# Patient Record
Sex: FEMALE | Race: WHITE | Employment: OTHER | URBAN - METROPOLITAN AREA
[De-identification: names, ages, dates, MRNs, and addresses within clinical notes are randomized per-mention and may not be internally consistent; named-entity substitution may affect disease eponyms.]

---

## 2017-10-04 PROBLEM — N39.0 ACUTE UTI (URINARY TRACT INFECTION): Status: ACTIVE | Noted: 2017-10-04

## 2017-10-04 PROBLEM — R62.7 FTT (FAILURE TO THRIVE) IN ADULT: Status: ACTIVE | Noted: 2017-10-04

## 2017-10-04 PROBLEM — E10.65 TYPE 1 DIABETES MELLITUS WITH HYPERGLYCEMIA (HCC): Status: ACTIVE | Noted: 2017-10-04

## 2017-10-04 PROBLEM — E66.01 MORBID OBESITY WITH BMI OF 40.0-44.9, ADULT (HCC): Status: ACTIVE | Noted: 2017-10-04

## 2017-10-04 PROBLEM — E87.5 HYPERKALEMIA: Status: ACTIVE | Noted: 2017-10-04

## 2017-10-04 PROBLEM — N17.9 AKI (ACUTE KIDNEY INJURY) (HCC): Status: ACTIVE | Noted: 2017-10-04

## 2017-10-07 PROBLEM — L97.919 CHRONIC VENOUS HYPERTENSION (IDIOPATHIC) WITH ULCER AND INFLAMMATION OF BILATERAL LOWER EXTREMITY (HCC): Status: ACTIVE | Noted: 2017-10-07

## 2017-10-07 PROBLEM — L97.929 CHRONIC VENOUS HYPERTENSION (IDIOPATHIC) WITH ULCER AND INFLAMMATION OF BILATERAL LOWER EXTREMITY (HCC): Status: ACTIVE | Noted: 2017-10-07

## 2017-10-07 PROBLEM — I87.333 CHRONIC VENOUS HYPERTENSION (IDIOPATHIC) WITH ULCER AND INFLAMMATION OF BILATERAL LOWER EXTREMITY (HCC): Status: ACTIVE | Noted: 2017-10-07

## 2018-04-12 PROBLEM — E87.1 HYPONATREMIA: Status: ACTIVE | Noted: 2018-04-12

## 2018-04-13 PROBLEM — E11.9 DMII (DIABETES MELLITUS, TYPE 2) (HCC): Status: ACTIVE | Noted: 2018-04-13

## 2018-04-13 PROBLEM — N30.00 ACUTE CYSTITIS: Status: ACTIVE | Noted: 2018-04-13

## 2018-04-13 PROBLEM — R79.89 PRERENAL AZOTEMIA: Status: ACTIVE | Noted: 2018-04-13

## 2018-04-13 PROBLEM — R53.1 GENERALIZED WEAKNESS: Status: ACTIVE | Noted: 2018-04-13

## 2018-04-13 PROBLEM — N17.9 ACUTE RENAL FAILURE (ARF) (HCC): Status: ACTIVE | Noted: 2018-04-13

## 2018-04-13 PROBLEM — L03.115 BILATERAL LOWER LEG CELLULITIS: Status: ACTIVE | Noted: 2018-04-13

## 2018-04-13 PROBLEM — R63.0 POOR APPETITE: Status: ACTIVE | Noted: 2018-04-13

## 2018-04-13 PROBLEM — L03.116 BILATERAL LOWER LEG CELLULITIS: Status: ACTIVE | Noted: 2018-04-13

## 2018-04-13 PROBLEM — E86.0 DEHYDRATION: Status: ACTIVE | Noted: 2018-04-13

## 2018-04-13 PROBLEM — E44.0 PROTEIN-CALORIE MALNUTRITION, MODERATE (HCC): Status: ACTIVE | Noted: 2018-04-13

## 2018-04-14 PROBLEM — R31.9 URINARY TRACT INFECTION WITH HEMATURIA: Status: ACTIVE | Noted: 2017-10-04

## 2018-04-23 ENCOUNTER — TELEPHONE (OUTPATIENT)
Dept: CARDIOLOGY CLINIC | Age: 73
End: 2018-04-23

## 2018-04-26 ENCOUNTER — TELEPHONE (OUTPATIENT)
Dept: PHARMACY | Facility: CLINIC | Age: 73
End: 2018-04-26

## 2018-04-26 LAB — INR BLD: 1.2

## 2018-04-27 ENCOUNTER — ANTI-COAG VISIT (OUTPATIENT)
Dept: PHARMACY | Facility: CLINIC | Age: 73
End: 2018-04-27

## 2018-04-30 ENCOUNTER — ANTI-COAG VISIT (OUTPATIENT)
Dept: PHARMACY | Facility: CLINIC | Age: 73
End: 2018-04-30

## 2018-04-30 DIAGNOSIS — I48.0 PAF (PAROXYSMAL ATRIAL FIBRILLATION) (HCC): ICD-10-CM

## 2018-04-30 LAB — INR BLD: 4.9

## 2018-05-02 ENCOUNTER — ANTI-COAG VISIT (OUTPATIENT)
Dept: PHARMACY | Facility: CLINIC | Age: 73
End: 2018-05-02

## 2018-05-02 ENCOUNTER — TELEPHONE (OUTPATIENT)
Dept: CARDIOLOGY CLINIC | Age: 73
End: 2018-05-02

## 2018-05-02 LAB — INR BLD: 6.2

## 2018-05-04 ENCOUNTER — ANTI-COAG VISIT (OUTPATIENT)
Dept: PHARMACY | Facility: CLINIC | Age: 73
End: 2018-05-04

## 2018-05-04 DIAGNOSIS — I48.0 PAF (PAROXYSMAL ATRIAL FIBRILLATION) (HCC): ICD-10-CM

## 2018-05-04 LAB — INR BLD: 4.8

## 2018-05-07 ENCOUNTER — OFFICE VISIT (OUTPATIENT)
Dept: CARDIOLOGY CLINIC | Age: 73
End: 2018-05-07

## 2018-05-07 ENCOUNTER — ANTI-COAG VISIT (OUTPATIENT)
Dept: PHARMACY | Facility: CLINIC | Age: 73
End: 2018-05-07

## 2018-05-07 VITALS
WEIGHT: 181 LBS | SYSTOLIC BLOOD PRESSURE: 130 MMHG | HEIGHT: 59 IN | DIASTOLIC BLOOD PRESSURE: 70 MMHG | HEART RATE: 71 BPM | OXYGEN SATURATION: 95 % | BODY MASS INDEX: 36.49 KG/M2

## 2018-05-07 DIAGNOSIS — L97.919 CHRONIC VENOUS HYPERTENSION (IDIOPATHIC) WITH ULCER AND INFLAMMATION OF BILATERAL LOWER EXTREMITY (HCC): ICD-10-CM

## 2018-05-07 DIAGNOSIS — L97.929 CHRONIC VENOUS HYPERTENSION (IDIOPATHIC) WITH ULCER AND INFLAMMATION OF BILATERAL LOWER EXTREMITY (HCC): ICD-10-CM

## 2018-05-07 DIAGNOSIS — I87.333 CHRONIC VENOUS HYPERTENSION (IDIOPATHIC) WITH ULCER AND INFLAMMATION OF BILATERAL LOWER EXTREMITY (HCC): ICD-10-CM

## 2018-05-07 DIAGNOSIS — D64.9 CHRONIC ANEMIA: ICD-10-CM

## 2018-05-07 DIAGNOSIS — E87.1 HYPONATREMIA: ICD-10-CM

## 2018-05-07 DIAGNOSIS — I48.0 PAF (PAROXYSMAL ATRIAL FIBRILLATION) (HCC): Primary | ICD-10-CM

## 2018-05-07 DIAGNOSIS — I50.32 CHRONIC DIASTOLIC HEART FAILURE (HCC): ICD-10-CM

## 2018-05-07 DIAGNOSIS — I10 ESSENTIAL HYPERTENSION: ICD-10-CM

## 2018-05-07 LAB — INR BLD: 4.5

## 2018-05-07 PROCEDURE — G8427 DOCREV CUR MEDS BY ELIG CLIN: HCPCS | Performed by: NURSE PRACTITIONER

## 2018-05-07 PROCEDURE — G8400 PT W/DXA NO RESULTS DOC: HCPCS | Performed by: NURSE PRACTITIONER

## 2018-05-07 PROCEDURE — 1111F DSCHRG MED/CURRENT MED MERGE: CPT | Performed by: NURSE PRACTITIONER

## 2018-05-07 PROCEDURE — G8417 CALC BMI ABV UP PARAM F/U: HCPCS | Performed by: NURSE PRACTITIONER

## 2018-05-07 PROCEDURE — 4040F PNEUMOC VAC/ADMIN/RCVD: CPT | Performed by: NURSE PRACTITIONER

## 2018-05-07 PROCEDURE — 1036F TOBACCO NON-USER: CPT | Performed by: NURSE PRACTITIONER

## 2018-05-07 PROCEDURE — 99214 OFFICE O/P EST MOD 30 MIN: CPT | Performed by: NURSE PRACTITIONER

## 2018-05-07 PROCEDURE — G8598 ASA/ANTIPLAT THER USED: HCPCS | Performed by: NURSE PRACTITIONER

## 2018-05-07 PROCEDURE — 1123F ACP DISCUSS/DSCN MKR DOCD: CPT | Performed by: NURSE PRACTITIONER

## 2018-05-07 PROCEDURE — 1090F PRES/ABSN URINE INCON ASSESS: CPT | Performed by: NURSE PRACTITIONER

## 2018-05-07 PROCEDURE — 3017F COLORECTAL CA SCREEN DOC REV: CPT | Performed by: NURSE PRACTITIONER

## 2018-05-07 PROCEDURE — 93000 ELECTROCARDIOGRAM COMPLETE: CPT | Performed by: NURSE PRACTITIONER

## 2018-05-10 ENCOUNTER — ANTI-COAG VISIT (OUTPATIENT)
Dept: PHARMACY | Facility: CLINIC | Age: 73
End: 2018-05-10

## 2018-05-10 DIAGNOSIS — I48.0 PAF (PAROXYSMAL ATRIAL FIBRILLATION) (HCC): ICD-10-CM

## 2018-05-13 PROBLEM — E86.0 DEHYDRATION: Status: RESOLVED | Noted: 2018-04-13 | Resolved: 2018-05-13

## 2018-05-14 ENCOUNTER — TELEPHONE (OUTPATIENT)
Dept: CARDIOLOGY CLINIC | Age: 73
End: 2018-05-14

## 2018-05-17 ENCOUNTER — PAT TELEPHONE (OUTPATIENT)
Dept: PREADMISSION TESTING | Age: 73
End: 2018-05-17

## 2018-05-18 ENCOUNTER — SURG/PROC ORDERS (OUTPATIENT)
Dept: ANESTHESIOLOGY | Age: 73
End: 2018-05-18

## 2018-05-18 RX ORDER — SODIUM CHLORIDE 9 MG/ML
INJECTION, SOLUTION INTRAVENOUS CONTINUOUS
Status: CANCELLED | OUTPATIENT
Start: 2018-05-18

## 2018-05-18 RX ORDER — SODIUM CHLORIDE 0.9 % (FLUSH) 0.9 %
10 SYRINGE (ML) INJECTION EVERY 12 HOURS SCHEDULED
Status: CANCELLED | OUTPATIENT
Start: 2018-05-18

## 2018-05-18 RX ORDER — SODIUM CHLORIDE 0.9 % (FLUSH) 0.9 %
10 SYRINGE (ML) INJECTION PRN
Status: CANCELLED | OUTPATIENT
Start: 2018-05-18

## 2018-05-21 ENCOUNTER — HOSPITAL ENCOUNTER (OUTPATIENT)
Dept: SURGERY | Age: 73
Discharge: HOME | End: 2018-05-21
Admitting: UROLOGY

## 2018-05-21 VITALS
HEART RATE: 93 BPM | HEIGHT: 59 IN | OXYGEN SATURATION: 99 % | DIASTOLIC BLOOD PRESSURE: 79 MMHG | RESPIRATION RATE: 20 BRPM | WEIGHT: 181 LBS | SYSTOLIC BLOOD PRESSURE: 154 MMHG | BODY MASS INDEX: 36.49 KG/M2 | TEMPERATURE: 98.6 F

## 2018-05-21 LAB
ANION GAP SERPL CALCULATED.3IONS-SCNC: 13 MMOL/L (ref 3–16)
APTT: 30.6 SEC (ref 24.1–34.9)
BUN BLDV-MCNC: 9 MG/DL (ref 7–20)
CALCIUM SERPL-MCNC: 8.1 MG/DL (ref 8.3–10.6)
CHLORIDE BLD-SCNC: 105 MMOL/L (ref 99–110)
CO2: 26 MMOL/L (ref 21–32)
CREAT SERPL-MCNC: 1 MG/DL (ref 0.6–1.2)
GFR AFRICAN AMERICAN: >60
GFR NON-AFRICAN AMERICAN: 54
GLUCOSE BLD-MCNC: 146 MG/DL (ref 70–99)
GLUCOSE BLD-MCNC: 155 MG/DL (ref 70–99)
HCT VFR BLD CALC: 25.7 % (ref 36–48)
HEMOGLOBIN: 9 G/DL (ref 12–16)
INR BLD: 1.05 (ref 0.85–1.15)
MCH RBC QN AUTO: 33 PG (ref 26–34)
MCHC RBC AUTO-ENTMCNC: 34.9 G/DL (ref 31–36)
MCV RBC AUTO: 94.4 FL (ref 80–100)
PDW BLD-RTO: 14 % (ref 12.4–15.4)
PERFORMED ON: ABNORMAL
PLATELET # BLD: 215 K/UL (ref 135–450)
PMV BLD AUTO: 8.7 FL (ref 5–10.5)
POTASSIUM REFLEX MAGNESIUM: 4.3 MMOL/L (ref 3.5–5.1)
PROTHROMBIN TIME: 11.9 SEC (ref 9.6–13)
RBC # BLD: 2.72 M/UL (ref 4–5.2)
SODIUM BLD-SCNC: 144 MMOL/L (ref 136–145)
WBC # BLD: 6.1 K/UL (ref 4–11)

## 2018-05-21 RX ORDER — SODIUM CHLORIDE 0.9 % (FLUSH) 0.9 %
10 SYRINGE (ML) INJECTION EVERY 12 HOURS SCHEDULED
Status: DISCONTINUED | OUTPATIENT
Start: 2018-05-21 | End: 2018-05-22 | Stop reason: HOSPADM

## 2018-05-21 RX ORDER — FENTANYL CITRATE 50 UG/ML
25 INJECTION, SOLUTION INTRAMUSCULAR; INTRAVENOUS EVERY 5 MIN PRN
Status: DISCONTINUED | OUTPATIENT
Start: 2018-05-21 | End: 2018-05-22 | Stop reason: HOSPADM

## 2018-05-21 RX ORDER — SODIUM CHLORIDE 0.9 % (FLUSH) 0.9 %
10 SYRINGE (ML) INJECTION EVERY 12 HOURS SCHEDULED
Status: DISCONTINUED | OUTPATIENT
Start: 2018-05-21 | End: 2018-05-21 | Stop reason: SDUPTHER

## 2018-05-21 RX ORDER — OXYCODONE HYDROCHLORIDE AND ACETAMINOPHEN 5; 325 MG/1; MG/1
1 TABLET ORAL PRN
Status: ACTIVE | OUTPATIENT
Start: 2018-05-21 | End: 2018-05-21

## 2018-05-21 RX ORDER — ONDANSETRON 2 MG/ML
4 INJECTION INTRAMUSCULAR; INTRAVENOUS
Status: ACTIVE | OUTPATIENT
Start: 2018-05-21 | End: 2018-05-21

## 2018-05-21 RX ORDER — SODIUM CHLORIDE 9 MG/ML
INJECTION, SOLUTION INTRAVENOUS CONTINUOUS
Status: DISCONTINUED | OUTPATIENT
Start: 2018-05-21 | End: 2018-05-21 | Stop reason: SDUPTHER

## 2018-05-21 RX ORDER — SODIUM CHLORIDE 0.9 % (FLUSH) 0.9 %
10 SYRINGE (ML) INJECTION PRN
Status: DISCONTINUED | OUTPATIENT
Start: 2018-05-21 | End: 2018-05-21 | Stop reason: SDUPTHER

## 2018-05-21 RX ORDER — OXYCODONE HYDROCHLORIDE AND ACETAMINOPHEN 5; 325 MG/1; MG/1
2 TABLET ORAL PRN
Status: ACTIVE | OUTPATIENT
Start: 2018-05-21 | End: 2018-05-21

## 2018-05-21 RX ORDER — SODIUM CHLORIDE 9 MG/ML
INJECTION, SOLUTION INTRAVENOUS CONTINUOUS
Status: DISCONTINUED | OUTPATIENT
Start: 2018-05-21 | End: 2018-05-22 | Stop reason: HOSPADM

## 2018-05-21 RX ORDER — SODIUM CHLORIDE 0.9 % (FLUSH) 0.9 %
10 SYRINGE (ML) INJECTION PRN
Status: DISCONTINUED | OUTPATIENT
Start: 2018-05-21 | End: 2018-05-22 | Stop reason: HOSPADM

## 2018-05-21 RX ADMIN — SODIUM CHLORIDE: 9 INJECTION, SOLUTION INTRAVENOUS at 14:34

## 2018-05-21 ASSESSMENT — PAIN SCALES - GENERAL
PAINLEVEL_OUTOF10: 0

## 2018-05-21 ASSESSMENT — PAIN - FUNCTIONAL ASSESSMENT: PAIN_FUNCTIONAL_ASSESSMENT: 0-10

## 2018-05-21 ASSESSMENT — PAIN DESCRIPTION - PAIN TYPE
TYPE: SURGICAL PAIN
TYPE: SURGICAL PAIN

## 2018-05-21 ASSESSMENT — PAIN DESCRIPTION - DESCRIPTORS: DESCRIPTORS: ACHING

## 2018-05-21 ASSESSMENT — ENCOUNTER SYMPTOMS: SHORTNESS OF BREATH: 0

## 2018-06-21 ENCOUNTER — TELEPHONE (OUTPATIENT)
Dept: CARDIOLOGY CLINIC | Age: 73
End: 2018-06-21

## 2018-06-21 NOTE — TELEPHONE ENCOUNTER
Spoke with Meliza (daughter) and Herlinda Mccormick GIANCARLO [facility]) on speaker phone in regards to restarting anticoagulants. Herlinda cMcormick is concerned since Nadir Quiroz is a fall risk and has a history of anemia. She recommended her taking  mg. Meliza states the Xarelto will cost $142/month which is cost prohibitive. The are both concerned with her being on Coumadin. We discussed in detail the risk of stroke with a CHADS Vasc score of at least 7 (CHF, age, gender, HTN, DM,atherosclerosis). Meliza and Senia plan to talk with Nadir Quiroz and will return call to the office.

## 2018-07-13 ENCOUNTER — ANTI-COAG VISIT (OUTPATIENT)
Dept: PHARMACY | Facility: CLINIC | Age: 73
End: 2018-07-13

## 2018-09-17 ENCOUNTER — OFFICE VISIT (OUTPATIENT)
Dept: CARDIOLOGY CLINIC | Age: 73
End: 2018-09-17

## 2018-09-17 VITALS
OXYGEN SATURATION: 98 % | BODY MASS INDEX: 37.5 KG/M2 | SYSTOLIC BLOOD PRESSURE: 130 MMHG | WEIGHT: 186 LBS | HEART RATE: 70 BPM | DIASTOLIC BLOOD PRESSURE: 60 MMHG | HEIGHT: 59 IN

## 2018-09-17 DIAGNOSIS — I48.0 PAF (PAROXYSMAL ATRIAL FIBRILLATION) (HCC): Primary | ICD-10-CM

## 2018-09-17 DIAGNOSIS — I25.10 CORONARY ARTERY CALCIFICATION: ICD-10-CM

## 2018-09-17 DIAGNOSIS — I50.32 CHRONIC DIASTOLIC HEART FAILURE (HCC): ICD-10-CM

## 2018-09-17 DIAGNOSIS — I25.84 CORONARY ARTERY CALCIFICATION: ICD-10-CM

## 2018-09-17 DIAGNOSIS — I10 ESSENTIAL HYPERTENSION: ICD-10-CM

## 2018-09-17 PROCEDURE — 1090F PRES/ABSN URINE INCON ASSESS: CPT | Performed by: INTERNAL MEDICINE

## 2018-09-17 PROCEDURE — 1101F PT FALLS ASSESS-DOCD LE1/YR: CPT | Performed by: INTERNAL MEDICINE

## 2018-09-17 PROCEDURE — 1036F TOBACCO NON-USER: CPT | Performed by: INTERNAL MEDICINE

## 2018-09-17 PROCEDURE — 3017F COLORECTAL CA SCREEN DOC REV: CPT | Performed by: INTERNAL MEDICINE

## 2018-09-17 PROCEDURE — G8400 PT W/DXA NO RESULTS DOC: HCPCS | Performed by: INTERNAL MEDICINE

## 2018-09-17 PROCEDURE — G8427 DOCREV CUR MEDS BY ELIG CLIN: HCPCS | Performed by: INTERNAL MEDICINE

## 2018-09-17 PROCEDURE — 1123F ACP DISCUSS/DSCN MKR DOCD: CPT | Performed by: INTERNAL MEDICINE

## 2018-09-17 PROCEDURE — G8598 ASA/ANTIPLAT THER USED: HCPCS | Performed by: INTERNAL MEDICINE

## 2018-09-17 PROCEDURE — 4040F PNEUMOC VAC/ADMIN/RCVD: CPT | Performed by: INTERNAL MEDICINE

## 2018-09-17 PROCEDURE — G8417 CALC BMI ABV UP PARAM F/U: HCPCS | Performed by: INTERNAL MEDICINE

## 2018-09-17 PROCEDURE — 99214 OFFICE O/P EST MOD 30 MIN: CPT | Performed by: INTERNAL MEDICINE

## 2018-09-17 RX ORDER — ASPIRIN 325 MG
325 TABLET ORAL DAILY
COMMUNITY
Start: 2018-09-17 | End: 2019-08-22

## 2018-09-17 NOTE — PATIENT INSTRUCTIONS
Patient Education        Heart-Healthy Diet: Care Instructions  Your Care Instructions    A heart-healthy diet has lots of vegetables, fruits, nuts, beans, and whole grains, and is low in salt. It limits foods that are high in saturated fat, such as meats, cheeses, and fried foods. It may be hard to change your diet, but even small changes can lower your risk of heart attack and heart disease. Follow-up care is a key part of your treatment and safety. Be sure to make and go to all appointments, and call your doctor if you are having problems. It's also a good idea to know your test results and keep a list of the medicines you take. How can you care for yourself at home? Watch your portions  · Learn what a serving is. A \"serving\" and a \"portion\" are not always the same thing. Make sure that you are not eating larger portions than are recommended. For example, a serving of pasta is ½ cup. A serving size of meat is 2 to 3 ounces. A 3-ounce serving is about the size of a deck of cards. Measure serving sizes until you are good at [place]" them. Keep in mind that restaurants often serve portions that are 2 or 3 times the size of one serving. · To keep your energy level up and keep you from feeling hungry, eat often but in smaller portions. · Eat only the number of calories you need to stay at a healthy weight. If you need to lose weight, eat fewer calories than your body burns (through exercise and other physical activity). Eat more fruits and vegetables  · Eat a variety of fruit and vegetables every day. Dark green, deep orange, red, or yellow fruits and vegetables are especially good for you. Examples include spinach, carrots, peaches, and berries. · Keep carrots, celery, and other veggies handy for snacks. Buy fruit that is in season and store it where you can see it so that you will be tempted to eat it. · Cook dishes that have a lot of veggies in them, such as stir-fries and soups.   Limit saturated and

## 2018-09-17 NOTE — PROGRESS NOTES
[address]      Cardiology Consult    Flako Cutlre  1945    September 17, 2018      Reason for Referral: CHF management     CC: \"I've been doing fine\"    HPI:  The patient is 74 y.o. female with a past medical history significant for diabetes mellitus, hypertension, and diastolic heart failure who presents for heart failure management and PAF. She was hospitalized 4/12/2018-4/19/2018 with dehydration, acute renal failure, hyperkalemia, bilateral LE cellulitis, atrial fib, and severe hyponatremia. She followed up with Chava Reynolds NP after discharge. She had a suprapubic catheter placed as an outpatient. Her INR remained supra therapeutic and she was started on Xarelto. She is not an ideal candidate for anticoagulation due to frequent falls and anemia. She is accompanied by her daughter and is currently living with her. She denies any new cardiac sounding complaints. She reports compliance with her medications. She reports frequent falls and utilizes a walker. She and her daughter Danyel Sheridan ultimately decided to discontinue anticoagulation. She prefers conservative therapies and does not wish to pursue Watchman device. She denies worsening of chronic SHINE. She states since having her suprapubic catheter placed her LE edema has improved. She also wears support stockings. Patient denies exertional chest pain/pressure, dyspnea at rest, PND, orthopnea, palpitations, lightheadedness, and syncope.     Past Medical History:   Diagnosis Date    Acute MI ([address]     Atrial fibrillation ([address]     CAD (coronary artery disease)     CHF (congestive heart failure) (HCC)     Chronic back pain     Chronic kidney disease     Diabetes mellitus ([address]     Hyperlipidemia     Hypertension     MRSA (methicillin resistant staph aureus) culture positive 04/13/2018    leg ulcer    MRSA colonization 04/13/2018    Osteoarthritis     Pancreatitis     Sepsis ([address]     Stasis ulcer ([address]     bilateral lower and no thyromegaly present. No lymphadenopathy present. Cardiovascular: Normal rate. Normal heart sounds. Exam reveals no gallop and no friction rub. No murmur heard. Pulmonary/Chest: Effort normal and breath sounds normal. No respiratory distress. She has no wheezes, rhonchi or rales. Abdominal: Soft, non-tender. Bowel sounds are normal. She exhibits no organomegaly, mass or bruit. Extremities: 1+ BLE edema. No cyanosis or clubbing. Pulses are 2+ radial/dorsalis pedis/posterior tibial/carotid bilaterally. Left leg wound, support stocking. Neurological: No gross cranial nerve deficit. Coordination normal.   Skin: Skin is warm and dry. There is no rash or diaphoresis. Psychiatric: She has a normal mood and affect. Her speech is normal and behavior is normal.     Lab Review:   FLP:  No results found for: TRIG, HDL, LDLCALC, LDLDIRECT, LABVLDL   Lipids 7/30/18 ([facility]) Trig 124, HDL 52, LDL 41    BUN/Creatinine:    Lab Results   Component Value Date    BUN 9 05/21/2018    CREATININE 1.0 05/21/2018 7/30/18 ([facility]) Cr 1.10, BUN 13, K 3.9, Na 139  7/30/18 ([facility]) H&H     EKG Interpretation:     Image Review:  CT 8/2017 Regenia Schirmer)   VESSELS:  Moderate atherosclerotic disease. No abdominal aortic aneurysm. Echo 4/2018:  Normal left ventricle size, wall thickness, and systolic function with an estimated ejection fraction of 55-60%. No regional wall motion abnormalities are seen. Normal right ventricular size and function. Mild mitral regurgitation.     ECG 5/7/2018:  Sinus rhythm     Assessment:     1) Paroxsymal atrial fibrillation. Asymptomatic. CHADS Vasc score at least 7 (CHF, age, gender, HTN, DM, atherosclerosis). Treatment options for atrial fibrillation discussed including rate control, anticoagulation, and antiarrhythmics. She is not and ideal candidate for anticoagulation secondary to fall risk and anemia. She does not want to take full anticoagulation and prefers ASA only.  She is aware of the increased risk for CVA. Discussed the watchman procedure but she prefers conservative therapies and would not like to undergo any additional procedures.    2) Chronic diastolic heart failure. Pt appears compensated today. Encouraged a low sodium diet.    3) Essential hypertension. Controlled. Goal BP <130/80. Continue medical therapy.    4) Coronary artery calcifications/CAD. Asymptomatic. Noted on CT. Continue with medical management and risk factor modification including aspirin, statin, and B-blocker. 5) Anemia. Chronic issue. Hgb stable from 6/2018. Follow up in 6 months. Thank you very much for allowing me to participate in the care of your patient. Please do not hesitate to contact me if you have any questions. Sincerely,  Denzel Marie. Vidya Patient, [address] [place] [address]  Ph: [phone]  Fax: [phone]      This note was scribed in the presence of Dr Vidya Burgess MD by Saul Guillen RN. Physician Attestation: The scribes documentation has been prepared under my direction and personally reviewed by me it its entirety. I confirm that the note above accurately reflects all work, treatment, procedures, and medical decision making performed by me.

## 2018-10-14 ENCOUNTER — APPOINTMENT (OUTPATIENT)
Dept: GENERAL RADIOLOGY | Age: 73
DRG: 637 | End: 2018-10-14
Payer: MEDICARE

## 2018-10-14 ENCOUNTER — HOSPITAL ENCOUNTER (INPATIENT)
Age: 73
LOS: 9 days | Discharge: HOME HEALTH | DRG: 637 | End: 2018-10-23
Attending: EMERGENCY MEDICINE
Payer: MEDICARE

## 2018-10-14 DIAGNOSIS — R41.82 ALTERED MENTAL STATUS, UNSPECIFIED ALTERED MENTAL STATUS TYPE: ICD-10-CM

## 2018-10-14 DIAGNOSIS — L03.116 CELLULITIS OF BOTH LOWER EXTREMITIES: Primary | ICD-10-CM

## 2018-10-14 DIAGNOSIS — L03.115 CELLULITIS OF BOTH LOWER EXTREMITIES: Primary | ICD-10-CM

## 2018-10-14 DIAGNOSIS — M86.9 OSTEOMYELITIS OF RIGHT TIBIA, UNSPECIFIED TYPE (HCC): ICD-10-CM

## 2018-10-14 DIAGNOSIS — M86.9 OSTEOMYELITIS OF LEFT TIBIA, UNSPECIFIED TYPE (HCC): ICD-10-CM

## 2018-10-14 PROBLEM — L03.90 CELLULITIS: Status: ACTIVE | Noted: 2018-10-14

## 2018-10-14 LAB
A/G RATIO: 1.1 (ref 1.1–2.2)
ALBUMIN SERPL-MCNC: 3.5 G/DL (ref 3.4–5)
ALP BLD-CCNC: 125 U/L (ref 40–129)
ALT SERPL-CCNC: 9 U/L (ref 10–40)
ANION GAP SERPL CALCULATED.3IONS-SCNC: 12 MMOL/L (ref 3–16)
AST SERPL-CCNC: 13 U/L (ref 15–37)
BASOPHILS ABSOLUTE: 0.1 K/UL (ref 0–0.2)
BASOPHILS RELATIVE PERCENT: 0.9 %
BILIRUB SERPL-MCNC: <0.2 MG/DL (ref 0–1)
BILIRUBIN URINE: NEGATIVE
BLOOD, URINE: ABNORMAL
BUN BLDV-MCNC: 33 MG/DL (ref 7–20)
CALCIUM SERPL-MCNC: 8.4 MG/DL (ref 8.3–10.6)
CHLORIDE BLD-SCNC: 104 MMOL/L (ref 99–110)
CLARITY: CLEAR
CO2: 21 MMOL/L (ref 21–32)
COLOR: YELLOW
CREAT SERPL-MCNC: 1.5 MG/DL (ref 0.6–1.2)
EOSINOPHILS ABSOLUTE: 0.3 K/UL (ref 0–0.6)
EOSINOPHILS RELATIVE PERCENT: 4.9 %
EPITHELIAL CELLS, UA: 1 /HPF (ref 0–5)
FOLATE: 14.12 NG/ML (ref 4.78–24.2)
GFR AFRICAN AMERICAN: 41
GFR NON-AFRICAN AMERICAN: 34
GLOBULIN: 3.2 G/DL
GLUCOSE BLD-MCNC: 147 MG/DL (ref 70–99)
GLUCOSE BLD-MCNC: 160 MG/DL (ref 70–99)
GLUCOSE BLD-MCNC: 204 MG/DL (ref 70–99)
GLUCOSE URINE: NEGATIVE MG/DL
HCT VFR BLD CALC: 25 % (ref 36–48)
HEMOGLOBIN: 8.4 G/DL (ref 12–16)
HYALINE CASTS: 0 /LPF (ref 0–8)
KETONES, URINE: NEGATIVE MG/DL
LACTIC ACID, SEPSIS: 0.7 MMOL/L (ref 0.4–1.9)
LACTIC ACID, SEPSIS: 1.5 MMOL/L (ref 0.4–1.9)
LEUKOCYTE ESTERASE, URINE: NEGATIVE
LYMPHOCYTES ABSOLUTE: 1 K/UL (ref 1–5.1)
LYMPHOCYTES RELATIVE PERCENT: 14.9 %
MCH RBC QN AUTO: 32.5 PG (ref 26–34)
MCHC RBC AUTO-ENTMCNC: 33.4 G/DL (ref 31–36)
MCV RBC AUTO: 97.3 FL (ref 80–100)
MICROSCOPIC EXAMINATION: YES
MONOCYTES ABSOLUTE: 0.4 K/UL (ref 0–1.3)
MONOCYTES RELATIVE PERCENT: 6.3 %
NEUTROPHILS ABSOLUTE: 5 K/UL (ref 1.7–7.7)
NEUTROPHILS RELATIVE PERCENT: 73 %
NITRITE, URINE: NEGATIVE
PDW BLD-RTO: 12.9 % (ref 12.4–15.4)
PERFORMED ON: ABNORMAL
PERFORMED ON: ABNORMAL
PH UA: 5.5
PLATELET # BLD: 177 K/UL (ref 135–450)
PMV BLD AUTO: 8.9 FL (ref 5–10.5)
POTASSIUM REFLEX MAGNESIUM: 4.6 MMOL/L (ref 3.5–5.1)
PRO-BNP: 1349 PG/ML (ref 0–449)
PROTEIN UA: NEGATIVE MG/DL
RBC # BLD: 2.57 M/UL (ref 4–5.2)
RBC UA: 1 /HPF (ref 0–4)
SODIUM BLD-SCNC: 137 MMOL/L (ref 136–145)
SPECIFIC GRAVITY UA: 1.01
TOTAL PROTEIN: 6.7 G/DL (ref 6.4–8.2)
TROPONIN: <0.01 NG/ML
URINE REFLEX TO CULTURE: ABNORMAL
URINE TYPE: ABNORMAL
UROBILINOGEN, URINE: 0.2 E.U./DL
VITAMIN B-12: 406 PG/ML (ref 211–911)
WBC # BLD: 6.8 K/UL (ref 4–11)
WBC UA: 2 /HPF (ref 0–5)

## 2018-10-14 PROCEDURE — 80053 COMPREHEN METABOLIC PANEL: CPT

## 2018-10-14 PROCEDURE — 84484 ASSAY OF TROPONIN QUANT: CPT

## 2018-10-14 PROCEDURE — 73590 X-RAY EXAM OF LOWER LEG: CPT

## 2018-10-14 PROCEDURE — 83036 HEMOGLOBIN GLYCOSYLATED A1C: CPT

## 2018-10-14 PROCEDURE — 6360000002 HC RX W HCPCS: Performed by: HOSPITALIST

## 2018-10-14 PROCEDURE — 87040 BLOOD CULTURE FOR BACTERIA: CPT

## 2018-10-14 PROCEDURE — 99285 EMERGENCY DEPT VISIT HI MDM: CPT

## 2018-10-14 PROCEDURE — 82607 VITAMIN B-12: CPT

## 2018-10-14 PROCEDURE — 6370000000 HC RX 637 (ALT 250 FOR IP): Performed by: HOSPITALIST

## 2018-10-14 PROCEDURE — 6360000002 HC RX W HCPCS: Performed by: PHYSICIAN ASSISTANT

## 2018-10-14 PROCEDURE — 2580000003 HC RX 258: Performed by: PHYSICIAN ASSISTANT

## 2018-10-14 PROCEDURE — 36415 COLL VENOUS BLD VENIPUNCTURE: CPT

## 2018-10-14 PROCEDURE — 85025 COMPLETE CBC W/AUTO DIFF WBC: CPT

## 2018-10-14 PROCEDURE — 93005 ELECTROCARDIOGRAM TRACING: CPT | Performed by: PHYSICIAN ASSISTANT

## 2018-10-14 PROCEDURE — 71045 X-RAY EXAM CHEST 1 VIEW: CPT

## 2018-10-14 PROCEDURE — 82746 ASSAY OF FOLIC ACID SERUM: CPT

## 2018-10-14 PROCEDURE — 81001 URINALYSIS AUTO W/SCOPE: CPT

## 2018-10-14 PROCEDURE — 96367 TX/PROPH/DG ADDL SEQ IV INF: CPT

## 2018-10-14 PROCEDURE — 2580000003 HC RX 258: Performed by: HOSPITALIST

## 2018-10-14 PROCEDURE — 96365 THER/PROPH/DIAG IV INF INIT: CPT

## 2018-10-14 PROCEDURE — 83605 ASSAY OF LACTIC ACID: CPT

## 2018-10-14 PROCEDURE — 1200000000 HC SEMI PRIVATE

## 2018-10-14 PROCEDURE — 6370000000 HC RX 637 (ALT 250 FOR IP): Performed by: PHYSICIAN ASSISTANT

## 2018-10-14 PROCEDURE — 83880 ASSAY OF NATRIURETIC PEPTIDE: CPT

## 2018-10-14 RX ORDER — GABAPENTIN 300 MG/1
300 CAPSULE ORAL DAILY
Status: DISCONTINUED | OUTPATIENT
Start: 2018-10-15 | End: 2018-10-23 | Stop reason: HOSPADM

## 2018-10-14 RX ORDER — POLYETHYLENE GLYCOL 3350 17 G/17G
17 POWDER, FOR SOLUTION ORAL DAILY
Status: DISCONTINUED | OUTPATIENT
Start: 2018-10-15 | End: 2018-10-23 | Stop reason: HOSPADM

## 2018-10-14 RX ORDER — TIMOLOL MALEATE 6.8 MG/ML
1 SOLUTION/ DROPS OPHTHALMIC DAILY
COMMUNITY
End: 2019-08-22

## 2018-10-14 RX ORDER — OXYCODONE AND ACETAMINOPHEN 10; 325 MG/1; MG/1
1 TABLET ORAL 3 TIMES DAILY
Status: DISCONTINUED | OUTPATIENT
Start: 2018-10-14 | End: 2018-10-23 | Stop reason: HOSPADM

## 2018-10-14 RX ORDER — SODIUM CHLORIDE 9 MG/ML
INJECTION, SOLUTION INTRAVENOUS CONTINUOUS
Status: DISCONTINUED | OUTPATIENT
Start: 2018-10-14 | End: 2018-10-15

## 2018-10-14 RX ORDER — CALCIUM CARBONATE 200(500)MG
1000 TABLET,CHEWABLE ORAL DAILY PRN
Status: DISCONTINUED | OUTPATIENT
Start: 2018-10-14 | End: 2018-10-23 | Stop reason: HOSPADM

## 2018-10-14 RX ORDER — ATORVASTATIN CALCIUM 10 MG/1
10 TABLET, FILM COATED ORAL DAILY
Status: DISCONTINUED | OUTPATIENT
Start: 2018-10-15 | End: 2018-10-23 | Stop reason: HOSPADM

## 2018-10-14 RX ORDER — VENLAFAXINE 50 MG/1
150 TABLET ORAL 2 TIMES DAILY
Status: DISCONTINUED | OUTPATIENT
Start: 2018-10-14 | End: 2018-10-23 | Stop reason: HOSPADM

## 2018-10-14 RX ORDER — CEFTRIAXONE 500 MG/1
500 INJECTION, POWDER, FOR SOLUTION INTRAMUSCULAR; INTRAVENOUS 2 TIMES DAILY
Status: ON HOLD | COMMUNITY
Start: 2018-10-12 | End: 2018-10-23 | Stop reason: HOSPADM

## 2018-10-14 RX ORDER — FENTANYL CITRATE 50 UG/ML
25 INJECTION, SOLUTION INTRAMUSCULAR; INTRAVENOUS ONCE
Status: DISCONTINUED | OUTPATIENT
Start: 2018-10-14 | End: 2018-10-14

## 2018-10-14 RX ORDER — POTASSIUM CHLORIDE 7.45 MG/ML
10 INJECTION INTRAVENOUS PRN
Status: DISCONTINUED | OUTPATIENT
Start: 2018-10-14 | End: 2018-10-23 | Stop reason: HOSPADM

## 2018-10-14 RX ORDER — SENNA AND DOCUSATE SODIUM 50; 8.6 MG/1; MG/1
1 TABLET, FILM COATED ORAL DAILY
COMMUNITY
End: 2019-06-08 | Stop reason: ALTCHOICE

## 2018-10-14 RX ORDER — POTASSIUM CHLORIDE 1.5 G/1.77G
40 POWDER, FOR SOLUTION ORAL PRN
Status: DISCONTINUED | OUTPATIENT
Start: 2018-10-14 | End: 2018-10-23 | Stop reason: HOSPADM

## 2018-10-14 RX ORDER — FUROSEMIDE 20 MG/1
20 TABLET ORAL DAILY
Status: DISCONTINUED | OUTPATIENT
Start: 2018-10-15 | End: 2018-10-15

## 2018-10-14 RX ORDER — SODIUM CHLORIDE 0.9 % (FLUSH) 0.9 %
10 SYRINGE (ML) INJECTION PRN
Status: DISCONTINUED | OUTPATIENT
Start: 2018-10-14 | End: 2018-10-23 | Stop reason: HOSPADM

## 2018-10-14 RX ORDER — TIMOLOL MALEATE 5 MG/ML
1 SOLUTION/ DROPS OPHTHALMIC 2 TIMES DAILY
Status: DISCONTINUED | OUTPATIENT
Start: 2018-10-15 | End: 2018-10-21

## 2018-10-14 RX ORDER — CETIRIZINE HYDROCHLORIDE 10 MG/1
10 TABLET ORAL DAILY
Status: DISCONTINUED | OUTPATIENT
Start: 2018-10-15 | End: 2018-10-23 | Stop reason: HOSPADM

## 2018-10-14 RX ORDER — INSULIN GLARGINE 100 [IU]/ML
35 INJECTION, SOLUTION SUBCUTANEOUS NIGHTLY
Status: DISCONTINUED | OUTPATIENT
Start: 2018-10-14 | End: 2018-10-23 | Stop reason: HOSPADM

## 2018-10-14 RX ORDER — POTASSIUM CHLORIDE 20 MEQ/1
40 TABLET, EXTENDED RELEASE ORAL PRN
Status: DISCONTINUED | OUTPATIENT
Start: 2018-10-14 | End: 2018-10-23 | Stop reason: HOSPADM

## 2018-10-14 RX ORDER — DEXTROSE MONOHYDRATE 25 G/50ML
12.5 INJECTION, SOLUTION INTRAVENOUS PRN
Status: DISCONTINUED | OUTPATIENT
Start: 2018-10-14 | End: 2018-10-23 | Stop reason: HOSPADM

## 2018-10-14 RX ORDER — ONDANSETRON 2 MG/ML
4 INJECTION INTRAMUSCULAR; INTRAVENOUS EVERY 6 HOURS PRN
Status: DISCONTINUED | OUTPATIENT
Start: 2018-10-14 | End: 2018-10-23 | Stop reason: HOSPADM

## 2018-10-14 RX ORDER — HYDROCODONE BITARTRATE AND ACETAMINOPHEN 5; 325 MG/1; MG/1
2 TABLET ORAL ONCE
Status: COMPLETED | OUTPATIENT
Start: 2018-10-14 | End: 2018-10-14

## 2018-10-14 RX ORDER — DIPHENHYDRAMINE HYDROCHLORIDE 25 MG/1
1 TABLET ORAL DAILY
Status: DISCONTINUED | OUTPATIENT
Start: 2018-10-14 | End: 2018-10-14

## 2018-10-14 RX ORDER — FLUCONAZOLE 150 MG/1
150 TABLET ORAL ONCE
Status: ON HOLD | COMMUNITY
End: 2018-10-23 | Stop reason: HOSPADM

## 2018-10-14 RX ORDER — ISOSORBIDE MONONITRATE 30 MG/1
30 TABLET, EXTENDED RELEASE ORAL DAILY
Status: DISCONTINUED | OUTPATIENT
Start: 2018-10-15 | End: 2018-10-23 | Stop reason: HOSPADM

## 2018-10-14 RX ORDER — ASPIRIN 81 MG/1
324 TABLET, CHEWABLE ORAL DAILY
Status: DISCONTINUED | OUTPATIENT
Start: 2018-10-15 | End: 2018-10-23 | Stop reason: HOSPADM

## 2018-10-14 RX ORDER — NICOTINE POLACRILEX 4 MG
15 LOZENGE BUCCAL PRN
Status: DISCONTINUED | OUTPATIENT
Start: 2018-10-14 | End: 2018-10-23 | Stop reason: HOSPADM

## 2018-10-14 RX ORDER — FERROUS SULFATE TAB EC 324 MG (65 MG FE EQUIVALENT) 324 (65 FE) MG
324 TABLET DELAYED RESPONSE ORAL
Status: DISCONTINUED | OUTPATIENT
Start: 2018-10-15 | End: 2018-10-23 | Stop reason: HOSPADM

## 2018-10-14 RX ORDER — DEXTROSE MONOHYDRATE 50 MG/ML
100 INJECTION, SOLUTION INTRAVENOUS PRN
Status: DISCONTINUED | OUTPATIENT
Start: 2018-10-14 | End: 2018-10-23 | Stop reason: HOSPADM

## 2018-10-14 RX ORDER — 0.9 % SODIUM CHLORIDE 0.9 %
30 INTRAVENOUS SOLUTION INTRAVENOUS ONCE
Status: COMPLETED | OUTPATIENT
Start: 2018-10-14 | End: 2018-10-14

## 2018-10-14 RX ORDER — SODIUM CHLORIDE 0.9 % (FLUSH) 0.9 %
10 SYRINGE (ML) INJECTION EVERY 12 HOURS SCHEDULED
Status: DISCONTINUED | OUTPATIENT
Start: 2018-10-14 | End: 2018-10-23 | Stop reason: HOSPADM

## 2018-10-14 RX ADMIN — Medication 1250 MG: at 15:13

## 2018-10-14 RX ADMIN — ENOXAPARIN SODIUM 40 MG: 40 INJECTION SUBCUTANEOUS at 20:00

## 2018-10-14 RX ADMIN — Medication 10 ML: at 22:24

## 2018-10-14 RX ADMIN — PIPERACILLIN SODIUM,TAZOBACTAM SODIUM 3.38 G: 3; .375 INJECTION, POWDER, FOR SOLUTION INTRAVENOUS at 14:38

## 2018-10-14 RX ADMIN — HYDROCODONE BITARTRATE AND ACETAMINOPHEN 2 TABLET: 5; 325 TABLET ORAL at 15:19

## 2018-10-14 RX ADMIN — INSULIN GLARGINE 35 UNITS: 100 INJECTION, SOLUTION SUBCUTANEOUS at 22:24

## 2018-10-14 RX ADMIN — SODIUM CHLORIDE 2376 ML: 9 INJECTION, SOLUTION INTRAVENOUS at 14:38

## 2018-10-14 RX ADMIN — INSULIN LISPRO 2 UNITS: 100 INJECTION, SOLUTION INTRAVENOUS; SUBCUTANEOUS at 22:24

## 2018-10-14 RX ADMIN — METOPROLOL TARTRATE 25 MG: 25 TABLET ORAL at 20:00

## 2018-10-14 RX ADMIN — VENLAFAXINE 150 MG: 50 TABLET ORAL at 20:00

## 2018-10-14 RX ADMIN — SODIUM CHLORIDE: 9 INJECTION, SOLUTION INTRAVENOUS at 17:25

## 2018-10-14 RX ADMIN — OXYCODONE HYDROCHLORIDE AND ACETAMINOPHEN 1 TABLET: 10; 325 TABLET ORAL at 20:00

## 2018-10-14 ASSESSMENT — PAIN DESCRIPTION - PAIN TYPE
TYPE: ACUTE PAIN
TYPE: ACUTE PAIN

## 2018-10-14 ASSESSMENT — PAIN DESCRIPTION - ORIENTATION
ORIENTATION: LEFT
ORIENTATION: LEFT;RIGHT
ORIENTATION: RIGHT;LEFT

## 2018-10-14 ASSESSMENT — PAIN DESCRIPTION - DESCRIPTORS
DESCRIPTORS: ACHING;BURNING
DESCRIPTORS: ACHING

## 2018-10-14 ASSESSMENT — PAIN DESCRIPTION - ONSET
ONSET: ON-GOING
ONSET: ON-GOING

## 2018-10-14 ASSESSMENT — PAIN SCALES - GENERAL
PAINLEVEL_OUTOF10: 8
PAINLEVEL_OUTOF10: 8
PAINLEVEL_OUTOF10: 5
PAINLEVEL_OUTOF10: 6
PAINLEVEL_OUTOF10: 0
PAINLEVEL_OUTOF10: 6
PAINLEVEL_OUTOF10: 5

## 2018-10-14 ASSESSMENT — PAIN DESCRIPTION - FREQUENCY
FREQUENCY: CONTINUOUS
FREQUENCY: CONTINUOUS

## 2018-10-14 ASSESSMENT — PAIN DESCRIPTION - LOCATION
LOCATION: LEG

## 2018-10-14 ASSESSMENT — PAIN DESCRIPTION - PROGRESSION
CLINICAL_PROGRESSION: GRADUALLY IMPROVING
CLINICAL_PROGRESSION: NOT CHANGED

## 2018-10-14 NOTE — PROGRESS NOTES
Pt arrived to unit at 1640 . Pt is alert and disoriented to time/situation. Pt/pt family oriented to unit and to room. Pt/pt family oriented to call light and to phone. White board updated. Call light within reach, bed alarm intact. Pt respirations easy/even, lungs clear. Circ cks wdl, cap refill brisk. Multiple red/open areas on LLE, RLE, marked redness present BLE. BLE w/xeroform/ABD/kerlex to prevent wound f/weeping on bed. X1 Stage 2 present rt buttock. X2 Stage 2 wounds present left buttock, X1 stage 2 wound present Left Upper Posterior Thigh. All covered w/mepilex, areas of redness/exoriation on rt buttock/left buttock/left posterior upper thigh covered w/zinc cream. Bilat breast folds w/redness/open areas- interdry placed. Abdominal fold w/redness/excoriation/wetness- interdry placed. Wound care consult obtained per . Large Abdominal hernia present also. Pt denies any further needs at this time. Family at bedside. Will continue to monitor and assess.  Electronically signed by Leyla Arzola RN on 10/14/2018 at 7:42 PM

## 2018-10-14 NOTE — ED PROVIDER NOTES
[address]  [address] [facility] [address]  Dept: [phone]  Loc: [phone]    eMERGENCY dEPARTMENT eNCOUnter      CHIEF COMPLAINT    Chief Complaint   Patient presents with    Urinary Tract Infection     failed outpatient tx. EMS reports daughter states AMS    Cellulitis       HPI    Michael Perry is a 74 y.o. female who presents with redness of the skin located on the lower legs. Onset was 1 week ago. The duration has been constant since the onset. The patient has associated confusion. The pain severity is 8/10. There are no alleviating factors. The context was a spontaneous onset. Patient's daughter mentioned UTI as the patient was recently diagnosed and treated for this. She is concerned that her confusion may be secondary to recurrence of her UTI. REVIEW OF SYSTEMS    Constitutional: no fever, chills or sweats  Respiratory:  No cough or shortness of breath   Cardiovascular:  No chest pain  GI: No vomiting or abdominal pain  Skin: see HPI  All other systems reviewed and are negative.     PAST MEDICAL HISTORY/SURGICAL HISTORY     Past Medical History:   Diagnosis Date    Acute MI ([address]     Atrial fibrillation ([address]     CAD (coronary artery disease)     CHF (congestive heart failure) (HCC)     Chronic back pain     Chronic kidney disease     Diabetes mellitus ([address]     Hyperlipidemia     Hypertension     MRSA (methicillin resistant staph aureus) culture positive 04/13/2018    leg ulcer    MRSA colonization 04/13/2018    Osteoarthritis     Pancreatitis     Sepsis ([address]     Stasis ulcer ([address]     bilateral lower extremities     Type 2 diabetes mellitus without complication ([address]     type 1     Past Surgical History:   Procedure Laterality Date    APPENDECTOMY      BACK SURGERY      x2    CATARACT REMOVAL      bilateral     CHOLECYSTECTOMY      COLONOSCOPY      EYE SURGERY      FRACTURE Again, osteomyelitis is a concern. XR CHEST PORTABLE   Final Result   Stable cardial-pericardial silhouette enlargement. No acute abnormality   detected. PROCEDURES  None     ED COURSE/MEDICAL DECISION MAKING   See EMR for medications prescribed in the ED. Vitals:    10/14/18 1306 10/14/18 1319 10/14/18 1430   BP:  (!) 130/43 (!) 134/55   Pulse: 66 64 61   Resp: 15 15 18   Temp: 97.9 °F (36.6 °C)     TempSrc: Oral     SpO2: 99% 100% 99%   Weight: 174 lb 9.7 oz (79.2 kg)         Differential diagnosis: Necrotizing fasciitis, cellulitis, erysipelas, suppurative thrombophlebitis, aseptic superficial thrombophlebitis, DVT, gout, compartment syndrome, erythema migrans (lyme disease) ,contact dermatitis , lymphedema, other    CRITICAL CARE NOTE:  There was a high probability of clinically significant life-threatening deterioration of the patient's condition requiring my urgent intervention. Total critical care time was at least 15 minutes. This includes vital sign monitoring, pulse oximetry monitoring, telemetry monitoring, clinical response to the IV medications, reviewing the nursing notes, consultation time, dictation/documentation time, and interpretation of the labwork. This excludes any separately billable procedures performed. Patient is afebrile and nontoxic in appearance. Bilateral lower extremity cellulitis on exam.  Plain film findings as above, concerning for osteomyelitis. Labs reveal no leukocytosis. Hemoglobin 8.4, slightly below recent baseline. Lactic acid within normal limits. Creatinine 1.5, elevated above recent baseline. Patient does have history of CKD. Patient's daughter was concerned for possible recurrent UTI, her urinalysis is unremarkable. Consultation with hospitalist at 3 PM: I contacted Dr. Dilcia Lugo via [facility], and the patient was accepted for admission. The patient was also seen and evaluated by the ED attending, Dr. Marine Osgood.  Please see the attending note for further details. FINAL IMPRESSION  1. Cellulitis of both lower extremities    2. Altered mental status, unspecified altered mental status type    3. Osteomyel[facility] of right tibia, unspecified type [facility]    4.  Osteomyelitis of left tibia, unspecified type [facility])        PLAN  Admission to the hospital    (Please note that this note was completed with a voice recognition program.  Every attempt was made to edit the dictations, but inevitably there remain words that are mis-transcribed.)            Rogelio Jain, [address]  10/14/18 5849

## 2018-10-14 NOTE — ED PROVIDER NOTES
Attending Supervisory Note/Shared Visit   I have personally performed a face to face diagnostic evaluation on this patient. I have reviewed the mid-levels findings and agree. History and Exam by me shows An alert white female in no acute distress. Eyes: Conjunctiva are pale. Oropharynx: Pink oral mucosa. Somewhat dry. Heart: Regular rate and rhythm. No murmurs gallops noted. Lungs: Breath sounds equal bilaterally and clear. Abdomen: Obese, soft, nontender. Muscular skeletal: Erythema of the mid and lower left leg which is over the anterior medial lateral aspects. There are multiple superficial lacerations. There is erythema and induration over the posterior medial thigh starting just above the knee extending up to mid thigh. There is some erythema and superficial ulcerations over the anterior medial mid right rib region. She has intact capillary refill. I cannot palpate pulses in her feet. She has 2+ nonpitting edema both lower extremities. EKG: Junctional rhythm, rate of 60, nonspecific ST-T changes. Rhythm strip shows a junctional rhythm with a rate of 60, QRS 88 ms with no other ectopy is interpreted by me. This is compared to a tracing dated 4/13/18, a junctional rhythm as needed. Lab reviewed. Urine shows 2 white cells and one red cell. BUN of 33 with a creatinine 1.5. Electrolytes are normal. Glucose 147. Troponin less than 0.01. H&H are 8.4 and 25. White blood count 6800 with 73 neutrophils and 15 T labs. Lactic acid of 0.7. Chest x-ray: No acute cardiopulmonary abnormality as interpreted by the radiologist.    IV antibiotics of been initiated in the emergency department. Blood cultures were obtained prior to antibiotic therapy. The hospitalist was consulted for admission for cellulitis.   (Please note that portions of this note were completed with a voice recognition program.  Efforts were made to edit the dictations but occasionally words are mis-transcribed.)    Yasmine Flannery,

## 2018-10-14 NOTE — H&P
Authorizing Provider   Timolol (TIMOPTIC) 0.5 % (DAILY) SOLN ophthalmic solution 1 drop daily   Yes Historical Provider, MD   aspirin 81 MG chewable tablet Take 4 tablets by mouth daily 9/17/18   Mohsen Pendleton MD   atorvastatin (LIPITOR) 10 MG tablet Take 1 tablet by mouth daily 4/20/18   Lloyd Delgado MD   metoprolol tartrate (LOPRESSOR) 25 MG tablet Take 1 tablet by mouth 2 times daily 4/19/18   Lloyd Delgado MD   furosemide (LASIX) 40 MG tablet Take 0.5 tablets by mouth daily 4/19/18   Lloyd Delgado MD   Biotin (BIOTIN 5000) 5 MG CAPS Take 1 tablet by mouth daily    Historical Provider, MD   Calcium Carbonate Antacid (TUMS ULTRA 1000) 1000 MG CHEW Take 1 tablet by mouth daily as needed (acid reflux)    Historical Provider, MD   lipase-protease-amylase (CREON) 49061 units delayed release capsule Take 24,000 Units by mouth 3 times daily (with meals)    Historical Provider, MD   ferrous sulfate 325 (65 Fe) MG tablet Take 325 mg by mouth daily (with breakfast)     Historical Provider, MD   gabapentin (NEURONTIN) 300 MG capsule Take 300 mg by mouth daily At noon    Historical Provider, MD   insulin lispro (HUMALOG) 100 UNIT/ML injection vial Inject 1.8 Units into the skin continuous Insulin pump  With bolus doses    Historical Provider, MD   isosorbide mononitrate (IMDUR) 30 MG extended release tablet Take 30 mg by mouth daily    Historical Provider, MD   polyethylene glycol (GLYCOLAX) packet Take 17 g by mouth daily    Historical Provider, MD   oxyCODONE-acetaminophen (PERCOCET)  MG per tablet Take 1 tablet by mouth three times daily. Garlon Odor     Historical Provider, MD   venlafaxine (EFFEXOR) 75 MG tablet Take 150 mg by mouth 2 times daily     Historical Provider, MD   cetirizine (ZYRTEC) 10 MG tablet Take 10 mg by mouth daily    Historical Provider, MD   Cholecalciferol (VITAMIN D3) 2000 units CAPS Take 2,000 Units by mouth daily    Historical Provider, MD       Allergies:  Sulfa antibiotics      Social History:   TOBACCO:   reports that she has never smoked. She has never used smokeless tobacco.  ETOH:   reports that she drinks alcohol. Family History:   Family History   Problem Relation Age of Onset    Heart Disease Father            REVIEW OF SYSTEMS:     patients reported symptoms are in BOLD all other symptoms are negative. CONSTITUTIONAL:      fatigue, fever, chills or night sweats, recent weight gain, recent wt loss, insomnia,  General weakness, poor appetite, muscle aches and pains    HEAD: headache, dizziness    EYES:      blurriness,  double vision, dryness,  discharge, irritation,diplopia    EARS:      hearing loss, vertigo, ear discharge,  Earache. Ringing in the ears. NOSE:      Rhinorrhea, sneezing, epistaxis. Discharge, sinusitis,     MOUTH/THROAT:         sore throat, mouth ulcers, Hoarseness    RESPIRATORY:        Shortness of breath, wheezing,  cough, sputum, hemoptysis, obstructive sleep apnea,    CARDIOVASCULAR :      chest pain, palpitations, dyspnea on exercise, Lower extrimity edema (swelling),     GASTROINTESTINAL:       Dysphagia, Poor appetite,  Nausea, Vomiting, diarrhea, heartburn, abdominal pain. Blood in the stools, hematemesis. Pain with swallowing, constipation    GENITOURINARY:       Urinary frequency, hesitancy,  urgency, Dysuria, hematuria,  Urinary Incontinence. Urinary Retention. GYNECOLOGICAL: vaginal bleeding , vaginal discharge, menopause    MUSCULOSKELETAL:       joint swelling or stiffness, joint pain, muscle pain, balance problems, low back pain. bilateral leg cellulitis    NEUROLOGICAL:      Gait problems. Tremor. Dizziness. Pain and paresthesias, weakness in extremities.  Seizures, memory loss    PSYCHLOGICAL:        Anxiety, depression    SKIN :      Rashes ulcers, skin color changes, easy bruisability, lymphadenopathy,Redness and open wounds       Physical Exam:      Vitals: BP (!) 134/55   Pulse 61   Temp 97.9 °F (36.6 °C) (Oral)

## 2018-10-15 PROBLEM — N28.9 ACUTE ON CHRONIC RENAL INSUFFICIENCY: Status: ACTIVE | Noted: 2017-10-04

## 2018-10-15 PROBLEM — R63.0 POOR APPETITE: Status: RESOLVED | Noted: 2018-04-13 | Resolved: 2018-10-15

## 2018-10-15 PROBLEM — R31.9 URINARY TRACT INFECTION WITH HEMATURIA: Status: RESOLVED | Noted: 2017-10-04 | Resolved: 2018-10-15

## 2018-10-15 PROBLEM — N39.0 URINARY TRACT INFECTION WITH HEMATURIA: Status: RESOLVED | Noted: 2017-10-04 | Resolved: 2018-10-15

## 2018-10-15 PROBLEM — E87.5 HYPERKALEMIA: Status: RESOLVED | Noted: 2017-10-04 | Resolved: 2018-10-15

## 2018-10-15 PROBLEM — Z22.322 MRSA NASAL COLONIZATION: Status: ACTIVE | Noted: 2018-10-15

## 2018-10-15 PROBLEM — N17.9 ACUTE RENAL FAILURE (ARF) (HCC): Status: RESOLVED | Noted: 2018-04-13 | Resolved: 2018-10-15

## 2018-10-15 PROBLEM — R79.89 PRERENAL AZOTEMIA: Status: RESOLVED | Noted: 2018-04-13 | Resolved: 2018-10-15

## 2018-10-15 PROBLEM — E87.1 HYPONATREMIA: Status: RESOLVED | Noted: 2018-04-12 | Resolved: 2018-10-15

## 2018-10-15 PROBLEM — E66.01 MORBID OBESITY WITH BMI OF 40.0-44.9, ADULT (HCC): Status: RESOLVED | Noted: 2017-10-04 | Resolved: 2018-10-15

## 2018-10-15 PROBLEM — N18.9 ACUTE ON CHRONIC RENAL INSUFFICIENCY: Status: ACTIVE | Noted: 2017-10-04

## 2018-10-15 PROBLEM — R53.1 GENERALIZED WEAKNESS: Status: RESOLVED | Noted: 2018-04-13 | Resolved: 2018-10-15

## 2018-10-15 PROBLEM — N30.00 ACUTE CYSTITIS: Status: RESOLVED | Noted: 2018-04-13 | Resolved: 2018-10-15

## 2018-10-15 PROBLEM — N18.30 CKD (CHRONIC KIDNEY DISEASE), STAGE III (HCC): Chronic | Status: ACTIVE | Noted: 2017-10-04

## 2018-10-15 LAB
CREAT SERPL-MCNC: 1.3 MG/DL (ref 0.6–1.2)
ESTIMATED AVERAGE GLUCOSE: 159.9 MG/DL
GFR AFRICAN AMERICAN: 48
GFR NON-AFRICAN AMERICAN: 40
GLUCOSE BLD-MCNC: 107 MG/DL (ref 70–99)
GLUCOSE BLD-MCNC: 132 MG/DL (ref 70–99)
GLUCOSE BLD-MCNC: 150 MG/DL (ref 70–99)
GLUCOSE BLD-MCNC: 167 MG/DL (ref 70–99)
HBA1C MFR BLD: 7.2 %
HCT VFR BLD CALC: 25.7 % (ref 36–48)
HEMOGLOBIN: 8.5 G/DL (ref 12–16)
MCH RBC QN AUTO: 32.8 PG (ref 26–34)
MCHC RBC AUTO-ENTMCNC: 32.9 G/DL (ref 31–36)
MCV RBC AUTO: 99.5 FL (ref 80–100)
MRSA SCREEN RT-PCR: ABNORMAL
MRSA SCREEN RT-PCR: ABNORMAL
ORGANISM: ABNORMAL
PDW BLD-RTO: 13.2 % (ref 12.4–15.4)
PERFORMED ON: ABNORMAL
PLATELET # BLD: 177 K/UL (ref 135–450)
PMV BLD AUTO: 8.8 FL (ref 5–10.5)
RBC # BLD: 2.59 M/UL (ref 4–5.2)
WBC # BLD: 7.6 K/UL (ref 4–11)

## 2018-10-15 PROCEDURE — 94760 N-INVAS EAR/PLS OXIMETRY 1: CPT

## 2018-10-15 PROCEDURE — 85027 COMPLETE CBC AUTOMATED: CPT

## 2018-10-15 PROCEDURE — 6370000000 HC RX 637 (ALT 250 FOR IP): Performed by: HOSPITALIST

## 2018-10-15 PROCEDURE — 2580000003 HC RX 258: Performed by: HOSPITALIST

## 2018-10-15 PROCEDURE — 90670 PCV13 VACCINE IM: CPT | Performed by: HOSPITALIST

## 2018-10-15 PROCEDURE — 6360000002 HC RX W HCPCS: Performed by: HOSPITALIST

## 2018-10-15 PROCEDURE — 6370000000 HC RX 637 (ALT 250 FOR IP): Performed by: NURSE PRACTITIONER

## 2018-10-15 PROCEDURE — 93010 ELECTROCARDIOGRAM REPORT: CPT | Performed by: INTERNAL MEDICINE

## 2018-10-15 PROCEDURE — G0009 ADMIN PNEUMOCOCCAL VACCINE: HCPCS | Performed by: HOSPITALIST

## 2018-10-15 PROCEDURE — 87641 MR-STAPH DNA AMP PROBE: CPT

## 2018-10-15 PROCEDURE — 82565 ASSAY OF CREATININE: CPT

## 2018-10-15 PROCEDURE — 1200000000 HC SEMI PRIVATE

## 2018-10-15 PROCEDURE — 36415 COLL VENOUS BLD VENIPUNCTURE: CPT

## 2018-10-15 RX ORDER — LACTOBACILLUS RHAMNOSUS GG 10B CELL
1 CAPSULE ORAL
Status: DISCONTINUED | OUTPATIENT
Start: 2018-10-16 | End: 2018-10-23 | Stop reason: HOSPADM

## 2018-10-15 RX ORDER — SENNA AND DOCUSATE SODIUM 50; 8.6 MG/1; MG/1
1 TABLET, FILM COATED ORAL DAILY
Status: DISCONTINUED | OUTPATIENT
Start: 2018-10-15 | End: 2018-10-23 | Stop reason: HOSPADM

## 2018-10-15 RX ADMIN — INSULIN GLARGINE 35 UNITS: 100 INJECTION, SOLUTION SUBCUTANEOUS at 22:12

## 2018-10-15 RX ADMIN — PANCRELIPASE 2 CAPSULE: 60000; 12000; 38000 CAPSULE, DELAYED RELEASE PELLETS ORAL at 18:04

## 2018-10-15 RX ADMIN — OXYCODONE HYDROCHLORIDE AND ACETAMINOPHEN 1 TABLET: 10; 325 TABLET ORAL at 13:48

## 2018-10-15 RX ADMIN — VANCOMYCIN HYDROCHLORIDE 1000 MG: 1 INJECTION, POWDER, LYOPHILIZED, FOR SOLUTION INTRAVENOUS at 15:25

## 2018-10-15 RX ADMIN — PIPERACILLIN SODIUM,TAZOBACTAM SODIUM 3.38 G: 3; .375 INJECTION, POWDER, FOR SOLUTION INTRAVENOUS at 00:44

## 2018-10-15 RX ADMIN — INSULIN LISPRO 2 UNITS: 100 INJECTION, SOLUTION INTRAVENOUS; SUBCUTANEOUS at 14:35

## 2018-10-15 RX ADMIN — VITAMIN D, TAB 1000IU (100/BT) 2000 UNITS: 25 TAB at 10:33

## 2018-10-15 RX ADMIN — METOPROLOL TARTRATE 25 MG: 25 TABLET ORAL at 22:00

## 2018-10-15 RX ADMIN — ANORECTAL OINTMENT: 15.7; .44; 24; 20.6 OINTMENT TOPICAL at 22:01

## 2018-10-15 RX ADMIN — PIPERACILLIN SODIUM,TAZOBACTAM SODIUM 3.38 G: 3; .375 INJECTION, POWDER, FOR SOLUTION INTRAVENOUS at 10:36

## 2018-10-15 RX ADMIN — Medication 10 ML: at 22:02

## 2018-10-15 RX ADMIN — POLYETHYLENE GLYCOL 3350 17 G: 17 POWDER, FOR SOLUTION ORAL at 10:33

## 2018-10-15 RX ADMIN — GABAPENTIN 300 MG: 300 CAPSULE ORAL at 10:34

## 2018-10-15 RX ADMIN — ANORECTAL OINTMENT: 15.7; .44; 24; 20.6 OINTMENT TOPICAL at 16:30

## 2018-10-15 RX ADMIN — ASPIRIN 81 MG CHEWABLE TABLET 324 MG: 81 TABLET CHEWABLE at 10:33

## 2018-10-15 RX ADMIN — MUPIROCIN: 20 OINTMENT TOPICAL at 22:00

## 2018-10-15 RX ADMIN — INSULIN LISPRO 2 UNITS: 100 INJECTION, SOLUTION INTRAVENOUS; SUBCUTANEOUS at 10:35

## 2018-10-15 RX ADMIN — VENLAFAXINE 150 MG: 50 TABLET ORAL at 21:59

## 2018-10-15 RX ADMIN — TIMOLOL MALEATE 1 DROP: 5 SOLUTION/ DROPS OPHTHALMIC at 22:02

## 2018-10-15 RX ADMIN — PANCRELIPASE 2 CAPSULE: 60000; 12000; 38000 CAPSULE, DELAYED RELEASE PELLETS ORAL at 14:35

## 2018-10-15 RX ADMIN — INSULIN LISPRO 10 UNITS: 100 INJECTION, SOLUTION INTRAVENOUS; SUBCUTANEOUS at 17:34

## 2018-10-15 RX ADMIN — INSULIN LISPRO 10 UNITS: 100 INJECTION, SOLUTION INTRAVENOUS; SUBCUTANEOUS at 14:36

## 2018-10-15 RX ADMIN — ATORVASTATIN CALCIUM 10 MG: 10 TABLET, FILM COATED ORAL at 10:34

## 2018-10-15 RX ADMIN — ENOXAPARIN SODIUM 40 MG: 40 INJECTION SUBCUTANEOUS at 22:00

## 2018-10-15 RX ADMIN — PNEUMOCOCCAL 13-VALENT CONJUGATE VACCINE 0.5 ML: 2.2; 2.2; 2.2; 2.2; 2.2; 4.4; 2.2; 2.2; 2.2; 2.2; 2.2; 2.2; 2.2 INJECTION, SUSPENSION INTRAMUSCULAR at 10:34

## 2018-10-15 RX ADMIN — TIMOLOL MALEATE 1 DROP: 5 SOLUTION/ DROPS OPHTHALMIC at 10:34

## 2018-10-15 RX ADMIN — CETIRIZINE HYDROCHLORIDE 10 MG: 10 TABLET, FILM COATED ORAL at 10:33

## 2018-10-15 RX ADMIN — VENLAFAXINE 150 MG: 50 TABLET ORAL at 10:33

## 2018-10-15 RX ADMIN — FERROUS SULFATE TAB EC 324 MG (65 MG FE EQUIVALENT) 324 MG: 324 (65 FE) TABLET DELAYED RESPONSE at 10:33

## 2018-10-15 RX ADMIN — PANCRELIPASE 2 CAPSULE: 60000; 12000; 38000 CAPSULE, DELAYED RELEASE PELLETS ORAL at 10:33

## 2018-10-15 RX ADMIN — OXYCODONE HYDROCHLORIDE AND ACETAMINOPHEN 1 TABLET: 10; 325 TABLET ORAL at 20:44

## 2018-10-15 RX ADMIN — SENNOSIDES AND DOCUSATE SODIUM 1 TABLET: 8.6; 5 TABLET ORAL at 10:33

## 2018-10-15 RX ADMIN — INSULIN LISPRO 10 UNITS: 100 INJECTION, SOLUTION INTRAVENOUS; SUBCUTANEOUS at 10:36

## 2018-10-15 RX ADMIN — PIPERACILLIN SODIUM,TAZOBACTAM SODIUM 3.38 G: 3; .375 INJECTION, POWDER, FOR SOLUTION INTRAVENOUS at 17:34

## 2018-10-15 ASSESSMENT — PAIN DESCRIPTION - FREQUENCY
FREQUENCY: CONTINUOUS
FREQUENCY: CONTINUOUS

## 2018-10-15 ASSESSMENT — PAIN SCALES - GENERAL
PAINLEVEL_OUTOF10: 9
PAINLEVEL_OUTOF10: 8
PAINLEVEL_OUTOF10: 5
PAINLEVEL_OUTOF10: 10

## 2018-10-15 ASSESSMENT — PAIN DESCRIPTION - DESCRIPTORS
DESCRIPTORS: ACHING
DESCRIPTORS: ACHING

## 2018-10-15 ASSESSMENT — PAIN DESCRIPTION - PROGRESSION: CLINICAL_PROGRESSION: NOT CHANGED

## 2018-10-15 ASSESSMENT — PAIN DESCRIPTION - ONSET
ONSET: ON-GOING
ONSET: ON-GOING

## 2018-10-15 ASSESSMENT — PAIN DESCRIPTION - ORIENTATION
ORIENTATION: LEFT;RIGHT
ORIENTATION: LEFT;RIGHT

## 2018-10-15 ASSESSMENT — PAIN DESCRIPTION - LOCATION
LOCATION: GENERALIZED;LEG
LOCATION: GENERALIZED;LEG

## 2018-10-15 NOTE — PROGRESS NOTES
This nurse changed patient's bilateral leg dressings with assistance from Sara Inman, wound care RN. New dressings to bilateral LE are clean, dry, and intact. Calmoseptine applied to areas on buttocks. See new order. This nurse ordered rere II speciality bed for patient via [facility]. See new order. Will continue to monitor and reassess.  Electronically signed by Luh Jerry RN on 10/15/2018

## 2018-10-15 NOTE — PROGRESS NOTES
Other:  Genitourinary:  []  frequency  []  polyuria  []  nocturia  []  hesitancy  []  urgency  []  hematuria  []  incontinence [] Other:  Musculoskeletal ROS:  []  muscle or joint pain  []  stiffness  [] arthritis  []  gout  []  weakness  [x]  redness  [x]  swelling  []  instability [] Other:  Endocrine ROS:  []  heat/cold intolerance  []  sweating  []  excessive thirst or hunger [] Other:  Neurological ROS:  []  Seizures  []  numbness  []  tingling  []  fainting  []  burning  []  tremors [] Other:  Psych ROS:  []  Anxiety  []  depression  []  abnormal thoughts [] Other:  Dermatological ROS:  []  Rash  []  sores  []  lumps  []  skin changes  []  changes to hair or nails [x] Other: wounds      Past Medical History:        Diagnosis Date    [facility]     CAD (coronary artery disease)     Chronic back pain     CKD ([facility]ronic kidney disease), stage III [facility]     Diabetes mellitus [facility]     Diastolic CHF ([facility]     Hyperlipidemia     Hypertension     MI [facility]myocardial infarction) [facility]     MRSA (methicillin resistant staph aureus) culture positive 04/13/2018    leg ulcer    Obesity (BMI 30-39. 9)     Osteoarthritis     Pancreatitis     Sepsis ([facility]     Stasis ulcer ([facility] [address]     bilateral lower extremities        Past Surgical History:        Procedure Laterality Date    APPENDECTOMY      BACK SURGERY      x2    CATARACT REMOVAL      bilateral     CHOLECYSTECTOMY      COLONOSCOPY      EYE SURGERY      FRACTURE SURGERY      Left ankle    HYSTERECTOMY      INSET/CHANGE LOMELI CATHETER - COMPLICATED  95/5322    suprapubic cath    JOINT REPLACEMENT      Right knee    KNEE SURGERY         Allergies:  Sulfa antibiotics    Past medical and surgical history reviewed. Any changes have been noted.      Scheduled and prn Medications:    Scheduled Meds:   sennosides-docusate sodium  1 tablet Oral Daily    menthol-zinc oxide        mupirocin   Topical BID    sodium chloride flush  10 mL Intravenous 2 Non-tender. Non-distended. BS+. Extremities: Kerlix dressings to the lower extremities bilaterally. Feet with erythema and non pitting edema. Brisk cap refill. Skin: Dry and intact. No lesions noted. Neuro: Grossly intact. No focal deficits noted. LABS:    Lab Results   Component Value Date    WBC 7.6 10/15/2018    HGB 8.5 (L) 10/15/2018    HCT 25.7 (L) 10/15/2018    MCV 99.5 10/15/2018     10/15/2018    LYMPHOPCT 14.9 10/14/2018    RBC 2.59 (L) 10/15/2018    MCH 32.8 10/15/2018    MCHC 32.9 10/15/2018    RDW 13.2 10/15/2018       Lab Results   Component Value Date    CREATININE 1.3 (H) 10/15/2018    BUN 33 (H) 10/14/2018     10/14/2018    K 4.6 10/14/2018     10/14/2018    CO2 21 10/14/2018       Lab Results   Component Value Date    MG 2.10 10/09/2017       Lab Results   Component Value Date    ALT 9 (L) 10/14/2018    AST 13 (L) 10/14/2018    ALKPHOS 125 10/14/2018    BILITOT <0.2 10/14/2018        No flowsheet data found. Imaging:  XR TIBIA FIBULA RIGHT (2 VIEWS)   Final Result   Left tibia and fibula: Postsurgical changes. Suspected periosteal reaction   involving the distal tibia, which raise the possibility of osteomyelitis. Right tibia and fibula: Periosteal reaction involving the distal tibia and to   a lesser extent the fibula. Again, osteomyelitis is a concern. XR TIBIA FIBULA LEFT (2 VIEWS)   Final Result   Left tibia and fibula: Postsurgical changes. Suspected periosteal reaction   involving the distal tibia, which raise the possibility of osteomyelitis. Right tibia and fibula: Periosteal reaction involving the distal tibia and to   a lesser extent the fibula. Again, osteomyelitis is a concern. XR CHEST PORTABLE   Final Result   Stable cardial-pericardial silhouette enlargement. No acute abnormality   detected.              Assessment & Plan:      Bilateral lower extremity cellulitis  MRSA swab positive-continue vancomycin for now  ID consultation pending  Continue Zosyn  CBC with diff in AM  Wound care consult  BMP in AM    Acute on chronic renal insufficiency   Trending back down  Furosemide on hold  BMP in AM    MRSA nasal colonization  Bactroban BID x 5 days  Contact isolation    Diabetes mellitus with hyperglycemia  POCT with SSI  Prandial Humalog  Continue Lantus  Carb controlled diet      HTN  Continue home meds with hold parameters    Chronic diastolic CHF  Furosemide currently on hold    Body mass index is 35.13 kg/m². The patient was informed of the results of any tests, a time was given to answer questions, a plan was proposed and they agreed with plan.     DVT prophylaxis: [x] Lovenox  [] SQ Heparin  [] SCDs because of  [] warfarin/oral direct thrombin inhibitor [] Encourage ambulation    GI prophylaxis: [] PPI/[id number]  [x] not indicated    Probiotic if on abx: [x] Yes [] No [] Not Indicated    Diet: DIET CARB CONTROL;    Consults:  PHARMACY TO DOSE VANCOMYCIN  IP CONSULT TO HOSPITALIST  IP CONSULT TO INFECTIOUS DISEASES  PHARMACY TO DOSE VANCOMYCIN    Disposition:  [] Home  [x] Home with home health [] Rehab [] Psych [] SNF  [] LTAC  [] Long term nursing home or group home [] Transfer to ICU  [] Transfer to PCU [] Other:    Code Status: Full Code    ELOS: 2-3 more days    FRANCHESCA Thorne CNP  10/15/18

## 2018-10-15 NOTE — PROGRESS NOTES
Checking on patient Q2H for nutrition needs, hygiene needs, comfort measures, mobility, fall risk interventions, and safe environment. All precautions and interventions in place. Educated patient on use of call light and telephone. Patient verbalizes understanding. Call light/telephone in reach.   Electronically signed by Malvin Callahan RN on 10/14/2018 at 9:22 PM

## 2018-10-15 NOTE — PLAN OF CARE
Problem: Falls - Risk of:  Goal: Will remain free from falls  Will remain free from falls   Outcome: Ongoing  Fall risk assessment completed . Fall precautions in place, bed alarm on, side rails 2/4 up, call light in reach, educated pt on calling for assistance when needed, room clear of clutter. Pt verbalized understanding. Electronically signed by Krysten Teresa RN on 10/14/2018 at 9:21 PM      Problem: Pain:  Goal: Pain level will decrease  Pain level will decrease   Outcome: Ongoing  Pain /discomfort being managed with PRN analgesics per MD orders. Patient able to express presence and absence of pain and rate pain appropriately using numerical scale. Electronically signed by Krysten Teresa RN on 10/14/2018 at 9:21 PM      Problem: Wound:  Goal: Will show signs of wound healing; wound closure and no evidence of infection  Will show signs of wound healing; wound closure and no evidence of infection   Outcome: Ongoing  Patient's wounds will exhibit signs of healing this shift. Electronically signed by Krysten Teresa RN on 10/14/2018 at 9:22 PM      Problem: Blood Glucose:  Goal: Ability to maintain appropriate glucose levels will improve  Ability to maintain appropriate glucose levels will improve   Outcome: Ongoing  Patient will experience normal blood sugar levels this shift.   Electronically signed by Krysten Teresa RN on 10/14/2018 at 9:22 PM

## 2018-10-15 NOTE — PROGRESS NOTES
Patient has a BP of 90/43. ALONSO Metzger aware and notified. Ok to [employer], lopressor, and percocet per Pb Puente NP. Will continue to monitor and reassess.  Electronically signed by Tameka Linares RN on 10/15/2018

## 2018-10-16 PROBLEM — D64.9 NORMOCYTIC ANEMIA: Status: ACTIVE | Noted: 2018-10-16

## 2018-10-16 LAB
ABO/RH: NORMAL
ANION GAP SERPL CALCULATED.3IONS-SCNC: 11 MMOL/L (ref 3–16)
ANTIBODY SCREEN: NORMAL
BASOPHILS ABSOLUTE: 0 K/UL (ref 0–0.2)
BASOPHILS RELATIVE PERCENT: 0.7 %
BLOOD BANK DISPENSE STATUS: NORMAL
BLOOD BANK PRODUCT CODE: NORMAL
BPU ID: NORMAL
BUN BLDV-MCNC: 22 MG/DL (ref 7–20)
CALCIUM SERPL-MCNC: 8 MG/DL (ref 8.3–10.6)
CHLORIDE BLD-SCNC: 107 MMOL/L (ref 99–110)
CO2: 19 MMOL/L (ref 21–32)
CREAT SERPL-MCNC: 1.5 MG/DL (ref 0.6–1.2)
DESCRIPTION BLOOD BANK: NORMAL
EOSINOPHILS ABSOLUTE: 0.3 K/UL (ref 0–0.6)
EOSINOPHILS RELATIVE PERCENT: 5.4 %
FERRITIN: 129.4 NG/ML (ref 15–150)
GFR AFRICAN AMERICAN: 41
GFR NON-AFRICAN AMERICAN: 34
GLUCOSE BLD-MCNC: 157 MG/DL (ref 70–99)
GLUCOSE BLD-MCNC: 208 MG/DL (ref 70–99)
GLUCOSE BLD-MCNC: 248 MG/DL (ref 70–99)
GLUCOSE BLD-MCNC: 331 MG/DL (ref 70–99)
GLUCOSE BLD-MCNC: 98 MG/DL (ref 70–99)
HCT VFR BLD CALC: 21.9 % (ref 36–48)
HEMOGLOBIN: 7.2 G/DL (ref 12–16)
IRON SATURATION: 19 % (ref 15–50)
IRON: 45 UG/DL (ref 37–145)
LYMPHOCYTES ABSOLUTE: 1.1 K/UL (ref 1–5.1)
LYMPHOCYTES RELATIVE PERCENT: 18.6 %
MCH RBC QN AUTO: 32.6 PG (ref 26–34)
MCHC RBC AUTO-ENTMCNC: 32.8 G/DL (ref 31–36)
MCV RBC AUTO: 99.4 FL (ref 80–100)
MONOCYTES ABSOLUTE: 0.3 K/UL (ref 0–1.3)
MONOCYTES RELATIVE PERCENT: 5.1 %
NEUTROPHILS ABSOLUTE: 4 K/UL (ref 1.7–7.7)
NEUTROPHILS RELATIVE PERCENT: 70.2 %
OCCULT BLOOD SCREENING: NORMAL
PDW BLD-RTO: 13 % (ref 12.4–15.4)
PERFORMED ON: ABNORMAL
PERFORMED ON: NORMAL
PLATELET # BLD: 144 K/UL (ref 135–450)
PMV BLD AUTO: 8.9 FL (ref 5–10.5)
POTASSIUM SERPL-SCNC: 4.1 MMOL/L (ref 3.5–5.1)
RBC # BLD: 2.2 M/UL (ref 4–5.2)
SODIUM BLD-SCNC: 137 MMOL/L (ref 136–145)
TOTAL IRON BINDING CAPACITY: 231 UG/DL (ref 260–445)
VANCOMYCIN TROUGH: 15.3 UG/ML (ref 10–20)
WBC # BLD: 5.7 K/UL (ref 4–11)

## 2018-10-16 PROCEDURE — 86900 BLOOD TYPING SEROLOGIC ABO: CPT

## 2018-10-16 PROCEDURE — 6370000000 HC RX 637 (ALT 250 FOR IP): Performed by: HOSPITALIST

## 2018-10-16 PROCEDURE — 85025 COMPLETE CBC W/AUTO DIFF WBC: CPT

## 2018-10-16 PROCEDURE — P9016 RBC LEUKOCYTES REDUCED: HCPCS

## 2018-10-16 PROCEDURE — G0328 FECAL BLOOD SCRN IMMUNOASSAY: HCPCS

## 2018-10-16 PROCEDURE — 83540 ASSAY OF IRON: CPT

## 2018-10-16 PROCEDURE — 6370000000 HC RX 637 (ALT 250 FOR IP): Performed by: NURSE PRACTITIONER

## 2018-10-16 PROCEDURE — 82728 ASSAY OF FERRITIN: CPT

## 2018-10-16 PROCEDURE — 80202 ASSAY OF VANCOMYCIN: CPT

## 2018-10-16 PROCEDURE — 83550 IRON BINDING TEST: CPT

## 2018-10-16 PROCEDURE — 86901 BLOOD TYPING SEROLOGIC RH(D): CPT

## 2018-10-16 PROCEDURE — 36430 TRANSFUSION BLD/BLD COMPNT: CPT

## 2018-10-16 PROCEDURE — 80048 BASIC METABOLIC PNL TOTAL CA: CPT

## 2018-10-16 PROCEDURE — 2580000003 HC RX 258: Performed by: NURSE PRACTITIONER

## 2018-10-16 PROCEDURE — 86923 COMPATIBILITY TEST ELECTRIC: CPT

## 2018-10-16 PROCEDURE — 2580000003 HC RX 258: Performed by: HOSPITALIST

## 2018-10-16 PROCEDURE — 36415 COLL VENOUS BLD VENIPUNCTURE: CPT

## 2018-10-16 PROCEDURE — 86850 RBC ANTIBODY SCREEN: CPT

## 2018-10-16 PROCEDURE — 6360000002 HC RX W HCPCS: Performed by: HOSPITALIST

## 2018-10-16 PROCEDURE — 1200000000 HC SEMI PRIVATE

## 2018-10-16 RX ORDER — TRAMADOL HYDROCHLORIDE 50 MG/1
50 TABLET ORAL EVERY 6 HOURS PRN
Status: DISCONTINUED | OUTPATIENT
Start: 2018-10-16 | End: 2018-10-23 | Stop reason: HOSPADM

## 2018-10-16 RX ORDER — 0.9 % SODIUM CHLORIDE 0.9 %
250 INTRAVENOUS SOLUTION INTRAVENOUS ONCE
Status: COMPLETED | OUTPATIENT
Start: 2018-10-16 | End: 2018-10-16

## 2018-10-16 RX ADMIN — INSULIN LISPRO 8 UNITS: 100 INJECTION, SOLUTION INTRAVENOUS; SUBCUTANEOUS at 14:00

## 2018-10-16 RX ADMIN — PIPERACILLIN SODIUM,TAZOBACTAM SODIUM 3.38 G: 3; .375 INJECTION, POWDER, FOR SOLUTION INTRAVENOUS at 10:36

## 2018-10-16 RX ADMIN — PANCRELIPASE 2 CAPSULE: 60000; 12000; 38000 CAPSULE, DELAYED RELEASE PELLETS ORAL at 13:42

## 2018-10-16 RX ADMIN — VENLAFAXINE 150 MG: 50 TABLET ORAL at 21:44

## 2018-10-16 RX ADMIN — PANCRELIPASE 2 CAPSULE: 60000; 12000; 38000 CAPSULE, DELAYED RELEASE PELLETS ORAL at 17:35

## 2018-10-16 RX ADMIN — ANORECTAL OINTMENT: 15.7; .44; 24; 20.6 OINTMENT TOPICAL at 13:57

## 2018-10-16 RX ADMIN — INSULIN LISPRO 2 UNITS: 100 INJECTION, SOLUTION INTRAVENOUS; SUBCUTANEOUS at 17:43

## 2018-10-16 RX ADMIN — INSULIN LISPRO 10 UNITS: 100 INJECTION, SOLUTION INTRAVENOUS; SUBCUTANEOUS at 10:41

## 2018-10-16 RX ADMIN — ASPIRIN 81 MG CHEWABLE TABLET 324 MG: 81 TABLET CHEWABLE at 10:36

## 2018-10-16 RX ADMIN — TIMOLOL MALEATE 1 DROP: 5 SOLUTION/ DROPS OPHTHALMIC at 10:48

## 2018-10-16 RX ADMIN — INSULIN LISPRO 4 UNITS: 100 INJECTION, SOLUTION INTRAVENOUS; SUBCUTANEOUS at 10:37

## 2018-10-16 RX ADMIN — CETIRIZINE HYDROCHLORIDE 10 MG: 10 TABLET, FILM COATED ORAL at 10:35

## 2018-10-16 RX ADMIN — METOPROLOL TARTRATE 25 MG: 25 TABLET ORAL at 10:35

## 2018-10-16 RX ADMIN — TRAMADOL HYDROCHLORIDE 50 MG: 50 TABLET, FILM COATED ORAL at 00:34

## 2018-10-16 RX ADMIN — OXYCODONE HYDROCHLORIDE AND ACETAMINOPHEN 1 TABLET: 10; 325 TABLET ORAL at 10:35

## 2018-10-16 RX ADMIN — OXYCODONE HYDROCHLORIDE AND ACETAMINOPHEN 1 TABLET: 10; 325 TABLET ORAL at 21:44

## 2018-10-16 RX ADMIN — ATORVASTATIN CALCIUM 10 MG: 10 TABLET, FILM COATED ORAL at 10:34

## 2018-10-16 RX ADMIN — MUPIROCIN: 20 OINTMENT TOPICAL at 10:37

## 2018-10-16 RX ADMIN — ENOXAPARIN SODIUM 40 MG: 40 INJECTION SUBCUTANEOUS at 21:45

## 2018-10-16 RX ADMIN — FERROUS SULFATE TAB EC 324 MG (65 MG FE EQUIVALENT) 324 MG: 324 (65 FE) TABLET DELAYED RESPONSE at 10:35

## 2018-10-16 RX ADMIN — Medication 1 CAPSULE: at 10:30

## 2018-10-16 RX ADMIN — VANCOMYCIN HYDROCHLORIDE 1000 MG: 1 INJECTION, POWDER, LYOPHILIZED, FOR SOLUTION INTRAVENOUS at 17:34

## 2018-10-16 RX ADMIN — INSULIN LISPRO 10 UNITS: 100 INJECTION, SOLUTION INTRAVENOUS; SUBCUTANEOUS at 14:00

## 2018-10-16 RX ADMIN — Medication 10 ML: at 21:47

## 2018-10-16 RX ADMIN — VENLAFAXINE 150 MG: 50 TABLET ORAL at 10:37

## 2018-10-16 RX ADMIN — PIPERACILLIN SODIUM,TAZOBACTAM SODIUM 3.38 G: 3; .375 INJECTION, POWDER, FOR SOLUTION INTRAVENOUS at 21:22

## 2018-10-16 RX ADMIN — MUPIROCIN: 20 OINTMENT TOPICAL at 21:46

## 2018-10-16 RX ADMIN — INSULIN LISPRO 10 UNITS: 100 INJECTION, SOLUTION INTRAVENOUS; SUBCUTANEOUS at 17:43

## 2018-10-16 RX ADMIN — OXYCODONE HYDROCHLORIDE AND ACETAMINOPHEN 1 TABLET: 10; 325 TABLET ORAL at 13:42

## 2018-10-16 RX ADMIN — Medication 10 ML: at 10:45

## 2018-10-16 RX ADMIN — ANORECTAL OINTMENT: 15.7; .44; 24; 20.6 OINTMENT TOPICAL at 10:30

## 2018-10-16 RX ADMIN — SODIUM CHLORIDE 250 ML: 9 INJECTION, SOLUTION INTRAVENOUS at 13:34

## 2018-10-16 RX ADMIN — METOPROLOL TARTRATE 25 MG: 25 TABLET ORAL at 21:44

## 2018-10-16 RX ADMIN — GABAPENTIN 300 MG: 300 CAPSULE ORAL at 10:36

## 2018-10-16 RX ADMIN — VITAMIN D, TAB 1000IU (100/BT) 2000 UNITS: 25 TAB at 10:37

## 2018-10-16 RX ADMIN — PANCRELIPASE 2 CAPSULE: 60000; 12000; 38000 CAPSULE, DELAYED RELEASE PELLETS ORAL at 11:00

## 2018-10-16 RX ADMIN — PIPERACILLIN SODIUM,TAZOBACTAM SODIUM 3.38 G: 3; .375 INJECTION, POWDER, FOR SOLUTION INTRAVENOUS at 00:37

## 2018-10-16 RX ADMIN — ISOSORBIDE MONONITRATE 30 MG: 30 TABLET, EXTENDED RELEASE ORAL at 10:35

## 2018-10-16 RX ADMIN — SENNOSIDES AND DOCUSATE SODIUM 1 TABLET: 8.6; 5 TABLET ORAL at 10:37

## 2018-10-16 RX ADMIN — TIMOLOL MALEATE 1 DROP: 5 SOLUTION/ DROPS OPHTHALMIC at 21:46

## 2018-10-16 ASSESSMENT — PAIN DESCRIPTION - FREQUENCY
FREQUENCY: CONTINUOUS

## 2018-10-16 ASSESSMENT — PAIN DESCRIPTION - PAIN TYPE
TYPE: ACUTE PAIN
TYPE: ACUTE PAIN;CHRONIC PAIN
TYPE: ACUTE PAIN

## 2018-10-16 ASSESSMENT — PAIN DESCRIPTION - ORIENTATION
ORIENTATION: RIGHT;LEFT
ORIENTATION: LEFT;RIGHT
ORIENTATION: RIGHT;LEFT

## 2018-10-16 ASSESSMENT — PAIN SCALES - GENERAL
PAINLEVEL_OUTOF10: 9
PAINLEVEL_OUTOF10: 10
PAINLEVEL_OUTOF10: 8
PAINLEVEL_OUTOF10: 3
PAINLEVEL_OUTOF10: 7
PAINLEVEL_OUTOF10: 8
PAINLEVEL_OUTOF10: 9
PAINLEVEL_OUTOF10: 5

## 2018-10-16 ASSESSMENT — PAIN DESCRIPTION - LOCATION
LOCATION: LEG
LOCATION: GENERALIZED
LOCATION: LEG
LOCATION: ANKLE;LEG
LOCATION: LEG

## 2018-10-16 ASSESSMENT — PAIN DESCRIPTION - DESCRIPTORS
DESCRIPTORS: ACHING
DESCRIPTORS: ACHING
DESCRIPTORS: BURNING;ACHING;THROBBING
DESCRIPTORS: ACHING
DESCRIPTORS: ACHING

## 2018-10-16 ASSESSMENT — PAIN DESCRIPTION - ONSET
ONSET: ON-GOING

## 2018-10-16 ASSESSMENT — PAIN DESCRIPTION - PROGRESSION
CLINICAL_PROGRESSION: NOT CHANGED
CLINICAL_PROGRESSION: GRADUALLY IMPROVING

## 2018-10-16 NOTE — PLAN OF CARE
Problem: Falls - Risk of:  Goal: Will remain free from falls  Will remain free from falls   Outcome: Ongoing  Fall risk assessment completed. Fall precautions in place. Call light within reach. Pt educated on calling for assistance before getting up. Walkway free of clutter. Will continue to monitor. Electronically signed by Usman Castañeda RN on 10/16/18 at 6:09 PM        Problem: Pain:  Goal: Control of acute pain  Control of acute pain   Outcome: Ongoing  Patient educated on acute pain. Taught patient to use call light to ask for pain medication. PRN pain medication given for acute pain. Will continue to monitor pain per unit protocol. Electronically signed by Usman Castañeda RN on 10/16/18 at 6:09 PM      Problem: Wound:  Goal: Will show signs of wound healing; wound closure and no evidence of infection  Will show signs of wound healing; wound closure and no evidence of infection   Outcome: Ongoing  The wound is cleansed and dressed. Moderate amount of serosanguinous drainage noted. The patient is alerted to watch for any signs of infection (redness, pus, pain, increased swelling or fever) and call if such occurs. Electronically signed by Usman Castañeda RN on 10/16/18 at 6:10 PM      Problem: Pressure Ulcer:  Goal: Signs of wound healing will improve  Signs of wound healing will improve   Outcome: Ongoing  Pressure ulcer assessed this shift. Wound is open to air and topical medication applied. Will continue to monitor and assess. Electronically signed by Usman Castañeda RN on 10/16/18 at 6:11 PM      Problem: Blood Glucose:  Goal: Ability to maintain appropriate glucose levels will improve  Ability to maintain appropriate glucose levels will improve   Outcome: Ongoing  Blood sugars checked before meals and before bed. Insulin administered per sliding scale and prandial dose. Assessed for signs of hypo/hyperglycemia. Educated patient on those signs and symptoms and to report them.  Will continue to monitor and assess. Electronically signed by Corey Helton RN on 10/16/18 at 6:12 PM      Problem: Risk for Impaired Skin Integrity  Goal: Tissue integrity - skin and mucous membranes  Structural intactness and normal physiological function of skin and  mucous membranes. Outcome: Ongoing  Pt assessed for skin break down. Skin was warm and dry to touch. Pt able to turn self and regulate head of bed with assistance. Pt reminded to turn or reposition at least every 2 hours to prevent skin breakdown. Will continue to monitor and assess.  Electronically signed by Corey Helton RN on 10/16/18 at 6:13 PM

## 2018-10-16 NOTE — PROGRESS NOTES
Updated patient daughter on how patient did during blood administration per request. Informed her that there weren't any complications.

## 2018-10-16 NOTE — PROGRESS NOTES
Patient tolerating blood transfusion. VSS. No reactions assessed or stated from patient. Dose increase to 125 ml/hr. Verified by Shania Larkin RN. Will continue to monitor.

## 2018-10-17 LAB
ANION GAP SERPL CALCULATED.3IONS-SCNC: 12 MMOL/L (ref 3–16)
BASOPHILS ABSOLUTE: 0.1 K/UL (ref 0–0.2)
BASOPHILS RELATIVE PERCENT: 1 %
BUN BLDV-MCNC: 18 MG/DL (ref 7–20)
CALCIUM SERPL-MCNC: 8.3 MG/DL (ref 8.3–10.6)
CHLORIDE BLD-SCNC: 105 MMOL/L (ref 99–110)
CO2: 20 MMOL/L (ref 21–32)
CREAT SERPL-MCNC: 1.3 MG/DL (ref 0.6–1.2)
EKG ATRIAL RATE: 60 BPM
EKG DIAGNOSIS: NORMAL
EKG Q-T INTERVAL: 424 MS
EKG QRS DURATION: 88 MS
EKG QTC CALCULATION (BAZETT): 424 MS
EKG R AXIS: 14 DEGREES
EKG T AXIS: 43 DEGREES
EKG VENTRICULAR RATE: 60 BPM
EOSINOPHILS ABSOLUTE: 0.4 K/UL (ref 0–0.6)
EOSINOPHILS RELATIVE PERCENT: 5 %
GFR AFRICAN AMERICAN: 48
GFR NON-AFRICAN AMERICAN: 40
GLUCOSE BLD-MCNC: 117 MG/DL (ref 70–99)
GLUCOSE BLD-MCNC: 124 MG/DL (ref 70–99)
GLUCOSE BLD-MCNC: 150 MG/DL (ref 70–99)
GLUCOSE BLD-MCNC: 211 MG/DL (ref 70–99)
GLUCOSE BLD-MCNC: 97 MG/DL (ref 70–99)
HCT VFR BLD CALC: 28.3 % (ref 36–48)
HEMOGLOBIN: 9.3 G/DL (ref 12–16)
LYMPHOCYTES ABSOLUTE: 0.8 K/UL (ref 1–5.1)
LYMPHOCYTES RELATIVE PERCENT: 9.3 %
MCH RBC QN AUTO: 31.4 PG (ref 26–34)
MCHC RBC AUTO-ENTMCNC: 32.8 G/DL (ref 31–36)
MCV RBC AUTO: 95.7 FL (ref 80–100)
MONOCYTES ABSOLUTE: 0.3 K/UL (ref 0–1.3)
MONOCYTES RELATIVE PERCENT: 3.5 %
NEUTROPHILS ABSOLUTE: 6.8 K/UL (ref 1.7–7.7)
NEUTROPHILS RELATIVE PERCENT: 81.2 %
PDW BLD-RTO: 15.4 % (ref 12.4–15.4)
PERFORMED ON: ABNORMAL
PERFORMED ON: NORMAL
PLATELET # BLD: 171 K/UL (ref 135–450)
PMV BLD AUTO: 8.8 FL (ref 5–10.5)
POTASSIUM SERPL-SCNC: 4.6 MMOL/L (ref 3.5–5.1)
RBC # BLD: 2.96 M/UL (ref 4–5.2)
SODIUM BLD-SCNC: 137 MMOL/L (ref 136–145)
WBC # BLD: 8.4 K/UL (ref 4–11)

## 2018-10-17 PROCEDURE — G8987 SELF CARE CURRENT STATUS: HCPCS

## 2018-10-17 PROCEDURE — 6360000002 HC RX W HCPCS: Performed by: HOSPITALIST

## 2018-10-17 PROCEDURE — 36415 COLL VENOUS BLD VENIPUNCTURE: CPT

## 2018-10-17 PROCEDURE — G8988 SELF CARE GOAL STATUS: HCPCS

## 2018-10-17 PROCEDURE — 97166 OT EVAL MOD COMPLEX 45 MIN: CPT

## 2018-10-17 PROCEDURE — 80048 BASIC METABOLIC PNL TOTAL CA: CPT

## 2018-10-17 PROCEDURE — 2500000003 HC RX 250 WO HCPCS: Performed by: NURSE PRACTITIONER

## 2018-10-17 PROCEDURE — 1200000000 HC SEMI PRIVATE

## 2018-10-17 PROCEDURE — 6370000000 HC RX 637 (ALT 250 FOR IP): Performed by: NURSE PRACTITIONER

## 2018-10-17 PROCEDURE — 6360000002 HC RX W HCPCS: Performed by: INTERNAL MEDICINE

## 2018-10-17 PROCEDURE — 85025 COMPLETE CBC W/AUTO DIFF WBC: CPT

## 2018-10-17 PROCEDURE — 97530 THERAPEUTIC ACTIVITIES: CPT

## 2018-10-17 PROCEDURE — 2580000003 HC RX 258: Performed by: HOSPITALIST

## 2018-10-17 PROCEDURE — 99223 1ST HOSP IP/OBS HIGH 75: CPT | Performed by: INTERNAL MEDICINE

## 2018-10-17 PROCEDURE — 6370000000 HC RX 637 (ALT 250 FOR IP): Performed by: HOSPITALIST

## 2018-10-17 RX ADMIN — ANORECTAL OINTMENT: 15.7; .44; 24; 20.6 OINTMENT TOPICAL at 09:24

## 2018-10-17 RX ADMIN — TIMOLOL MALEATE 1 DROP: 5 SOLUTION/ DROPS OPHTHALMIC at 21:26

## 2018-10-17 RX ADMIN — ANORECTAL OINTMENT: 15.7; .44; 24; 20.6 OINTMENT TOPICAL at 21:26

## 2018-10-17 RX ADMIN — OXYCODONE HYDROCHLORIDE AND ACETAMINOPHEN 1 TABLET: 10; 325 TABLET ORAL at 09:26

## 2018-10-17 RX ADMIN — ASPIRIN 81 MG CHEWABLE TABLET 324 MG: 81 TABLET CHEWABLE at 09:26

## 2018-10-17 RX ADMIN — TIMOLOL MALEATE 1 DROP: 5 SOLUTION/ DROPS OPHTHALMIC at 11:02

## 2018-10-17 RX ADMIN — ISOSORBIDE MONONITRATE 30 MG: 30 TABLET, EXTENDED RELEASE ORAL at 09:26

## 2018-10-17 RX ADMIN — FLUCONAZOLE 100 MG: 2 INJECTION, SOLUTION INTRAVENOUS at 18:21

## 2018-10-17 RX ADMIN — PANCRELIPASE 2 CAPSULE: 60000; 12000; 38000 CAPSULE, DELAYED RELEASE PELLETS ORAL at 18:21

## 2018-10-17 RX ADMIN — PANCRELIPASE 2 CAPSULE: 60000; 12000; 38000 CAPSULE, DELAYED RELEASE PELLETS ORAL at 09:43

## 2018-10-17 RX ADMIN — GABAPENTIN 300 MG: 300 CAPSULE ORAL at 09:26

## 2018-10-17 RX ADMIN — ENOXAPARIN SODIUM 40 MG: 40 INJECTION SUBCUTANEOUS at 21:26

## 2018-10-17 RX ADMIN — MUPIROCIN: 20 OINTMENT TOPICAL at 11:03

## 2018-10-17 RX ADMIN — Medication 10 ML: at 21:27

## 2018-10-17 RX ADMIN — METOPROLOL TARTRATE 25 MG: 25 TABLET ORAL at 09:26

## 2018-10-17 RX ADMIN — MICONAZOLE NITRATE: 20 POWDER TOPICAL at 21:27

## 2018-10-17 RX ADMIN — PIPERACILLIN SODIUM,TAZOBACTAM SODIUM 3.38 G: 3; .375 INJECTION, POWDER, FOR SOLUTION INTRAVENOUS at 14:16

## 2018-10-17 RX ADMIN — PIPERACILLIN SODIUM,TAZOBACTAM SODIUM 3.38 G: 3; .375 INJECTION, POWDER, FOR SOLUTION INTRAVENOUS at 06:33

## 2018-10-17 RX ADMIN — CETIRIZINE HYDROCHLORIDE 10 MG: 10 TABLET, FILM COATED ORAL at 09:26

## 2018-10-17 RX ADMIN — OXYCODONE HYDROCHLORIDE AND ACETAMINOPHEN 1 TABLET: 10; 325 TABLET ORAL at 14:45

## 2018-10-17 RX ADMIN — ATORVASTATIN CALCIUM 10 MG: 10 TABLET, FILM COATED ORAL at 09:27

## 2018-10-17 RX ADMIN — VENLAFAXINE 150 MG: 50 TABLET ORAL at 09:27

## 2018-10-17 RX ADMIN — SENNOSIDES AND DOCUSATE SODIUM 1 TABLET: 8.6; 5 TABLET ORAL at 09:26

## 2018-10-17 RX ADMIN — INSULIN LISPRO 10 UNITS: 100 INJECTION, SOLUTION INTRAVENOUS; SUBCUTANEOUS at 13:41

## 2018-10-17 RX ADMIN — Medication 1 CAPSULE: at 09:26

## 2018-10-17 RX ADMIN — METOPROLOL TARTRATE 25 MG: 25 TABLET ORAL at 21:26

## 2018-10-17 RX ADMIN — INSULIN LISPRO 10 UNITS: 100 INJECTION, SOLUTION INTRAVENOUS; SUBCUTANEOUS at 18:22

## 2018-10-17 RX ADMIN — OXYCODONE HYDROCHLORIDE AND ACETAMINOPHEN 1 TABLET: 10; 325 TABLET ORAL at 21:26

## 2018-10-17 RX ADMIN — ANORECTAL OINTMENT: 15.7; .44; 24; 20.6 OINTMENT TOPICAL at 14:16

## 2018-10-17 RX ADMIN — MICONAZOLE NITRATE: 20 POWDER TOPICAL at 13:42

## 2018-10-17 RX ADMIN — FERROUS SULFATE TAB EC 324 MG (65 MG FE EQUIVALENT) 324 MG: 324 (65 FE) TABLET DELAYED RESPONSE at 09:27

## 2018-10-17 RX ADMIN — PIPERACILLIN SODIUM,TAZOBACTAM SODIUM 3.38 G: 3; .375 INJECTION, POWDER, FOR SOLUTION INTRAVENOUS at 21:26

## 2018-10-17 RX ADMIN — VANCOMYCIN HYDROCHLORIDE 1000 MG: 1 INJECTION, POWDER, LYOPHILIZED, FOR SOLUTION INTRAVENOUS at 19:01

## 2018-10-17 RX ADMIN — MUPIROCIN: 20 OINTMENT TOPICAL at 21:27

## 2018-10-17 RX ADMIN — INSULIN LISPRO 10 UNITS: 100 INJECTION, SOLUTION INTRAVENOUS; SUBCUTANEOUS at 09:29

## 2018-10-17 RX ADMIN — INSULIN LISPRO 2 UNITS: 100 INJECTION, SOLUTION INTRAVENOUS; SUBCUTANEOUS at 13:46

## 2018-10-17 RX ADMIN — VITAMIN D, TAB 1000IU (100/BT) 2000 UNITS: 25 TAB at 09:26

## 2018-10-17 RX ADMIN — VENLAFAXINE 150 MG: 50 TABLET ORAL at 21:26

## 2018-10-17 RX ADMIN — PANCRELIPASE 2 CAPSULE: 60000; 12000; 38000 CAPSULE, DELAYED RELEASE PELLETS ORAL at 13:41

## 2018-10-17 ASSESSMENT — PAIN SCALES - GENERAL
PAINLEVEL_OUTOF10: 7
PAINLEVEL_OUTOF10: 7
PAINLEVEL_OUTOF10: 0
PAINLEVEL_OUTOF10: 3
PAINLEVEL_OUTOF10: 3
PAINLEVEL_OUTOF10: 0
PAINLEVEL_OUTOF10: 7

## 2018-10-17 ASSESSMENT — PAIN DESCRIPTION - LOCATION: LOCATION: LEG

## 2018-10-17 ASSESSMENT — PAIN DESCRIPTION - PAIN TYPE: TYPE: ACUTE PAIN;CHRONIC PAIN

## 2018-10-17 ASSESSMENT — PAIN DESCRIPTION - ORIENTATION: ORIENTATION: RIGHT;LEFT

## 2018-10-17 NOTE — PROGRESS NOTES
HOSPITALIST  IP CONSULT TO INFECTIOUS DISEASES  PHARMACY TO DOSE VANCOMYCIN  IP CONSULT TO INFECTIOUS DISEASES    Disposition:  [] Home  [x] Home with home health [] Rehab [] Psych [] SNF  [] LTAC  [] Long term nursing home or group home [] Transfer to ICU  [] Transfer to PCU [] Other:    Code Status: Full Code    ELOS: 1-2  more days    Emmie Milner, APRN - CNP  10/17/18

## 2018-10-17 NOTE — PROGRESS NOTES
very forward flexed - cues to puush thru arms and extend elbows but pt unable to grasp concept. Daughter at bedside reports pt not typically this forward flexed when standing. ADL  LE Dressing: Dependent/Total (to don gama socks)  Toileting:  (suprapubic catheter)  Additional Comments: Anticipate pt to be setup for feeding, setup bed level grooming, Min A UE bathing/dressing, Max A LE bathing/dressing and toileting based on ROM , balance and endurance     Bed mobility  Supine to Sit: Minimal assistance (HOB elevated; use of bedrail)  Sit to Supine: Moderate assistance (x 2 )  Scooting: Dependent/Total (x 2 to scoot towards HOB in supine)     Transfers  Sit to stand: Moderate assistance (bed > walker x 2)  Stand to sit: Moderate assistance (walker > bed)  Transfer Comments: somewhat difficult to transfer due to pt's height and height of tall bed + specialty air mattress. pt had difficulty fully clearing buttocks from bed. Cognition  Overall Cognitive Status: Exceptions  Safety Judgement: Decreased awareness of need for safety;Decreased awareness of need for assistance  Insights: Decreased awareness of deficits  Cognition Comment: Pt reports \"I can walk around fine at home with my walker. \" Educated pt that a few days in bed can make people very weak, but she does not grasp this concept. Also repeats several times \"I've only been here for a day\" but per chart, has been here for three days now. Sensation  Overall Sensation Status: WFL      LUE AROM (degrees)  LUE AROM : WFL  RUE AROM (degrees)  RUE AROM : WFL  LUE Strength  Gross LUE Strength: WFL  RUE Strength  Gross RUE Strength: WFL          Assessment   Performance deficits / Impairments: Decreased functional mobility ; Decreased ADL status; Decreased ROM; Decreased endurance;Decreased balance;Decreased safe awareness;Decreased cognition  Assessment: Pt admitted with bilateral LE cellulitis. Pt also with buttocks ulcers.  Prior to admission, pt was living Treatment Recommendations: Strengthening, ROM, Endurance Training, Safety Education & Training, Self-Care / ADL, Equipment Evaluation, Education, & procurement, Patient/Caregiver Education & Training, Cognitive Reorientation, Positioning    G-Code  OT G-codes  Functional Assessment Tool Used: [facility] ADL   Score: 13  Functional Limitation: Self care  Self Care Current Status (): At least 60 percent but less than 80 percent impaired, limited or restricted  Self Care Goal Status (): At least 40 percent but less than 60 percent impaired, limited or restricted    AM-PAC Score  AM-PAC Inpatient Daily Activity Raw Score: 13  AM-PAC Inpatient ADL T-Scale Score : 32.03  ADL Inpatient CMS 0-100% Score: 63.03  ADL Inpatient CMS G-Code Modifier : CL    Goals  Short term goals  Time Frame for Short term goals: By d/c:  Short term goal 1: Pt will complete fxl mobility and fxl transfers to/from ADL surfaces with Min A and use of AD  Short term goal 2: Pt will complete LB bathing/dressing with Mod A  Short term goal 3: Pt will complete toileting with Mod A  Short term goal 4: Pt will tolerate 2-4 minutes of standing functional activity with CGA  Patient Goals   Patient goals : \"to go home with my daughter\"       Therapy Time   Individual Concurrent Group Co-treatment   Time In 1435         Time Out 1530         Minutes 55         Timed Code Treatment Minutes: 40 Minutes     This note to serve as d/c summary if pt d/c-ed prior to next therapy session.     Zee Angulo, OTR/L [id number]

## 2018-10-17 NOTE — CONSULTS
CATHETER - COMPLICATED  70/5032    suprapubic cath    JOINT REPLACEMENT      Right knee    KNEE SURGERY         Current Medications:    Outpatient Prescriptions Marked as Taking for the 10/14/18 encounter [facility] Encounter)   Medication Sig Dispense Refill    Timolol (TIMOPTIC) 0.5 % (DAILY) SOLN ophthalmic solution 1 drop daily      cefTRIAXone (ROCEPHIN) 500 MG injection Inject 500 mg into the muscle every 24 hours      fluconazole (DIFLUCAN) 150 MG tablet Take 150 mg by mouth once      collagenase 250 UNIT/GM ointment Apply 250 g topically daily Apply topically daily.  sennosides-docusate sodium (SENOKOT-S) 8.6-50 MG tablet Take 1 tablet by mouth daily      aspirin 81 MG chewable tablet Take 4 tablets by mouth daily      atorvastatin (LIPITOR) 10 MG tablet Take 1 tablet by mouth daily 30 tablet 0    metoprolol tartrate (LOPRESSOR) 25 MG tablet Take 1 tablet by mouth 2 times daily 60 tablet 0    furosemide (LASIX) 40 MG tablet Take 0.5 tablets by mouth daily 60 tablet 0    Biotin (BIOTIN 5000) 5 MG CAPS Take 1 tablet by mouth daily      Calcium Carbonate Antacid (TUMS ULTRA 1000) 1000 MG CHEW Take 1 tablet by mouth daily as needed (acid reflux)      lipase-protease-amylase (CREON) 04376 units delayed release capsule Take 24,000 Units by mouth 3 times daily (with meals)      ferrous sulfate 325 (65 Fe) MG tablet Take 325 mg by mouth 2 times daily       gabapentin (NEURONTIN) 300 MG capsule Take 300 mg by mouth daily At noon      insulin lispro (HUMALOG) 100 UNIT/ML injection vial Inject 1.8 Units into the skin continuous Insulin pump  With bolus doses      isosorbide mononitrate (IMDUR) 30 MG extended release tablet Take 30 mg by mouth daily      polyethylene glycol (GLYCOLAX) packet Take 17 g by mouth daily      oxyCODONE-acetaminophen (PERCOCET)  MG per tablet Take 1 tablet by mouth three times daily.  Yaakov Jon venlafaxine (EFFEXOR) 75 MG tablet Take 150 mg by mouth 2 times daily  cetirizine (ZYRTEC) 10 MG tablet Take 10 mg by mouth daily      Cholecalciferol (VITAMIN D3) 2000 units CAPS Take 2,000 Units by mouth daily         Allergies:  Sulfa antibiotics    Immunizations :   Immunization History   Administered Date(s) Administered    Influenza Vaccine, unspecified formulation 09/08/2017    Pneumococcal 13-valent Conjugate ([id number]) 10/15/2018    Pneumococcal Polysaccharide ([id number]) 08/15/2016         Social History:   Social History   Substance Use Topics    Smoking status: Never Smoker    Smokeless tobacco: Never Used    Alcohol use Yes      Comment: occassional     History   Smoking Status    Never Smoker   Smokeless Tobacco    Never Used      Family History   Problem Relation Age of Onset    Heart Disease Father     Heart Failure Father     Diabetes Father     Diabetes Daughter     Diabetes Daughter          REVIEW OF SYSTEMS:    No fever / chills / sweats. No weight loss. No visual change, eye pain, eye discharge. No oral lesion, sore throat, dysphagia. Denies cough / sputum/Sob   Denies chest pain, palpitations/ dizziness  Denies nausea/ vomiting/abdominal pain/diarrhea. Denies dysuria or change in urinary function. Denies joint swelling or pain. No myalgia, arthralgia. No rashes, skin lesions   Denies focal weakness, sensory change or other neurologic symptoms  No lymph node swelling or tenderness.     Bi LATERAL lower leg cellulitis and weeping ulcer and skin peeling groin area local redness+     PHYSICAL EXAM:      Vitals:    BP (!) 129/55   Pulse 78   Temp 98.6 °F (37 °C) (Oral)   Resp 16   Ht 4' 11\" (1.499 m)   Wt 181 lb (82.1 kg)   SpO2 95%   BMI 36.56 kg/m²     General Appearance: alert,in  acute distress, +  pallor, no icterus   Skin: warm and dry, no rash or erythema  Head: normocephalic and atraumatic  Eyes: pupils equal, round, and reactive to light, conjunctivae normal  ENT: tympanic membrane, external ear and ear canal normal bilaterally, nose without deformity, nasal mucosa and turbinates normal without polyps  Neck: supple and non-tender without mass, no thyromegaly  no cervical lymphadenopathy  Pulmonary/Chest: clear to auscultation bilaterally- no wheezes, rales or rhonchi, normal air movement, no respiratory distress  Cardiovascular: normal rate, regular rhythm, normal S1 and S2, no murmurs, rubs, clicks, or gallops, no carotid bruits  Abdomen: soft, non-tender, non-distended, normal bowel sounds, no masses or organomegaly  Extremities: no cyanosis, clubbing or edema  Musculoskeletal: normal range of motion, no joint swelling, deformity or tenderness  Neurologic: reflexes normal and symmetric, no cranial nerve deficit  Psych:  Orientation, sensorium, mood normal  Lines: IV   Bi lateral lower leg edema and on going ulcers and skin peeling off pigmentation from venous stasis and cellulitis from before  Fungal dermatitis and rash under the breast and in the groin area           DATA:    Lab Results   Component Value Date    WBC 8.4 10/17/2018    HGB 9.3 (L) 10/17/2018    HCT 28.3 (L) 10/17/2018    MCV 95.7 10/17/2018     10/17/2018     Lab Results   Component Value Date    CREATININE 1.3 (H) 10/17/2018    BUN 18 10/17/2018     10/17/2018    K 4.6 10/17/2018     10/17/2018    CO2 20 (L) 10/17/2018       Hepatic Function Panel:   Lab Results   Component Value Date    ALKPHOS 125 10/14/2018    ALT 9 10/14/2018    AST 13 10/14/2018    PROT 6.7 10/14/2018    BILITOT <0.2 10/14/2018    LABALBU 3.5 10/14/2018     UA:  Lab Results   Component Value Date    COLORU YELLOW 10/14/2018    CLARITYU Clear 10/14/2018    GLUCOSEU Negative 10/14/2018    BILIRUBINUR Negative 10/14/2018    KETUA Negative 10/14/2018    SPECGRAV 1.008 10/14/2018    BLOODU TRACE 10/14/2018    PHUR 5.5 10/14/2018    PROTEINU Negative 10/14/2018    UROBILINOGEN 0.2 10/14/2018    NITRU Negative 10/14/2018    LEUKOCYTESUR Negative 10/14/2018    LABMICR YES 10/14/2018    URINETYPE Not Specified 10/14/2018      Urine Microscopic:   Lab Results   Component Value Date    BACTERIA 4+ 04/12/2018    COMU see below 04/13/2018    HYALCAST 0 10/14/2018    WBCUA 2 10/14/2018    RBCUA 1 10/14/2018    EPIU 1 10/14/2018         Scheduled Meds:   miconazole   Topical BID    sennosides-docusate sodium  1 tablet Oral Daily    mupirocin   Topical BID    lactobacillus  1 capsule Oral Daily with breakfast    menthol-zinc oxide   Topical TID    sodium chloride flush  10 mL Intravenous 2 times per day    enoxaparin  40 mg Subcutaneous Nightly    piperacillin-tazobactam  3.375 g Intravenous Q8H    aspirin  324 mg Oral Daily    vitamin D  2,000 Units Oral Daily    atorvastatin  10 mg Oral Daily    cetirizine  10 mg Oral Daily    ferrous sulfate  324 mg Oral Daily with breakfast    gabapentin  300 mg Oral Daily    isosorbide mononitrate  30 mg Oral Daily    lipase-protease-amylase  2 capsule Oral TID WC    metoprolol tartrate  25 mg Oral BID    oxyCODONE-acetaminophen  1 tablet Oral TID    polyethylene glycol  17 g Oral Daily    timolol  1 drop Both Eyes BID    venlafaxine  150 mg Oral BID    insulin glargine  35 Units Subcutaneous Nightly    insulin lispro  0-12 Units Subcutaneous TID WC    insulin lispro  0-6 Units Subcutaneous Nightly    insulin lispro  10 Units Subcutaneous TID WC    vancomycin  1,000 mg Intravenous Q24H    vancomycin (VANCOCIN) intermittent dosing (placeholder)   Other RX Placeholder       Continuous Infusions:   dextrose         PRN Meds:  traMADol, menthol-zinc oxide **AND** menthol-zinc oxide, sodium chloride flush, potassium chloride **OR** potassium chloride **OR** potassium chloride, ondansetron, calcium carbonate, glucose, dextrose, glucagon (rDNA), dextrose    Creat 1.5  Down to   1.3    Ref Range & Units 10/14/18 1417   Pro-BNP 0 - 449 pg/mL 1,349     Comment: Methodology by NT-proBNP     An age-independent cutoff point of 300 pg/ml

## 2018-10-17 NOTE — PLAN OF CARE
Problem: Falls - Risk of:  Goal: Will remain free from falls  Will remain free from falls   Outcome: Ongoing  Fall risk assessment completed. Fall precautions in place. Call light within reach. Pt educated on calling for assistance before getting up. Walkway free of clutter. Will continue to monitor. Goal: Absence of physical injury  Absence of physical injury   Outcome: Ongoing  Fall risk assessment completed. Fall precautions in place. Call light within reach. Pt educated on calling for assistance before getting up. Walkway free of clutter. Will continue to monitor. Problem: Pain:  Goal: Pain level will decrease  Pain level will decrease   Outcome: Ongoing  Pain/discomfort being managed with PRN analgesics per MD orders. Pt able to express presence and absence of pain and rate pain appropriately using numerical scale. Goal: Control of acute pain  Control of acute pain   Outcome: Ongoing  Pain/discomfort being managed with PRN analgesics per MD orders. Pt able to express presence and absence of pain and rate pain appropriately using numerical scale. Goal: Control of chronic pain  Control of chronic pain   Outcome: Ongoing  Pain/discomfort being managed with PRN analgesics per MD orders. Pt able to express presence and absence of pain and rate pain appropriately using numerical scale.         Problem: Wound:  Goal: Will show signs of wound healing; wound closure and no evidence of infection  Will show signs of wound healing; wound closure and no evidence of infection   Outcome: Ongoing      Problem: Pressure Ulcer:  Goal: Signs of wound healing will improve  Signs of wound healing will improve   Outcome: Ongoing    Goal: Absence of new pressure ulcer  Absence of new pressure ulcer   Outcome: Ongoing    Goal: Will show no infection signs and symptoms  Will show no infection signs and symptoms   Outcome: Ongoing      Problem: Blood Glucose:  Goal: Ability to maintain appropriate glucose levels will improve  Ability to maintain appropriate glucose levels will improve   Outcome: Ongoing      Problem: Risk for Impaired Skin Integrity  Goal: Tissue integrity - skin and mucous membranes  Structural intactness and normal physiological function of skin and  mucous membranes. Outcome: Ongoing  Skin assessment completed every shift. Pt assessed for incontinence, appropriate barrier cream applied prn. Pt encouraged to turn/rotate every 2 hours. Assistance provided if pt unable to do so themselves.

## 2018-10-18 LAB
ANION GAP SERPL CALCULATED.3IONS-SCNC: 12 MMOL/L (ref 3–16)
BASOPHILS ABSOLUTE: 0 K/UL (ref 0–0.2)
BASOPHILS RELATIVE PERCENT: 0.7 %
BUN BLDV-MCNC: 17 MG/DL (ref 7–20)
CALCIUM SERPL-MCNC: 8.3 MG/DL (ref 8.3–10.6)
CHLORIDE BLD-SCNC: 107 MMOL/L (ref 99–110)
CO2: 18 MMOL/L (ref 21–32)
CREAT SERPL-MCNC: 1.4 MG/DL (ref 0.6–1.2)
EOSINOPHILS ABSOLUTE: 0.5 K/UL (ref 0–0.6)
EOSINOPHILS RELATIVE PERCENT: 6.8 %
GFR AFRICAN AMERICAN: 44
GFR NON-AFRICAN AMERICAN: 37
GLUCOSE BLD-MCNC: 121 MG/DL (ref 70–99)
GLUCOSE BLD-MCNC: 178 MG/DL (ref 70–99)
GLUCOSE BLD-MCNC: 189 MG/DL (ref 70–99)
GLUCOSE BLD-MCNC: 193 MG/DL (ref 70–99)
GLUCOSE BLD-MCNC: 238 MG/DL (ref 70–99)
HCT VFR BLD CALC: 26.7 % (ref 36–48)
HEMOGLOBIN: 9.1 G/DL (ref 12–16)
LYMPHOCYTES ABSOLUTE: 1 K/UL (ref 1–5.1)
LYMPHOCYTES RELATIVE PERCENT: 14.2 %
MCH RBC QN AUTO: 32.7 PG (ref 26–34)
MCHC RBC AUTO-ENTMCNC: 34 G/DL (ref 31–36)
MCV RBC AUTO: 96.3 FL (ref 80–100)
MONOCYTES ABSOLUTE: 0.4 K/UL (ref 0–1.3)
MONOCYTES RELATIVE PERCENT: 6.4 %
NEUTROPHILS ABSOLUTE: 4.9 K/UL (ref 1.7–7.7)
NEUTROPHILS RELATIVE PERCENT: 71.9 %
PDW BLD-RTO: 14.9 % (ref 12.4–15.4)
PERFORMED ON: ABNORMAL
PLATELET # BLD: 163 K/UL (ref 135–450)
PMV BLD AUTO: 9.2 FL (ref 5–10.5)
POTASSIUM SERPL-SCNC: 4.4 MMOL/L (ref 3.5–5.1)
RBC # BLD: 2.77 M/UL (ref 4–5.2)
SODIUM BLD-SCNC: 137 MMOL/L (ref 136–145)
VANCOMYCIN TROUGH: 19.9 UG/ML (ref 10–20)
WBC # BLD: 6.8 K/UL (ref 4–11)

## 2018-10-18 PROCEDURE — 97530 THERAPEUTIC ACTIVITIES: CPT

## 2018-10-18 PROCEDURE — G8978 MOBILITY CURRENT STATUS: HCPCS

## 2018-10-18 PROCEDURE — 6370000000 HC RX 637 (ALT 250 FOR IP): Performed by: NURSE PRACTITIONER

## 2018-10-18 PROCEDURE — 97535 SELF CARE MNGMENT TRAINING: CPT

## 2018-10-18 PROCEDURE — 6360000002 HC RX W HCPCS: Performed by: INTERNAL MEDICINE

## 2018-10-18 PROCEDURE — 97162 PT EVAL MOD COMPLEX 30 MIN: CPT

## 2018-10-18 PROCEDURE — 80048 BASIC METABOLIC PNL TOTAL CA: CPT

## 2018-10-18 PROCEDURE — 80202 ASSAY OF VANCOMYCIN: CPT

## 2018-10-18 PROCEDURE — 85025 COMPLETE CBC W/AUTO DIFF WBC: CPT

## 2018-10-18 PROCEDURE — G8979 MOBILITY GOAL STATUS: HCPCS

## 2018-10-18 PROCEDURE — 2580000003 HC RX 258: Performed by: HOSPITALIST

## 2018-10-18 PROCEDURE — 94760 N-INVAS EAR/PLS OXIMETRY 1: CPT

## 2018-10-18 PROCEDURE — 99232 SBSQ HOSP IP/OBS MODERATE 35: CPT | Performed by: INTERNAL MEDICINE

## 2018-10-18 PROCEDURE — 36415 COLL VENOUS BLD VENIPUNCTURE: CPT

## 2018-10-18 PROCEDURE — 6370000000 HC RX 637 (ALT 250 FOR IP): Performed by: HOSPITALIST

## 2018-10-18 PROCEDURE — 1200000000 HC SEMI PRIVATE

## 2018-10-18 PROCEDURE — 6360000002 HC RX W HCPCS: Performed by: HOSPITALIST

## 2018-10-18 RX ADMIN — MICONAZOLE NITRATE: 20 POWDER TOPICAL at 09:33

## 2018-10-18 RX ADMIN — PANCRELIPASE 2 CAPSULE: 60000; 12000; 38000 CAPSULE, DELAYED RELEASE PELLETS ORAL at 09:33

## 2018-10-18 RX ADMIN — FERROUS SULFATE TAB EC 324 MG (65 MG FE EQUIVALENT) 324 MG: 324 (65 FE) TABLET DELAYED RESPONSE at 09:35

## 2018-10-18 RX ADMIN — OXYCODONE HYDROCHLORIDE AND ACETAMINOPHEN 1 TABLET: 10; 325 TABLET ORAL at 09:34

## 2018-10-18 RX ADMIN — VENLAFAXINE 150 MG: 50 TABLET ORAL at 09:34

## 2018-10-18 RX ADMIN — GABAPENTIN 300 MG: 300 CAPSULE ORAL at 09:34

## 2018-10-18 RX ADMIN — PANCRELIPASE 2 CAPSULE: 60000; 12000; 38000 CAPSULE, DELAYED RELEASE PELLETS ORAL at 12:44

## 2018-10-18 RX ADMIN — PIPERACILLIN SODIUM,TAZOBACTAM SODIUM 3.38 G: 3; .375 INJECTION, POWDER, FOR SOLUTION INTRAVENOUS at 12:44

## 2018-10-18 RX ADMIN — ANORECTAL OINTMENT: 15.7; .44; 24; 20.6 OINTMENT TOPICAL at 18:08

## 2018-10-18 RX ADMIN — VENLAFAXINE 150 MG: 50 TABLET ORAL at 20:52

## 2018-10-18 RX ADMIN — METOPROLOL TARTRATE 25 MG: 25 TABLET ORAL at 09:34

## 2018-10-18 RX ADMIN — ANORECTAL OINTMENT: 15.7; .44; 24; 20.6 OINTMENT TOPICAL at 09:35

## 2018-10-18 RX ADMIN — OXYCODONE HYDROCHLORIDE AND ACETAMINOPHEN 1 TABLET: 10; 325 TABLET ORAL at 20:53

## 2018-10-18 RX ADMIN — FLUCONAZOLE 100 MG: 2 INJECTION, SOLUTION INTRAVENOUS at 18:08

## 2018-10-18 RX ADMIN — TIMOLOL MALEATE 1 DROP: 5 SOLUTION/ DROPS OPHTHALMIC at 21:29

## 2018-10-18 RX ADMIN — MICONAZOLE NITRATE: 20 POWDER TOPICAL at 20:53

## 2018-10-18 RX ADMIN — PIPERACILLIN SODIUM,TAZOBACTAM SODIUM 3.38 G: 3; .375 INJECTION, POWDER, FOR SOLUTION INTRAVENOUS at 04:59

## 2018-10-18 RX ADMIN — ISOSORBIDE MONONITRATE 30 MG: 30 TABLET, EXTENDED RELEASE ORAL at 09:35

## 2018-10-18 RX ADMIN — INSULIN GLARGINE 35 UNITS: 100 INJECTION, SOLUTION SUBCUTANEOUS at 21:22

## 2018-10-18 RX ADMIN — PIPERACILLIN SODIUM,TAZOBACTAM SODIUM 3.38 G: 3; .375 INJECTION, POWDER, FOR SOLUTION INTRAVENOUS at 20:52

## 2018-10-18 RX ADMIN — MUPIROCIN: 20 OINTMENT TOPICAL at 09:33

## 2018-10-18 RX ADMIN — VITAMIN D, TAB 1000IU (100/BT) 2000 UNITS: 25 TAB at 09:35

## 2018-10-18 RX ADMIN — INSULIN LISPRO 2 UNITS: 100 INJECTION, SOLUTION INTRAVENOUS; SUBCUTANEOUS at 12:47

## 2018-10-18 RX ADMIN — OXYCODONE HYDROCHLORIDE AND ACETAMINOPHEN 1 TABLET: 10; 325 TABLET ORAL at 14:44

## 2018-10-18 RX ADMIN — ASPIRIN 81 MG CHEWABLE TABLET 324 MG: 81 TABLET CHEWABLE at 09:33

## 2018-10-18 RX ADMIN — CETIRIZINE HYDROCHLORIDE 10 MG: 10 TABLET, FILM COATED ORAL at 09:34

## 2018-10-18 RX ADMIN — INSULIN LISPRO 2 UNITS: 100 INJECTION, SOLUTION INTRAVENOUS; SUBCUTANEOUS at 21:22

## 2018-10-18 RX ADMIN — INSULIN LISPRO 10 UNITS: 100 INJECTION, SOLUTION INTRAVENOUS; SUBCUTANEOUS at 12:44

## 2018-10-18 RX ADMIN — TIMOLOL MALEATE 1 DROP: 5 SOLUTION/ DROPS OPHTHALMIC at 09:33

## 2018-10-18 RX ADMIN — Medication 10 ML: at 20:59

## 2018-10-18 RX ADMIN — Medication 10 ML: at 09:37

## 2018-10-18 RX ADMIN — ATORVASTATIN CALCIUM 10 MG: 10 TABLET, FILM COATED ORAL at 09:34

## 2018-10-18 RX ADMIN — METOPROLOL TARTRATE 25 MG: 25 TABLET ORAL at 20:53

## 2018-10-18 RX ADMIN — SENNOSIDES AND DOCUSATE SODIUM 1 TABLET: 8.6; 5 TABLET ORAL at 09:35

## 2018-10-18 RX ADMIN — INSULIN LISPRO 2 UNITS: 100 INJECTION, SOLUTION INTRAVENOUS; SUBCUTANEOUS at 18:13

## 2018-10-18 RX ADMIN — ANORECTAL OINTMENT: 15.7; .44; 24; 20.6 OINTMENT TOPICAL at 20:53

## 2018-10-18 RX ADMIN — INSULIN LISPRO 10 UNITS: 100 INJECTION, SOLUTION INTRAVENOUS; SUBCUTANEOUS at 09:49

## 2018-10-18 RX ADMIN — INSULIN LISPRO 10 UNITS: 100 INJECTION, SOLUTION INTRAVENOUS; SUBCUTANEOUS at 18:08

## 2018-10-18 RX ADMIN — MUPIROCIN: 20 OINTMENT TOPICAL at 20:53

## 2018-10-18 RX ADMIN — PANCRELIPASE 2 CAPSULE: 60000; 12000; 38000 CAPSULE, DELAYED RELEASE PELLETS ORAL at 18:08

## 2018-10-18 RX ADMIN — ENOXAPARIN SODIUM 40 MG: 40 INJECTION SUBCUTANEOUS at 20:53

## 2018-10-18 RX ADMIN — Medication 1 CAPSULE: at 09:34

## 2018-10-18 ASSESSMENT — PAIN DESCRIPTION - ORIENTATION: ORIENTATION: LEFT

## 2018-10-18 ASSESSMENT — PAIN DESCRIPTION - LOCATION: LOCATION: ARM

## 2018-10-18 ASSESSMENT — PAIN DESCRIPTION - DESCRIPTORS: DESCRIPTORS: ACHING

## 2018-10-18 ASSESSMENT — PAIN DESCRIPTION - PAIN TYPE: TYPE: ACUTE PAIN

## 2018-10-18 ASSESSMENT — PAIN SCALES - GENERAL
PAINLEVEL_OUTOF10: 8
PAINLEVEL_OUTOF10: 8
PAINLEVEL_OUTOF10: 0
PAINLEVEL_OUTOF10: 0
PAINLEVEL_OUTOF10: 9

## 2018-10-18 ASSESSMENT — PAIN DESCRIPTION - PROGRESSION: CLINICAL_PROGRESSION: GRADUALLY WORSENING

## 2018-10-18 ASSESSMENT — PAIN DESCRIPTION - ONSET: ONSET: ON-GOING

## 2018-10-18 ASSESSMENT — PAIN DESCRIPTION - FREQUENCY: FREQUENCY: CONTINUOUS

## 2018-10-18 NOTE — PLAN OF CARE
Problem: Falls - Risk of:  Goal: Will remain free from falls  Will remain free from falls   Outcome: Ongoing  Fall risk assessment completed. Fall precautions in place. Call light within reach. Pt educated on calling for assistance before getting up. Walkway free of clutter. Will continue to monitor. Goal: Absence of physical injury  Absence of physical injury   Outcome: Ongoing  Fall risk assessment completed. Fall precautions in place. Call light within reach. Pt educated on calling for assistance before getting up. Walkway free of clutter. Will continue to monitor. Problem: Pain:  Goal: Pain level will decrease  Pain level will decrease   Outcome: Ongoing  Pain/discomfort being managed with PRN analgesics per MD orders. Pt able to express presence and absence of pain and rate pain appropriately using numerical scale. Goal: Control of acute pain  Control of acute pain   Outcome: Ongoing  Pain/discomfort being managed with PRN analgesics per MD orders. Pt able to express presence and absence of pain and rate pain appropriately using numerical scale. Goal: Control of chronic pain  Control of chronic pain   Outcome: Ongoing  Pain/discomfort being managed with PRN analgesics per MD orders. Pt able to express presence and absence of pain and rate pain appropriately using numerical scale. Problem: Wound:  Goal: Will show signs of wound healing; wound closure and no evidence of infection  Will show signs of wound healing; wound closure and no evidence of infection   Outcome: Ongoing      Problem: Risk for Impaired Skin Integrity  Goal: Tissue integrity - skin and mucous membranes  Structural intactness and normal physiological function of skin and  mucous membranes. Outcome: Ongoing  Skin assessment completed every shift. Pt assessed for incontinence, appropriate barrier cream applied prn. Pt encouraged to turn/rotate every 2 hours. Assistance provided if pt unable to do so themselves.

## 2018-10-18 NOTE — PROGRESS NOTES
fibula: Postsurgical changes. Suspected periosteal reaction   involving the distal tibia, which raise the possibility of osteomyelitis. Right tibia and fibula: Periosteal reaction involving the distal tibia and to   a lesser extent the fibula. Again, osteomyelitis is a concern. XR CHEST PORTABLE   Final Result   Stable cardial-pericardial silhouette enlargement. No acute abnormality   detected. VL Extremity Venous Bilateral    (Results Pending)       Assessment & Plan:      Bilateral lower extremity cellulitis-secondary to infected wounds  MRSA swab positive-continue vancomycin for now  ID consultation appreciated  Continue Zosyn  CBC with diff in AM  Wound care consult-appreciate recs  BMP in AM    Yeast infection  IV diflucan per ID  Miconazole powder    Pressure Ulcers   Left inner and mid gluteal-stage 3, injury vs blister rupture  Left ischial pressure vs ruptured blister  Specialty hospital bed  Wound care to follow as above    Normocytic anemia-improved with transfusion  Iron studies noted  Fecal occult negative  Transfuse 1unit PRBCs 10/16   Repeat CBC in AM    Acute on chronic renal insufficiency   Furosemide on hold  BMP in AM    MRSA nasal colonization  Bactroban BID x 5 days  Contact isolation    Diabetes mellitus with hyperglycemia  POCT with SSI  Prandial Humalog  Continue Lantus  Carb controlled diet      HTN  Continue home meds with hold parameters    Chronic diastolic CHF  Furosemide currently on hold    Body mass index is 36.02 kg/m². The patient was informed of the results of any tests, a time was given to answer questions, a plan was proposed and they agreed with plan. I discussed the plan with her daughter Nacho Vega by phone.     DVT prophylaxis: [x] Lovenox  [] SQ Heparin  [] SCDs because of  [] warfarin/oral direct thrombin inhibitor [] Encourage ambulation    GI prophylaxis: [] [id number]  [x] not indicated    Probiotic if on abx: [x] Yes [] No [] Not

## 2018-10-18 NOTE — PROGRESS NOTES
Infectious Disease Follow up Notes  Admit Date: 10/14/2018  Hospital Day: 5    Antibiotics :   IV Vancomycin  IV Zosyn  IV Fluconazole    CHIEF COMPLAINT:     Bi lateral lower leg cellulitis  MRSA  Fungal dermatitis  Lower leg edema      Subjective interval History :  74 y.o.  woman with bi lateral lower leg edema and venous ulcer and recurrent cellulitis admitted with bi lateral lower leg weeping ulcer and drainage , pain she has h/o mRSA infection in the past and has missed her Wound care appointments and has home care RN for wound dressings she has SPC in place for urine drainage, also noticed extensive fungal dermatitis under the breast and in the groin areas bi lateral. Given recurrent cellulitis we are consulted for abx recommendations has on going pain in both lower legs worse with any ambulation or movement. On going fungal rash under the breast and in the groin area very moist and some scaly skin+ lower leg edema and lower leg pigmentation from cellulitis and has on going pain and she did not want to take them off for Venous  Reflex and study not done, no fevers no chills          Past Medical History:    Past Medical History:   Diagnosis Date    Anemia     Atrial fibrillation (HCC)     CAD (coronary artery disease)     Chronic back pain     CKD (chronic kidney disease), stage III (HCC)     Diabetes mellitus (HCC)     Diastolic CHF ([address]     Hyperlipidemia     Hypertension     MI (myocardial infarction) ([address]     MRSA (methicillin resistant staph aureus) culture positive 04/13/2018    leg ulcer    MRSA colonization 10/16/2018    Obesity (BMI 30-39. 9)     Osteoarthritis     Pancreatitis     Sepsis ([address]     Stasis ulcer ([address]     bilateral lower extremities        Past Surgical History:    Past Surgical History:   Procedure Laterality Date    APPENDECTOMY      BACK SURGERY      x2    CATARACT REMOVAL equal, round, and reactive to light, conjunctivae normal  ENT: tympanic membrane, external ear and ear canal normal bilaterally, nose without deformity, nasal mucosa and turbinates normal without polyps  Neck: supple and non-tender without mass, no thyromegaly  no cervical lymphadenopathy  Pulmonary/Chest: clear to auscultation bilaterally- no wheezes, rales or rhonchi, normal air movement, no respiratory distress  Cardiovascular: normal rate, regular rhythm, normal S1 and S2, no murmurs, rubs, clicks, or gallops, no carotid bruits  Abdomen: soft, non-tender, non-distended, normal bowel sounds, no masses or organomegaly  Extremities: no cyanosis, clubbing or edema  Musculoskeletal: normal range of motion, no joint swelling, deformity or tenderness  Neurologic: reflexes normal and symmetric, no cranial nerve deficit  Psych:  Orientation, sensorium, mood normal  Lines: IV   Bi lateral lower leg edema and on going ulcers and skin peeling off pigmentation from venous stasis and cellulitis from before  Fungal dermatitis and rash under the breast and in the groin area                       Data Review:    Lab Results   Component Value Date    WBC 6.8 10/18/2018    HGB 9.1 (L) 10/18/2018    HCT 26.7 (L) 10/18/2018    MCV 96.3 10/18/2018     10/18/2018     Lab Results   Component Value Date    CREATININE 1.4 (H) 10/18/2018    BUN 17 10/18/2018     10/18/2018    K 4.4 10/18/2018     10/18/2018    CO2 18 (L) 10/18/2018       Hepatic Function Panel:   Lab Results   Component Value Date    ALKPHOS 125 10/14/2018    ALT 9 10/14/2018    AST 13 10/14/2018    PROT 6.7 10/14/2018    BILITOT <0.2 10/14/2018    LABALBU 3.5 10/14/2018       UA:  Lab Results   Component Value Date    COLORU YELLOW 10/14/2018    CLARITYU Clear 10/14/2018    GLUCOSEU Negative 10/14/2018    BILIRUBINUR Negative 10/14/2018    KETUA Negative 10/14/2018    SPECGRAV 1.008 10/14/2018    BLOODU TRACE 10/14/2018    PHUR 5.5 10/14/2018 Intravenous Q8H    aspirin  324 mg Oral Daily    vitamin D  2,000 Units Oral Daily    atorvastatin  10 mg Oral Daily    cetirizine  10 mg Oral Daily    ferrous sulfate  324 mg Oral Daily with breakfast    gabapentin  300 mg Oral Daily    isosorbide mononitrate  30 mg Oral Daily    lipase-protease-amylase  2 capsule Oral TID WC    metoprolol tartrate  25 mg Oral BID    oxyCODONE-acetaminophen  1 tablet Oral TID    polyethylene glycol  17 g Oral Daily    timolol  1 drop Both Eyes BID    venlafaxine  150 mg Oral BID    insulin glargine  35 Units Subcutaneous Nightly    insulin lispro  0-12 Units Subcutaneous TID WC    insulin lispro  0-6 Units Subcutaneous Nightly    insulin lispro  10 Units Subcutaneous TID WC    vancomycin  1,000 mg Intravenous Q24H    vancomycin (VANCOCIN) intermittent dosing (placeholder)   Other RX Placeholder       Continuous Infusions:   dextrose         PRN Meds:  traMADol, menthol-zinc oxide **AND** menthol-zinc oxide, sodium chloride flush, potassium chloride **OR** potassium chloride **OR** potassium chloride, ondansetron, calcium carbonate, glucose, dextrose, glucagon (rDNA), dextrose      Assessment:   Bi lateral lower leg cellulitis  Venous ulcers with skin peeling off  Lower leg edema  Bi latera lower leg pain  Fungal dermatitis extensive infra mammary and in the groin folds area  KACIE  MRSA infection and colonization  Obesity   Recurrent lower leg cellulitis   Has on going pain and she did not want to do Venous reflux study and I encouraged to get this done at least as out pt     Labs, Microbiology, Radiology and all the pertinent results from current hospitalization and  care every where were reviewed  by me as a part of the evaluation   Plan:   1. Cont IV Vancomycin x 1 gm x Q 24 HRS  2. Watch creat closely   3. Cont IV Zosyn for gram -ve coverage  4. IV Fluconazole for dermatitis will change to oral soon   5. Cont local care and ace wraps both legs  6.  Venous

## 2018-10-18 NOTE — PROGRESS NOTES
cellulitis - difficulty walking  Response To Previous Treatment: Not applicable  Family / Caregiver Present: No  Follows Commands: Within Functional Limits  Subjective  Subjective: alert and agreeable to PT OT session and OOB to the recliner (via trent becerra)  Pain Screening  Patient Currently in Pain:  (not at this time)  Social/Functional History  Social/Functional History  Lives With: Daughter (& daughter's significant other)  Type of Home: House  Home Layout: One level  Home Access: Stairs to enter without rails  Entrance Stairs - Number of Steps: 1-2 steps in   Bathroom Shower/Tub: Walk-in shower, Shower chair with back  [employer]: Standard (toilet safety frame)  Bathroom Accessibility: Walker accessible  Home Equipment: Rolling walker, Wheelchair-manual, Lift chair, Reacher (pt sleeps in lift chair)  Receives Help From: Home health, Friend(s)  ADL Assistance: Independent (pt showers once a week to wash hair & requires supv ; pt typically completes sponge bath independently, independent toileting and dressing)  Homemaking Responsibilities: No  Ambulation Assistance: Independent (with RW)  Transfer Assistance: Independent  Additional Comments: Pt has frequent visitors throughout day -- friends, cleaning lady, personal hired nurse, home PT, daughter. Daughter at bedside reports about 3-4 falls in past 3-4 months.   Objective  ROM and strength grossly functional to her reported habitus prior to admission  Motor Control  Gross Motor?: WFL  Sensation  Overall Sensation Status: WFL  Bed mobility  Supine to Sit: Moderate assistance  Sit to Supine: Unable to assess (remained OOB at conclusion)  Transfers  Sit to Stand: Contact guard assistance (in trent becerra)  Stand to sit: Contact guard assistance (from trent becerra)  Ambulation  Ambulation?: No  Stairs/Curb  Stairs?: No     Balance  Comments: midline in sitting at EOB and in recliner      midline in static stance in the trent becerra but initially not able to get her

## 2018-10-19 LAB
ANION GAP SERPL CALCULATED.3IONS-SCNC: 12 MMOL/L (ref 3–16)
BASOPHILS ABSOLUTE: 0.1 K/UL (ref 0–0.2)
BASOPHILS RELATIVE PERCENT: 0.8 %
BLOOD CULTURE, ROUTINE: NORMAL
BUN BLDV-MCNC: 18 MG/DL (ref 7–20)
CALCIUM SERPL-MCNC: 8.5 MG/DL (ref 8.3–10.6)
CHLORIDE BLD-SCNC: 109 MMOL/L (ref 99–110)
CO2: 19 MMOL/L (ref 21–32)
CREAT SERPL-MCNC: 1.2 MG/DL (ref 0.6–1.2)
CULTURE, BLOOD 2: NORMAL
EOSINOPHILS ABSOLUTE: 0.4 K/UL (ref 0–0.6)
EOSINOPHILS RELATIVE PERCENT: 6.1 %
GFR AFRICAN AMERICAN: 53
GFR NON-AFRICAN AMERICAN: 44
GLUCOSE BLD-MCNC: 164 MG/DL (ref 70–99)
GLUCOSE BLD-MCNC: 174 MG/DL (ref 70–99)
GLUCOSE BLD-MCNC: 184 MG/DL (ref 70–99)
GLUCOSE BLD-MCNC: 228 MG/DL (ref 70–99)
GLUCOSE BLD-MCNC: 232 MG/DL (ref 70–99)
HCT VFR BLD CALC: 25.8 % (ref 36–48)
HEMOGLOBIN: 8.7 G/DL (ref 12–16)
LYMPHOCYTES ABSOLUTE: 0.9 K/UL (ref 1–5.1)
LYMPHOCYTES RELATIVE PERCENT: 15 %
MCH RBC QN AUTO: 32.2 PG (ref 26–34)
MCHC RBC AUTO-ENTMCNC: 33.7 G/DL (ref 31–36)
MCV RBC AUTO: 95.6 FL (ref 80–100)
MONOCYTES ABSOLUTE: 0.4 K/UL (ref 0–1.3)
MONOCYTES RELATIVE PERCENT: 7.1 %
NEUTROPHILS ABSOLUTE: 4.4 K/UL (ref 1.7–7.7)
NEUTROPHILS RELATIVE PERCENT: 71 %
PDW BLD-RTO: 14.4 % (ref 12.4–15.4)
PERFORMED ON: ABNORMAL
PLATELET # BLD: 164 K/UL (ref 135–450)
PMV BLD AUTO: 9 FL (ref 5–10.5)
POTASSIUM SERPL-SCNC: 4.2 MMOL/L (ref 3.5–5.1)
RBC # BLD: 2.7 M/UL (ref 4–5.2)
SODIUM BLD-SCNC: 140 MMOL/L (ref 136–145)
VANCOMYCIN RANDOM: 17 UG/ML
WBC # BLD: 6.2 K/UL (ref 4–11)

## 2018-10-19 PROCEDURE — 6360000002 HC RX W HCPCS: Performed by: INTERNAL MEDICINE

## 2018-10-19 PROCEDURE — 94760 N-INVAS EAR/PLS OXIMETRY 1: CPT

## 2018-10-19 PROCEDURE — 97530 THERAPEUTIC ACTIVITIES: CPT

## 2018-10-19 PROCEDURE — 97116 GAIT TRAINING THERAPY: CPT

## 2018-10-19 PROCEDURE — 1200000000 HC SEMI PRIVATE

## 2018-10-19 PROCEDURE — 6370000000 HC RX 637 (ALT 250 FOR IP): Performed by: NURSE PRACTITIONER

## 2018-10-19 PROCEDURE — 2580000003 HC RX 258: Performed by: HOSPITALIST

## 2018-10-19 PROCEDURE — 6370000000 HC RX 637 (ALT 250 FOR IP): Performed by: HOSPITALIST

## 2018-10-19 PROCEDURE — 36415 COLL VENOUS BLD VENIPUNCTURE: CPT

## 2018-10-19 PROCEDURE — 80048 BASIC METABOLIC PNL TOTAL CA: CPT

## 2018-10-19 PROCEDURE — 85025 COMPLETE CBC W/AUTO DIFF WBC: CPT

## 2018-10-19 PROCEDURE — 6360000002 HC RX W HCPCS: Performed by: HOSPITALIST

## 2018-10-19 PROCEDURE — 80202 ASSAY OF VANCOMYCIN: CPT

## 2018-10-19 PROCEDURE — 97110 THERAPEUTIC EXERCISES: CPT

## 2018-10-19 PROCEDURE — 99232 SBSQ HOSP IP/OBS MODERATE 35: CPT | Performed by: INTERNAL MEDICINE

## 2018-10-19 RX ADMIN — PIPERACILLIN SODIUM,TAZOBACTAM SODIUM 3.38 G: 3; .375 INJECTION, POWDER, FOR SOLUTION INTRAVENOUS at 14:14

## 2018-10-19 RX ADMIN — PIPERACILLIN SODIUM,TAZOBACTAM SODIUM 3.38 G: 3; .375 INJECTION, POWDER, FOR SOLUTION INTRAVENOUS at 05:02

## 2018-10-19 RX ADMIN — VENLAFAXINE 150 MG: 50 TABLET ORAL at 20:39

## 2018-10-19 RX ADMIN — METOPROLOL TARTRATE 25 MG: 25 TABLET ORAL at 09:24

## 2018-10-19 RX ADMIN — VANCOMYCIN HYDROCHLORIDE 1000 MG: 1 INJECTION, POWDER, LYOPHILIZED, FOR SOLUTION INTRAVENOUS at 12:16

## 2018-10-19 RX ADMIN — ISOSORBIDE MONONITRATE 30 MG: 30 TABLET, EXTENDED RELEASE ORAL at 09:25

## 2018-10-19 RX ADMIN — PANCRELIPASE 2 CAPSULE: 60000; 12000; 38000 CAPSULE, DELAYED RELEASE PELLETS ORAL at 09:23

## 2018-10-19 RX ADMIN — MUPIROCIN: 20 OINTMENT TOPICAL at 09:30

## 2018-10-19 RX ADMIN — INSULIN LISPRO 2 UNITS: 100 INJECTION, SOLUTION INTRAVENOUS; SUBCUTANEOUS at 20:54

## 2018-10-19 RX ADMIN — INSULIN LISPRO 10 UNITS: 100 INJECTION, SOLUTION INTRAVENOUS; SUBCUTANEOUS at 09:38

## 2018-10-19 RX ADMIN — OXYCODONE HYDROCHLORIDE AND ACETAMINOPHEN 1 TABLET: 10; 325 TABLET ORAL at 09:25

## 2018-10-19 RX ADMIN — FLUCONAZOLE 100 MG: 2 INJECTION, SOLUTION INTRAVENOUS at 15:23

## 2018-10-19 RX ADMIN — VITAMIN D, TAB 1000IU (100/BT) 2000 UNITS: 25 TAB at 09:24

## 2018-10-19 RX ADMIN — ENOXAPARIN SODIUM 40 MG: 40 INJECTION SUBCUTANEOUS at 20:40

## 2018-10-19 RX ADMIN — ANORECTAL OINTMENT: 15.7; .44; 24; 20.6 OINTMENT TOPICAL at 13:24

## 2018-10-19 RX ADMIN — OXYCODONE HYDROCHLORIDE AND ACETAMINOPHEN 1 TABLET: 10; 325 TABLET ORAL at 14:33

## 2018-10-19 RX ADMIN — MICONAZOLE NITRATE: 20 POWDER TOPICAL at 09:29

## 2018-10-19 RX ADMIN — INSULIN LISPRO 10 UNITS: 100 INJECTION, SOLUTION INTRAVENOUS; SUBCUTANEOUS at 18:10

## 2018-10-19 RX ADMIN — VENLAFAXINE 150 MG: 50 TABLET ORAL at 09:24

## 2018-10-19 RX ADMIN — ASPIRIN 81 MG CHEWABLE TABLET 324 MG: 81 TABLET CHEWABLE at 09:24

## 2018-10-19 RX ADMIN — Medication 1 CAPSULE: at 09:24

## 2018-10-19 RX ADMIN — POLYETHYLENE GLYCOL 3350 17 G: 17 POWDER, FOR SOLUTION ORAL at 09:26

## 2018-10-19 RX ADMIN — ANORECTAL OINTMENT: 15.7; .44; 24; 20.6 OINTMENT TOPICAL at 09:26

## 2018-10-19 RX ADMIN — INSULIN GLARGINE 35 UNITS: 100 INJECTION, SOLUTION SUBCUTANEOUS at 20:54

## 2018-10-19 RX ADMIN — PANCRELIPASE 2 CAPSULE: 60000; 12000; 38000 CAPSULE, DELAYED RELEASE PELLETS ORAL at 13:23

## 2018-10-19 RX ADMIN — PANCRELIPASE 2 CAPSULE: 60000; 12000; 38000 CAPSULE, DELAYED RELEASE PELLETS ORAL at 18:10

## 2018-10-19 RX ADMIN — METOPROLOL TARTRATE 25 MG: 25 TABLET ORAL at 20:39

## 2018-10-19 RX ADMIN — INSULIN LISPRO 2 UNITS: 100 INJECTION, SOLUTION INTRAVENOUS; SUBCUTANEOUS at 09:38

## 2018-10-19 RX ADMIN — ATORVASTATIN CALCIUM 10 MG: 10 TABLET, FILM COATED ORAL at 09:24

## 2018-10-19 RX ADMIN — GABAPENTIN 300 MG: 300 CAPSULE ORAL at 09:24

## 2018-10-19 RX ADMIN — TIMOLOL MALEATE 1 DROP: 5 SOLUTION/ DROPS OPHTHALMIC at 09:30

## 2018-10-19 RX ADMIN — INSULIN LISPRO 4 UNITS: 100 INJECTION, SOLUTION INTRAVENOUS; SUBCUTANEOUS at 18:11

## 2018-10-19 RX ADMIN — SENNOSIDES AND DOCUSATE SODIUM 1 TABLET: 8.6; 5 TABLET ORAL at 09:24

## 2018-10-19 RX ADMIN — PIPERACILLIN SODIUM,TAZOBACTAM SODIUM 3.38 G: 3; .375 INJECTION, POWDER, FOR SOLUTION INTRAVENOUS at 20:39

## 2018-10-19 RX ADMIN — CETIRIZINE HYDROCHLORIDE 10 MG: 10 TABLET, FILM COATED ORAL at 09:25

## 2018-10-19 RX ADMIN — FERROUS SULFATE TAB EC 324 MG (65 MG FE EQUIVALENT) 324 MG: 324 (65 FE) TABLET DELAYED RESPONSE at 09:24

## 2018-10-19 RX ADMIN — OXYCODONE HYDROCHLORIDE AND ACETAMINOPHEN 1 TABLET: 10; 325 TABLET ORAL at 20:39

## 2018-10-19 ASSESSMENT — PAIN SCALES - GENERAL
PAINLEVEL_OUTOF10: 5
PAINLEVEL_OUTOF10: 8
PAINLEVEL_OUTOF10: 2
PAINLEVEL_OUTOF10: 2
PAINLEVEL_OUTOF10: 8

## 2018-10-19 ASSESSMENT — PAIN DESCRIPTION - PAIN TYPE
TYPE: CHRONIC PAIN

## 2018-10-19 ASSESSMENT — PAIN DESCRIPTION - FREQUENCY
FREQUENCY: CONTINUOUS

## 2018-10-19 ASSESSMENT — PAIN DESCRIPTION - DESCRIPTORS
DESCRIPTORS: ACHING

## 2018-10-19 ASSESSMENT — PAIN DESCRIPTION - PROGRESSION
CLINICAL_PROGRESSION: GRADUALLY IMPROVING
CLINICAL_PROGRESSION: NOT CHANGED
CLINICAL_PROGRESSION: GRADUALLY IMPROVING

## 2018-10-19 ASSESSMENT — PAIN DESCRIPTION - ONSET
ONSET: ON-GOING
ONSET: ON-GOING

## 2018-10-19 ASSESSMENT — PAIN DESCRIPTION - ORIENTATION
ORIENTATION: LEFT

## 2018-10-19 ASSESSMENT — PAIN DESCRIPTION - LOCATION
LOCATION: LEG

## 2018-10-19 NOTE — PROGRESS NOTES
Infectious Disease Follow up Notes  Admit Date: 10/14/2018  Hospital Day: 6    Antibiotics :   IV Vancomycin  IV Zosyn  Oral Fluconazole    CHIEF COMPLAINT:     Bi lateral lower leg cellulitis  MRSA  Fungal dermatitis  Lower leg edema      Subjective interval History :  74 y.o.  woman with bi lateral lower leg edema and venous ulcer and recurrent cellulitis admitted with bi lateral lower leg weeping ulcer and drainage , pain she has h/o mRSA infection in the past and has missed her Wound care appointments and has home care RN for wound dressings she has SPC in place for urine drainage, also noticed extensive fungal dermatitis under the breast and in the groin areas bi lateral. Given recurrent cellulitis we are consulted for abx recommendations has on going pain in both lower legs worse with any ambulation or movement. Leg pain better redness slow to resolve and fungal rash slow to improve and tolerating Ace wraps ok no fevers no chills        Past Medical History:    Past Medical History:   Diagnosis Date    Anemia     Atrial fibrillation (HCC)     CAD (coronary artery disease)     Chronic back pain     CKD (chronic kidney disease), stage III (HCC)     Diabetes mellitus (HCC)     Diastolic CHF ([address]     Hyperlipidemia     Hypertension     MI (myocardial infarction) ([address]     MRSA (methicillin resistant staph aureus) culture positive 04/13/2018    leg ulcer    MRSA colonization 10/16/2018    Obesity (BMI 30-39. 9)     Osteoarthritis     Pancreatitis     Sepsis ([address]     Stasis ulcer ([address]     bilateral lower extremities     Suprapubic catheter ([address]        Past Surgical History:    Past Surgical History:   Procedure Laterality Date    APPENDECTOMY      BACK SURGERY      x2    CATARACT REMOVAL      bilateral     CHOLECYSTECTOMY      COLONOSCOPY      EYE SURGERY      FRACTURE SURGERY      Left ankle    bilaterally, nose without deformity, nasal mucosa and turbinates normal without polyps  Neck: supple and non-tender without mass, no thyromegaly  no cervical lymphadenopathy  Pulmonary/Chest: clear to auscultation bilaterally- no wheezes, rales or rhonchi, normal air movement, no respiratory distress  Cardiovascular: normal rate, regular rhythm, normal S1 and S2, no murmurs, rubs, clicks, or gallops, no carotid bruits  Abdomen: soft, non-tender, non-distended, normal bowel sounds, no masses or organomegaly  Extremities: no cyanosis, clubbing or edema  Musculoskeletal: normal range of motion, no joint swelling, deformity or tenderness  Neurologic: reflexes normal and symmetric, no cranial nerve deficit  Psych:  Orientation, sensorium, mood normal  Lines: IV   Bi lateral lower leg edema and on going ulcers and skin peeling off pigmentation from venous stasis and cellulitis from before  Fungal dermatitis and rash under the breast and in the groin area                       Data Review:    Lab Results   Component Value Date    WBC 6.2 10/19/2018    HGB 8.7 (L) 10/19/2018    HCT 25.8 (L) 10/19/2018    MCV 95.6 10/19/2018     10/19/2018     Lab Results   Component Value Date    CREATININE 1.2 10/19/2018    BUN 18 10/19/2018     10/19/2018    K 4.2 10/19/2018     10/19/2018    CO2 19 (L) 10/19/2018       Hepatic Function Panel:   Lab Results   Component Value Date    ALKPHOS 125 10/14/2018    ALT 9 10/14/2018    AST 13 10/14/2018    PROT 6.7 10/14/2018    BILITOT <0.2 10/14/2018    LABALBU 3.5 10/14/2018       UA:  Lab Results   Component Value Date    COLORU YELLOW 10/14/2018    CLARITYU Clear 10/14/2018    GLUCOSEU Negative 10/14/2018    BILIRUBINUR Negative 10/14/2018    KETUA Negative 10/14/2018    SPECGRAV 1.008 10/14/2018    BLOODU TRACE 10/14/2018    PHUR 5.5 10/14/2018    PROTEINU Negative 10/14/2018    UROBILINOGEN 0.2 10/14/2018    NITRU Negative 10/14/2018    LEUKOCYTESUR Negative 10/14/2018  atorvastatin  10 mg Oral Daily    cetirizine  10 mg Oral Daily    ferrous sulfate  324 mg Oral Daily with breakfast    gabapentin  300 mg Oral Daily    isosorbide mononitrate  30 mg Oral Daily    lipase-protease-amylase  2 capsule Oral TID     metoprolol tartrate  25 mg Oral BID    oxyCODONE-acetaminophen  1 tablet Oral TID    polyethylene glycol  17 g Oral Daily    timolol  1 drop Both Eyes BID    venlafaxine  150 mg Oral BID    insulin glargine  35 Units Subcutaneous Nightly    insulin lispro  0-12 Units Subcutaneous TID WC    insulin lispro  0-6 Units Subcutaneous Nightly    insulin lispro  10 Units Subcutaneous TID     vancomycin (VANCOCIN) intermittent dosing (placeholder)   Other RX Placeholder       Continuous Infusions:   dextrose         PRN Meds:  traMADol, menthol-zinc oxide **AND** menthol-zinc oxide, sodium chloride flush, potassium chloride **OR** potassium chloride **OR** potassium chloride, ondansetron, calcium carbonate, glucose, dextrose, glucagon (rDNA), dextrose      Assessment:   Bi lateral lower leg cellulitis  Venous ulcers with skin peeling off  Lower leg edema  Bi latera lower leg pain  Fungal dermatitis extensive infra mammary and in the groin folds area  KACIE  MRSA infection and colonization  Obesity   Recurrent lower leg cellulitis   Has on going pain and she did not want to do Venous reflux study and I encouraged to get this done at least as out pt     Labs, Microbiology, Radiology and all the pertinent results from current hospitalization and  care every where were reviewed  by me as a part of the evaluation   Plan:   1. Cont IV Vancomycin x 1 gm x Q 24 HRS  2. Watch creat closely   3. Cont IV Zosyn for gram -ve coverage  4. Change to oral  Fluconazole for dermatitis   5. Cont local care and ace wraps both legs  6. Venous duplex for Reflux study pt refused - d/w her   7. Anticipate to cont IV abx through weekend and home on oral abx     Discussed with patient/Family d/w RN     Thanks for allowing me to participate in your patient's care and please call me with any questions or concerns.     Franciso Angelucci MD  Infectious Disease  [facility]) Physician  Phone: [phone]   Fax : [phone]

## 2018-10-19 NOTE — PROGRESS NOTES
Pt resting in bed with eyes closed. PM medications given. Bed locked and in the lowest position. Call light within reach. Will continue to monitor.   Electronically signed by Reilly Gomez RN on 10/19/2018 at 12:11 AM

## 2018-10-19 NOTE — PROGRESS NOTES
Physical Therapy    Facility/Department: [address] ORTHOPEDICS  Daily Treatment Note    This note serves as patient discharge summary if pt discharges prior to next PT visit      NAME: Adryan White  : 1945  MRN: [id number]    Date of Service: 10/19/2018    Discharge Recommendations:  3-5 sessions per week   Adryan White scored a  on the AM-PAC short mobility form. Current research shows that an AM-PAC score of 17 or less is typically not associated with a discharge to the patient's home setting. Based on the patients AM-PAC score and their current functional mobility deficits, it is recommended that the patient have 3-5 sessions per week of Physical Therapy at d/c to increase the patients independence. Assessment: Per Dr Fermín Marie and P 58-79:  \"66 yo female with medical history significant for coronary artery disease, atrial fibrillation, CHF, hypertension and diabetes mellitus. to hospital 10-14-18 with bilateral LE swelling and erythema, and weeping of skin ulcers. \"  Admitting dx: B LE cellulitis and difficulty walking. At 10-19-18 session patient tolerated a few B LE exs, and required CGA/cues for transfers and up to 22' ambulation with her juan  walker. Patient requires ongoing skilled therapy to maximize tolerance, safety, and independence with functional mobility prior to returning to home as prior. AMpa score supports this. PT Equipment Recommendations  Equipment Needed: No    Patient Diagnosis(es): The primary encounter diagnosis was Cellulitis of both lower extremities. Diagnoses of Altered mental status, unspecified altered mental status type, Osteomyelitis of right tibia, unspecified type ([facility] [address] and Osteomyelitis of left tibia, unspecified type [facility]) were also pertinent to this visit. has a past medical history of Anemia; Atrial fibrillation ([address] CAD (coronary artery disease); Chronic back pain; CKD (chronic kidney disease), stage III ([address]  Diabetes mellitus ([address] ulcers. \"  Admitting dx: B LE cellulitis and difficulty walking. At 10-19-18 session patient tolerated a few B LE exs, and required CGA/cues for transfers and up to 22' ambulation with her juan  walker. Patient requires ongoing skilled therapy to maximize tolerance, safety, and independence with functional mobility prior to returning to home as prior. AMpac score supports this. Treatment Diagnosis: Impaired functional mobility. Prognosis: Good  Patient Education: PT plan of care. Safe transfer and gait technique. REQUIRES PT FOLLOW UP: Yes  Activity Tolerance  Activity Tolerance: Patient limited by fatigue       Plan   Plan  Times per week: 3-5  Current Treatment Recommendations: Functional Mobility Training, Positioning, Patient/Caregiver Education & Training  Safety Devices  Type of devices: All fall risk precautions in place, Call light within reach, Chair alarm in place, Left in chair, Nurse notified ([facility] notified. Patient seated on waffle cushion.  )    AM-PAC Score  AM-PAC Inpatient Mobility Raw Score : 12  AM-PAC Inpatient T-Scale Score : 35.33  Mobility Inpatient CMS 0-100% Score: 68.66  Mobility Inpatient CMS G-Code Modifier : CL    Goals  Short term goals  Time Frame for Short term goals: 2-3 days 10-19-18: all goals ongoing  Short term goal 1: bed mobility at [facility]  Short term goal 2: transfers at [facility]  Short term goal 3: ambulation at [facility] rolling walker for basic household distances          Short term goal 4: steps as needed to enter home at close cga one rail  Patient Goals   Patient goals : \"go home\"    Therapy Time   Individual Concurrent Group Co-treatment   Time In 1105         Time Out 1155         Minutes 48            Kari Jackson PT  Electronically signed by Tiffany Carter, DI 2162 on 10/19/2018 at 12:07 PM

## 2018-10-19 NOTE — PROGRESS NOTES
palpitations  []  pain  []  SHINE  []  orthopnea  []  syncope  []  lower extremity edema [] Other:  Gastrointestinal ROS:  []  Dysphagia  [] ABD pain  []  nausea  []  vomiting  []  indigestion  []  diarrhea  []  constipation [] Other:  Genitourinary:  []  frequency  []  polyuria  []  nocturia  []  hesitancy  []  urgency  []  hematuria  []  incontinence [] Other:  Musculoskeletal ROS:  []  muscle or joint pain  []  stiffness  [] arthritis  []  gout  []  weakness  [x]  redness  [x]  swelling  []  instability [] Other:  Endocrine ROS:  []  heat/cold intolerance  []  sweating  []  excessive thirst or hunger [] Other:  Neurological ROS:  []  Seizures  []  numbness  []  tingling  []  fainting  []  burning  []  tremors [] Other:  Psych ROS:  []  Anxiety  []  depression  []  abnormal thoughts [] Other:  Dermatological ROS:  []  Rash  []  sores  []  lumps  []  skin changes  []  changes to hair or nails [x] Other: wounds      Past Medical History:        Diagnosis Date    Anemia     Atrial fibrillation ([address]     CAD (coronary artery disease)     Chronic back pain     CKD (chronic kidney disease), stage III ([address]     Diabetes mellitus ([address]     Diastolic CHF ([address]     Hyperlipidemia     Hypertension     MI (myocardial infarction) ([address]     MRSA (methicillin resistant staph aureus) culture positive 04/13/2018    leg ulcer    MRSA colonization 10/16/2018    Obesity (BMI 30-39. 9)     Osteoarthritis     Pancreatitis     Sepsis ([address]     Stasis ulcer ([address]     bilateral lower extremities     Suprapubic catheter ([facility] [address]        Past Surgical History:        Procedure Laterality Date    APPENDECTOMY      BACK SURGERY      x2    CATARACT REMOVAL      bilateral     CHOLECYSTECTOMY      COLONOSCOPY      EYE SURGERY      FRACTURE SURGERY      Left ankle    HYSTERECTOMY      INSET/CHANGE LOMELI CATHETER - COMPLICATED  [id number]/9252    suprapubic cath    JOINT REPLACEMENT      Right knee    KNEE SURGERY         Allergies:

## 2018-10-20 LAB
ANION GAP SERPL CALCULATED.3IONS-SCNC: 11 MMOL/L (ref 3–16)
BASOPHILS ABSOLUTE: 0 K/UL (ref 0–0.2)
BASOPHILS RELATIVE PERCENT: 0.6 %
BUN BLDV-MCNC: 15 MG/DL (ref 7–20)
CALCIUM SERPL-MCNC: 8.6 MG/DL (ref 8.3–10.6)
CHLORIDE BLD-SCNC: 108 MMOL/L (ref 99–110)
CO2: 21 MMOL/L (ref 21–32)
CREAT SERPL-MCNC: 1.1 MG/DL (ref 0.6–1.2)
EOSINOPHILS ABSOLUTE: 0.5 K/UL (ref 0–0.6)
EOSINOPHILS RELATIVE PERCENT: 7.3 %
GFR AFRICAN AMERICAN: 58
GFR NON-AFRICAN AMERICAN: 48
GLUCOSE BLD-MCNC: 108 MG/DL (ref 70–99)
GLUCOSE BLD-MCNC: 136 MG/DL (ref 70–99)
GLUCOSE BLD-MCNC: 226 MG/DL (ref 70–99)
GLUCOSE BLD-MCNC: 266 MG/DL (ref 70–99)
GLUCOSE BLD-MCNC: 327 MG/DL (ref 70–99)
HCT VFR BLD CALC: 27 % (ref 36–48)
HEMOGLOBIN: 9.1 G/DL (ref 12–16)
LYMPHOCYTES ABSOLUTE: 1 K/UL (ref 1–5.1)
LYMPHOCYTES RELATIVE PERCENT: 15.5 %
MCH RBC QN AUTO: 31.8 PG (ref 26–34)
MCHC RBC AUTO-ENTMCNC: 33.6 G/DL (ref 31–36)
MCV RBC AUTO: 94.6 FL (ref 80–100)
MONOCYTES ABSOLUTE: 0.4 K/UL (ref 0–1.3)
MONOCYTES RELATIVE PERCENT: 5.7 %
NEUTROPHILS ABSOLUTE: 4.7 K/UL (ref 1.7–7.7)
NEUTROPHILS RELATIVE PERCENT: 70.9 %
PDW BLD-RTO: 14.3 % (ref 12.4–15.4)
PERFORMED ON: ABNORMAL
PLATELET # BLD: 178 K/UL (ref 135–450)
PMV BLD AUTO: 9.2 FL (ref 5–10.5)
POTASSIUM SERPL-SCNC: 4.3 MMOL/L (ref 3.5–5.1)
RBC # BLD: 2.86 M/UL (ref 4–5.2)
SODIUM BLD-SCNC: 140 MMOL/L (ref 136–145)
VANCOMYCIN RANDOM: 14.4 UG/ML
WBC # BLD: 6.7 K/UL (ref 4–11)

## 2018-10-20 PROCEDURE — 6370000000 HC RX 637 (ALT 250 FOR IP): Performed by: NURSE PRACTITIONER

## 2018-10-20 PROCEDURE — 6360000002 HC RX W HCPCS

## 2018-10-20 PROCEDURE — 6360000002 HC RX W HCPCS: Performed by: HOSPITALIST

## 2018-10-20 PROCEDURE — 6370000000 HC RX 637 (ALT 250 FOR IP): Performed by: INTERNAL MEDICINE

## 2018-10-20 PROCEDURE — 80202 ASSAY OF VANCOMYCIN: CPT

## 2018-10-20 PROCEDURE — 94760 N-INVAS EAR/PLS OXIMETRY 1: CPT

## 2018-10-20 PROCEDURE — 2580000003 HC RX 258

## 2018-10-20 PROCEDURE — 80048 BASIC METABOLIC PNL TOTAL CA: CPT

## 2018-10-20 PROCEDURE — 2580000003 HC RX 258: Performed by: HOSPITALIST

## 2018-10-20 PROCEDURE — 36415 COLL VENOUS BLD VENIPUNCTURE: CPT

## 2018-10-20 PROCEDURE — 1200000000 HC SEMI PRIVATE

## 2018-10-20 PROCEDURE — 85025 COMPLETE CBC W/AUTO DIFF WBC: CPT

## 2018-10-20 PROCEDURE — 6370000000 HC RX 637 (ALT 250 FOR IP): Performed by: HOSPITALIST

## 2018-10-20 RX ORDER — FLUCONAZOLE 100 MG/1
100 TABLET ORAL DAILY
Status: DISCONTINUED | OUTPATIENT
Start: 2018-10-20 | End: 2018-10-23 | Stop reason: HOSPADM

## 2018-10-20 RX ADMIN — PANCRELIPASE 2 CAPSULE: 60000; 12000; 38000 CAPSULE, DELAYED RELEASE PELLETS ORAL at 12:42

## 2018-10-20 RX ADMIN — TIMOLOL MALEATE 1 DROP: 5 SOLUTION/ DROPS OPHTHALMIC at 21:53

## 2018-10-20 RX ADMIN — ENOXAPARIN SODIUM 40 MG: 40 INJECTION SUBCUTANEOUS at 21:49

## 2018-10-20 RX ADMIN — ANORECTAL OINTMENT: 15.7; .44; 24; 20.6 OINTMENT TOPICAL at 09:16

## 2018-10-20 RX ADMIN — INSULIN LISPRO 2 UNITS: 100 INJECTION, SOLUTION INTRAVENOUS; SUBCUTANEOUS at 21:50

## 2018-10-20 RX ADMIN — TIMOLOL MALEATE 1 DROP: 5 SOLUTION/ DROPS OPHTHALMIC at 09:19

## 2018-10-20 RX ADMIN — GABAPENTIN 300 MG: 300 CAPSULE ORAL at 09:14

## 2018-10-20 RX ADMIN — METOPROLOL TARTRATE 25 MG: 25 TABLET ORAL at 09:14

## 2018-10-20 RX ADMIN — PIPERACILLIN SODIUM,TAZOBACTAM SODIUM 3.38 G: 3; .375 INJECTION, POWDER, FOR SOLUTION INTRAVENOUS at 05:50

## 2018-10-20 RX ADMIN — METOPROLOL TARTRATE 25 MG: 25 TABLET ORAL at 21:49

## 2018-10-20 RX ADMIN — VANCOMYCIN HYDROCHLORIDE 750 MG: 750 INJECTION, POWDER, LYOPHILIZED, FOR SOLUTION INTRAVENOUS at 19:33

## 2018-10-20 RX ADMIN — SENNOSIDES AND DOCUSATE SODIUM 1 TABLET: 8.6; 5 TABLET ORAL at 09:14

## 2018-10-20 RX ADMIN — MICONAZOLE NITRATE: 20 POWDER TOPICAL at 21:52

## 2018-10-20 RX ADMIN — ISOSORBIDE MONONITRATE 30 MG: 30 TABLET, EXTENDED RELEASE ORAL at 09:13

## 2018-10-20 RX ADMIN — FERROUS SULFATE TAB EC 324 MG (65 MG FE EQUIVALENT) 324 MG: 324 (65 FE) TABLET DELAYED RESPONSE at 09:13

## 2018-10-20 RX ADMIN — MUPIROCIN: 20 OINTMENT TOPICAL at 09:20

## 2018-10-20 RX ADMIN — VENLAFAXINE 150 MG: 50 TABLET ORAL at 09:13

## 2018-10-20 RX ADMIN — PANCRELIPASE 2 CAPSULE: 60000; 12000; 38000 CAPSULE, DELAYED RELEASE PELLETS ORAL at 09:12

## 2018-10-20 RX ADMIN — MICONAZOLE NITRATE: 20 POWDER TOPICAL at 09:19

## 2018-10-20 RX ADMIN — Medication 1 CAPSULE: at 09:12

## 2018-10-20 RX ADMIN — VITAMIN D, TAB 1000IU (100/BT) 2000 UNITS: 25 TAB at 09:13

## 2018-10-20 RX ADMIN — ANORECTAL OINTMENT: 15.7; .44; 24; 20.6 OINTMENT TOPICAL at 21:49

## 2018-10-20 RX ADMIN — ATORVASTATIN CALCIUM 10 MG: 10 TABLET, FILM COATED ORAL at 09:13

## 2018-10-20 RX ADMIN — CETIRIZINE HYDROCHLORIDE 10 MG: 10 TABLET, FILM COATED ORAL at 09:14

## 2018-10-20 RX ADMIN — ASPIRIN 81 MG CHEWABLE TABLET 324 MG: 81 TABLET CHEWABLE at 09:13

## 2018-10-20 RX ADMIN — INSULIN LISPRO 8 UNITS: 100 INJECTION, SOLUTION INTRAVENOUS; SUBCUTANEOUS at 12:42

## 2018-10-20 RX ADMIN — ANORECTAL OINTMENT: 15.7; .44; 24; 20.6 OINTMENT TOPICAL at 14:09

## 2018-10-20 RX ADMIN — OXYCODONE HYDROCHLORIDE AND ACETAMINOPHEN 1 TABLET: 10; 325 TABLET ORAL at 21:49

## 2018-10-20 RX ADMIN — PIPERACILLIN SODIUM,TAZOBACTAM SODIUM 3.38 G: 3; .375 INJECTION, POWDER, FOR SOLUTION INTRAVENOUS at 12:44

## 2018-10-20 RX ADMIN — VENLAFAXINE 150 MG: 50 TABLET ORAL at 21:49

## 2018-10-20 RX ADMIN — INSULIN LISPRO 10 UNITS: 100 INJECTION, SOLUTION INTRAVENOUS; SUBCUTANEOUS at 17:12

## 2018-10-20 RX ADMIN — INSULIN LISPRO 6 UNITS: 100 INJECTION, SOLUTION INTRAVENOUS; SUBCUTANEOUS at 16:32

## 2018-10-20 RX ADMIN — Medication 10 ML: at 21:52

## 2018-10-20 RX ADMIN — FLUCONAZOLE 100 MG: 100 TABLET ORAL at 09:13

## 2018-10-20 RX ADMIN — INSULIN GLARGINE 35 UNITS: 100 INJECTION, SOLUTION SUBCUTANEOUS at 21:50

## 2018-10-20 RX ADMIN — OXYCODONE HYDROCHLORIDE AND ACETAMINOPHEN 1 TABLET: 10; 325 TABLET ORAL at 09:14

## 2018-10-20 RX ADMIN — PANCRELIPASE 2 CAPSULE: 60000; 12000; 38000 CAPSULE, DELAYED RELEASE PELLETS ORAL at 16:31

## 2018-10-20 RX ADMIN — PIPERACILLIN SODIUM,TAZOBACTAM SODIUM 3.38 G: 3; .375 INJECTION, POWDER, FOR SOLUTION INTRAVENOUS at 21:49

## 2018-10-20 RX ADMIN — INSULIN LISPRO 10 UNITS: 100 INJECTION, SOLUTION INTRAVENOUS; SUBCUTANEOUS at 12:44

## 2018-10-20 RX ADMIN — OXYCODONE HYDROCHLORIDE AND ACETAMINOPHEN 1 TABLET: 10; 325 TABLET ORAL at 14:08

## 2018-10-20 ASSESSMENT — PAIN SCALES - GENERAL
PAINLEVEL_OUTOF10: 0
PAINLEVEL_OUTOF10: 4
PAINLEVEL_OUTOF10: 8
PAINLEVEL_OUTOF10: 3
PAINLEVEL_OUTOF10: 7
PAINLEVEL_OUTOF10: 7
PAINLEVEL_OUTOF10: 4

## 2018-10-20 NOTE — PROGRESS NOTES
distal tibia, which raise the possibility of osteomyelitis. Right tibia and fibula: Periosteal reaction involving the distal tibia and to   a lesser extent the fibula. Again, osteomyelitis is a concern. XR CHEST PORTABLE   Final Result   Stable cardial-pericardial silhouette enlargement. No acute abnormality   detected. Assessment & Plan:      Bilateral lower extremity cellulitis-secondary to infected wounds  continue vancomycin   MRSA swab +-received 5 days of decolonization   ID consultation appreciated-per ID, will need IV abx through the weekend  Continue Zosyn  Wound care consult-appreciate recs    Yeast infection  diflucan per ID  Miconazole powder    Pressure Ulcers-present on admission   Left inner and mid gluteal-stage 3, injury vs blister rupture  Left ischial pressure vs ruptured blister  Specialty hospital bed  Wound care to follow as above    Normocytic anemia-improved with transfusion  Iron studies noted  Fecal occult negative  Transfuse 1unit PRBCs 10/16   Hemoglobin stable at this time    Acute on chronic renal insufficiency-resolved   Furosemide on hold  Creatinine in AM    MRSA nasal colonization  Bactroban BID x 5 days-completed  Contact isolation    Diabetes mellitus with hyperglycemia  POCT with SSI  Prandial Humalog  Continue Lantus  Carb controlled diet      HTN  Continue home meds with hold parameters    Chronic diastolic CHF  Furosemide currently on hold    Body mass index is 35.62 kg/m². The patient was informed of the results of any tests, a time was given to answer questions, a plan was proposed and they agreed with plan. I discussed the plan with her daughter Rose Pastor by phone.     DVT prophylaxis: [x] Lovenox  [] SQ Heparin  [] SCDs because of  [] warfarin/oral direct thrombin inhibitor [] Encourage ambulation    GI prophylaxis: [] PPI/[id number]  [x] not indicated    Probiotic if on abx: [x] Yes [] No [] Not Indicated    Diet: DIET CARB

## 2018-10-20 NOTE — PROGRESS NOTES
Daily BLE dressing changes done per order. Cleansed with wound cleanser. Moderate amt of ss drainage noted on old dressing but wounds no actively bleeding. Dressed with adaptic, guaze, kerlix, and ace. Pt tolerated well.    Electronically signed by Jens Mandujano RN on 10/20/2018 at 6:25 PM

## 2018-10-21 LAB
CREAT SERPL-MCNC: 1.2 MG/DL (ref 0.6–1.2)
GFR AFRICAN AMERICAN: 53
GFR NON-AFRICAN AMERICAN: 44
GLUCOSE BLD-MCNC: 102 MG/DL (ref 70–99)
GLUCOSE BLD-MCNC: 212 MG/DL (ref 70–99)
GLUCOSE BLD-MCNC: 74 MG/DL (ref 70–99)
GLUCOSE BLD-MCNC: 81 MG/DL (ref 70–99)
PERFORMED ON: ABNORMAL
PERFORMED ON: ABNORMAL
PERFORMED ON: NORMAL
PERFORMED ON: NORMAL

## 2018-10-21 PROCEDURE — 2580000003 HC RX 258: Performed by: HOSPITALIST

## 2018-10-21 PROCEDURE — 6370000000 HC RX 637 (ALT 250 FOR IP): Performed by: INTERNAL MEDICINE

## 2018-10-21 PROCEDURE — 6370000000 HC RX 637 (ALT 250 FOR IP): Performed by: NURSE PRACTITIONER

## 2018-10-21 PROCEDURE — 82565 ASSAY OF CREATININE: CPT

## 2018-10-21 PROCEDURE — 6370000000 HC RX 637 (ALT 250 FOR IP): Performed by: HOSPITALIST

## 2018-10-21 PROCEDURE — 2500000003 HC RX 250 WO HCPCS: Performed by: NURSE PRACTITIONER

## 2018-10-21 PROCEDURE — 36415 COLL VENOUS BLD VENIPUNCTURE: CPT

## 2018-10-21 PROCEDURE — 6360000002 HC RX W HCPCS: Performed by: HOSPITALIST

## 2018-10-21 PROCEDURE — 1200000000 HC SEMI PRIVATE

## 2018-10-21 RX ORDER — TIMOLOL MALEATE 5 MG/ML
1 SOLUTION/ DROPS OPHTHALMIC 2 TIMES DAILY
Status: DISCONTINUED | OUTPATIENT
Start: 2018-10-21 | End: 2018-10-23 | Stop reason: HOSPADM

## 2018-10-21 RX ADMIN — INSULIN GLARGINE 35 UNITS: 100 INJECTION, SOLUTION SUBCUTANEOUS at 20:44

## 2018-10-21 RX ADMIN — INSULIN LISPRO 10 UNITS: 100 INJECTION, SOLUTION INTRAVENOUS; SUBCUTANEOUS at 12:02

## 2018-10-21 RX ADMIN — METOPROLOL TARTRATE 25 MG: 25 TABLET ORAL at 09:03

## 2018-10-21 RX ADMIN — PIPERACILLIN SODIUM,TAZOBACTAM SODIUM 3.38 G: 3; .375 INJECTION, POWDER, FOR SOLUTION INTRAVENOUS at 05:25

## 2018-10-21 RX ADMIN — FERROUS SULFATE TAB EC 324 MG (65 MG FE EQUIVALENT) 324 MG: 324 (65 FE) TABLET DELAYED RESPONSE at 08:59

## 2018-10-21 RX ADMIN — VENLAFAXINE 150 MG: 50 TABLET ORAL at 20:36

## 2018-10-21 RX ADMIN — VENLAFAXINE 150 MG: 50 TABLET ORAL at 09:02

## 2018-10-21 RX ADMIN — TIMOLOL MALEATE 1 DROP: 5 SOLUTION/ DROPS OPHTHALMIC at 20:36

## 2018-10-21 RX ADMIN — PANCRELIPASE 2 CAPSULE: 60000; 12000; 38000 CAPSULE, DELAYED RELEASE PELLETS ORAL at 17:32

## 2018-10-21 RX ADMIN — CETIRIZINE HYDROCHLORIDE 10 MG: 10 TABLET, FILM COATED ORAL at 09:03

## 2018-10-21 RX ADMIN — ANORECTAL OINTMENT 1 EACH: 15.7; .44; 24; 20.6 OINTMENT TOPICAL at 08:57

## 2018-10-21 RX ADMIN — PANCRELIPASE 2 CAPSULE: 60000; 12000; 38000 CAPSULE, DELAYED RELEASE PELLETS ORAL at 08:57

## 2018-10-21 RX ADMIN — ANORECTAL OINTMENT: 15.7; .44; 24; 20.6 OINTMENT TOPICAL at 13:45

## 2018-10-21 RX ADMIN — TIMOLOL MALEATE 1 DROP: 5 SOLUTION/ DROPS OPHTHALMIC at 08:59

## 2018-10-21 RX ADMIN — MICONAZOLE NITRATE: 20 POWDER TOPICAL at 20:51

## 2018-10-21 RX ADMIN — INSULIN LISPRO 2 UNITS: 100 INJECTION, SOLUTION INTRAVENOUS; SUBCUTANEOUS at 20:44

## 2018-10-21 RX ADMIN — ANORECTAL OINTMENT: 15.7; .44; 24; 20.6 OINTMENT TOPICAL at 20:36

## 2018-10-21 RX ADMIN — PIPERACILLIN SODIUM,TAZOBACTAM SODIUM 3.38 G: 3; .375 INJECTION, POWDER, FOR SOLUTION INTRAVENOUS at 20:37

## 2018-10-21 RX ADMIN — ISOSORBIDE MONONITRATE 30 MG: 30 TABLET, EXTENDED RELEASE ORAL at 09:01

## 2018-10-21 RX ADMIN — METOPROLOL TARTRATE 25 MG: 25 TABLET ORAL at 20:36

## 2018-10-21 RX ADMIN — Medication 1 CAPSULE: at 09:03

## 2018-10-21 RX ADMIN — MICONAZOLE NITRATE: 20 POWDER TOPICAL at 08:58

## 2018-10-21 RX ADMIN — ATORVASTATIN CALCIUM 10 MG: 10 TABLET, FILM COATED ORAL at 09:03

## 2018-10-21 RX ADMIN — ASPIRIN 81 MG CHEWABLE TABLET 324 MG: 81 TABLET CHEWABLE at 08:57

## 2018-10-21 RX ADMIN — OXYCODONE HYDROCHLORIDE AND ACETAMINOPHEN 1 TABLET: 10; 325 TABLET ORAL at 20:36

## 2018-10-21 RX ADMIN — ENOXAPARIN SODIUM 40 MG: 40 INJECTION SUBCUTANEOUS at 20:36

## 2018-10-21 RX ADMIN — SENNOSIDES AND DOCUSATE SODIUM 1 TABLET: 8.6; 5 TABLET ORAL at 09:03

## 2018-10-21 RX ADMIN — PIPERACILLIN SODIUM,TAZOBACTAM SODIUM 3.38 G: 3; .375 INJECTION, POWDER, FOR SOLUTION INTRAVENOUS at 13:24

## 2018-10-21 RX ADMIN — FLUCONAZOLE 100 MG: 100 TABLET ORAL at 09:02

## 2018-10-21 RX ADMIN — PANCRELIPASE 2 CAPSULE: 60000; 12000; 38000 CAPSULE, DELAYED RELEASE PELLETS ORAL at 12:01

## 2018-10-21 RX ADMIN — OXYCODONE HYDROCHLORIDE AND ACETAMINOPHEN 1 TABLET: 10; 325 TABLET ORAL at 13:27

## 2018-10-21 RX ADMIN — VITAMIN D, TAB 1000IU (100/BT) 2000 UNITS: 25 TAB at 09:03

## 2018-10-21 RX ADMIN — Medication 10 ML: at 20:52

## 2018-10-21 RX ADMIN — GABAPENTIN 300 MG: 300 CAPSULE ORAL at 09:02

## 2018-10-21 RX ADMIN — OXYCODONE HYDROCHLORIDE AND ACETAMINOPHEN 1 TABLET: 10; 325 TABLET ORAL at 08:59

## 2018-10-21 ASSESSMENT — PAIN SCALES - GENERAL
PAINLEVEL_OUTOF10: 0
PAINLEVEL_OUTOF10: 0
PAINLEVEL_OUTOF10: 7
PAINLEVEL_OUTOF10: 5
PAINLEVEL_OUTOF10: 0
PAINLEVEL_OUTOF10: 0

## 2018-10-21 ASSESSMENT — PAIN DESCRIPTION - LOCATION: LOCATION: LEG

## 2018-10-21 ASSESSMENT — PAIN DESCRIPTION - PAIN TYPE: TYPE: CHRONIC PAIN

## 2018-10-21 ASSESSMENT — PAIN DESCRIPTION - ORIENTATION: ORIENTATION: RIGHT;LEFT

## 2018-10-21 NOTE — PLAN OF CARE
Problem: Falls - Risk of:  Goal: Will remain free from falls  Will remain free from falls   Outcome: Ongoing  Fall risk assessment completed. Fall precautions in place. Bed in lowest position, wheels locked, bed/chair exit alarm in place, call light within reach, and non skid footwear on. Walkway free of clutter. Pt alert and oriented and able to make needs known. Pt educated to use call light when needing to get up, and pt utilizes call light to make needs known. Will continue to monitor. Electronically signed by Elizabeth Zacarias RN on 10/21/2018 at 10:34 AM      Problem: Pain:  Goal: Pain level will decrease  Pain level will decrease   Outcome: Ongoing  Pt assessed for pain. Pt in pain and assessed with 0-10 pain rating scale. Pt given prescribed analgesic for pain. (See eMar) Pt satisfied with pain relief thus far. Will reassess and continue to monitor. Electronically signed by Elizabeth Zacarias RN on 10/21/2018 at 10:35 AM      Problem: Wound:  Goal: Will show signs of wound healing; wound closure and no evidence of infection  Will show signs of wound healing; wound closure and no evidence of infection   Outcome: Ongoing  Wounds are showing signs of healing and no signs of infection. Daily dressing changes done per order. Electronically signed by Elizabeth Zacarias RN on 10/21/2018 at 10:35 AM      Problem: Blood Glucose:  Goal: Ability to maintain appropriate glucose levels will improve  Ability to maintain appropriate glucose levels will improve   Outcome: Ongoing  Monitoring blood glucose levels ACHS. Electronically signed by Elizabeth Zacarias RN on 10/21/2018 at 10:36 AM      Problem: Risk for Impaired Skin Integrity  Goal: Tissue integrity - skin and mucous membranes  Structural intactness and normal physiological function of skin and  mucous membranes. Outcome: Ongoing  Will monitor skin and mucous members. Will turn patient every 2 hours, monitor for friction and sheering, and change dressings as needed.

## 2018-10-22 LAB
ANION GAP SERPL CALCULATED.3IONS-SCNC: 10 MMOL/L (ref 3–16)
BASOPHILS ABSOLUTE: 0.1 K/UL (ref 0–0.2)
BASOPHILS RELATIVE PERCENT: 1.2 %
BUN BLDV-MCNC: 15 MG/DL (ref 7–20)
CALCIUM SERPL-MCNC: 8.3 MG/DL (ref 8.3–10.6)
CHLORIDE BLD-SCNC: 107 MMOL/L (ref 99–110)
CO2: 24 MMOL/L (ref 21–32)
CREAT SERPL-MCNC: 1.3 MG/DL (ref 0.6–1.2)
EOSINOPHILS ABSOLUTE: 0.5 K/UL (ref 0–0.6)
EOSINOPHILS RELATIVE PERCENT: 7.3 %
GFR AFRICAN AMERICAN: 48
GFR NON-AFRICAN AMERICAN: 40
GLUCOSE BLD-MCNC: 156 MG/DL (ref 70–99)
GLUCOSE BLD-MCNC: 158 MG/DL (ref 70–99)
GLUCOSE BLD-MCNC: 163 MG/DL (ref 70–99)
GLUCOSE BLD-MCNC: 300 MG/DL (ref 70–99)
GLUCOSE BLD-MCNC: 335 MG/DL (ref 70–99)
HCT VFR BLD CALC: 26.9 % (ref 36–48)
HEMOGLOBIN: 8.9 G/DL (ref 12–16)
LYMPHOCYTES ABSOLUTE: 1.2 K/UL (ref 1–5.1)
LYMPHOCYTES RELATIVE PERCENT: 17.6 %
MCH RBC QN AUTO: 31.9 PG (ref 26–34)
MCHC RBC AUTO-ENTMCNC: 33.2 G/DL (ref 31–36)
MCV RBC AUTO: 96.3 FL (ref 80–100)
MONOCYTES ABSOLUTE: 0.4 K/UL (ref 0–1.3)
MONOCYTES RELATIVE PERCENT: 5.7 %
NEUTROPHILS ABSOLUTE: 4.5 K/UL (ref 1.7–7.7)
NEUTROPHILS RELATIVE PERCENT: 68.2 %
PDW BLD-RTO: 14.2 % (ref 12.4–15.4)
PERFORMED ON: ABNORMAL
PLATELET # BLD: 172 K/UL (ref 135–450)
PMV BLD AUTO: 8.5 FL (ref 5–10.5)
POTASSIUM SERPL-SCNC: 4.9 MMOL/L (ref 3.5–5.1)
RBC # BLD: 2.8 M/UL (ref 4–5.2)
SODIUM BLD-SCNC: 141 MMOL/L (ref 136–145)
VANCOMYCIN RANDOM: 14.2 UG/ML
WBC # BLD: 6.6 K/UL (ref 4–11)

## 2018-10-22 PROCEDURE — 99232 SBSQ HOSP IP/OBS MODERATE 35: CPT | Performed by: INTERNAL MEDICINE

## 2018-10-22 PROCEDURE — 80202 ASSAY OF VANCOMYCIN: CPT

## 2018-10-22 PROCEDURE — 6370000000 HC RX 637 (ALT 250 FOR IP): Performed by: NURSE PRACTITIONER

## 2018-10-22 PROCEDURE — 6360000002 HC RX W HCPCS: Performed by: HOSPITALIST

## 2018-10-22 PROCEDURE — 6370000000 HC RX 637 (ALT 250 FOR IP): Performed by: HOSPITALIST

## 2018-10-22 PROCEDURE — 97116 GAIT TRAINING THERAPY: CPT

## 2018-10-22 PROCEDURE — 1200000000 HC SEMI PRIVATE

## 2018-10-22 PROCEDURE — 2580000003 HC RX 258: Performed by: NURSE PRACTITIONER

## 2018-10-22 PROCEDURE — 2500000003 HC RX 250 WO HCPCS: Performed by: NURSE PRACTITIONER

## 2018-10-22 PROCEDURE — 94760 N-INVAS EAR/PLS OXIMETRY 1: CPT

## 2018-10-22 PROCEDURE — 36415 COLL VENOUS BLD VENIPUNCTURE: CPT

## 2018-10-22 PROCEDURE — 97530 THERAPEUTIC ACTIVITIES: CPT

## 2018-10-22 PROCEDURE — 97535 SELF CARE MNGMENT TRAINING: CPT

## 2018-10-22 PROCEDURE — 85025 COMPLETE CBC W/AUTO DIFF WBC: CPT

## 2018-10-22 PROCEDURE — 6370000000 HC RX 637 (ALT 250 FOR IP): Performed by: INTERNAL MEDICINE

## 2018-10-22 PROCEDURE — 6360000002 HC RX W HCPCS: Performed by: NURSE PRACTITIONER

## 2018-10-22 PROCEDURE — 80048 BASIC METABOLIC PNL TOTAL CA: CPT

## 2018-10-22 PROCEDURE — 2580000003 HC RX 258: Performed by: HOSPITALIST

## 2018-10-22 RX ORDER — CLINDAMYCIN HYDROCHLORIDE 300 MG/1
300 CAPSULE ORAL 3 TIMES DAILY
Qty: 21 CAPSULE | Refills: 0 | Status: SHIPPED | OUTPATIENT
Start: 2018-10-22 | End: 2018-10-29

## 2018-10-22 RX ORDER — POTASSIUM CHLORIDE AND SODIUM CHLORIDE 450; 150 MG/100ML; MG/100ML
INJECTION, SOLUTION INTRAVENOUS CONTINUOUS
Status: DISPENSED | OUTPATIENT
Start: 2018-10-22 | End: 2018-10-22

## 2018-10-22 RX ORDER — FLUCONAZOLE 100 MG/1
100 TABLET ORAL DAILY
Qty: 7 TABLET | Refills: 0 | Status: SHIPPED | OUTPATIENT
Start: 2018-10-22 | End: 2018-10-29

## 2018-10-22 RX ADMIN — PANCRELIPASE 2 CAPSULE: 60000; 12000; 38000 CAPSULE, DELAYED RELEASE PELLETS ORAL at 09:25

## 2018-10-22 RX ADMIN — PIPERACILLIN SODIUM,TAZOBACTAM SODIUM 3.38 G: 3; .375 INJECTION, POWDER, FOR SOLUTION INTRAVENOUS at 13:30

## 2018-10-22 RX ADMIN — PANCRELIPASE 2 CAPSULE: 60000; 12000; 38000 CAPSULE, DELAYED RELEASE PELLETS ORAL at 12:28

## 2018-10-22 RX ADMIN — PIPERACILLIN SODIUM,TAZOBACTAM SODIUM 3.38 G: 3; .375 INJECTION, POWDER, FOR SOLUTION INTRAVENOUS at 20:18

## 2018-10-22 RX ADMIN — OXYCODONE HYDROCHLORIDE AND ACETAMINOPHEN 1 TABLET: 10; 325 TABLET ORAL at 20:17

## 2018-10-22 RX ADMIN — CETIRIZINE HYDROCHLORIDE 10 MG: 10 TABLET, FILM COATED ORAL at 09:27

## 2018-10-22 RX ADMIN — VENLAFAXINE 150 MG: 50 TABLET ORAL at 20:17

## 2018-10-22 RX ADMIN — VENLAFAXINE 150 MG: 50 TABLET ORAL at 09:27

## 2018-10-22 RX ADMIN — INSULIN LISPRO 10 UNITS: 100 INJECTION, SOLUTION INTRAVENOUS; SUBCUTANEOUS at 12:25

## 2018-10-22 RX ADMIN — INSULIN LISPRO 10 UNITS: 100 INJECTION, SOLUTION INTRAVENOUS; SUBCUTANEOUS at 17:24

## 2018-10-22 RX ADMIN — INSULIN LISPRO 2 UNITS: 100 INJECTION, SOLUTION INTRAVENOUS; SUBCUTANEOUS at 09:34

## 2018-10-22 RX ADMIN — ANORECTAL OINTMENT: 15.7; .44; 24; 20.6 OINTMENT TOPICAL at 09:25

## 2018-10-22 RX ADMIN — GABAPENTIN 300 MG: 300 CAPSULE ORAL at 09:26

## 2018-10-22 RX ADMIN — ANORECTAL OINTMENT: 15.7; .44; 24; 20.6 OINTMENT TOPICAL at 14:59

## 2018-10-22 RX ADMIN — VITAMIN D, TAB 1000IU (100/BT) 2000 UNITS: 25 TAB at 09:26

## 2018-10-22 RX ADMIN — TIMOLOL MALEATE 1 DROP: 5 SOLUTION/ DROPS OPHTHALMIC at 20:19

## 2018-10-22 RX ADMIN — INSULIN LISPRO 4 UNITS: 100 INJECTION, SOLUTION INTRAVENOUS; SUBCUTANEOUS at 21:22

## 2018-10-22 RX ADMIN — VANCOMYCIN HYDROCHLORIDE 750 MG: 750 INJECTION, POWDER, LYOPHILIZED, FOR SOLUTION INTRAVENOUS at 12:01

## 2018-10-22 RX ADMIN — ATORVASTATIN CALCIUM 10 MG: 10 TABLET, FILM COATED ORAL at 09:26

## 2018-10-22 RX ADMIN — SENNOSIDES AND DOCUSATE SODIUM 1 TABLET: 8.6; 5 TABLET ORAL at 09:26

## 2018-10-22 RX ADMIN — METOPROLOL TARTRATE 25 MG: 25 TABLET ORAL at 09:27

## 2018-10-22 RX ADMIN — METOPROLOL TARTRATE 25 MG: 25 TABLET ORAL at 20:17

## 2018-10-22 RX ADMIN — Medication 1 CAPSULE: at 09:25

## 2018-10-22 RX ADMIN — PIPERACILLIN SODIUM,TAZOBACTAM SODIUM 3.38 G: 3; .375 INJECTION, POWDER, FOR SOLUTION INTRAVENOUS at 04:50

## 2018-10-22 RX ADMIN — ENOXAPARIN SODIUM 40 MG: 40 INJECTION SUBCUTANEOUS at 20:18

## 2018-10-22 RX ADMIN — FERROUS SULFATE TAB EC 324 MG (65 MG FE EQUIVALENT) 324 MG: 324 (65 FE) TABLET DELAYED RESPONSE at 09:25

## 2018-10-22 RX ADMIN — MICONAZOLE NITRATE: 20 POWDER TOPICAL at 20:19

## 2018-10-22 RX ADMIN — ISOSORBIDE MONONITRATE 30 MG: 30 TABLET, EXTENDED RELEASE ORAL at 09:25

## 2018-10-22 RX ADMIN — POLYETHYLENE GLYCOL 3350 17 G: 17 POWDER, FOR SOLUTION ORAL at 09:25

## 2018-10-22 RX ADMIN — INSULIN LISPRO 2 UNITS: 100 INJECTION, SOLUTION INTRAVENOUS; SUBCUTANEOUS at 12:15

## 2018-10-22 RX ADMIN — OXYCODONE HYDROCHLORIDE AND ACETAMINOPHEN 1 TABLET: 10; 325 TABLET ORAL at 14:59

## 2018-10-22 RX ADMIN — Medication 10 ML: at 09:27

## 2018-10-22 RX ADMIN — Medication 10 ML: at 20:18

## 2018-10-22 RX ADMIN — ASPIRIN 81 MG CHEWABLE TABLET 324 MG: 81 TABLET CHEWABLE at 09:27

## 2018-10-22 RX ADMIN — ANORECTAL OINTMENT: 15.7; .44; 24; 20.6 OINTMENT TOPICAL at 20:19

## 2018-10-22 RX ADMIN — INSULIN GLARGINE 35 UNITS: 100 INJECTION, SOLUTION SUBCUTANEOUS at 21:22

## 2018-10-22 RX ADMIN — TIMOLOL MALEATE 1 DROP: 5 SOLUTION/ DROPS OPHTHALMIC at 09:43

## 2018-10-22 RX ADMIN — OXYCODONE HYDROCHLORIDE AND ACETAMINOPHEN 1 TABLET: 10; 325 TABLET ORAL at 09:26

## 2018-10-22 RX ADMIN — FLUCONAZOLE 100 MG: 100 TABLET ORAL at 09:26

## 2018-10-22 RX ADMIN — INSULIN LISPRO 8 UNITS: 100 INJECTION, SOLUTION INTRAVENOUS; SUBCUTANEOUS at 17:23

## 2018-10-22 RX ADMIN — POTASSIUM CHLORIDE AND SODIUM CHLORIDE: 450; 150 INJECTION, SOLUTION INTRAVENOUS at 13:30

## 2018-10-22 RX ADMIN — MICONAZOLE NITRATE: 20 POWDER TOPICAL at 09:43

## 2018-10-22 RX ADMIN — PANCRELIPASE 2 CAPSULE: 60000; 12000; 38000 CAPSULE, DELAYED RELEASE PELLETS ORAL at 17:18

## 2018-10-22 ASSESSMENT — PAIN DESCRIPTION - PROGRESSION
CLINICAL_PROGRESSION: GRADUALLY IMPROVING
CLINICAL_PROGRESSION: GRADUALLY IMPROVING

## 2018-10-22 ASSESSMENT — PAIN SCALES - GENERAL
PAINLEVEL_OUTOF10: 0
PAINLEVEL_OUTOF10: 3
PAINLEVEL_OUTOF10: 8
PAINLEVEL_OUTOF10: 1
PAINLEVEL_OUTOF10: 0

## 2018-10-22 ASSESSMENT — PAIN DESCRIPTION - LOCATION
LOCATION: LEG
LOCATION: LEG

## 2018-10-22 ASSESSMENT — PAIN DESCRIPTION - FREQUENCY
FREQUENCY: INTERMITTENT
FREQUENCY: INTERMITTENT

## 2018-10-22 ASSESSMENT — PAIN DESCRIPTION - PAIN TYPE
TYPE: ACUTE PAIN
TYPE: ACUTE PAIN

## 2018-10-22 ASSESSMENT — PAIN DESCRIPTION - ONSET
ONSET: GRADUAL
ONSET: GRADUAL

## 2018-10-22 ASSESSMENT — PAIN DESCRIPTION - ORIENTATION
ORIENTATION: RIGHT;LEFT
ORIENTATION: RIGHT;LEFT

## 2018-10-22 ASSESSMENT — PAIN DESCRIPTION - DESCRIPTORS
DESCRIPTORS: ACHING
DESCRIPTORS: ACHING

## 2018-10-22 NOTE — PROGRESS NOTES
the distal tibia, which raise the possibility of osteomyelitis. Right tibia and fibula: Periosteal reaction involving the distal tibia and to   a lesser extent the fibula. Again, osteomyelitis is a concern. XR CHEST PORTABLE   Final Result   Stable cardial-pericardial silhouette enlargement. No acute abnormality   detected. Assessment & Plan:      Bilateral lower extremity cellulitis-secondary to infected wounds  ID following. Appreciate recs. MRSA nasal probe+. On Vanc and zosyn. - May be able to DC on PO abx? Wound care consult-appreciate recs  Continue to follow labs. Yeast infection  Diflucan per ID  Miconazole powder    Pressure Ulcers   Left inner and mid gluteal-stage 3, injury vs blister rupture  Left ischial pressure vs ruptured blister  Specialty hospital bed  Wound care to follow as above    Normocytic anemia-improved with transfusion  Iron studies noted  Fecal occult negative  Transfused 1unit PRBCs 10/16   Follow hgb. Acute on chronic renal insufficiency   Furosemide on hold  Creatinine trending up. Gentle IVF. MRSA nasal colonization  Bactroban BID x 5 days  Contact isolation    Diabetes mellitus with hyperglycemia  Humalog SSI  Prandial Humalog  Continue Lantus  Carb controlled diet   Follow FSBG  Titrate insulin PRN. HTN  Continue imdur, metoprolol  Currently holding furosemide    Chronic diastolic CHF  Furosemide currently on hold  Continue imdur and metoprolol  Monitor intake and output and daily weights. Body mass index is 35.71 kg/m². The patient was informed of the results of any tests, a time was given to answer questions, a plan was proposed and they agreed with plan. I discussed the plan with her daughter Denise Alston by phone.     DVT prophylaxis: [x] Lovenox  [] SQ Heparin  [] SCDs because of  [] warfarin/oral direct thrombin inhibitor [] Encourage ambulation    GI prophylaxis: [] PPI/[id number]  [x] not indicated    Probiotic if on abx: [x] Yes [] No [] Not Indicated    Diet: DIET CARB CONTROL;    Consults:  PHARMACY TO DOSE VANCOMYCIN  IP CONSULT TO HOSPITALIST  IP CONSULT TO INFECTIOUS DISEASES  PHARMACY TO DOSE VANCOMYCIN  IP CONSULT TO INFECTIOUS DISEASES  IP CONSULT TO HOME CARE NEEDS    Disposition:  [] Home  [x] Home with home health [] Rehab [] Psych [] SNF  [] LTAC  [] Long term nursing home or group home [] Transfer to ICU  [] Transfer to PCU [] Other:    Code Status: Full Code    ELOS: probable discharge tomorrow, depending on ID recs and labs.     FRANCHESCA Gallegos - NP  10/22/18

## 2018-10-22 NOTE — CARE COORDINATION
Formerly [facility]  Patient is active with [facility].  Casie Auguste LPN  CTN with  [facility],  [address], Fax [phone]
Diabetes Father     Diabetes Daughter     Diabetes Daughter        SOCIAL HISTORY    Social History   Substance Use Topics    Smoking status: Never Smoker    Smokeless tobacco: Never Used    Alcohol use Yes      Comment: occassional       ALLERGIES    Allergies   Allergen Reactions    Sulfa Antibiotics Rash       MEDICATIONS    No current facility-administered medications on file prior to encounter. Current Outpatient Prescriptions on File Prior to Encounter   Medication Sig Dispense Refill    aspirin 81 MG chewable tablet Take 4 tablets by mouth daily      atorvastatin (LIPITOR) 10 MG tablet Take 1 tablet by mouth daily 30 tablet 0    metoprolol tartrate (LOPRESSOR) 25 MG tablet Take 1 tablet by mouth 2 times daily 60 tablet 0    furosemide (LASIX) 40 MG tablet Take 0.5 tablets by mouth daily 60 tablet 0    Biotin (BIOTIN 5000) 5 MG CAPS Take 1 tablet by mouth daily      Calcium Carbonate Antacid (TUMS ULTRA 1000) 1000 MG CHEW Take 1 tablet by mouth daily as needed (acid reflux)      lipase-protease-amylase (CREON) 36305 units delayed release capsule Take 24,000 Units by mouth 3 times daily (with meals)      ferrous sulfate 325 (65 Fe) MG tablet Take 325 mg by mouth 2 times daily       gabapentin (NEURONTIN) 300 MG capsule Take 300 mg by mouth daily At noon      insulin lispro (HUMALOG) 100 UNIT/ML injection vial Inject 1.8 Units into the skin continuous Insulin pump  With bolus doses      isosorbide mononitrate (IMDUR) 30 MG extended release tablet Take 30 mg by mouth daily      polyethylene glycol (GLYCOLAX) packet Take 17 g by mouth daily      oxyCODONE-acetaminophen (PERCOCET)  MG per tablet Take 1 tablet by mouth three times daily.  Artjessica Reap venlafaxine (EFFEXOR) 75 MG tablet Take 150 mg by mouth 2 times daily       cetirizine (ZYRTEC) 10 MG tablet Take 10 mg by mouth daily      Cholecalciferol (VITAMIN D3) 2000 units CAPS Take 2,000 Units by mouth daily         Objective    BP
Smoking status: Never Smoker    Smokeless tobacco: Never Used    Alcohol use Yes      Comment: occassional       ALLERGIES    Allergies   Allergen Reactions    Sulfa Antibiotics Rash       MEDICATIONS    No current facility-administered medications on file prior to encounter. Current Outpatient Prescriptions on File Prior to Encounter   Medication Sig Dispense Refill    aspirin 81 MG chewable tablet Take 4 tablets by mouth daily      atorvastatin (LIPITOR) 10 MG tablet Take 1 tablet by mouth daily 30 tablet 0    metoprolol tartrate (LOPRESSOR) 25 MG tablet Take 1 tablet by mouth 2 times daily 60 tablet 0    furosemide (LASIX) 40 MG tablet Take 0.5 tablets by mouth daily 60 tablet 0    Biotin (BIOTIN 5000) 5 MG CAPS Take 1 tablet by mouth daily      Calcium Carbonate Antacid (TUMS ULTRA 1000) 1000 MG CHEW Take 1 tablet by mouth daily as needed (acid reflux)      lipase-protease-amylase (CREON) 81651 units delayed release capsule Take 24,000 Units by mouth 3 times daily (with meals)      ferrous sulfate 325 (65 Fe) MG tablet Take 325 mg by mouth 2 times daily       gabapentin (NEURONTIN) 300 MG capsule Take 300 mg by mouth daily At noon      insulin lispro (HUMALOG) 100 UNIT/ML injection vial Inject 1.8 Units into the skin continuous Insulin pump  With bolus doses      isosorbide mononitrate (IMDUR) 30 MG extended release tablet Take 30 mg by mouth daily      polyethylene glycol (GLYCOLAX) packet Take 17 g by mouth daily      oxyCODONE-acetaminophen (PERCOCET)  MG per tablet Take 1 tablet by mouth three times daily.  [facility] venlafaxine (EFFEXOR) 75 MG tablet Take 150 mg by mouth 2 times daily       cetirizine (ZYRTEC) 10 MG tablet Take 10 mg by mouth daily      Cholecalciferol (VITAMIN D3) 2000 units CAPS Take 2,000 Units by mouth daily         Objective    BP (!) 169/63   Pulse 71   Temp 98.5 °F (36.9 °C) (Oral)   Resp 18   Ht 4' 11\" (1.499 m)   Wt 176 lb 12.9 oz (80.2 kg)   SpO2

## 2018-10-22 NOTE — PLAN OF CARE
Problem: Falls - Risk of:  Goal: Will remain free from falls  Will remain free from falls   Outcome: Ongoing  Fall risk assessment completed. Fall precautions in place. Call light within reach. Pt educated on calling for assistance before getting up. Walkway free of clutter. Will continue to monitor. Goal: Absence of physical injury  Absence of physical injury   Outcome: Ongoing  Pt is free of injury. No injury noted. Fall precautions in place. Call light within reach. Will monitor. Problem: Pain:  Goal: Control of acute pain  Control of acute pain   Outcome: Ongoing  Pain/discomfort being managed with PRN analgesics per MD orders. Pt able to express presence and absence of pain and rate pain appropriately using numerical scale.

## 2018-10-22 NOTE — PROGRESS NOTES
female with medical history significant for coronary artery disease, atrial fibrillation, CHF, hypertension and diabetes mellitus. to hospital 10-14-18 with bilateral LE swelling and erythema, and weeping of skin ulcers. \"  Admitting dx: B LE cellulitis and difficulty walking. Response To Previous Treatment: Patient with no complaints from previous session. Family / Caregiver Present: No  Follows Commands: Within Functional Limits  Subjective  Subjective: alert oriented and agreeable to PT OT session and OOB to ambulate in room to demonstrate her mobility  Pain Screening  Patient Currently in Pain: Denies  Orientation  Orientation  Overall Orientation Status: Within Functional Limits  Social/Functional History  Social/Functional History  Lives With: Daughter (& daughter's significant other)  Type of Home: House  Home Layout: One level  Home Access: Stairs to enter without rails  Entrance Stairs - Number of Steps: 1-2 steps in   Bathroom Shower/Tub: Walk-in shower, Shower chair with back  [employer]: Standard (toilet safety frame)  Bathroom Accessibility: Walker accessible  Home Equipment: Rolling walker, Wheelchair-manual, Lift chair, Reacher (pt sleeps in lift chair)  Receives Help From: Home health, Friend(s)  ADL Assistance: Independent (pt showers once a week to wash hair & requires supv ; pt typically completes sponge bath independently, independent toileting and dressing)  Homemaking Responsibilities: No  Ambulation Assistance: Independent (with RW)  Transfer Assistance: Independent  Additional Comments: Pt has frequent visitors throughout day -- friends, cleaning lady, personal hired nurse, home PT, daughter. Daughter at bedside reports about 3-4 falls in past 3-4 months.   Objective  Motor Control  Gross Motor?: WFL  Sensation  Overall Sensation Status: WFL  Bed mobility  Supine to Sit: Stand by assistance  Sit to Supine: Unable to assess (remained OOB at conclusion)  Transfers  Sit to Stand: Minimal

## 2018-10-22 NOTE — PROGRESS NOTES
Infectious Disease Follow up Notes  Admit Date: 10/14/2018  Hospital Day: 9    Antibiotics :   IV Vancomycin   Oral Fluconazole        CHIEF COMPLAINT:     Bi lateral lower leg cellulitis  MRSA  Fungal dermatitis  Lower leg edema      Subjective interval History :  74 y.o.  woman with bi lateral lower leg edema and venous ulcer and recurrent cellulitis admitted with bi lateral lower leg weeping ulcer and drainage , pain she has h/o mRSA infection in the past and has missed her Wound care appointments and has home care RN for wound dressings she has SPC in place for urine drainage, also noticed extensive fungal dermatitis under the breast and in the groin areas bi lateral. Given recurrent cellulitis we are consulted for abx recommendations has on going pain in both lower legs worse with any ambulation or movement. Leg pain better redness resolving and skin peeling off and the fungal rash slow to resolve and tolerating the meds well supra pubic catheter working well and wants to go home     Past Medical History:    Past Medical History:   Diagnosis Date    Anemia     Atrial fibrillation ([address]     CAD (coronary artery disease)     Chronic back pain     CKD (chronic kidney disease), stage III ([address]     Diabetes mellitus ([address]     Diastolic CHF ([address]     Hyperlipidemia     Hypertension     MI (myocardial infarction) ([address]     MRSA (methicillin resistant staph aureus) culture positive 04/13/2018    leg ulcer    MRSA colonization 10/16/2018    Obesity (BMI 30-39. 9)     Osteoarthritis     Pancreatitis     Sepsis ([address]     Stasis ulcer ([address]     bilateral lower extremities     Suprapubic catheter ([address]        Past Surgical History:    Past Surgical History:   Procedure Laterality Date    APPENDECTOMY      BACK SURGERY      x2    CATARACT REMOVAL      bilateral     CHOLECYSTECTOMY      COLONOSCOPY      EYE SURGERY     

## 2018-10-22 NOTE — PROGRESS NOTES
Patient is alert and oriented. Patient is resting comfortably in bed with eyes closed. Patient is on room air. Side rails are up x2. Fall precautions are in place. Bed alarm on. Bed is in lowest position. Call light is in reach. Will continue to monitor patient per unit protocols.  Electronically signed by Cindi Varma RN on 10/22/2018 at 7:55 AM

## 2018-10-22 NOTE — PROGRESS NOTES
Pt is up in their chair with chair alarm on. Helped pt ambulate around in the room to relieve pressure off of buttocks and to help pt put pants on. Pt tolerated well. Pt returned to the chair with call light and belongings within reach. No further needs.    Electronically signed by Sherryle Russel, RN on 10/22/2018 at 3:42 PM

## 2018-10-23 VITALS
TEMPERATURE: 99.2 F | HEART RATE: 76 BPM | OXYGEN SATURATION: 94 % | BODY MASS INDEX: 35.69 KG/M2 | RESPIRATION RATE: 16 BRPM | WEIGHT: 177.03 LBS | HEIGHT: 59 IN | DIASTOLIC BLOOD PRESSURE: 74 MMHG | SYSTOLIC BLOOD PRESSURE: 164 MMHG

## 2018-10-23 LAB
ANION GAP SERPL CALCULATED.3IONS-SCNC: 11 MMOL/L (ref 3–16)
BASOPHILS ABSOLUTE: 0.1 K/UL (ref 0–0.2)
BASOPHILS RELATIVE PERCENT: 0.9 %
BUN BLDV-MCNC: 17 MG/DL (ref 7–20)
CALCIUM SERPL-MCNC: 8.5 MG/DL (ref 8.3–10.6)
CHLORIDE BLD-SCNC: 105 MMOL/L (ref 99–110)
CO2: 24 MMOL/L (ref 21–32)
CREAT SERPL-MCNC: 1.4 MG/DL (ref 0.6–1.2)
EOSINOPHILS ABSOLUTE: 0.5 K/UL (ref 0–0.6)
EOSINOPHILS RELATIVE PERCENT: 6.6 %
GFR AFRICAN AMERICAN: 44
GFR NON-AFRICAN AMERICAN: 37
GLUCOSE BLD-MCNC: 115 MG/DL (ref 70–99)
GLUCOSE BLD-MCNC: 115 MG/DL (ref 70–99)
GLUCOSE BLD-MCNC: 121 MG/DL (ref 70–99)
HCT VFR BLD CALC: 26.1 % (ref 36–48)
HEMOGLOBIN: 8.8 G/DL (ref 12–16)
LYMPHOCYTES ABSOLUTE: 1.1 K/UL (ref 1–5.1)
LYMPHOCYTES RELATIVE PERCENT: 14.8 %
MCH RBC QN AUTO: 32.2 PG (ref 26–34)
MCHC RBC AUTO-ENTMCNC: 33.7 G/DL (ref 31–36)
MCV RBC AUTO: 95.5 FL (ref 80–100)
MONOCYTES ABSOLUTE: 0.4 K/UL (ref 0–1.3)
MONOCYTES RELATIVE PERCENT: 6 %
NEUTROPHILS ABSOLUTE: 5.3 K/UL (ref 1.7–7.7)
NEUTROPHILS RELATIVE PERCENT: 71.7 %
PDW BLD-RTO: 14.1 % (ref 12.4–15.4)
PERFORMED ON: ABNORMAL
PERFORMED ON: ABNORMAL
PLATELET # BLD: 175 K/UL (ref 135–450)
PMV BLD AUTO: 8.7 FL (ref 5–10.5)
POTASSIUM REFLEX MAGNESIUM: 4.9 MMOL/L (ref 3.5–5.1)
RBC # BLD: 2.73 M/UL (ref 4–5.2)
SODIUM BLD-SCNC: 140 MMOL/L (ref 136–145)
WBC # BLD: 7.3 K/UL (ref 4–11)

## 2018-10-23 PROCEDURE — 36415 COLL VENOUS BLD VENIPUNCTURE: CPT

## 2018-10-23 PROCEDURE — 6370000000 HC RX 637 (ALT 250 FOR IP): Performed by: INTERNAL MEDICINE

## 2018-10-23 PROCEDURE — 94760 N-INVAS EAR/PLS OXIMETRY 1: CPT

## 2018-10-23 PROCEDURE — 6370000000 HC RX 637 (ALT 250 FOR IP): Performed by: NURSE PRACTITIONER

## 2018-10-23 PROCEDURE — 80048 BASIC METABOLIC PNL TOTAL CA: CPT

## 2018-10-23 PROCEDURE — 85025 COMPLETE CBC W/AUTO DIFF WBC: CPT

## 2018-10-23 PROCEDURE — 6370000000 HC RX 637 (ALT 250 FOR IP): Performed by: HOSPITALIST

## 2018-10-23 PROCEDURE — 2580000003 HC RX 258: Performed by: HOSPITALIST

## 2018-10-23 RX ORDER — LACTOBACILLUS RHAMNOSUS GG 10B CELL
1 CAPSULE ORAL 2 TIMES DAILY
Qty: 20 CAPSULE | Refills: 0 | Status: SHIPPED | OUTPATIENT
Start: 2018-10-23 | End: 2018-11-02

## 2018-10-23 RX ORDER — CLINDAMYCIN HYDROCHLORIDE 300 MG/1
300 CAPSULE ORAL 3 TIMES DAILY
Qty: 21 CAPSULE | Refills: 0 | Status: SHIPPED | OUTPATIENT
Start: 2018-10-23 | End: 2018-10-23 | Stop reason: HOSPADM

## 2018-10-23 RX ADMIN — ATORVASTATIN CALCIUM 10 MG: 10 TABLET, FILM COATED ORAL at 09:45

## 2018-10-23 RX ADMIN — TIMOLOL MALEATE 1 DROP: 5 SOLUTION/ DROPS OPHTHALMIC at 09:50

## 2018-10-23 RX ADMIN — Medication 1 CAPSULE: at 09:41

## 2018-10-23 RX ADMIN — METOPROLOL TARTRATE 25 MG: 25 TABLET ORAL at 09:42

## 2018-10-23 RX ADMIN — OXYCODONE HYDROCHLORIDE AND ACETAMINOPHEN 1 TABLET: 10; 325 TABLET ORAL at 09:42

## 2018-10-23 RX ADMIN — GABAPENTIN 300 MG: 300 CAPSULE ORAL at 09:45

## 2018-10-23 RX ADMIN — POLYETHYLENE GLYCOL 3350 17 G: 17 POWDER, FOR SOLUTION ORAL at 09:50

## 2018-10-23 RX ADMIN — ASPIRIN 81 MG CHEWABLE TABLET 324 MG: 81 TABLET CHEWABLE at 09:44

## 2018-10-23 RX ADMIN — SENNOSIDES AND DOCUSATE SODIUM 1 TABLET: 8.6; 5 TABLET ORAL at 09:42

## 2018-10-23 RX ADMIN — CETIRIZINE HYDROCHLORIDE 10 MG: 10 TABLET, FILM COATED ORAL at 09:45

## 2018-10-23 RX ADMIN — Medication 10 ML: at 09:40

## 2018-10-23 RX ADMIN — FERROUS SULFATE TAB EC 324 MG (65 MG FE EQUIVALENT) 324 MG: 324 (65 FE) TABLET DELAYED RESPONSE at 09:42

## 2018-10-23 RX ADMIN — MICONAZOLE NITRATE: 20 POWDER TOPICAL at 09:47

## 2018-10-23 RX ADMIN — VITAMIN D, TAB 1000IU (100/BT) 2000 UNITS: 25 TAB at 09:45

## 2018-10-23 RX ADMIN — PANCRELIPASE 2 CAPSULE: 60000; 12000; 38000 CAPSULE, DELAYED RELEASE PELLETS ORAL at 09:41

## 2018-10-23 RX ADMIN — ANORECTAL OINTMENT: 15.7; .44; 24; 20.6 OINTMENT TOPICAL at 09:47

## 2018-10-23 RX ADMIN — FLUCONAZOLE 100 MG: 100 TABLET ORAL at 09:44

## 2018-10-23 RX ADMIN — VENLAFAXINE 150 MG: 50 TABLET ORAL at 09:41

## 2018-10-23 RX ADMIN — ISOSORBIDE MONONITRATE 30 MG: 30 TABLET, EXTENDED RELEASE ORAL at 09:45

## 2018-10-23 ASSESSMENT — PAIN DESCRIPTION - PAIN TYPE
TYPE: CHRONIC PAIN
TYPE: CHRONIC PAIN

## 2018-10-23 ASSESSMENT — PAIN DESCRIPTION - DESCRIPTORS
DESCRIPTORS: ACHING
DESCRIPTORS: ACHING

## 2018-10-23 ASSESSMENT — PAIN SCALES - GENERAL
PAINLEVEL_OUTOF10: 4
PAINLEVEL_OUTOF10: 0

## 2018-10-23 ASSESSMENT — PAIN DESCRIPTION - ONSET
ONSET: ON-GOING
ONSET: ON-GOING

## 2018-10-23 ASSESSMENT — PAIN DESCRIPTION - FREQUENCY
FREQUENCY: CONTINUOUS
FREQUENCY: CONTINUOUS

## 2018-10-23 ASSESSMENT — PAIN DESCRIPTION - LOCATION
LOCATION: GENERALIZED
LOCATION: GENERALIZED

## 2018-10-23 ASSESSMENT — PAIN DESCRIPTION - PROGRESSION
CLINICAL_PROGRESSION: GRADUALLY IMPROVING
CLINICAL_PROGRESSION: GRADUALLY IMPROVING

## 2018-10-23 NOTE — PROGRESS NOTES
· Oral Diet intake:  (intakes vary )  · Oral Nutrition Supplement (ONS) Orders: None  · Anthropometric Measures:  · Ht: 4' 11\" (149.9 cm)   · Current Body Wt: 177 lb (80.3 kg)  · Admission Body Wt: 174 lb (78.9 kg)  · % Weight Change: -5% (possibly due to fluid shifts),  1 week   · Ideal Body Wt: 95 lb (43.1 kg)    · BMI Classification: BMI 35.0 - 39.9 Obese Class II  · Comparative Standards (Estimated Nutrition Needs):  · Estimated Daily Total Kcal: 2041-4669 kcal  · Estimated Daily Protein (g): 64-86 gm    Estimated Intake vs Estimated Needs: Intake Less Than Needs    Nutrition Risk Level: Low    Nutrition Interventions:   Continue current diet  Continued Inpatient Monitoring, Education Not Indicated    Nutrition Evaluation:   · Evaluation: Progressing toward goals   · Goals: >50% of meals consumed    · Monitoring: Meal Intake, Wound Healing, Ascites/Edema, Weight, Pertinent Labs, Diarrhea, Constipation    See Adult Nutrition Doc Flowsheet for more detail.      Electronically signed by Arabella Lynn on 10/23/18 at 11:23 AM    Contact Number: [phone]

## 2018-10-23 NOTE — PROGRESS NOTES
Patient returned to bed from chair. Patient repositioned with pillows. Call light and belongings in reach. Denies complaints of pain, or needs at this time.

## 2018-10-23 NOTE — DISCHARGE SUMMARY
room with bilateral lower extremity swelling, erythema, weeping, and open ulcers that were worsening. This was associated with increasing pain. Typically patient has home health care services and the dressings are changed 2-3 times a week. The patient had not been following in the wound care center. She was admitted with bilateral lower extremity cellulitis. She was started on IV vanc and zosyn. ID was consulted. The patient was noted to have pressure ulcers on her buttocks/thigh as well as the wounds on her legs, prompting wound care consult. Nasal swab was + for MRSA 10/15. Hgb dropped to 7.2 10/16/18, and she received 1 unit PRBCs. Fecal occult was negative and iron studies were unremarkable with exception of low TIBC. The patient was seen by PT/OT. The patient's symptoms improved and at this time, she is able to be discharged to home with PO abx and HH for PT/OT and wound care. She will need to f/u with PCP within 1 week. Exam:     BP (!) 164/74   Pulse 76   Temp 99.2 °F (37.3 °C) (Oral)   Resp 16   Ht 4' 11\" (1.499 m)   Wt 177 lb 0.5 oz (80.3 kg)   SpO2 94%   BMI 35.76 kg/m²     General: Alert and oriented. Up at bedside in NAD. Pleasant and cooperative. HEENT: Normocephalic. Atraumatic. Pupils equal and reactive. EOM intact. Oral mucosa pink/moist/intact. Neck: Supple. Symmetrical. Trachea midline. Lungs: Clear to auscultation bilaterally. Respirations even and unlabored. Chest: Exam unremarkable. Cardiac: S1/S2 noted. Regular Rhythm and rate. Abdomen/GI: Soft. Non-tender. Non-distended. BS+. Extremities: Dressings intact lower extremities bilaterally. Skin: Gluteal/thigh wounds - not visualized on exam.  Neuro: Grossly intact. No focal deficits noted.      Consults:     PHARMACY TO DOSE VANCOMYCIN  IP CONSULT TO HOSPITALIST  IP CONSULT TO INFECTIOUS DISEASES  PHARMACY TO DOSE VANCOMYCIN  IP CONSULT TO INFECTIOUS DISEASES  IP CONSULT TO HOME CARE NEEDS  IP CONSULT TO HOME CARE

## 2018-11-19 ENCOUNTER — HOSPITAL ENCOUNTER (OUTPATIENT)
Age: 73
Setting detail: OBSERVATION
Discharge: ANOTHER HOSPITAL | End: 2018-11-20
Attending: HOSPITALIST | Admitting: HOSPITALIST
Payer: MEDICARE

## 2018-11-19 ENCOUNTER — APPOINTMENT (OUTPATIENT)
Dept: GENERAL RADIOLOGY | Age: 73
End: 2018-11-19
Payer: MEDICARE

## 2018-11-19 ENCOUNTER — APPOINTMENT (OUTPATIENT)
Dept: CT IMAGING | Age: 73
End: 2018-11-19
Payer: MEDICARE

## 2018-11-19 DIAGNOSIS — R41.82 ALTERED MENTAL STATUS, UNSPECIFIED ALTERED MENTAL STATUS TYPE: Primary | ICD-10-CM

## 2018-11-19 DIAGNOSIS — D64.9 CHRONIC ANEMIA: ICD-10-CM
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
## 2018-11-19 DIAGNOSIS — N18.9 CHRONIC KIDNEY DISEASE, UNSPECIFIED CKD STAGE: ICD-10-CM

## 2018-11-19 LAB
A/G RATIO: 0.9 (ref 1.1–2.2)
ALBUMIN SERPL-MCNC: 3.3 G/DL (ref 3.4–5)
ALP BLD-CCNC: 143 U/L (ref 40–129)
ALT SERPL-CCNC: <5 U/L (ref 10–40)
ANION GAP SERPL CALCULATED.3IONS-SCNC: 11 MMOL/L (ref 3–16)
AST SERPL-CCNC: 14 U/L (ref 15–37)
BASOPHILS ABSOLUTE: 0 K/UL (ref 0–0.2)
BASOPHILS RELATIVE PERCENT: 0.6 %
BILIRUB SERPL-MCNC: <0.2 MG/DL (ref 0–1)
BILIRUBIN URINE: NEGATIVE
BLOOD, URINE: ABNORMAL
BUN BLDV-MCNC: 31 MG/DL (ref 7–20)
CALCIUM SERPL-MCNC: 8.8 MG/DL (ref 8.3–10.6)
CHLORIDE BLD-SCNC: 104 MMOL/L (ref 99–110)
CLARITY: CLEAR
CO2: 22 MMOL/L (ref 21–32)
COLOR: YELLOW
CREAT SERPL-MCNC: 1.5 MG/DL (ref 0.6–1.2)
EOSINOPHILS ABSOLUTE: 0.5 K/UL (ref 0–0.6)
EOSINOPHILS RELATIVE PERCENT: 7 %
EPITHELIAL CELLS, UA: 1 /HPF (ref 0–5)
GFR AFRICAN AMERICAN: 41
GFR NON-AFRICAN AMERICAN: 34
GLOBULIN: 3.7 G/DL
GLUCOSE BLD-MCNC: 84 MG/DL (ref 70–99)
GLUCOSE URINE: NEGATIVE MG/DL
HCT VFR BLD CALC: 26.8 % (ref 36–48)
HEMOGLOBIN: 9.1 G/DL (ref 12–16)
HYALINE CASTS: 1 /LPF (ref 0–8)
KETONES, URINE: NEGATIVE MG/DL
LACTIC ACID: 0.7 MMOL/L (ref 0.4–2)
LEUKOCYTE ESTERASE, URINE: NEGATIVE
LYMPHOCYTES ABSOLUTE: 0.8 K/UL (ref 1–5.1)
LYMPHOCYTES RELATIVE PERCENT: 11.6 %
MCH RBC QN AUTO: 32.3 PG (ref 26–34)
MCHC RBC AUTO-ENTMCNC: 34 G/DL (ref 31–36)
MCV RBC AUTO: 95.2 FL (ref 80–100)
MICROSCOPIC EXAMINATION: YES
MONOCYTES ABSOLUTE: 0.4 K/UL (ref 0–1.3)
MONOCYTES RELATIVE PERCENT: 6.4 %
NEUTROPHILS ABSOLUTE: 4.9 K/UL (ref 1.7–7.7)
NEUTROPHILS RELATIVE PERCENT: 74.4 %
NITRITE, URINE: NEGATIVE
PDW BLD-RTO: 14.2 % (ref 12.4–15.4)
PH UA: 5.5
PLATELET # BLD: 134 K/UL (ref 135–450)
PMV BLD AUTO: 10.2 FL (ref 5–10.5)
POTASSIUM SERPL-SCNC: 3.9 MMOL/L (ref 3.5–5.1)
PROTEIN UA: NEGATIVE MG/DL
RBC # BLD: 2.81 M/UL (ref 4–5.2)
RBC UA: 5 /HPF (ref 0–4)
SODIUM BLD-SCNC: 137 MMOL/L (ref 136–145)
SPECIFIC GRAVITY UA: 1.01
TOTAL PROTEIN: 7 G/DL (ref 6.4–8.2)
URINE REFLEX TO CULTURE: ABNORMAL
URINE TYPE: ABNORMAL
UROBILINOGEN, URINE: 0.2 E.U./DL
WBC # BLD: 6.5 K/UL (ref 4–11)
WBC UA: 1 /HPF (ref 0–5)

## 2018-11-19 PROCEDURE — 71045 X-RAY EXAM CHEST 1 VIEW: CPT

## 2018-11-19 PROCEDURE — 83605 ASSAY OF LACTIC ACID: CPT

## 2018-11-19 PROCEDURE — G0378 HOSPITAL OBSERVATION PER HR: HCPCS

## 2018-11-19 PROCEDURE — 80053 COMPREHEN METABOLIC PANEL: CPT

## 2018-11-19 PROCEDURE — 6370000000 HC RX 637 (ALT 250 FOR IP): Performed by: HOSPITALIST

## 2018-11-19 PROCEDURE — 72125 CT NECK SPINE W/O DYE: CPT

## 2018-11-19 PROCEDURE — 70450 CT HEAD/BRAIN W/O DYE: CPT

## 2018-11-19 PROCEDURE — 99285 EMERGENCY DEPT VISIT HI MDM: CPT

## 2018-11-19 PROCEDURE — 74176 CT ABD & PELVIS W/O CONTRAST: CPT

## 2018-11-19 PROCEDURE — 2580000003 HC RX 258: Performed by: HOSPITALIST

## 2018-11-19 PROCEDURE — 93005 ELECTROCARDIOGRAM TRACING: CPT | Performed by: NURSE PRACTITIONER

## 2018-11-19 PROCEDURE — 81001 URINALYSIS AUTO W/SCOPE: CPT

## 2018-11-19 PROCEDURE — 85025 COMPLETE CBC W/AUTO DIFF WBC: CPT

## 2018-11-19 RX ORDER — FUROSEMIDE 20 MG/1
20 TABLET ORAL DAILY
Status: DISCONTINUED | OUTPATIENT
Start: 2018-11-20 | End: 2018-11-20 | Stop reason: HOSPADM

## 2018-11-19 RX ORDER — ASPIRIN 81 MG/1
81 TABLET, CHEWABLE ORAL DAILY
Status: DISCONTINUED | OUTPATIENT
Start: 2018-11-20 | End: 2018-11-20 | Stop reason: HOSPADM

## 2018-11-19 RX ORDER — SODIUM CHLORIDE 0.9 % (FLUSH) 0.9 %
10 SYRINGE (ML) INJECTION EVERY 12 HOURS SCHEDULED
Status: DISCONTINUED | OUTPATIENT
Start: 2018-11-19 | End: 2018-11-20 | Stop reason: HOSPADM

## 2018-11-19 RX ORDER — SENNA AND DOCUSATE SODIUM 50; 8.6 MG/1; MG/1
1 TABLET, FILM COATED ORAL DAILY
Status: DISCONTINUED | OUTPATIENT
Start: 2018-11-20 | End: 2018-11-20 | Stop reason: HOSPADM

## 2018-11-19 RX ORDER — ISOSORBIDE MONONITRATE 30 MG/1
30 TABLET, EXTENDED RELEASE ORAL DAILY
Status: DISCONTINUED | OUTPATIENT
Start: 2018-11-20 | End: 2018-11-20 | Stop reason: HOSPADM

## 2018-11-19 RX ORDER — ATORVASTATIN CALCIUM 10 MG/1
10 TABLET, FILM COATED ORAL DAILY
Status: DISCONTINUED | OUTPATIENT
Start: 2018-11-20 | End: 2018-11-20 | Stop reason: HOSPADM

## 2018-11-19 RX ORDER — VENLAFAXINE 50 MG/1
150 TABLET ORAL 2 TIMES DAILY
Status: DISCONTINUED | OUTPATIENT
Start: 2018-11-19 | End: 2018-11-20 | Stop reason: HOSPADM

## 2018-11-19 RX ORDER — TIMOLOL MALEATE 5 MG/ML
1 SOLUTION/ DROPS OPHTHALMIC DAILY
Status: DISCONTINUED | OUTPATIENT
Start: 2018-11-20 | End: 2018-11-20 | Stop reason: HOSPADM

## 2018-11-19 RX ORDER — ACETAMINOPHEN 325 MG/1
650 TABLET ORAL EVERY 4 HOURS PRN
Status: DISCONTINUED | OUTPATIENT
Start: 2018-11-19 | End: 2018-11-20 | Stop reason: HOSPADM

## 2018-11-19 RX ORDER — POLYETHYLENE GLYCOL 3350 17 G/17G
17 POWDER, FOR SOLUTION ORAL DAILY
Status: DISCONTINUED | OUTPATIENT
Start: 2018-11-20 | End: 2018-11-20 | Stop reason: HOSPADM

## 2018-11-19 RX ORDER — ONDANSETRON 2 MG/ML
4 INJECTION INTRAMUSCULAR; INTRAVENOUS EVERY 6 HOURS PRN
Status: DISCONTINUED | OUTPATIENT
Start: 2018-11-19 | End: 2018-11-20 | Stop reason: HOSPADM

## 2018-11-19 RX ORDER — FERROUS SULFATE TAB EC 324 MG (65 MG FE EQUIVALENT) 324 (65 FE) MG
324 TABLET DELAYED RESPONSE ORAL 2 TIMES DAILY WITH MEALS
Status: DISCONTINUED | OUTPATIENT
Start: 2018-11-20 | End: 2018-11-20 | Stop reason: HOSPADM

## 2018-11-19 RX ORDER — LEVOFLOXACIN 500 MG/1
500 TABLET, FILM COATED ORAL DAILY
Status: ON HOLD | COMMUNITY
End: 2019-05-06 | Stop reason: HOSPADM

## 2018-11-19 RX ORDER — SODIUM CHLORIDE 0.9 % (FLUSH) 0.9 %
10 SYRINGE (ML) INJECTION PRN
Status: DISCONTINUED | OUTPATIENT
Start: 2018-11-19 | End: 2018-11-20 | Stop reason: HOSPADM

## 2018-11-19 RX ORDER — GABAPENTIN 300 MG/1
300 CAPSULE ORAL DAILY
Status: DISCONTINUED | OUTPATIENT
Start: 2018-11-20 | End: 2018-11-20 | Stop reason: HOSPADM

## 2018-11-19 RX ADMIN — Medication 10 ML: at 22:48

## 2018-11-19 RX ADMIN — VENLAFAXINE 150 MG: 50 TABLET ORAL at 22:48

## 2018-11-19 RX ADMIN — METOPROLOL TARTRATE 25 MG: 25 TABLET ORAL at 22:48

## 2018-11-19 ASSESSMENT — ENCOUNTER SYMPTOMS
COLOR CHANGE: 1
VOMITING: 0
ABDOMINAL PAIN: 0
DIARRHEA: 0
NAUSEA: 0
BACK PAIN: 0
SHORTNESS OF BREATH: 0

## 2018-11-19 ASSESSMENT — PAIN SCALES - GENERAL: PAINLEVEL_OUTOF10: 0

## 2018-11-20 VITALS
RESPIRATION RATE: 16 BRPM | HEIGHT: 59 IN | SYSTOLIC BLOOD PRESSURE: 128 MMHG | TEMPERATURE: 97.5 F | OXYGEN SATURATION: 96 % | WEIGHT: 157.41 LBS | HEART RATE: 72 BPM | BODY MASS INDEX: 31.73 KG/M2 | DIASTOLIC BLOOD PRESSURE: 75 MMHG

## 2018-11-20 LAB
AMMONIA: 44 UMOL/L (ref 11–51)
ANION GAP SERPL CALCULATED.3IONS-SCNC: 11 MMOL/L (ref 3–16)
BUN BLDV-MCNC: 27 MG/DL (ref 7–20)
CALCIUM SERPL-MCNC: 8.5 MG/DL (ref 8.3–10.6)
CHLORIDE BLD-SCNC: 107 MMOL/L (ref 99–110)
CO2: 20 MMOL/L (ref 21–32)
CREAT SERPL-MCNC: 1.4 MG/DL (ref 0.6–1.2)
ESTIMATED AVERAGE GLUCOSE: 145.6 MG/DL
GFR AFRICAN AMERICAN: 44
GFR NON-AFRICAN AMERICAN: 37
GLUCOSE BLD-MCNC: 292 MG/DL (ref 70–99)
GLUCOSE BLD-MCNC: 309 MG/DL (ref 70–99)
GLUCOSE BLD-MCNC: 313 MG/DL (ref 70–99)
GLUCOSE BLD-MCNC: 331 MG/DL (ref 70–99)
GLUCOSE BLD-MCNC: 356 MG/DL (ref 70–99)
HBA1C MFR BLD: 6.7 %
HCT VFR BLD CALC: 25.7 % (ref 36–48)
HEMOGLOBIN: 8.5 G/DL (ref 12–16)
MCH RBC QN AUTO: 31.7 PG (ref 26–34)
MCHC RBC AUTO-ENTMCNC: 33.2 G/DL (ref 31–36)
MCV RBC AUTO: 95.6 FL (ref 80–100)
PDW BLD-RTO: 14.1 % (ref 12.4–15.4)
PERFORMED ON: ABNORMAL
PLATELET # BLD: 122 K/UL (ref 135–450)
PMV BLD AUTO: 10.2 FL (ref 5–10.5)
POTASSIUM REFLEX MAGNESIUM: 3.9 MMOL/L (ref 3.5–5.1)
RBC # BLD: 2.69 M/UL (ref 4–5.2)
SODIUM BLD-SCNC: 138 MMOL/L (ref 136–145)
TSH REFLEX: 1.34 UIU/ML (ref 0.27–4.2)
VITAMIN B-12: 307 PG/ML (ref 211–911)
WBC # BLD: 5 K/UL (ref 4–11)

## 2018-11-20 PROCEDURE — 94760 N-INVAS EAR/PLS OXIMETRY 1: CPT

## 2018-11-20 PROCEDURE — 36415 COLL VENOUS BLD VENIPUNCTURE: CPT

## 2018-11-20 PROCEDURE — 97166 OT EVAL MOD COMPLEX 45 MIN: CPT

## 2018-11-20 PROCEDURE — 84443 ASSAY THYROID STIM HORMONE: CPT

## 2018-11-20 PROCEDURE — G8979 MOBILITY GOAL STATUS: HCPCS

## 2018-11-20 PROCEDURE — 6370000000 HC RX 637 (ALT 250 FOR IP): Performed by: HOSPITALIST

## 2018-11-20 PROCEDURE — 6360000002 HC RX W HCPCS: Performed by: HOSPITALIST

## 2018-11-20 PROCEDURE — 82607 VITAMIN B-12: CPT

## 2018-11-20 PROCEDURE — 82140 ASSAY OF AMMONIA: CPT

## 2018-11-20 PROCEDURE — G0378 HOSPITAL OBSERVATION PER HR: HCPCS

## 2018-11-20 PROCEDURE — G8988 SELF CARE GOAL STATUS: HCPCS

## 2018-11-20 PROCEDURE — G8987 SELF CARE CURRENT STATUS: HCPCS

## 2018-11-20 PROCEDURE — 2580000003 HC RX 258: Performed by: HOSPITALIST

## 2018-11-20 PROCEDURE — 97162 PT EVAL MOD COMPLEX 30 MIN: CPT

## 2018-11-20 PROCEDURE — 80048 BASIC METABOLIC PNL TOTAL CA: CPT

## 2018-11-20 PROCEDURE — 96372 THER/PROPH/DIAG INJ SC/IM: CPT

## 2018-11-20 PROCEDURE — 97116 GAIT TRAINING THERAPY: CPT

## 2018-11-20 PROCEDURE — 83036 HEMOGLOBIN GLYCOSYLATED A1C: CPT

## 2018-11-20 PROCEDURE — 85027 COMPLETE CBC AUTOMATED: CPT

## 2018-11-20 PROCEDURE — G8978 MOBILITY CURRENT STATUS: HCPCS

## 2018-11-20 PROCEDURE — 97530 THERAPEUTIC ACTIVITIES: CPT

## 2018-11-20 RX ORDER — INSULIN GLARGINE 100 [IU]/ML
10 INJECTION, SOLUTION SUBCUTANEOUS NIGHTLY
Status: DISCONTINUED | OUTPATIENT
Start: 2018-11-20 | End: 2018-11-20 | Stop reason: HOSPADM

## 2018-11-20 RX ORDER — INSULIN GLARGINE 100 [IU]/ML
10 INJECTION, SOLUTION SUBCUTANEOUS DAILY
Status: DISCONTINUED | OUTPATIENT
Start: 2018-11-20 | End: 2018-11-20

## 2018-11-20 RX ORDER — DEXTROSE MONOHYDRATE 25 G/50ML
12.5 INJECTION, SOLUTION INTRAVENOUS PRN
Status: DISCONTINUED | OUTPATIENT
Start: 2018-11-20 | End: 2018-11-20 | Stop reason: HOSPADM

## 2018-11-20 RX ORDER — LORAZEPAM 0.5 MG/1
0.5 TABLET ORAL NIGHTLY
COMMUNITY
End: 2019-05-23

## 2018-11-20 RX ORDER — INSULIN GLARGINE 100 [IU]/ML
10 INJECTION, SOLUTION SUBCUTANEOUS NIGHTLY
Qty: 1 VIAL | Refills: 0 | Status: SHIPPED | OUTPATIENT
Start: 2018-11-20 | End: 2019-05-23

## 2018-11-20 RX ORDER — NICOTINE POLACRILEX 4 MG
15 LOZENGE BUCCAL PRN
Status: DISCONTINUED | OUTPATIENT
Start: 2018-11-20 | End: 2018-11-20 | Stop reason: HOSPADM

## 2018-11-20 RX ORDER — DEXTROSE MONOHYDRATE 50 MG/ML
100 INJECTION, SOLUTION INTRAVENOUS PRN
Status: DISCONTINUED | OUTPATIENT
Start: 2018-11-20 | End: 2018-11-20 | Stop reason: HOSPADM

## 2018-11-20 RX ADMIN — FUROSEMIDE 20 MG: 20 TABLET ORAL at 08:20

## 2018-11-20 RX ADMIN — INSULIN LISPRO 10 UNITS: 100 INJECTION, SOLUTION INTRAVENOUS; SUBCUTANEOUS at 08:26

## 2018-11-20 RX ADMIN — METOPROLOL TARTRATE 25 MG: 25 TABLET ORAL at 08:20

## 2018-11-20 RX ADMIN — ATORVASTATIN CALCIUM 10 MG: 10 TABLET, FILM COATED ORAL at 08:20

## 2018-11-20 RX ADMIN — PANCRELIPASE 2 CAPSULE: 60000; 12000; 38000 CAPSULE, DELAYED RELEASE PELLETS ORAL at 11:49

## 2018-11-20 RX ADMIN — FERROUS SULFATE TAB EC 324 MG (65 MG FE EQUIVALENT) 324 MG: 324 (65 FE) TABLET DELAYED RESPONSE at 08:20

## 2018-11-20 RX ADMIN — INSULIN LISPRO 5 UNITS: 100 INJECTION, SOLUTION INTRAVENOUS; SUBCUTANEOUS at 11:51

## 2018-11-20 RX ADMIN — INSULIN LISPRO 8 UNITS: 100 INJECTION, SOLUTION INTRAVENOUS; SUBCUTANEOUS at 11:50

## 2018-11-20 RX ADMIN — ENOXAPARIN SODIUM 30 MG: 30 INJECTION SUBCUTANEOUS at 08:21

## 2018-11-20 RX ADMIN — TIMOLOL MALEATE 1 DROP: 5 SOLUTION/ DROPS OPHTHALMIC at 08:21

## 2018-11-20 RX ADMIN — ISOSORBIDE MONONITRATE 30 MG: 30 TABLET, EXTENDED RELEASE ORAL at 08:20

## 2018-11-20 RX ADMIN — GABAPENTIN 300 MG: 300 CAPSULE ORAL at 08:20

## 2018-11-20 RX ADMIN — INSULIN GLARGINE 10 UNITS: 100 INJECTION, SOLUTION SUBCUTANEOUS at 03:28

## 2018-11-20 RX ADMIN — VITAMIN D, TAB 1000IU (100/BT) 2000 UNITS: 25 TAB at 08:19

## 2018-11-20 RX ADMIN — VENLAFAXINE 150 MG: 50 TABLET ORAL at 08:19

## 2018-11-20 RX ADMIN — POLYETHYLENE GLYCOL 3350 17 G: 17 POWDER, FOR SOLUTION ORAL at 08:21

## 2018-11-20 RX ADMIN — SENNOSIDES AND DOCUSATE SODIUM 1 TABLET: 8.6; 5 TABLET ORAL at 08:20

## 2018-11-20 RX ADMIN — ASPIRIN 81 MG 81 MG: 81 TABLET ORAL at 08:20

## 2018-11-20 RX ADMIN — INSULIN LISPRO 5 UNITS: 100 INJECTION, SOLUTION INTRAVENOUS; SUBCUTANEOUS at 08:29

## 2018-11-20 RX ADMIN — PANCRELIPASE 2 CAPSULE: 60000; 12000; 38000 CAPSULE, DELAYED RELEASE PELLETS ORAL at 08:20

## 2018-11-20 RX ADMIN — COLLAGENASE SANTYL: 250 OINTMENT TOPICAL at 08:21

## 2018-11-20 RX ADMIN — Medication 10 ML: at 08:21

## 2018-11-20 ASSESSMENT — PAIN SCALES - GENERAL: PAINLEVEL_OUTOF10: 0

## 2018-11-20 NOTE — ED NOTES
Pt resting comfortably in bed, NAD noted, skin warm and dry, respirations even and easy. Family at bedside.       Verneice Kussmaul, RN  11/19/18 5677

## 2018-11-20 NOTE — CARE COORDINATION
Received dc order for today. Pt and daughter are agreeable to [facility], since pt has been a resident in past.  Received dc order. [facility] to transport pt at 1100 pm to [facility]. Observation pasarr is complete and on chart. Informed pt, daughter, nursing and acc of dc. Call report to [phone]. Later received notice that friend Kristen Alas will transport pt at 500 pm to [facility]. Ambulance has been cancelled.     Electronically signed by JOSEPHINE Ledesma on 11/20/2018 at 2:57 PM

## 2018-11-20 NOTE — ED PROVIDER NOTES
breath. Cardiovascular: Negative for chest pain. Gastrointestinal: Negative for abdominal pain, diarrhea, nausea and vomiting. Genitourinary: Negative for dysuria, flank pain, frequency and hematuria. Musculoskeletal: Negative for back pain, neck pain and neck stiffness. Skin: Positive for color change (redness) and wound (chronic on bilateral lower extremities). Negative for rash. Allergic/Immunologic: Negative for immunocompromised state. Neurological: Negative for dizziness, syncope, weakness, light-headedness, numbness and headaches. Hematological: Negative for adenopathy. Psychiatric/Behavioral: Positive for confusion. All other systems reviewed and are negative. Positives and Pertinent negatives as per HPI. Except as noted abovein the ROS, all other systems were reviewed and negative. PAST MEDICAL HISTORY     Past Medical History:   Diagnosis Date    Anemia     Atrial fibrillation ([address]     CAD (coronary artery disease)     Chronic back pain     CKD (chronic kidney disease), stage III (HCC)     Diabetes mellitus (HCC)     Diastolic CHF (HCC)     Hyperlipidemia     Hypertension     MI (myocardial infarction) ([address]     MRSA (methicillin resistant staph aureus) culture positive 04/13/2018    leg ulcer    MRSA colonization 10/16/2018    Obesity (BMI 30-39. 9)     Osteoarthritis     Pancreatitis     Sepsis ([address]     Stasis ulcer ([address]     bilateral lower extremities     Suprapubic catheter ([address]          SURGICAL HISTORY     Past Surgical History:   Procedure Laterality Date    APPENDECTOMY      BACK SURGERY      x2    CATARACT REMOVAL      bilateral     CHOLECYSTECTOMY      COLONOSCOPY      EYE SURGERY      FRACTURE SURGERY      Left ankle    HYSTERECTOMY      INSET/CHANGE LOMELI CATHETER - COMPLICATED  [id number]    suprapubic cath    JOINT REPLACEMENT      Right knee    KNEE SURGERY           CURRENTMEDICATIONS       Previous Medications    ASPIRIN 81 MG this chart in the absence of a cardiologist.  Please see their note for interpretation of EKG. RADIOLOGY:   Non-plain film images such as CT, Ultrasound and MRI are read by the radiologist. Plain radiographic images are visualized andpreliminarily interpreted by the  ED Provider with the below findings:        Interpretation perthe Radiologist below, if available at the time of this note:    XR CHEST PORTABLE   Final Result   1. No focal airspace disease. 2.  Cardiomegaly. CT Cervical Spine WO Contrast   Final Result   No acute abnormality of the cervical spine. Fluid within the upper esophagus likely relates to reflux. CT ABDOMEN PELVIS WO CONTRAST Additional Contrast? None   Final Result   No acute traumatic solid organ injury      Tiny nonobstructing renal calculi. .  Large stool load is seen. Septated nodule left adnexa. Cystic adnexal nodules were described on   outside CT report 08/31/2017      Mild injection of the fat surrounding bladder. Recommend correlation with   urinalysis to exclude cystitis         CT HEAD WO CONTRAST   Final Result   No acute traumatic intracranial abnormality      Atrophy and small-vessel ischemic change, similar to prior           No results found.       PROCEDURES   Unless otherwise noted below, none     Procedures    CRITICAL CARE TIME   N/A    CONSULTS:  IP CONSULT TO HOSPITALIST  IP CONSULT TO CASE MANAGEMENT      EMERGENCY DEPARTMENT COURSE and DIFFERENTIALDIAGNOSIS/MDM:   Vitals:    Vitals:    11/19/18 1836 11/19/18 1947   BP: 137/62 128/82   Pulse: 68 70   Resp: 18 20   Temp: 98.9 °F (37.2 °C)    TempSrc: Oral    SpO2: 94% 97%   Weight: 177 lb 0.5 oz (80.3 kg)        Patient was given thefollowing medications:  Medications - No data to display    Differential Diagnosis:    Hypoxemia/ischemic encephalopathy, hepatic encephalopathy   Seizure or postictal state   Alterations of glucose such as hypoglycemia and hyperglycemia    Alterations in perfusions such as hypotension and hypoperfusion    Alterations in electrolytes such as disturbances in sodium or calcium   Infectious processes such as sepsis from a pneumonia or urinary tract infection    Substance use or withdrawal, especially alcohol and drugs    Medication adverse event or interaction    Vitamin deficiencies such as Wernicke's encephalopathy    CNS lesion, injury, infection (CVA, subdural hematoma, meningitis, encephalitis)    Alterations in hormones such as thyroid or adrenal abnormalities    Alterations in cardiac functioning such as arrhythmia, MI or CHF    Alteration in temperature such as hyperthermia or hypothermia    Dehydration, sleep deprivation   Change in medical regimen    Alteration in lifestyle, environment, or personal relationships    Patient presents with altered mental status with family. See HPI for presentation. Physical exam as above. Patient is alert and oriented to person and place but not year. Family states that she is normally alert and oriented to person place and time. She is also having increased generalized weakness. She was neurovascularly intact and moving all extremities on my exam.  She does have venous stasis ulcers to lower extremities with possibly a small amount of cellulitis. Her abdomen is soft and nontender. Lungs CTA bilaterally. Cardiac exam is normal.  CT head and neck normal.  CT abdomen and pelvis normal.  Chest x-ray shows no focal airspace disease. At about panel shows no electrolyte abnormalities or liver dysfunction. It does show stable kidney disease with a creatinine of 1.5 and GFR of 34. CBC shows no leukocytosis but shows chronic stable anemia. Lactic acid normal.  Urine shows no infection. Patient was stable throughout her stay in the emergency department. Patient will be admitted for further workup. She lives with her daughter who is out of town at [employer] this week.   I do not feel that she is appropriate or safe for

## 2018-11-20 NOTE — PROGRESS NOTES
Objective   Vision: Impaired  Vision Exceptions: Wears glasses at all times  Hearing: Within functional limits      Orientation  Overall Orientation Status: Impaired  Orientation Level: Oriented to person;Disoriented to time;Disoriented to situation;Disoriented to place (knew month/not year)     Observation/Palpation  Observation: dressings B lower legs; strike thru blood on the L leg     Balance  Sitting Balance: Stand by assistance (seated EOB)  Standing Balance: Minimal assistance  Standing Balance  Functional Mobility  Functional - Mobility Device: Rolling Walker  Assist Level: Minimal assistance  Functional Mobility Comments: Pt completed fxl mobility ~3-4 steps bed-chair with RW and Min A. Pt slow, needs assist to manage walker around turns    ADL  Feeding: Beverage management;Setup;Supervision  LE Dressing: Dependent/Total;Maximum assistance (assist to don gama socks. Anticipate max A/total A for LB dressing)  Additional Comments: Anticipate pt is CGA grooming, min A UB dressing/bathing, max A LB bathing, toileting based on ROM/strength, balance, endurance, cognition. Bed mobility  Supine to Sit: Maximum assistance (HOB elevated, use of rail, increased time/effort)  Sit to Supine: Unable to assess (pt seated in recliner at end of session)  Scooting: Maximal assistance (max A x2 to scoot back in chair)     Transfers  Sit to stand: Moderate assistance (min/mod A x1 EOB>RW, recliner>RW +Verbal cues for hand placement)  Stand to sit: Minimal assistance (RW>recliner +Verbal cues for hand placement)  Comment: Waffle cushion placed in recliner     Vision - Basic Assessment  Prior Vision: Wears glasses all the time     Cognition  Overall Cognitive Status: Exceptions  Arousal/Alertness: Delayed responses to stimuli  Following Commands: Follows one step commands with increased time  Attention Span: Difficulty attending to directions; Attends with cues to redirect  Memory: Decreased short term memory;Decreased Tolerance  Activity Tolerance: Patient limited by fatigue;Patient limited by pain;Treatment limited secondary to decreased cognition  Safety Devices  Safety Devices in place: Yes  Type of devices: Call light within reach; Chair alarm in place; Patient at risk for falls;Gait belt;Nurse notified; Left in chair (KYAW Barnhart notified)         Plan   Plan  Times per week: 3-5  Times per day: Daily  Current Treatment Recommendations: Strengthening, Balance Training, Endurance Training, Functional Mobility Training, Safety Education & Training, Self-Care / ADL, Cognitive Reorientation, Cognitive/Perceptual Training, Pain Management, Patient/Caregiver Education & Training, Home Management Training    G-Code  OT G-codes  Functional Assessment Tool Used: AM-PAC  Score: 16  Functional Limitation: Self care  Self Care Current Status (): At least 40 percent but less than 60 percent impaired, limited or restricted  Self Care Goal Status (): At least 20 percent but less than 40 percent impaired, limited or restricted  OutComes Score    How much help for putting on and taking off regular lower body clothing?: A Lot  How much help for Bathing?: A Lot  How much help for Toileting?: A Lot  How much help for putting on and taking off regular upper body clothing?: A Little  How much help for taking care of personal grooming?: A Little  How much help for eating meals?: None  AM-[facility] Inpatient Daily Activity Raw Score: 16  AM-PAC Inpatient ADL T-Scale Score : 35.96  ADL Inpatient CMS 0-100% Score: 53.32  ADL Inpatient CMS G-Code Modifier : CK                                        AM-PAC Score        AM-[facility] Inpatient Daily Activity Raw Score: 16  AM-PAC Inpatient ADL T-Scale Score : 35.96  ADL Inpatient CMS 0-100% Score: 53.32  ADL Inpatient CMS G-Code Modifier : CK    Goals  Short term goals  Time Frame for Short term goals: Prior to d/c:  Short term goal 1: Pt will bathe with min A using A/E and DME prn.   Short term goal 2: Pt will dress with min A using A/E prn. Short term goal 3: Pt will toilet with CGA/min A. Short term goal 4: Pt will complete fxl mobility and fxl transfers to/from ADL surfaces with SBA/CGA using AD. Short term goal 5: Pt will tolerate 4-6 minutes of standing activity for ADLs with SBA for balance. Short term goal 6: Further assess pt's cognitive status with efforts to improve pt's orientation and safety awareness. Long term goals  Time Frame for Long term goals : STG=LTG  Patient Goals   Patient goals : pt unable to verbalize personal therapy goal d/t decreased cognition. Therapy Time   Individual Concurrent Group Co-treatment   Time In 4049         Time Out 1044         Minutes 30         Timed Code Treatment Minutes: 15 Minutes     This note to serve as OT d/c summary if pt is d/c-ed prior to next therapy session.     Trina Gomez, OTR/L [id number]

## 2018-11-20 NOTE — DISCHARGE SUMMARY
500 MG tablet Take 500 mg by mouth daily      venlafaxine (EFFEXOR) 75 MG tablet Take 150 mg by mouth 2 times daily       Timolol (TIMOPTIC) 0.5 % (DAILY) SOLN ophthalmic solution Place 1 drop into the right eye 2 times daily       sennosides-docusate sodium (SENOKOT-S) 8.6-50 MG tablet Take 1 tablet by mouth daily      aspirin 81 MG chewable tablet Take 4 tablets by mouth daily      atorvastatin (LIPITOR) 10 MG tablet Take 1 tablet by mouth daily  Qty: 30 tablet, Refills: 0      metoprolol tartrate (LOPRESSOR) 25 MG tablet Take 1 tablet by mouth 2 times daily  Qty: 60 tablet, Refills: 0      furosemide (LASIX) 40 MG tablet Take 0.5 tablets by mouth daily  Qty: 60 tablet, Refills: 0      Biotin (BIOTIN 5000) 5 MG CAPS Take 1 tablet by mouth daily      Calcium Carbonate Antacid (TUMS ULTRA 1000) 1000 MG CHEW Take 1 tablet by mouth daily as needed (acid reflux)      lipase-protease-amylase (CREON) 07638 units delayed release capsule Take 24,000 Units by mouth 3 times daily (with meals)      gabapentin (NEURONTIN) 300 MG capsule Take 300 mg by mouth daily At noon      isosorbide mononitrate (IMDUR) 30 MG extended release tablet Take 30 mg by mouth daily      polyethylene glycol (GLYCOLAX) packet Take 17 g by mouth daily      cetirizine (ZYRTEC) 10 MG tablet Take 10 mg by mouth daily      Cholecalciferol (VITAMIN D3) 2000 units CAPS Take 2,000 Units by mouth daily             The patient was seen and examined on day of discharge and this discharge summary is in conjunction with any daily progress note from day of discharge. Hospital Course: The patient is a 74year old,  female, who was brought to [facility] ER by EMS after the patient's daughter noticed episodes of confusion. The patient apparently fell out of her chair the date prior to admission. She hit her head but did not lose consciousness. The patient's daughter noted that the date of admission, she was confused and disoriented.  Per chart review, the

## 2018-11-20 NOTE — DISCHARGE INSTR - COC
colonization Z22.322    Chronic diastolic heart failure ([facility]) I50.32    Coronary artery disease involving native coronary artery of native heart without angina pectoris I25.10    Aortic atherosclerosis ([facility]) I70.0    Essential hypertension I10    Cellulitis, lower extremities bilaterally L03.90    MRSA nasal colonization Z22.322    Normocytic anemia D64.9    Altered mental status R41.82       Isolation/Infection:   Isolation          Contact        Patient Infection Status     Infection Encounter Level? Added Added By Resolved Resolved By Review Date Onset Date    MRSA No 04/16/18 Bhavik Hyde RN        Nares colonization 10/15/2018          Nurse Assessment:  Last Vital Signs: /60   Pulse 75   Temp 98.5 °F (36.9 °C) (Oral)   Resp 16   Ht 4' 11\" (1.499 m)   Wt 157 lb 6.5 oz (71.4 kg)   SpO2 95%   BMI 31.79 kg/m²     Last documented pain score (0-10 scale): Pain Level: 0  Last Weight:   Wt Readings from Last 1 Encounters:   11/20/18 157 lb 6.5 oz (71.4 kg)     Mental Status:  oriented to self and time, disoriented to place and situation at times    IV Access:  - None    Nursing Mobility/ADLs:  Walking   Assisted  Transfer  Assisted  Bathing  Assisted  Dressing  Assisted  Toileting  Assisted  Feeding  Independent  Med Admin  Assisted  Med Delivery   whole    Wound Care Documentation and Therapy:  Wound 11/19/18 Venous ulcer Leg Left; Anterior (Active)   Wound Type Wound 11/19/2018 10:00 PM   Wound Unstageable 11/19/2018 10:00 PM   Dressing Status Changed 11/19/2018 10:00 PM   Dressing Changed Changed/New 11/19/2018 10:00 PM   Dressing/Treatment Vaseline gauze;4x4;Other (Comment) 11/19/2018 10:00 PM   Wound Cleansed Not Cleansed 11/19/2018 10:00 PM   Culture Taken No 11/19/2018 10:00 PM   Number of days: 0       Wound 04/12/18 Venous ulcer Leg Right; Anterior (Active)   Number of days: 221       Wound 04/12/18 Buttocks Medial;Left (Active)   Number of days: 221       Wound 04/12/18 Buttocks Left;Medial (Active)   Number of days: 221       Wound 04/12/18 Buttocks Right;Medial (Active)   Number of days: 221       Wound 10/15/18 Leg Left; Lower (Active)   Wound Image   10/22/2018 12:06 PM   Wound Type Wound 10/23/2018  9:52 AM   Wound Venous 10/23/2018  9:52 AM   Dressing Status Clean;Dry; Intact 10/23/2018  9:52 AM   Dressing Changed Changed/New 10/22/2018 12:29 PM   Dressing/Treatment Dry dressing; Ace Wrap;Non adherent 10/23/2018  9:52 AM   Wound Cleansed Rinsed/Irrigated with saline 10/22/2018 12:29 PM   Dressing Change Due 10/23/18 10/23/2018  9:52 AM   Wound Length (cm) 11.5 cm 10/22/2018 12:06 PM   Wound Width (cm) 5.5 cm 10/22/2018 12:06 PM   Wound Depth (cm)  0.1 10/22/2018 12:06 PM   Calculated Wound Size (cm^2) (l*w) 63.25 cm^2 10/22/2018 12:06 PM   Change in Wound Size % (l*w) -0.4 10/22/2018 12:06 PM   Wound Assessment Clean;Drainage;Fragile; Red 10/22/2018 12:06 PM   Drainage Amount Small 10/22/2018 12:06 PM   Drainage Description Serosanguinous 10/22/2018 12:06 PM   Odor None 10/22/2018 12:06 PM   Marilynn-wound Assessment Dry; Intact 10/22/2018 12:06 PM   Non-staged Wound Description Partial thickness 10/22/2018 12:06 PM   Red%Wound Bed 100 10/22/2018 12:06 PM   Culture Taken No 10/23/2018  9:52 AM   Number of days: 35       Wound 10/15/18 Leg Right;Medial;Lower (Active)   Wound Image   10/22/2018 12:06 PM   Wound Type Wound 10/23/2018  9:52 AM   Wound Venous 10/23/2018  9:52 AM   Dressing Status Clean;Dry; Intact 10/23/2018  9:52 AM   Dressing Changed Changed/New 10/22/2018 12:29 PM   Dressing/Treatment Non adherent;Dry dressing; Ace Wrap 10/23/2018  9:52 AM   Wound Cleansed Rinsed/Irrigated with saline 10/22/2018 12:06 PM   Dressing Change Due 10/23/18 10/23/2018  9:52 AM   Wound Length (cm) 3 cm 10/22/2018 12:06 PM   Wound Width (cm) 0.5 cm 10/22/2018 12:06 PM   Wound Depth (cm)  0.1 10/22/2018 12:06 PM   Calculated Wound Size (cm^2) (l*w) 1.5 cm^2 10/22/2018 12:06 PM   Change in Wound Size %

## 2018-11-21 PROCEDURE — 93010 ELECTROCARDIOGRAM REPORT: CPT | Performed by: INTERNAL MEDICINE

## 2018-11-23 LAB
EKG ATRIAL RATE: 66 BPM
EKG DIAGNOSIS: NORMAL
EKG P AXIS: 57 DEGREES
EKG P-R INTERVAL: 188 MS
EKG Q-T INTERVAL: 428 MS
EKG QRS DURATION: 102 MS
EKG QTC CALCULATION (BAZETT): 448 MS
EKG R AXIS: 12 DEGREES
EKG T AXIS: 55 DEGREES
EKG VENTRICULAR RATE: 66 BPM

## 2019-05-04 ENCOUNTER — HOSPITAL ENCOUNTER (OUTPATIENT)
Age: 74
Setting detail: OBSERVATION
Discharge: HOME HEALTH | End: 2019-05-07
Attending: EMERGENCY MEDICINE | Admitting: INTERNAL MEDICINE
Payer: MEDICARE

## 2019-05-04 ENCOUNTER — APPOINTMENT (OUTPATIENT)
Dept: GENERAL RADIOLOGY | Age: 74
End: 2019-05-04
Payer: MEDICARE

## 2019-05-04 DIAGNOSIS — N30.00 ACUTE CYSTITIS WITHOUT HEMATURIA: Primary | ICD-10-CM

## 2019-05-04 DIAGNOSIS — R73.9 HYPERGLYCEMIA: ICD-10-CM

## 2019-05-04 DIAGNOSIS — E87.20 LACTIC ACIDOSIS: ICD-10-CM

## 2019-05-04 PROBLEM — R79.89 ELEVATED LACTIC ACID LEVEL: Status: ACTIVE | Noted: 2019-05-04

## 2019-05-04 LAB
A/G RATIO: 0.8 (ref 1.1–2.2)
ALBUMIN SERPL-MCNC: 3.1 G/DL (ref 3.4–5)
ALP BLD-CCNC: 154 U/L (ref 40–129)
ALT SERPL-CCNC: 8 U/L (ref 10–40)
ANION GAP SERPL CALCULATED.3IONS-SCNC: 13 MMOL/L (ref 3–16)
AST SERPL-CCNC: 11 U/L (ref 15–37)
BACTERIA: ABNORMAL /HPF
BASE EXCESS VENOUS: -5.8 MMOL/L
BETA-HYDROXYBUTYRATE: 0.06 MMOL/L (ref 0–0.27)
BILIRUB SERPL-MCNC: <0.2 MG/DL (ref 0–1)
BILIRUBIN URINE: NEGATIVE
BLOOD, URINE: ABNORMAL
BUN BLDV-MCNC: 25 MG/DL (ref 7–20)
CALCIUM SERPL-MCNC: 8.1 MG/DL (ref 8.3–10.6)
CARBOXYHEMOGLOBIN: 2.4 %
CHLORIDE BLD-SCNC: 99 MMOL/L (ref 99–110)
CLARITY: ABNORMAL
CO2: 22 MMOL/L (ref 21–32)
COLOR: YELLOW
CREAT SERPL-MCNC: 1.4 MG/DL (ref 0.6–1.2)
EPITHELIAL CELLS, UA: 0 /HPF (ref 0–5)
GFR AFRICAN AMERICAN: 44
GFR NON-AFRICAN AMERICAN: 37
GLOBULIN: 4 G/DL
GLUCOSE BLD-MCNC: 374 MG/DL (ref 70–99)
GLUCOSE BLD-MCNC: 498 MG/DL (ref 70–99)
GLUCOSE URINE: >=1000 MG/DL
HCO3 VENOUS: 21 MMOL/L (ref 23–29)
HCT VFR BLD CALC: 24.8 % (ref 36–48)
HEMOGLOBIN: 8.4 G/DL (ref 12–16)
HYALINE CASTS: 2 /LPF (ref 0–8)
KETONES, URINE: NEGATIVE MG/DL
LACTIC ACID: 2.7 MMOL/L (ref 0.4–2)
LEUKOCYTE ESTERASE, URINE: ABNORMAL
LIPASE: <3 U/L (ref 13–60)
MCH RBC QN AUTO: 33.6 PG (ref 26–34)
MCHC RBC AUTO-ENTMCNC: 34 G/DL (ref 31–36)
MCV RBC AUTO: 99 FL (ref 80–100)
METHEMOGLOBIN VENOUS: 1.4 %
MICROSCOPIC EXAMINATION: YES
NITRITE, URINE: POSITIVE
O2 CONTENT, VEN: 8 ML/DL
O2 SAT, VEN: 67 %
O2 THERAPY: ABNORMAL
PCO2, VEN: 52.3 MMHG (ref 40–50)
PDW BLD-RTO: 13.2 % (ref 12.4–15.4)
PERFORMED ON: ABNORMAL
PH UA: 5 (ref 5–8)
PH VENOUS: 7.23 (ref 7.35–7.45)
PLATELET # BLD: 222 K/UL (ref 135–450)
PMV BLD AUTO: 8.5 FL (ref 5–10.5)
PO2, VEN: 37 MMHG
POTASSIUM REFLEX MAGNESIUM: 3.8 MMOL/L (ref 3.5–5.1)
PROTEIN UA: NEGATIVE MG/DL
RBC # BLD: 2.51 M/UL (ref 4–5.2)
RBC UA: 2 /HPF (ref 0–4)
SODIUM BLD-SCNC: 134 MMOL/L (ref 136–145)
SPECIFIC GRAVITY UA: 1.02 (ref 1–1.03)
TCO2 CALC VENOUS: 23 MMOL/L
TOTAL PROTEIN: 7.1 G/DL (ref 6.4–8.2)
TROPONIN: 0.02 NG/ML
URINE REFLEX TO CULTURE: YES
URINE TYPE: ABNORMAL
UROBILINOGEN, URINE: 0.2 E.U./DL
WBC # BLD: 7.4 K/UL (ref 4–11)
WBC UA: 35 /HPF (ref 0–5)
YEAST: PRESENT /HPF

## 2019-05-04 PROCEDURE — 87086 URINE CULTURE/COLONY COUNT: CPT

## 2019-05-04 PROCEDURE — 83605 ASSAY OF LACTIC ACID: CPT

## 2019-05-04 PROCEDURE — 85027 COMPLETE CBC AUTOMATED: CPT

## 2019-05-04 PROCEDURE — 84484 ASSAY OF TROPONIN QUANT: CPT

## 2019-05-04 PROCEDURE — 96365 THER/PROPH/DIAG IV INF INIT: CPT

## 2019-05-04 PROCEDURE — 87077 CULTURE AEROBIC IDENTIFY: CPT

## 2019-05-04 PROCEDURE — 96361 HYDRATE IV INFUSION ADD-ON: CPT

## 2019-05-04 PROCEDURE — 80053 COMPREHEN METABOLIC PANEL: CPT

## 2019-05-04 PROCEDURE — G0378 HOSPITAL OBSERVATION PER HR: HCPCS

## 2019-05-04 PROCEDURE — 93005 ELECTROCARDIOGRAM TRACING: CPT | Performed by: EMERGENCY MEDICINE

## 2019-05-04 PROCEDURE — 81001 URINALYSIS AUTO W/SCOPE: CPT

## 2019-05-04 PROCEDURE — 82803 BLOOD GASES ANY COMBINATION: CPT

## 2019-05-04 PROCEDURE — 6370000000 HC RX 637 (ALT 250 FOR IP): Performed by: EMERGENCY MEDICINE

## 2019-05-04 PROCEDURE — 83690 ASSAY OF LIPASE: CPT

## 2019-05-04 PROCEDURE — 87186 SC STD MICRODIL/AGAR DIL: CPT

## 2019-05-04 PROCEDURE — 99285 EMERGENCY DEPT VISIT HI MDM: CPT

## 2019-05-04 PROCEDURE — 6360000002 HC RX W HCPCS: Performed by: EMERGENCY MEDICINE

## 2019-05-04 PROCEDURE — 71046 X-RAY EXAM CHEST 2 VIEWS: CPT

## 2019-05-04 PROCEDURE — 82010 KETONE BODYS QUAN: CPT

## 2019-05-04 PROCEDURE — 2580000003 HC RX 258: Performed by: EMERGENCY MEDICINE

## 2019-05-04 PROCEDURE — 36415 COLL VENOUS BLD VENIPUNCTURE: CPT

## 2019-05-04 RX ORDER — FERROUS SULFATE TAB EC 324 MG (65 MG FE EQUIVALENT) 324 (65 FE) MG
324 TABLET DELAYED RESPONSE ORAL
Status: DISCONTINUED | OUTPATIENT
Start: 2019-05-05 | End: 2019-05-07 | Stop reason: HOSPADM

## 2019-05-04 RX ORDER — DOXYCYCLINE HYCLATE 100 MG/1
100 CAPSULE ORAL 2 TIMES DAILY
COMMUNITY
End: 2019-05-23

## 2019-05-04 RX ORDER — TIMOLOL MALEATE 5 MG/ML
1 SOLUTION/ DROPS OPHTHALMIC 2 TIMES DAILY
Status: DISCONTINUED | OUTPATIENT
Start: 2019-05-05 | End: 2019-05-07 | Stop reason: HOSPADM

## 2019-05-04 RX ORDER — ATORVASTATIN CALCIUM 10 MG/1
10 TABLET, FILM COATED ORAL DAILY
Status: DISCONTINUED | OUTPATIENT
Start: 2019-05-05 | End: 2019-05-07 | Stop reason: HOSPADM

## 2019-05-04 RX ORDER — TRAMADOL HYDROCHLORIDE 50 MG/1
50 TABLET ORAL ONCE
Status: COMPLETED | OUTPATIENT
Start: 2019-05-04 | End: 2019-05-04

## 2019-05-04 RX ORDER — DEXTROSE MONOHYDRATE 50 MG/ML
100 INJECTION, SOLUTION INTRAVENOUS PRN
Status: DISCONTINUED | OUTPATIENT
Start: 2019-05-04 | End: 2019-05-07 | Stop reason: HOSPADM

## 2019-05-04 RX ORDER — ASPIRIN 325 MG
325 TABLET ORAL DAILY
Status: DISCONTINUED | OUTPATIENT
Start: 2019-05-05 | End: 2019-05-07 | Stop reason: HOSPADM

## 2019-05-04 RX ORDER — VENLAFAXINE 37.5 MG/1
150 TABLET ORAL 2 TIMES DAILY
Status: DISCONTINUED | OUTPATIENT
Start: 2019-05-05 | End: 2019-05-07 | Stop reason: HOSPADM

## 2019-05-04 RX ORDER — INSULIN GLARGINE 100 [IU]/ML
10 INJECTION, SOLUTION SUBCUTANEOUS NIGHTLY
Status: DISCONTINUED | OUTPATIENT
Start: 2019-05-05 | End: 2019-05-07

## 2019-05-04 RX ORDER — GABAPENTIN 300 MG/1
300 CAPSULE ORAL DAILY
Status: DISCONTINUED | OUTPATIENT
Start: 2019-05-05 | End: 2019-05-07 | Stop reason: HOSPADM

## 2019-05-04 RX ORDER — DIPHENHYDRAMINE HYDROCHLORIDE 25 MG/1
1 TABLET ORAL DAILY
Status: DISCONTINUED | OUTPATIENT
Start: 2019-05-05 | End: 2019-05-05

## 2019-05-04 RX ORDER — ISOSORBIDE MONONITRATE 30 MG/1
30 TABLET, EXTENDED RELEASE ORAL DAILY
Status: DISCONTINUED | OUTPATIENT
Start: 2019-05-05 | End: 2019-05-07 | Stop reason: HOSPADM

## 2019-05-04 RX ORDER — 0.9 % SODIUM CHLORIDE 0.9 %
1000 INTRAVENOUS SOLUTION INTRAVENOUS ONCE
Status: COMPLETED | OUTPATIENT
Start: 2019-05-04 | End: 2019-05-04

## 2019-05-04 RX ORDER — NICOTINE POLACRILEX 4 MG
15 LOZENGE BUCCAL PRN
Status: DISCONTINUED | OUTPATIENT
Start: 2019-05-04 | End: 2019-05-07 | Stop reason: HOSPADM

## 2019-05-04 RX ORDER — DEXTROSE MONOHYDRATE 25 G/50ML
12.5 INJECTION, SOLUTION INTRAVENOUS PRN
Status: DISCONTINUED | OUTPATIENT
Start: 2019-05-04 | End: 2019-05-07 | Stop reason: HOSPADM

## 2019-05-04 RX ORDER — DOXYCYCLINE HYCLATE 100 MG
100 TABLET ORAL 2 TIMES DAILY
Status: DISCONTINUED | OUTPATIENT
Start: 2019-05-05 | End: 2019-05-05

## 2019-05-04 RX ORDER — LORAZEPAM 0.5 MG/1
0.5 TABLET ORAL NIGHTLY
Status: DISCONTINUED | OUTPATIENT
Start: 2019-05-05 | End: 2019-05-07 | Stop reason: HOSPADM

## 2019-05-04 RX ORDER — RANITIDINE 150 MG/1
150 TABLET ORAL 2 TIMES DAILY
Status: DISCONTINUED | OUTPATIENT
Start: 2019-05-05 | End: 2019-05-05 | Stop reason: CLARIF

## 2019-05-04 RX ORDER — FERROUS SULFATE 325(65) MG
325 TABLET ORAL 2 TIMES DAILY WITH MEALS
COMMUNITY
End: 2020-09-22

## 2019-05-04 RX ADMIN — TRAMADOL HYDROCHLORIDE 50 MG: 50 TABLET, FILM COATED ORAL at 22:00

## 2019-05-04 RX ADMIN — SODIUM CHLORIDE 1000 ML: 9 INJECTION, SOLUTION INTRAVENOUS at 22:00

## 2019-05-04 RX ADMIN — PIPERACILLIN SODIUM AND TAZOBACTAM SODIUM 3.38 G: 3; .375 INJECTION, POWDER, FOR SOLUTION INTRAVENOUS at 22:45

## 2019-05-04 ASSESSMENT — PAIN DESCRIPTION - PROGRESSION
CLINICAL_PROGRESSION: GRADUALLY WORSENING
CLINICAL_PROGRESSION: GRADUALLY IMPROVING

## 2019-05-04 ASSESSMENT — ENCOUNTER SYMPTOMS
TROUBLE SWALLOWING: 0
SHORTNESS OF BREATH: 0
VOMITING: 0
COLOR CHANGE: 0
ABDOMINAL PAIN: 0
PHOTOPHOBIA: 0
COUGH: 0

## 2019-05-04 ASSESSMENT — PAIN DESCRIPTION - LOCATION
LOCATION: FOOT
LOCATION: FOOT;LEG

## 2019-05-04 ASSESSMENT — PAIN DESCRIPTION - DESCRIPTORS
DESCRIPTORS: BURNING;PINS AND NEEDLES
DESCRIPTORS: BURNING

## 2019-05-04 ASSESSMENT — PAIN DESCRIPTION - ORIENTATION: ORIENTATION: RIGHT;LEFT

## 2019-05-04 ASSESSMENT — PAIN SCALES - GENERAL
PAINLEVEL_OUTOF10: 4
PAINLEVEL_OUTOF10: 8
PAINLEVEL_OUTOF10: 10

## 2019-05-04 ASSESSMENT — PAIN DESCRIPTION - PAIN TYPE
TYPE: ACUTE PAIN
TYPE: ACUTE PAIN

## 2019-05-04 ASSESSMENT — PAIN DESCRIPTION - ONSET: ONSET: AWAKENED FROM SLEEP

## 2019-05-04 ASSESSMENT — PAIN DESCRIPTION - FREQUENCY
FREQUENCY: CONTINUOUS
FREQUENCY: CONTINUOUS

## 2019-05-05 LAB
ESTIMATED AVERAGE GLUCOSE: 134.1 MG/DL
GLUCOSE BLD-MCNC: 179 MG/DL (ref 70–99)
GLUCOSE BLD-MCNC: 197 MG/DL (ref 70–99)
GLUCOSE BLD-MCNC: 322 MG/DL (ref 70–99)
GLUCOSE BLD-MCNC: 332 MG/DL (ref 70–99)
GLUCOSE BLD-MCNC: 349 MG/DL (ref 70–99)
HBA1C MFR BLD: 6.3 %
LACTIC ACID: 1.9 MMOL/L (ref 0.4–2)
PERFORMED ON: ABNORMAL
TROPONIN: <0.01 NG/ML

## 2019-05-05 PROCEDURE — 6370000000 HC RX 637 (ALT 250 FOR IP): Performed by: INTERNAL MEDICINE

## 2019-05-05 PROCEDURE — 2580000003 HC RX 258: Performed by: INTERNAL MEDICINE

## 2019-05-05 PROCEDURE — 83036 HEMOGLOBIN GLYCOSYLATED A1C: CPT

## 2019-05-05 PROCEDURE — 36415 COLL VENOUS BLD VENIPUNCTURE: CPT

## 2019-05-05 PROCEDURE — 96367 TX/PROPH/DG ADDL SEQ IV INF: CPT

## 2019-05-05 PROCEDURE — 6370000000 HC RX 637 (ALT 250 FOR IP): Performed by: PHARMACIST

## 2019-05-05 PROCEDURE — 6360000002 HC RX W HCPCS: Performed by: INTERNAL MEDICINE

## 2019-05-05 PROCEDURE — 93010 ELECTROCARDIOGRAM REPORT: CPT | Performed by: INTERNAL MEDICINE

## 2019-05-05 PROCEDURE — 84484 ASSAY OF TROPONIN QUANT: CPT

## 2019-05-05 PROCEDURE — 2500000003 HC RX 250 WO HCPCS: Performed by: INTERNAL MEDICINE

## 2019-05-05 PROCEDURE — 96372 THER/PROPH/DIAG INJ SC/IM: CPT

## 2019-05-05 PROCEDURE — G0378 HOSPITAL OBSERVATION PER HR: HCPCS

## 2019-05-05 PROCEDURE — 83605 ASSAY OF LACTIC ACID: CPT

## 2019-05-05 RX ORDER — HEPARIN SODIUM 5000 [USP'U]/ML
5000 INJECTION, SOLUTION INTRAVENOUS; SUBCUTANEOUS EVERY 8 HOURS SCHEDULED
Status: DISCONTINUED | OUTPATIENT
Start: 2019-05-05 | End: 2019-05-07 | Stop reason: HOSPADM

## 2019-05-05 RX ORDER — FAMOTIDINE 20 MG/1
20 TABLET, FILM COATED ORAL DAILY
Status: DISCONTINUED | OUTPATIENT
Start: 2019-05-05 | End: 2019-05-07 | Stop reason: HOSPADM

## 2019-05-05 RX ORDER — TRAMADOL HYDROCHLORIDE 50 MG/1
50 TABLET ORAL EVERY 6 HOURS PRN
Status: DISCONTINUED | OUTPATIENT
Start: 2019-05-05 | End: 2019-05-07 | Stop reason: HOSPADM

## 2019-05-05 RX ORDER — DOXYCYCLINE HYCLATE 100 MG
100 TABLET ORAL 2 TIMES DAILY
Status: COMPLETED | OUTPATIENT
Start: 2019-05-05 | End: 2019-05-06

## 2019-05-05 RX ORDER — HYDROCODONE BITARTRATE AND ACETAMINOPHEN 5; 325 MG/1; MG/1
1 TABLET ORAL EVERY 6 HOURS PRN
Status: DISCONTINUED | OUTPATIENT
Start: 2019-05-05 | End: 2019-05-05

## 2019-05-05 RX ADMIN — INSULIN LISPRO 2 UNITS: 100 INJECTION, SOLUTION INTRAVENOUS; SUBCUTANEOUS at 18:38

## 2019-05-05 RX ADMIN — INSULIN GLARGINE 10 UNITS: 100 INJECTION, SOLUTION SUBCUTANEOUS at 01:41

## 2019-05-05 RX ADMIN — METOPROLOL TARTRATE 25 MG: 25 TABLET ORAL at 01:40

## 2019-05-05 RX ADMIN — INSULIN LISPRO 5 UNITS: 100 INJECTION, SOLUTION INTRAVENOUS; SUBCUTANEOUS at 13:26

## 2019-05-05 RX ADMIN — INSULIN LISPRO 5 UNITS: 100 INJECTION, SOLUTION INTRAVENOUS; SUBCUTANEOUS at 18:37

## 2019-05-05 RX ADMIN — INSULIN LISPRO 8 UNITS: 100 INJECTION, SOLUTION INTRAVENOUS; SUBCUTANEOUS at 10:07

## 2019-05-05 RX ADMIN — ATORVASTATIN CALCIUM 10 MG: 10 TABLET, FILM COATED ORAL at 10:06

## 2019-05-05 RX ADMIN — LORAZEPAM 0.5 MG: 0.5 TABLET ORAL at 22:21

## 2019-05-05 RX ADMIN — ASPIRIN 325 MG ORAL TABLET 325 MG: 325 PILL ORAL at 10:06

## 2019-05-05 RX ADMIN — PANCRELIPASE 2 CAPSULE: 60000; 12000; 38000 CAPSULE, DELAYED RELEASE PELLETS ORAL at 17:04

## 2019-05-05 RX ADMIN — ISOSORBIDE MONONITRATE 30 MG: 30 TABLET, EXTENDED RELEASE ORAL at 10:06

## 2019-05-05 RX ADMIN — INSULIN LISPRO 2 UNITS: 100 INJECTION, SOLUTION INTRAVENOUS; SUBCUTANEOUS at 22:21

## 2019-05-05 RX ADMIN — METOPROLOL TARTRATE 25 MG: 25 TABLET ORAL at 22:20

## 2019-05-05 RX ADMIN — MICONAZOLE NITRATE: 20 POWDER TOPICAL at 22:19

## 2019-05-05 RX ADMIN — HEPARIN SODIUM 5000 UNITS: 5000 INJECTION INTRAVENOUS; SUBCUTANEOUS at 17:04

## 2019-05-05 RX ADMIN — FERROUS SULFATE TAB EC 324 MG (65 MG FE EQUIVALENT) 324 MG: 324 (65 FE) TABLET DELAYED RESPONSE at 10:06

## 2019-05-05 RX ADMIN — TIMOLOL MALEATE 1 DROP: 5 SOLUTION OPHTHALMIC at 22:20

## 2019-05-05 RX ADMIN — FAMOTIDINE 20 MG: 20 TABLET ORAL at 10:06

## 2019-05-05 RX ADMIN — HEPARIN SODIUM 5000 UNITS: 5000 INJECTION INTRAVENOUS; SUBCUTANEOUS at 22:21

## 2019-05-05 RX ADMIN — INSULIN LISPRO 8 UNITS: 100 INJECTION, SOLUTION INTRAVENOUS; SUBCUTANEOUS at 13:29

## 2019-05-05 RX ADMIN — VENLAFAXINE 150 MG: 37.5 TABLET ORAL at 22:20

## 2019-05-05 RX ADMIN — HYDROCODONE BITARTRATE AND ACETAMINOPHEN 1 TABLET: 5; 325 TABLET ORAL at 12:26

## 2019-05-05 RX ADMIN — DOXYCYCLINE HYCLATE 100 MG: 100 TABLET, COATED ORAL at 22:19

## 2019-05-05 RX ADMIN — GABAPENTIN 300 MG: 300 CAPSULE ORAL at 12:26

## 2019-05-05 RX ADMIN — VENLAFAXINE 150 MG: 37.5 TABLET ORAL at 10:06

## 2019-05-05 RX ADMIN — LORAZEPAM 0.5 MG: 0.5 TABLET ORAL at 01:40

## 2019-05-05 RX ADMIN — INSULIN LISPRO 5 UNITS: 100 INJECTION, SOLUTION INTRAVENOUS; SUBCUTANEOUS at 10:08

## 2019-05-05 RX ADMIN — TIMOLOL MALEATE 1 DROP: 5 SOLUTION OPHTHALMIC at 10:07

## 2019-05-05 RX ADMIN — INSULIN LISPRO 4 UNITS: 100 INJECTION, SOLUTION INTRAVENOUS; SUBCUTANEOUS at 01:41

## 2019-05-05 RX ADMIN — INSULIN GLARGINE 10 UNITS: 100 INJECTION, SOLUTION SUBCUTANEOUS at 22:21

## 2019-05-05 RX ADMIN — CEFTRIAXONE 1 G: 1 INJECTION, POWDER, FOR SOLUTION INTRAMUSCULAR; INTRAVENOUS at 01:40

## 2019-05-05 RX ADMIN — METOPROLOL TARTRATE 25 MG: 25 TABLET ORAL at 10:06

## 2019-05-05 RX ADMIN — MICONAZOLE NITRATE: 20 POWDER TOPICAL at 13:27

## 2019-05-05 RX ADMIN — DOXYCYCLINE HYCLATE 100 MG: 100 TABLET, COATED ORAL at 10:06

## 2019-05-05 RX ADMIN — PANCRELIPASE 2 CAPSULE: 60000; 12000; 38000 CAPSULE, DELAYED RELEASE PELLETS ORAL at 10:06

## 2019-05-05 RX ADMIN — VENLAFAXINE 150 MG: 37.5 TABLET ORAL at 01:40

## 2019-05-05 RX ADMIN — PANCRELIPASE 2 CAPSULE: 60000; 12000; 38000 CAPSULE, DELAYED RELEASE PELLETS ORAL at 12:26

## 2019-05-05 ASSESSMENT — PAIN DESCRIPTION - PAIN TYPE
TYPE: ACUTE PAIN

## 2019-05-05 ASSESSMENT — PAIN SCALES - GENERAL
PAINLEVEL_OUTOF10: 0
PAINLEVEL_OUTOF10: 9
PAINLEVEL_OUTOF10: 0
PAINLEVEL_OUTOF10: 7
PAINLEVEL_OUTOF10: 0
PAINLEVEL_OUTOF10: 6

## 2019-05-05 ASSESSMENT — PAIN DESCRIPTION - DESCRIPTORS
DESCRIPTORS: THROBBING
DESCRIPTORS: ACHING;BURNING
DESCRIPTORS: THROBBING

## 2019-05-05 ASSESSMENT — PAIN DESCRIPTION - FREQUENCY
FREQUENCY: CONTINUOUS

## 2019-05-05 ASSESSMENT — PAIN DESCRIPTION - LOCATION
LOCATION: LEG
LOCATION: FOOT;LEG
LOCATION: LEG

## 2019-05-05 ASSESSMENT — PAIN DESCRIPTION - PROGRESSION
CLINICAL_PROGRESSION: GRADUALLY WORSENING
CLINICAL_PROGRESSION: GRADUALLY IMPROVING
CLINICAL_PROGRESSION: NOT CHANGED

## 2019-05-05 ASSESSMENT — PAIN - FUNCTIONAL ASSESSMENT
PAIN_FUNCTIONAL_ASSESSMENT: ACTIVITIES ARE NOT PREVENTED
PAIN_FUNCTIONAL_ASSESSMENT: PREVENTS OR INTERFERES SOME ACTIVE ACTIVITIES AND ADLS
PAIN_FUNCTIONAL_ASSESSMENT: PREVENTS OR INTERFERES SOME ACTIVE ACTIVITIES AND ADLS

## 2019-05-05 ASSESSMENT — PAIN DESCRIPTION - ORIENTATION
ORIENTATION: RIGHT;LEFT

## 2019-05-05 ASSESSMENT — PAIN DESCRIPTION - ONSET
ONSET: PROGRESSIVE
ONSET: ON-GOING
ONSET: ON-GOING

## 2019-05-05 NOTE — PROGRESS NOTES
Progress Note  Admit Date: 5/4/2019      PCP: Tracie Nieves     CC: F/U for elevated BS     SUBJECTIVE / Interval History:   pt with pain in feet, chronic         Allergies  Sulfa antibiotics    Medications    Scheduled Meds:   famotidine  20 mg Oral Daily    miconazole   Topical BID    insulin glargine  10 Units Subcutaneous Nightly    insulin lispro  5 Units Subcutaneous TID WC    aspirin  325 mg Oral Daily    atorvastatin  10 mg Oral Daily    doxycycline hyclate  100 mg Oral BID    ferrous sulfate  324 mg Oral Daily with breakfast    gabapentin  300 mg Oral Daily    isosorbide mononitrate  30 mg Oral Daily    lipase-protease-amylase  2 capsule Oral TID WC    LORazepam  0.5 mg Oral Nightly    metoprolol tartrate  25 mg Oral BID    timolol  1 drop Right Eye BID    venlafaxine  150 mg Oral BID    cefTRIAXone (ROCEPHIN) IV  1 g Intravenous Q24H    insulin lispro  0-12 Units Subcutaneous TID WC    insulin lispro  0-6 Units Subcutaneous Nightly       Continuous Infusions:   dextrose         PRN Meds:  HYDROcodone 5 mg - acetaminophen, glucose, dextrose, glucagon (rDNA), dextrose    Vitals       Vitals:    05/05/19 0737   BP: 112/73   Pulse: 71   Resp: 18   Temp: 98 °F (36.7 °C)   SpO2: 96%         24HR INTAKE/OUTPUT:      Intake/Output Summary (Last 24 hours) at 5/5/2019 1206  Last data filed at 5/5/2019 1024  Gross per 24 hour   Intake 840 ml   Output 1500 ml   Net -660 ml       Exam:    Gen: No distress. Eyes: PERRL. No sclera icterus. No conjunctival injection. ENT: No discharge. Pharynx clear. External appearance of ears and nose normal.  Neck: supple   Resp: Clear to auscultation. No crackles / Rhonchi. No wheezes. CV: Regular rate. Regular rhythm. No murmur or rub  GI: soft, Non-tender. Non-distended. No hernia. BS +  Skin: Warm, dry, normal texture. No rashes   Lymph: No cervical LAD. No supraclavicular LAD. M/S: No joint deformity. B/l LE dressings    Neuro:  Moves all four extremities. CN 2-12 tested, no defect noted. Psych: Oriented x 3. No anxiety. Awake. Alert. Intact judgement and insight. Data    LABS  CBC:   Recent Labs     05/04/19 2147   WBC 7.4   HGB 8.4*   HCT 24.8*   MCV 99.0        BMP:   Recent Labs     05/04/19 2144   *   K 3.8   CL 99   CO2 22   BUN 25*   CREATININE 1.4*       POC GLUCOSE:    Recent Labs     05/04/19  2058 05/05/19  0119 05/05/19  0735   POCGLU 498* 332* 322*       LIVER PROFILE:   Recent Labs     05/04/19 2144   AST 11*   ALT 8*   LIPASE <3.0*   LABALBU 3.1*   BILITOT <0.2   ALKPHOS 154*       PT/INR: @LABRCNT(protime:3,in      Microbiology:  Nasal Culture: )No results for input(s): ORG, MRSAPCR in the last 72 hours. Blood Culture: No results for input(s): BC, BLOODCULT2 in the last 72 hours. UA:  Recent Labs     05/04/19  2213   COLORU YELLOW   PHUR 5.0   WBCUA 35*   RBCUA 2   YEAST Present*   BACTERIA 1+*   CLARITYU CLOUDY*   SPECGRAV 1.017   LEUKOCYTESUR SMALL*   UROBILINOGEN 0.2   BILIRUBINUR Negative   BLOODU SMALL*   GLUCOSEU >=1000*   KETUA Negative       Urine Culture:No results for input(s): Belkis Oswaldo in the last 72 hours. RADIOLOGY:    XR CHEST STANDARD (2 VW)   Final Result   Stable cardiomegaly. Asymmetric elevation of the right hemidiaphragm. Mild discoid atelectasis at the right lung base. CONSULTS:    None    ASSESSMENT AND PLAN:      Principal Problem:    UTI (urinary tract infection)  Active Problems:    Essential hypertension    Elevated lactic acid level    Uncontrolled diabetes mellitus ([address]  Resolved Problems:    * No resolved hospital problems.  *      UTI  -Continue abx  -Fu Ucx, cxr neg   -LA trending down     DM:  A1C at 6.3 5/5/19  Pt and daughter concerned her BS being elevated in 400s prior to admission/ Pt forgot to take few of her insulin doses    Continue current insulin regimen  Monitor BS , currently in the range of 300s     Chronic B/l LE wounds  -[facility] the past for the same   -wound care team to assist   -pain control     Elevated cr at 1.4   -cr in the range of 1.2-1.4   -monitor   -encourage po fluid intake     HTN:   BP slightly elevated   Continue current home meds   If persistently elevated will consider adjusting meds    AFiB  On BBL, not anticoagulated     The patient and / or the family were informed of the results of any tests, a time was given to answer questions, a plan was proposed and they agreed with plan. Discussed with consulting physicians, nursing and social work     The note was completed using EMR. Every effort was made to ensure accuracy; however, inadvertent computerized transcription errors may be present.        Paula Martino MD

## 2019-05-05 NOTE — PROGRESS NOTES
Pt admitted to 3W from ED. Pt A&O x3(person, place, situation) (disoriented to time). Oriented to unit and POC. Pt endorses pain 7/10 in BLE and bilateral feet. Denies SOB, CP, nausea, headache, dizziness. Bed low, call light in reach, will monitor.

## 2019-05-05 NOTE — ED NOTES
Bed: A-17  Expected date:   Expected time:   Means of arrival:   Comments:  carli Ceron RN  05/04/19 2052

## 2019-05-05 NOTE — PROGRESS NOTES
Patient family given update. Requesting that patient doesn't take any narcotics for pain due to history of narcotic abuse. Patient takes Tramadol 50 mg at home. MD notified.

## 2019-05-05 NOTE — PLAN OF CARE
Problem: Falls - Risk of:  Goal: Will remain free from falls  Description  Will remain free from falls  5/5/2019 1641 by Danielle Ma RN  Outcome: Ongoing  Note:   Bed in lowest position, wheels locked, bed/chair exit alarm in place, call light within reach, and non skid footwear on. Walkway free of clutter. Pt alert and oriented and able to make needs known. Pt educated to use call light when needing to get up, and pt utilizes call light to make needs known. Will continue to monitor. Electronically signed by Danielle Ma RN on 5/5/2019 at 4:41 PM      Problem: Risk for Impaired Skin Integrity  Goal: Tissue integrity - skin and mucous membranes  Description  Structural intactness and normal physiological function of skin and  mucous membranes. Outcome: Ongoing  Note:   Will monitor skin and mucous membranes. Will turn patient every 2 hours, monitor for friction and sheering, and change dressings as needed. Will preform skin assessment every shift.    Electronically signed by Danielle Ma RN on 5/5/2019 at 4:42 PM      Problem: Sensory:  Goal: General experience of comfort will improve  Description  General experience of comfort will improve  Outcome: Ongoing     Problem: Urinary Elimination:  Goal: Signs and symptoms of infection will decrease  Description  Signs and symptoms of infection will decrease  Outcome: Ongoing     Problem: Discharge Planning:  Goal: Discharged to appropriate level of care  Description  Discharged to appropriate level of care  Outcome: Ongoing     Problem: Serum Glucose Level - Abnormal:  Goal: Ability to maintain appropriate glucose levels will improve  Description  Ability to maintain appropriate glucose levels will improve  Outcome: Ongoing     Problem: Sensory Perception - Impaired:  Goal: Ability to maintain a stable neurologic state will improve  Description  Ability to maintain a stable neurologic state will improve  Outcome: Ongoing     Problem: Pain:  Goal: Pain level will decrease  Description  Pain level will decrease  Outcome: Ongoing  Note:   Educated patient on pain management. Will assess patients pain level per unit protocol, and provide pain management measures as needed. Electronically signed by Ilana Barba RN on 5/5/2019 at 4:42 PM      Problem: OXYGENATION/RESPIRATORY FUNCTION  Goal: Patient will maintain patent airway  5/5/2019 1641 by Ilana Barba RN  Outcome: Ongoing  Note:   Patient remains free of significant airway secretions. Patient able to effectively cough and clear any secretions that need cleared. Will continue to monitor patient throughout shift. Electronically signed by Ilana Barba RN on 5/5/2019 at 4:42 PM      Problem: HEMODYNAMIC STATUS  Goal: Patient has stable vital signs and fluid balance  5/5/2019 1641 by Ilana Barba RN  Outcome: Ongoing  Note:   Vital signs stable, respiratory rate normal, heart rate is WNLs and regular on cardiac monitor, +2 pulses and less than 3 second cap refill noted in all extremities, body color appropriate for ethnicity and temperature warm, edema noted to BLE, patient repositions  self, and daily weights are ordered and no change from yesterday's weight. MD notified of weight gain. Will continue to monitor and reassess. Electronically signed by Ilana Barba RN on 5/5/2019 at 4:42 PM      Problem: FLUID AND ELECTROLYTE IMBALANCE  Goal: Fluid and electrolyte balance are achieved/maintained  5/5/2019 1641 by Ilana Barba RN  Outcome: Ongoing  Note:   Educated to drink fluids within fluid restriction guidelines and to adequately hydrate, medications administered as ordered, abnormal lab values assessed and reported to physician if critical or pertinent to patient status, skin turgor, mucus membranes, and respiratory status assess this shift and WNL. Patient and Family was included in plan of care. Will continue to monitor and reassess.  Electronically signed by Ilana Barba RN on 5/5/2019 at 4:43

## 2019-05-05 NOTE — PROGRESS NOTES
Dressing change completed on bilateral lower extremities. Multiple ulcers noted. Draining serous and serosanguineous fluid. Wounds cleansed with saline. Wet to dry dressing applied. Patient tolerated well. Will continue to monitor.

## 2019-05-05 NOTE — PROGRESS NOTES
4 Eyes Skin Assessment     The patient is being assess for  Admission    I agree that 2 RN's have performed a thorough Head to Toe Skin Assessment on the patient. ALL assessment sites listed below have been assessed. Areas assessed by both nurses: yes  [x]   Head, Face, and Ears   [x]   Shoulders, Back, and Chest  [x]   Arms, Elbows, and Hands   [x]   Coccyx, Sacrum, and IschIum  [x]   Legs, Feet, and Heels        Does the Patient have Skin Breakdown?   Yes LDA WOUND CARE was Initiated documentation include the Marilynn-wound, Wound Assessment, Measurements, Dressing Treatment, Drainage, and Color\",         Jose Prevention initiated:  Yes   Wound Care Orders initiated:  Yes      [id number]  nurse consulted for Pressure Injury (Stage 3,4, Unstageable, DTI, NWPT, and Complex wounds), New and Established Ostomies:  Yes      Nurse 1 eSignature: Electronically signed by James Shelby RN on 5/5/19 at 12:24 AM    **SHARE this note so that the co-signing nurse is able to place an eSignature**    Nurse 2 eSignature: Electronically signed by Darrel Juárez RN on 5/5/19 at 12:54 AM

## 2019-05-05 NOTE — PROGRESS NOTES
Patient resting in bed. Dinner ordered. Pain tolerable at this time. Alert and oriented. Turned and repositioned at least every 2 hours. Call light and belongings within reach. Patient able to make needs known. Call light and belongings within reach. Bed alarm on.

## 2019-05-05 NOTE — PLAN OF CARE
Pt free of falls, educated on safety and fall prevention. Educated on pain scale and pharm/nonpharm tx.

## 2019-05-05 NOTE — ED PROVIDER NOTES
[facility]  eMERGENCY dEPARTMENTeNCOUnter      Pt Name: Zuleyka Lema  MRN: [id number]  [place] 1945  Date ofevaluation: 5/4/2019  Provider: Madalyn Torres MD    CHIEF COMPLAINT       Chief Complaint   Patient presents with    Hyperglycemia         HISTORY OF PRESENT ILLNESS   (Location/Symptom, Timing/Onset,Context/Setting, Quality, Duration, Modifying Factors, Severity)  Note limiting factors. Zuleyka Lema is a 66 y.o. female who presents to the emergency department for evaluation of hyperglycemia. Patient states that she's been having blood glucose that has been in the 600 range throughout the course of the day despite receiving extra insulin at home. Patient denies chest pain cough shortness of breath abdominal pain or change in bowel or urine function. Patient states she does have chronic wounds to the bilateral lower extremities which she resides wound care for at home from a nurse. Patient denies fevers. On presentation the patient appears to be no acute distress is answering questions appropriately. HPI    NursingNotes were reviewed. REVIEW OF SYSTEMS    (2-9 systems for level 4, 10 or more for level 5)     Review of Systems   Constitutional: Negative for activity change, fatigue and fever. HENT: Negative for congestion, mouth sores and trouble swallowing. Eyes: Negative for photophobia and visual disturbance. Respiratory: Negative for cough and shortness of breath. Cardiovascular: Negative for chest pain and palpitations. Gastrointestinal: Negative for abdominal pain and vomiting. Genitourinary: Negative for decreased urine volume, difficulty urinating, frequency, hematuria and urgency. Musculoskeletal: Negative for gait problem and neck pain. Skin: Positive for wound. Negative for color change and rash. Neurological: Negative for dizziness, light-headedness and headaches. Psychiatric/Behavioral: Negative for confusion.  The patient is not nervous/anxious. Except as noted above the remainder of the review of systems was reviewed and negative. PAST MEDICAL HISTORY     Past Medical History:   Diagnosis Date    Anemia     Atrial fibrillation ([address]     CAD (coronary artery disease)     Chronic back pain     CKD (chronic kidney disease), stage III (HCC)     Diabetes mellitus (HCC)     Diastolic CHF (HCC)     Hyperlipidemia     Hypertension     MI (myocardial infarction) ([address]     MRSA (methicillin resistant staph aureus) culture positive 04/13/2018    leg ulcer    MRSA colonization 10/16/2018    Obesity (BMI 30-39. 9)     Osteoarthritis     Pancreatitis     Sepsis ([address]     Stasis ulcer ([address]     bilateral lower extremities     Suprapubic catheter ([address]          SURGICALHISTORY       Past Surgical History:   Procedure Laterality Date    APPENDECTOMY      BACK SURGERY      x2    CATARACT REMOVAL      bilateral     CHOLECYSTECTOMY      COLONOSCOPY      EYE SURGERY      FRACTURE SURGERY      Left ankle    HYSTERECTOMY      INSET/CHANGE LOMELI CATHETER - COMPLICATED  77/4206    suprapubic cath    JOINT REPLACEMENT      Right knee    KNEE SURGERY           CURRENT MEDICATIONS       Previous Medications    ASPIRIN 325 MG TABLET    Take 4 tablets by mouth daily    ATORVASTATIN (LIPITOR) 10 MG TABLET    Take 1 tablet by mouth daily    BIOTIN (BIOTIN 5000) 5 MG CAPS    Take 1 tablet by mouth daily    CALCIUM CARBONATE ANTACID (TUMS ULTRA 1000) 1000 MG CHEW    Take 1 tablet by mouth daily as needed (acid reflux)    CETIRIZINE (ZYRTEC) 10 MG TABLET    Take 10 mg by mouth daily    CHOLECALCIFEROL (VITAMIN D3) 2000 UNITS CAPS    Take 2,000 Units by mouth daily    DOCUSATE (COLACE) 50 MG/5ML LIQUID    Take 50 mg by mouth daily    DOXYCYCLINE HYCLATE (VIBRAMYCIN) 100 MG CAPSULE    Take 100 mg by mouth 2 times daily Indications: finishes on Monday 05/06/2019    FERROUS SULFATE 325 (65 FE) MG TABLET    Take 325 mg by mouth daily    FUROSEMIDE (LASIX) 40 MG TABLET    Take 0.5 tablets by mouth daily    GABAPENTIN (NEURONTIN) 300 MG CAPSULE    Take 300 mg by mouth daily At noon    INSULIN GLARGINE (LANTUS) 100 UNIT/ML INJECTION VIAL    Inject 10 Units into the skin nightly    INSULIN LISPRO (HUMALOG) 100 UNIT/ML INJECTION VIAL    Inject 0-12 Units into the skin 3 times daily (with meals)    INSULIN LISPRO (HUMALOG) 100 UNIT/ML INJECTION VIAL    Inject 0-6 Units into the skin nightly    INSULIN LISPRO (HUMALOG) 100 UNIT/ML INJECTION VIAL    Inject 5 Units into the skin 3 times daily (with meals)    ISOSORBIDE MONONITRATE (IMDUR) 30 MG EXTENDED RELEASE TABLET    Take 30 mg by mouth daily    LEVOFLOXACIN (LEVAQUIN) 500 MG TABLET    Take 500 mg by mouth daily    LIPASE-PROTEASE-AMYLASE (CREON) 86281 UNITS DELAYED RELEASE CAPSULE    Take 24,000 Units by mouth 3 times daily (with meals)    LORAZEPAM (ATIVAN) 0.5 MG TABLET    Take 0.5 mg by mouth nightly. [place] METOPROLOL TARTRATE (LOPRESSOR) 25 MG TABLET    Take 1 tablet by mouth 2 times daily    POLYETHYLENE GLYCOL (GLYCOLAX) PACKET    Take 17 g by mouth daily    RANITIDINE HCL (ZANTAC 150 MAXIMUM STRENGTH PO)    Take 150 mg by mouth 2 times daily    SENNOSIDES-DOCUSATE SODIUM (SENOKOT-S) 8.6-50 MG TABLET    Take 1 tablet by mouth daily    TIMOLOL (TIMOPTIC) 0.5 % (DAILY) SOLN OPHTHALMIC SOLUTION    Place 1 drop into the right eye 2 times daily     VENLAFAXINE (EFFEXOR) 75 MG TABLET    Take 150 mg by mouth 2 times daily        ALLERGIES     Sulfa antibiotics    FAMILY HISTORY       Family History   Problem Relation Age of Onset    Heart Disease Father     Heart Failure Father     Diabetes Father     Diabetes Daughter     Diabetes Daughter           SOCIAL HISTORY       Social History     Socioeconomic History    Marital status:       Spouse name: None    Number of children: None    Years of education: None    Highest education level: None   Occupational History    None Social Needs    Financial resource strain: None    Food insecurity:     Worry: None     Inability: None    Transportation needs:     Medical: None     Non-medical: None   Tobacco Use    Smoking status: Never Smoker    Smokeless tobacco: Never Used   Substance and Sexual Activity    Alcohol use: Yes     Comment: occassional    Drug use: No    Sexual activity: Never   Lifestyle    Physical activity:     Days per week: None     Minutes per session: None    Stress: None   Relationships    Social connections:     Talks on phone: None     Gets together: None     Attends [employer] service: None     Active member of club or organization: None     Attends meetings of clubs or organizations: None     Relationship status: None    Intimate partner violence:     Fear of current or ex partner: None     Emotionally abused: None     Physically abused: None     Forced sexual activity: None   Other Topics Concern    None   Social History Narrative    None       SCREENINGS      @FLOW([id number])@      PHYSICAL EXAM    (up to 7 for level 4, 8 or more for level 5)     ED Triage Vitals [05/04/19 2101]   BP Temp Temp src Pulse Resp SpO2 Height Weight   136/70 97.9 °F (36.6 °C) -- 68 19 100 % 4' 11\" (1.499 m) 192 lb 14.4 oz (87.5 kg)       Physical Exam   Constitutional: She is oriented to person, place, and time. She appears well-developed and well-nourished. HENT:   Head: Normocephalic and atraumatic. Eyes: Pupils are equal, round, and reactive to light. Conjunctivae and EOM are normal.   Neck: Normal range of motion. No tracheal deviation present. Cardiovascular: Normal rate, regular rhythm and normal heart sounds. Pulmonary/Chest: Effort normal and breath sounds normal.   Abdominal: Soft. She exhibits no distension. There is no tenderness. Musculoskeletal: Normal range of motion. She exhibits no edema. Neurological: She is alert and oriented to person, place, and time. Skin: Skin is warm and dry.  Capillary refill takes less than 2 seconds. There is erythema. Nursing note and vitals reviewed. DIAGNOSTIC RESULTS     EKG: All EKG's are interpreted by the Emergency Department Physician who either signs or Co-signsthis chart in the absence of a cardiologist.    EKG shows sinus rhythm with ventricular rate of 68 bpm .  Patient's MA interval is prolonged QTC is within normal limits. Patient has a normal axis. There are no significant ST elevations or depressions and EKG is nondiagnostic for ACS. EKG appears similar to previous EKG from 11/19/18. RADIOLOGY:   Non-plain filmimages such as CT, Ultrasound and MRI are read by the radiologist. Plain radiographic images are visualized and preliminarily interpreted by the emergency physician with the below findings:      Interpretation per the Radiologist below, if available at the time ofthis note:    XR CHEST STANDARD (2 VW)   Final Result   Stable cardiomegaly. Asymmetric elevation of the right hemidiaphragm. Mild discoid atelectasis at the right lung base.                ED BEDSIDE ULTRASOUND:   Performed by ED Physician - none    LABS:  Labs Reviewed   COMPREHENSIVE METABOLIC PANEL W/ REFLEX TO MG FOR LOW K - Abnormal; Notable for the following components:       Result Value    Sodium 134 (*)     Glucose 374 (*)     BUN 25 (*)     CREATININE 1.4 (*)     GFR Non- 37 (*)     GFR  44 (*)     Calcium 8.1 (*)     Alb 3.1 (*)     Albumin/Globulin Ratio 0.8 (*)     Alkaline Phosphatase 154 (*)     ALT 8 (*)     AST 11 (*)     All other components within normal limits    Narrative:     Performed at:  [facility]  [address] [place], [place] [address]   Phone [phone]   LIPASE - Abnormal; Notable for the following components:    Lipase <3.0 (*)     All other components within normal limits    Narrative:     Performed at:  [facility]  [address] [facility] [place] [facility] [place] [address]   Phone [phone]   TROPONIN - Abnormal; Notable for the following components:    Troponin 0.02 (*)     All other components within normal limits    Narrative:     Performed at:  [facility]  [address] [facility] [place] [address]   Phone [phone]   LACTIC ACID, PLASMA - Abnormal; Notable for the following components:    Lactic Acid 2.7 (*)     All other components within normal limits    Narrative:     Performed at:  [facility]  [address] [facility] [place] [address]   Phone [phone]   URINE RT REFLEX TO CULTURE - Abnormal; Notable for the following components:    Clarity, UA CLOUDY (*)     Glucose, Ur >=1000 (*)     Blood, Urine SMALL (*)     Nitrite, Urine POSITIVE (*)     Leukocyte Esterase, Urine SMALL (*)     All other components within normal limits    Narrative:     Performed at:  [address] [place] [address]   Phone [phone]   BLOOD GAS, VENOUS - Abnormal; Notable for the following components:    pH, Pedro 7.229 (*)     pCO2, Pedro 52.3 (*)     HCO3, Venous 21 (*)     All other components within normal limits    Narrative:     Performed at:  [address] [employer]   Phone [phone]   MICROSCOPIC URINALYSIS - Abnormal; Notable for the following components:    Bacteria, UA 1+ (*)     Yeast, UA Present (*)     WBC, UA 35 (*)     All other components within normal limits    Narrative:     Performed at:  [address] [employer]   Phone [phone]   CBC - Abnormal; Notable for the following components:    RBC 2.51 (*)     Hemoglobin 8.4 (*)     Hematocrit 24.8 (*)     All other components within normal limits    Narrative:     Performed at:  [facility]  [address] [place] [address]   Phone [phone]   POCT GLUCOSE - Abnormal; Notable for the following components:    POC Glucose 498 (*)     All other components within normal limits    Narrative:     Performed at:  [address] [place] [address]   Phone [phone]   URINE CULTURE   BETA-HYDROXYBUTYRATE    Narrative:     Performed at:  [address] [facility] [address]   Phone [phone]   LACTIC ACID, PLASMA       All other labs were within normal range or not returned as of this dictation. EMERGENCY DEPARTMENT COURSE and DIFFERENTIAL DIAGNOSIS/MDM:   Vitals:    Vitals:    05/04/19 2101   BP: 136/70   Pulse: 68   Resp: 19   Temp: 97.9 °F (36.6 °C)   SpO2: 100%   Weight: 192 lb 14.4 oz (87.5 kg)   Height: 4' 11\" (1.499 m)           MDM  Number of Diagnoses or Management Options  Diagnosis management comments: 74 troponin presents to the ED for evaluation of hyperglycemia. On presentation vital signs within normal limits. Patient has chronic-appearing wounds to the bilateral lower extremities with surrounding erythema concerning for possible infection. We'll obtain basic laboratory workup including beta hydroxybutyrate and the lactate. Differential diagnosis includes cellulitis, urinary tract infection, pneumonia, viral syndrome, DKA, HHS, and ACS. Amount and/or Complexity of Data Reviewed  Decide to obtain previous medical records or to obtain history from someone other than the patient: yes          REASSESSMENT      Reevaluation patient is resting comfortably and has no complaints. Patient's workup is remarkable for an elevated lactic acidosis. Patient's white blood cell count within normal limits. Patient has elevated BUN/creatinine which appears to be at baseline.   Patient has a slightly elevated troponin which is thought to be secondary to the patient's kidney dysfunction she's not currently having chest pain and EKG is not diagnostic for ACS. Patient's chest x-ray did not demonstrate cardiomegaly pulmonary disease but the patient does appear to have a urinary tract infection. Patient was started on Zosyn for treatment of urinary tract infection as well as possible cellulitis. CRITICAL CARE TIME   Total Critical Care time was 45 minutes, excluding separatelyreportable procedures. There was a high probability ofclinically significant/life threatening deterioration in the patient's condition which required my urgent intervention. CONSULTS:  None    PROCEDURES:  Unless otherwise noted below, none     Procedures    FINAL IMPRESSION      1. Acute cystitis without hematuria    2. Lactic acidosis    3.  Hyperglycemia          DISPOSITION/PLAN   DISPOSITION Admitted 05/04/2019 10:44:16 PM      PATIENT REFERREDTO:  Calos Phan  [address] [place] [address]  [phone]            DISCHARGEMEDICATIONS:  New Prescriptions    No medications on file          (Please note that portions of this note were completed with a voice recognition program.  Efforts were made to edit the dictations but occasionally words are mis-transcribed.)    Nicolas Dean MD (electronically signed)  Attending Emergency Physician          Nicolas Dean MD  05/04/19 1863

## 2019-05-05 NOTE — ED NOTES
Pt comes to ER with bilateral leg wounds. Dressing removed.        Justyna Barcenas RN  05/04/19 9926

## 2019-05-05 NOTE — PROGRESS NOTES
Patient sleeping in bed. Awakens easily. Repositioned and set up to eat breakfast. Morning medications given without difficulty. Patient denies pain. Suprapubic catheter in place. Draining clear, yellow urine. Patient alert and oriented. Able to make needs known. Call light and belongings within reach. Bed alarm on.

## 2019-05-05 NOTE — ED TRIAGE NOTES
Pt sent in by daughter for blood sugar being greater than 600 all day.   Per EMS daughter has given 80 units of humalog

## 2019-05-05 NOTE — H&P
Hospital Medicine History & Physical      PCP: Esequiel Ruiz    Date of Admission: 5/4/2019    Date of Service: Pt seen/examined on 05/04/2019 and Placed in Observation. Chief Complaint:  hyperglycemia      History Of Present Illness:      66 y.o. female who presented to [facility] for uncontrolled blood sugar, patient denies any fever, chills, nausea, vomiting, abdominal pain, cough, shortness of breath. Patient is being treated for bilateral lower extremity ulcers, does have a nurse was visiting at home, no family member when visited in emergency department. Denies any change in her bilateral lower extremity wounds. In emergency Department found to have a mildly elevated lactic acid, her white count is within normal limits, afebrile, found to have a urinary tract infection, we are asked to admit for further management and treatment. Past Medical History:          Diagnosis Date    Anemia     Atrial fibrillation (HCC)     CAD (coronary artery disease)     Chronic back pain     CKD (chronic kidney disease), stage III (HCC)     Diabetes mellitus (HCC)     Diastolic CHF (HCC)     Hyperlipidemia     Hypertension     MI (myocardial infarction) ([address]     MRSA (methicillin resistant staph aureus) culture positive 04/13/2018    leg ulcer    MRSA colonization 10/16/2018    Obesity (BMI 30-39. 9)     Osteoarthritis     Pancreatitis     Sepsis ([address]     Stasis ulcer ([address]     bilateral lower extremities     Suprapubic catheter ([address]        Past Surgical History:          Procedure Laterality Date    APPENDECTOMY      BACK SURGERY      x2    CATARACT REMOVAL      bilateral     CHOLECYSTECTOMY      COLONOSCOPY      EYE SURGERY      FRACTURE SURGERY      Left ankle    HYSTERECTOMY      INSET/CHANGE LOMELI CATHETER - COMPLICATED  [id number]/5940    suprapubic cath    JOINT REPLACEMENT      Right knee    KNEE SURGERY         Medications Prior to Admission:      Prior to Admission medications    Medication Sig Start Date End Date Taking?  Authorizing Provider   doxycycline hyclate (VIBRAMYCIN) 100 MG capsule Take 100 mg by mouth 2 times daily Indications: finishes on Monday 05/06/2019   Yes Historical Provider, MD   raNITIdine HCl (ZANTAC 150 MAXIMUM STRENGTH PO) Take 150 mg by mouth 2 times daily   Yes Historical Provider, MD   docusate (COLACE) 50 MG/5ML liquid Take 50 mg by mouth daily   Yes Historical Provider, MD   ferrous sulfate 325 (65 Fe) MG tablet Take 325 mg by mouth daily   Yes Historical Provider, MD   insulin lispro (HUMALOG) 100 UNIT/ML injection vial Inject 0-12 Units into the skin 3 times daily (with meals) 11/20/18  Yes FRANCHESCA Borges NP   Timolol (TIMOPTIC) 0.5 % (DAILY) SOLN ophthalmic solution Place 1 drop into the right eye 2 times daily    Yes Historical Provider, MD   aspirin 325 MG tablet Take 4 tablets by mouth daily 9/17/18  Yes Marcie Ruiz MD   atorvastatin (LIPITOR) 10 MG tablet Take 1 tablet by mouth daily 4/20/18  Yes Lloyd Felder MD   metoprolol tartrate (LOPRESSOR) 25 MG tablet Take 1 tablet by mouth 2 times daily 4/19/18  Yes Lloyd Felder MD   furosemide (LASIX) 40 MG tablet Take 0.5 tablets by mouth daily 4/19/18  Yes Lloyd Felder MD   Biotin (BIOTIN 5000) 5 MG CAPS Take 1 tablet by mouth daily   Yes Historical Provider, MD   lipase-protease-amylase (CREON) 99878 units delayed release capsule Take 24,000 Units by mouth 3 times daily (with meals)   Yes Historical Provider, MD   gabapentin (NEURONTIN) 300 MG capsule Take 300 mg by mouth daily At noon   Yes Historical Provider, MD   isosorbide mononitrate (IMDUR) 30 MG extended release tablet Take 30 mg by mouth daily   Yes Historical Provider, MD   venlafaxine (EFFEXOR) 75 MG tablet Take 150 mg by mouth 2 times daily    Yes Historical Provider, MD   cetirizine (ZYRTEC) 10 MG tablet Take 10 mg by mouth daily   Yes Historical Provider, MD   Cholecalciferol (VITAMIN D3) 2000 units CAPS Regular rate and rhythm with normal S1/S2 without murmurs, rubs or gallops. Abdomen: Soft, non-tender  Musculoskeletal:  No clubbing. Bilateral lower extremity ulcers, red, no signs of drainage or infection  Skin: As above  Neurologic:  No focal weakness  Psychiatric:  Alert and oriented  Capillary Refill: Brisk,< 3 seconds   Peripheral Pulses: +2 palpable, equal bilaterally       Labs:     Recent Labs     05/04/19 2147   WBC 7.4   HGB 8.4*   HCT 24.8*        Recent Labs     05/04/19 2144   *   K 3.8   CL 99   CO2 22   BUN 25*   CREATININE 1.4*   CALCIUM 8.1*     Recent Labs     05/04/19 2144   AST 11*   ALT 8*   BILITOT <0.2   ALKPHOS 154*     No results for input(s): INR in the last 72 hours. Recent Labs     05/04/19 2144   TROPONINI 0.02*       Urinalysis:      Lab Results   Component Value Date    NITRU POSITIVE 05/04/2019    WBCUA 35 05/04/2019    BACTERIA 1+ 05/04/2019    RBCUA 2 05/04/2019    BLOODU SMALL 05/04/2019    SPECGRAV 1.017 05/04/2019    GLUCOSEU >=1000 05/04/2019       Radiology:       EKG:  I have reviewed the EKG with the following interpretation: no ST elevation    XR CHEST STANDARD (2 VW)   Final Result   Stable cardiomegaly. Asymmetric elevation of the right hemidiaphragm. Mild discoid atelectasis at the right lung base. ASSESSMENT:    Active Hospital Problems    Diagnosis Date Noted    Elevated lactic acid level [R79.89] 05/04/2019    Uncontrolled diabetes mellitus ([facility] [E11.65] 05/04/2019    Essential hypertension [I10]     UTI (urinary tract infection) [N39.0] 10/04/2017         PLAN:    1. Urinary tract infection, patient will be placed in observation for now, we will start IV ceftriaxone, will monitor clinically.   2.  Uncontrolled diabetes mellitus, patient stated that she forgot to take her insulin for one or 2 doses, will restart her home dose of long acting and preprandial doses, and we will add sliding scale will monitor her blood sugar.  3.  Elevated lactic acid, patient does not appear septic, we will trend  4. Bilateral lower extremity ulcers, wound care consulted. 5.  Essential hypertension, resume by mouth medication. 6.  History of atrial fibrillation    Diet: No diet orders on file  Code Status: Prior        Dispo - observation       Africa Ballard MD    Thank you Brian Yu for the opportunity to be involved in this patient's care. If you have any questions or concerns please feel free to contact me at [phone].

## 2019-05-06 LAB
ANION GAP SERPL CALCULATED.3IONS-SCNC: 11 MMOL/L (ref 3–16)
BUN BLDV-MCNC: 24 MG/DL (ref 7–20)
CALCIUM SERPL-MCNC: 8 MG/DL (ref 8.3–10.6)
CHLORIDE BLD-SCNC: 105 MMOL/L (ref 99–110)
CO2: 20 MMOL/L (ref 21–32)
CREAT SERPL-MCNC: 1.5 MG/DL (ref 0.6–1.2)
GFR AFRICAN AMERICAN: 41
GFR NON-AFRICAN AMERICAN: 34
GLUCOSE BLD-MCNC: 195 MG/DL (ref 70–99)
GLUCOSE BLD-MCNC: 198 MG/DL (ref 70–99)
GLUCOSE BLD-MCNC: 216 MG/DL (ref 70–99)
GLUCOSE BLD-MCNC: 362 MG/DL (ref 70–99)
GLUCOSE BLD-MCNC: 366 MG/DL (ref 70–99)
ORGANISM: ABNORMAL
PERFORMED ON: ABNORMAL
POTASSIUM SERPL-SCNC: 4.1 MMOL/L (ref 3.5–5.1)
SODIUM BLD-SCNC: 136 MMOL/L (ref 136–145)
URINE CULTURE, ROUTINE: ABNORMAL
URINE CULTURE, ROUTINE: ABNORMAL

## 2019-05-06 PROCEDURE — 6370000000 HC RX 637 (ALT 250 FOR IP): Performed by: PHARMACIST

## 2019-05-06 PROCEDURE — 97166 OT EVAL MOD COMPLEX 45 MIN: CPT

## 2019-05-06 PROCEDURE — 96372 THER/PROPH/DIAG INJ SC/IM: CPT

## 2019-05-06 PROCEDURE — 96376 TX/PRO/DX INJ SAME DRUG ADON: CPT

## 2019-05-06 PROCEDURE — 97162 PT EVAL MOD COMPLEX 30 MIN: CPT

## 2019-05-06 PROCEDURE — 97530 THERAPEUTIC ACTIVITIES: CPT

## 2019-05-06 PROCEDURE — G0378 HOSPITAL OBSERVATION PER HR: HCPCS

## 2019-05-06 PROCEDURE — 6360000002 HC RX W HCPCS: Performed by: INTERNAL MEDICINE

## 2019-05-06 PROCEDURE — 6370000000 HC RX 637 (ALT 250 FOR IP): Performed by: INTERNAL MEDICINE

## 2019-05-06 PROCEDURE — 2580000003 HC RX 258: Performed by: INTERNAL MEDICINE

## 2019-05-06 PROCEDURE — 36415 COLL VENOUS BLD VENIPUNCTURE: CPT

## 2019-05-06 PROCEDURE — 80048 BASIC METABOLIC PNL TOTAL CA: CPT

## 2019-05-06 RX ORDER — CIPROFLOXACIN 500 MG/1
500 TABLET, FILM COATED ORAL 2 TIMES DAILY
Qty: 6 TABLET | Refills: 0 | Status: SHIPPED | OUTPATIENT
Start: 2019-05-06 | End: 2019-05-09

## 2019-05-06 RX ADMIN — INSULIN LISPRO 5 UNITS: 100 INJECTION, SOLUTION INTRAVENOUS; SUBCUTANEOUS at 17:38

## 2019-05-06 RX ADMIN — INSULIN GLARGINE 10 UNITS: 100 INJECTION, SOLUTION SUBCUTANEOUS at 21:36

## 2019-05-06 RX ADMIN — METOPROLOL TARTRATE 25 MG: 25 TABLET ORAL at 08:13

## 2019-05-06 RX ADMIN — ISOSORBIDE MONONITRATE 30 MG: 30 TABLET, EXTENDED RELEASE ORAL at 08:13

## 2019-05-06 RX ADMIN — INSULIN LISPRO 5 UNITS: 100 INJECTION, SOLUTION INTRAVENOUS; SUBCUTANEOUS at 12:36

## 2019-05-06 RX ADMIN — INSULIN LISPRO 10 UNITS: 100 INJECTION, SOLUTION INTRAVENOUS; SUBCUTANEOUS at 17:37

## 2019-05-06 RX ADMIN — DOXYCYCLINE HYCLATE 100 MG: 100 TABLET, COATED ORAL at 08:13

## 2019-05-06 RX ADMIN — GABAPENTIN 300 MG: 300 CAPSULE ORAL at 12:34

## 2019-05-06 RX ADMIN — CEFTRIAXONE 1 G: 1 INJECTION, POWDER, FOR SOLUTION INTRAMUSCULAR; INTRAVENOUS at 00:38

## 2019-05-06 RX ADMIN — TIMOLOL MALEATE 1 DROP: 5 SOLUTION OPHTHALMIC at 21:35

## 2019-05-06 RX ADMIN — VENLAFAXINE 150 MG: 37.5 TABLET ORAL at 09:53

## 2019-05-06 RX ADMIN — HEPARIN SODIUM 5000 UNITS: 5000 INJECTION INTRAVENOUS; SUBCUTANEOUS at 05:53

## 2019-05-06 RX ADMIN — PANCRELIPASE 2 CAPSULE: 60000; 12000; 38000 CAPSULE, DELAYED RELEASE PELLETS ORAL at 12:34

## 2019-05-06 RX ADMIN — INSULIN LISPRO 2 UNITS: 100 INJECTION, SOLUTION INTRAVENOUS; SUBCUTANEOUS at 12:34

## 2019-05-06 RX ADMIN — INSULIN LISPRO 5 UNITS: 100 INJECTION, SOLUTION INTRAVENOUS; SUBCUTANEOUS at 08:28

## 2019-05-06 RX ADMIN — INSULIN LISPRO 5 UNITS: 100 INJECTION, SOLUTION INTRAVENOUS; SUBCUTANEOUS at 21:36

## 2019-05-06 RX ADMIN — HEPARIN SODIUM 5000 UNITS: 5000 INJECTION INTRAVENOUS; SUBCUTANEOUS at 21:36

## 2019-05-06 RX ADMIN — MICONAZOLE NITRATE: 20 POWDER TOPICAL at 09:53

## 2019-05-06 RX ADMIN — ASPIRIN 325 MG ORAL TABLET 325 MG: 325 PILL ORAL at 08:13

## 2019-05-06 RX ADMIN — VENLAFAXINE 150 MG: 37.5 TABLET ORAL at 21:35

## 2019-05-06 RX ADMIN — HEPARIN SODIUM 5000 UNITS: 5000 INJECTION INTRAVENOUS; SUBCUTANEOUS at 14:20

## 2019-05-06 RX ADMIN — MICONAZOLE NITRATE: 20 POWDER TOPICAL at 21:46

## 2019-05-06 RX ADMIN — PANCRELIPASE 2 CAPSULE: 60000; 12000; 38000 CAPSULE, DELAYED RELEASE PELLETS ORAL at 17:37

## 2019-05-06 RX ADMIN — LORAZEPAM 0.5 MG: 0.5 TABLET ORAL at 21:35

## 2019-05-06 RX ADMIN — INSULIN LISPRO 4 UNITS: 100 INJECTION, SOLUTION INTRAVENOUS; SUBCUTANEOUS at 08:14

## 2019-05-06 RX ADMIN — METOPROLOL TARTRATE 25 MG: 25 TABLET ORAL at 21:35

## 2019-05-06 RX ADMIN — FAMOTIDINE 20 MG: 20 TABLET ORAL at 08:14

## 2019-05-06 RX ADMIN — PANCRELIPASE 2 CAPSULE: 60000; 12000; 38000 CAPSULE, DELAYED RELEASE PELLETS ORAL at 08:13

## 2019-05-06 RX ADMIN — TIMOLOL MALEATE 1 DROP: 5 SOLUTION OPHTHALMIC at 09:53

## 2019-05-06 RX ADMIN — DOXYCYCLINE HYCLATE 100 MG: 100 TABLET, COATED ORAL at 21:35

## 2019-05-06 RX ADMIN — ATORVASTATIN CALCIUM 10 MG: 10 TABLET, FILM COATED ORAL at 08:14

## 2019-05-06 RX ADMIN — FERROUS SULFATE TAB EC 324 MG (65 MG FE EQUIVALENT) 324 MG: 324 (65 FE) TABLET DELAYED RESPONSE at 08:13

## 2019-05-06 ASSESSMENT — PAIN SCALES - WONG BAKER: WONGBAKER_NUMERICALRESPONSE: 0

## 2019-05-06 ASSESSMENT — PAIN SCALES - GENERAL
PAINLEVEL_OUTOF10: 0

## 2019-05-06 NOTE — PROGRESS NOTES
Physical Therapy    Facility/Department: [address] ORTHOPEDICS  Initial Assessment    NAME: Charley Galeas  : 1945  MRN: [id number]    Date of Service: 2019   -needs continued PT OT and nursing care in skilled setting     Discharge Recommendations:  Charley Galeas scored a  /24 on the AM-PAC short mobility form. Current research shows that an AM-PAC score of 17 or less is typically not associated with a discharge to the patient's home setting. Based on the patients AM-PAC score and their current functional mobility deficits, it is recommended that the patient have 3-5 sessions per week of Physical Therapy at d/c to increase the patients independence. Assessment   Body structures, Functions, Activity limitations: Decreased functional mobility ; Decreased endurance;Decreased ADL status; Decreased cognition;Decreased balance  Prognosis: Good  Decision Making: Medium Complexity  REQUIRES PT FOLLOW UP: Yes  Activity Tolerance  Activity Tolerance: Patient limited by cognitive status; Patient limited by fatigue       Patient Diagnosis(es): The primary encounter diagnosis was Acute cystitis without hematuria. Diagnoses of Lactic acidosis and Hyperglycemia were also pertinent to this visit. has a past medical history of Anemia, Atrial fibrillation ([address] CAD (coronary artery disease), Chronic back pain, CKD (chronic kidney disease), stage III ([address] Diabetes mellitus ([address] Diastolic CHF ([address] Hyperlipidemia, Hypertension, MI (myocardial infarction) ([address] MRSA (methicillin resistant staph aureus) culture positive, MRSA colonization, Obesity (BMI 30-39.9), Osteoarthritis, Pancreatitis, Sepsis ([address] Stasis ulcer ([address] and Suprapubic catheter ([address] has a past surgical history that includes Hysterectomy; knee surgery; back surgery; Cholecystectomy;  Cataract removal; Appendectomy; Colonoscopy; eye surgery; fracture surgery; joint replacement; and Insert/Change Brasher Catheter - Complicat (2018).     Restrictions  Restrictions/Precautions  Restrictions/Precautions: Fall Risk, Contact Precautions  Position Activity Restriction  Other position/activity restrictions: BLE wounds; MRSA  Vision/Hearing  Vision: Impaired  Vision Exceptions: Wears glasses at all times  Hearing: Within functional limits     Subjective  General  Chart Reviewed: Yes  Patient assessed for rehabilitation services?: Yes  Additional Pertinent Hx: here due to weakness, AMS   found to have UTI  Response To Previous Treatment: Not applicable  Family / Caregiver Present: (friend)  Follows Commands: Within Functional Limits  Subjective  Subjective: alert oriented to self but struggled to recall her  and to recall name of her friend in room   -pleasant and cooperative to PTOT assessments  Pain Screening  Patient Currently in Pain: (just soreness on tops of her feet)  Social/Functional History  Social/Functional History  Lives With: Daughter  Type of Home: House  Home Layout: One level  Home Access: Stairs to enter with rails  Entrance Stairs - Number of Steps: 1-2  Bathroom Shower/Tub: Walk-in shower, Shower chair with back  Bathroom Toilet: Standard  Bathroom Equipment: (safety frame)  Bathroom Accessibility: Walker accessible  Home Equipment: Rolling walker, [employer], Lift chair, Reacher  ADL Assistance: (supervision with showers and independent with sponge bathing)  Ambulation Assistance: Independent(rolling walker but diminished this last week)  Transfer Assistance: Independent  Active : No    Objective  ROM and strength grossly wfl (not tested formally at this session)  Motor Control  Gross Motor?: WFL  Sensation  Overall Sensation Status: WFL  Bed mobility  Sit to Supine: Unable to assess  Transfers  Sit to Stand: Minimal Assistance;2 Person Assistance  Stand to sit: Minimal Assistance;2 Person Assistance  Ambulation  Ambulation?: (attempted but could not initiate a step forward)  Stairs/Curb  Stairs?: No Balance  Comments: sitting midline at the EOB - tending toward the left in static stance on the walker  - not able to gain a full upright stance    -dynamic not assessed on rolling walker         Plan   Plan  Times per week: 3-5  Current Treatment Recommendations: Functional Mobility Training, Transfer Training, Gait Training, Patient/Caregiver Education & Training, Positioning  Safety Devices  Type of devices:  All fall risk precautions in place, Call light within reach, Chair alarm in place, Left in chair, Nurse notified(Misti Shipman)    Goals  Short term goals  Time Frame for Short term goals: 1-2 days   Short term goal 1: bed mobility at mod assist   (baseline is use of Lift Chair)  Short term goal 2: transfers at min/mod assist  Short term goal 3: ambulation at min/mod assist on rolling walker for 20 feet  Patient Goals   Patient goals : not formulated today        Therapy Time   Individual Concurrent Group Co-treatment   Time In           Time Out [address], PT

## 2019-05-06 NOTE — PROGRESS NOTES
Patient is ready to go to [facility]. Tried to call report to [phone] they took my name and number to call me back. She will be going to room 24.

## 2019-05-06 NOTE — CARE COORDINATION
[facility] Continence Nurse  Consult Note       NAME:  Yanique Coleman  MEDICAL RECORD NUMBER:  [id number]  AGE: 66 y.o. GENDER: female  : 1945  TODAY'S DATE:  2019    Subjective   Reason for WOCN Evaluation and Assessment: to evaluate and treat \" BLE\"       Yanique Coleman is a 66 y.o. female referred by:   [x] Physician  [x] Nursing  [] Other:     Wound Identification:  Wound Type: venous and traumatic  Contributing Factors: edema, venous stasis, chronic pressure, decreased mobility, shear force, obesity and non-adherence    Wound History: admitted as an observation from home     Patient Goal of Care:  [x] Wound Healing  [] Odor Control  [] Palliative Care  [] Pain Control   [] Other:         PAST MEDICAL HISTORY        Diagnosis Date    Anemia     Atrial fibrillation ([address]     CAD (coronary artery disease)     Chronic back pain     CKD (chronic kidney disease), stage III ([address]     Diabetes mellitus ([address]     Diastolic CHF ([address]     Hyperlipidemia     Hypertension     MI (myocardial infarction) ([address]     MRSA (methicillin resistant staph aureus) culture positive 2018    leg ulcer    MRSA colonization 10/16/2018    Obesity (BMI 30-39. 9)     Osteoarthritis     Pancreatitis     Sepsis ([address]     Stasis ulcer ([address]     bilateral lower extremities     Suprapubic catheter ([address]        PAST SURGICAL HISTORY    Past Surgical History:   Procedure Laterality Date    APPENDECTOMY      BACK SURGERY      x2    CATARACT REMOVAL      bilateral     CHOLECYSTECTOMY      COLONOSCOPY      EYE SURGERY      FRACTURE SURGERY      Left ankle    HYSTERECTOMY      INSET/CHANGE LOMELI CATHETER - COMPLICATED      suprapubic cath    JOINT REPLACEMENT      Right knee    KNEE SURGERY         FAMILY HISTORY    Family History   Problem Relation Age of Onset    Heart Disease Father     Heart Failure Father     Diabetes Father     Diabetes Daughter     Diabetes Daughter SOCIAL HISTORY    Social History     Tobacco Use    Smoking status: Never Smoker    Smokeless tobacco: Never Used   Substance Use Topics    Alcohol use: Yes     Comment: occassional    Drug use: No       ALLERGIES    Allergies   Allergen Reactions    Sulfa Antibiotics Rash       MEDICATIONS    No current facility-administered medications on file prior to encounter.       Current Outpatient Medications on File Prior to Encounter   Medication Sig Dispense Refill    doxycycline hyclate (VIBRAMYCIN) 100 MG capsule Take 100 mg by mouth 2 times daily Indications: finishes on Monday 05/06/2019      raNITIdine HCl (ZANTAC 150 MAXIMUM STRENGTH PO) Take 150 mg by mouth 2 times daily      docusate (COLACE) 50 MG/5ML liquid Take 50 mg by mouth daily      ferrous sulfate 325 (65 Fe) MG tablet Take 325 mg by mouth daily      insulin lispro (HUMALOG) 100 UNIT/ML injection vial Inject 0-12 Units into the skin 3 times daily (with meals) 1 vial 0    Timolol (TIMOPTIC) 0.5 % (DAILY) SOLN ophthalmic solution Place 1 drop into the right eye 2 times daily       aspirin 325 MG tablet Take 4 tablets by mouth daily      atorvastatin (LIPITOR) 10 MG tablet Take 1 tablet by mouth daily 30 tablet 0    metoprolol tartrate (LOPRESSOR) 25 MG tablet Take 1 tablet by mouth 2 times daily 60 tablet 0    furosemide (LASIX) 40 MG tablet Take 0.5 tablets by mouth daily 60 tablet 0    Biotin (BIOTIN 5000) 5 MG CAPS Take 1 tablet by mouth daily      lipase-protease-amylase (CREON) 67126 units delayed release capsule Take 24,000 Units by mouth 3 times daily (with meals)      gabapentin (NEURONTIN) 300 MG capsule Take 300 mg by mouth daily At noon      isosorbide mononitrate (IMDUR) 30 MG extended release tablet Take 30 mg by mouth daily      venlafaxine (EFFEXOR) 75 MG tablet Take 150 mg by mouth 2 times daily       cetirizine (ZYRTEC) 10 MG tablet Take 10 mg by mouth daily      Cholecalciferol (VITAMIN D3) 2000 units CAPS Take teaching         [] N/A       Electronically signed by Prema Neumann RN, [id number] on 5/6/2019 at 5:16 PM

## 2019-05-06 NOTE — PROGRESS NOTES
Wet to dry dressing done on bilateral leg wounds. Pt tolerated well. Will continue to monitor.   Electronically signed by Sylvia Gallegos RN on 5/6/2019 at 3:10 AM

## 2019-05-06 NOTE — CARE COORDINATION
Received referral and reviewed records. Spoke with PM&R physician and physical therapy. Both feel she could not tolerate an intensive rehab program.  I did let case management know this. Will no longer follow.

## 2019-05-06 NOTE — PLAN OF CARE
Problem: Falls - Risk of:  Goal: Will remain free from falls  Description  Will remain free from falls  5/6/2019 0324 by Stanley Verma RN  Outcome: Ongoing  Note:   Fall risk assessment completed. Fall precautions in place. Call light within reach. Pt educated on calling for assistance before getting up. Walkway free of clutter. Will continue to monitor. 5/5/2019 1641 by Mally Narayanan RN  Outcome: Ongoing  Note:   Bed in lowest position, wheels locked, bed/chair exit alarm in place, call light within reach, and non skid footwear on. Walkway free of clutter. Pt alert and oriented and able to make needs known. Pt educated to use call light when needing to get up, and pt utilizes call light to make needs known. Will continue to monitor. Electronically signed by Mally Narayanan RN on 5/5/2019 at 4:41 PM      Problem: Falls - Risk of:  Goal: Absence of physical injury  Description  Absence of physical injury  Outcome: Ongoing  Note:   Pt is free of injury. No injury noted. Fall precautions in place. Call light within reach. Will monitor. Problem: Risk for Impaired Skin Integrity  Goal: Tissue integrity - skin and mucous membranes  Description  Structural intactness and normal physiological function of skin and  mucous membranes. 5/6/2019 0324 by Stanley Verma RN  Outcome: Ongoing  Note:   Will assess skin every shift and monitor for friction and shearing. Will reposition patient every two hours if unable to do so independently. 5/5/2019 1641 by Mally Narayanan RN  Outcome: Ongoing  Note:   Will monitor skin and mucous membranes. Will turn patient every 2 hours, monitor for friction and sheering, and change dressings as needed. Will preform skin assessment every shift.    Electronically signed by Mally Narayanan RN on 5/5/2019 at 4:42 PM      Problem: Sensory Perception - Impaired:  Goal: Ability to maintain a stable neurologic state will improve  Description  Ability to maintain a stable neurologic state will improve  5/6/2019 0324 by Lyssa Phillips RN  Outcome: Ongoing  Note:   Pt remained alert and oriented throughout shift. 5/5/2019 1641 by Eula Reyna RN  Outcome: Ongoing     Problem: Pain:  Goal: Pain level will decrease  Description  Pain level will decrease  5/6/2019 0324 by Lyssa Phillips RN  Outcome: Ongoing  Note:   Educated patient on pain management. Will assess patients pain level per unit protocol, and provide pain management measures as needed. 5/5/2019 1641 by Eula Reyna RN  Outcome: Ongoing  Note:   Educated patient on pain management. Will assess patients pain level per unit protocol, and provide pain management measures as needed. Electronically signed by Eula Reyna RN on 5/5/2019 at 4:42 PM      Problem: OXYGENATION/RESPIRATORY FUNCTION  Goal: Patient will maintain patent airway  5/6/2019 0324 by Lyssa Phillips RN  Outcome: Ongoing  Note:   Patient has maintained a patent airway, repositions self, lung sounds bilaterally wdl, patient has had adequate fluid intake, no need to suction airway at this time, educated patient to turn and cough and deep breathe every two hours and as needed, encouraged incentive spirometer and patient has been performing. Will continue to monitor and reassess. 5/5/2019 1641 by Eula Reyna RN  Outcome: Ongoing  Note:   Patient remains free of significant airway secretions. Patient able to effectively cough and clear any secretions that need cleared. Will continue to monitor patient throughout shift.   Electronically signed by Eula Reyna RN on 5/5/2019 at 4:42 PM      Problem: OXYGENATION/RESPIRATORY FUNCTION  Goal: Patient will achieve/maintain normal respiratory rate/effort  Description  Respiratory rate and effort will be within normal limits for the patient  Outcome: Ongoing  Note:   Respiratory Rate and effort and pattern is WNLs, wearing 0L NC O2, not home dependent and weaning, patient does not appear air hungry and no anxiety noted this shift, patient is able to ambulate well without dyspnea or sob, planning activities to conserve energy. Will continue to monitor and reassess       Problem: HEMODYNAMIC STATUS  Goal: Patient has stable vital signs and fluid balance  5/6/2019 0324 by Crispin Newman RN  Outcome: Ongoing  Note:   Vital signs stable, respiratory rate normal, heart rate is WNLs and regular on cardiac monitor, +2 pulses and less than 3 second cap refill noted in all extremities, body color appropriate for ethnicity and temperature warm, edema noted to trace, patient repositions  self, and daily weights are ordered. Will continue to monitor and reassess. 5/5/2019 1641 by Barry Urbina RN  Outcome: Ongoing  Note:   Vital signs stable, respiratory rate normal, heart rate is WNLs and regular on cardiac monitor, +2 pulses and less than 3 second cap refill noted in all extremities, body color appropriate for ethnicity and temperature warm, edema noted to BLE, patient repositions  self, and daily weights are ordered and no change from yesterday's weight. MD notified of weight gain. Will continue to monitor and reassess. Electronically signed by Barry Urbina RN on 5/5/2019 at 4:42 PM      Problem: FLUID AND ELECTROLYTE IMBALANCE  Goal: Fluid and electrolyte balance are achieved/maintained  5/6/2019 0324 by Crispin Newman RN  Outcome: Ongoing  Note:   Educated to drink fluids within fluid restriction guidelines and to adequately hydrate, medications administered as ordered, abnormal lab values assessed and reported to physician if critical or pertinent to patient status, skin turgor, mucus membranes, and respiratory status assess this shift and wdl. Patient and Family was included in plan of care. Will continue to monitor and reassess.     5/5/2019 1641 by Barry Urbina RN  Outcome: Ongoing  Note:   Educated to drink fluids within fluid restriction guidelines and to adequately hydrate, medications administered as ordered, abnormal lab values assessed and reported to physician if critical or pertinent to patient status, skin turgor, mucus membranes, and respiratory status assess this shift and WNL. Patient and Family was included in plan of care. Will continue to monitor and reassess.  Electronically signed by Angeles Baldwin RN on 5/5/2019 at 4:43 PM

## 2019-05-06 NOTE — PROGRESS NOTES
Occupational Therapy   Occupational Therapy Initial Assessment  Date: 2019   Patient Name: Parmjit Warren  MRN: [id number]     : 1945    Date of Service: 2019    Assessment: Pt is 66 y.o. F who presents with elevated glucose levels, AMS and UTI. PTA pt lives with daughter in one story home with 2 RAYMOND. Pt has SPV for showers 1x/week and independently sponge bathes, toilets and dresses. Pt reports walking with RW at baseline. PLOF information obtained from previous hospitalization d/t decreased cognition this date. Currently, pt is disoriented and is limited by weakness. Pt completed bed mobility with mod Ax2 and sit <> stand transfers with min Ax2. Pt attempted ambulation with RW however could not advance LEs and therefore use of ternt stedy for mobility. Anticipate pt to require mod/max A for ADL needs at this time. Pt is unsafe to return home and would benefit from continued low frequency therapy to increase mobility and independence prior to return home. Discharge Recommendations:  3-5 sessions per week     Parmjit Warren scored a 13/24 on the AM-PAC ADL Inpatient form. Current research shows that an AM-PAC score of 17 or less is typically not associated with a discharge to the patient's home setting. Based on the patients AM-PAC score and their current ADL deficits, it is recommended that the patient have 3-5 sessions per week of Occupational Therapy at d/c to increase the patients independence. Assessment   Performance deficits / Impairments: Decreased functional mobility ; Decreased safe awareness;Decreased balance;Decreased ADL status; Decreased cognition;Decreased strength;Decreased endurance  Assessment: Pt is 66 y.o. F who presents with elevated glucose levels, AMS and UTI. PTA pt lives with daughter in one story home with 2 RAYMOND. Pt has SPV for showers 1x/week and independently sponge bathes, toilets and dresses. Pt reports walking with RW at baseline.  PLOF information obtained from previous hospitalization d/t decreased cognition this date. Currently, pt is disoriented and is limited by weakness. Pt completed bed mobility with mod Ax2 and sit <> stand transfers with min Ax2. Pt attempted ambulation with RW however could not advance LEs and therefore use of trent stedy for mobility. Anticipate pt to require mod/max A for ADL needs at this time. Pt is unsafe to return home and would benefit from continued low frequency therapy to increase mobility and independence prior to return home. Treatment Diagnosis: impaired func mob, transfers, strength, ADL status, cognition  Prognosis: Fair  Decision Making: Medium Complexity  History: PMH: Afib, CKD, CAD, DM, Suprapubic catheter  Exam: ADLs, transfers, func mob, bed mob  Assistance / Modification: min Ax2 transfers with trent stedy, mod/max A for ADLs  Patient Education: Role of OT, POC, safety   REQUIRES OT FOLLOW UP: Yes  Activity Tolerance  Activity Tolerance: Treatment limited secondary to decreased cognition  Activity Tolerance: Also limited by weakness  Safety Devices  Safety Devices in place: Yes(KYAW Ramirez) aware)  Type of devices: Gait belt;Call light within reach; Chair alarm in place;Nurse notified; Left in chair           Patient Diagnosis(es): The primary encounter diagnosis was Acute cystitis without hematuria. Diagnoses of Lactic acidosis and Hyperglycemia were also pertinent to this visit. has a past medical history of Anemia, Atrial fibrillation ([address] CAD (coronary artery disease), Chronic back pain, CKD (chronic kidney disease), stage III ([address] Diabetes mellitus ([address] Diastolic CHF ([address] Hyperlipidemia, Hypertension, MI (myocardial infarction) ([address] MRSA (methicillin resistant staph aureus) culture positive, MRSA colonization, Obesity (BMI 30-39.9), Osteoarthritis, Pancreatitis, Sepsis ([address] Stasis ulcer ([address] and Suprapubic catheter ([address]    has a past surgical history that includes Hysterectomy; knee surgery; back surgery; Cholecystectomy; Cataract removal; Appendectomy; Colonoscopy; eye surgery; fracture surgery; joint replacement; and Insert/Change Brasher Catheter - Complicat (14/7470). Treatment Diagnosis: impaired func mob, transfers, strength, ADL status, cognition      Restrictions  Restrictions/Precautions  Restrictions/Precautions: Fall Risk, Contact Precautions  Position Activity Restriction  Other position/activity restrictions: BLE wounds; MRSA    Subjective   General  Chart Reviewed: Yes  Patient assessed for rehabilitation services?: Yes  Additional Pertinent Hx: Roshan Perez MD 5/4/19: Pt is \"70 y.o. female who presented to [facility] for uncontrolled blood sugar, patient denies any fever, chills, nausea, vomiting, abdominal pain, cough, shortness of breath. Patient is being treated for bilateral lower extremity ulcers, does have a nurse was visiting at home, no family member when visited in emergency department. Denies any change in her bilateral lower extremity wounds. In emergency Department found to have a mildly elevated lactic acid, her white count is within normal limits, afebrile, found to have a urinary tract infection, we are asked to admit for further management and treatment. \"  Family / Caregiver Present: Tomasz Kekaha)  Referring Practitioner: Fernando Horn MD  Diagnosis: UTI   Subjective  Subjective: Pt met bedside, agreeable for OOB activity and therapy evaluation.    Oxygen Therapy  SpO2: 99 %  O2 Device: None (Room air)     Social/Functional History  Social/Functional History  Lives With: Daughter  Type of Home: House  Home Layout: One level  Home Access: Stairs to enter with rails  Entrance Stairs - Number of Steps: 1-2  Bathroom Shower/Tub: Walk-in shower, Shower chair with back  [employer]: Standard  Bathroom Equipment: (safety frame)  Bathroom Accessibility: Walker accessible  Home Equipment: Rolling walker, [employer], Lift chair, Reacher  ADL Assistance: (supervision with showers and independent with sponge bathing)  Ambulation Assistance: Independent(rolling walker but diminished this last week)  Transfer Assistance: Independent  Active : No       Objective   Vision: Impaired  Vision Exceptions: Wears glasses at all times  Hearing: Within functional limits      Orientation  Overall Orientation Status: Impaired  Orientation Level: Oriented to person;Disoriented to place; Disoriented to time;Disoriented to situation(Partially oriented to person - knew her name and part of birthday )     Balance  Sitting Balance: Contact guard assistance  Standing Balance: Minimal assistance  Standing Balance  Time: ~3 minutes  Activity: Func mob, transfers, static standing. Functional Mobility  Functional Mobility Comments: Attempted stand to RW - stood to RW but unable to advance LEs. Use of trent stedy for mobility at this time. Per friend in room, pt was ambulating around home just before admission. ADL  Toileting: (Catheter at baseline)  Additional Comments: PTA pt has SPV for showers 1x/week and independent with sponge bathing, toileting and dressing. Anticipate pt would require mod/max A for bathing/dressing/toileting at this time d/t decreased cognition. Tone RUE  RUE Tone: Normotonic  Tone LUE  LUE Tone: Normotonic  Coordination  Movements Are Fluid And Coordinated: Yes     Bed mobility  Supine to Sit: Moderate assistance;2 Person assistance  Sit to Supine: Unable to assess(Up in chair at end of session)     Transfers  Sit to stand: Minimal assistance;2 Person assistance  Stand to sit: Minimal assistance;2 Person assistance  Transfer Comments: Min Ax2 for sit <> stand from EOB to RW, sit to chair with armrests then used trent stedy with sit <> stand min Ax2     Cognition  Overall Cognitive Status: Exceptions  Arousal/Alertness: Delayed responses to stimuli  Following Commands: Follows one step commands with increased time; Follows one step commands with repetition  Attention Span: Attends with cues to redirect  Memory: Decreased short term memory;Decreased recall of recent events  Safety Judgement: Decreased awareness of need for assistance;Decreased awareness of need for safety  Problem Solving: Assistance required to generate solutions;Assistance required to implement solutions  Insights: Decreased awareness of deficits  Initiation: Requires cues for some  Sequencing: Requires cues for some        Sensation  Overall Sensation Status: WFL        LUE AROM (degrees)  LUE AROM : WFL  Left Hand AROM (degrees)  Left Hand AROM: WFL  RUE AROM (degrees)  RUE AROM : WFL  Right Hand AROM (degrees)  Right Hand AROM: WFL     LUE Strength  LUE Strength Comment: Grossly 4-/5   RUE Strength  RUE Strength Comment: Grossly 4-/5           Plan   Plan  Times per week: 3-5  Times per day: Daily  Current Treatment Recommendations: Strengthening, Safety Education & Training, Balance Training, Functional Mobility Training, Endurance Training, Equipment Evaluation, Education, & procurement, Patient/Caregiver Education & Training, Self-Care / ADL      AM-PAC Score    Ria Jack scored a 13/24 on the AM-[facility] ADL Inpatient form. Current research shows that an AM-PAC score of 17 or less is typically not associated with a discharge to the patient's home setting. Based on the patients AM-PAC score and their current ADL deficits, it is recommended that the patient have 3-5 sessions per week of Occupational Therapy at d/c to increase the patients independence.      AM-PAC Inpatient Daily Activity Raw Score: 13  AM-PAC Inpatient ADL T-Scale Score : 32.03  ADL Inpatient CMS 0-100% Score: 63.03  ADL Inpatient CMS G-Code Modifier : CL    Goals  Short term goals  Time Frame for Short term goals: prior to d/c  Short term goal 1: Pt will complete sit <> stand with CGA  Short term goal 2: Pt will complete bathing with min A  Short term goal 3: Pt will complete dressing with min A   Short term goal 4: Pt will complete toileting with min A  Short term goal 5: Pt will complete grooming with setup   Patient Goals   Patient goals : Pt unable to formulate goal at this time d/t decreased cognition - did report during session \"I do not want to go to rehab\"       Therapy Time   Individual Concurrent Group Co-treatment   Time In       1110   Time Out       1150   Minutes       40   Timed Code Treatment Minutes: 25 Minutes(15 min eval )     If pt is discharged prior to next OT session, this note will serve as the discharge summary.     Tayo Boyle

## 2019-05-06 NOTE — CARE COORDINATION
[facility]  Patient is active with [facility].   Casie Auguste LPN  CTN with  [facility],  [address], Fax [phone]

## 2019-05-06 NOTE — CARE COORDINATION
5/6  Met with patient to discuss DC needs earlier today-- patient very sleepy--barely able to stay awake during my visit-- she requested I speak with her daughter Meliza # [phone]. PT/OT char noted--   Date of Service: 5/6/2019     Assessment: Pt is 66 y.o. F who presents with elevated glucose levels, AMS and UTI. PTA pt lives with daughter in one story home with 2 RAYMOND. Pt has SPV for showers 1x/week and independently sponge bathes, toilets and dresses. Pt reports walking with RW at baseline. PLOF information obtained from previous hospitalization d/t decreased cognition this date. Currently, pt is disoriented and is limited by weakness. Pt completed bed mobility with mod Ax2 and sit <> stand transfers with min Ax2. Pt attempted ambulation with RW however could not advance LEs and therefore use of trent stedy for mobility. Anticipate pt to require mod/max A for ADL needs at this time. Pt is unsafe to return home and would benefit from continued low frequency therapy to increase mobility and independence prior to return home.      Discharge Recommendations:  3-5 sessions per week  Mekhirosales Lopezlino scored a 13/24 on the AM-PAC ADL Inpatient form. Current research shows that an AM-PAC score of 17 or less is typically not associated with a discharge to the patient's home setting. Based on the patients AM-PAC score and their current ADL deficits, it is recommended that the patient have 3-5 sessions per week of Occupational Therapy at d/c to increase the patients independence.       Rehab nicolle noted -- I called and spoke w/ Meliza (daughter) who states she will not be able to handle her mother at home . I informed her that [address] [place] [address] denied . We discussed SNF and the fact that her mother is under an OBS status and will not have met the 3 day IP criteria required for a Medicare SNF stay. Meliza states that her mother cannot afford a Skilled nursing facility .   I discussed referral for [facility] - she would like us to try them . Referral made to [facility] # [phone]     PCP-Dr Denise Moctezuma    Will follow.   .Electronically signed by Rojelio Og on 5/6/2019 at 1:22 PM

## 2019-05-06 NOTE — PLAN OF CARE
Problem: Falls - Risk of:  Goal: Will remain free from falls  Description  Will remain free from falls  5/6/2019 1145 by Carline Gutierrez RN  Outcome: Ongoing  Note:   Patient remains free from falls during this shift. Will continue to implement fall precautions. Problem: Falls - Risk of:  Goal: Absence of physical injury  Description  Absence of physical injury  5/6/2019 1145 by Carline Gutierrez RN  Outcome: Ongoing  Note:   Patient remains free from physical injury during this shift. Will continue to monitor. Problem: Risk for Impaired Skin Integrity  Goal: Tissue integrity - skin and mucous membranes  Description  Structural intactness and normal physiological function of skin and  mucous membranes. 5/6/2019 1145 by Carline Gutierrez RN  Outcome: Ongoing  Note:   Patient skin integrity and mucus membrane condition remains unchanged at this time. Problem: Sensory:  Goal: General experience of comfort will improve  Description  General experience of comfort will improve  5/6/2019 1145 by Carline Gutierrez RN  Outcome: Ongoing  Note:   Patient denies pain at this time. Comfort interventions initiated. Will continue to monitor patient status. Problem: Urinary Elimination:  Goal: Signs and symptoms of infection will decrease  Description  Signs and symptoms of infection will decrease  5/6/2019 1145 by Carline Gutierrez RN  Outcome: Ongoing  Note:   Patient does not present with any new signs or symptoms of infection at this time. Will continue to monitor . Problem: Urinary Elimination:  Goal: Ability to reestablish a normal urinary elimination pattern will improve - after catheter removal  Description  Ability to reestablish a normal urinary elimination pattern will improve  5/6/2019 1145 by Carline Gutierrez RN  Outcome: Ongoing  Note:   Patient continues use of a suprapubic catheter. Catheter has good output and remains free from signs of infection. Will continue to monitor patient.       Problem: Urinary Elimination:  Goal: Complications related to the disease process, condition or treatment will be avoided or minimized  Description  Complications related to the disease process, condition or treatment will be avoided or minimized  5/6/2019 1145 by Sergo Guillen RN  Outcome: Ongoing     Problem: Discharge Planning:  Goal: Discharged to appropriate level of care  Description  Discharged to appropriate level of care  5/6/2019 1145 by Sergo Guillen RN  Outcome: Ongoing  Note:   Patient has d/c order in for today. Awaiting analysis of home care needs before d/c is complete. Problem: Serum Glucose Level - Abnormal:  Goal: Ability to maintain appropriate glucose levels will improve  Description  Ability to maintain appropriate glucose levels will improve  5/6/2019 1145 by Sergo Guillen RN  Outcome: Ongoing  Note:   Patient glucose levels are corrected with insulin at this time. Problem: Sensory Perception - Impaired:  Goal: Ability to maintain a stable neurologic state will improve  Description  Ability to maintain a stable neurologic state will improve  5/6/2019 1145 by Sergo Guillen RN  Outcome: Ongoing  Note:   Patient oriented only to person at this time. Patient re-oriented to place, situation, and time. Will continue to re-orient as appropriate. Problem: Pain:  Goal: Pain level will decrease  Description  Pain level will decrease  5/6/2019 1145 by Sergo Guillen RN  Outcome: Ongoing  Note:   Patient pain controlled with pharmacologic and non-pharmacologic interventions at this time. Problem: Pain:  Goal: Control of acute pain  Description  Control of acute pain  5/6/2019 1145 by Sergo Guileln RN  Outcome: Ongoing  Note:   Patient pain controlled with pharmacologic and non-pharmacologic interventions at this time.       Problem: Pain:  Goal: Control of chronic pain  Description  Control of chronic pain  5/6/2019 1145 by Sergo Guillen RN  Outcome: Ongoing  Note:   Patient pain controlled with pharmacologic and non-pharmacologic interventions at this time. Problem: OXYGENATION/RESPIRATORY FUNCTION  Goal: Patient will maintain patent airway  5/6/2019 1145 by Lexi Christy RN  Outcome: Ongoing  Note:   Patient maintains a patent airway at this time. Will continue to monitor. Problem: OXYGENATION/RESPIRATORY FUNCTION  Goal: Patient will achieve/maintain normal respiratory rate/effort  Description  Respiratory rate and effort will be within normal limits for the patient  5/6/2019 1145 by Lexi Christy RN  Outcome: Ongoing  Note:   Patient VSS. Respiratory rate and effort remain within normal limits. Problem: HEMODYNAMIC STATUS  Goal: Patient has stable vital signs and fluid balance  5/6/2019 1145 by Lexi Christy RN  Outcome: Ongoing  Note:   Patient VSS. Patient does not present with any signs or symptoms of fluid imbalance at this time. Problem: FLUID AND ELECTROLYTE IMBALANCE  Goal: Fluid and electrolyte balance are achieved/maintained  5/6/2019 1145 by Lexi Christy RN  Outcome: Ongoing  Note:   Patient VSS. Patient does not present with any signs or symptoms of fluid imbalance at this time. Problem: ACTIVITY INTOLERANCE/IMPAIRED MOBILITY  Goal: Mobility/activity is maintained at optimum level for patient  5/6/2019 1145 by Lexi Christy RN  Outcome: Ongoing  Note:   Patient transfers as a steady x1-2 r/t BLE weakness. Will continue to assist patient with safe transfers.

## 2019-05-06 NOTE — DISCHARGE INSTR - COC
Continuity of Care Form    Patient Name: Selma Perez   :  1945  MRN:  [id number]    Admit date:  2019  Discharge date:  2019    Code Status Order: Prior   Advance Directives:   [address] Directive Type of Healthcare Directive Copy in [address] Agent's Name Healthcare Agent's Phone Number    [phone]  Yes, patient has an advance directive for healthcare treatment  Durable power of  for health care; Health care treatment directive  No, copy requested from family  Adult [facility]  [phone]          Admitting Physician:  Moira Zapata MD  PCP: Austin Ventura    Discharging Nurse: [facility] Unit/Room#: H7T-3028/2308-72  Discharging Unit Phone Number: [phone]    Emergency Contact:   Extended Emergency Contact Information  Primary Emergency Contact: Meliza Patel  Address: [address] Phone: [phone]  Relation: Child  Secondary Emergency Contact: Paige Dickson[address] Phone: [phone]  Relation: Child    Past Surgical History:  Past Surgical History:   Procedure Laterality Date    APPENDECTOMY      BACK SURGERY      x2    CATARACT REMOVAL      bilateral     CHOLECYSTECTOMY      COLONOSCOPY      EYE SURGERY      FRACTURE SURGERY      Left ankle    HYSTERECTOMY      INSET/CHANGE LOMELI CATHETER - COMPLICATED      suprapubic cath    JOINT REPLACEMENT      Right knee    KNEE SURGERY         Immunization History:   Immunization History   Administered Date(s) Administered    Influenza Vaccine, unspecified formulation 10/08/2010, 08/15/2014, 2017, 2018    Influenza, High Dose (Fluzone 65 yrs and older) 2018    Influenza, Kenzie Gamez, 3 Years and older, IM (Fluzone 3 yrs and older or Afluria 5 yrs and older) 2017    Pneumococcal 13-valent Conjugate (Omvmxqf12) 10/15/2018    Pneumococcal Polysaccharide (Diqnnkdqx13) 08/15/2016       Active Problems:  Patient Active Problem List   Diagnosis Code    Acute on chronic renal insufficiency N28.9, N18.9    UTI (urinary tract infection) N39.0    FTT (failure to thrive) in adult R62.7    Type 1 diabetes mellitus with hyperglycemia (HCC) E10.65    Chronic venous hypertension (idiopathic) with ulcer and inflammation of bilateral lower extremity ([facility]) I87.333, L97.919, L97.929    DMII (diabetes mellitus, type 2) ([facility]) E11.9    Cellulitis of left lower extremity L03. 116    Protein-calorie malnutrition, moderate ([facility]) E44.0    MRSA colonization Z22.322    Chronic diastolic heart failure ([facility]) I50.32    Coronary artery disease involving native coronary artery of native heart without angina pectoris I25.10    Aortic atherosclerosis ([facility]) I70.0    Essential hypertension I10    Cellulitis, lower extremities bilaterally L03.90    MRSA nasal colonization Z22.322    Normocytic anemia D64.9    Altered mental status R41.82    Elevated lactic acid level R79.89    Uncontrolled diabetes mellitus ([facility]) E11.65       Isolation/Infection:   Isolation          Contact        Patient Infection Status     Infection Encounter Level?  Onset Date Added Added By Resolved Resolved By Review Date    MRSA No  04/16/18 Lydia Darden RN       Nares colonization 10/15/2018          Nurse Assessment:  Last Vital Signs: BP (!) 143/83   Pulse 76   Temp 98.5 °F (36.9 °C) (Oral)   Resp 16   Ht 4' 11\" (1.499 m)   Wt 192 lb 14.4 oz (87.5 kg)   SpO2 94%   BMI 38.96 kg/m²     Last documented pain score (0-10 scale): Pain Level: 0  Last Weight:   Wt Readings from Last 1 Encounters:   05/05/19 192 lb 14.4 oz (87.5 kg)     Mental Status:  alert and oriented to place and person, not time or situation     IV Access:  - None    Nursing Mobility/ADLs:  Walking   Dependent  Transfer  Dependent  Bathing Assisted  Dressing  Assisted  Toileting  Dependent  Feeding  Independent  Med Admin  Dependent  Med Delivery   whole    Wound Care Documentation and Therapy:  Wound 11/19/18 Venous ulcer Leg Left; Anterior (Active)   Number of days: 167       Wound 04/12/18 Venous ulcer Leg Right; Anterior (Active)   Number of days: 388       Wound 04/12/18 Buttocks Medial;Left (Active)   Number of days: 388       Wound 04/12/18 Buttocks Left;Medial (Active)   Number of days: 388       Wound 04/12/18 Buttocks Right;Medial (Active)   Number of days: 388       Wound 10/15/18 Leg Left; Lower (Active)   Number of days: 202       Wound 10/15/18 Leg Right;Medial;Lower (Active)   Number of days: 202       Wound 10/15/18 Buttocks Left (Active)   Number of days: 202       Wound 10/15/18 Ischium Left (Active)   Number of days: 202       Incision 05/21/18 Abdomen (Active)   Number of days: 349       Wound 05/05/19 Leg Right;Left multiple weeping ulcers, red, yellow, pink (Active)   Wound Venous 5/6/2019  8:38 AM   Dressing Status Clean;Dry; Intact 5/6/2019  8:38 AM   Dressing Changed Changed/New 5/6/2019  3:17 AM   Dressing/Treatment Moist to dry 5/6/2019  8:38 AM   Wound Cleansed Rinsed/Irrigated with saline; Wound cleanser 5/6/2019  3:17 AM   Dressing Change Due 05/06/19 5/6/2019  8:38 AM   Wound Assessment SAMARA 5/6/2019  8:38 AM   Drainage Amount SAMARA 5/6/2019  8:38 AM   Drainage Description Serosanguinous 5/6/2019  3:17 AM   Odor None 5/6/2019  3:17 AM   Marilynn-wound Assessment SAMARA 5/6/2019  8:38 AM   Number of days: 1        Elimination:  Continence:   · Bowel: Yes  · Bladder: No  Urinary Catheter: Suprapubic catheter   Colostomy/Ileostomy/Ileal Conduit: No       Date of Last BM: 5/6/2019    Intake/Output Summary (Last 24 hours) at 5/6/2019 1031  Last data filed at 5/6/2019 0559  Gross per 24 hour   Intake 420 ml   Output 875 ml   Net -455 ml     I/O last 3 completed shifts:   In: 900 [P.O.:900]  Out: [address] [Urine:1125]    Safety Concerns: History of Falls (last 30 days) and At Risk for Falls    Impairments/Disabilities:      Vision    Nutrition Therapy:  Current Nutrition Therapy:   - Oral Diet:  Carb Control 4 carbs/meal (1800kcals/day)    Routes of Feeding: Oral  Liquids: No Restrictions  Daily Fluid Restriction: no  Last Modified Barium Swallow with Video (Video Swallowing Test): not done    Treatments at the Time of Hospital Discharge:   Respiratory Treatments: none   Oxygen Therapy:  is not on home oxygen therapy. Ventilator:    - No ventilator support    Rehab Therapies: Nurse, PT-OT-HHA  Weight Bearing Status/Restrictions: No weight bearing restirctions  Other Medical Equipment (for information only, NOT a DME order):   Ann Rater at home, in the hospital a [facility] x2 staff assist   Wound care Treatment: Right and left lower leg   Cleanse wound with Normal Saline   Pat completely dry   Apply thin layer of Wound Gel   Secure with Curad Non-adherent gauze, rolled gauze, and ace wrap   Change dressing DAILY    Other Treatments: HOME HEALTH CARE: LEVEL 4 SICK   Home health agency to establish plan of care for patient over 60 day period  Nursing:  Initial home SN evaluation visit to occur within 24-48 hours of hospital discharge  [facility] nursing visits daily, then QOD with progression as warranted for:  1)  medication management  2)  VS and clinical assessment  3)  S&S chronic disease exacerbation education + when to contact MD/NP  4)  care coordination  Medication Reconciliation during 1st SN visit  Nurse telephone visit on the days that visit is not being made in person for first 30 days                               PT/OT/Speech   Evaluations in home within 24-48 hours of hospital discharge to include DME and home safety   Frontload therapy 5 days, then 3x a week   OT evaluation for [address] needs for personal care    Palliative Care referral within 5 days of hospital discharge    evaluation within 24-48 hours of hospital discharge to evaluate resources & insurance for potential  [place], [place], LTC, and Medicaid options   Dietician evaluation within 5 days of hospital discharge   PCP visit within 3 days of hospital discharge, followed by PCP visit @ 10 days, and 21 days   TeleHealth ([facility]) if patient agreeable and qualifies. Patient's personal belongings (please select all that are sent with patient):  Marisol Valentine RN SIGNATURE:  Electronically signed by Halina Greenfield RN 05/07/19 14:00    CASE MANAGEMENT/SOCIAL WORK SECTION    Inpatient Status Date:     Readmission Risk Assessment Score:  26    Discharging to Facility/ Agency   Name:  [facility] care    Address: [address] [place] [address], [address] [place] [address]  Phone: [phone]  Fax: [phone]    / signature: Electronically signed by Sarita Dunbar on 5/6/19 at 10:41 AM    PHYSICIAN SECTION    Prognosis: Good    Condition at Discharge: Stable    Rehab Potential (if transferring to Rehab): Good    Recommended Labs or Other Treatments After Discharge: PT, OT, HHA, Vn    Physician Certification: I certify the above information and transfer of Parmjit Warren  is necessary for the continuing treatment of the diagnosis listed and that she requires Home Care for less 30 days.      Update Admission H&P: No change in H&P    PHYSICIAN SIGNATURE:  Electronically signed by Dee Curtis MD on 5/6/19 at 10:46 AM

## 2019-05-06 NOTE — PROGRESS NOTES
Patient resting in bed at this time. Patient denies any physical or emotional needs. IV clean, dry, and intact. Fall precautions in place. Call light, telephone, and bed side table within reach. Will continue to monitor.

## 2019-05-06 NOTE — DISCHARGE INSTR - DIET

## 2019-05-06 NOTE — DISCHARGE SUMMARY
Take 1 tablet by mouth 2 times daily for 3 days  Qty: 6 tablet, Refills: 0              Details   doxycycline hyclate (VIBRAMYCIN) 100 MG capsule Take 100 mg by mouth 2 times daily Indications: finishes on Monday 05/06/2019      raNITIdine HCl (ZANTAC 150 MAXIMUM STRENGTH PO) Take 150 mg by mouth 2 times daily      docusate (COLACE) 50 MG/5ML liquid Take 50 mg by mouth daily      ferrous sulfate 325 (65 Fe) MG tablet Take 325 mg by mouth daily      !! insulin lispro (HUMALOG) 100 UNIT/ML injection vial Inject 0-12 Units into the skin 3 times daily (with meals)  Qty: 1 vial, Refills: 0      Timolol (TIMOPTIC) 0.5 % (DAILY) SOLN ophthalmic solution Place 1 drop into the right eye 2 times daily       aspirin 325 MG tablet Take 4 tablets by mouth daily      atorvastatin (LIPITOR) 10 MG tablet Take 1 tablet by mouth daily  Qty: 30 tablet, Refills: 0      metoprolol tartrate (LOPRESSOR) 25 MG tablet Take 1 tablet by mouth 2 times daily  Qty: 60 tablet, Refills: 0      furosemide (LASIX) 40 MG tablet Take 0.5 tablets by mouth daily  Qty: 60 tablet, Refills: 0      Biotin (BIOTIN 5000) 5 MG CAPS Take 1 tablet by mouth daily      lipase-protease-amylase (CREON) 18039 units delayed release capsule Take 24,000 Units by mouth 3 times daily (with meals)      gabapentin (NEURONTIN) 300 MG capsule Take 300 mg by mouth daily At noon      isosorbide mononitrate (IMDUR) 30 MG extended release tablet Take 30 mg by mouth daily      venlafaxine (EFFEXOR) 75 MG tablet Take 150 mg by mouth 2 times daily       cetirizine (ZYRTEC) 10 MG tablet Take 10 mg by mouth daily      Cholecalciferol (VITAMIN D3) 2000 units CAPS Take 2,000 Units by mouth daily      insulin glargine (LANTUS) 100 UNIT/ML injection vial Inject 10 Units into the skin nightly  Qty: 1 vial, Refills: 0      !! insulin lispro (HUMALOG) 100 UNIT/ML injection vial Inject 0-6 Units into the skin nightly  Qty: 1 vial, Refills: 0      !! insulin lispro (HUMALOG) 100 UNIT/ML injection vial Inject 5 Units into the skin 3 times daily (with meals)  Qty: 1 vial, Refills: 0      LORazepam (ATIVAN) 0.5 MG tablet Take 0.5 mg by mouth nightly. .      sennosides-docusate sodium (SENOKOT-S) 8.6-50 MG tablet Take 1 tablet by mouth daily      Calcium Carbonate Antacid (TUMS ULTRA 1000) 1000 MG CHEW Take 1 tablet by mouth daily as needed (acid reflux)      polyethylene glycol (GLYCOLAX) packet Take 17 g by mouth daily       ! ! - Potential duplicate medications found. Please discuss with provider. No future appointments. Time Spent on discharge is more than 30 minutes in the examination, evaluation, counseling and review of medications and discharge plan. Signed:    Estefany Casey MD   5/6/2019      Thank you Austin Ventura for the opportunity to be involved in this patient's care. If you have any questions or concerns please feel free to contact me at [phone].

## 2019-05-06 NOTE — CARE COORDINATION
5/6 patient accepted at [facility]- patient transport arranged with [address] for 8:30pm. Nurse to call report to #[phone]. Patient and daughter aware and agreeable. Electronically signed by Oniel Alatorre on 5/6/2019 at 4:54 PM

## 2019-05-07 VITALS
WEIGHT: 192.9 LBS | RESPIRATION RATE: 16 BRPM | TEMPERATURE: 97.7 F | SYSTOLIC BLOOD PRESSURE: 150 MMHG | HEART RATE: 79 BPM | BODY MASS INDEX: 38.89 KG/M2 | DIASTOLIC BLOOD PRESSURE: 52 MMHG | OXYGEN SATURATION: 97 % | HEIGHT: 59 IN

## 2019-05-07 LAB
EKG ATRIAL RATE: 68 BPM
EKG DIAGNOSIS: NORMAL
EKG P AXIS: 47 DEGREES
EKG P-R INTERVAL: 172 MS
EKG Q-T INTERVAL: 418 MS
EKG QRS DURATION: 84 MS
EKG QTC CALCULATION (BAZETT): 444 MS
EKG R AXIS: 2 DEGREES
EKG T AXIS: 92 DEGREES
EKG VENTRICULAR RATE: 68 BPM
GLUCOSE BLD-MCNC: 285 MG/DL (ref 70–99)
GLUCOSE BLD-MCNC: 383 MG/DL (ref 70–99)
PERFORMED ON: ABNORMAL
PERFORMED ON: ABNORMAL

## 2019-05-07 PROCEDURE — 96372 THER/PROPH/DIAG INJ SC/IM: CPT

## 2019-05-07 PROCEDURE — 6360000002 HC RX W HCPCS: Performed by: INTERNAL MEDICINE

## 2019-05-07 PROCEDURE — G0378 HOSPITAL OBSERVATION PER HR: HCPCS

## 2019-05-07 PROCEDURE — 6370000000 HC RX 637 (ALT 250 FOR IP): Performed by: INTERNAL MEDICINE

## 2019-05-07 PROCEDURE — 97116 GAIT TRAINING THERAPY: CPT

## 2019-05-07 PROCEDURE — 97530 THERAPEUTIC ACTIVITIES: CPT

## 2019-05-07 PROCEDURE — 97535 SELF CARE MNGMENT TRAINING: CPT

## 2019-05-07 RX ORDER — INSULIN GLARGINE 100 [IU]/ML
15 INJECTION, SOLUTION SUBCUTANEOUS NIGHTLY
Status: DISCONTINUED | OUTPATIENT
Start: 2019-05-07 | End: 2019-05-07 | Stop reason: HOSPADM

## 2019-05-07 RX ADMIN — MICONAZOLE NITRATE: 20 POWDER TOPICAL at 09:12

## 2019-05-07 RX ADMIN — TIMOLOL MALEATE 1 DROP: 5 SOLUTION OPHTHALMIC at 09:12

## 2019-05-07 RX ADMIN — PANCRELIPASE 2 CAPSULE: 60000; 12000; 38000 CAPSULE, DELAYED RELEASE PELLETS ORAL at 09:11

## 2019-05-07 RX ADMIN — ATORVASTATIN CALCIUM 10 MG: 10 TABLET, FILM COATED ORAL at 09:11

## 2019-05-07 RX ADMIN — INSULIN LISPRO 10 UNITS: 100 INJECTION, SOLUTION INTRAVENOUS; SUBCUTANEOUS at 09:16

## 2019-05-07 RX ADMIN — ISOSORBIDE MONONITRATE 30 MG: 30 TABLET, EXTENDED RELEASE ORAL at 09:11

## 2019-05-07 RX ADMIN — METOPROLOL TARTRATE 25 MG: 25 TABLET ORAL at 09:11

## 2019-05-07 RX ADMIN — FERROUS SULFATE TAB EC 324 MG (65 MG FE EQUIVALENT) 324 MG: 324 (65 FE) TABLET DELAYED RESPONSE at 09:11

## 2019-05-07 RX ADMIN — PANCRELIPASE 2 CAPSULE: 60000; 12000; 38000 CAPSULE, DELAYED RELEASE PELLETS ORAL at 13:01

## 2019-05-07 RX ADMIN — FAMOTIDINE 20 MG: 20 TABLET ORAL at 09:11

## 2019-05-07 RX ADMIN — ASPIRIN 325 MG ORAL TABLET 325 MG: 325 PILL ORAL at 09:11

## 2019-05-07 RX ADMIN — VENLAFAXINE 150 MG: 37.5 TABLET ORAL at 09:11

## 2019-05-07 RX ADMIN — INSULIN LISPRO 10 UNITS: 100 INJECTION, SOLUTION INTRAVENOUS; SUBCUTANEOUS at 13:01

## 2019-05-07 RX ADMIN — INSULIN LISPRO 10 UNITS: 100 INJECTION, SOLUTION INTRAVENOUS; SUBCUTANEOUS at 13:02

## 2019-05-07 RX ADMIN — HEPARIN SODIUM 5000 UNITS: 5000 INJECTION INTRAVENOUS; SUBCUTANEOUS at 05:28

## 2019-05-07 RX ADMIN — GABAPENTIN 300 MG: 300 CAPSULE ORAL at 13:01

## 2019-05-07 RX ADMIN — INSULIN LISPRO 6 UNITS: 100 INJECTION, SOLUTION INTRAVENOUS; SUBCUTANEOUS at 09:13

## 2019-05-07 ASSESSMENT — PAIN SCALES - GENERAL
PAINLEVEL_OUTOF10: 0
PAINLEVEL_OUTOF10: 0

## 2019-05-07 NOTE — CARE COORDINATION
[facility] Continence Nurse  Follow-up Progress Note       NAME:  Imelda Linares  MEDICAL RECORD NUMBER:  [id number]  AGE:  66 y.o. GENDER:  female  :  1945  TODAY'S DATE:  2019    Subjective: Follow up to change BLE dressing to evaluate current treatment. Large amounts for sanguinous drainage noted from bilateral leg wounds with blood clot formation. Treatment plan changed to Xeroform gauze as the contact layer. See wound details, measurements, photos, and plan of care below. Wound Identification:  Wound Type: venous  Contributing Factors: edema, venous stasis, chronic pressure, decreased mobility, shear force and obesity        Patient Goal of Care:  [x] Wound Healing  [] Odor Control  [] Palliative Care  [] Pain Control   [] Other:     Objective:    BP (!) 150/52   Pulse 79   Temp 97.7 °F (36.5 °C) (Oral)   Resp 16   Ht 4' 11\" (1.499 m)   Wt 192 lb 14.4 oz (87.5 kg)   SpO2 97%   BMI 38.96 kg/m²   Jose Risk Score: Jose Scale Score: 17 at risk   Assessment:   Measurements:  Wound 19 Leg Right (Active)   Wound Image   2019  5:12 PM   Wound Venous 2019  1:36 PM   Dressing Status Changed 2019  1:36 PM   Dressing Changed Changed/New 2019  1:36 PM   Dressing/Treatment Xeroform;ABD;Ace Wrap;Dry Dressing 2019  1:36 PM   Wound Cleansed Rinsed/Irrigated with saline 2019  1:36 PM   Dressing Change Due 19  1:36 PM   Wound Length (cm) 13 cm 2019  5:12 PM   Wound Width (cm) 6.5 cm 2019  5:12 PM   Wound Depth (cm) 0.1 cm 2019  5:12 PM   Wound Surface Area (cm^2) 84.5 cm^2 2019  5:12 PM   Wound Volume (cm^3) 8.45 cm^3 2019  5:12 PM   Wound Assessment Bleeding;Red 2019  1:36 PM   Drainage Amount Large 2019  1:36 PM   Drainage Description Sanguinous 2019  1:36 PM   Odor None 2019  1:36 PM   Marilynn-wound Assessment Dry; Intact 2019  1:36 PM   Non-staged Wound Description Partial thickness 5/7/2019  1:36 PM   Red%Wound Bed 100 5/7/2019  1:36 PM   Culture Taken No 5/6/2019  5:12 PM   Number of days: 2       Wound 05/05/19 Leg Left;Mid (Active)   Wound Image   5/7/2019  1:36 PM   Wound Venous 5/7/2019  1:36 PM   Dressing Status Changed 5/7/2019  1:36 PM   Dressing Changed Changed/New 5/7/2019  1:36 PM   Dressing/Treatment Xeroform;ABD;Dry Dressing; Ace Wrap 5/7/2019  1:36 PM   Wound Cleansed Rinsed/Irrigated with saline 5/7/2019  1:36 PM   Dressing Change Due 05/08/19 5/7/2019  1:36 PM   Wound Length (cm) 12 cm 5/6/2019  5:05 PM   Wound Width (cm) 8 cm 5/6/2019  5:05 PM   Wound Depth (cm) 0.1 cm 5/6/2019  5:05 PM   Wound Surface Area (cm^2) 96 cm^2 5/6/2019  5:05 PM   Wound Volume (cm^3) 9.6 cm^3 5/6/2019  5:05 PM   Wound Assessment Bleeding;Red 5/7/2019  1:36 PM   Drainage Amount Large 5/7/2019  1:36 PM   Drainage Description Sanguinous 5/7/2019  1:36 PM   Odor None 5/7/2019  1:36 PM   Non-staged Wound Description Partial thickness 5/7/2019  1:36 PM   Red%Wound Bed 100 5/7/2019  1:36 PM   Culture Taken No 5/6/2019  5:05 PM   Number of days: 1      5/6/19 Yesterday wound appearance to left lower leg ([facility] wound bed)        5/7/19 Large amounts for sanguinous drainage with blood clot formation (wound bed appearance due to bleeding)      Response to treatment:  Well tolerated by patient. Pain Assessment:  Severity:  0 / 10  Quality of pain: N/A  Wound Pain Timing/Severity: none  Premedicated: No  Plan:   Plan of Care: Wound 05/05/19 Leg Left;Mid-Dressing/Treatment: Xeroform, ABD, Dry Dressing, Ace Wrap  Wound 05/05/19 Leg Right-Dressing/Treatment: Xeroform, ABD, Ace Wrap, Dry Dressing      1. Implement Jose prevention orders. See orders for prevention order details   2. Implement Wound Care orders. See wound care orders for dressing change instructions   BLE: Xeroform gauze single layer secured with ABD pad, rolled gauze, and ace wrap from foot to knees DAILY  3.  Will continue to follow   4. Apply Medline Boots (black) to BLE while in bed at all times and/ or when heels resting on surface. (ex. recliner chair foot rest). Check heel placement in heel protector boot every 2 hours   5. Use the Wedge Pillow to reposition the patient while in bed. Keep Patient OFF BACK while in bed   Turn patient to left and right lying positions while in bed   6. Consider Outpatient [address] [place] [address] for for leg care follow-up   7. Consider discontinuing Subq Heparin for DVT prophylactics and Asprin due to bleeding for wounds. Specialty Bed Required : No   [] Low Air Loss   [] Pressure Redistribution  [] Fluid Immersion  [] Bariatric  [] Total Pressure Relief  [] Other:     Current Diet: DIET CARB CONTROL;   Dietician consult:  Yes    Discharge Plan:  Placement for patient upon discharge: home with support   Patient appropriate for Outpatient [address] [place] [address]: Yes    Referrals:  []   [] [address] [facility] [employer] [facility]  [] Supplies  [] Other    Patient/Caregiver Teaching:  Level of patient/caregiver understanding able to: Spoke with patient, patient's daughter Lauro Cartwright, and bedside nurse Sterling Carter about plan of care   [x] Indicates understanding       [x] Needs reinforcement  [] Unsuccessful      [] Verbal Understanding  [] Demonstrated understanding       [] No evidence of learning  [] Refused teaching         [] N/A       Electronically signed by Sara Callahan RN, [address] on 5/7/2019 at 1:38 PM

## 2019-05-07 NOTE — PROGRESS NOTES
Physical Therapy    Peggyjoaquin Rizviling  2019  Returned to room, along with OT,  a second time to assess further her mobility.  Her daughter indicates the patient walked with her in room earlier a short distance and would like another observation to determine needs for discharge.  -at arrival patient sitting in recliner and agreeable to PT OT reassessments  -sit to stand to rolling walker at [facility] and cues for hand placement (she pulls herself up using the walker versus the arm rests)  -ambulation on rolling walker at light cga with cues to stay in the walker   -she tends to leave the walker too far away when backing up   -balance without contact to the walker is poor  -she is not fully oriented and is slow to answer orientation questions beyond name and   -at this time recommend to carry on with the plan for Acute Rehab stay as arranged at [facility]   -if decision, by patient and her daughter,  is for discharge to home then recommend she have 24 hour assist and supervision until Home Therapies (PT and OT) in front loaded fashion indicate safe to be home alone for some periods of time as prior to admission    -at conclusion, patient in recliner with chair alarm on and needs in reach  -nursing, MD and case management informed    Electronically signed by Christie Courtney PT on 2019 at 2:14 PM

## 2019-05-07 NOTE — PROGRESS NOTES
Occupational Therapy  Rex Roa  [id number]  [id number]    Request made to see patient again this date per daughter. Patient seated in recliner chair upon arrival to room with PT. Patient more alert however still requires cues for place, date and day of the week. Patient is able to state . Sit <> stand with CGA and cues for hand placement. Transfer to recliner chair, comfort height commode and BSC over commode with CGA and cues for hand placement. While seated on commode able to empty thornton bag into graduated container with Min A. Patient requires increased time to problem solve opening thornton clip. Functional mobility with RW with CGA and poor safety. Patient bumps items on right side. 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 Patient does not follow safety or hand placement techniques. Patient continues to demonstrate decreased cognition. Patient will require 24 hour hands on assistance with discharge to home with daughter. Recommend Level 4 home OT. Please see last daily note for discharge status.      Electronically signed by Mau Kee, [id number] on 2019 at 2:07 PM      Therapy Time     Individual Co-treatment   Time In 662    Time Out 6621    OXVYCSF 05

## 2019-05-07 NOTE — PROGRESS NOTES
Patient alert and oriented, discharged to home with documented belongings. IV removed 5/6 with no complications. Transported out of [facility] by wheelchair by daughter, daughter would not allow nurse to assist in taking pt to personal vehicle off of [address] discharge, follow up, and medication instructions with patient and pt daughter, patient and daughter verbalized understanding. Prescription sent to pt personal pharmacy to be filled, daughter will . Pt/pt daughter deny any questions/concerns. Pt daughter provides this nurse with Tylova 3276 pens and denies and further needs. Suprapubic catheter intact at discharge, pt daughter to change pt to leg bag when pt/pt daughter arrive home.  Electronically signed by Royal Rebeka RN on 5/7/2019 at 3:58 PM

## 2019-05-07 NOTE — CARE COORDINATION
[facility]  Received referral from case management [address] with patent regarding [facility] services, Faxed orders to [address] care to see patient by   5/8/19  Raeann Stuart LPN    [facility] CTN Cell [phone], Fax [phone]

## 2019-05-07 NOTE — PROGRESS NOTES
Pt resting in bedside chair with w/eyes closed. No facial grimace, respirations easy/even. Call light within reach. Will continue to monitor. Chair alarm intact.  Electronically signed by Silvia Gallego RN on 5/7/2019 at 7:57 AM

## 2019-05-07 NOTE — DISCHARGE SUMMARY
Hospital Medicine Discharge Summary    Patient ID: Tayo Mendoza      Patient's PCP: Ruther Dancer    Admit Date: 5/4/2019     Discharge Date:   5/7/19    Admitting Physician: Jeanie Ortiz MD     Discharge Physician: Merlin Decant, MD     Discharge Diagnoses: Active Hospital Problems    Diagnosis Date Noted    Elevated lactic acid level [R79.89] 05/04/2019    Uncontrolled diabetes mellitus ([address] [E11.65] 05/04/2019    Essential hypertension [I10]     UTI (urinary tract infection) [N39.0] 10/04/2017       The patient was seen and examined on day of discharge and this discharge summary is in conjunction with any daily progress note from day of discharge. 66 y.o. female who presented to [facility] for uncontrolled blood sugar, patient denies any fever, chills, nausea, vomiting, abdominal pain, cough, shortness of breath. Patient is being treated for bilateral lower extremity ulcers, does have a nurse was visiting at home, no family member when visited in emergency department. Denies any change in her bilateral lower extremity wounds. In emergency Department found to have a mildly elevated lactic acid, her white count is within normal limits, afebrile, found to have a urinary tract infection, we are asked to admit for further management and treatment. Hospital Course:   UTI 2/2 Enterobacter in setting of suprapubic catheter  - sens to rocephin/cipro  - D3 abx  - DC with cipro 3 days     Hyperglycemia with DM2  - missed few doses of home insulin  - HgA1c 6.3, controlled  - sugars coming down here  - likely exacerbated by infection     Chronic BL LE Wounds  - wound care  - pain control     Chronic Kidney Disease Stage 3  - at baseline     HTN  - continue home medications     Atrial Fibrillation  - on BB, not on AC due to falls and anemia    Patient was supposed to leave last night but transportation was pushed back till 11PM so discharge was postponed until today.  On day of discharge patient was confused earlier in the day but when I saw her again she was back to her baseline mentation. Patient likely has some underlying mild cognitive impairment with dementia. She may or may not of had a UTI but in the setting of chronic suprapubic catheter this could just be her normal. Patient likely needs further outpatient workup regarding her cognitive decline as it is noted over the past few months of notes. Exam:     BP (!) 150/52   Pulse 79   Temp 97.7 °F (36.5 °C) (Oral)   Resp 16   Ht 4' 11\" (1.499 m)   Wt 192 lb 14.4 oz (87.5 kg)   SpO2 97%   BMI 38.96 kg/m²     General appearance: No apparent distress, appears stated age and cooperative. HEENT: Pupils equal, round, and reactive to light. Conjunctivae/corneas clear. Neck: Supple, with full range of motion. No jugular venous distention. Trachea midline. Respiratory:  Normal respiratory effort. Clear to auscultation, bilaterally without Rales/Wheezes/Rhonchi. Cardiovascular: Regular rate and rhythm with normal S1/S2 without murmurs, rubs or gallops. Abdomen: Soft, non-tender, non-distended with normal bowel sounds, suprapubic in place  Musculoskeletal: chronic BL LE wounds, no active weeping  Skin: Skin color, texture, turgor normal.  No rashes or lesions. Neurologic:  Neurovascularly intact without any focal sensory/motor deficits. Cranial nerves: II-XII intact, grossly non-focal.  Psychiatric: Alert and oriented to month, president, location      Consults:     IP Elton Guevara  IP CONSULT TO PHYSICAL MEDICINE REHAB    Significant Diagnostic Studies:     XR CHEST STANDARD (2 VW)   Final Result   Stable cardiomegaly. Asymmetric elevation of the right hemidiaphragm. Mild discoid atelectasis at the right lung base.              Disposition:  IP rehab    Condition at Discharge: Stable    Discharge Instructions/Follow-up:  PCP    Code Status:  DNR-CCA     Activity: activity as tolerated    Diet: diabetic diet      Labs:  For convenience and continuity at follow-up the following most recent labs are provided:      CBC:    Lab Results   Component Value Date    WBC 7.4 05/04/2019    HGB 8.4 05/04/2019    HCT 24.8 05/04/2019     05/04/2019       Renal:    Lab Results   Component Value Date     05/06/2019    K 4.1 05/06/2019    K 3.8 05/04/2019     05/06/2019    CO2 20 05/06/2019    BUN 24 05/06/2019    CREATININE 1.5 05/06/2019    CALCIUM 8.0 05/06/2019    PHOS 2.7 10/09/2017       Discharge Medications:     Current Discharge Medication List           Details   ciprofloxacin (CIPRO) 500 MG tablet Take 1 tablet by mouth 2 times daily for 3 days  Qty: 6 tablet, Refills: 0              Details   doxycycline hyclate (VIBRAMYCIN) 100 MG capsule Take 100 mg by mouth 2 times daily Indications: finishes on Monday 05/06/2019      raNITIdine HCl (ZANTAC 150 MAXIMUM STRENGTH PO) Take 150 mg by mouth 2 times daily      docusate (COLACE) 50 MG/5ML liquid Take 50 mg by mouth daily      ferrous sulfate 325 (65 Fe) MG tablet Take 325 mg by mouth daily      !! insulin lispro (HUMALOG) 100 UNIT/ML injection vial Inject 0-12 Units into the skin 3 times daily (with meals)  Qty: 1 vial, Refills: 0      Timolol (TIMOPTIC) 0.5 % (DAILY) SOLN ophthalmic solution Place 1 drop into the right eye 2 times daily       aspirin 325 MG tablet Take 4 tablets by mouth daily      atorvastatin (LIPITOR) 10 MG tablet Take 1 tablet by mouth daily  Qty: 30 tablet, Refills: 0      metoprolol tartrate (LOPRESSOR) 25 MG tablet Take 1 tablet by mouth 2 times daily  Qty: 60 tablet, Refills: 0      furosemide (LASIX) 40 MG tablet Take 0.5 tablets by mouth daily  Qty: 60 tablet, Refills: 0      Biotin (BIOTIN 5000) 5 MG CAPS Take 1 tablet by mouth daily      lipase-protease-amylase (CREON) 02684 units delayed release capsule Take 24,000 Units by mouth 3 times daily (with meals)      gabapentin (NEURONTIN) 300 MG capsule Take 300 mg by mouth daily At noon      isosorbide mononitrate (IMDUR) 30 MG extended release tablet Take 30 mg by mouth daily      venlafaxine (EFFEXOR) 75 MG tablet Take 150 mg by mouth 2 times daily       cetirizine (ZYRTEC) 10 MG tablet Take 10 mg by mouth daily      Cholecalciferol (VITAMIN D3) 2000 units CAPS Take 2,000 Units by mouth daily      insulin glargine (LANTUS) 100 UNIT/ML injection vial Inject 10 Units into the skin nightly  Qty: 1 vial, Refills: 0      !! insulin lispro (HUMALOG) 100 UNIT/ML injection vial Inject 0-6 Units into the skin nightly  Qty: 1 vial, Refills: 0      !! insulin lispro (HUMALOG) 100 UNIT/ML injection vial Inject 5 Units into the skin 3 times daily (with meals)  Qty: 1 vial, Refills: 0      LORazepam (ATIVAN) 0.5 MG tablet Take 0.5 mg by mouth nightly. .      sennosides-docusate sodium (SENOKOT-S) 8.6-50 MG tablet Take 1 tablet by mouth daily      Calcium Carbonate Antacid (TUMS ULTRA 1000) 1000 MG CHEW Take 1 tablet by mouth daily as needed (acid reflux)      polyethylene glycol (GLYCOLAX) packet Take 17 g by mouth daily       ! ! - Potential duplicate medications found. Please discuss with provider. No future appointments. Time Spent on discharge is more than 30 minutes in the examination, evaluation, counseling and review of medications and discharge plan. Signed:    Valentin Carolina MD   5/7/2019      Thank you [address] [place] [address] for the opportunity to be involved in this patient's care. If you have any questions or concerns please feel free to contact me at [phone].

## 2019-05-07 NOTE — PROGRESS NOTES
Physical Therapy    Facility/Department: [facility]  Treatment note    NAME: Zelda Pennington  : 1945  MRN: [id number]    Date of Service: 2019   -was set up for discharge yesterday to [facility]  -anticipate same plan in force    Discharge Recommendations:  Remains:  Zelda Pennington scored a 10 on the AM-PAC short mobility form. Current research shows that an AM-PAC score of 17 or less is typically not associated with a discharge to the patient's home setting. Based on the patients AM-PAC score and their current functional mobility deficits, it is recommended that the patient have 3-5 sessions per week of Physical Therapy at d/c to increase the patients independence. Assessment   Body structures, Functions, Activity limitations: Decreased functional mobility ; Decreased endurance;Decreased ADL status; Decreased cognition;Decreased balance  Prognosis: Good  REQUIRES PT FOLLOW UP: Yes  Activity Tolerance  Activity Tolerance: Patient limited by cognitive status; Patient limited by fatigue       Patient Diagnosis(es): The primary encounter diagnosis was Acute cystitis without hematuria. Diagnoses of Lactic acidosis and Hyperglycemia were also pertinent to this visit. has a past medical history of Anemia, Atrial fibrillation ([address] CAD (coronary artery disease), Chronic back pain, CKD (chronic kidney disease), stage III ([address] Diabetes mellitus ([address] Diastolic CHF ([address] Hyperlipidemia, Hypertension, MI (myocardial infarction) ([address] MRSA (methicillin resistant staph aureus) culture positive, MRSA colonization, Obesity (BMI 30-39.9), Osteoarthritis, Pancreatitis, Sepsis ([address] Stasis ulcer ([address] and Suprapubic catheter ([address] has a past surgical history that includes Hysterectomy; knee surgery; back surgery; Cholecystectomy;  Cataract removal; Appendectomy; Colonoscopy; eye surgery; fracture surgery; joint replacement; and Insert/Change Brasher Catheter - Complicat Person Assistance  Ambulation  Ambulation?: (not attempted at this session due to sleepiness)  Stairs/Curb  Stairs?: No  Balance  Comments: able to maintain midline in sitting in the recliner and in static stance in the trent stedy       (not assessed on the walker at this session)    Plan   Plan  Times per week: 3-5  Current Treatment Recommendations: Functional Mobility Training, Transfer Training, Gait Training, Patient/Caregiver Education & Training, Positioning  Safety Devices  Type of devices:  All fall risk precautions in place, Bed alarm in place, Call light within reach, Left in bed, Nurse notified(Stephanie)    AM-[facility] Score  AM-PAC Inpatient Mobility Raw Score : 10  AM-PAC Inpatient T-Scale Score : 32.29  Mobility Inpatient CMS 0-100% Score: 76.75  Mobility Inpatient CMS G-Code Modifier : CL    Goals  Short term goals  Time Frame for Short term goals: 1-2 days   Short term goal 1: bed mobility at mod assist   (baseline is use of Lift Chair)  Short term goal 2: transfers at min/mod assist  Short term goal 3: ambulation at min/mod assist on rolling walker for 20 feet  Patient Goals   Patient goals : not formulated today        Therapy Time   Individual Concurrent Group Co-treatment   Time In 0800         Time Out 0840         Minutes [address] [facility] Dru Conte, PT

## 2019-05-07 NOTE — CONSULTS
[facility]  5/7/2019  9:42 AM    Patient Name:   Alicia Alvarado  YOB: 1945    Diagnosis: UTI (urinary tract infection)        Subjective:  Rehabilitation consult received. Chart reviewed. Patient discussed with our rehabilitation unit admission nurse. 66 y. o. female who presented to [facility] uncontrolled blood sugar.  Patient is being treated for bilateral lower extremity ulcers. Paatient found to have a urinary tract infection. Patient was treated and improved. Patient started therapies . Patient fatigues quickly with therapies and is not very motivated. Our rehabilitation unit admission nurse talked with physical therapy, and they think patient could not tolerate an intensive rehab program.  Discharge to SNF for slow-paced therapy is recommended with her limitations from her bilateral lower ulcers. BP (!) 150/52   Pulse 79   Temp 97.7 °F (36.5 °C) (Oral)   Resp 16   Ht 4' 11\" (1.499 m)   Wt 192 lb 14.4 oz (87.5 kg)   SpO2 97%   BMI 38.96 kg/m²     Last 24 hour lab  Recent Results (from the past 24 hour(s))   POCT Glucose    Collection Time: 05/06/19 11:54 AM   Result Value Ref Range    POC Glucose 195 (H) 70 - 99 mg/dl    Performed on ACCU-CHEK    POCT Glucose    Collection Time: 05/06/19  4:24 PM   Result Value Ref Range    POC Glucose 366 (H) 70 - 99 mg/dl    Performed on ACCU-CHEK    POCT Glucose    Collection Time: 05/06/19  8:50 PM   Result Value Ref Range    POC Glucose 362 (H) 70 - 99 mg/dl    Performed on ACCU-CHEK    POCT Glucose    Collection Time: 05/07/19  7:34 AM   Result Value Ref Range    POC Glucose 285 (H) 70 - 99 mg/dl    Performed on ACCU-CHEK          Plan: Discharge to SNF when medically ready.       Dr. Pasquale Nielson

## 2019-05-07 NOTE — PROGRESS NOTES
Spoke with patient's daughter and Leslie Matt, over the phone. Pt's daughter concerned that she has been confused today and she does not have a history of confusion. She does not want her mom to be discharged tonight without evaluating new confusion, especially since transport is over 3 hours late as well. Called PA on call and they said it was okay to cancel the discharge for tonight and re-evaluate in morning. Called back Meliza to update her on the situation. She will be up in the morning to visit. No further needs noted at this time.  Electronically signed by Venson Riedel, RN on 5/6/2019 at 10:06 PM

## 2019-05-07 NOTE — PROGRESS NOTES
Occupational Therapy  Facility/Department: [address] ORTHOPEDICS  Daily Treatment Note  NAME: Patel Rush  : 1945  MRN: [id number]    Date of Service: 2019    Discharge Recommendations:  3-5 sessions per week         Assessment: Discussed with OTR am pac score is 12 which indicates need for continued skilled OT to increase [place] and decrease caregiver burden. Patient fatigued this date and requires encouargement to participate. Sit<> [address] [facility] stedy with Min A of 2. Sit to supine with Mod A of 2. Cont with POC. Patient Diagnosis(es): The primary encounter diagnosis was Acute cystitis without hematuria. Diagnoses of Lactic acidosis and Hyperglycemia were also pertinent to this visit. has a past medical history of Anemia, Atrial fibrillation ([address] CAD (coronary artery disease), Chronic back pain, CKD (chronic kidney disease), stage III ([address] Diabetes mellitus ([address] Diastolic CHF ([address] Hyperlipidemia, Hypertension, MI (myocardial infarction) ([address] MRSA (methicillin resistant staph aureus) culture positive, MRSA colonization, Obesity (BMI 30-39.9), Osteoarthritis, Pancreatitis, Sepsis ([address] Stasis ulcer ([address] and Suprapubic catheter ([address] has a past surgical history that includes Hysterectomy; knee surgery; back surgery; Cholecystectomy; Cataract removal; Appendectomy; Colonoscopy; eye surgery; fracture surgery; joint replacement; and Insert/Change Brasher Catheter - Complicat ().     Restrictions  Restrictions/Precautions  Restrictions/Precautions: Fall Risk, Contact Precautions  Position Activity Restriction  Other position/activity restrictions: BLE wounds; MRSA  Subjective   General  Chart Reviewed: Yes  Patient assessed for rehabilitation services?: Yes  Additional Pertinent Hx: Per Mercy Amin MD 19: Pt is \"70 y.o. female who presented to [facility] for uncontrolled blood sugar, patient denies any fever, chills, nausea, vomiting, abdominal pain, cough, shortness of breath. Patient is being treated for bilateral lower extremity ulcers, does have a nurse was visiting at home, no family member when visited in emergency department. Denies any change in her bilateral lower extremity wounds. In emergency Department found to have a mildly elevated lactic acid, her white count is within normal limits, afebrile, found to have a urinary tract infection, we are asked to admit for further management and treatment. \"  Response to previous treatment: Patient unable to report, no changes reported from family or staff  Family / Caregiver Present: Yes(home nurse)  Referring Practitioner: Wero Yen MD  Diagnosis: UTI   Subjective  Subjective: Patient soundly sleeping in recliner chair, positioned poorly in chair. Patient agreeable to therapy with encouragement       Orientation  Orientation  Overall Orientation Status: (cues for )  Orientation Level: Oriented to person;Disoriented to place; Disoriented to time;Disoriented to situation  Objective    ADL  Feeding: Setup  Grooming: Setup(patient able to wash face with cues)        Balance  Sitting Balance: Contact guard assistance  Standing Balance: Minimal assistance  Standing Balance  Activity: Standing in [facility]   Functional Mobility  Functional Mobility Comments: Not attempted this date   Bed mobility  Supine to Sit: (up in chair at start of session)  Sit to Supine: Moderate assistance;2 Person assistance  Transfers  Sit to stand: Minimal assistance;2 Person assistance  Stand to sit: Minimal assistance;2 Person assistance  Transfer Comments: Transfer from recliner chair to hospital bed per [facility]           Cognition  Overall Cognitive Status: Exceptions  Arousal/Alertness: Delayed responses to stimuli  Following Commands: Follows one step commands with increased time; Follows one step commands with repetition  Attention Span: Attends with cues to redirect  Memory: Decreased short term memory;Decreased recall of recent events  Safety Judgement: Decreased awareness of need for assistance;Decreased awareness of need for safety  Problem Solving: Assistance required to generate solutions;Assistance required to implement solutions  Insights: Decreased awareness of deficits  Initiation: Requires cues for some  Sequencing: Requires cues for some        Assessment   Performance deficits / Impairments: Decreased functional mobility ; Decreased safe awareness;Decreased balance;Decreased ADL status; Decreased cognition;Decreased strength;Decreased endurance  Assessment: Discussed with OTR am pac score is 12 which indicates need for continued skilled OT to increase [place] and decrease caregiver burden. Patient fatigued this date and requires encouargement to participate. Sit<> [address] [place], [place] [address]dy with Min A of 2. Sit to supine with Mod A of 2. Cont with POC. Patient Education: Role of OT, POC, safety   REQUIRES OT FOLLOW UP: Yes  Activity Tolerance  Activity Tolerance: Treatment limited secondary to decreased cognition  Safety Devices  Safety Devices in place: Yes(KYAW Brown informed)  Type of devices: Bed alarm in place;Call light within reach; Left in bed;Nurse notified      Plan   Plan  Times per week: 3-5  Times per day: Daily  Current Treatment Recommendations: Strengthening, Safety Education & Training, Balance Training, Functional Mobility Training, Endurance Training, Equipment Evaluation, Education, & procurement, Patient/Caregiver Education & Training, Self-Care / ADL    AM-PAC Score        AM-PAC Inpatient Daily Activity Raw Score: 12  AM-PAC Inpatient ADL T-Scale Score : 30.6  ADL Inpatient CMS 0-100% Score: 66.57  ADL Inpatient CMS G-Code Modifier : CL    Goals  Short term goals  Time Frame for Short term goals: prior to d/c: All goals ongoing   Short term goal 1: Pt will complete sit <> stand with CGA  Short term goal 2: Pt will complete bathing with min A  Short term goal 3: Pt will complete dressing with min A   Short term goal 4: Pt will complete toileting with min A  Short term goal 5: Pt will complete grooming with setup   Patient Goals   Patient goals : Pt unable to formulate goal at this time d/t decreased cognition - did report during session \"I do not want to go to rehab\"       Therapy Time   Individual Concurrent Group Co-treatment   Time In 0800         Time Out 0840         Minutes 40                 Electronically signed by Brady Martin, [id number] on 5/7/2019 at 8:47 AM     If discharged prior to next OT session please see last daily note for discharge status

## 2019-05-07 NOTE — CARE COORDINATION
5/7  Discharge order noted-- transport arranged with [address] [place] [address] Road-- transport time 2:00pm--patient and daughter aware. Patient going to [facility] room 24.   Report # [id number]   Electronically signed by Popeye Rosenbaum on 5/7/2019 at 12:16 PM

## 2019-05-07 NOTE — CARE COORDINATION
5/7 spoke with daughter Meliza who has now decided that she will take her Mom home -- she will resume her private aide services (3h hrs/day) with [facility] and would also like home care with [facility] . REferral to [facility].   Notified Padmini Marcial with [facility] of change in [address] and cancelled transport with [facility]   Electronically signed by Sree Ardon on 5/7/2019 at 1:59 PM

## 2019-05-23 ENCOUNTER — HOSPITAL ENCOUNTER (OUTPATIENT)
Dept: WOUND CARE | Age: 74
Discharge: HOME | End: 2019-05-23
Payer: MEDICARE

## 2019-05-23 VITALS
HEART RATE: 104 BPM | BODY MASS INDEX: 41.74 KG/M2 | TEMPERATURE: 98.5 F | RESPIRATION RATE: 18 BRPM | SYSTOLIC BLOOD PRESSURE: 155 MMHG | HEIGHT: 57 IN | DIASTOLIC BLOOD PRESSURE: 64 MMHG

## 2019-05-23 DIAGNOSIS — L97.919 CHRONIC VENOUS HYPERTENSION (IDIOPATHIC) WITH ULCER AND INFLAMMATION OF BILATERAL LOWER EXTREMITY (HCC): Primary | ICD-10-CM

## 2019-05-23 DIAGNOSIS — I87.333 CHRONIC VENOUS HYPERTENSION (IDIOPATHIC) WITH ULCER AND INFLAMMATION OF BILATERAL LOWER EXTREMITY (HCC): Primary | ICD-10-CM

## 2019-05-23 DIAGNOSIS — L97.929 CHRONIC VENOUS HYPERTENSION (IDIOPATHIC) WITH ULCER AND INFLAMMATION OF BILATERAL LOWER EXTREMITY (HCC): Primary | ICD-10-CM

## 2019-05-23 PROCEDURE — 11045 DBRDMT SUBQ TISS EACH ADDL: CPT | Performed by: SURGERY

## 2019-05-23 PROCEDURE — 99215 OFFICE O/P EST HI 40 MIN: CPT

## 2019-05-23 PROCEDURE — 11042 DBRDMT SUBQ TIS 1ST 20SQCM/<: CPT

## 2019-05-23 PROCEDURE — 11045 DBRDMT SUBQ TISS EACH ADDL: CPT

## 2019-05-23 PROCEDURE — 11042 DBRDMT SUBQ TIS 1ST 20SQCM/<: CPT | Performed by: SURGERY

## 2019-05-23 RX ORDER — LIDOCAINE HYDROCHLORIDE 40 MG/ML
SOLUTION TOPICAL ONCE
Status: DISCONTINUED | OUTPATIENT
Start: 2019-05-23 | End: 2019-05-24 | Stop reason: HOSPADM

## 2019-05-23 RX ORDER — FLUCONAZOLE 150 MG/1
150 TABLET ORAL DAILY
COMMUNITY
End: 2019-06-08 | Stop reason: ALTCHOICE

## 2019-05-23 RX ORDER — AMOXICILLIN 875 MG/1
875 TABLET, COATED ORAL 2 TIMES DAILY
COMMUNITY
End: 2019-06-08 | Stop reason: ALTCHOICE

## 2019-05-23 ASSESSMENT — PAIN DESCRIPTION - LOCATION: LOCATION: LEG

## 2019-05-23 ASSESSMENT — PAIN DESCRIPTION - FREQUENCY: FREQUENCY: CONTINUOUS

## 2019-05-23 ASSESSMENT — PAIN DESCRIPTION - PROGRESSION: CLINICAL_PROGRESSION: GRADUALLY WORSENING

## 2019-05-23 ASSESSMENT — PAIN DESCRIPTION - ONSET: ONSET: ON-GOING

## 2019-05-23 ASSESSMENT — PAIN SCALES - GENERAL: PAINLEVEL_OUTOF10: 7

## 2019-05-23 ASSESSMENT — PAIN DESCRIPTION - ORIENTATION: ORIENTATION: LEFT;RIGHT

## 2019-05-23 ASSESSMENT — PAIN - FUNCTIONAL ASSESSMENT: PAIN_FUNCTIONAL_ASSESSMENT: PREVENTS OR INTERFERES SOME ACTIVE ACTIVITIES AND ADLS

## 2019-05-23 ASSESSMENT — PAIN DESCRIPTION - DESCRIPTORS: DESCRIPTORS: CONSTANT

## 2019-05-23 NOTE — PROGRESS NOTES
[employer] Application   Below Knee    NAME:  Meghana Ferrer  YOB: 1945  MEDICAL RECORD NUMBER:  [id number]  DATE:  5/23/2019    Emil Mckee boot: Appied primary and secondary dressing as ordered. Applied Unna roll from toes to knee overlapping each time. Applied ace wrap or coban from toes to below the knee. Secured with tape and/or metal clips covered with tape. Instructed patient/caregiver to keep dressing dry and intact. DO NOT REMOVE DRESSING. Instructed pt/family/caregiver to report excessive draining, loose bandage, wet dressing, severe pain or tingling in toes. Applied [employer] dressing below the knee to right lower leg. Applied [employer] dressing below the knee to left lower leg. Unna Boot(s) were applied per  Guidelines.      Electronically signed by Elisa Engel RN on 5/23/2019 at 4:35 PM

## 2019-05-23 NOTE — PROGRESS NOTES
 INSET/CHANGE LOMELI CATHETER - COMPLICATED  05/0280    suprapubic cath    JOINT REPLACEMENT      Right knee    KNEE SURGERY         FAMILY HISTORY    Family History   Problem Relation Age of Onset    Heart Disease Father     Heart Failure Father     Diabetes Father     Diabetes Daughter     Diabetes Daughter        SOCIAL HISTORY    Social History     Tobacco Use    Smoking status: Never Smoker    Smokeless tobacco: Never Used   Substance Use Topics    Alcohol use: Yes     Comment: occassional    Drug use: No       ALLERGIES    Allergies   Allergen Reactions    Sulfa Antibiotics Rash       MEDICATIONS    Current Outpatient Medications on File Prior to Encounter   Medication Sig Dispense Refill    amoxicillin (AMOXIL) 875 MG tablet Take 875 mg by mouth 2 times daily      fluconazole (DIFLUCAN) 150 MG tablet Take 150 mg by mouth daily      docusate (COLACE) 50 MG/5ML liquid Take 50 mg by mouth daily      ferrous sulfate 325 (65 Fe) MG tablet Take 325 mg by mouth daily      insulin lispro (HUMALOG) 100 UNIT/ML injection vial Inject 0-12 Units into the skin 3 times daily (with meals) 1 vial 0    insulin lispro (HUMALOG) 100 UNIT/ML injection vial Inject 0-6 Units into the skin nightly 1 vial 0    insulin lispro (HUMALOG) 100 UNIT/ML injection vial Inject 5 Units into the skin 3 times daily (with meals) (Patient taking differently: Inject 2 Units into the skin every hour Per her insulin pump) 1 vial 0    Timolol (TIMOPTIC) 0.5 % (DAILY) SOLN ophthalmic solution Place 1 drop into the right eye 2 times daily       sennosides-docusate sodium (SENOKOT-S) 8.6-50 MG tablet Take 1 tablet by mouth daily      aspirin 325 MG tablet Take 4 tablets by mouth daily      atorvastatin (LIPITOR) 10 MG tablet Take 1 tablet by mouth daily 30 tablet 0    metoprolol tartrate (LOPRESSOR) 25 MG tablet Take 1 tablet by mouth 2 times daily 60 tablet 0    furosemide (LASIX) 40 MG tablet Take 0.5 tablets by mouth daily movement, no respiratory distress, right base rales appreciated did not clear with cough. No shortness of breath while speaking daughter suspects she does have shortness of breath and has gained 10 pounds in a week Cardiovascular: normal rate, regular rhythm, normal S1 and S2, no murmurs, rubs, clicks, or gallops, distal pulses +2 left dorsalis pedis, no others palpable Doppler signals biphasic in all 4 pedal arteries no carotid bruits neck veins slightly elevated left side  Abdomen: soft, non-tender, non-distended obese right upper quadrant gallbladder scar LRR, suprapubic tube placed by Deanna Elder normal bowel sounds, no masses or organomegaly  Extremities: no cyanosis, clubbing massive edema left thigh and calf moderate edema right thigh and calf . Bilateral ulcerations of the calf and one of the medial left thigh see pictures below  Musculoskeletal: normal range of motion, no joint swelling, deformity or tenderness  Neurologic: reflexes normal and symmetric, no cranial nerve deficit, had trouble walking from the house to the car today, coordination and speech normal      Assessment:        Problem List Items Addressed This Visit     Chronic venous hypertension (idiopathic) with ulcer and inflammation of bilateral lower extremity ([address] - Primary           Procedure Note  Indications:  Based on my examination of this patient's wound(s)/ulcer(s) today, debridement is required to promote healing and evaluate the wound base. Performed by: Juana Shelby MD    Consent obtained:  Yes    Time out taken:  Yes    Pain Control: Anesthetic  Anesthetic: 4% Lidocaine Liquid Topical(10mL's)       Debridement: Excisional Debridement    Using curette the wound(s)/ulcer(s) was/were sharply debrided down through and including the removal of epidermis, dermis and subcutaneous tissue.         Devitalized Tissue Debrided:  biofilm, slough, necrotic/eschar and exudate    Pre Debridement Measurements:  Are located in the Wound/Ulcer Documentation Flow Sheet    Wound/Ulcer #: 1, 2 and 3    Post Debridement Measurements:  Wound/Ulcer Descriptions are Pre Debridement except measurements:    Wound 05/23/19 Leg Right;Medial Wound #1; re-opened approx 2 months ago (Active)   Wound Image   5/23/2019  3:26 PM   Dressing Status Old drainage 5/23/2019  3:26 PM   Wound Length (cm) 12.2 cm 5/23/2019  3:26 PM   Wound Width (cm) 9.5 cm 5/23/2019  3:26 PM   Wound Depth (cm) 0.1 cm 5/23/2019  3:26 PM   Wound Surface Area (cm^2) 115.9 cm^2 5/23/2019  3:26 PM   Wound Volume (cm^3) 11.59 cm^3 5/23/2019  3:26 PM   Wound Assessment Granulation tissue;Slough 5/23/2019  3:26 PM   Drainage Amount Copious 5/23/2019  3:26 PM   Drainage Description Green 5/23/2019  3:26 PM   Odor None 5/23/2019  3:26 PM   Margins Undefined edges 5/23/2019  3:26 PM   Marilynn-wound Assessment Pink;Swelling 5/23/2019  3:26 PM   Non-staged Wound Description Full thickness 5/23/2019  3:26 PM   [place]%Wound Bed 30 5/23/2019  3:26 PM   Red%Wound Bed 40 5/23/2019  3:26 PM   Yellow%Wound Bed 30 5/23/2019  3:26 PM   Number of days: 0       Wound 05/23/19 Leg Left;Medial;Lower Wound #2; per family open for months (Active)   Wound Image   5/23/2019  3:26 PM   Dressing Status Old drainage 5/23/2019  3:26 PM   Wound Length (cm) 12 cm 5/23/2019  3:26 PM   Wound Width (cm) 13 cm 5/23/2019  3:26 PM   Wound Depth (cm) 0.1 cm 5/23/2019  3:26 PM   Wound Surface Area (cm^2) 156 cm^2 5/23/2019  3:26 PM   Wound Volume (cm^3) 15.6 cm^3 5/23/2019  3:26 PM   Wound Assessment Granulation tissue;Slough 5/23/2019  3:26 PM   Drainage Amount Copious 5/23/2019  3:26 PM   Drainage Description Green 5/23/2019  3:26 PM   Odor None 5/23/2019  3:26 PM   Margins Attached edges 5/23/2019  3:26 PM   Marilynn-wound Assessment Fragile;Pink;Swelling 5/23/2019  3:26 PM   Non-staged Wound Description Full thickness 5/23/2019  3:26 PM   [place]%Wound Bed 60 5/23/2019  3:26 PM   Red%Wound Bed 20 5/23/2019  3:26 PM   Yellow%Wound Bed 20 5/23/2019  3:26 PM   Number of days: 0       Wound 05/23/19 Thigh Left;Medial Wound #3; approx 4/25/19 (Active)   Wound Image   5/23/2019  3:26 PM   Dressing Status Old drainage 5/23/2019  3:26 PM   Wound Length (cm) 1.3 cm 5/23/2019  3:26 PM   Wound Width (cm) 1.5 cm 5/23/2019  3:26 PM   Wound Depth (cm) 0.1 cm 5/23/2019  3:26 PM   Wound Surface Area (cm^2) 1.95 cm^2 5/23/2019  3:26 PM   Wound Volume (cm^3) 0.2 cm^3 5/23/2019  3:26 PM   Wound Assessment Granulation tissue;Slough 5/23/2019  3:26 PM   Drainage Amount Small 5/23/2019  3:26 PM   Drainage Description Green 5/23/2019  3:26 PM   Odor None 5/23/2019  3:26 PM   Margins Attached edges 5/23/2019  3:26 PM   Marilynn-wound Assessment Pink;Swelling 5/23/2019  3:26 PM   Non-staged Wound Description Full thickness 5/23/2019  3:26 PM   [place]%Wound Bed 50 5/23/2019  3:26 PM   Yellow%Wound Bed 50 5/23/2019  3:26 PM   Number of days: 0          Percent of Wound(s)/Ulcer(s) Debrided: 100%    Total Surface Area Debrided:  278.83 sq cm       Ulcer: Other: Venous possible lymphedema     Estimated Blood Loss:  Minimal    Hemostasis Achieved:  by pressure    Procedural Pain:  10  / 10     Post Procedural Pain:  10 / 10     Response to treatment:  Well tolerated by patient. No complaints of pain while debriding her baseline she reports is 10       Plan:     Treatment Note please see attached Discharge Instructions    Written patient dismissal instructions given to patient and signed by patient or POA. Discharge Instructions       [address] and [facility] Therapy   Physician Orders and Discharge Instructions  [address]   [facility] [place], [place] [address]  Telephone: [phone]    NAME:  Jules Perdomo [place]:  1945  MEDICAL RECORD NUMBER:  [id number]  DATE:  5/23/2019    Wound Cleansing:   Do not scrub or use excessive force.   Cleanse wound prior to applying a clean dressing with:  [] Normal Saline [] Keep Wound Dry in Shower    [] Wound Cleanser   [] Cleanse wound with Mild Soap & Water  [] May Shower at Discharge   [] Other:       Topical Treatments:  Do not apply lotions, creams, or ointments to wound bed unless directed. [] Apply moisturizing lotion to skin surrounding the wound prior to dressing change.  [] Apply antifungal ointment to skin surrounding the wound prior to dressing change.  [] Apply thin film of moisture barrier ointment to skin immediately around wound. [] Other:       Dressings:           Wound Location-  BILATERAL LOWER EXTREMITIES  AND LEFT MEDIAL THIGH WOUND  [x] Apply Primary Dressing:       [] MediHoney Gel [x] Alginate with Silver [] Alginate   [] Collagen [] Collagen with Silver   [] Santyl with Moisten saline gauze     [] Hydrocolloid   [] MediHoney Alginate [] Foam with Silver   [] Foam   [] Hydrofera Blue    [] Mepilex Border    [] Moisten with Saline [] Hydrogel [] Mepitel     [] Bactroban/Mupirocin [] Polysporin  [] Other:    [] Pack wound loosely with  [] Iodoform   [] Plain Packing  [] Other   [x] Cover and Secure with:     [] Gauze [] Brigette [] Kerlix   [] Ace Wrap [] Cover Roll Tape [] ABD     [x] Other: EXTRASORB Avoid contact of tape with skin. [] Change dressing: [] Daily    [] Every Other Day [] Three times per week   [] Once a week [x] [address] BOOTS ON Monday. [x] Other: ACE WRAP TO LEFT THIGH     Edema Control:   Apply: [] Compression Stocking []Right Leg []Left Leg   [] Tubigrip []Right Leg Double Layer []Left Leg Double Layer       []Right Leg Single Layer []Left Leg Single Layer   [] SpandaGrip []Right Leg  []Left Leg      []Low compression 5-10 mm/Hg      []Medium compression 10-20 mm/Hg     []High compression  20-30 mm/Hg   every morning immediately when getting up should be applied to affected leg(s) from mid foot to knee making sure to cover the heel.   Remove every night before going to bed. [] Elevate leg(s) above the level of the heart when sitting. [] Avoid prolonged standing in one place. [] Elevate arm/hand above the level of the heart []RightArm []LeftArm     Compression:  Apply: [x] Multilayer Compression Wrap Applied in [address] [place] [address] [place] [x]RightLeg [x]Left Leg--HOME CARE TO CHANGE ON Monday 5/27/19   [x] Multi-layer compression. Do not get leg(s) with wrap wet. If wraps become too tight call the center or completely remove the wrap. [x] Elevate leg(s) above the level of the heart when sitting. [x] Avoid prolonged standing in one place. Off-Loading:   [] Off-loading when [] walking  [] in bed [] sitting  [] Total non-weight bearing  [] Right Leg  [] Left Leg   [] Assistive Device [] Walker [] Cane  [] Wheelchair  [] Crutches   [] Surgical shoe    [] Podus Boot(s)   [] Foam Boot(s)  [] Roll About    [] Cast Boot [] CROW Boot  [] Other:      Dietary:  [] Diet as tolerated: [x] Calorie Diabetic Diet: [] No Added Salt:  [] Increase Protein: [] Other:   Activity:  [x] Activity as tolerated:  [] Patient has no activity restrictions     [] Strict Bedrest: [] Remain off Work:     [] May return to full duty work:                                   [] Return to work with restrictions: If you are still having pain after you go home:  [x] Elevate the affected limb. [x] Use over-the-counter medications you would normally use for pain as permitted by your family doctor. [x] For persistent pain not relieved by the above interventions, please call your family doctor.              Return Appointment:  [] Wound and dressing supply provider:   [x] Critical access hospital or [address] [place] [address]  [] Wound Assessment: [] Physician or NP scheduled for Wound Assessment:   [x] Return Appointment: With DR Peter Gonzalez  in  1 Week(s)  [] Ordered tests:     Nurse Case Manger:  MELISSA    Electronically signed by Mark Winston RN on 5/23/2019 at 4:02 PM     Wound Care Center Information: Should you experience any significant changes in your wound(s) or have questions about your wound care, please contact the [address] [place] [address] at [address] 8:00 am - 4:30 pm and Friday 8:00 am - 12:30 pm.  If you need help with your wound outside these hours and cannot wait until we are again available, contact your PCP or go to the hospital emergency room. PLEASE NOTE: IF YOU ARE UNABLE TO OBTAIN WOUND SUPPLIES, CONTINUE TO USE THE SUPPLIES YOU HAVE AVAILABLE UNTIL YOU ARE ABLE TO REACH US. IT IS MOST IMPORTANT TO KEEP THE WOUND COVERED AT ALL TIMES.      Physician Signature:_______________________    Date: ___________ Time:  ____________        Dr Tapan Denton                        Electronically signed by Krupa Edwards MD on 5/23/2019 at 4:30 PM

## 2019-05-28 ENCOUNTER — TELEPHONE (OUTPATIENT)
Dept: SURGERY | Age: 74
End: 2019-05-28

## 2019-05-30 ENCOUNTER — HOSPITAL ENCOUNTER (OUTPATIENT)
Dept: WOUND CARE | Age: 74
Discharge: HOME | End: 2019-05-30
Payer: MEDICARE

## 2019-05-30 VITALS
RESPIRATION RATE: 18 BRPM | DIASTOLIC BLOOD PRESSURE: 61 MMHG | HEART RATE: 67 BPM | TEMPERATURE: 98.7 F | SYSTOLIC BLOOD PRESSURE: 107 MMHG

## 2019-05-30 DIAGNOSIS — E44.0 PROTEIN-CALORIE MALNUTRITION, MODERATE (HCC): Primary | ICD-10-CM

## 2019-05-30 DIAGNOSIS — I87.333 CHRONIC VENOUS HYPERTENSION (IDIOPATHIC) WITH ULCER AND INFLAMMATION OF BILATERAL LOWER EXTREMITY (HCC): ICD-10-CM

## 2019-05-30 DIAGNOSIS — L97.919 CHRONIC VENOUS HYPERTENSION (IDIOPATHIC) WITH ULCER AND INFLAMMATION OF BILATERAL LOWER EXTREMITY (HCC): ICD-10-CM

## 2019-05-30 DIAGNOSIS — L97.929 CHRONIC VENOUS HYPERTENSION (IDIOPATHIC) WITH ULCER AND INFLAMMATION OF BILATERAL LOWER EXTREMITY (HCC): ICD-10-CM

## 2019-05-30 PROCEDURE — 11045 DBRDMT SUBQ TISS EACH ADDL: CPT | Performed by: SURGERY

## 2019-05-30 PROCEDURE — 11042 DBRDMT SUBQ TIS 1ST 20SQCM/<: CPT

## 2019-05-30 PROCEDURE — 11042 DBRDMT SUBQ TIS 1ST 20SQCM/<: CPT | Performed by: SURGERY

## 2019-05-30 PROCEDURE — 11045 DBRDMT SUBQ TISS EACH ADDL: CPT

## 2019-05-30 RX ORDER — LIDOCAINE HYDROCHLORIDE 40 MG/ML
SOLUTION TOPICAL ONCE
Status: DISCONTINUED | OUTPATIENT
Start: 2019-05-30 | End: 2019-05-31 | Stop reason: HOSPADM

## 2019-05-30 ASSESSMENT — PAIN SCALES - GENERAL: PAINLEVEL_OUTOF10: 0

## 2019-05-30 NOTE — PROGRESS NOTES
[address]   Progress Note and Procedure Note      Charley Galeas  MEDICAL RECORD NUMBER:  [id number]  AGE: 66 y.o. GENDER: female  : 1945  EPISODE DATE:  2019    Subjective:     Chief Complaint   Patient presents with    Wound Check     Follow up on Left and Right Lower Legs         HISTORY of PRESENT ILLNESS HPI     Charley Galeas is a 66 y.o. female who presents today for wound/ulcer evaluation. History of Wound Context: Years of edema years of ulceration left leg never healed right leg has healed but then would break open when it swelled again had zippered stockings from [employer] but would ulcerate anyway  Wound/Ulcer Pain Timing/Severity: constant, moderate  Quality of pain: dull, aching  Severity:  10 / 10   Modifying Factors: Pain worsens with walking  Associated Signs/Symptoms: edema, erythema, drainage, odor and pain    Ulcer Identification:  Ulcer Type: venous    Contributing Factors: edema, venous stasis, lymphedema, diabetes, decreased mobility and obesity    Wound: N/A        PAST MEDICAL HISTORY        Diagnosis Date    Anemia     Atrial fibrillation ([address]     CAD (coronary artery disease)     Chronic back pain     CKD (chronic kidney disease), stage III ([address]     Diabetes mellitus ([address]     Diastolic CHF ([address]     Hyperlipidemia     Hypertension     MI (myocardial infarction) ([address]     MRSA (methicillin resistant staph aureus) culture positive 2018    leg ulcer    MRSA colonization 10/16/2018    Obesity (BMI 30-39. 9)     Osteoarthritis     Pancreatitis     Sepsis ([address]     Stasis ulcer ([address]     bilateral lower extremities     Suprapubic catheter ([address]        PAST SURGICAL HISTORY    Past Surgical History:   Procedure Laterality Date    APPENDECTOMY      BACK SURGERY      x2    CATARACT REMOVAL      bilateral     CHOLECYSTECTOMY      COLONOSCOPY      EYE SURGERY      FRACTURE SURGERY      Left ankle    HYSTERECTOMY      INSET/CHANGE LOMELI CATHETER - COMPLICATED  15/9392    suprapubic cath    JOINT REPLACEMENT      Right knee    KNEE SURGERY         FAMILY HISTORY    Family History   Problem Relation Age of Onset    Heart Disease Father     Heart Failure Father     Diabetes Father     Diabetes Daughter     Diabetes Daughter        SOCIAL HISTORY    Social History     Tobacco Use    Smoking status: Never Smoker    Smokeless tobacco: Never Used   Substance Use Topics    Alcohol use: Yes     Comment: occassional    Drug use: No       ALLERGIES    Allergies   Allergen Reactions    Sulfa Antibiotics Rash       MEDICATIONS    Current Outpatient Medications on File Prior to Encounter   Medication Sig Dispense Refill    amoxicillin (AMOXIL) 875 MG tablet Take 875 mg by mouth 2 times daily      fluconazole (DIFLUCAN) 150 MG tablet Take 150 mg by mouth daily      raNITIdine HCl (ZANTAC 150 MAXIMUM STRENGTH PO) Take 150 mg by mouth 2 times daily      docusate (COLACE) 50 MG/5ML liquid Take 50 mg by mouth daily      ferrous sulfate 325 (65 Fe) MG tablet Take 325 mg by mouth daily      insulin lispro (HUMALOG) 100 UNIT/ML injection vial Inject 0-12 Units into the skin 3 times daily (with meals) 1 vial 0    insulin lispro (HUMALOG) 100 UNIT/ML injection vial Inject 0-6 Units into the skin nightly 1 vial 0    insulin lispro (HUMALOG) 100 UNIT/ML injection vial Inject 5 Units into the skin 3 times daily (with meals) (Patient taking differently: Inject 2 Units into the skin every hour Per her insulin pump) 1 vial 0    Timolol (TIMOPTIC) 0.5 % (DAILY) SOLN ophthalmic solution Place 1 drop into the right eye 2 times daily       sennosides-docusate sodium (SENOKOT-S) 8.6-50 MG tablet Take 1 tablet by mouth daily      aspirin 325 MG tablet Take 4 tablets by mouth daily      atorvastatin (LIPITOR) 10 MG tablet Take 1 tablet by mouth daily 30 tablet 0    metoprolol tartrate (LOPRESSOR) 25 MG tablet Take 1 tablet by mouth 2 times daily 60 tablet 0    furosemide (LASIX) 40 MG tablet Take 0.5 tablets by mouth daily 60 tablet 0    Biotin (BIOTIN 5000) 5 MG CAPS Take 1 tablet by mouth daily      lipase-protease-amylase (CREON) 34925 units delayed release capsule Take 24,000 Units by mouth 3 times daily (with meals)      gabapentin (NEURONTIN) 300 MG capsule Take 300 mg by mouth daily At noon      isosorbide mononitrate (IMDUR) 30 MG extended release tablet Take 30 mg by mouth daily      polyethylene glycol (GLYCOLAX) packet Take 17 g by mouth daily      venlafaxine (EFFEXOR) 75 MG tablet Take 150 mg by mouth 2 times daily       cetirizine (ZYRTEC) 10 MG tablet Take 10 mg by mouth daily      Cholecalciferol (VITAMIN D3) 2000 units CAPS Take 2,000 Units by mouth daily      Calcium Carbonate Antacid (TUMS ULTRA 1000) 1000 MG CHEW Take 1 tablet by mouth daily as needed (acid reflux)       No current facility-administered medications on file prior to encounter.         REVIEW OF SYSTEMS    A comprehensive review of systems was negative except for: Respiratory: positive for dyspnea on exertion  Musculoskeletal: positive for back pain and Left leg pain    Objective:      /61   Pulse 67   Temp 98.7 °F (37.1 °C) (Oral)   Resp 18     Wt Readings from Last 3 Encounters:   05/05/19 192 lb 14.4 oz (87.5 kg)   11/20/18 157 lb 6.5 oz (71.4 kg)   10/23/18 177 lb 0.5 oz (80.3 kg)       PHYSICAL EXAM    General Appearance: alert and oriented to person, place and time, well developed and well- nourished, in no acute distress  Skin: warm and dry, no rash or erythema  Head: normocephalic and atraumatic  Eyes: pupils equal, round, and reactive to light, extraocular eye movements intact, conjunctivae normal  Neck: supple and non-tender without mass, no thyromegaly or thyroid nodules, no cervical lymphadenopathy  Pulmonary/Chest: clear to auscultation left chest- no wheezes, rales or rhonchi, normal air movement, no respiratory distress, right base rales appreciated did not clear with cough. No shortness of breath while speaking daughter suspects she does have shortness of breath and has gained 10 pounds in a week Cardiovascular: normal rate, regular rhythm, normal S1 and S2, no murmurs, rubs, clicks, or gallops, distal pulses +2 left dorsalis pedis, no others palpable Doppler signals biphasic in all 4 pedal arteries no carotid bruits neck veins slightly elevated left side  Abdomen: soft, non-tender, non-distended obese right upper quadrant gallbladder scar LRR, suprapubic tube placed by [facility] normal bowel sounds, no masses or organomegaly  Extremities: no cyanosis, clubbing massive edema left thigh and calf moderate edema right thigh and calf . Bilateral ulcerations of the calf and one of the medial left thigh see pictures below  Musculoskeletal: normal range of motion, no joint swelling, deformity or tenderness  Neurologic: reflexes normal and symmetric, no cranial nerve deficit, had trouble walking from the house to the car today, coordination and speech normal      Assessment:        Problem List Items Addressed This Visit     Chronic venous hypertension (idiopathic) with ulcer and inflammation of bilateral lower extremity (HCC)    Protein-calorie malnutrition, moderate ([address] - Primary             Excisional Debridement Procedure Note  Indications:  Based on my examination of this patient's wound(s)/ulcer(s) today, debridement is required to promote healing and evaluate the wound base. Performed by: Shefali Fishman MD    Consent obtained? Yes    Time out taken: Yes    Pain Control: Anesthetic: 4% Lidocaine Liquid Topical(10 ml)     Debridement:Excisional Debridement    Using curette the wound/ulcer was sharply debrided    down through and included excision of  subcutaneous tissue.         Devitalized Tissue Debrided:  fibrin, biofilm, slough and necrotic/eschar      Pre Debridement Measurements:  Are located in the [facility]  Documentation Flow Sheet   Wound/Ulcer #: 1, 2 and 3     Post  Debridement Measurements:  Wound 05/23/19 Leg Right;Medial Wound #1; re-opened approx 2 months ago (Active)   Wound Image   5/23/2019  3:26 PM   Wound Venous 5/23/2019  3:26 PM   Dressing Status Old drainage 5/30/2019  3:44 PM   Dressing Changed Changed/New 5/30/2019  4:24 PM   Dressing/Treatment Other (comment) 5/30/2019  4:24 PM   Wound Length (cm) 13 cm 5/30/2019  3:44 PM   Wound Width (cm) 10.4 cm 5/30/2019  3:44 PM   Wound Depth (cm) 0.1 cm 5/30/2019  3:44 PM   Wound Surface Area (cm^2) 135.2 cm^2 5/30/2019  3:44 PM   Change in Wound Size % (l*w) -16.65 5/30/2019  3:44 PM   Wound Volume (cm^3) 13.52 cm^3 5/30/2019  3:44 PM   Wound Healing % -17 5/30/2019  3:44 PM   Post-Procedure Length (cm) 13.1 cm 5/30/2019  4:17 PM   Post-Procedure Width (cm) 10.5 cm 5/30/2019  4:17 PM   Post-Procedure Depth (cm) 0.1 cm 5/30/2019  4:17 PM   Post-Procedure Surface Area (cm^2) 137.55 cm^2 5/30/2019  4:17 PM   Post-Procedure Volume (cm^3) 13.76 cm^3 5/30/2019  4:17 PM   Wound Assessment Granulation tissue;Slough 5/30/2019  3:44 PM   Drainage Amount Large 5/30/2019  3:44 PM   Drainage Description Green;Purulent 5/30/2019  3:44 PM   Odor Mild 5/30/2019  3:44 PM   Margins Attached edges; Undefined edges 5/30/2019  3:44 PM   Marilynn-wound Assessment Maceration;Pink 5/30/2019  3:44 PM   Non-staged Wound Description Full thickness 5/30/2019  3:44 PM   [place]%Wound Bed 30 5/30/2019  3:44 PM   Red%Wound Bed 50 5/30/2019  3:44 PM   Yellow%Wound Bed 20 5/30/2019  3:44 PM   Number of days: 7       Wound 05/23/19 Leg Left;Medial;Lower Wound #2; per family open for months (Active)   Wound Image   5/23/2019  3:26 PM   Wound Venous 5/23/2019  3:26 PM   Dressing Status Old drainage 5/30/2019  3:44 PM   Dressing Changed Changed/New 5/30/2019  4:24 PM   Dressing/Treatment Other (comment) 5/30/2019  4:24 PM   Wound Length (cm) 12 cm 5/30/2019  3:44 PM   Wound Width (cm) 13 cm 5/30/2019 Post-Procedure Volume (cm^3) 0.17 cm^3 5/30/2019  4:17 PM   Wound Assessment Granulation tissue;Slough 5/30/2019  3:44 PM   Drainage Amount Small 5/30/2019  3:44 PM   Drainage Description Serosanguinous 5/30/2019  3:44 PM   Odor None 5/30/2019  3:44 PM   Margins Attached edges 5/30/2019  3:44 PM   Marilynn-wound Assessment Pink;Swelling;Excoriated 5/30/2019  3:44 PM   Non-staged Wound Description Full thickness 5/30/2019  3:44 PM   [place]%Wound Bed 20 5/30/2019  3:44 PM   Yellow%Wound Bed 80 5/30/2019  3:44 PM   Number of days: 7       Wound 05/30/19 Leg Left;Lateral;Lower #4 (Active)   Wound Image   5/30/2019  3:44 PM   Dressing Status Old drainage 5/30/2019  3:44 PM   Dressing Changed Changed/New 5/30/2019  4:24 PM   Dressing/Treatment Other (comment) 5/30/2019  4:24 PM   Wound Length (cm) 6.5 cm 5/30/2019  3:44 PM   Wound Width (cm) 5.6 cm 5/30/2019  3:44 PM   Wound Depth (cm) 0.1 cm 5/30/2019  3:44 PM   Wound Surface Area (cm^2) 36.4 cm^2 5/30/2019  3:44 PM   Wound Volume (cm^3) 3.64 cm^3 5/30/2019  3:44 PM   Post-Procedure Length (cm) 6.5 cm 5/30/2019  4:17 PM   Post-Procedure Width (cm) 5.6 cm 5/30/2019  4:17 PM   Post-Procedure Depth (cm) 0.1 cm 5/30/2019  4:17 PM   Post-Procedure Surface Area (cm^2) 36.4 cm^2 5/30/2019  4:17 PM   Post-Procedure Volume (cm^3) 3.64 cm^3 5/30/2019  4:17 PM   Wound Assessment Granulation tissue;Slough 5/30/2019  3:44 PM   Drainage Amount Moderate 5/30/2019  3:44 PM   Drainage Description Green;Purulent;Serosanguinous 5/30/2019  3:44 PM   Odor Mild 5/30/2019  3:44 PM   Margins Attached edges 5/30/2019  3:44 PM   Marilynn-wound Assessment Pink 5/30/2019  3:44 PM   Non-staged Wound Description Full thickness 5/30/2019  3:44 PM   Red%Wound Bed 80 5/30/2019  3:44 PM   Yellow%Wound Bed 20 5/30/2019  3:44 PM   Number of days: 0       Percent of Wound/Ulcer Debrided: 100%    Total Surface Area Debrided:  297.74 sq cm    Diabetic/Pressure/Non Pressure Ulcers only:  Ulcer: N/A    Bleeding: loosely with       [] Iodoform   [] Plain Packing       [] Other   [x] Cover and Secure with:                       [] Gauze [] Brigette   [x] Kerlix              [] Ace Wrap       [] Cover Roll Tape         [x] ABD  PADS                           [] Other: Avoid contact of tape with skin. [] Change dressing:       [] Daily               [] Every Other Day        [] Three times per week              [] Once a week  [x] TWICE A DAY         [x] Other: ACE WRAPS TOES TO KNEE RIGHT LEG, TOES TO THIGH TO LEFT LEG                Edema Control:   Apply:  [] Compression Stocking           []Right Leg         []Left Leg              [] Tubigrip          []Right Leg Double Layer          []Left Leg Double Layer                                                  []Right Leg Single Layer           []Left Leg Single Layer              [] SpandaGrip    []Right Leg         []Left Leg                                      []Low compression 5-10 mm/Hg                                                 []Medium compression 10-20 mm/Hg                                      []High compression  20-30 mm/Hg              every morning immediately when getting up should be applied to affected leg(s) from mid foot to knee making sure to cover the heel. Remove every night before going to bed. [] Elevate leg(s) above the level of the heart when sitting. [] Avoid prolonged standing in one place. [] Elevate arm/hand above the level of the heart         []RightArm         []LeftArm                Compression:  Apply: [] Multilayer Compression Wrap Applied in Clinic-   []RightLeg            []Left Leg--              [] Multi-layer compression. Do not get leg(s) with wrap wet. If wraps become too tight call the center or completely remove the wrap. [x] Elevate leg(s) above the level of the heart when sitting.                           [x] Avoid prolonged standing in one place.     Off-Loading:   [] Off-loading when        [] walking           [] in bed [] sitting  [] Total non-weight bearing  [] Right Leg  [] Left Leg          [] Assistive Device         [] Walker            [] Cane   [] Wheelchair      [] Crutches              [] Surgical shoe    [] Podus Boot(s)   [] Foam Boot(s)  [] Roll About              [] Cast Boot       [] CROW Boot  [] Other:        Dietary:  [] Diet as tolerated:        [x] Calorie Diabetic Diet: [] No Added Salt:  [] Increase Protein:        [] Other:   Activity:  [x] Activity as tolerated:  [] Patient has no activity restrictions     [] Strict Bedrest:            [] Remain off Work:     [] May return to full duty work:                                       [] Return to work with restrictions:                 If you are still having pain after you go home:  [x] Elevate the affected limb. [x] Use over-the-counter medications you would normally use for pain as permitted by your family doctor.   [x] For persistent pain not relieved by the above interventions, please call your family doctor.   Romana Clock   Return Appointment:  [] Wound and dressing supply provider:   [x] [facility] or [address] [place] [address]  [] Wound Assessment:  [] Physician or NP scheduled for Wound Assessment:   [x] Return Appointment: With DR Kolton Martin  in  1 Week(s)  [] Ordered tests:      Nurse Case Manger:  MELISSA     Electronically signed by Alyssa Alejandro RN on 5/30/2019 at 4:01 PM   [address] [place] [address] Information: Should you experience any significant changes in your wound(s) or have questions about your wound care, please contact the [address] [place] [address] at [address] 8:00 am - 4:30 pm and Friday 8:00 am - 12:30 pm.  If you need help with your wound outside these hours and cannot wait until we are again available, contact your PCP or go to the hospital emergency room.      PLEASE NOTE: IF YOU ARE UNABLE TO [address], CONTINUE TO USE THE SUPPLIES YOU HAVE AVAILABLE UNTIL YOU ARE ABLE TO REACH US. IT IS MOST IMPORTANT TO KEEP THE WOUND COVERED AT ALL TIMES.      Physician Signature:_______________________     Date: ___________ Time:  ____________                                Dr lCarissa Werner               Electronically signed by Alla Arteaga MD on 5/30/2019 at 5:05 PM

## 2019-06-03 PROBLEM — N39.0 UTI (URINARY TRACT INFECTION): Status: RESOLVED | Noted: 2017-10-04 | Resolved: 2019-06-03

## 2019-06-06 ENCOUNTER — HOSPITAL ENCOUNTER (OUTPATIENT)
Dept: WOUND CARE | Age: 74
Discharge: HOME | End: 2019-06-06
Payer: MEDICARE

## 2019-06-08 ENCOUNTER — APPOINTMENT (OUTPATIENT)
Dept: GENERAL RADIOLOGY | Age: 74
DRG: 698 | End: 2019-06-08
Payer: MEDICARE

## 2019-06-08 ENCOUNTER — HOSPITAL ENCOUNTER (INPATIENT)
Age: 74
LOS: 5 days | Discharge: HOME HEALTH | DRG: 698 | End: 2019-06-13
Attending: EMERGENCY MEDICINE | Admitting: INTERNAL MEDICINE
Payer: MEDICARE

## 2019-06-08 DIAGNOSIS — L97.911 BILATERAL LEG ULCER, LIMITED TO BREAKDOWN OF SKIN (HCC): ICD-10-CM

## 2019-06-08 DIAGNOSIS — N39.0 URINARY TRACT INFECTION WITHOUT HEMATURIA, SITE UNSPECIFIED: Primary | ICD-10-CM

## 2019-06-08 DIAGNOSIS — L97.921 BILATERAL LEG ULCER, LIMITED TO BREAKDOWN OF SKIN (HCC): ICD-10-CM

## 2019-06-08 PROBLEM — A41.9 SEPSIS (HCC): Status: ACTIVE | Noted: 2019-06-08

## 2019-06-08 LAB
A/G RATIO: 0.7 (ref 1.1–2.2)
ALBUMIN SERPL-MCNC: 3.1 G/DL (ref 3.4–5)
ALP BLD-CCNC: 150 U/L (ref 40–129)
ALT SERPL-CCNC: 9 U/L (ref 10–40)
ANION GAP SERPL CALCULATED.3IONS-SCNC: 16 MMOL/L (ref 3–16)
AST SERPL-CCNC: 8 U/L (ref 15–37)
BACTERIA: ABNORMAL /HPF
BASOPHILS ABSOLUTE: 0.1 K/UL (ref 0–0.2)
BASOPHILS RELATIVE PERCENT: 0.4 %
BILIRUB SERPL-MCNC: 0.3 MG/DL (ref 0–1)
BILIRUBIN URINE: NEGATIVE
BLOOD, URINE: ABNORMAL
BUN BLDV-MCNC: 16 MG/DL (ref 7–20)
CALCIUM SERPL-MCNC: 8.4 MG/DL (ref 8.3–10.6)
CHLORIDE BLD-SCNC: 106 MMOL/L (ref 99–110)
CLARITY: ABNORMAL
CO2: 21 MMOL/L (ref 21–32)
COLOR: YELLOW
CREAT SERPL-MCNC: 1.3 MG/DL (ref 0.6–1.2)
EOSINOPHILS ABSOLUTE: 0.4 K/UL (ref 0–0.6)
EOSINOPHILS RELATIVE PERCENT: 2.1 %
EPITHELIAL CELLS, UA: 0 /HPF (ref 0–5)
GFR AFRICAN AMERICAN: 48
GFR NON-AFRICAN AMERICAN: 40
GLOBULIN: 4.3 G/DL
GLUCOSE BLD-MCNC: 118 MG/DL (ref 70–99)
GLUCOSE BLD-MCNC: 133 MG/DL (ref 70–99)
GLUCOSE BLD-MCNC: 221 MG/DL (ref 70–99)
GLUCOSE URINE: NEGATIVE MG/DL
HCT VFR BLD CALC: 30.2 % (ref 36–48)
HEMOGLOBIN: 9.7 G/DL (ref 12–16)
HYALINE CASTS: 2 /LPF (ref 0–8)
KETONES, URINE: NEGATIVE MG/DL
LACTIC ACID, SEPSIS: 1.4 MMOL/L (ref 0.4–1.9)
LACTIC ACID, SEPSIS: 1.7 MMOL/L (ref 0.4–1.9)
LEUKOCYTE ESTERASE, URINE: ABNORMAL
LYMPHOCYTES ABSOLUTE: 0.5 K/UL (ref 1–5.1)
LYMPHOCYTES RELATIVE PERCENT: 2.9 %
MCH RBC QN AUTO: 31.7 PG (ref 26–34)
MCHC RBC AUTO-ENTMCNC: 32.2 G/DL (ref 31–36)
MCV RBC AUTO: 98.5 FL (ref 80–100)
MICROSCOPIC EXAMINATION: YES
MONOCYTES ABSOLUTE: 0.9 K/UL (ref 0–1.3)
MONOCYTES RELATIVE PERCENT: 5 %
NEUTROPHILS ABSOLUTE: 15.7 K/UL (ref 1.7–7.7)
NEUTROPHILS RELATIVE PERCENT: 89.6 %
NITRITE, URINE: POSITIVE
PDW BLD-RTO: 13.4 % (ref 12.4–15.4)
PERFORMED ON: ABNORMAL
PERFORMED ON: ABNORMAL
PH UA: 5.5 (ref 5–8)
PLATELET # BLD: 318 K/UL (ref 135–450)
PMV BLD AUTO: 8.3 FL (ref 5–10.5)
POTASSIUM SERPL-SCNC: 3.1 MMOL/L (ref 3.5–5.1)
PROTEIN UA: 30 MG/DL
RBC # BLD: 3.07 M/UL (ref 4–5.2)
RBC UA: 5 /HPF (ref 0–4)
SODIUM BLD-SCNC: 143 MMOL/L (ref 136–145)
SPECIFIC GRAVITY UA: 1.01 (ref 1–1.03)
TOTAL PROTEIN: 7.4 G/DL (ref 6.4–8.2)
URINE REFLEX TO CULTURE: YES
URINE TYPE: ABNORMAL
UROBILINOGEN, URINE: 0.2 E.U./DL
WBC # BLD: 17.5 K/UL (ref 4–11)
WBC UA: 328 /HPF (ref 0–5)

## 2019-06-08 PROCEDURE — 83036 HEMOGLOBIN GLYCOSYLATED A1C: CPT

## 2019-06-08 PROCEDURE — 6370000000 HC RX 637 (ALT 250 FOR IP): Performed by: INTERNAL MEDICINE

## 2019-06-08 PROCEDURE — 80053 COMPREHEN METABOLIC PANEL: CPT

## 2019-06-08 PROCEDURE — 85025 COMPLETE CBC W/AUTO DIFF WBC: CPT

## 2019-06-08 PROCEDURE — 71045 X-RAY EXAM CHEST 1 VIEW: CPT

## 2019-06-08 PROCEDURE — 2580000003 HC RX 258: Performed by: EMERGENCY MEDICINE

## 2019-06-08 PROCEDURE — 94660 CPAP INITIATION&MGMT: CPT

## 2019-06-08 PROCEDURE — 6360000002 HC RX W HCPCS: Performed by: INTERNAL MEDICINE

## 2019-06-08 PROCEDURE — 2060000000 HC ICU INTERMEDIATE R&B

## 2019-06-08 PROCEDURE — 87040 BLOOD CULTURE FOR BACTERIA: CPT

## 2019-06-08 PROCEDURE — 96365 THER/PROPH/DIAG IV INF INIT: CPT

## 2019-06-08 PROCEDURE — 87077 CULTURE AEROBIC IDENTIFY: CPT

## 2019-06-08 PROCEDURE — 6360000002 HC RX W HCPCS: Performed by: EMERGENCY MEDICINE

## 2019-06-08 PROCEDURE — 87086 URINE CULTURE/COLONY COUNT: CPT

## 2019-06-08 PROCEDURE — 81001 URINALYSIS AUTO W/SCOPE: CPT

## 2019-06-08 PROCEDURE — 87186 SC STD MICRODIL/AGAR DIL: CPT

## 2019-06-08 PROCEDURE — 86403 PARTICLE AGGLUT ANTBDY SCRN: CPT

## 2019-06-08 PROCEDURE — 96375 TX/PRO/DX INJ NEW DRUG ADDON: CPT

## 2019-06-08 PROCEDURE — 6370000000 HC RX 637 (ALT 250 FOR IP): Performed by: EMERGENCY MEDICINE

## 2019-06-08 PROCEDURE — 96367 TX/PROPH/DG ADDL SEQ IV INF: CPT

## 2019-06-08 PROCEDURE — 93005 ELECTROCARDIOGRAM TRACING: CPT | Performed by: EMERGENCY MEDICINE

## 2019-06-08 PROCEDURE — 87205 SMEAR GRAM STAIN: CPT

## 2019-06-08 PROCEDURE — 87070 CULTURE OTHR SPECIMN AEROBIC: CPT

## 2019-06-08 PROCEDURE — 83605 ASSAY OF LACTIC ACID: CPT

## 2019-06-08 PROCEDURE — 99285 EMERGENCY DEPT VISIT HI MDM: CPT

## 2019-06-08 PROCEDURE — 36415 COLL VENOUS BLD VENIPUNCTURE: CPT

## 2019-06-08 RX ORDER — TIMOLOL MALEATE 6.8 MG/ML
1 SOLUTION/ DROPS OPHTHALMIC DAILY
Status: DISCONTINUED | OUTPATIENT
Start: 2019-06-08 | End: 2019-06-08 | Stop reason: SDUPTHER

## 2019-06-08 RX ORDER — IBUPROFEN 200 MG
400 TABLET ORAL EVERY 6 HOURS PRN
COMMUNITY
End: 2020-01-03

## 2019-06-08 RX ORDER — NICOTINE POLACRILEX 4 MG
15 LOZENGE BUCCAL PRN
Status: DISCONTINUED | OUTPATIENT
Start: 2019-06-08 | End: 2019-06-13 | Stop reason: HOSPADM

## 2019-06-08 RX ORDER — TIMOLOL MALEATE 5 MG/ML
1 SOLUTION/ DROPS OPHTHALMIC DAILY
Status: DISCONTINUED | OUTPATIENT
Start: 2019-06-08 | End: 2019-06-13 | Stop reason: HOSPADM

## 2019-06-08 RX ORDER — ACETAMINOPHEN 500 MG
500 TABLET ORAL EVERY 6 HOURS PRN
Status: DISCONTINUED | OUTPATIENT
Start: 2019-06-08 | End: 2019-06-13 | Stop reason: HOSPADM

## 2019-06-08 RX ORDER — DEXTROSE MONOHYDRATE 25 G/50ML
12.5 INJECTION, SOLUTION INTRAVENOUS PRN
Status: DISCONTINUED | OUTPATIENT
Start: 2019-06-08 | End: 2019-06-13 | Stop reason: HOSPADM

## 2019-06-08 RX ORDER — LORAZEPAM 0.5 MG/1
0.5 TABLET ORAL NIGHTLY PRN
COMMUNITY
End: 2019-08-22

## 2019-06-08 RX ORDER — VENLAFAXINE 50 MG/1
150 TABLET ORAL 2 TIMES DAILY
Status: DISCONTINUED | OUTPATIENT
Start: 2019-06-08 | End: 2019-06-13 | Stop reason: HOSPADM

## 2019-06-08 RX ORDER — FUROSEMIDE 10 MG/ML
20 INJECTION INTRAMUSCULAR; INTRAVENOUS ONCE
Status: COMPLETED | OUTPATIENT
Start: 2019-06-08 | End: 2019-06-08

## 2019-06-08 RX ORDER — ASPIRIN 325 MG
325 TABLET ORAL DAILY
Status: DISCONTINUED | OUTPATIENT
Start: 2019-06-08 | End: 2019-06-13 | Stop reason: HOSPADM

## 2019-06-08 RX ORDER — ACETAMINOPHEN 500 MG
1000 TABLET ORAL ONCE
Status: COMPLETED | OUTPATIENT
Start: 2019-06-08 | End: 2019-06-08

## 2019-06-08 RX ORDER — LORAZEPAM 0.5 MG/1
0.5 TABLET ORAL NIGHTLY PRN
Status: DISCONTINUED | OUTPATIENT
Start: 2019-06-08 | End: 2019-06-13 | Stop reason: HOSPADM

## 2019-06-08 RX ORDER — ISOSORBIDE MONONITRATE 30 MG/1
30 TABLET, EXTENDED RELEASE ORAL DAILY
Status: DISCONTINUED | OUTPATIENT
Start: 2019-06-08 | End: 2019-06-09

## 2019-06-08 RX ORDER — ACETAMINOPHEN 650 MG/1
975 SUPPOSITORY RECTAL ONCE
Status: COMPLETED | OUTPATIENT
Start: 2019-06-08 | End: 2019-06-08

## 2019-06-08 RX ORDER — FERROUS SULFATE TAB EC 324 MG (65 MG FE EQUIVALENT) 324 (65 FE) MG
325 TABLET DELAYED RESPONSE ORAL 2 TIMES DAILY
Status: DISCONTINUED | OUTPATIENT
Start: 2019-06-08 | End: 2019-06-08 | Stop reason: SDUPTHER

## 2019-06-08 RX ORDER — ONDANSETRON 2 MG/ML
4 INJECTION INTRAMUSCULAR; INTRAVENOUS EVERY 6 HOURS PRN
Status: DISCONTINUED | OUTPATIENT
Start: 2019-06-08 | End: 2019-06-13 | Stop reason: HOSPADM

## 2019-06-08 RX ORDER — POTASSIUM CHLORIDE 7.45 MG/ML
10 INJECTION INTRAVENOUS
Status: DISPENSED | OUTPATIENT
Start: 2019-06-08 | End: 2019-06-08

## 2019-06-08 RX ORDER — MORPHINE SULFATE 2 MG/ML
1 INJECTION, SOLUTION INTRAMUSCULAR; INTRAVENOUS ONCE
Status: COMPLETED | OUTPATIENT
Start: 2019-06-08 | End: 2019-06-08

## 2019-06-08 RX ORDER — TRAMADOL HYDROCHLORIDE 50 MG/1
50 TABLET ORAL EVERY 8 HOURS PRN
Status: DISCONTINUED | OUTPATIENT
Start: 2019-06-08 | End: 2019-06-13 | Stop reason: HOSPADM

## 2019-06-08 RX ORDER — DEXTROSE MONOHYDRATE 50 MG/ML
100 INJECTION, SOLUTION INTRAVENOUS PRN
Status: DISCONTINUED | OUTPATIENT
Start: 2019-06-08 | End: 2019-06-13 | Stop reason: HOSPADM

## 2019-06-08 RX ORDER — FERROUS SULFATE TAB EC 324 MG (65 MG FE EQUIVALENT) 324 (65 FE) MG
324 TABLET DELAYED RESPONSE ORAL 2 TIMES DAILY WITH MEALS
Status: DISCONTINUED | OUTPATIENT
Start: 2019-06-08 | End: 2019-06-13 | Stop reason: HOSPADM

## 2019-06-08 RX ORDER — ATORVASTATIN CALCIUM 10 MG/1
10 TABLET, FILM COATED ORAL DAILY
Status: DISCONTINUED | OUTPATIENT
Start: 2019-06-08 | End: 2019-06-13 | Stop reason: HOSPADM

## 2019-06-08 RX ORDER — TRAMADOL HYDROCHLORIDE 50 MG/1
50 TABLET ORAL EVERY 8 HOURS PRN
COMMUNITY
End: 2020-03-12 | Stop reason: ALTCHOICE

## 2019-06-08 RX ORDER — LEVOFLOXACIN 5 MG/ML
500 INJECTION, SOLUTION INTRAVENOUS ONCE
Status: COMPLETED | OUTPATIENT
Start: 2019-06-08 | End: 2019-06-08

## 2019-06-08 RX ORDER — 0.9 % SODIUM CHLORIDE 0.9 %
30 INTRAVENOUS SOLUTION INTRAVENOUS ONCE
Status: COMPLETED | OUTPATIENT
Start: 2019-06-08 | End: 2019-06-08

## 2019-06-08 RX ADMIN — MORPHINE SULFATE 1 MG: 2 INJECTION, SOLUTION INTRAMUSCULAR; INTRAVENOUS at 15:26

## 2019-06-08 RX ADMIN — ACETAMINOPHEN 975 MG: 650 SUPPOSITORY RECTAL at 12:40

## 2019-06-08 RX ADMIN — SODIUM CHLORIDE 2502 ML: 9 INJECTION, SOLUTION INTRAVENOUS at 12:28

## 2019-06-08 RX ADMIN — Medication 10 MEQ: at 21:22

## 2019-06-08 RX ADMIN — Medication 10 MEQ: at 20:14

## 2019-06-08 RX ADMIN — VENLAFAXINE 150 MG: 50 TABLET ORAL at 21:10

## 2019-06-08 RX ADMIN — ATORVASTATIN CALCIUM 10 MG: 10 TABLET, FILM COATED ORAL at 18:33

## 2019-06-08 RX ADMIN — FUROSEMIDE 20 MG: 10 INJECTION, SOLUTION INTRAMUSCULAR; INTRAVENOUS at 15:26

## 2019-06-08 RX ADMIN — ACETAMINOPHEN 1000 MG: 500 TABLET ORAL at 12:20

## 2019-06-08 RX ADMIN — Medication 1250 MG: at 14:13

## 2019-06-08 RX ADMIN — INSULIN LISPRO 1 UNITS: 100 INJECTION, SOLUTION INTRAVENOUS; SUBCUTANEOUS at 21:15

## 2019-06-08 RX ADMIN — ISOSORBIDE MONONITRATE 30 MG: 30 TABLET, EXTENDED RELEASE ORAL at 18:33

## 2019-06-08 RX ADMIN — ASPIRIN 325 MG ORAL TABLET 325 MG: 325 PILL ORAL at 18:33

## 2019-06-08 RX ADMIN — Medication 10 MEQ: at 18:33

## 2019-06-08 RX ADMIN — LEVOFLOXACIN 500 MG: 5 INJECTION, SOLUTION INTRAVENOUS at 12:28

## 2019-06-08 RX ADMIN — ONDANSETRON 4 MG: 2 INJECTION INTRAMUSCULAR; INTRAVENOUS at 16:27

## 2019-06-08 RX ADMIN — FERROUS SULFATE TAB EC 324 MG (65 MG FE EQUIVALENT) 324 MG: 324 (65 FE) TABLET DELAYED RESPONSE at 18:33

## 2019-06-08 ASSESSMENT — PAIN SCALES - GENERAL
PAINLEVEL_OUTOF10: 0

## 2019-06-08 NOTE — CONSULTS
Clinical Pharmacy Note  Vancomycin Consult    Jer Brandt is a 66 y.o. female ordered Vancomycin; consult received from Dr. Omid Junior to manage therapy. Patient Active Problem List   Diagnosis    Acute on chronic renal insufficiency    FTT (failure to thrive) in adult    Type 1 diabetes mellitus with hyperglycemia (HCC)    Chronic venous hypertension (idiopathic) with ulcer and inflammation of bilateral lower extremity (HCC)    DMII (diabetes mellitus, type 2) (HCC)    Cellulitis of left lower extremity    Protein-calorie malnutrition, moderate (HCC)    MRSA colonization    Chronic diastolic heart failure ([address]    Coronary artery disease involving native coronary artery of native heart without angina pectoris    Aortic atherosclerosis (HCC)    Essential hypertension    Cellulitis, lower extremities bilaterally    MRSA nasal colonization    Normocytic anemia    Altered mental status    Elevated lactic acid level    Uncontrolled diabetes mellitus (HCC)       Allergies:  Sulfa antibiotics     Temp max:  Temp (24hrs), Av.3 °F (39.6 °C), Min:103.3 °F (39.6 °C), Max:103.3 °F (39.6 °C)      No results for input(s): WBC in the last 72 hours. No results for input(s): BUN, CREATININE in the last 72 hours. No intake or output data in the 24 hours ending 19 1229    Culture Results:  pending    Ht Readings from Last 1 Encounters:   19 4' 11\" (1.499 m)        Wt Readings from Last 1 Encounters:   19 183 lb 13.8 oz (83.4 kg)         CrCl cannot be calculated (Patient's most recent lab result is older than the maximum 10 days allowed. ). Assessment/Plan:  Will dose vancomycin 1250 mg IV  For one dose in the ER. Will await further orders  Thank you for the consult. Will continue to follow.

## 2019-06-08 NOTE — ED TRIAGE NOTES
Patient to ED via EMS for fever. Per daughter, patient spiked fever at home early this AM. [facility] home healthcare nurse took sample of urine and told daughter patient has urine infection. Patient is oriented to self only at this time. Patient has suprapubic catheter in place. Temp 103.3 at this time. Patient has wounds bilaterally to lower extremities that are weeping, patient sees wound clinic weekly and is followed by Dr. Roxana Dunaway.

## 2019-06-08 NOTE — ED NOTES
Patient placed on bedpan for BM. Patient instructed to hit call light when she is finished.      Angel Conde RN  06/08/19 [facility], RN  06/08/19 [id number]

## 2019-06-08 NOTE — ED NOTES
Bed: B-08  Expected date: 6/8/19  Expected time: 11:51 AM  Means of arrival: [facility]  Comments:     Navin Montelongo RN  06/08/19 [id number]

## 2019-06-08 NOTE — PROGRESS NOTES
PT REMOVED EMERGENTLY FROM BIPAP AS SHE VOMITED IN MASK.  Electronically signed by Naman Mitchell RCP on 6/8/2019 at 4:21 PM

## 2019-06-08 NOTE — PROGRESS NOTES
4 Eyes Skin Assessment     The patient is being assess for  Admission    I agree that 2 RN's have performed a thorough Head to Toe Skin Assessment on the patient. ALL assessment sites listed below have been assessed. Areas assessed by both nurses:   [x]   Head, Face, and Ears   [x]   Shoulders, Back, and Chest  [x]   Arms, Elbows, and Hands   [x]   Coccyx, Sacrum, and IschIum  [x]   Legs, Feet, and Heels        Does the Patient have Skin Breakdown?   Yes [facility] WOUND CARE was Initiated documentation include the Marilynn-wound, Wound Assessment, Measurements, Dressing Treatment, Drainage, and Color\",         Jose Prevention initiated:  Yes   Wound Care Orders initiated:  Yes      [id number]  nurse consulted for Pressure Injury (Stage 3,4, Unstageable, DTI, NWPT, and Complex wounds), New and Established Ostomies:  Yes      Nurse 1 eSignature: Electronically signed by Samuel Huddleston RN on 6/8/19 at 7:02 PM    **SHARE this note so that the co-signing nurse is able to place an eSignature**    Nurse 2 eSignature: Electronically signed by Shannon Santos RN on 6/8/19 at 8:33 PM

## 2019-06-08 NOTE — ED NOTES
Patient vomited as soon as tylenol given oral. ED MD aware and order for rectal tylenol to be given.      Marlena Perdomo RN  06/08/19 [id number]

## 2019-06-08 NOTE — PROGRESS NOTES
Pharmacy Medication Reconciliation Note     List of medications patient is currently taking is complete. Source of information:   1. Patient's medication list from home care nurse  2. Patient's family at bedside  3. EMR    Notes regarding home medications:   1. Patient did not receive any of her home medication doses before presenting to the ER. 2. Pt's family at bedside states that they don't want the patient is receive any narcotics as she is a former addict.       [id number] [facility] Antonia, Pharmacy Intern  6/8/2019 3:15 PM

## 2019-06-08 NOTE — ED PROVIDER NOTES
[facility]  eMERGENCY dEPARTMENT eNCOUnter      Pt Name: Aleksey Cruz  MRN: [id number]  [place] 1945  Date of evaluation: 6/8/2019  Provider: Igor Lacy MD    [address] [place] [address]       Chief Complaint   Patient presents with    Fever     103.3 in ED, started this AM. Patinet denies any pain or complaint. CRITICAL CARE TIME   Total Critical Care time was 0 minutes, excluding separately reportable procedures. There was a high probability of clinically significant/life threatening deterioration in the patient's condition which required my urgent intervention. HISTORY OF PRESENT ILLNESS  (Location/Symptom, Timing/Onset, Context/Setting, Quality, Duration, Modifying Factors, Severity.)   Aleksey Cruz is a 66 y.o. female who presents to the emergency department mom with a fever. She apparently had a temperature of 101 at home. Her temperature is 103 here. She has chronic wounds on both legs followed by Dr. Maribell Patel and the [address] [place] [address] Road. Her daughter states that her wounds looked better than they have in a long time. No reported cough. No vomiting or diarrhea. Denies abdominal pain. She has a chronic indwelling suprapubic catheter. Nursing Notes were reviewed and I agree. REVIEW OF SYSTEMS    (2-9 systems for level 4, 10 or more for level 5)     Gen. : Fever that started this morning. ENT: Denies sore throat earache or nasal congestion. Cardiovascular: Denies chest pain. Pulmonary: Denies shortness of breath or cough. GI: Denies abdominal pain, vomiting, or diarrhea. : Chronic indwelling Brasher catheter. Muscle skeletal: Chronic ulcers on both legs. Except as noted above the remainder of the review of systems was reviewed and negative.        PAST MEDICAL HISTORY     Past Medical History:   Diagnosis Date    Anemia     [facility]rial [facility]     CAD (coronary artery disease)     Chronic back pain     CKD (chronic kidney disease), stage III ([address]  Diabetes mellitus ([facility]     Diastolic CHF ([facility]     Hyperlipidemia     Hypertension     MI (myocardial infarction) ([facility]     MRSA (methicillin resistant staph aureus) culture positive 04/13/2018    leg ulcer    MRSA colonization 10/16/2018    Obesity (BMI 30-39. 9)     Osteoarthritis     Pancreatitis     Sepsis ([facility]     Stasis ulcer ([facility]     bilateral lower extremities     [facility]          SURGICAL HISTORY       Past Surgical History:   Procedure Laterality Date    APPENDECTOMY      BACK SURGERY      x2    CATARACT REMOVAL      bilateral     CHOLECYSTECTOMY      COLONOSCOPY      EYE SURGERY      FRACTURE SURGERY      Left ankle    HYSTERECTOMY      INSET/CHANGE LOMELI CATHETER - COMPLICATED  49/1791    suprapubic cath    JOINT REPLACEMENT      Right knee    KNEE SURGERY           CURRENT MEDICATIONS       Current Discharge Medication List      CONTINUE these medications which have NOT CHANGED    Details   ibuprofen (ADVIL;MOTRIN) 200 MG tablet Take 400 mg by mouth every 6 hours as needed for Pain      LORazepam (ATIVAN) 0.5 MG tablet Take 0.5 mg by mouth nightly as needed for Anxiety. traMADol (ULTRAM) 50 MG tablet Take 50 mg by mouth every 8 hours as needed for Pain.       raNITIdine HCl (ZANTAC 150 MAXIMUM STRENGTH PO) Take 150 mg by mouth 2 times daily      ferrous sulfate 325 (65 Fe) MG tablet Take 325 mg by mouth 2 times daily       insulin lispro (HUMALOG) 100 UNIT/ML injection vial Inject 5 Units into the skin 3 times daily (with meals)  Qty: 1 vial, Refills: 0      Timolol (TIMOPTIC) 0.5 % (DAILY) SOLN ophthalmic solution Place 1 drop into the right eye daily       aspirin 325 MG tablet Take 325 mg by mouth daily       atorvastatin (LIPITOR) 10 MG tablet Take 1 tablet by mouth daily  Qty: 30 tablet, Refills: 0      metoprolol tartrate (LOPRESSOR) 25 MG tablet Take 1 tablet by mouth 2 times daily  Qty: 60 tablet, Refills: 0      furosemide (LASIX) 40 MG tablet Take 0.5 tablets by mouth daily  Qty: 60 tablet, Refills: 0      Biotin (BIOTIN 5000) 5 MG CAPS Take 1 tablet by mouth daily      Calcium Carbonate Antacid (TUMS ULTRA 1000) 1000 MG CHEW Take 1 tablet by mouth as needed (acid reflux)       lipase-protease-amylase (CREON) 63906 units delayed release capsule Take 24,000 Units by mouth 3 times daily (with meals)      gabapentin (NEURONTIN) 300 MG capsule Take 300 mg by mouth 2 times daily. isosorbide mononitrate (IMDUR) 30 MG extended release tablet Take 30 mg by mouth daily      venlafaxine (EFFEXOR) 75 MG tablet Take 150 mg by mouth 2 times daily       cetirizine (ZYRTEC) 10 MG tablet Take 10 mg by mouth daily      vitamin D3 (CHOLECALCIFEROL) 400 units TABS tablet Take 400 Units by mouth daily              ALLERGIES     Sulfa antibiotics    FAMILY HISTORY       Family History   Problem Relation Age of Onset    Heart Disease Father     Heart Failure Father     Diabetes Father     Diabetes Daughter     Diabetes Daughter           SOCIAL HISTORY       Social History     Socioeconomic History    Marital status:       Spouse name: None    Number of children: None    Years of education: None    Highest education level: None   Occupational History    None   Social Needs    Financial resource strain: None    Food insecurity:     Worry: None     Inability: None    Transportation needs:     Medical: None     Non-medical: None   Tobacco Use    Smoking status: Never Smoker    Smokeless tobacco: Never Used   Substance and Sexual Activity    Alcohol use: Yes     Comment: occassional    Drug use: No    Sexual activity: Never   Lifestyle    Physical activity:     Days per week: None     Minutes per session: None    Stress: None   Relationships    Social connections:     Talks on phone: None     Gets together: None     Attends Yazidism service: None     Active member of club or organization: None     Attends meetings of clubs or organizations: None Relationship status: None    Intimate partner violence:     Fear of current or ex partner: None     Emotionally abused: None     Physically abused: None     Forced sexual activity: None   Other Topics Concern    None   Social History Narrative    None         PHYSICAL EXAM    (up to 7 for level 4, 8 or more for level 5)     ED Triage Vitals   BP Temp Temp Source Pulse Resp SpO2 Height Weight   06/08/19 1159 06/08/19 1159 06/08/19 1159 06/08/19 1159 06/08/19 1159 06/08/19 1159 06/08/19 1212 06/08/19 1212   (!) 127/48 103.3 °F (39.6 °C) Oral 109 20 100 % 4' 11\" (1.499 m) 183 lb 13.8 oz (83.4 kg)       Gen. : An alert elderly female in no obvious distress. Head: Atraumatic and normocephalic eyes: No conjunctival injection. Pupils equal round reactive. ENT: TMs are normal. Nose clear. Oropharynx is moist without erythema. Neck: Supple without adenopathy, nontender. Heart: Tachycardic, irregularly irregular. No murmurs gallops noted. Lungs: Breath sounds equal bilaterally and clear. Abdomen: Soft, nondistended, nontender. Suprapubic catheter noted. No masses organomegaly. Muscle skeletal: Ultimate ulcerations both lower extremities. The right leg ulcerations are primarily over the distal one half of the tib-fib anteriorly and medial. Extend to the dermis. There is some purulent drainage. There is some mild erythema. I cannot palpate distal pulses. The ulcerations on the left leg early in the medial left knee and popliteal fossa and proximal posterior heel tib-fib area. There is some induration in this area. There are other ulcerations on the distal one third of the tib-fib. Again these are pending into the dermis and some areas through the dermis. There is some purulent drainage. Neuro: Awake, alert, oriented to person, not to place or time. Symmetrical reactive pupils. Intact extraocular movements. No facial asymmetry. Midline tongue. Symmetrical motor function, diffusely weak.       DIFFERENTIAL DIAGNOSIS Differential includes but is not limited to sepsis, dehydration, hypoglycemia, hyperglycemia, urinary tract infection, cellulitis/wound infection, pneumonia. DIAGNOSTIC RESULTS     EKG: All EKG's are interpreted by Edilson Meza MD in the absence of a cardiologist.    Sinus tachycardia, rate 139, age-indeterminate inferior infarct, age indeterminate anterior infarct. Lateral ischemic changes. Rhythm strip shows sinus tachycardia with a rate of 139, GA interval 136 ms, QRS 74 ms with no other ectopy is interpreted by me. This compared to an EKG dated 5/4/19, sinus tachycardia is new. The lateral ischemic changes are new. RADIOLOGY:   Non-plain film images such as CT, Ultrasound and MRI are read by the radiologist. Plain radiographic images are visualized and preliminarily interpreted Edilson Meza MD with the below findings:      Interpretation per the Radiologist below, if available at the time of this note:    XR CHEST PORTABLE   Final Result   Pulmonary vascular congestion.                ED BEDSIDE ULTRASOUND:   Performed by ED Physician - none    LABS:  Labs Reviewed   CBC WITH AUTO DIFFERENTIAL - Abnormal; Notable for the following components:       Result Value    WBC 17.5 (*)     RBC 3.07 (*)     Hemoglobin 9.7 (*)     Hematocrit 30.2 (*)     Neutrophils # 15.7 (*)     Lymphocytes # 0.5 (*)     All other components within normal limits    Narrative:     Performed at:  [address] [address]   Phone [phone]   COMPREHENSIVE METABOLIC PANEL - Abnormal; Notable for the following components:    Potassium 3.1 (*)     Glucose 133 (*)     CREATININE 1.3 (*)     GFR Non- 40 (*)     GFR  48 (*)     Alb 3.1 (*)     Albumin/Globulin Ratio 0.7 (*)     Alkaline Phosphatase 150 (*)     ALT 9 (*)     AST 8 (*)     All other components within normal limits    Narrative:     Performed at:  [facility]  [address] [place] [address] [employer] [address]   Phone [phone]   URINE RT REFLEX TO CULTURE - Abnormal; Notable for the following components:    Clarity, UA CLOUDY (*)     Blood, Urine MODERATE (*)     Protein, UA 30 (*)     Nitrite, Urine POSITIVE (*)     Leukocyte Esterase, Urine LARGE (*)     All other components within normal limits    Narrative:     Performed at:  [address] [employer] [address]   Phone [phone]   MICROSCOPIC URINALYSIS - Abnormal; Notable for the following components:    Bacteria, UA 1+ (*)     WBC,  (*)     RBC, UA 5 (*)     All other components within normal limits    Narrative:     Performed at:  [address] [employer] [address]   Phone [phone]   POCT GLUCOSE - Abnormal; Notable for the following components:    POC Glucose 118 (*)     All other components within normal limits    Narrative:     Performed at:  [address] [employer] [address]   Phone [phone]   POCT GLUCOSE - Abnormal; Notable for the following components:    POC Glucose 221 (*)     All other components within normal limits    Narrative:     Performed at:  [address] [employer]   Phone [phone]   CULTURE BLOOD #1   CULTURE BLOOD #2   WOUND CULTURE    Narrative:     ORDER#: [id number]                          ORDERED BY: Fern Saunders  SOURCE: Leg Right                          COLLECTED:  06/08/19 12:24  ANTIBIOTICS AT REGIS.:                      RECEIVED :  06/08/19 12:36  Performed at:  [facility]  [address] [place] [address] [employer] [address]   Phone [phone]   URINE CULTURE   LACTATE, SEPSIS    Narrative:     Performed at:  [facility]  [address] [facility]  [facility] [address]   Phone [phone]   LACTATE, SEPSIS    Narrative:     Performed at:  [facility]  [address] [facility] [address]   Phone [phone]   HEMOGLOBIN A1C   POCT GLUCOSE   POCT GLUCOSE   POCT GLUCOSE       All other labs were within normal range or not returned as of this dictation. EMERGENCY DEPARTMENT COURSE and DIFFERENTIAL DIAGNOSIS/MDM:   Vitals:    Vitals:    06/08/19 1615 06/08/19 1633 06/08/19 2012 06/08/19 2105   BP: 127/67  (!) 95/46 (!) 95/46   Pulse: 124  111    Resp: 28  14    Temp: 102 °F (38.9 °C)  99.5 °F (37.5 °C)    TempSrc:       SpO2:  100% 97%    Weight:       Height:           Patient presented with fever. She had some confusion when she arrived here. Chronic indwelling suprapubic catheter. She has chronic leg ulcers bilaterally. They're cared for at the wound care center. She developed a fever of 101 at home, she was febrile at 103 here. She had a minimal cough, but there is no evidence of acute infiltrate on x-ray, the radiologist felt there might be some pulmonary vascular congestion. Clinically she does not sound or look like she is in congestive heart failure. She has significant pyuria on her urinalysis. He has multiple chronic bilateral leg ulcers with some purulent drainage. There does seem to be some erythema and induration primarily in the area of the left proximal thigh which could be acute. The daughter looked at him and said that they look better than they have in a long time. It's unclear to me how much of this is chronic versus acute, but I cannot rule out the possibility of some acute superimposed soft tissue infection. Blood cultures were obtained. Sepsis fluids were initiated. Her lactic acid is not elevated. Her fever was treated. Her heart rate is down. Her blood pressures remained stable. IV antibiotics were initiated. I use Levaquin and vancomycin to cover for both soft tissue and .  The

## 2019-06-08 NOTE — ED NOTES
ED SBAR report provider to [facility]. Patient to be transported to Room 5251 via stretcher by transport tech. Patient transported with bedside cardiac monitor and with IV medications infusing. IV site clean, dry, and intact. MEWS score and pain assessed and documented. Updated patient and family on plan of care.        Alana Lee RN  06/08/19 5841

## 2019-06-08 NOTE — H&P
Hospital Medicine History & Physical      PCP: Matteo Sr    Date of Admission: 6/8/2019    Date of Service: Pt seen/examined on 6.8.19 . Patient will be admitted  in/to the hospital.    Chief Complaint:  Sob, fevers from this am , ate dinner normally yesterday       History Of Present Illness:      66 y.o. female who presented to [facility]  with above complaints. Symptom onset has been acute for a time period of 1 day(s). Severity is described as moderate. Course of her symptoms over time is constant and worsening. Associated with diff breathing, fevers, leg ulcers chronic   Not Associated with loc, sz   No h/o of recent travel, ill contacts,weight loss. Pain description - chr leg pain, 4/10,     Past Medical History:          Diagnosis Date    Anemia     Atrial fibrillation (HCC)     CAD (coronary artery disease)     Chronic back pain     CKD (chronic kidney disease), stage III (HCC)     Diabetes mellitus ([address]     Diastolic CHF ([address]     Hyperlipidemia     Hypertension     MI (myocardial infarction) ([address]     MRSA (methicillin resistant staph aureus) culture positive 04/13/2018    leg ulcer    MRSA colonization 10/16/2018    Obesity (BMI 30-39. 9)     Osteoarthritis     Pancreatitis     Sepsis ([address]     Stasis ulcer ([address]     bilateral lower extremities     Suprapubic catheter ([address]        Past Surgical History:          Procedure Laterality Date    APPENDECTOMY      BACK SURGERY      x2    CATARACT REMOVAL      bilateral     CHOLECYSTECTOMY      COLONOSCOPY      EYE SURGERY      FRACTURE SURGERY      Left ankle    HYSTERECTOMY      INSET/CHANGE LOMELI CATHETER - COMPLICATED  99/3446    suprapubic cath    JOINT REPLACEMENT      Right knee    KNEE SURGERY         Medications Prior to Admission:      Prior to Admission medications    Medication Sig Start Date End Date Taking?  Authorizing Provider   ibuprofen (ADVIL;MOTRIN) 200 MG tablet Take 400 mg by mouth every 6 hours as needed for Pain   Yes Historical Provider, MD   LORazepam (ATIVAN) 0.5 MG tablet Take 0.5 mg by mouth every 6 hours as needed for Anxiety. Yes Historical Provider, MD   ferrous sulfate 325 (65 Fe) MG tablet Take 325 mg by mouth 2 times daily    Yes Historical Provider, MD   aspirin 325 MG tablet Take 325 mg by mouth daily  9/17/18  Yes Tahir Yatse MD   atorvastatin (LIPITOR) 10 MG tablet Take 1 tablet by mouth daily 4/20/18  Yes Lloyd Prudence MD Woo   furosemide (LASIX) 40 MG tablet Take 0.5 tablets by mouth daily 4/19/18  Yes Lloyd Lamaudence MD Woo   Biotin (BIOTIN 5000) 5 MG CAPS Take 1 tablet by mouth daily   Yes Historical Provider, MD   Calcium Carbonate Antacid (TUMS ULTRA 1000) 1000 MG CHEW Take 1 tablet by mouth daily as needed (acid reflux)   Yes Historical Provider, MD   lipase-protease-amylase (CREON) 51864 units delayed release capsule Take 24,000 Units by mouth 3 times daily (with meals)   Yes Historical Provider, MD   gabapentin (NEURONTIN) 300 MG capsule Take 300 mg by mouth 2 times daily.     Yes Historical Provider, MD   isosorbide mononitrate (IMDUR) 30 MG extended release tablet Take 30 mg by mouth daily   Yes Historical Provider, MD   amoxicillin (AMOXIL) 875 MG tablet Take 875 mg by mouth 2 times daily    Historical Provider, MD   fluconazole (DIFLUCAN) 150 MG tablet Take 150 mg by mouth daily    Historical Provider, MD   raNITIdine HCl (ZANTAC 150 MAXIMUM STRENGTH PO) Take 150 mg by mouth 2 times daily    Historical Provider, MD   docusate (COLACE) 50 MG/5ML liquid Take 50 mg by mouth daily    Historical Provider, MD   insulin lispro (HUMALOG) 100 UNIT/ML injection vial Inject 5 Units into the skin 3 times daily (with meals)  Patient taking differently: Inject 2 Units into the skin every hour Humalog insulin pump 1.8 Unit basal rate continous and as needed bolus per insulin pump 11/20/18   Agnieszka Sims, APRN - NP   Timolol (TIMOPTIC) 0.5 % (DAILY) SOLN ophthalmic solution Place 1 drop into the right eye 2 times daily     Historical Provider, MD   sennosides-docusate sodium (SENOKOT-S) 8.6-50 MG tablet Take 1 tablet by mouth daily    Historical Provider, MD   metoprolol tartrate (LOPRESSOR) 25 MG tablet Take 1 tablet by mouth 2 times daily 4/19/18   Lloyd Mijares MD   polyethylene glycol (GLYCOLAX) packet Take 17 g by mouth daily    Historical Provider, MD   venlafaxine (EFFEXOR) 75 MG tablet Take 150 mg by mouth 2 times daily     Historical Provider, MD   cetirizine (ZYRTEC) 10 MG tablet Take 10 mg by mouth daily    Historical Provider, MD   Cholecalciferol (VITAMIN D3) 2000 units CAPS Take 2,000 Units by mouth daily    Historical Provider, MD       Allergies:  Sulfa antibiotics    Social History:      The patient currently lives at home with daughter Lawrence Ledesma:   reports that she has never smoked. She has never used smokeless tobacco.  ETOH:   reports that she drinks alcohol. Family History:                  Problem Relation Age of Onset    Heart Disease Father     Heart Failure Father     Diabetes Father     Diabetes Daughter     Diabetes Daughter          PHYSICAL EXAM PERFORMED BY ME:    BP (!) 133/100   Pulse 103   Temp 100.6 °F (38.1 °C) (Oral)   Resp (!) 38   Ht 4' 11\" (1.499 m)   Wt 183 lb 13.8 oz (83.4 kg)   SpO2 100%   BMI 37.14 kg/m²     General appearance: comfortable, distress- in resp   HEENT: Atraumatic, Pupils equal, round, and reactive to light. Extra ocular muscles intact. Neck: Supple, with full range of motion. No jugular venous distention. Respiratory:  Crackles on  auscultation , accessory muscle use yes n, Rales/Ronchi no  Cardiovascular: Regular rate and rhythm with normal S1/S2 ,  Abdomen: Soft, non-tender, non-distended with normal bowel sounds.   Musculoskelatal: No clubbing, chr  edema bilaterally with shallow wound ulcers      Skin: Skin color, texture, turgor normal.     Neurologic:   Neurovascularly intact grossly non-focal.      CXR:  I have reviewed the CXR with the following interpretation: pul edema   EKG:  I have reviewed the EKG with the following interpretation: afib     Labs:     Recent Labs     06/08/19  1224   WBC 17.5*   HGB 9.7*   HCT 30.2*        Recent Labs     06/08/19  1224      K 3.1*      CO2 21   BUN 16   CREATININE 1.3*   CALCIUM 8.4     Recent Labs     06/08/19  1224   AST 8*   ALT 9*   BILITOT 0.3   ALKPHOS 150*     No results for input(s): INR in the last 72 hours. No results for input(s): Ceci Ka in the last 72 hours. No flowsheet data found. Urinalysis:      Lab Results   Component Value Date    NITRU POSITIVE 06/08/2019    WBCUA 328 06/08/2019    BACTERIA 1+ 06/08/2019    RBCUA 5 06/08/2019    BLOODU MODERATE 06/08/2019    SPECGRAV 1.012 06/08/2019    GLUCOSEU Negative 06/08/2019       Reviewed his/her  old charts from [address] [place] [address], dated 5.2019 , showed admission for uti  . Diagnostic Assesment and Plan :   1. Admitted for acute metabolic encephalopathy secondary to complicated urinary tract infection secondary to chronic suprapubic cystostomy catheter, significant pyuria  Sepsis present on admission secondary to above, increased WBC, tachycardia,+ infection. Continue IV Rocephin, follow-up urine culture, received 2 L of fluids in the emergency room. Chronic bilateral lower extremity arterial ulcers, follows wound care, continue IV vancomycin for now, follow up on culture. Follow blood culture  2. Acute on chronic diastolic congestive heart failure, A. fib with RVR, heart rate in 130s. Chest x-ray and   examination shows pulmonary vascular condition, gentle IV Lasix for now, takes 20 mg of Lasix at home. Body mass index is 37.14 kg/m². 3.  Acute respiratory failure, respiratory rate 33 secondary to sepsis and CHF, BiPAP as needed, started in emergency room. 4.  Obesity she needs to lose weight.   5.  Diabetes mellitus type 2, now on low sliding scale and monitor blood sugars. 6.ckd stage 3 sec t o dm ty 2         The following items were considered in medical decision making:  Independent review of images, Review / order clinical lab tests, Review / order radiology, tests Decision to obtain old records. DVT Prophylaxis: lvx   Diet: No diet orders on file  Code Status: Prior    PT/OT Eval Status: na    : na     Dispo - will monitor in floor   Needs to stay in hospital for iv abx and lasix , bipap . I have discussed the above stated plan with the patient,  and daughter Meliza  and they verbalized understanding and agreed with the plan. A total time of 15 min were exclusively devoted to discuss plan of care, and advanced care planning during this encounter. RN notified about todays plan  bed side. [facility], [address]  This chart was generated in part by using Dragon Dictation system and may contain errors related to that system including errors in grammar, punctuation, and spelling, as well as words and phrases that may be inappropriate. When dictating, effort is made to correct spelling/grammar errors. If there are any questions or concerns please feel free to contact the dictating provider for clarification. Thank you [address] [place] [address] for the opportunity to be involved in this patient's care. If you have any questions or concerns please feel free to contact me at [phone].

## 2019-06-08 NOTE — PROGRESS NOTES
Advanced Care Planning Note. Purpose of Encounter: Advanced care planning in light of copd , chf, uti . Parties in attendance:   Patient Arvhomar Dub), Dr. China hale   (myself). Decisional Capacity: yes      Subjective/Patient Story: Patient/Family understands that her medical condition continues to deteriorate. She/he no longer wishes further curative interventions, including hospitalization, if there is no long term benefit :no  Patient/Family wishes to continue further interventions, patient will re-evaluate at a later time: yes      Subjective and Objective Medical history :   Patient seen and examined  In resp distress  Failed bipap  Got morphine with good help  Less anxious  B/l leg  art ulcers        Goals of Care Determinations: Patient/Family wishes to focus sx control .      Plan:  As above     Code Status: At this time patient/Family  wishes to be dnr cca .     Time Spent on Advanced Planning Documents: 31 mins. The following items were considered in medical decision making:  Independent review of images , Review / order clinical lab tests. Review / order radiology tests, Decision to obtain old records. Advanced Care Planning Documents: Completed advances directives, advanced directives in chart. Fernando Longoria MD  6/8/2019 3:51 PM

## 2019-06-08 NOTE — ED NOTES
Hospitalist requesting patient be placed on bi-pap at this time.  This RN called respiratory to bedside     Alana Lee RN  06/08/19 [id number]

## 2019-06-08 NOTE — ED NOTES
Patient resting comfortably, respirations easy, unlabored. Patient in no acute distress. Denies needs at this time. Call light within reach, bed in lowest position, side rails up x 2.         Cathryn Rodriguez RN  06/08/19 0219

## 2019-06-08 NOTE — ED NOTES

## 2019-06-09 LAB
ALBUMIN SERPL-MCNC: 2.2 G/DL (ref 3.4–5)
ALP BLD-CCNC: 122 U/L (ref 40–129)
ALT SERPL-CCNC: 8 U/L (ref 10–40)
ANION GAP SERPL CALCULATED.3IONS-SCNC: 16 MMOL/L (ref 3–16)
AST SERPL-CCNC: 10 U/L (ref 15–37)
BASOPHILS ABSOLUTE: 0.1 K/UL (ref 0–0.2)
BASOPHILS RELATIVE PERCENT: 0.4 %
BILIRUB SERPL-MCNC: 0.3 MG/DL (ref 0–1)
BILIRUBIN DIRECT: <0.2 MG/DL (ref 0–0.3)
BILIRUBIN, INDIRECT: ABNORMAL MG/DL (ref 0–1)
BUN BLDV-MCNC: 17 MG/DL (ref 7–20)
CALCIUM SERPL-MCNC: 7.5 MG/DL (ref 8.3–10.6)
CHLORIDE BLD-SCNC: 100 MMOL/L (ref 99–110)
CO2: 15 MMOL/L (ref 21–32)
CREAT SERPL-MCNC: 1.5 MG/DL (ref 0.6–1.2)
CREAT SERPL-MCNC: 1.5 MG/DL (ref 0.6–1.2)
EKG ATRIAL RATE: 139 BPM
EKG DIAGNOSIS: NORMAL
EKG P AXIS: 34 DEGREES
EKG P-R INTERVAL: 136 MS
EKG Q-T INTERVAL: 370 MS
EKG QRS DURATION: 74 MS
EKG QTC CALCULATION (BAZETT): 563 MS
EKG R AXIS: 100 DEGREES
EKG T AXIS: 125 DEGREES
EKG VENTRICULAR RATE: 139 BPM
EOSINOPHILS ABSOLUTE: 0.2 K/UL (ref 0–0.6)
EOSINOPHILS RELATIVE PERCENT: 1.4 %
ESTIMATED AVERAGE GLUCOSE: 142.7 MG/DL
GFR AFRICAN AMERICAN: 41
GFR AFRICAN AMERICAN: 41
GFR NON-AFRICAN AMERICAN: 34
GFR NON-AFRICAN AMERICAN: 34
GLUCOSE BLD-MCNC: 297 MG/DL (ref 70–99)
GLUCOSE BLD-MCNC: 315 MG/DL (ref 70–99)
GLUCOSE BLD-MCNC: 316 MG/DL (ref 70–99)
GLUCOSE BLD-MCNC: 328 MG/DL (ref 70–99)
GLUCOSE BLD-MCNC: 354 MG/DL (ref 70–99)
HBA1C MFR BLD: 6.6 %
HCT VFR BLD CALC: 23.3 % (ref 36–48)
HEMOGLOBIN: 7.4 G/DL (ref 12–16)
LYMPHOCYTES ABSOLUTE: 0.7 K/UL (ref 1–5.1)
LYMPHOCYTES RELATIVE PERCENT: 4.6 %
MCH RBC QN AUTO: 32.4 PG (ref 26–34)
MCHC RBC AUTO-ENTMCNC: 32 G/DL (ref 31–36)
MCV RBC AUTO: 101.5 FL (ref 80–100)
MONOCYTES ABSOLUTE: 0.4 K/UL (ref 0–1.3)
MONOCYTES RELATIVE PERCENT: 2.5 %
NEUTROPHILS ABSOLUTE: 13.7 K/UL (ref 1.7–7.7)
NEUTROPHILS RELATIVE PERCENT: 91.1 %
PDW BLD-RTO: 13.7 % (ref 12.4–15.4)
PERFORMED ON: ABNORMAL
PHOSPHORUS: 3.4 MG/DL (ref 2.5–4.9)
PLATELET # BLD: 240 K/UL (ref 135–450)
PMV BLD AUTO: 8.6 FL (ref 5–10.5)
POTASSIUM SERPL-SCNC: 3.4 MMOL/L (ref 3.5–5.1)
RBC # BLD: 2.29 M/UL (ref 4–5.2)
SODIUM BLD-SCNC: 131 MMOL/L (ref 136–145)
TOTAL PROTEIN: 5.5 G/DL (ref 6.4–8.2)
VANCOMYCIN RANDOM: 13.2 UG/ML
WBC # BLD: 15 K/UL (ref 4–11)

## 2019-06-09 PROCEDURE — 80076 HEPATIC FUNCTION PANEL: CPT

## 2019-06-09 PROCEDURE — 80202 ASSAY OF VANCOMYCIN: CPT

## 2019-06-09 PROCEDURE — 84100 ASSAY OF PHOSPHORUS: CPT

## 2019-06-09 PROCEDURE — 2060000000 HC ICU INTERMEDIATE R&B

## 2019-06-09 PROCEDURE — 6370000000 HC RX 637 (ALT 250 FOR IP): Performed by: INTERNAL MEDICINE

## 2019-06-09 PROCEDURE — 93010 ELECTROCARDIOGRAM REPORT: CPT | Performed by: INTERNAL MEDICINE

## 2019-06-09 PROCEDURE — 85025 COMPLETE CBC W/AUTO DIFF WBC: CPT

## 2019-06-09 PROCEDURE — 80048 BASIC METABOLIC PNL TOTAL CA: CPT

## 2019-06-09 PROCEDURE — 6360000002 HC RX W HCPCS: Performed by: INTERNAL MEDICINE

## 2019-06-09 PROCEDURE — 36415 COLL VENOUS BLD VENIPUNCTURE: CPT

## 2019-06-09 PROCEDURE — 94761 N-INVAS EAR/PLS OXIMETRY MLT: CPT

## 2019-06-09 PROCEDURE — 2580000003 HC RX 258: Performed by: INTERNAL MEDICINE

## 2019-06-09 PROCEDURE — 82565 ASSAY OF CREATININE: CPT

## 2019-06-09 PROCEDURE — 2700000000 HC OXYGEN THERAPY PER DAY

## 2019-06-09 RX ADMIN — INSULIN LISPRO 2 UNITS: 100 INJECTION, SOLUTION INTRAVENOUS; SUBCUTANEOUS at 21:27

## 2019-06-09 RX ADMIN — INSULIN LISPRO 4 UNITS: 100 INJECTION, SOLUTION INTRAVENOUS; SUBCUTANEOUS at 10:28

## 2019-06-09 RX ADMIN — VENLAFAXINE 150 MG: 50 TABLET ORAL at 10:21

## 2019-06-09 RX ADMIN — PANCRELIPASE 2 CAPSULE: 60000; 12000; 38000 CAPSULE, DELAYED RELEASE PELLETS ORAL at 10:28

## 2019-06-09 RX ADMIN — ASPIRIN 325 MG ORAL TABLET 325 MG: 325 PILL ORAL at 10:22

## 2019-06-09 RX ADMIN — ATORVASTATIN CALCIUM 10 MG: 10 TABLET, FILM COATED ORAL at 10:21

## 2019-06-09 RX ADMIN — PANCRELIPASE 2 CAPSULE: 60000; 12000; 38000 CAPSULE, DELAYED RELEASE PELLETS ORAL at 13:12

## 2019-06-09 RX ADMIN — PANCRELIPASE 2 CAPSULE: 60000; 12000; 38000 CAPSULE, DELAYED RELEASE PELLETS ORAL at 18:30

## 2019-06-09 RX ADMIN — PIPERACILLIN SODIUM,TAZOBACTAM SODIUM 3.38 G: 3; .375 INJECTION, POWDER, FOR SOLUTION INTRAVENOUS at 10:28

## 2019-06-09 RX ADMIN — VENLAFAXINE 150 MG: 50 TABLET ORAL at 20:36

## 2019-06-09 RX ADMIN — FERROUS SULFATE TAB EC 324 MG (65 MG FE EQUIVALENT) 324 MG: 324 (65 FE) TABLET DELAYED RESPONSE at 18:29

## 2019-06-09 RX ADMIN — INSULIN LISPRO 4 UNITS: 100 INJECTION, SOLUTION INTRAVENOUS; SUBCUTANEOUS at 18:29

## 2019-06-09 RX ADMIN — FERROUS SULFATE TAB EC 324 MG (65 MG FE EQUIVALENT) 324 MG: 324 (65 FE) TABLET DELAYED RESPONSE at 10:22

## 2019-06-09 RX ADMIN — TIMOLOL MALEATE 1 DROP: 5 SOLUTION OPHTHALMIC at 13:12

## 2019-06-09 RX ADMIN — TRAMADOL HYDROCHLORIDE 50 MG: 50 TABLET, FILM COATED ORAL at 18:33

## 2019-06-09 RX ADMIN — METOPROLOL TARTRATE 25 MG: 25 TABLET ORAL at 20:37

## 2019-06-09 RX ADMIN — TRAMADOL HYDROCHLORIDE 50 MG: 50 TABLET, FILM COATED ORAL at 06:33

## 2019-06-09 RX ADMIN — INSULIN LISPRO 5 UNITS: 100 INJECTION, SOLUTION INTRAVENOUS; SUBCUTANEOUS at 13:12

## 2019-06-09 RX ADMIN — METOPROLOL TARTRATE 25 MG: 25 TABLET ORAL at 10:22

## 2019-06-09 RX ADMIN — PIPERACILLIN SODIUM,TAZOBACTAM SODIUM 3.38 G: 3; .375 INJECTION, POWDER, FOR SOLUTION INTRAVENOUS at 18:29

## 2019-06-09 ASSESSMENT — PAIN SCALES - GENERAL
PAINLEVEL_OUTOF10: 0
PAINLEVEL_OUTOF10: 0
PAINLEVEL_OUTOF10: 7
PAINLEVEL_OUTOF10: 0
PAINLEVEL_OUTOF10: 6
PAINLEVEL_OUTOF10: 7

## 2019-06-09 ASSESSMENT — PAIN DESCRIPTION - PROGRESSION: CLINICAL_PROGRESSION: NOT CHANGED

## 2019-06-09 ASSESSMENT — PAIN DESCRIPTION - DESCRIPTORS: DESCRIPTORS: SHARP;OTHER (COMMENT)

## 2019-06-09 ASSESSMENT — PAIN DESCRIPTION - LOCATION
LOCATION: BUTTOCKS
LOCATION: LEG

## 2019-06-09 ASSESSMENT — PAIN DESCRIPTION - FREQUENCY: FREQUENCY: CONTINUOUS

## 2019-06-09 ASSESSMENT — PAIN DESCRIPTION - ORIENTATION
ORIENTATION: RIGHT;LEFT
ORIENTATION: LEFT;RIGHT

## 2019-06-09 ASSESSMENT — PAIN DESCRIPTION - PAIN TYPE: TYPE: CHRONIC PAIN

## 2019-06-09 ASSESSMENT — PAIN DESCRIPTION - ONSET: ONSET: ON-GOING

## 2019-06-09 NOTE — PROGRESS NOTES
UTILIZATION REVIEW     Angela TRIPP  6/9/2019,   [address] [place] [address]    1945  Chief Complaint   Patient presents with    Fever     103.3 in ED, started this AM. Patinet denies any pain or complaint. Active Problems:    Sepsis ([address]  Resolved Problems:    * No resolved hospital problems. *     has a past medical history of Anemia, Atrial fibrillation ([address] CAD (coronary artery disease), Chronic back pain, CKD (chronic kidney disease), stage III ([address] Diabetes mellitus ([address] Diastolic CHF ([address] Hyperlipidemia, Hypertension, MI (myocardial infarction) ([address] MRSA (methicillin resistant staph aureus) culture positive, MRSA colonization, Obesity (BMI 30-39.9), Osteoarthritis, Pancreatitis, Sepsis ([address] Stasis ulcer ([address] and Suprapubic catheter ([address] Past Surgical History:     has a past surgical history that includes Hysterectomy; knee surgery; back surgery; Cholecystectomy; Cataract removal; Appendectomy; Colonoscopy; eye surgery; fracture surgery; joint replacement; and Insert/Change Brasher Catheter - Complicat (14/2636). ED REVIEW:  HISTORY OF PRESENT ILLNESS  (Location/Symptom, Timing/Onset, Context/Setting, Quality, Duration, Modifying Factors, Severity.)   Tal Farrell is a 66 y.o. female who presents to the emergency department mom with a fever. She apparently had a temperature of 101 at home. Her temperature is 103 here. She has chronic wounds on both legs followed by Dr. Andrea Granados and the [facility]. Her daughter states that her wounds looked better than they have in a long time. No reported cough. No vomiting or diarrhea. Denies abdominal pain. She has a chronic indwelling suprapubic catheter. CURRENT STATUS:    IMAGING-Relevant:  FINDINGS:  Cardial-pericardial silhouette is enlarged but stable.  The pulmonary  vasculature is congested.  Focal infiltrate is not seen.  No pneumothorax.   No free air.  No acute bony abnormality.      Impression:       Pulmonary

## 2019-06-09 NOTE — PROGRESS NOTES
Hospitalist Progress Note      PCP: Maine Arce    Date of Admission: 6/8/2019    Chief Complaint: SOB and fever. Hospital Course:     Long standing hx of chronic suprapubic catheter. Hx of chronic LE wounds. Subjective:   Feels much better today. She is off BiPAP. SOB much improved. Urine draining clear. Has some bleeding from chronic leg wounds this am.       Medications:  Reviewed    Infusion Medications    dextrose       Scheduled Medications    piperacillin-tazobactam  3.375 g Intravenous Q8H    vancomycin  1,000 mg Intravenous Once    aspirin  325 mg Oral Daily    atorvastatin  10 mg Oral Daily    lipase-protease-amylase  2 capsule Oral TID WC    metoprolol tartrate  25 mg Oral BID    venlafaxine  150 mg Oral BID    insulin lispro  0-6 Units Subcutaneous TID WC    insulin lispro  0-3 Units Subcutaneous Nightly    timolol  1 drop Right Eye Daily    ferrous sulfate  324 mg Oral BID WC    vancomycin (VANCOCIN) intermittent dosing (placeholder)   Other RX Placeholder     PRN Meds: LORazepam, traMADol, glucose, dextrose, glucagon (rDNA), dextrose, ondansetron, acetaminophen      Intake/Output Summary (Last 24 hours) at 6/9/2019 1217  Last data filed at 6/9/2019 0634  Gross per 24 hour   Intake 120 ml   Output 950 ml   Net -830 ml       Physical Exam Performed:    BP (!) 106/45   Pulse 83   Temp 98.7 °F (37.1 °C) (Oral)   Resp 20   Ht 4' 11\" (1.499 m)   Wt 186 lb 4.6 oz (84.5 kg)   SpO2 98%   BMI 37.63 kg/m²     General appearance: No apparent distress, appears stated age and cooperative. HEENT: Pupils equal, round, and reactive to light. Conjunctivae/corneas clear. Neck: Supple, with full range of motion. No jugular venous distention. Trachea midline. Respiratory:  Normal respiratory effort. Clear to auscultation, bilaterally without Rales/Wheezes/Rhonchi. Cardiovascular: Regular rate and rhythm with normal S1/S2 without murmurs, rubs or gallops.   Abdomen: Soft, non-tender, non-distended with normal bowel sounds. Musculoskeletal: No clubbing, cyanosis or edema bilaterally. Full range of motion without deformity. Skin: Skin color, texture, turgor normal.  No rashes or lesions. Neurologic:  Neurovascularly intact without any focal sensory/motor deficits. Cranial nerves: II-XII intact, grossly non-focal.  Psychiatric: Alert and oriented, thought content appropriate, normal insight  Capillary Refill: Brisk,< 3 seconds   Peripheral Pulses: +2 palpable, equal bilaterally       Labs:   Recent Labs     06/08/19  1224 06/09/19  0615   WBC 17.5* 15.0*   HGB 9.7* 7.4*   HCT 30.2* 23.3*    240     Recent Labs     06/08/19  1224 06/09/19  0615    131*   K 3.1* 3.4*    100   CO2 21 15*   BUN 16 17   CREATININE 1.3* 1.5*  1.5*   CALCIUM 8.4 7.5*   PHOS  --  3.4     Recent Labs     06/08/19  1224 06/09/19  0615   AST 8* 10*   ALT 9* 8*   BILIDIR  --  <0.2   BILITOT 0.3 0.3   ALKPHOS 150* 122     No results for input(s): INR in the last 72 hours. No results for input(s): Fátima Southerly in the last 72 hours. Urinalysis:      Lab Results   Component Value Date    NITRU POSITIVE 06/08/2019    WBCUA 328 06/08/2019    BACTERIA 1+ 06/08/2019    RBCUA 5 06/08/2019    BLOODU MODERATE 06/08/2019    SPECGRAV 1.012 06/08/2019    GLUCOSEU Negative 06/08/2019       Radiology:  XR CHEST PORTABLE   Final Result   Pulmonary vascular congestion. Assessment/Plan:    Active Hospital Problems    Diagnosis    Sepsis ([facility] [A41.9]       Acute Metabolic Encephalopathy secondary to complicated UTI due to chronic suprapubic cystostomy catheter and possible LE wound infection. Wound culture with GNR prelim. Urine Culture pending. On IV zosyn. - ok to stop vanc. Sepsis POA - due to above. LA normal.       UTI - as above      Chronic bilateral leg wounds with possible infection and cellulitis. As above.            Acute on Chronic dCHF  Afib RVR on presentation. HR better today in mid 80s this am.   On metoprolol BP permitting. S/p a dose of lasix - diuretic now on hold till infection under control. DMII - with uncontrolled hyperglycemia. on ISS  Add carb control diet. DVT Prophylaxis: SCD - leg wounds bleeding. Diet: DIET GENERAL;  Code Status: DNR-CCA      Dispo - cc. Prognosis guarded. Code status DNRCCA per conversation earlier in the admission.      Daniel Ferris MD

## 2019-06-10 ENCOUNTER — APPOINTMENT (OUTPATIENT)
Dept: ULTRASOUND IMAGING | Age: 74
DRG: 698 | End: 2019-06-10
Payer: MEDICARE

## 2019-06-10 LAB
ALBUMIN SERPL-MCNC: 2.2 G/DL (ref 3.4–5)
ANION GAP SERPL CALCULATED.3IONS-SCNC: 15 MMOL/L (ref 3–16)
BASOPHILS ABSOLUTE: 0 K/UL (ref 0–0.2)
BASOPHILS RELATIVE PERCENT: 0.3 %
BUN BLDV-MCNC: 20 MG/DL (ref 7–20)
CALCIUM SERPL-MCNC: 8.1 MG/DL (ref 8.3–10.6)
CHLORIDE BLD-SCNC: 102 MMOL/L (ref 99–110)
CO2: 18 MMOL/L (ref 21–32)
CREAT SERPL-MCNC: 1.6 MG/DL (ref 0.6–1.2)
EOSINOPHILS ABSOLUTE: 0.3 K/UL (ref 0–0.6)
EOSINOPHILS RELATIVE PERCENT: 3 %
GFR AFRICAN AMERICAN: 38
GFR NON-AFRICAN AMERICAN: 31
GLUCOSE BLD-MCNC: 224 MG/DL (ref 70–99)
GLUCOSE BLD-MCNC: 300 MG/DL (ref 70–99)
GLUCOSE BLD-MCNC: 300 MG/DL (ref 70–99)
GLUCOSE BLD-MCNC: 333 MG/DL (ref 70–99)
GLUCOSE BLD-MCNC: 384 MG/DL (ref 70–99)
HCT VFR BLD CALC: 23 % (ref 36–48)
HEMOGLOBIN: 7.6 G/DL (ref 12–16)
LYMPHOCYTES ABSOLUTE: 0.7 K/UL (ref 1–5.1)
LYMPHOCYTES RELATIVE PERCENT: 6 %
MCH RBC QN AUTO: 32.6 PG (ref 26–34)
MCHC RBC AUTO-ENTMCNC: 33 G/DL (ref 31–36)
MCV RBC AUTO: 98.8 FL (ref 80–100)
MONOCYTES ABSOLUTE: 0.4 K/UL (ref 0–1.3)
MONOCYTES RELATIVE PERCENT: 3.4 %
NEUTROPHILS ABSOLUTE: 9.6 K/UL (ref 1.7–7.7)
NEUTROPHILS RELATIVE PERCENT: 87.3 %
PDW BLD-RTO: 13.4 % (ref 12.4–15.4)
PERFORMED ON: ABNORMAL
PHOSPHORUS: 3.1 MG/DL (ref 2.5–4.9)
PLATELET # BLD: 248 K/UL (ref 135–450)
PMV BLD AUTO: 8.3 FL (ref 5–10.5)
POTASSIUM SERPL-SCNC: 3.2 MMOL/L (ref 3.5–5.1)
RBC # BLD: 2.33 M/UL (ref 4–5.2)
SODIUM BLD-SCNC: 135 MMOL/L (ref 136–145)
WBC # BLD: 11 K/UL (ref 4–11)

## 2019-06-10 PROCEDURE — 6360000002 HC RX W HCPCS: Performed by: INTERNAL MEDICINE

## 2019-06-10 PROCEDURE — 94760 N-INVAS EAR/PLS OXIMETRY 1: CPT

## 2019-06-10 PROCEDURE — 6370000000 HC RX 637 (ALT 250 FOR IP): Performed by: INTERNAL MEDICINE

## 2019-06-10 PROCEDURE — 86335 IMMUNFIX E-PHORSIS/URINE/CSF: CPT

## 2019-06-10 PROCEDURE — 2060000000 HC ICU INTERMEDIATE R&B

## 2019-06-10 PROCEDURE — 2580000003 HC RX 258: Performed by: INTERNAL MEDICINE

## 2019-06-10 PROCEDURE — 84166 PROTEIN E-PHORESIS/URINE/CSF: CPT

## 2019-06-10 PROCEDURE — 85025 COMPLETE CBC W/AUTO DIFF WBC: CPT

## 2019-06-10 PROCEDURE — 84156 ASSAY OF PROTEIN URINE: CPT

## 2019-06-10 PROCEDURE — 76770 US EXAM ABDO BACK WALL COMP: CPT

## 2019-06-10 PROCEDURE — 99223 1ST HOSP IP/OBS HIGH 75: CPT | Performed by: INTERNAL MEDICINE

## 2019-06-10 PROCEDURE — 36415 COLL VENOUS BLD VENIPUNCTURE: CPT

## 2019-06-10 PROCEDURE — 80069 RENAL FUNCTION PANEL: CPT

## 2019-06-10 RX ORDER — SODIUM CHLORIDE 9 MG/ML
INJECTION, SOLUTION INTRAVENOUS CONTINUOUS
Status: DISCONTINUED | OUTPATIENT
Start: 2019-06-10 | End: 2019-06-10

## 2019-06-10 RX ORDER — 0.9 % SODIUM CHLORIDE 0.9 %
250 INTRAVENOUS SOLUTION INTRAVENOUS ONCE
Status: COMPLETED | OUTPATIENT
Start: 2019-06-10 | End: 2019-06-10

## 2019-06-10 RX ORDER — POTASSIUM CHLORIDE 750 MG/1
40 TABLET, FILM COATED, EXTENDED RELEASE ORAL ONCE
Status: COMPLETED | OUTPATIENT
Start: 2019-06-10 | End: 2019-06-10

## 2019-06-10 RX ORDER — INSULIN GLARGINE 100 [IU]/ML
8 INJECTION, SOLUTION SUBCUTANEOUS DAILY
Status: DISCONTINUED | OUTPATIENT
Start: 2019-06-10 | End: 2019-06-11

## 2019-06-10 RX ADMIN — INSULIN LISPRO 4 UNITS: 100 INJECTION, SOLUTION INTRAVENOUS; SUBCUTANEOUS at 12:13

## 2019-06-10 RX ADMIN — SODIUM CHLORIDE 250 ML: 9 INJECTION, SOLUTION INTRAVENOUS at 12:11

## 2019-06-10 RX ADMIN — INSULIN LISPRO 2 UNITS: 100 INJECTION, SOLUTION INTRAVENOUS; SUBCUTANEOUS at 20:55

## 2019-06-10 RX ADMIN — INSULIN LISPRO 10 UNITS: 100 INJECTION, SOLUTION INTRAVENOUS; SUBCUTANEOUS at 17:03

## 2019-06-10 RX ADMIN — FERROUS SULFATE TAB EC 324 MG (65 MG FE EQUIVALENT) 324 MG: 324 (65 FE) TABLET DELAYED RESPONSE at 09:55

## 2019-06-10 RX ADMIN — VANCOMYCIN HYDROCHLORIDE 1000 MG: 1 INJECTION, POWDER, LYOPHILIZED, FOR SOLUTION INTRAVENOUS at 14:40

## 2019-06-10 RX ADMIN — PANCRELIPASE 2 CAPSULE: 60000; 12000; 38000 CAPSULE, DELAYED RELEASE PELLETS ORAL at 17:04

## 2019-06-10 RX ADMIN — INSULIN LISPRO 5 UNITS: 100 INJECTION, SOLUTION INTRAVENOUS; SUBCUTANEOUS at 17:04

## 2019-06-10 RX ADMIN — INSULIN GLARGINE 8 UNITS: 100 INJECTION, SOLUTION SUBCUTANEOUS at 15:44

## 2019-06-10 RX ADMIN — PIPERACILLIN SODIUM,TAZOBACTAM SODIUM 3.38 G: 3; .375 INJECTION, POWDER, FOR SOLUTION INTRAVENOUS at 09:49

## 2019-06-10 RX ADMIN — PANCRELIPASE 2 CAPSULE: 60000; 12000; 38000 CAPSULE, DELAYED RELEASE PELLETS ORAL at 09:49

## 2019-06-10 RX ADMIN — VENLAFAXINE 150 MG: 50 TABLET ORAL at 20:54

## 2019-06-10 RX ADMIN — METOPROLOL TARTRATE 25 MG: 25 TABLET ORAL at 20:54

## 2019-06-10 RX ADMIN — INSULIN LISPRO 4 UNITS: 100 INJECTION, SOLUTION INTRAVENOUS; SUBCUTANEOUS at 09:52

## 2019-06-10 RX ADMIN — ASPIRIN 325 MG ORAL TABLET 325 MG: 325 PILL ORAL at 09:50

## 2019-06-10 RX ADMIN — CEFEPIME HYDROCHLORIDE 2 G: 2 INJECTION, POWDER, FOR SOLUTION INTRAVENOUS at 17:04

## 2019-06-10 RX ADMIN — POTASSIUM CHLORIDE 40 MEQ: 750 TABLET, EXTENDED RELEASE ORAL at 12:11

## 2019-06-10 RX ADMIN — ATORVASTATIN CALCIUM 10 MG: 10 TABLET, FILM COATED ORAL at 09:49

## 2019-06-10 RX ADMIN — VENLAFAXINE 150 MG: 50 TABLET ORAL at 11:11

## 2019-06-10 RX ADMIN — FERROUS SULFATE TAB EC 324 MG (65 MG FE EQUIVALENT) 324 MG: 324 (65 FE) TABLET DELAYED RESPONSE at 17:04

## 2019-06-10 RX ADMIN — TRAMADOL HYDROCHLORIDE 50 MG: 50 TABLET, FILM COATED ORAL at 03:44

## 2019-06-10 RX ADMIN — METOPROLOL TARTRATE 25 MG: 25 TABLET ORAL at 09:49

## 2019-06-10 RX ADMIN — TIMOLOL MALEATE 1 DROP: 5 SOLUTION OPHTHALMIC at 09:52

## 2019-06-10 RX ADMIN — PIPERACILLIN SODIUM,TAZOBACTAM SODIUM 3.38 G: 3; .375 INJECTION, POWDER, FOR SOLUTION INTRAVENOUS at 01:23

## 2019-06-10 ASSESSMENT — PAIN SCALES - GENERAL
PAINLEVEL_OUTOF10: 0
PAINLEVEL_OUTOF10: 0
PAINLEVEL_OUTOF10: 9
PAINLEVEL_OUTOF10: 0
PAINLEVEL_OUTOF10: 2

## 2019-06-10 ASSESSMENT — PAIN DESCRIPTION - DESCRIPTORS: DESCRIPTORS: OTHER (COMMENT)

## 2019-06-10 ASSESSMENT — PAIN DESCRIPTION - ORIENTATION: ORIENTATION: RIGHT;LEFT

## 2019-06-10 ASSESSMENT — PAIN DESCRIPTION - FREQUENCY: FREQUENCY: CONTINUOUS

## 2019-06-10 ASSESSMENT — PAIN DESCRIPTION - ONSET: ONSET: ON-GOING

## 2019-06-10 ASSESSMENT — PAIN - FUNCTIONAL ASSESSMENT: PAIN_FUNCTIONAL_ASSESSMENT: PREVENTS OR INTERFERES SOME ACTIVE ACTIVITIES AND ADLS

## 2019-06-10 ASSESSMENT — PAIN DESCRIPTION - PROGRESSION: CLINICAL_PROGRESSION: GRADUALLY WORSENING

## 2019-06-10 ASSESSMENT — PAIN DESCRIPTION - PAIN TYPE: TYPE: CHRONIC PAIN

## 2019-06-10 ASSESSMENT — PAIN DESCRIPTION - LOCATION: LOCATION: LEG

## 2019-06-10 ASSESSMENT — PAIN SCALES - WONG BAKER: WONGBAKER_NUMERICALRESPONSE: 0

## 2019-06-10 NOTE — PROGRESS NOTES
BLE wound dressings changed. Patient tolerated well.      Electronically signed by Jaime Sawyer RN on 6/10/2019 at 12:56 PM

## 2019-06-10 NOTE — CONSULTS
Clinical Pharmacy Note  Vancomycin Consult    Carmelita Joseph is a 66 y.o. female ordered Vancomycin for LE wounds; consult received from Dr. Starlyn Gosselin to manage therapy. Also receiving cefepime 2gm q24h.     Patient Active Problem List   Diagnosis    Acute on chronic renal insufficiency    FTT (failure to thrive) in adult    Type 1 diabetes mellitus with hyperglycemia (HCC)    Chronic venous hypertension (idiopathic) with ulcer and inflammation of bilateral lower extremity (HCC)    DMII (diabetes mellitus, type 2) (HCC)    Cellulitis of left lower extremity    Protein-calorie malnutrition, moderate (HCC)    MRSA colonization    Chronic diastolic heart failure (HCC)    Coronary artery disease involving native coronary artery of native heart without angina pectoris    Aortic atherosclerosis (HCC)    Essential hypertension    Cellulitis, lower extremities bilaterally    MRSA nasal colonization    Normocytic anemia    Altered mental status    Elevated lactic acid level    Uncontrolled diabetes mellitus ([facility]    Sepsis ([facility])       Allergies:  Sulfa antibiotics     Temp max:  Temp (24hrs), Av.3 °F (36.8 °C), Min:97.9 °F (36.6 °C), Max:98.7 °F (37.1 °C)      Recent Labs     19  1224 19  0615 06/10/19  0633   WBC 17.5* 15.0* 11.0       Recent Labs     19  1224 19  0615 06/10/19  0633   BUN 16 17 20   CREATININE 1.3* 1.5*  1.5* 1.6*         Intake/Output Summary (Last 24 hours) at 6/10/2019 1154  Last data filed at 6/10/2019 0600  Gross per 24 hour   Intake 420 ml   Output 700 ml   Net -280 ml       Culture Results:  Wound with light growth MRSA (MICs pending) and Pseudomonas (sensitive to cefepime)  Urine with >100,000 CFU Enterobacter (sensitive to cefepime)    Ht Readings from Last 1 Encounters:   19 4' 11\" (1.499 m)        Wt Readings from Last 1 Encounters:   06/10/19 182 lb 5.1 oz (82.7 kg)         CrCl cannot be calculated (Unknown ideal

## 2019-06-10 NOTE — CONSULTS
Infectious Diseases Inpatient Consult Note      Reason for Consult:  Sepsis, high fevers     Requesting Physician:  Gualberto Alcantara     Primary Care Physician:  [place] [facility]    History Obtained From:  Pt and Medical records     CHIEF COMPLAINT:     Chief Complaint   Patient presents with    Fever     103.3 in ED, started this AM. Patinet denies any pain or complaint. HISTORY OF PRESENT ILLNESS:  66 y.o. woman admitted with high fevers from home and with change in mentation and she has spc in place has abnormal UA and urine cx positive for Enterobacter, she has poor mobility and venous stasis ulcer non healing for months and is followed in wound care center now with more weeping bleeding and pain and wound cx positive for Pseudomonas and MRSA. WBC elevation on admit and fever down since IV abx and we are consulted given the complicated infectious process her mentation bit better still very weak and tired. Past Medical History:    Past Medical History:   Diagnosis Date    Anemia     Atrial fibrillation ([address]     CAD (coronary artery disease)     Chronic back pain     CKD (chronic kidney disease), stage III (HCC)     Diabetes mellitus (HCC)     Diastolic CHF (HCC)     Hyperlipidemia     Hypertension     MI (myocardial infarction) ([address]     MRSA (methicillin resistant staph aureus) culture positive 04/13/2018    leg ulcer    MRSA colonization 10/16/2018    Obesity (BMI 30-39. 9)     Osteoarthritis     Pancreatitis     Sepsis ([address]     Stasis ulcer ([address]     bilateral lower extremities     Suprapubic catheter ([address]        Past Surgical History:    Past Surgical History:   Procedure Laterality Date    APPENDECTOMY      BACK SURGERY      x2    CATARACT REMOVAL      bilateral     CHOLECYSTECTOMY      COLONOSCOPY      EYE SURGERY      FRACTURE SURGERY      Left ankle    HYSTERECTOMY      INSET/CHANGE LOMELI CATHETER - COMPLICATED  04/1949    suprapubic cath   [place] JOINT REPLACEMENT      Right knee    KNEE SURGERY         Current Medications:    Outpatient Medications Marked as Taking for the 6/8/19 encounter [facility] Encounter)   Medication Sig Dispense Refill    ibuprofen (ADVIL;MOTRIN) 200 MG tablet Take 400 mg by mouth every 6 hours as needed for Pain      LORazepam (ATIVAN) 0.5 MG tablet Take 0.5 mg by mouth nightly as needed for Anxiety.  traMADol (ULTRAM) 50 MG tablet Take 50 mg by mouth every 8 hours as needed for Pain.  raNITIdine HCl (ZANTAC 150 MAXIMUM STRENGTH PO) Take 150 mg by mouth 2 times daily      ferrous sulfate 325 (65 Fe) MG tablet Take 325 mg by mouth 2 times daily       insulin lispro (HUMALOG) 100 UNIT/ML injection vial Inject 5 Units into the skin 3 times daily (with meals) (Patient taking differently: Inject into the skin Humalog insulin pump: 1.8 Unit basal rate continous and as needed bolus per insulin pump) 1 vial 0    Timolol (TIMOPTIC) 0.5 % (DAILY) SOLN ophthalmic solution Place 1 drop into the right eye daily       aspirin 325 MG tablet Take 325 mg by mouth daily       atorvastatin (LIPITOR) 10 MG tablet Take 1 tablet by mouth daily 30 tablet 0    metoprolol tartrate (LOPRESSOR) 25 MG tablet Take 1 tablet by mouth 2 times daily 60 tablet 0    furosemide (LASIX) 40 MG tablet Take 0.5 tablets by mouth daily 60 tablet 0    Biotin (BIOTIN 5000) 5 MG CAPS Take 1 tablet by mouth daily      Calcium Carbonate Antacid (TUMS ULTRA 1000) 1000 MG CHEW Take 1 tablet by mouth as needed (acid reflux)       lipase-protease-amylase (CREON) 31444 units delayed release capsule Take 24,000 Units by mouth 3 times daily (with meals)      gabapentin (NEURONTIN) 300 MG capsule Take 300 mg by mouth 2 times daily.        isosorbide mononitrate (IMDUR) 30 MG extended release tablet Take 30 mg by mouth daily      venlafaxine (EFFEXOR) 75 MG tablet Take 150 mg by mouth 2 times daily       cetirizine (ZYRTEC) 10 MG tablet Take 10 mg by mouth daily  vitamin D3 (CHOLECALCIFEROL) 400 units TABS tablet Take 400 Units by mouth daily          Allergies:  Sulfa antibiotics    Immunizations :   Immunization History   Administered Date(s) Administered    Influenza Vaccine, unspecified formulation 10/08/2010, 08/15/2014, 09/08/2017, 09/29/2018    Influenza, High Dose (Fluzone 65 yrs and older) 09/29/2018    Influenza, [place], 3 Years and older, IM (Fluzone 3 yrs and older or Afluria 5 yrs and older) 09/08/2017    Pneumococcal 13-valent Conjugate ([id number]) 10/15/2018    Pneumococcal Polysaccharide ([place]95) 08/15/2016         Social History:    Social History     Tobacco Use    Smoking status: Never Smoker    Smokeless tobacco: Never Used   Substance Use Topics    Alcohol use: Yes     Comment: occassional    Drug use: No     Social History     Tobacco Use   Smoking Status Never Smoker   Smokeless Tobacco Never Used      Family History   Problem Relation Age of Onset    Heart Disease Father     Heart Failure Father     Diabetes Father     Diabetes Daughter     Diabetes Daughter          REVIEW OF SYSTEMS:    No fever / chills / sweats. No weight loss. No visual change, eye pain, eye discharge. No oral lesion, sore throat, dysphagia. Denies cough / sputum/Sob   Denies chest pain, palpitations/ dizziness  Denies nausea/ vomiting/abdominal pain/diarrhea. Denies dysuria or change in urinary function. Denies joint swelling or pain. No myalgia, arthralgia. No rashes, skin lesions   Denies focal weakness, sensory change or other neurologic symptoms  No lymph node swelling or tenderness.     Fevers, change in mentation, bi lateral lower leg cellulitis and open wound and venous ulcerations    PHYSICAL EXAM:      Vitals:  T max  103.3     /61   Pulse 85   Temp 98.3 °F (36.8 °C) (Oral)   Resp 18   Ht 4' 11\" (1.499 m)   Wt 182 lb 5.1 oz (82.7 kg)   SpO2 94%   BMI 36.82 kg/m²     General Appearance: alert,in some acute distress, +  pallor, no icterus obesity +  Skin: warm and dry, no rash or erythema  Head: normocephalic and atraumatic  Eyes: pupils equal, round, and reactive to light, conjunctivae normal  ENT: tympanic membrane, external ear and ear canal normal bilaterally, nose without deformity, nasal mucosa and turbinates normal without polyps  Neck: supple and non-tender without mass, no thyromegaly  no cervical lymphadenopathy  Pulmonary/Chest: clear to auscultation bilaterally- no wheezes, rales or rhonchi, normal air movement, no respiratory distress  Cardiovascular: normal rate, regular rhythm, normal S1 and S2, no murmurs, rubs, clicks, or gallops, no carotid bruits  Abdomen: soft, non-tender, non-distended, normal bowel sounds, no masses or organomegaly  Extremities: no cyanosis, clubbing or + edema  Bi lateral lower leg ulcers+ venous stasis skin peeling+   Musculoskeletal: normal range of motion, no joint swelling, deformity or tenderness  Neurologic: reflexes normal and symmetric, no cranial nerve deficit  Psych:  Orientation, sensorium, mood normal  Lines:  Supra pubic catheter+         DATA:    Lab Results   Component Value Date    WBC 11.0 06/10/2019    HGB 7.6 (L) 06/10/2019    HCT 23.0 (L) 06/10/2019    MCV 98.8 06/10/2019     06/10/2019     Lab Results   Component Value Date    CREATININE 1.6 (H) 06/10/2019    BUN 20 06/10/2019     (L) 06/10/2019    K 3.2 (L) 06/10/2019     06/10/2019    CO2 18 (L) 06/10/2019       Hepatic Function Panel:   Lab Results   Component Value Date    ALKPHOS 122 06/09/2019    ALT 8 06/09/2019    AST 10 06/09/2019    PROT 5.5 06/09/2019    BILITOT 0.3 06/09/2019    BILIDIR <0.2 06/09/2019    IBILI see below 06/09/2019    LABALBU 2.2 06/10/2019     UA:  Lab Results   Component Value Date    COLORU YELLOW 06/08/2019    CLARITYU CLOUDY 06/08/2019    GLUCOSEU Negative 06/08/2019    BILIRUBINUR Negative 06/08/2019    KETUA Negative 06/08/2019    SPECGRAV 1.012 06/08/2019    BLOODU MODERATE 1.030  1.012   Blood, Urine Latest Ref Range: Negative  MODERATE (A)   pH, UA Latest Ref Range: 5.0 - 8.0  5.5   Protein, UA Latest Ref Range: Negative mg/dL 30 (A)   Urobilinogen, Urine Latest Ref Range: <2.0 E.U./dL 0.2   Nitrite, Urine Latest Ref Range: Negative  POSITIVE (A)   Leukocyte Esterase, Urine Latest Ref Range: Negative  LARGE (A)   Urine Type Unknown Clean catch   Urine Reflex to Culture Unknown Yes   Hyaline Casts, UA Latest Ref Range: 0 - 8 /LPF 2   WBC, UA Latest Ref Range: 0 - 5 / (H)   RBC, UA Latest Ref Range: 0 - 4 /HPF 5 (H)   Epi Cells Latest Ref Range: 0 - 5 /HPF 0   Bacteria, UA Latest Units: /HPF 1+ (A)   Microscopic Examination Unknown YES   URINE RT REFLEX TO CULTURE Unknown Rpt (A)     MICRO: cultures reviewed and updated by me            Wound Culture [[id number]] (Abnormal)  Collected: 06/08/19 1224   Order Status: Completed Specimen: Leg Updated: 06/10/19 0830    WOUND/ABSCESS --    Gram Stain Result 3+ WBC's (Polymorphonuclear)   1+ Gram positive cocci   Abnormal     Organism Pseudomonas aeruginosaAbnormal     WOUND/ABSCESS Light growth    Organism Staph aureus MRSAAbnormal     WOUND/ABSCESS --Abnormal     Light growth   Sensitivity to follow   CONTACT PRECAUTIONS INDICATED   PBP2= POSITIVE   Abnormal    Narrative:     ORDER#: [id number]                          ORDERED BY: Kami Tanner  SOURCE: Leg Right                          COLLECTED:  06/08/19 12:24  ANTIBIOTICS AT REGIS. :                      RECEIVED :  06/08/19 12:36  CALL Chandrika Doll  [facility] [address] [phone],  Microbiology results called to and read back by Coco Yu RN,  06/10/2019 08:29, by Johnna Escobar  Performed at:  [facility]  [address] [facility] [address]   Phone [phone]   Urine Culture [[id number]] (Abnormal)  Collected: 06/08/19 1222   Order Status: Completed Specimen: Urine, clean catch Updated: 06/10/19 0617    Urine Culture, Routine --    Organism Enterobacter cloacaeAbnormal     Urine Culture, Routine --    >100,000 CFU/ml   ID and sensitivity to follow    Narrative:     ORDER#: [id number]                          ORDERED BY: Arash Kellogg  SOURCE: Urine Clean Catch                  COLLECTED:  06/08/19 12:22  ANTIBIOTICS AT REGIS. :                      RECEIVED :  06/08/19 12:49  Performed at:  [address], [place] [address]   Phone [phone]   Culture Blood #1 [[id number]] Collected: 06/08/19 1223   Order Status: Completed Specimen: Blood Updated: 06/09/19 1315    Blood Culture, Routine No Growth to date.  Any change in status will be called. Narrative:     ORDER#: [id number]                          ORDERED BY: JESSICA Morley  SOURCE: Blood Antecubital-Right            COLLECTED:  06/08/19 12:23  ANTIBIOTICS AT REGIS. :                      RECEIVED :  06/08/19 12:36  If child <=2 yrs old please draw pediatric bottle. ~Blood Culture #1  Performed at:  [address], [place] [address]   Phone [phone]   Culture Blood #2 [[id number]] Collected: 06/08/19 1219   Order Status: Completed Specimen: Blood Updated: 06/09/19 1315    Culture, Blood 2 No Growth to date.  Any change in status will be called. Narrative:     ORDER#: [id number]                          ORDERED BY: Arash Kellogg  SOURCE: Blood Antecubital-Left             COLLECTED:  06/08/19 12:19  ANTIBIOTICS AT REGIS. :                      RECEIVED :  06/08/19 12:36  If child <=2 yrs old please draw pediatric bottle. ~Blood Culture #2  Performed at:  [address], [place] [address]   Phone [phone]   Pseudomonas aeruginosa (1)     Antibiotic Interpretation NEVILLE Status    cefepime Sensitive 4 mcg/mL     ciprofloxacin Sensitive <=1 mcg/mL     gentamicin Sensitive <=4 mcg/mL     meropenem Sensitive <=1 mcg/mL     piperacillin-tazobactam Sensitive <=16 mcg/mL     tobramycin Sensitive <=4 mcg/mL       Enterobacter cloacae (1)     Antibiotic Interpretation NEVILLE Status    amoxicillin-clavulanate Resistant >16/8 mcg/mL     ampicillin Resistant >16 mcg/mL     ceFAZolin Resistant >16 mcg/mL     cefepime Sensitive <=1 mcg/mL     cefTRIAXone Sensitive <=1 mcg/mL     ciprofloxacin Sensitive <=0.5 mcg/mL     gentamicin Sensitive <=2 mcg/mL     levofloxacin Sensitive <=1 mcg/mL     meropenem Sensitive <=0.25 mcg/mL     nitrofurantoin Resistant >64 mcg/mL     piperacillin-tazobactam Sensitive <=2/4 mcg/mL     tetracycline Sensitive <=2 mcg/mL     trimethoprim-sulfamethoxazole Sensitive <=0.5/9.5 mcg/mL           Urine Culture  Recent Labs     06/08/19  1222   LABURIN >100,000 CFU/ml  ID and sensitivity to follow       Imaging:   XR CHEST PORTABLE   Final Result   Pulmonary vascular congestion. US RENAL COMPLETE    (Results Pending)       All pertinent images and reports for the current Hospitalization were reviewed by me. IMPRESSION:  Sepsis on admit  High fevers  WBC elevation  UTI with SPC in place  UA very abnormal and urine cx Enterobacter  Bi lateral lower leg venous ulcers non healing   Wound cx with MRSA and Pseudomonas  H/o MRSA colonization  CKD+  Encephalopathy from infection and metabolic process slow to resolve  Poor mobility +         Labs, Microbiology, Radiology and pertinent results from current hospitalization and care every where were reviewed by me as a part of the consultation. PLAN :  1. Cont IV Cefepime will cover Pseudomonas and Enterobacter  2. Cont IV Vancomycin for MRSA on the wound cx   3. Bi lateral lower leg ulcers non healing for a while and she is followed in wound care center  4. Watch creat closely  5. Hopefully able to choose oral abx when ready for d/c    Discussed with patient/Family  D/w RN    Thanks for allowing me to participate in your patient's care please call me with any questions or concerns.      One [facility] Abbott Physician  Phone: [phone]   Fax : [phone]

## 2019-06-10 NOTE — PROGRESS NOTES
Hospitalist Progress Note      PCP: Orly Herrera    Date of Admission: 6/8/2019    Chief Complaint: SOB and fever. Hospital Course:     Long standing hx of chronic suprapubic catheter. Hx of chronic LE wounds. Subjective:   Feels much better today. SOB much improved. Urine draining clear. Ongoing bleeding from chronic leg wounds. Medications:  Reviewed    Infusion Medications    dextrose       Scheduled Medications    sodium chloride  250 mL Intravenous Once    cefepime  2 g Intravenous Q24H    vancomycin (VANCOCIN) intermittent dosing (placeholder)   Other RX Placeholder    vancomycin  1,000 mg Intravenous Once    aspirin  325 mg Oral Daily    atorvastatin  10 mg Oral Daily    lipase-protease-amylase  2 capsule Oral TID     metoprolol tartrate  25 mg Oral BID    venlafaxine  150 mg Oral BID    insulin lispro  0-6 Units Subcutaneous TID WC    insulin lispro  0-3 Units Subcutaneous Nightly    timolol  1 drop Right Eye Daily    ferrous sulfate  324 mg Oral BID WC     PRN Meds: LORazepam, traMADol, glucose, dextrose, glucagon (rDNA), dextrose, ondansetron, acetaminophen      Intake/Output Summary (Last 24 hours) at 6/10/2019 1449  Last data filed at 6/10/2019 1156  Gross per 24 hour   Intake 420 ml   Output 900 ml   Net -480 ml       Physical Exam Performed:    /61   Pulse 85   Temp 98.3 °F (36.8 °C) (Oral)   Resp 18   Ht 4' 11\" (1.499 m)   Wt 182 lb 5.1 oz (82.7 kg)   SpO2 93%   BMI 36.82 kg/m²     General appearance: No apparent distress, appears stated age and cooperative. HEENT: Pupils equal, round, and reactive to light. Conjunctivae/corneas clear. Neck: Supple, with full range of motion. No jugular venous distention. Trachea midline. Respiratory:  Normal respiratory effort. Clear to auscultation, bilaterally without Rales/Wheezes/Rhonchi. Cardiovascular: Regular rate and rhythm with normal S1/S2 without murmurs, rubs or gallops.   Abdomen: Soft, Enterobacter cloacae   On IV zosyn   Add iv vancomycin   Monitor fever curve/WBC  ID consult     Sepsis POA - due to above   LA normal.   Leukocytosis resolved     Chronic bilateral leg wounds with possible infection and cellulitis. Wound care as needed     Acute on Chronic dCHF  Afib RVR on presentation. HR better today in mid 80s this am.   On metoprolol BP permitting. S/p a dose of lasix - diuretic now on hold till infection under control. KACIE on CKD stage 3  Cr 1.6 today  Daily bmp  Avoid nephrotoxins   Nephrology consult    Acute on chronic anemia  Worsened by ongoing bleeding from chronic leg wounds. Iron studies ordered  Cont po iron     DMII - with uncontrolled hyperglycemia. Increase ISS dose  Add lantus and prandial insulin coverage   Carb control diet. DVT Prophylaxis: SCD - leg wounds bleeding. Diet: DIET GENERAL; Carb Control: 4 carb choices (60 gms)/meal  Code Status: DNR-CCA      Dispo - cc. Prognosis guarded.  Code status DNRCCA  Daughter was updated over the phone today     Dennie Ouch, MD

## 2019-06-10 NOTE — CARE COORDINATION
[facility] Nurse  Consult Note       NAME:  Silvia Walton  MEDICAL RECORD NUMBER:  [id number]  AGE: 66 y.o. GENDER: female  : 1945  TODAY'S DATE:  6/10/2019    Subjective   Reason for WOCN Evaluation and Assessment: to evaluate and treat \"venous ulcers BLE\"      Silvia Walton is a 66 y.o. female referred by:   [x] Physician  [x] Nursing  [] Other:     Wound Identification:  Wound Type: venous  Contributing Factors: edema and venous stasis    Wound History: admitted with shortness of breath from home. [address]  nurse familiar with the patient last seen in April for venous stasis ulcer on BLE. Pt active with the [facility] [address] [facility] followed by Dr. Geovanny Evans. Bilateral legs assessed. Damp gauze dressing applied due to bleeding secured with ABD pads. Will continue to follow. See wound details and plan of care below. Spoke with patient's daughter at bedside. Patient Goal of Care:  [x] Wound Healing  [] Odor Control  [] Palliative Care  [] Pain Control   [] Other:         PAST MEDICAL HISTORY        Diagnosis Date    Anemia     Atrial fibrillation (HCC)     CAD (coronary artery disease)     Chronic back pain     CKD (chronic kidney disease), stage III (HCC)     Diabetes mellitus ([address]     Diastolic CHF ([address]     Hyperlipidemia     Hypertension     MI (myocardial infarction) ([address]     MRSA (methicillin resistant staph aureus) culture positive 2018    leg ulcer    MRSA colonization 10/16/2018    Obesity (BMI 30-39. 9)     Osteoarthritis     Pancreatitis     Sepsis ([address]     Stasis ulcer ([address]     bilateral lower extremities     Suprapubic catheter ([address]        PAST SURGICAL HISTORY    Past Surgical History:   Procedure Laterality Date    APPENDECTOMY      BACK SURGERY      x2    CATARACT REMOVAL      bilateral     CHOLECYSTECTOMY      COLONOSCOPY      EYE SURGERY      FRACTURE SURGERY      Left ankle    HYSTERECTOMY      INSET/CHANGE LOMELI CATHETER - COMPLICATED  74/5711    suprapubic cath    JOINT REPLACEMENT      Right knee    KNEE SURGERY         FAMILY HISTORY    Family History   Problem Relation Age of Onset    Heart Disease Father     Heart Failure Father     Diabetes Father     Diabetes Daughter     Diabetes Daughter        SOCIAL HISTORY    Social History     Tobacco Use    Smoking status: Never Smoker    Smokeless tobacco: Never Used   Substance Use Topics    Alcohol use: Yes     Comment: occassional    Drug use: No       ALLERGIES    Allergies   Allergen Reactions    Sulfa Antibiotics Rash       MEDICATIONS    No current facility-administered medications on file prior to encounter. Current Outpatient Medications on File Prior to Encounter   Medication Sig Dispense Refill    ibuprofen (ADVIL;MOTRIN) 200 MG tablet Take 400 mg by mouth every 6 hours as needed for Pain      LORazepam (ATIVAN) 0.5 MG tablet Take 0.5 mg by mouth nightly as needed for Anxiety.  traMADol (ULTRAM) 50 MG tablet Take 50 mg by mouth every 8 hours as needed for Pain.       raNITIdine HCl (ZANTAC 150 MAXIMUM STRENGTH PO) Take 150 mg by mouth 2 times daily      ferrous sulfate 325 (65 Fe) MG tablet Take 325 mg by mouth 2 times daily       insulin lispro (HUMALOG) 100 UNIT/ML injection vial Inject 5 Units into the skin 3 times daily (with meals) (Patient taking differently: Inject into the skin Humalog insulin pump: 1.8 Unit basal rate continous and as needed bolus per insulin pump) 1 vial 0    Timolol (TIMOPTIC) 0.5 % (DAILY) SOLN ophthalmic solution Place 1 drop into the right eye daily       aspirin 325 MG tablet Take 325 mg by mouth daily       atorvastatin (LIPITOR) 10 MG tablet Take 1 tablet by mouth daily 30 tablet 0    metoprolol tartrate (LOPRESSOR) 25 MG tablet Take 1 tablet by mouth 2 times daily 60 tablet 0    furosemide (LASIX) 40 MG tablet Take 0.5 tablets by mouth daily 60 tablet 0    Biotin (BIOTIN 5000) 5 MG CAPS Take 1 tablet by mouth daily      Calcium Carbonate Antacid (TUMS ULTRA 1000) 1000 MG CHEW Take 1 tablet by mouth as needed (acid reflux)       lipase-protease-amylase (CREON) 03520 units delayed release capsule Take 24,000 Units by mouth 3 times daily (with meals)      gabapentin (NEURONTIN) 300 MG capsule Take 300 mg by mouth 2 times daily.  isosorbide mononitrate (IMDUR) 30 MG extended release tablet Take 30 mg by mouth daily      venlafaxine (EFFEXOR) 75 MG tablet Take 150 mg by mouth 2 times daily       cetirizine (ZYRTEC) 10 MG tablet Take 10 mg by mouth daily      vitamin D3 (CHOLECALCIFEROL) 400 units TABS tablet Take 400 Units by mouth daily          Objective    /64   Pulse 71   Temp 97.2 °F (36.2 °C) (Oral)   Resp 18   Ht 4' 11\" (1.499 m)   Wt 182 lb 5.1 oz (82.7 kg)   SpO2 95%   BMI 36.82 kg/m²     LABS:  WBC:    Lab Results   Component Value Date    WBC 11.0 06/10/2019     H/H:    Lab Results   Component Value Date    HGB 7.6 06/10/2019    HCT 23.0 06/10/2019     PTT:    Lab Results   Component Value Date    APTT 30.6 05/21/2018   [APTT}  PT/INR:    Lab Results   Component Value Date    PROTIME 11.9 05/21/2018    INR 1.05 05/21/2018     HgBA1c:    Lab Results   Component Value Date    LABA1C 6.6 06/08/2019       Assessment   Jose Risk Score: Jose Scale Score: 17 at risk     Patient Active Problem List   Diagnosis Code    Acute on chronic renal insufficiency N28.9, N18.9    FTT (failure to thrive) in adult R62.7    Type 1 diabetes mellitus with hyperglycemia (HCC) E10.65    Chronic venous hypertension (idiopathic) with ulcer and inflammation of bilateral lower extremity (HCC) I87.333, L97.919, L97.929    DMII (diabetes mellitus, type 2) (HCC) E11.9    Cellulitis of left lower extremity L03. 116    Protein-calorie malnutrition, moderate (HCC) E44.0    MRSA colonization Z22.322    Chronic diastolic heart failure (HCC) I50.32    Coronary artery disease involving native coronary artery of native heart without angina pectoris I25.10    Aortic atherosclerosis (HCC) I70.0    Essential hypertension I10    Cellulitis, lower extremities bilaterally L03.90    MRSA nasal colonization Z22.322    Normocytic anemia D64.9    Altered mental status R41.82    Elevated lactic acid level R79.89    Uncontrolled diabetes mellitus ([facility] E11.65    Sepsis ([facility] A41.9       Measurements:  Wound 06/08/19 Tibial Left; Lower (Active)   Wound Venous 6/10/2019  1:00 PM   Dressing Status Changed 6/10/2019  1:00 PM   Dressing Changed Changed/New 6/10/2019  1:00 PM   Dressing/Treatment Ace Wrap;Dry Dressing;Non adherent 6/10/2019  1:00 PM   Wound Cleansed Wound cleanser 6/10/2019  1:00 PM   Wound Length (cm) 11 cm 6/8/2019  4:30 PM   Wound Width (cm) 7 cm 6/8/2019  4:30 PM   Wound Surface Area (cm^2) 77 cm^2 6/8/2019  4:30 PM   Wound Assessment Painful;Red;Drainage 6/10/2019  1:00 PM   Drainage Amount Moderate 6/10/2019  1:00 PM   Drainage Description Serosanguinous 6/10/2019  1:00 PM   Marilynn-wound Assessment Fragile;Pink;Painful 6/10/2019  1:00 PM   Number of days: 2       Wound 06/08/19 Pretibial Distal;Right; Lower;Medial (Active)   Wound Venous 6/10/2019  1:00 PM   Dressing Status Changed 6/10/2019  1:00 PM   Dressing Changed Changed/New 6/10/2019  1:00 PM   Dressing/Treatment Ace Wrap;Dry Dressing;Non adherent 6/10/2019  1:00 PM   Wound Cleansed Wound cleanser 6/10/2019  1:00 PM   Wound Length (cm) 1.4 cm 6/8/2019  4:30 PM   Wound Width (cm) 1.5 cm 6/8/2019  4:30 PM   Wound Surface Area (cm^2) 2.1 cm^2 6/8/2019  4:30 PM   Wound Assessment Drainage;Painful;Red 6/10/2019  1:00 PM   Drainage Amount Moderate 6/10/2019  1:00 PM   Drainage Description Serosanguinous 6/10/2019  1:00 PM   Marilynn-wound Assessment Intact;Painful;Pink 6/10/2019  1:00 PM   Number of days: 2       Wound 06/08/19 Tibial Distal;Right (Active)   Wound Venous 6/10/2019  1:00 PM   Dressing Status Changed 6/10/2019  1:00 PM   Dressing Changed Changed/New 6/10/2019  1:00 PM   Dressing/Treatment Ace Wrap;Dry Dressing;Non adherent 6/10/2019  1:00 PM   Wound Cleansed Wound cleanser 6/10/2019  1:00 PM   Wound Length (cm) 2 cm 6/8/2019  4:30 PM   Wound Width (cm) 2.3 cm 6/8/2019  4:30 PM   Wound Surface Area (cm^2) 4.6 cm^2 6/8/2019  4:30 PM   Wound Assessment Drainage;Painful;Red 6/10/2019  1:00 PM   Drainage Amount Moderate 6/10/2019  1:00 PM   Drainage Description Serosanguinous 6/10/2019  1:00 PM   Marilynn-wound Assessment Intact;Pink;Painful 6/10/2019  1:00 PM   Number of days: 2       Wound 06/08/19 Tibial Right (Active)   Wound Venous 6/10/2019  1:00 PM   Dressing Status Changed 6/10/2019  1:00 PM   Dressing Changed Changed/New 6/10/2019  1:00 PM   Dressing/Treatment Ace Wrap;Dry Dressing;Non adherent 6/10/2019  1:00 PM   Wound Cleansed Rinsed/Irrigated with saline 6/9/2019  3:10 PM   Wound Length (cm) 5.4 cm 6/8/2019  4:30 PM   Wound Width (cm) 4.5 cm 6/8/2019  4:30 PM   Wound Surface Area (cm^2) 24.3 cm^2 6/8/2019  4:30 PM   Wound Assessment Drainage;Painful;Red 6/10/2019  1:00 PM   Drainage Amount Moderate 6/10/2019  1:00 PM   Drainage Description Serosanguinous 6/10/2019  1:00 PM   Marilynn-wound Assessment Fragile;Painful;Pink 6/10/2019  1:00 PM   Number of days: 2       Wound 06/08/19 Pretibial Right (Active)   Wound Venous 6/10/2019  1:00 PM   Dressing Status Changed 6/10/2019  1:00 PM   Dressing Changed Changed/New 6/10/2019  1:00 PM   Dressing/Treatment Ace Wrap;Dry Dressing;Non adherent 6/10/2019  1:00 PM   Wound Cleansed Wound cleanser 6/10/2019  1:00 PM   Wound Length (cm) 4.3 cm 6/8/2019  4:30 PM   Wound Width (cm) 7 cm 6/8/2019  4:30 PM   Wound Surface Area (cm^2) 30.1 cm^2 6/8/2019  4:30 PM   Wound Assessment Drainage;Painful;Red 6/10/2019  1:00 PM   Drainage Amount Moderate 6/10/2019  1:00 PM   Drainage Description Serosanguinous 6/10/2019  1:00 PM   Marilynn-wound Assessment Fragile;Painful;Pink 6/10/2019  1:00 PM   Number of days: 2       Wound 06/08/19 reposition the patient while in bed.    Keep Patient OFF BACK while in bed   Turn patient to left and right lying positions while in bed       Specialty Bed Required : Yes   [x] Low Air Loss   [] Pressure Redistribution  [] Fluid Immersion  [] Bariatric  [] Total Pressure Relief  [] Other:     Current Diet: DIET GENERAL; Carb Control: 4 carb choices (60 gms)/meal  Dietician consult:  Yes    Discharge Plan:  Placement for patient upon discharge: home with support    Patient appropriate for Outpatient [address]: Yes    Referrals:  []   [] [address] [facility]  [] Supplies  [] Other    Patient/Caregiver Teaching:  Level of patient/caregiver understanding able to: Spoke with patient's daughter at bedside   [x] Indicates understanding       [x] Needs reinforcement  [] Unsuccessful      [] Verbal Understanding  [] Demonstrated understanding       [] No evidence of learning  [] Refused teaching         [] N/A       Electronically signed by Ramila Esposito RN, [address] on 6/10/2019 at 4:50 PM

## 2019-06-10 NOTE — CARE COORDINATION
Chart reviewed. Patient known to TINA from previous admission. [facility] had accepted patient after [facility] ARU was not able to. Patient was in OBS status that time and was not able to pay privately for SNF. Patient/daughter ultimately decided to return home with [facility] and private aide services instead of pursing ARU level of care. Tina met with patient and a family friend this AM. Patient stated that she lives with her daughter Meliza who works. She has a private duty aide through [employer] for 4hrs/day 4x week. Patient stated that she has been doing well at home. She plans on returning home upon discharge. She stated that she could possibly increase her aide services if needed. Patient's family friend stated that they will need to see how she is doing physically prior to returning home. Sw did discuss the possibility of placement if needed, patient stated that she would prefer to return home.      PT/OT would be beneficial. Will inform MD. Davy Gomez, [address] [facility]  [phone]  6/10/19 at 9:33 AM

## 2019-06-10 NOTE — CARE COORDINATION
[facility]  Patient is active with Won Nichole LPN  [facility],  [address], Fax [phone]

## 2019-06-10 NOTE — PROGRESS NOTES
[facility]  Consult Note           Reason for Consult:  KACIE/CKD  Requesting Physician:  Dr. Shelby Manzanares    History Obtained From:  patient, electronic medical record    History of Present Ilness:    67 y/o WF with h/o CKD stage 3 baseline Cr 1.3-1.5 mg admitted with fever  Had fever to 103 in ER  Has h/o ch LE wounds followed at wound care center Has SPT in plcae  Noted to have progressive increase in Cr from 1.3 to 1.6 mg today  Has been on Vancomycin and Zosyn Takes Aleve prn at home  Denies nausea vomiting diarrhea or abdominal pain  Denies gross hematuria  No exposure to IV contrast Vancomycin level has been therapeutic       Past Medical History:        Diagnosis Date    Anemia     Atrial fibrillation ([address]     CAD (coronary artery disease)     Chronic back pain     CKD (chronic kidney disease), stage III (HCC)     Diabetes mellitus ([address]     Diastolic CHF ([address]     Hyperlipidemia     Hypertension     MI (myocardial infarction) ([address]     MRSA (methicillin resistant staph aureus) culture positive 04/13/2018    leg ulcer    MRSA colonization 10/16/2018    Obesity (BMI 30-39. 9)     Osteoarthritis     Pancreatitis     Sepsis ([address]     Stasis ulcer ([address]     bilateral lower extremities     Suprapubic catheter ([address]        Past Surgical History:        Procedure Laterality Date    APPENDECTOMY      BACK SURGERY      x2    CATARACT REMOVAL      bilateral     CHOLECYSTECTOMY      COLONOSCOPY      EYE SURGERY      FRACTURE SURGERY      Left ankle    HYSTERECTOMY      INSET/CHANGE LOMELI CATHETER - COMPLICATED  31/5011    suprapubic cath    JOINT REPLACEMENT      Right knee    KNEE SURGERY         Home Medications:    No current facility-administered medications on file prior to encounter.       Current Outpatient Medications on File Prior to Encounter   Medication Sig Dispense Refill    ibuprofen (ADVIL;MOTRIN) 200 MG tablet Take 400 mg by mouth every 6 hours as needed for Pain without m/r/g  Respiratory: CTA B without w/r/r; respiratory effort normal  Abdomen:  +bs, soft, nt, nd, no hepatosplenomegaly SPT in place Urine clear in bag  Ext: trace lower extremity edema ace wraps LE's  Psychiatric: mood and affect appropriate; judgement and insight intact  Musculoskeletal:  Rom, muscular strength intact; digits, nails normal    Data/  CBC:   Lab Results   Component Value Date    WBC 11.0 06/10/2019    RBC 2.33 06/10/2019    HGB 7.6 06/10/2019    HCT 23.0 06/10/2019    MCV 98.8 06/10/2019    MCH 32.6 06/10/2019    MCHC 33.0 06/10/2019    RDW 13.4 06/10/2019     06/10/2019    MPV 8.3 06/10/2019     BMP:    Lab Results   Component Value Date     06/10/2019    K 3.2 06/10/2019    K 3.8 05/04/2019     06/10/2019    CO2 18 06/10/2019    BUN 20 06/10/2019    LABALBU 2.2 06/10/2019    CREATININE 1.6 06/10/2019    CALCIUM 8.1 06/10/2019    GFRAA 38 06/10/2019    LABGLOM 31 06/10/2019    GLUCOSE 300 06/10/2019         Assessment/  1-KACIE/CKD baseline Cr 1.3-1.5 mg Suspect in the setting of SIRS, complicated UTI Underlying CKD likely due to D Nephropathy Has h/o D Retinopathy r/o Myeloma  2-Hypokalemia  3-Anemia normocytic   4 HTN variable Has had hypotension during hospitalization  5 S/P SPT  6 UTI Enterobacter  7 Ch LE wounds        Plan/  1-Renal US  2-Vancomycin dosing per pharmacy Monitor levels closely  3-Continue Cefepime pending sensitivities  4 Check SPEP, Fe studies  5 Replace K+ Check Mg++  6 Avoid Hypotension    Thank you for the consultation. Please do not hesitate to call with questions.     Manda West MD, [address]

## 2019-06-11 LAB
ALBUMIN SERPL-MCNC: 2.1 G/DL (ref 3.4–5)
ANION GAP SERPL CALCULATED.3IONS-SCNC: 14 MMOL/L (ref 3–16)
BASOPHILS ABSOLUTE: 0.1 K/UL (ref 0–0.2)
BASOPHILS RELATIVE PERCENT: 0.4 %
BUN BLDV-MCNC: 17 MG/DL (ref 7–20)
CALCIUM SERPL-MCNC: 8.2 MG/DL (ref 8.3–10.6)
CHLORIDE BLD-SCNC: 107 MMOL/L (ref 99–110)
CO2: 18 MMOL/L (ref 21–32)
CREAT SERPL-MCNC: 1.3 MG/DL (ref 0.6–1.2)
EOSINOPHILS ABSOLUTE: 0.2 K/UL (ref 0–0.6)
EOSINOPHILS RELATIVE PERCENT: 1.1 %
FERRITIN: 366.4 NG/ML (ref 15–150)
GFR AFRICAN AMERICAN: 48
GFR NON-AFRICAN AMERICAN: 40
GLUCOSE BLD-MCNC: 222 MG/DL (ref 70–99)
GLUCOSE BLD-MCNC: 227 MG/DL (ref 70–99)
GLUCOSE BLD-MCNC: 259 MG/DL (ref 70–99)
GLUCOSE BLD-MCNC: 272 MG/DL (ref 70–99)
GLUCOSE BLD-MCNC: 315 MG/DL (ref 70–99)
GRAM STAIN RESULT: ABNORMAL
HCT VFR BLD CALC: 23.6 % (ref 36–48)
HEMOGLOBIN: 7.8 G/DL (ref 12–16)
IRON SATURATION: 26 % (ref 15–50)
IRON: 38 UG/DL (ref 37–145)
LYMPHOCYTES ABSOLUTE: 0.4 K/UL (ref 1–5.1)
LYMPHOCYTES RELATIVE PERCENT: 3 %
MAGNESIUM: 2.3 MG/DL (ref 1.8–2.4)
MCH RBC QN AUTO: 32.6 PG (ref 26–34)
MCHC RBC AUTO-ENTMCNC: 33.1 G/DL (ref 31–36)
MCV RBC AUTO: 98.8 FL (ref 80–100)
MONOCYTES ABSOLUTE: 0.4 K/UL (ref 0–1.3)
MONOCYTES RELATIVE PERCENT: 2.7 %
NEUTROPHILS ABSOLUTE: 13.1 K/UL (ref 1.7–7.7)
NEUTROPHILS RELATIVE PERCENT: 92.8 %
ORGANISM: ABNORMAL
ORGANISM: ABNORMAL
PDW BLD-RTO: 13.2 % (ref 12.4–15.4)
PERFORMED ON: ABNORMAL
PHOSPHORUS: 2.6 MG/DL (ref 2.5–4.9)
PLATELET # BLD: 262 K/UL (ref 135–450)
PMV BLD AUTO: 8.2 FL (ref 5–10.5)
POTASSIUM SERPL-SCNC: 3.4 MMOL/L (ref 3.5–5.1)
PROTEIN PROTEIN: 0.07 G/DL
PROTEIN PROTEIN: 69 MG/DL
RBC # BLD: 2.39 M/UL (ref 4–5.2)
SODIUM BLD-SCNC: 139 MMOL/L (ref 136–145)
TOTAL IRON BINDING CAPACITY: 148 UG/DL (ref 260–445)
VANCOMYCIN RANDOM: 14.5 UG/ML
WBC # BLD: 14.1 K/UL (ref 4–11)
WOUND/ABSCESS: ABNORMAL

## 2019-06-11 PROCEDURE — 84155 ASSAY OF PROTEIN SERUM: CPT

## 2019-06-11 PROCEDURE — 83735 ASSAY OF MAGNESIUM: CPT

## 2019-06-11 PROCEDURE — 82728 ASSAY OF FERRITIN: CPT

## 2019-06-11 PROCEDURE — 6370000000 HC RX 637 (ALT 250 FOR IP): Performed by: INTERNAL MEDICINE

## 2019-06-11 PROCEDURE — 83540 ASSAY OF IRON: CPT

## 2019-06-11 PROCEDURE — 80069 RENAL FUNCTION PANEL: CPT

## 2019-06-11 PROCEDURE — 6360000002 HC RX W HCPCS: Performed by: INTERNAL MEDICINE

## 2019-06-11 PROCEDURE — 83550 IRON BINDING TEST: CPT

## 2019-06-11 PROCEDURE — 85025 COMPLETE CBC W/AUTO DIFF WBC: CPT

## 2019-06-11 PROCEDURE — 36415 COLL VENOUS BLD VENIPUNCTURE: CPT

## 2019-06-11 PROCEDURE — 99233 SBSQ HOSP IP/OBS HIGH 50: CPT | Performed by: INTERNAL MEDICINE

## 2019-06-11 PROCEDURE — 2580000003 HC RX 258: Performed by: INTERNAL MEDICINE

## 2019-06-11 PROCEDURE — 84165 PROTEIN E-PHORESIS SERUM: CPT

## 2019-06-11 PROCEDURE — 2060000000 HC ICU INTERMEDIATE R&B

## 2019-06-11 PROCEDURE — 80202 ASSAY OF VANCOMYCIN: CPT

## 2019-06-11 RX ORDER — HYDROPHILIC CREAM
PASTE (GRAM) TOPICAL DAILY
Status: DISCONTINUED | OUTPATIENT
Start: 2019-06-12 | End: 2019-06-12

## 2019-06-11 RX ORDER — INSULIN GLARGINE 100 [IU]/ML
10 INJECTION, SOLUTION SUBCUTANEOUS DAILY
Status: DISCONTINUED | OUTPATIENT
Start: 2019-06-12 | End: 2019-06-12

## 2019-06-11 RX ADMIN — INSULIN LISPRO 6 UNITS: 100 INJECTION, SOLUTION INTRAVENOUS; SUBCUTANEOUS at 12:40

## 2019-06-11 RX ADMIN — TIMOLOL MALEATE 1 DROP: 5 SOLUTION OPHTHALMIC at 08:54

## 2019-06-11 RX ADMIN — INSULIN GLARGINE 8 UNITS: 100 INJECTION, SOLUTION SUBCUTANEOUS at 08:55

## 2019-06-11 RX ADMIN — METOPROLOL TARTRATE 25 MG: 25 TABLET ORAL at 21:23

## 2019-06-11 RX ADMIN — VENLAFAXINE 150 MG: 50 TABLET ORAL at 21:23

## 2019-06-11 RX ADMIN — CEFEPIME HYDROCHLORIDE 2 G: 2 INJECTION, POWDER, FOR SOLUTION INTRAVENOUS at 17:44

## 2019-06-11 RX ADMIN — VENLAFAXINE 150 MG: 50 TABLET ORAL at 08:53

## 2019-06-11 RX ADMIN — FERROUS SULFATE TAB EC 324 MG (65 MG FE EQUIVALENT) 324 MG: 324 (65 FE) TABLET DELAYED RESPONSE at 08:54

## 2019-06-11 RX ADMIN — PANCRELIPASE 2 CAPSULE: 60000; 12000; 38000 CAPSULE, DELAYED RELEASE PELLETS ORAL at 08:54

## 2019-06-11 RX ADMIN — INSULIN LISPRO 6 UNITS: 100 INJECTION, SOLUTION INTRAVENOUS; SUBCUTANEOUS at 17:45

## 2019-06-11 RX ADMIN — PANCRELIPASE 2 CAPSULE: 60000; 12000; 38000 CAPSULE, DELAYED RELEASE PELLETS ORAL at 17:44

## 2019-06-11 RX ADMIN — ATORVASTATIN CALCIUM 10 MG: 10 TABLET, FILM COATED ORAL at 08:53

## 2019-06-11 RX ADMIN — METOPROLOL TARTRATE 25 MG: 25 TABLET ORAL at 08:53

## 2019-06-11 RX ADMIN — INSULIN LISPRO 5 UNITS: 100 INJECTION, SOLUTION INTRAVENOUS; SUBCUTANEOUS at 08:55

## 2019-06-11 RX ADMIN — FERROUS SULFATE TAB EC 324 MG (65 MG FE EQUIVALENT) 324 MG: 324 (65 FE) TABLET DELAYED RESPONSE at 17:44

## 2019-06-11 RX ADMIN — INSULIN LISPRO 6 UNITS: 100 INJECTION, SOLUTION INTRAVENOUS; SUBCUTANEOUS at 21:23

## 2019-06-11 RX ADMIN — ASPIRIN 325 MG ORAL TABLET 325 MG: 325 PILL ORAL at 08:53

## 2019-06-11 RX ADMIN — INSULIN LISPRO 9 UNITS: 100 INJECTION, SOLUTION INTRAVENOUS; SUBCUTANEOUS at 17:45

## 2019-06-11 RX ADMIN — VANCOMYCIN HYDROCHLORIDE 1000 MG: 1 INJECTION, POWDER, LYOPHILIZED, FOR SOLUTION INTRAVENOUS at 14:03

## 2019-06-11 RX ADMIN — INSULIN LISPRO 4 UNITS: 100 INJECTION, SOLUTION INTRAVENOUS; SUBCUTANEOUS at 08:55

## 2019-06-11 ASSESSMENT — PAIN SCALES - GENERAL
PAINLEVEL_OUTOF10: 0

## 2019-06-11 NOTE — PROGRESS NOTES
Hospitalist Progress Note      PCP: Derik Escalante    Date of Admission: 6/8/2019    Chief Complaint: SOB and fever. Hospital Course:   Long standing hx of chronic suprapubic catheter. Hx of chronic LE wounds. Subjective:   Feels better today. SOB much improved. Urine draining clear. No active complaints. Medications:  Reviewed    Infusion Medications    dextrose       Scheduled Medications    [START ON 6/12/2019] insulin glargine  10 Units Subcutaneous Daily    insulin lispro  6 Units Subcutaneous TID WC    [START ON 6/12/2019] zinc oxide   Topical Daily    vancomycin  1,000 mg Intravenous Q24H    cefepime  2 g Intravenous Q24H    vancomycin (VANCOCIN) intermittent dosing (placeholder)   Other RX Placeholder    insulin lispro  0-12 Units Subcutaneous TID WC    insulin lispro  0-6 Units Subcutaneous Nightly    aspirin  325 mg Oral Daily    atorvastatin  10 mg Oral Daily    lipase-protease-amylase  2 capsule Oral TID WC    metoprolol tartrate  25 mg Oral BID    venlafaxine  150 mg Oral BID    timolol  1 drop Right Eye Daily    ferrous sulfate  324 mg Oral BID WC     PRN Meds: LORazepam, traMADol, glucose, dextrose, glucagon (rDNA), dextrose, ondansetron, acetaminophen      Intake/Output Summary (Last 24 hours) at 6/11/2019 1548  Last data filed at 6/11/2019 1524  Gross per 24 hour   Intake 1450 ml   Output 575 ml   Net 875 ml       Physical Exam Performed:    BP (!) 150/67   Pulse 79   Temp 98.4 °F (36.9 °C) (Oral)   Resp 20   Ht 4' 11\" (1.499 m)   Wt 179 lb 14.3 oz (81.6 kg)   SpO2 97%   BMI 36.33 kg/m²     General appearance: No apparent distress, appears stated age and cooperative. HEENT: Pupils equal, round, and reactive to light. Conjunctivae/corneas clear. Neck: Supple, with full range of motion. No jugular venous distention. Trachea midline. Respiratory:  Normal respiratory effort.  Clear to auscultation, bilaterally without Rales/Wheezes/Rhonchi. Cardiovascular: Regular rate and rhythm with normal S1/S2 without murmurs, rubs or gallops. Abdomen: Soft, non-tender, non-distended with normal bowel sounds. Musculoskeletal: No clubbing, cyanosis or edema bilaterally. Full range of motion without deformity. Skin: Skin color, texture, turgor normal.  No rashes or lesions. Neurologic:  Neurovascularly intact without any focal sensory/motor deficits. Cranial nerves: II-XII intact, grossly non-focal.  Psychiatric: Alert and oriented, thought content appropriate, normal insight  Capillary Refill: Brisk,< 3 seconds   Peripheral Pulses: +2 palpable, equal bilaterally       Labs:   Recent Labs     06/09/19  0615 06/10/19  0633 06/11/19  0558   WBC 15.0* 11.0 14.1*   HGB 7.4* 7.6* 7.8*   HCT 23.3* 23.0* 23.6*    248 262     Recent Labs     06/09/19 0615 06/10/19  0633 06/11/19  0557   * 135* 139   K 3.4* 3.2* 3.4*    102 107   CO2 15* 18* 18*   BUN 17 20 17   CREATININE 1.5*  1.5* 1.6* 1.3*   CALCIUM 7.5* 8.1* 8.2*   PHOS 3.4 3.1 2.6     Recent Labs     06/09/19  0615   AST 10*   ALT 8*   BILIDIR <0.2   BILITOT 0.3   ALKPHOS 122     No results for input(s): INR in the last 72 hours. No results for input(s): Faby Specking in the last 72 hours. Urinalysis:      Lab Results   Component Value Date    NITRU POSITIVE 06/08/2019    WBCUA 328 06/08/2019    BACTERIA 1+ 06/08/2019    RBCUA 5 06/08/2019    BLOODU MODERATE 06/08/2019    SPECGRAV 1.012 06/08/2019    GLUCOSEU Negative 06/08/2019       Radiology:  US RENAL COMPLETE   Final Result   Findings are suggestive of bilateral nephrolithiasis, without hydronephrosis. XR CHEST PORTABLE   Final Result   Pulmonary vascular congestion.              Assessment/Plan:    Active Hospital Problems    Diagnosis    Sepsis ([facility] [A41.9]       Acute Metabolic Encephalopathy secondary to complicated UTI due to chronic suprapubic cystostomy catheter and possible LE wound infection. Wound culture with Pseudomonas aeruginosa and Staph aureus MRSA  Urine Culture growing Enterobacter cloacae   On IV cefepime and iv vancomycin   Monitor fever curve/WBC  ID following     Sepsis POA - due to above   LA normal.   Leukocytosis resolved     Chronic bilateral leg wounds with possible infection and cellulitis. Wound care as needed     Acute on Chronic dCHF  Afib RVR on presentation. HR better today in mid 70s this am.   On metoprolol BP permitting. S/p a dose of lasix - diuretic now on hold till infection under control. KACIE on CKD stage 3  Back to baseline   Cr 1.3 today  Daily bmp  Avoid nephrotoxins   Nephrology recs appreciated     Acute on chronic anemia  Worsened by ongoing bleeding from chronic leg wounds. Iron studies showed high ferritin and low TIBC  Cont po iron     DMII - with uncontrolled hyperglycemia. Increase ISS dose  Cont lantus and prandial insulin coverage   Carb control diet. DVT Prophylaxis: SCD - leg wounds bleeding. Diet: DIET GENERAL; Carb Control: 4 carb choices (60 gms)/meal  Code Status: DNR-CCA      Dispo - cc. Prognosis guarded.  Code status DNRCCA  Daughter was updated over the phone yesterday     Nicolas Luna MD

## 2019-06-11 NOTE — PROGRESS NOTES
[facility]  Nephrology   Progress Note        We are following the patient for KACIE/CKD stage 3 Baseline Cr 1.3-1.5 mg  Had fever to 103 in ER  Has h/o ch LE wounds followed at wound care center Has SPT in place  Noted to have progressive increase in Cr from 1.3 to 1.6 mg.  Has been on Vancomycin and Zosyn Takes Aleve prn at home      SUBJECTIVE:    OBJECTIVE:   Doing better Family present  No hypotension overnight  On Vancomycin and Cefepime    PHYSICAL:    TEMPERATURE:  Current - Temp: 97.9 °F (36.6 °C);  Max - Temp  Av.1 °F (36.7 °C)  Min: 97.2 °F (36.2 °C)  Max: 99.5 °F (37.5 °C)  RESPIRATIONS RANGE: Resp  Av.8  Min: 18  Max: 20  PULSE RANGE: Pulse  Av.3  Min: 71  Max: 93  BLOOD PRESSURE RANGE:  Systolic (75SYG), TIH:451 , Min:112 , AKY:454   ; Diastolic (03MGF), DGY:12, Min:61, Max:69    PULSE OXIMETRY RANGE: SpO2  Av.5 %  Min: 93 %  Max: 98 %  24HR INTAKE/OUTPUT:      Intake/Output Summary (Last 24 hours) at 2019 1439  Last data filed at 2019 1410  Gross per 24 hour   Intake 1450 ml   Output 575 ml   Net 875 ml       CONSTITUTIONAL:  awake, alert, cooperative, no apparent distress, and appears stated age  HEENT:  sclera clear  NECK:  Supple, symmetrical, trachea midline, no adenopathy, thyroid symmetric, not enlarged and no tenderness, skin normal  LUNGS:  No increased work of breathing, good air exchange, clear to auscultation bilaterally, no crackles or wheezing  CARDIOVASCULAR:  Normal apical impulse, regular rate and rhythm, normal S1 and S2, no S3 or S4, and no murmur noted  ABDOMEN:  No scars, normal bowel sounds, soft, non-distended, non-tender, no masses palpated,SPT in place  NEUROLOGIC:  alert, oriented, normal speech, no focal findings or movement disorder noted  SKIN: no bruising or bleeding  EXTREMITIES: bilateral LE ulcers     Medications     dextrose          Data      CBC:   Recent Labs     19  0615 06/10/19  0633 19  0558   WBC 15.0* 11.0 14.1*   RBC 2.29* 2.33* 2.39*   HGB 7.4* 7.6* 7.8*   HCT 23.3* 23.0* 23.6*    248 262     BMP:    Recent Labs     06/09/19  0615 06/10/19  0633 06/11/19  0557   * 135* 139   K 3.4* 3.2* 3.4*    102 107   CO2 15* 18* 18*   BUN 17 20 17   CREATININE 1.5*  1.5* 1.6* 1.3*   CALCIUM 7.5* 8.1* 8.2*   GLUCOSE 297* 300* 222*     Phosphorus:    Recent Labs     06/09/19  0615 06/10/19  0633 06/11/19  0557   PHOS 3.4 3.1 2.6     Magnesium:    Recent Labs     06/11/19  0557   MG 2.30         ASSESSMENT     Patient Active Problem List   Diagnosis    Acute on chronic renal insufficiency    FTT (failure to thrive) in adult    Type 1 diabetes mellitus with hyperglycemia (HCC)    Chronic venous hypertension (idiopathic) with ulcer and inflammation of bilateral lower extremity (HCC)    DMII (diabetes mellitus, type 2) (HCC)    Cellulitis of left lower extremity    Protein-calorie malnutrition, moderate (HCC)    MRSA colonization    Chronic diastolic heart failure (HCC)    Coronary artery disease involving native coronary artery of native heart without angina pectoris    Aortic atherosclerosis (HCC)    Essential hypertension    Cellulitis, lower extremities bilaterally    MRSA nasal colonization    Normocytic anemia    Altered mental status    Elevated lactic acid level    Uncontrolled diabetes mellitus (HCC)    Sepsis (HCC)       PLAN    1-KACIE/CKD baseline Cr 1.3-1.5 mg Suspect in the setting of SIRS, complicated UTI.   Underlying CKD likely due to D Nephropathy Has h/o D Retinopathy Cr improved to 1.3 mg today Monitor Vancomycin dosing pr levels   2-Hypokalemia replace Mg++ OK  3-Anemia normocytic Fe stores OK SPEP pending  4 HTN BP had been relatively low,  BP improved.  5 S/P SPT  6 UTI Enterobacter  7 Ch LE wounds MRSA on Vancomycin       Charlee Dominguez MD, Casi Veronica

## 2019-06-11 NOTE — CARE COORDINATION
[facility] Nurse  Follow-up Progress Note       NAME:  Billy Nicole  MEDICAL RECORD NUMBER:  [id number]  AGE:  66 y.o. GENDER:  female  :  1945  TODAY'S DATE:  2019    Subjective: Follow to change BLE dressings with bedside nurse. Bleeding venous stasis ulcers noted BLE. No lower leg swelling or edema. Pt tired and refusing to get up to the chair. [id number] nurse and bedside nurse convinced patient to get up in the chair. Pt a difficult transfer and the Wendall Splinter was used to transfer. See wound details, measurements, photos, and plan of care below. Wound Identification:  Wound Type: venous  Contributing Factors: edema, venous stasis, diabetes, chronic pressure, decreased mobility, shear force, incontinence of stool, incontinence of urine, poor hygiene and non-adherence        Patient Goal of Care:  [x] Wound Healing  [] Odor Control  [] Palliative Care  [] Pain Control   [] Other:     Objective:    /61   Pulse 93   Temp 97.9 °F (36.6 °C) (Oral)   Resp 18   Ht 4' 11\" (1.499 m)   Wt 179 lb 14.3 oz (81.6 kg)   SpO2 94%   BMI 36.33 kg/m²   Jose Risk Score: Jose Scale Score: 15 at risk   Assessment:   Measurements:  Wound 19 Tibial Left; Lower (Active)   Wound Image    2019 11:49 AM   Wound Venous 2019 11:49 AM   Dressing Status Changed 2019 11:49 AM   Dressing Changed Changed/New 2019 11:49 AM   Dressing/Treatment Moist to moist;ABD;Dry Dressing; Ace Wrap 2019 11:49 AM   Wound Cleansed Wound cleanser;Rinsed/Irrigated with saline 2019 11:49 AM   Dressing Change Due 19 11:49 AM   Wound Length (cm) 12 cm 2019 11:49 AM   Wound Width (cm) 6.5 cm 2019 11:49 AM   Wound Depth (cm) 0.1 cm 2019 11:49 AM   Wound Surface Area (cm^2) 78 cm^2 2019 11:49 AM   Change in Wound Size % (l*w) -1.3 2019 11:49 AM   Wound Volume (cm^3) 7.8 cm^3 2019 11:49 AM   Wound Assessment Bleeding;Drainage;Fragile;Red;Painful 6/11/2019 11:49 AM   Drainage Amount Moderate 6/11/2019 11:49 AM   Drainage Description Serosanguinous; Sanguinous 6/11/2019 11:49 AM   Odor None 6/11/2019 11:49 AM   Marilynn-wound Assessment Dry; Intact 6/11/2019 11:49 AM   Non-staged Wound Description Partial thickness 6/11/2019 11:49 AM   Red%Wound Bed 100 6/11/2019 11:49 AM   Number of days: 2      Left lower leg venous stasis ulcers     Left lower leg venous stasis ulcers    Wound 06/08/19 Tibial Right; Lower (Active)   Wound Image    6/11/2019 11:49 AM   Wound Venous 6/11/2019 11:49 AM   Dressing Status Changed 6/11/2019 11:49 AM   Dressing Changed Changed/New 6/11/2019 11:49 AM   Dressing/Treatment Moist to moist;Ace Wrap;ABD;Dry Dressing 6/11/2019 11:49 AM   Wound Cleansed Rinsed/Irrigated with saline 6/11/2019 11:49 AM   Dressing Change Due 06/12/19 6/11/2019 11:49 AM   Wound Length (cm) 10.5 cm 6/11/2019 11:49 AM   Wound Width (cm) 5 cm 6/11/2019 11:49 AM   Wound Depth (cm) 0.1 cm 6/11/2019 11:49 AM   Wound Surface Area (cm^2) 52.5 cm^2 6/11/2019 11:49 AM   Change in Wound Size % (l*w) -116.05 6/11/2019 11:49 AM   Wound Volume (cm^3) 5.25 cm^3 6/11/2019 11:49 AM   Wound Assessment Bleeding;Drainage;Fragile;Painful;Red 6/11/2019 11:49 AM   Drainage Amount Moderate 6/11/2019 11:49 AM   Drainage Description Sanguinous; Serosanguinous 6/11/2019 11:49 AM   Odor None 6/11/2019 11:49 AM   Marilynn-wound Assessment Dry; Intact 6/11/2019 11:49 AM   Non-staged Wound Description Partial thickness 6/11/2019 11:49 AM   Red%Wound Bed 100 6/11/2019 11:49 AM   Number of days: 2      Right lower leg venous stasis ulcer     Right lower leg venous stasis ulcer      Wound 06/10/19 Buttocks Left;Right MASD/ friction shear  (Active)   Wound Other 6/11/2019 10:36 AM   Dressing Status Changed 6/11/2019 10:36 AM   Dressing Changed Changed/New 6/11/2019 10:36 AM   Dressing/Treatment Moisture barrier 6/11/2019 10:36 AM   Wound Cleansed Wound while in bed at all times and/ or when heels resting on surface. (ex. recliner chair foot rest). Check heel placement in heel protector boot every 2 hours   5. Use the Wedge Pillow to reposition the patient while in bed.    Keep Patient OFF BACK while in bed   Turn patient to left and right lying positions while in bed       Specialty Bed Required : Yes   [x] Low Air Loss   [] Pressure Redistribution  [] Fluid Immersion  [] Bariatric  [] Total Pressure Relief  [] Other:     Current Diet: DIET GENERAL; Carb Control: 4 carb choices (60 gms)/meal  Dietician consult:  Yes      Patient/Caregiver Teaching:  Level of patient/caregiver understanding able to: Spoke with patient and bedside nurse Suzanne Innocent about plan of care   [x] Indicates understanding       [x] Needs reinforcement  [] Unsuccessful      [] Verbal Understanding  [] Demonstrated understanding       [] No evidence of learning  [] Refused teaching         [] N/A       Electronically signed by Dash Kaye RN, Malka Alfred on 6/11/2019 at 11:55 AM

## 2019-06-11 NOTE — PROGRESS NOTES
BLE dressings changed per wound care nurse, Xiomara Wells. Patient assisted up to chair with hernan. Friend at bedside visiting. Patient denied any current needs. Chair alarm on. Call light within reach. Will continue to monitor.      Electronically signed by Erasto Hendrickson RN on 6/11/2019 at 11:55 AM

## 2019-06-11 NOTE — PROGRESS NOTES
Infectious Disease Follow up Notes  Admit Date: 6/8/2019  Hospital Day: 4    Antibiotics : IV Cefepime  IV Vancomycin     CHIEF COMPLAINT:     Sepsis  Fevers  WBC elevation  UTI   lOWER LEG wounds    Subjective interval History :  66 y.o. woman admitted with high fevers from home and with change in mentation and she has spc in place has abnormal UA and urine cx positive for Enterobacter, she has poor mobility and venous stasis ulcer non healing for months and is followed in wound care center now with more weeping bleeding and pain and wound cx positive for Pseudomonas and MRSA. WBC elevation on admit and fever down since IV abx and we are consulted given the complicated infectious process her mentation bit better still very weak and tired. Sitting up in the chair, less confused and tolerating IV abx ok has on going pain in the legs and chronic changes Urine from spc still dark, wound cx noted and d/w pt          Past Medical History:    Past Medical History:   Diagnosis Date    Anemia     Atrial fibrillation ([address]     CAD (coronary artery disease)     Chronic back pain     CKD (chronic kidney disease), stage III (HCC)     Diabetes mellitus ([address]     Diastolic CHF ([address]     Hyperlipidemia     Hypertension     MI (myocardial infarction) ([address]     MRSA (methicillin resistant staph aureus) culture positive 04/13/2018    leg ulcer    MRSA colonization 10/16/2018    Obesity (BMI 30-39. 9)     Osteoarthritis     Pancreatitis     Sepsis ([address]     Stasis ulcer ([address]     bilateral lower extremities     Suprapubic catheter ([address]        Past Surgical History:    Past Surgical History:   Procedure Laterality Date    APPENDECTOMY      BACK SURGERY      x2    CATARACT REMOVAL      bilateral     CHOLECYSTECTOMY      COLONOSCOPY      EYE SURGERY      FRACTURE SURGERY      Left ankle    HYSTERECTOMY      INSET/CHANGE LOMELI CATHETER - COMPLICATED  91/[id number]    suprapubic cath    JOINT REPLACEMENT      Right knee    KNEE SURGERY         Current Medications:    Outpatient Medications Marked as Taking for the 6/8/19 encounter [facility] Encounter)   Medication Sig Dispense Refill    ibuprofen (ADVIL;MOTRIN) 200 MG tablet Take 400 mg by mouth every 6 hours as needed for Pain      LORazepam (ATIVAN) 0.5 MG tablet Take 0.5 mg by mouth nightly as needed for Anxiety.  traMADol (ULTRAM) 50 MG tablet Take 50 mg by mouth every 8 hours as needed for Pain.  raNITIdine HCl (ZANTAC 150 MAXIMUM STRENGTH PO) Take 150 mg by mouth 2 times daily      ferrous sulfate 325 (65 Fe) MG tablet Take 325 mg by mouth 2 times daily       insulin lispro (HUMALOG) 100 UNIT/ML injection vial Inject 5 Units into the skin 3 times daily (with meals) (Patient taking differently: Inject into the skin Humalog insulin pump: 1.8 Unit basal rate continous and as needed bolus per insulin pump) 1 vial 0    Timolol (TIMOPTIC) 0.5 % (DAILY) SOLN ophthalmic solution Place 1 drop into the right eye daily       aspirin 325 MG tablet Take 325 mg by mouth daily       atorvastatin (LIPITOR) 10 MG tablet Take 1 tablet by mouth daily 30 tablet 0    metoprolol tartrate (LOPRESSOR) 25 MG tablet Take 1 tablet by mouth 2 times daily 60 tablet 0    furosemide (LASIX) 40 MG tablet Take 0.5 tablets by mouth daily 60 tablet 0    Biotin (BIOTIN 5000) 5 MG CAPS Take 1 tablet by mouth daily      Calcium Carbonate Antacid (TUMS ULTRA 1000) 1000 MG CHEW Take 1 tablet by mouth as needed (acid reflux)       lipase-protease-amylase (CREON) 59375 units delayed release capsule Take 24,000 Units by mouth 3 times daily (with meals)      gabapentin (NEURONTIN) 300 MG capsule Take 300 mg by mouth 2 times daily.        isosorbide mononitrate (IMDUR) 30 MG extended release tablet Take 30 mg by mouth daily      venlafaxine (EFFEXOR) 75 MG tablet Take 150 mg by mouth 2 times daily       06/08/2019    BLOODU MODERATE 06/08/2019    PHUR 5.5 06/08/2019    PROTEINU 30 06/08/2019    UROBILINOGEN 0.2 06/08/2019    NITRU POSITIVE 06/08/2019    LEUKOCYTESUR LARGE 06/08/2019    LABMICR YES 06/08/2019    URINETYPE Clean catch 06/08/2019      Urine Microscopic:   Lab Results   Component Value Date    BACTERIA 1+ 06/08/2019    COMU see below 04/13/2018    HYALCAST 2 06/08/2019    WBCUA 328 06/08/2019    RBCUA 5 06/08/2019    EPIU 0 06/08/2019       MICRO: cultures reviewed and updated by me     [id number]       Order Status: Completed Specimen: Urine, clean catch Updated: 06/11/19 0711    Urine Culture, Routine --    Organism Enterobacter cloacaeAbnormal     Urine Culture, Routine --    >100,000 CFU/ml   ID and sensitivity to follow     Organism Gram negative rodAbnormal     Urine Culture, Routine --    75,000 CFU/ml   ID and sensitivity to follow    Narrative:     ORDER#: [id number]                          ORDERED BY: Robi Miramontes  SOURCE: Urine Clean Catch                  COLLECTED:  06/08/19 12:22  ANTIBIOTICS AT REGIS. :                      RECEIVED :  06/08/19 12:49  Performed at:  [address] [address]   Phone [phone]   Wound Culture [[id number]] (Abnormal)  Collected: 06/08/19 1224   Order Status: Completed Specimen: Leg Updated: 06/11/19 0615    WOUND/ABSCESS --Abnormal     Light growth Mixed skin rex,   No further workup   Abnormal     Gram Stain Result 3+ WBC's (Polymorphonuclear)   1+ Gram positive cocci   Abnormal     Organism Pseudomonas aeruginosaAbnormal     WOUND/ABSCESS Light growth    Organism Staph aureus MRSAAbnormal     WOUND/ABSCESS --Abnormal     Light growth   CONTACT PRECAUTIONS INDICATED   PBP2= POSITIVE   Abnormal    Narrative:     ORDER#: [id number]                          ORDERED BY: Robi Miramontes  SOURCE: Leg Right                          COLLECTED:  06/08/19 12:24  ANTIBIOTICS AT REGIS. :                      RECEIVED :  06/08/19 12:36  CALL Nida Stearns  [facility] [phone],  Microbiology results called to and read back by Eliel Knight RN,  06/10/2019 08:29, by Giancarlo Perez  Performed at:  [facility]  [address] [facility] [address]   Phone [phone]   Culture Blood #1 [[id number]] Collected: 06/08/19 1223   Order Status: Completed Specimen: Blood Updated: 06/09/19 1315    Blood Culture, Routine No Growth to date.  Any change in status will be called. Narrative:     ORDER#: [id number]                          ORDERED BY: Donnella Hammans, TIMOTHY  SOURCE: Blood Antecubital-Right            COLLECTED:  06/08/19 12:23  ANTIBIOTICS AT REGIS. :                      RECEIVED :  06/08/19 12:36  If child <=2 yrs old please draw pediatric bottle. ~Blood Culture #1  Performed at:  [facility]  [address] [facility] [address]   Phone [phone]   Culture Blood #2 [[id number]] Collected: 06/08/19 1219   Order Status: Completed Specimen: Blood Updated: 06/09/19 1315    Culture, Blood 2 No Growth to date.  Any change in status will be called. Narrative:     ORDER#: [id number]                          ORDERED BY: Ancelmo Toure  SOURCE: Blood Antecubital-Left             COLLECTED:  06/08/19 12:19  ANTIBIOTICS AT REGIS. :                      RECEIVED :  06/08/19 12:36  If child <=2 yrs old please draw pediatric bottle. ~Blood Culture #2  Performed at:  [facility]  [address] [facility] [address]   Phone [phone]       Susceptibility     Pseudomonas aeruginosa (1)     Antibiotic Interpretation NEVILLE Status    cefepime Sensitive 4 mcg/mL     ciprofloxacin Sensitive <=1 mcg/mL     gentamicin Sensitive <=4 mcg/mL     meropenem Sensitive <=1 mcg/mL     piperacillin-tazobactam Sensitive <=16 mcg/mL     tobramycin Sensitive <=4 mcg/mL     Staph aureus mrsa (2)     Antibiotic Interpretation NEVILLE Status    ceFAZolin Resistant >16 mcg/mL     clindamycin Resistant >2 mcg/mL     erythromycin Resistant >4 mcg/mL     oxacillin Resistant >2 mcg/mL     tetracycline Sensitive <=0.5 mcg/mL     trimethoprim-sulfamethoxazole Resistant >2/38 mcg/mL     vancomycin Sensitive 1 mcg/mL       Susceptibility     Enterobacter cloacae (1)     Antibiotic Interpretation NEVILLE Status    amoxicillin-clavulanate Resistant >16/8 mcg/mL     ampicillin Resistant >16 mcg/mL     ceFAZolin Resistant >16 mcg/mL     cefepime Sensitive <=1 mcg/mL     cefTRIAXone Sensitive <=1 mcg/mL     ciprofloxacin Sensitive <=0.5 mcg/mL     gentamicin Sensitive <=2 mcg/mL     levofloxacin Sensitive <=1 mcg/mL     meropenem Sensitive <=0.25 mcg/mL     nitrofurantoin Resistant >64 mcg/mL     piperacillin-tazobactam Sensitive <=2/4 mcg/mL     tetracycline Sensitive <=2 mcg/mL     trimethoprim-sulfamethoxazole Sensitive <=0.5/9.5 mcg/mL         IMAGING:    US RENAL COMPLETE   Final Result   Findings are suggestive of bilateral nephrolithiasis, without hydronephrosis. XR CHEST PORTABLE   Final Result   Pulmonary vascular congestion.                All the pertinent images and reports for the current Hospitalization were reviewed by me     Scheduled Meds:   [START ON 6/12/2019] insulin glargine  10 Units Subcutaneous Daily    insulin lispro  6 Units Subcutaneous TID WC    [START ON 6/12/2019] zinc oxide   Topical Daily    vancomycin  1,000 mg Intravenous Q24H    cefepime  2 g Intravenous Q24H    vancomycin (VANCOCIN) intermittent dosing (placeholder)   Other RX Placeholder    insulin lispro  0-12 Units Subcutaneous TID WC    insulin lispro  0-6 Units Subcutaneous Nightly    aspirin  325 mg Oral Daily    atorvastatin  10 mg Oral Daily    lipase-protease-amylase  2 capsule Oral TID WC    metoprolol tartrate  25 mg Oral BID    venlafaxine  150 mg Oral BID    timolol  1 drop Right Eye Daily    ferrous sulfate  324 mg Oral BID WC       Continuous Infusions:   dextrose PRN Meds:  LORazepam, traMADol, glucose, dextrose, glucagon (rDNA), dextrose, ondansetron, acetaminophen      Assessment:     Sepsis on admit  High fevers now going down   WBC elevation  Trend down    UTI with SPC in place  UA very abnormal and urine cx Enterobacter and GNR   Bi lateral lower leg venous ulcers non healing   Wound cx with MRSA and Pseudomonas  H/o MRSA colonization  CKD+  Encephalopathy from infection and metabolic process slow to resolve  Poor mobility +          Labs, Microbiology, Radiology and all the pertinent results from current hospitalization and  care every where were reviewed  by me as a part of the evaluation   Plan:   1. Cont IV Cefepime will cover Pseudomonas and Enterobacter  2. Cont IV Vancomycin for MRSA on the wound cx  Pharmacy dosing   3. Bi lateral lower leg ulcers non healing for a while and she is followed in wound care center  4. Watch creat closely  5. Hopefully able to choose oral abx when ready for d/c   6. Edema control and ace wraps in place     Discussed with patient/Family  D/w RN    Thanks for allowing me to participate in your patient's care and please call me with any questions or concerns.     Jenn Kline MD  Infectious Disease  [facility]) Physician  Phone: [phone]   Fax : [phone]

## 2019-06-12 LAB
ALBUMIN SERPL-MCNC: 1.9 G/DL (ref 3.1–4.9)
ALBUMIN SERPL-MCNC: 2.3 G/DL (ref 3.4–5)
ALPHA-1-GLOBULIN: 0.4 G/DL (ref 0.2–0.4)
ALPHA-2-GLOBULIN: 1 G/DL (ref 0.4–1.1)
ANION GAP SERPL CALCULATED.3IONS-SCNC: 11 MMOL/L (ref 3–16)
BASOPHILS ABSOLUTE: 0.1 K/UL (ref 0–0.2)
BASOPHILS RELATIVE PERCENT: 0.5 %
BETA GLOBULIN: 0.9 G/DL (ref 0.9–1.6)
BUN BLDV-MCNC: 12 MG/DL (ref 7–20)
CALCIUM SERPL-MCNC: 8.3 MG/DL (ref 8.3–10.6)
CHLORIDE BLD-SCNC: 106 MMOL/L (ref 99–110)
CO2: 21 MMOL/L (ref 21–32)
CREAT SERPL-MCNC: 1.1 MG/DL (ref 0.6–1.2)
EOSINOPHILS ABSOLUTE: 0.3 K/UL (ref 0–0.6)
EOSINOPHILS RELATIVE PERCENT: 2.7 %
GAMMA GLOBULIN: 1.2 G/DL (ref 0.6–1.8)
GFR AFRICAN AMERICAN: 58
GFR NON-AFRICAN AMERICAN: 48
GLUCOSE BLD-MCNC: 206 MG/DL (ref 70–99)
GLUCOSE BLD-MCNC: 265 MG/DL (ref 70–99)
GLUCOSE BLD-MCNC: 266 MG/DL (ref 70–99)
GLUCOSE BLD-MCNC: 296 MG/DL (ref 70–99)
GLUCOSE BLD-MCNC: 300 MG/DL (ref 70–99)
GLUCOSE BLD-MCNC: 328 MG/DL (ref 70–99)
HCT VFR BLD CALC: 24.7 % (ref 36–48)
HEMOGLOBIN: 8.2 G/DL (ref 12–16)
LYMPHOCYTES ABSOLUTE: 0.8 K/UL (ref 1–5.1)
LYMPHOCYTES RELATIVE PERCENT: 7.2 %
MCH RBC QN AUTO: 32.8 PG (ref 26–34)
MCHC RBC AUTO-ENTMCNC: 33.1 G/DL (ref 31–36)
MCV RBC AUTO: 99.2 FL (ref 80–100)
MONOCYTES ABSOLUTE: 0.4 K/UL (ref 0–1.3)
MONOCYTES RELATIVE PERCENT: 3.7 %
NEUTROPHILS ABSOLUTE: 9.5 K/UL (ref 1.7–7.7)
NEUTROPHILS RELATIVE PERCENT: 85.9 %
ORGANISM: ABNORMAL
ORGANISM: ABNORMAL
PDW BLD-RTO: 13.1 % (ref 12.4–15.4)
PERFORMED ON: ABNORMAL
PHOSPHORUS: 2.5 MG/DL (ref 2.5–4.9)
PLATELET # BLD: 271 K/UL (ref 135–450)
PMV BLD AUTO: 8 FL (ref 5–10.5)
POTASSIUM SERPL-SCNC: 3.6 MMOL/L (ref 3.5–5.1)
RBC # BLD: 2.5 M/UL (ref 4–5.2)
SODIUM BLD-SCNC: 138 MMOL/L (ref 136–145)
SPE/IFE INTERPRETATION: NORMAL
TOTAL PROTEIN: 5.5 G/DL (ref 6.4–8.2)
URINE CULTURE, ROUTINE: ABNORMAL
VANCOMYCIN RANDOM: 17.8 UG/ML
WBC # BLD: 11 K/UL (ref 4–11)

## 2019-06-12 PROCEDURE — 97166 OT EVAL MOD COMPLEX 45 MIN: CPT

## 2019-06-12 PROCEDURE — 80069 RENAL FUNCTION PANEL: CPT

## 2019-06-12 PROCEDURE — 80202 ASSAY OF VANCOMYCIN: CPT

## 2019-06-12 PROCEDURE — 2580000003 HC RX 258: Performed by: INTERNAL MEDICINE

## 2019-06-12 PROCEDURE — 36415 COLL VENOUS BLD VENIPUNCTURE: CPT

## 2019-06-12 PROCEDURE — 6370000000 HC RX 637 (ALT 250 FOR IP): Performed by: INTERNAL MEDICINE

## 2019-06-12 PROCEDURE — 2060000000 HC ICU INTERMEDIATE R&B

## 2019-06-12 PROCEDURE — 97530 THERAPEUTIC ACTIVITIES: CPT

## 2019-06-12 PROCEDURE — 99233 SBSQ HOSP IP/OBS HIGH 50: CPT | Performed by: INTERNAL MEDICINE

## 2019-06-12 PROCEDURE — 85025 COMPLETE CBC W/AUTO DIFF WBC: CPT

## 2019-06-12 PROCEDURE — 94760 N-INVAS EAR/PLS OXIMETRY 1: CPT

## 2019-06-12 PROCEDURE — 6360000002 HC RX W HCPCS: Performed by: INTERNAL MEDICINE

## 2019-06-12 PROCEDURE — 97162 PT EVAL MOD COMPLEX 30 MIN: CPT

## 2019-06-12 PROCEDURE — 97116 GAIT TRAINING THERAPY: CPT

## 2019-06-12 RX ORDER — INSULIN GLARGINE 100 [IU]/ML
20 INJECTION, SOLUTION SUBCUTANEOUS DAILY
Status: DISCONTINUED | OUTPATIENT
Start: 2019-06-12 | End: 2019-06-13 | Stop reason: HOSPADM

## 2019-06-12 RX ORDER — INSULIN GLARGINE 100 [IU]/ML
15 INJECTION, SOLUTION SUBCUTANEOUS DAILY
Status: DISCONTINUED | OUTPATIENT
Start: 2019-06-13 | End: 2019-06-12

## 2019-06-12 RX ORDER — INSULIN GLARGINE 100 [IU]/ML
18 INJECTION, SOLUTION SUBCUTANEOUS DAILY
Status: DISCONTINUED | OUTPATIENT
Start: 2019-06-12 | End: 2019-06-12

## 2019-06-12 RX ADMIN — PANCRELIPASE 2 CAPSULE: 60000; 12000; 38000 CAPSULE, DELAYED RELEASE PELLETS ORAL at 12:00

## 2019-06-12 RX ADMIN — VENLAFAXINE 150 MG: 50 TABLET ORAL at 08:58

## 2019-06-12 RX ADMIN — METOPROLOL TARTRATE 25 MG: 25 TABLET ORAL at 21:31

## 2019-06-12 RX ADMIN — METOPROLOL TARTRATE 25 MG: 25 TABLET ORAL at 09:00

## 2019-06-12 RX ADMIN — FERROUS SULFATE TAB EC 324 MG (65 MG FE EQUIVALENT) 324 MG: 324 (65 FE) TABLET DELAYED RESPONSE at 08:59

## 2019-06-12 RX ADMIN — ASPIRIN 325 MG ORAL TABLET 325 MG: 325 PILL ORAL at 08:59

## 2019-06-12 RX ADMIN — ATORVASTATIN CALCIUM 10 MG: 10 TABLET, FILM COATED ORAL at 09:00

## 2019-06-12 RX ADMIN — VENLAFAXINE 150 MG: 50 TABLET ORAL at 21:31

## 2019-06-12 RX ADMIN — INSULIN LISPRO 9 UNITS: 100 INJECTION, SOLUTION INTRAVENOUS; SUBCUTANEOUS at 20:46

## 2019-06-12 RX ADMIN — CEFEPIME HYDROCHLORIDE 2 G: 2 INJECTION, POWDER, FOR SOLUTION INTRAVENOUS at 17:40

## 2019-06-12 RX ADMIN — INSULIN LISPRO 12 UNITS: 100 INJECTION, SOLUTION INTRAVENOUS; SUBCUTANEOUS at 11:57

## 2019-06-12 RX ADMIN — FERROUS SULFATE TAB EC 324 MG (65 MG FE EQUIVALENT) 324 MG: 324 (65 FE) TABLET DELAYED RESPONSE at 17:40

## 2019-06-12 RX ADMIN — TIMOLOL MALEATE 1 DROP: 5 SOLUTION OPHTHALMIC at 12:01

## 2019-06-12 RX ADMIN — INSULIN LISPRO 12 UNITS: 100 INJECTION, SOLUTION INTRAVENOUS; SUBCUTANEOUS at 20:42

## 2019-06-12 RX ADMIN — VANCOMYCIN HYDROCHLORIDE 750 MG: 750 INJECTION, POWDER, LYOPHILIZED, FOR SOLUTION INTRAVENOUS at 20:06

## 2019-06-12 RX ADMIN — INSULIN LISPRO 6 UNITS: 100 INJECTION, SOLUTION INTRAVENOUS; SUBCUTANEOUS at 08:55

## 2019-06-12 RX ADMIN — PANCRELIPASE 2 CAPSULE: 60000; 12000; 38000 CAPSULE, DELAYED RELEASE PELLETS ORAL at 18:54

## 2019-06-12 RX ADMIN — INSULIN GLARGINE 20 UNITS: 100 INJECTION, SOLUTION SUBCUTANEOUS at 17:40

## 2019-06-12 ASSESSMENT — PAIN SCALES - GENERAL
PAINLEVEL_OUTOF10: 0

## 2019-06-12 NOTE — PROGRESS NOTES
Physical Therapy  Facility/Department: [address] PROGRESSIVE CARE  Initial Assessment    NAME: Tayo Mendoza  : 1945  MRN: [id number]    Date of Service: 2019    Discharge Recommendations:  Continue to assess pending progress, Patient would benefit from continued therapy after discharge, 3-5 sessions per week   PT Equipment Recommendations  Equipment Needed: No    Assessment   Body structures, Functions, Activity limitations: Decreased functional mobility ; Decreased endurance;Decreased strength;Decreased balance;Decreased safe awareness  Assessment: Pt presents with Decreased functional mobility after admission for Acute Metabolic Encephalopathy secondary to complicated UTI due to chronic suprapubic cystostomy catheter and possible LE wound infection. PMH includes anemia, afib, CAD, chronic back pain, CKD, CHF, DM,, hyperlipidemia, HTN, MI, MRSA, obesity, OA, suprapubic cath. Prior to admission, daughter reported pt was independent with RW at home for ambulation to/from bathroom, where pt needs to be albe to do this in order to return isidra to daughter's home. Pt had a daily aide for 3 hr/day, but they did not provide physical assistance to pt. This date, pt needed Min-Mod A x 2 persons for sit<>stand trasnfers, and only after the 3rd attempt to stand, was pt able to ambulate x 20 ft with Min-Mod A x 2 persons with chair follow due to pt unsteadiness. Current ampac scores reflect the need for ongoing skilled PT prior to pt returning home with daughter. Will cont to follow & pprogress mobility while hospitalized. Prognosis: Fair;Guarded  Decision Making: Medium Complexity  History: see above   Exam: see above.    Clinical Presentation: evolving   Patient Education: role & goals of PT, use of call light   Barriers to Learning: cog  REQUIRES PT FOLLOW UP: Yes  Activity Tolerance  Activity Tolerance: Patient limited by fatigue;Patient limited by endurance       Patient Diagnosis(es): The primary encounter diagnosis was Urinary tract infection without hematuria, site unspecified. A diagnosis of Bilateral leg ulcer, limited to breakdown of skin (HCC) was also pertinent to this visit. has a past medical history of Anemia, Atrial fibrillation ([facility] [address] CAD (coronary artery disease), Chronic back pain, CKD (chronic kidney disease), stage III ([address] Diabetes mellitus ([facility] [address] Diastolic CHF ([facility] [address] Hyperlipidemia, Hypertension, MI (myocardial infarction) ([facility] [address] MRSA (methicillin resistant staph aureus) culture positive, MRSA colonization, Obesity (BMI 30-39.9), Osteoarthritis, Pancreatitis, Sepsis ([facility] [address] Stasis ulcer ([facility] [address] and Suprapubic catheter ([facility] has a past surgical history that includes Hysterectomy; knee surgery; back surgery; Cholecystectomy; Cataract removal; Appendectomy; Colonoscopy; eye surgery; fracture surgery; joint replacement; and Insert/Change Brasher Catheter - Complicat ([id number]/6091). 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 Family / Caregiver Present: Yes(daughter Meliza)  Subjective  Subjective: Pt up in recliner, agreeable to therapy. Initially denying pain, but then reported pain in her legs with standing/activity--pt did not rate. Orientation  Orientation  Overall Orientation Status: Impaired  Orientation Level: Oriented to person;Oriented to place; Disoriented to time;Disoriented to situation  Social/Functional History  Social/Functional History  Lives With: Daughter  Type of Home: House  Home Layout: One level  Home Access: Stairs to enter with rails  Entrance Stairs - Number of Steps: 1-2  Bathroom Shower/Tub: Walk-in shower  Bathroom Toilet: Standard(safety frame)  Bathroom Equipment: Shower chair(safety frame)  Bathroom Accessibility: Walker accessible  Home Equipment: Rolling walker, [employer], Lift chair, Reacher  ADL Assistance: Needs assistance(supervision with showers and independent with sponge bathing)  Homemaking Assistance: Needs assistance(pt folds laundry)  Ambulation Assistance: Independent(RW)  Transfer Assistance: Independent(RW)  Additional Comments: aide 3hrs a day Mon-Thurs; 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 on most of Fridays     Objective  AROM RLE (degrees)  RLE AROM: WFL  RLE General AROM: ankles to neutral   AROM LLE (degrees)  LLE AROM : WFL  LLE General AROM: ankles to neutral   Strength RLE  Comment: functionally fair, took moderate resistance, grossly 4/5 throughout   Strength LLE  Comment: functionally fair, took moderate resistance, grossly 4/5 throughout         Bed mobility  Supine to Sit: Unable to assess  Sit to Supine: Unable to assess  Scooting: Dependent/Total;2 Person assistance(A x 2 scooting back into recliner after session/ambulation. )  Comment: nt--pt up in recliner at start & end of session. Transfers  Sit to Stand: Minimal Assistance; Moderate Assistance;2 Person Assistance  Stand to sit: Moderate Assistance;Minimal Assistance;2 Person Assistance  Ambulation  Ambulation?: Yes  Ambulation 1  Surface: level tile  Device: Rolling Walker  Other Apparatus: Wheelchair follow(giana follow. )  Assistance: Minimal assistance; Moderate assistance;2 Person assistance(A x 1-2, unsteady wtih chair follow )  Quality of Gait: unsteady, initial stance with wt shifintg/marching in place prior to 3rd attempt with limited ambulation.     Gait Deviations: Decreased step If patient is discharged prior to the next Physical Therapy visit, please see last written PT note for discharge status.     Favio Lee, PT Electronically signed by Favio Lee PT on 6/12/2019 at 10:44 AM

## 2019-06-12 NOTE — PROGRESS NOTES
[facility]  Nephrology   Progress Note        We are following the patient for KACIE/CKD stage 3 Baseline Cr 1.3-1.5 mg  Had fever to 103 in ER  Has h/o ch LE wounds followed at wound care center Has SPT in place  Noted to have progressive increase in Cr from 1.3 to 1.6 mg.  Has been on Vancomycin and Zosyn Takes Aleve prn at home      SUBJECTIVE:    OBJECTIVE:   Doing better Family present  No hypotension overnight  SPT in place    PHYSICAL:    TEMPERATURE:  Current - Temp: 97.8 °F (36.6 °C);  Max - Temp  Av.1 °F (36.7 °C)  Min: 97.7 °F (36.5 °C)  Max: 98.4 °F (36.9 °C)  RESPIRATIONS RANGE: Resp  Av  Min: 16  Max: 20  PULSE RANGE: Pulse  Av.2  Min: 69  Max: 90  BLOOD PRESSURE RANGE:  Systolic (53PXD), UJI:711 , Min:134 , AIP:431   ; Diastolic (93GKV), SPA:08, Min:63, Max:79    PULSE OXIMETRY RANGE: SpO2  Av.5 %  Min: 95 %  Max: 98 %  24HR INTAKE/OUTPUT:      Intake/Output Summary (Last 24 hours) at 2019 1255  Last data filed at 2019 0941  Gross per 24 hour   Intake 600 ml   Output 1475 ml   Net -875 ml       CONSTITUTIONAL:  awake, alert, cooperative, no apparent distress, and appears stated age  HEENT:  sclera clear  NECK:  Supple, symmetrical, trachea midline, no adenopathy, thyroid symmetric, not enlarged and no tenderness, skin normal  LUNGS:  No increased work of breathing, good air exchange, clear to auscultation bilaterally, no crackles or wheezing  CARDIOVASCULAR:  Normal apical impulse, regular rate and rhythm, normal S1 and S2  ABDOMEN:  No scars, normal bowel sounds, soft, non-distended, non-tender, no masses palpated,SPT in place  NEUROLOGIC:  alert, oriented, normal speech, no focal findings  SKIN: no bruising or bleeding  EXTREMITIES: bilateral LE ulcers     Medications     dextrose          Data      CBC:   Recent Labs     06/10/19  0633 19  0558 19  0742   WBC 11.0 14.1* 11.0   RBC 2.33* 2.39* 2.50*   HGB 7.6* 7.8* 8.2*   HCT 23.0* 23.6* 24.7*    262 271     BMP:    Recent Labs     06/10/19  0633 06/11/19  0557 06/12/19  0742   * 139 138   K 3.2* 3.4* 3.6    107 106   CO2 18* 18* 21   BUN 20 17 12   CREATININE 1.6* 1.3* 1.1   CALCIUM 8.1* 8.2* 8.3   GLUCOSE 300* 222* 296*     Phosphorus:    Recent Labs     06/10/19  0633 06/11/19  0557 06/12/19  0742   PHOS 3.1 2.6 2.5     Magnesium:    Recent Labs     06/11/19  0557   MG 2.30         ASSESSMENT     Patient Active Problem List   Diagnosis    Acute on chronic renal insufficiency    FTT (failure to thrive) in adult    Type 1 diabetes mellitus with hyperglycemia (HCC)    Chronic venous hypertension (idiopathic) with ulcer and inflammation of bilateral lower extremity (HCC)    DMII (diabetes mellitus, type 2) (HCC)    Cellulitis of left lower extremity    Protein-calorie malnutrition, moderate (HCC)    MRSA colonization    Chronic diastolic heart failure (HCC)    Coronary artery disease involving native coronary artery of native heart without angina pectoris    Aortic atherosclerosis (HCC)    Essential hypertension    Cellulitis, lower extremities bilaterally    MRSA nasal colonization    Normocytic anemia    Altered mental status    Elevated lactic acid level    Uncontrolled diabetes mellitus (HCC)    Sepsis (HCC)       PLAN    1-KACIE/CKD baseline Cr 1.3-1.5 mg Suspect in the setting of SIRS, complicated UTI.   Underlying CKD likely due to D Nephropathy Has h/o D Retinopathy Cr improved to 1.1 mg today Monitor Vancomycin dosing pr levels   2-Hypokalemia corrected 3.6 meq today Mg++ OK  3-Anemia normocytic Fe stores OK SPEP pending  4 HTN BP had been relatively low,  BP improved.  5 S/P SPT  6 UTI Enterobacter  7 Ch LE wounds MRSA on Vancomycin       David Uribe MD, [address]

## 2019-06-12 NOTE — PROGRESS NOTES
[facility]  Facility/Department: [address] PROGRESSIVE CARE  Daily Treatment Note  NAME: Zelda Pennington  : 1945  MRN: [id number]    Date of Service: 2019    Discharge Recommendations: Zelda Pennington scored a 16/24 on the AM-PAC ADL Inpatient form. Current research shows that an AM-PAC score of 17 or less is typically not associated with a discharge to the patient's home setting. Based on the patients AM-PAC score and their current ADL deficits, it is recommended that the patient have 3-5 sessions per week of Occupational Therapy at d/c to increase the patients independence. Continue to assess pending progress, 3-5 sessions per week  OT Equipment Recommendations  Equipment Needed: No    Assessment   Performance deficits / Impairments: Decreased functional mobility ; Decreased safe awareness;Decreased endurance;Decreased balance;Decreased high-level IADLs;Decreased ADL status; Decreased cognition;Decreased strength  Assessment: Pt tolerated session well. Pt continues to have decreased cognition and increased fatigue with activity. Pt completes functional mobility and transfers with Yoan/CGA and use of RW. Pt with no LOB with mobility this date. Pt reports she wishes to return home at time of D/C. Anticipate pt needing increased assist for ADLs. Continue with POC. Prognosis: Good  Decision Making: Medium Complexity  Exam: see above  Assistance / Modification: RW  Patient Education: role of OT  Barriers to Learning: cognition  REQUIRES OT FOLLOW UP: Yes  Activity Tolerance  Activity Tolerance: Patient limited by fatigue;Patient Tolerated treatment well  Safety Devices  Safety Devices in place: Yes  Type of devices: Left in chair;Gait belt; Chair alarm in place;Nurse notified;Call light within reach         Patient Diagnosis(es): The primary encounter diagnosis was Urinary tract infection without hematuria, site unspecified.  A diagnosis of Bilateral leg ulcer, limited to breakdown of skin [facility]) was also pertinent to this visit. has a past medical history of Anemia, Atrial fibrillation ([facility] [address] CAD (coronary artery disease), Chronic back pain, CKD (chronic kidney disease), stage III ([facility] [address] Diabetes mellitus ([facility] [address] Diastolic CHF ([facility] [address] Hyperlipidemia, Hypertension, MI (myocardial infarction) ([facility] [address] MRSA (methicillin resistant staph aureus) culture positive, MRSA colonization, Obesity (BMI 30-39.9), Osteoarthritis, Pancreatitis, Sepsis ([facility] [address] Stasis ulcer ([facility] [address] and Suprapubic catheter ([facility] [address] has a past surgical history that includes Hysterectomy; knee surgery; back surgery; Cholecystectomy; Cataract removal; Appendectomy; Colonoscopy; eye surgery; fracture surgery; joint replacement; and Insert/Change Brasher Catheter - Complicat (57/5024). Restrictions  Restrictions/Precautions  Restrictions/Precautions: Contact Precautions, Fall Risk  Subjective   General  Chart Reviewed: Yes  Patient assessed for rehabilitation services?: Yes  Additional Pertinent Hx: 66 y.o. female who presents to the emergency department mom with a fever. Acute Metabolic Encephalopathy secondary to complicated UTI due to chronic suprapubic cystostomy catheter and possible LE wound infection. Family / Caregiver Present: Yes(daughter)  Referring Practitioner: Aki Gallagher  Subjective  Subjective: Pt up in chair upon arrival and agreeabl to OT services. Pt reports pain in bilateral LE with no number stated. Pt wishes to return to chair at end of session. General Comment  Comments: okay for therapy per RN. Orientation  Orientation  Overall Orientation Status: Impaired  Orientation Level: Oriented to person;Disoriented to time;Disoriented to situation;Oriented to place  Objective    ADL  Feeding: Independent(seated)  Grooming: Setup(seated)  Additional Comments: Pt declining need for ADLs at this time.          Balance  Sitting Balance: Stand by assistance  Standing Balance: Contact guard assistance(RW)  Functional Mobility  Functional - Mobility Device: Rolling Walker  Activity: Other(household distances in room)  Assist Level: Contact guard assistance  Functional Mobility Comments: slow moving; unsteady  Bed mobility  Supine to Sit: Unable to assess  Sit to Supine: Unable to assess  Scooting: Unable to assess  Transfers  Sit to stand: Minimal assistance;Contact guard assistance  Stand to sit: Minimal assistance;Contact guard assistance  Transfer Comments: to/from RW                       Cognition  Overall Cognitive Status: Exceptions  Arousal/Alertness: Appropriate responses to stimuli  Following Commands: Follows one step commands with increased time; Follows one step commands consistently  Attention Span: Attends with cues to redirect  Memory: Decreased short term memory;Decreased recall of recent events  Safety Judgement: Decreased awareness of need for assistance  Problem Solving: Assistance required to generate solutions;Assistance required to implement solutions;Assistance required to identify errors made  Insights: Decreased awareness of deficits  Initiation: Requires cues for some  Sequencing: Requires cues for some                       LUE AROM (degrees)  LUE AROM : WFL(decreased shoulder ROM)  RUE AROM (degrees)  RUE AROM : WFL(decreased shoulder ROM)                 Plan   Plan  Times per week: 3-5x  Current Treatment Recommendations: Strengthening, Balance Training, Endurance Training, Gait Training, Patient/Caregiver Education & Training, Functional Mobility Training, Cognitive Reorientation, Safety Education & Training, Equipment Evaluation, Education, & procurement, Self-Care / ADL    AM-PAC Score  AM-PAC Inpatient Daily Activity Raw Score: 16 (06/12/19 1506)  AM-PAC Inpatient ADL T-Scale Score : 35.96 (06/12/19 1506)  ADL Inpatient CMS 0-100% Score: 53.32 (06/12/19 1506)  ADL Inpatient CMS G-Code Modifier : CK (06/12/19 1506)    Goals  Short term goals  Time Frame for Short term goals: prior to D/C  Short term goal 1: complete functional mobility and transfers with SBA  Short term goal 2: complete toileting with SBA  Short term goal 3: complete grooming in stance with SBA  Short term goal 4: tolerate B UE exercises for increased strength and endurance with UB ADLs  Long term goals  Time Frame for Long term goals : STG=LTG  Patient Goals   Patient goals : return home       Therapy Time   Individual Concurrent Group Co-treatment   Time In 7935         Time Out 1500         Minutes 27         Timed Code Treatment Minutes: 27 Minutes       Angelica Pod, OTR/L

## 2019-06-12 NOTE — CARE COORDINATION
Serosanguinous; Sanguinous 6/12/2019  5:08 PM   Odor None 6/12/2019  5:08 PM   Marilynn-wound Assessment Dry; Intact 6/12/2019  5:08 PM   Non-staged Wound Description Partial thickness 6/12/2019  5:08 PM   Red%Wound Bed 100 6/12/2019  5:08 PM   Number of days: 4       Wound 06/08/19 Tibial Right; Lower (Active)   Wound Image    6/11/2019 11:49 AM   Wound Venous 6/12/2019  5:08 PM   Dressing Status Changed 6/12/2019  5:08 PM   Dressing Changed Changed/New 6/12/2019  5:08 PM   Dressing/Treatment Silicone Dressing; Wound gel;ABD; Ace Wrap;Dry Dressing 6/12/2019  5:08 PM   Wound Cleansed Rinsed/Irrigated with saline 6/12/2019  5:08 PM   Dressing Change Due 06/13/19 6/12/2019  5:08 PM   Wound Length (cm) 10.5 cm 6/11/2019 11:49 AM   Wound Width (cm) 5 cm 6/11/2019 11:49 AM   Wound Depth (cm) 0.1 cm 6/11/2019 11:49 AM   Wound Surface Area (cm^2) 52.5 cm^2 6/11/2019 11:49 AM   Change in Wound Size % (l*w) -116.05 6/11/2019 11:49 AM   Wound Volume (cm^3) 5.25 cm^3 6/11/2019 11:49 AM   Wound Assessment Bleeding;Drainage;Fragile;Painful;Red;Granulation tissue 6/12/2019  5:08 PM   Drainage Amount Moderate 6/12/2019  5:08 PM   Drainage Description Sanguinous; Serosanguinous 6/12/2019  5:08 PM   Odor None 6/12/2019  5:08 PM   Marilynn-wound Assessment Dry; Intact 6/12/2019  5:08 PM   Non-staged Wound Description Partial thickness 6/12/2019  5:08 PM   Red%Wound Bed 100 6/12/2019  5:08 PM   Number of days: 4       Wound 06/10/19 Buttocks Left;Right MASD/ friction shear  (Active)   Wound Other 6/11/2019 10:36 AM   Dressing Status Changed 6/11/2019 10:36 AM   Dressing Changed Changed/New 6/11/2019 10:36 AM   Dressing/Treatment Moisture barrier 6/11/2019  8:15 PM   Wound Cleansed Wound cleanser 6/11/2019 10:36 AM   Dressing Change Due 06/11/19 6/11/2019 10:36 AM   Wound Assessment Bleeding;Fragile;Pink 6/11/2019 10:36 AM   Drainage Amount Scant 6/11/2019 10:36 AM   Drainage Description Sanguinous 6/11/2019 10:36 AM   Odor None 6/11/2019 10:36 AM Marilynn-wound Assessment Dry; Intact; Blanchable erythema 6/11/2019 10:36 AM   Non-staged Wound Description Partial thickness 6/11/2019 10:36 AM   [place]%Wound Bed 100 6/11/2019 10:36 AM   Number of days: 1       Response to treatment:  Well tolerated by patient. Pain Assessment:  Severity:  6 / 10  Quality of pain: aching, tender  Wound Pain Timing/Severity: intermittent  Premedicated: No  Plan:   Plan of Care:   1. Implement Jose prevention orders. See orders for prevention order details   2. Implement Wound Care orders. See wound care orders for dressing change instructions   BLE: Silicone Mepitel gauze dressing secure with thin layer of Hydrogel DAILY. Leave silicone dressing in place for 3 days. Buttock: Zinc paste bid and prn   3. Will continue to follow   4. Apply Medline Boots (black) to BLE while in bed at all times and/ or when heels resting on surface. (ex. recliner chair foot rest). Check heel placement in heel protector boot every 2 hours   5. Use the Wedge Pillow to reposition the patient while in bed.    Keep Patient OFF BACK while in bed   Turn patient to left and right lying positions while in bed     Specialty Bed Required : Yes   [x] Low Air Loss   [] Pressure Redistribution  [] Fluid Immersion  [] Bariatric  [] Total Pressure Relief  [] Other:     Current Diet: DIET GENERAL; Carb Control: 4 carb choices (60 gms)/meal  Dietary Nutrition Supplements: Diabetic Oral Supplement  Dietician consult:  Yes    Discharge Plan:  Placement for patient upon discharge: home with support   Patient appropriate for Outpatient [address]: Yes    Referrals:  []   [] [facility]  [] Supplies  [] Other    Patient/Caregiver Teaching:  Level of patient/caregiver understanding able to: Spoke with bedside nurse about plan of care   [x] Indicates understanding       [x] Needs reinforcement  [] Unsuccessful      [] Verbal Understanding  [] Demonstrated understanding       [] No evidence of learning  [] Refused teaching         [] N/A       Electronically signed by Dusty Ghotra RN, Donte & Corinna on 6/12/2019 at 5:10 PM

## 2019-06-12 NOTE — PROGRESS NOTES
Nutrition Assessment    Type and Reason for Visit: Initial(los)    Nutrition Recommendations: Add strawberry Glucerna tid  Continue current diet for now (least restrictive to help ensure intake)    Nutrition Assessment: Pt at risk for nutrition compromise r/t po, increased needs, altered labs r/t reports by family of poor po, infection, altered skin integrity, endocrine, renal dysfunction. Pt with hx of CHF. Diet advanced to [facility]. With current poor po, will not add a sodium modification at this time. Pt agreed to strawberry Glucerna tid to start. Malnutrition Assessment:  · Malnutrition Status: At risk for malnutrition  · Context: Acute illness or injury  · Findings of the 6 clinical characteristics of malnutrition (Minimum of 2 out of 6 clinical characteristics is required to make the diagnosis of moderate or severe Protein Calorie Malnutrition based on AND/ASPEN Guidelines):  1. Energy Intake-Less than or equal to 50% of estimated energy requirement, Greater than or equal to 5 days    2. Weight Loss-Unable to assess,    3. Fat Loss-No significant subcutaneous fat loss,    4. Muscle Loss-No significant muscle mass loss,    5. Fluid Accumulation-No significant fluid accumulation,    6.  Strength-Not measured    Nutrition Risk Level: Moderate    Nutrient Needs:  · Estimated Daily Total Kcal: 3925-7513 (15-18 x ABW of 79 kg)  · Estimated Daily Protein (g): 63-79 (.8-1 (adj for CKD lll & healing needs)  · Estimated Daily Total Fluid (ml/day): per provider    Nutrition Diagnosis:   · Problem: Inadequate oral intake  · Etiology: related to Insufficient energy/nutrient consumption     Signs and symptoms:  as evidenced by Patient report of    Objective Information:  · Nutrition-Focused Physical Findings: Pt -1.5 liters  · Wound Type: Venous Stasis(bilateral lower extremities.  Pt followed by a surgeon & wound care as an outpt)  · Current Nutrition Therapies:  · Oral Diet Orders: Carb Control 4 Carbs/Meal · Oral Diet intake: 1-25%(per feedback)  · Anthropometric Measures:  · Ht: 4' 11\" (149.9 cm)   · Current Body Wt: 174 lb (78.9 kg)  · Usual Body Wt: (Pt unable to state UBW)  · Ideal Body Wt: 99 lb (44.9 kg), % Ideal Body    · BMI Classification: BMI 35.0 - 39.9 Obese Class II    Nutrition Interventions:   Continue current diet, Start ONS  Continued Inpatient Monitoring, Education not appropriate at this time    Nutrition Evaluation:   · Evaluation: Goals set   · Goals: consume >/= to 50 %    · Monitoring: Meal Intake, Supplement Intake, Wound Healing, Weight, Pertinent Labs, Diarrhea, Constipation      Electronically signed by Mario Alberto Nina RD, LD on 6/12/19 at 2:22 PM    Contact Number: [phone]

## 2019-06-12 NOTE — PROGRESS NOTES
Occupational Therapy   Occupational Therapy Initial Assessment and Tentative D/C    This note to serve as d/c summary should pt d/c prior to next session. Date: 2019   Patient Name: Jocelin Ladd  MRN: [id number]     : 1945    Date of Service: 2019    Discharge Recommendations: Jocelin Ladd scored a 16/24 on the AM-PAC ADL Inpatient form. Current research shows that an AM-PAC score of 17 or less is typically not associated with a discharge to the patient's home setting. Based on the patients AM-PAC score and their current ADL deficits, it is recommended that the patient have 3-5 sessions per week of Occupational Therapy at d/c to increase the patients independence. Continue to assess pending progress, 3-5 sessions per week  OT Equipment Recommendations  Equipment Needed: No    Assessment   Performance deficits / Impairments: Decreased functional mobility ; Decreased safe awareness;Decreased endurance;Decreased balance;Decreased high-level IADLs;Decreased ADL status; Decreased cognition;Decreased strength  Assessment: 66 y.o. female who presents to the emergency department mom with a fever. Acute Metabolic Encephalopathy secondary to complicated UTI due to chronic suprapubic cystostomy catheter and possible LE wound infection. PTA pt independent with functional mobility with use of RW and min A for ADLs including bathing. Pt with home health aide 3 hours a day, 4 days a week. Currently pt functioning below baseline needing increased assist for mobility and ADLs. Pt completes mobility with Min Ax2 and use of RW and anticipate pt needing up to Max A for LB ADLs. Pt will benefit from skilled OT services at this time. Pt is limited due to decreased cognition. Pt may require placement prior to return home to increase overall independence.    Prognosis: Good  Decision Making: Medium Complexity  Exam: see above  Assistance / Modification: RW  Patient Education: role of OT  Barriers to Learning: cognition  REQUIRES OT FOLLOW UP: Yes  Activity Tolerance  Activity Tolerance: Patient limited by fatigue;Patient Tolerated treatment well  Safety Devices  Safety Devices in place: Yes  Type of devices: Left in chair;Gait belt; Chair alarm in place;Nurse notified;Call light within reach           Patient Diagnosis(es): The primary encounter diagnosis was Urinary tract infection without hematuria, site unspecified. A diagnosis of Bilateral leg ulcer, limited to breakdown of skin (HCC) was also pertinent to this visit. has a past medical history of Anemia, Atrial fibrillation ([address] CAD (coronary artery disease), Chronic back pain, CKD (chronic kidney disease), stage III ([address] Diabetes mellitus ([address] Diastolic CHF ([address] Hyperlipidemia, Hypertension, MI (myocardial infarction) ([facility] [address] MRSA (methicillin resistant staph aureus) culture positive, MRSA colonization, Obesity (BMI 30-39.9), Osteoarthritis, Pancreatitis, Sepsis ([facility] [address] Stasis ulcer ([facility] [address] and Suprapubic catheter ([facility] [address] has a past surgical history that includes Hysterectomy; knee surgery; back surgery; Cholecystectomy; Cataract removal; Appendectomy; Colonoscopy; eye surgery; fracture surgery; joint replacement; and Insert/Change Brasher Catheter - Complicat ([phone]). Restrictions  Restrictions/Precautions  Restrictions/Precautions: Contact Precautions, Fall Risk    Subjective   General  Chart Reviewed: Yes  Patient assessed for rehabilitation services?: Yes  Additional Pertinent Hx: 66 y.o. female who presents to the emergency department mom with a fever. Acute Metabolic Encephalopathy secondary to complicated UTI due to chronic suprapubic cystostomy catheter and possible LE wound infection. Family / Caregiver Present: Yes(daughter)  Referring Practitioner: Mundo Sena  Subjective  Subjective: Pt up in chair upon arrival and agreeable to OT services. Pt reports no pain initially but then states pain in B LE with activity.    General Comment  Comments: okay for therapy per RN. Social/Functional History  Social/Functional History  Lives With: Daughter  Type of Home: House  Home Layout: One level  Home Access: Stairs to enter with rails  Entrance Stairs - Number of Steps: 1-2  Bathroom Shower/Tub: Walk-in shower  Bathroom Toilet: Standard(safety frame)  Bathroom Equipment: Shower chair(safety frame)  Bathroom Accessibility: Walker accessible  Home Equipment: Rolling walker, Wheelchair-manual, Lift chair, Reacher  ADL Assistance: Needs assistance(supervision with showers and independent with sponge bathing)  Homemaking Assistance: Needs assistance(pt folds laundry)  Ambulation Assistance: Independent(RW)  Transfer Assistance: Independent(RW)  Additional Comments: aide 3hrs a day Mon-Thurs; 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 on most of        Objective   Vision: 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)  Vision Exceptions: Wears glasses at all times  Hearing: Within functional limits    Orientation  Overall Orientation Status: Impaired  Orientation Level: Oriented to person;Disoriented to time;Disoriented to situation;Oriented to place     Balance  Sitting Balance: Stand by assistance(seated in recliner)  Standing Balance: Minimal assistance(Min Ax2; RW)  Functional Mobility  Functional - Mobility Device: Rolling Walker  Activity: Other(short household distances in gym)  Assist Level: Minimal assistance(Min Ax2; chair follow; ~20ft)  Functional Mobility Comments: slow moving; unsteady  ADL  Feeding: Independent(seated)  Grooming: Setup(seated)  Additional Comments: Anticipate pt needing Max A for LB dressing/bathing, Max A toileting, and Min/Mod A for UB dressing/bathing        Bed mobility  Supine to Sit: Unable to assess  Sit to Supine: Unable to assess  Scootin Person assistance;Maximal assistance(scooting back in chair)  Transfers  Sit to stand: 2 Person assistance;Minimal assistance; Moderate assistance  Stand to sit: 2 Person assistance;Minimal assistance; Moderate assistance  Transfer Comments: up to RW; pt with lift chair at home; pt pulls up on RW     Cognition  Overall Cognitive Status: Exceptions  Arousal/Alertness: Appropriate responses to stimuli  Following Commands: Follows one step commands with increased time; Follows one step commands consistently  Attention Span: Attends with cues to redirect  Memory: Decreased short term memory;Decreased recall of recent events  Safety Judgement: Decreased awareness of need for assistance  Problem Solving: Assistance required to generate solutions;Assistance required to implement solutions;Assistance required to identify errors made  Insights: Decreased awareness of deficits  Initiation: Requires cues for some  Sequencing: Requires cues for some        Sensation  Overall Sensation Status: WFL        LUE AROM (degrees)  LUE AROM : WFL(decreased shoulder ROM)  RUE AROM (degrees)  RUE AROM : WFL(decreased shoulder ROM)  LUE Strength  Gross LUE Strength: [facility]  L Hand General: 4-/5  RUE Strength  Gross RUE Strength: Exceptions to [facility]  R Hand General: 4-/5                   Plan   Plan  Times per week: 3-5x  Current Treatment Recommendations: Strengthening, Balance Training, Endurance Training, Gait Training, Patient/Caregiver Education & Training, Functional Mobility Training, Cognitive Reorientation, Safety Education & Training, Equipment Evaluation, Education, & procurement, Self-Care / ADL      AM-PAC Score        AM-[facility] Inpatient Daily Activity Raw Score: 16 (06/12/19 1041)  AM-PAC Inpatient ADL T-Scale Score : 35.96 (06/12/19 1041)  ADL Inpatient CMS 0-100% Score: 53.32 (06/12/19 1041)  ADL Inpatient CMS G-Code Modifier : CK (06/12/19 1041)    Goals  Short term goals  Time Frame for Short term goals: prior to D/C  Short term goal 1: complete functional mobility and transfers with SBA  Short term goal 2: complete toileting with SBA  Short term goal 3: complete grooming in stance with SBA  Short term

## 2019-06-12 NOTE — PROGRESS NOTES
Hospitalist Progress Note      PCP: Chloé Reis    Date of Admission: 6/8/2019    Chief Complaint: SOB and fever. Hospital Course:   Long standing hx of chronic suprapubic catheter. Hx of chronic LE wounds. Subjective:   Feels better overall. SOB much improved. On RA. No active complaints. BS running high. Medications:  Reviewed    Infusion Medications    dextrose       Scheduled Medications    insulin lispro  12 Units Subcutaneous TID WC    insulin glargine  20 Units Subcutaneous Daily    vancomycin  750 mg Intravenous Q24H    insulin lispro  0-18 Units Subcutaneous TID WC    insulin lispro  0-9 Units Subcutaneous Nightly    cefepime  2 g Intravenous Q24H    vancomycin (VANCOCIN) intermittent dosing (placeholder)   Other RX Placeholder    aspirin  325 mg Oral Daily    atorvastatin  10 mg Oral Daily    lipase-protease-amylase  2 capsule Oral TID WC    metoprolol tartrate  25 mg Oral BID    venlafaxine  150 mg Oral BID    timolol  1 drop Right Eye Daily    ferrous sulfate  324 mg Oral BID WC     PRN Meds: LORazepam, traMADol, glucose, dextrose, glucagon (rDNA), dextrose, ondansetron, acetaminophen      Intake/Output Summary (Last 24 hours) at 6/12/2019 1524  Last data filed at 6/12/2019 1254  Gross per 24 hour   Intake 110 ml   Output 1575 ml   Net -1465 ml       Physical Exam Performed:    /79   Pulse 69   Temp 97.8 °F (36.6 °C) (Oral)   Resp 18   Ht 4' 11\" (1.499 m)   Wt 173 lb 11.6 oz (78.8 kg)   SpO2 95%   BMI 35.09 kg/m²     General appearance: No apparent distress, appears stated age and cooperative. HEENT: Pupils equal, round, and reactive to light. Conjunctivae/corneas clear. Neck: Supple, with full range of motion. No jugular venous distention. Trachea midline. Respiratory:  Normal respiratory effort. Clear to auscultation, bilaterally without Rales/Wheezes/Rhonchi.   Cardiovascular: Regular rate and rhythm with normal S1/S2 without murmurs, rubs and iv vancomycin   Monitor fever curve/WBC  ID following     Sepsis POA - due to above   LA normal.   Leukocytosis resolved     Chronic bilateral leg wounds with possible infection and cellulitis. Wound care as needed     Acute on Chronic dCHF  Afib RVR on presentation. HR has been well controlled   On metoprolol  S/p a dose of lasix - diuretic now on hold till infection under control. KACIE on CKD stage 3  Back to baseline   Cr 1.1 today  Daily bmp  Avoid nephrotoxins   Nephrology recs appreciated     Acute on chronic anemia  H/H stable   Iron studies showed high ferritin and low TIBC  Cont po iron     DMII - with uncontrolled hyperglycemia. Increase ISS dose  Increase lantus and prandial insulin dose   Carb control diet. DVT Prophylaxis: SCD - leg wounds bleeding. Diet: DIET GENERAL; Carb Control: 4 carb choices (60 gms)/meal  Dietary Nutrition Supplements: Diabetic Oral Supplement  Code Status: DNR-CCA      Dispo - cc. Prognosis guarded.  Code status DNRCCA  Daughter was updated at bedside today     Suzette Fox MD

## 2019-06-12 NOTE — PROGRESS NOTES
Infectious Disease Follow up Notes  Admit Date: 6/8/2019  Hospital Day: 5    Antibiotics : IV Cefepime  IV Vancomycin     CHIEF COMPLAINT:     Sepsis  Fevers  WBC elevation  UTI   lOWER LEG wounds    Subjective interval History :  66 y.o. woman admitted with high fevers from home and with change in mentation and she has spc in place has abnormal UA and urine cx positive for Enterobacter, she has poor mobility and venous stasis ulcer non healing for months and is followed in wound care center now with more weeping bleeding and pain and wound cx positive for Pseudomonas and MRSA. WBC elevation on admit and fever down since IV abx and we are consulted given the complicated infectious process her mentation bit better still very weak and tired. Sitting up in the chair, less confused and tolerating IV abx ok leg pain less and urine clearing and ace wraps in place asking about d/c plans       Past Medical History:    Past Medical History:   Diagnosis Date    Anemia     Atrial fibrillation (HCC)     CAD (coronary artery disease)     Chronic back pain     CKD (chronic kidney disease), stage III (HCC)     Diabetes mellitus (HCC)     Diastolic CHF ([address]     Hyperlipidemia     Hypertension     MI (myocardial infarction) ([address]     MRSA (methicillin resistant staph aureus) culture positive 04/13/2018    leg ulcer    MRSA colonization 10/16/2018    Obesity (BMI 30-39. 9)     Osteoarthritis     Pancreatitis     Sepsis ([address]     Stasis ulcer ([address]     bilateral lower extremities     Suprapubic catheter ([address]        Past Surgical History:    Past Surgical History:   Procedure Laterality Date    APPENDECTOMY      BACK SURGERY      x2    CATARACT REMOVAL      bilateral     CHOLECYSTECTOMY      COLONOSCOPY      EYE SURGERY      FRACTURE SURGERY      Left ankle    HYSTERECTOMY      INSET/CHANGE LOMELI CATHETER - COMPLICATED 05/2018    suprapubic cath    JOINT REPLACEMENT      Right knee    KNEE SURGERY         Current Medications:    Outpatient Medications Marked as Taking for the 6/8/19 encounter [facility] HOSPITAL Encounter)   Medication Sig Dispense Refill    ibuprofen (ADVIL;MOTRIN) 200 MG tablet Take 400 mg by mouth every 6 hours as needed for Pain      LORazepam (ATIVAN) 0.5 MG tablet Take 0.5 mg by mouth nightly as needed for Anxiety.  traMADol (ULTRAM) 50 MG tablet Take 50 mg by mouth every 8 hours as needed for Pain.  raNITIdine HCl (ZANTAC 150 MAXIMUM STRENGTH PO) Take 150 mg by mouth 2 times daily      ferrous sulfate 325 (65 Fe) MG tablet Take 325 mg by mouth 2 times daily       insulin lispro (HUMALOG) 100 UNIT/ML injection vial Inject 5 Units into the skin 3 times daily (with meals) (Patient taking differently: Inject into the skin Humalog insulin pump: 1.8 Unit basal rate continous and as needed bolus per insulin pump) 1 vial 0    Timolol (TIMOPTIC) 0.5 % (DAILY) SOLN ophthalmic solution Place 1 drop into the right eye daily       aspirin 325 MG tablet Take 325 mg by mouth daily       atorvastatin (LIPITOR) 10 MG tablet Take 1 tablet by mouth daily 30 tablet 0    metoprolol tartrate (LOPRESSOR) 25 MG tablet Take 1 tablet by mouth 2 times daily 60 tablet 0    furosemide (LASIX) 40 MG tablet Take 0.5 tablets by mouth daily 60 tablet 0    Biotin (BIOTIN 5000) 5 MG CAPS Take 1 tablet by mouth daily      Calcium Carbonate Antacid (TUMS ULTRA 1000) 1000 MG CHEW Take 1 tablet by mouth as needed (acid reflux)       lipase-protease-amylase (CREON) 82847 units delayed release capsule Take 24,000 Units by mouth 3 times daily (with meals)      gabapentin (NEURONTIN) 300 MG capsule Take 300 mg by mouth 2 times daily.        isosorbide mononitrate (IMDUR) 30 MG extended release tablet Take 30 mg by mouth daily      venlafaxine (EFFEXOR) 75 MG tablet Take 150 mg by mouth 2 times daily       cetirizine (ZYRTEC) 10 MG tablet Take 10 mg by mouth daily      vitamin D3 (CHOLECALCIFEROL) 400 units TABS tablet Take 400 Units by mouth daily          Allergies:  Sulfa antibiotics    Immunizations :   Immunization History   Administered Date(s) Administered    Influenza Vaccine, unspecified formulation 10/08/2010, 08/15/2014, 09/08/2017, 09/29/2018    Influenza, High Dose (Fluzone 65 yrs and older) 09/29/2018    Influenza, Milderd Mons, 3 Years and older, IM (Fluzone 3 yrs and older or Afluria 5 yrs and older) 09/08/2017    Pneumococcal 13-valent Conjugate ([id number]) 10/15/2018    Pneumococcal Polysaccharide ([id number]) 08/15/2016       Social History:     Social History     Tobacco Use    Smoking status: Never Smoker    Smokeless tobacco: Never Used   Substance Use Topics    Alcohol use: Yes     Comment: occassional    Drug use: No     Social History     Tobacco Use   Smoking Status Never Smoker   Smokeless Tobacco Never Used      Family History   Problem Relation Age of Onset    Heart Disease Father     Heart Failure Father     Diabetes Father     Diabetes Daughter     Diabetes Daughter         REVIEW OF SYSTEMS:    No fever / chills / sweats. No weight loss. No visual change, eye pain, eye discharge. No oral lesion, sore throat, dysphagia. Denies cough / sputum/Sob   Denies chest pain, palpitations/ dizziness  Denies nausea/ vomiting/abdominal pain/diarrhea. Denies dysuria or change in urinary function. Denies joint swelling or pain. No myalgia, arthralgia. No rashes, skin lesions   Denies focal weakness, sensory change or other neurologic symptoms  No lymph node swelling or tenderness.     LEG ulcer, fevers chills, wbc elevation and confusion        PHYSICAL EXAM:      Vitals:    /79   Pulse 69   Temp 97.8 °F (36.6 °C) (Oral)   Resp 18   Ht 4' 11\" (1.499 m)   Wt 173 lb 11.6 oz (78.8 kg)   SpO2 95%   BMI 35.09 kg/m²   General Appearance: alert,in some acute distress, +  pallor, no icterus obesity +  Skin: warm and dry, no rash or erythema  Head: normocephalic and atraumatic  Eyes: pupils equal, round, and reactive to light, conjunctivae normal  ENT: tympanic membrane, external ear and ear canal normal bilaterally, nose without deformity, nasal mucosa and turbinates normal without polyps  Neck: supple and non-tender without mass, no thyromegaly  no cervical lymphadenopathy  Pulmonary/Chest: clear to auscultation bilaterally- no wheezes, rales or rhonchi, normal air movement, no respiratory distress  Cardiovascular: normal rate, regular rhythm, normal S1 and S2, no murmurs, rubs, clicks, or gallops, no carotid bruits  Abdomen: soft, non-tender, non-distended, normal bowel sounds, no masses or organomegaly  Extremities: no cyanosis, clubbing or + edema  Bi lateral lower leg ulcers+ venous stasis skin peeling+   Musculoskeletal: normal range of motion, no joint swelling, deformity or tenderness  Neurologic: reflexes normal and symmetric, no cranial nerve deficit  Psych:  Orientation, sensorium, mood normal  Lines:  Supra pubic catheter+              Data Review:    Lab Results   Component Value Date    WBC 11.0 06/12/2019    HGB 8.2 (L) 06/12/2019    HCT 24.7 (L) 06/12/2019    MCV 99.2 06/12/2019     06/12/2019     Lab Results   Component Value Date    CREATININE 1.1 06/12/2019    BUN 12 06/12/2019     06/12/2019    K 3.6 06/12/2019     06/12/2019    CO2 21 06/12/2019       Hepatic Function Panel:   Lab Results   Component Value Date    ALKPHOS 122 06/09/2019    ALT 8 06/09/2019    AST 10 06/09/2019    PROT 5.5 06/11/2019    BILITOT 0.3 06/09/2019    BILIDIR <0.2 06/09/2019    IBILI see below 06/09/2019    LABALBU 2.3 06/12/2019       UA:  Lab Results   Component Value Date    COLORU YELLOW 06/08/2019    CLARITYU CLOUDY 06/08/2019    GLUCOSEU Negative 06/08/2019    BILIRUBINUR Negative 06/08/2019    KETUA Negative 06/08/2019    SPECGRAV 1.012 06/08/2019    BLOODU MODERATE 06/08/2019    PHUR 5.5 06/08/2019    PROTEINU 30 06/08/2019    UROBILINOGEN 0.2 06/08/2019    NITRU POSITIVE 06/08/2019    LEUKOCYTESUR LARGE 06/08/2019    LABMICR YES 06/08/2019    URINETYPE Clean catch 06/08/2019      Urine Microscopic:   Lab Results   Component Value Date    BACTERIA 1+ 06/08/2019    COMU see below 04/13/2018    HYALCAST 2 06/08/2019    WBCUA 328 06/08/2019    RBCUA 5 06/08/2019    EPIU 0 06/08/2019       MICRO: cultures reviewed and updated by me     0233       Order Status: Completed Specimen: Urine, clean catch Updated: 06/11/19 0711    Urine Culture, Routine --    Organism Enterobacter cloacaeAbnormal     Urine Culture, Routine --    >100,000 CFU/ml   ID and sensitivity to follow     Organism Gram negative rodAbnormal     Urine Culture, Routine --    75,000 CFU/ml   ID and sensitivity to follow    Narrative:     ORDER#: [id number]                          ORDERED BY: Arnold Lepe  SOURCE: Urine Clean Catch                  COLLECTED:  06/08/19 12:22  ANTIBIOTICS AT REGIS. :                      RECEIVED :  06/08/19 12:49  Performed at:  [place] [address] [address] [facility] [address]   Phone [phone]   Wound Culture [[id number]] (Abnormal)  Collected: 06/08/19 1224   Order Status: Completed Specimen: Leg Updated: 06/11/19 0615    WOUND/ABSCESS --Abnormal     Light growth Mixed skin rex,   No further workup   Abnormal     Gram Stain Result 3+ WBC's (Polymorphonuclear)   1+ Gram positive cocci   Abnormal     Organism Pseudomonas aeruginosaAbnormal     WOUND/ABSCESS Light growth    Organism Staph aureus MRSAAbnormal     WOUND/ABSCESS --Abnormal     Light growth   CONTACT PRECAUTIONS INDICATED   PBP2= POSITIVE   Abnormal    Narrative:     ORDER#: [id number]                          ORDERED BY: Arnold Lepe  SOURCE: Leg Right                          COLLECTED:  06/08/19 12:24  ANTIBIOTICS AT REGIS. :                      RECEIVED :  06/08/19 12:36  CALL  [facility]  [facility] tel. [phone],  Microbiology results called to and read back by Richard Feliz RN,  06/10/2019 08:29, by Britany Arroyo  Performed at:  [facility]  [address] [facility] [place] [address]   Phone [phone]   Culture Blood #1 [[id number]] Collected: 06/08/19 1223   Order Status: Completed Specimen: Blood Updated: 06/09/19 1315    Blood Culture, Routine No Growth to date.  Any change in status will be called. Narrative:     ORDER#: [id number]                          ORDERED BY: JESSICA Dixon  SOURCE: Blood Antecubital-Right            COLLECTED:  06/08/19 12:23  ANTIBIOTICS AT REGIS. :                      RECEIVED :  06/08/19 12:36  If child <=2 yrs old please draw pediatric bottle. ~Blood Culture #1  Performed at:  [facility]  [address] [facility] [place] [address]   Phone [phone]   Culture Blood #2 [[id number]] Collected: 06/08/19 1219   Order Status: Completed Specimen: Blood Updated: 06/09/19 1315    Culture, Blood 2 No Growth to date.  Any change in status will be called. Narrative:     ORDER#: [id number]                          ORDERED BY: Robi Miramontes  SOURCE: Blood Antecubital-Left             COLLECTED:  06/08/19 12:19  ANTIBIOTICS AT REGIS. :                      RECEIVED :  06/08/19 12:36  If child <=2 yrs old please draw pediatric bottle. ~Blood Culture #2  Performed at:  [facility]  [address] [facility] [place] [employer] [address]   Phone [phone]       Susceptibility     Pseudomonas aeruginosa (1)     Antibiotic Interpretation NEVILLE Status    cefepime Sensitive 4 mcg/mL     ciprofloxacin Sensitive <=1 mcg/mL     gentamicin Sensitive <=4 mcg/mL     meropenem Sensitive <=1 mcg/mL     piperacillin-tazobactam Sensitive <=16 mcg/mL     tobramycin Sensitive <=4 mcg/mL     Staph aureus mrsa (2)     Antibiotic Interpretation NEVILLE Status    ceFAZolin Resistant >16 mcg/mL     clindamycin Resistant >2 mcg/mL erythromycin Resistant >4 mcg/mL     oxacillin Resistant >2 mcg/mL     tetracycline Sensitive <=0.5 mcg/mL     trimethoprim-sulfamethoxazole Resistant >2/38 mcg/mL     vancomycin Sensitive 1 mcg/mL       Susceptibility     Enterobacter cloacae (1)     Antibiotic Interpretation NEVILLE Status    amoxicillin-clavulanate Resistant >16/8 mcg/mL     ampicillin Resistant >16 mcg/mL     ceFAZolin Resistant >16 mcg/mL     cefepime Sensitive <=1 mcg/mL     cefTRIAXone Sensitive <=1 mcg/mL     ciprofloxacin Sensitive <=0.5 mcg/mL     gentamicin Sensitive <=2 mcg/mL     levofloxacin Sensitive <=1 mcg/mL     meropenem Sensitive <=0.25 mcg/mL     nitrofurantoin Resistant >64 mcg/mL     piperacillin-tazobactam Sensitive <=2/4 mcg/mL     tetracycline Sensitive <=2 mcg/mL     trimethoprim-sulfamethoxazole Sensitive <=0.5/9.5 mcg/mL         IMAGING:    US RENAL COMPLETE   Final Result   Findings are suggestive of bilateral nephrolithiasis, without hydronephrosis. XR CHEST PORTABLE   Final Result   Pulmonary vascular congestion.                All the pertinent images and reports for the current Hospitalization were reviewed by me     Scheduled Meds:   insulin lispro  12 Units Subcutaneous TID WC    insulin glargine  20 Units Subcutaneous Daily    vancomycin  750 mg Intravenous Q24H    insulin lispro  0-18 Units Subcutaneous TID WC    insulin lispro  0-9 Units Subcutaneous Nightly    cefepime  2 g Intravenous Q24H    vancomycin (VANCOCIN) intermittent dosing (placeholder)   Other RX Placeholder    aspirin  325 mg Oral Daily    atorvastatin  10 mg Oral Daily    lipase-protease-amylase  2 capsule Oral TID WC    metoprolol tartrate  25 mg Oral BID    venlafaxine  150 mg Oral BID    timolol  1 drop Right Eye Daily    ferrous sulfate  324 mg Oral BID WC       Continuous Infusions:   dextrose         PRN Meds:  LORazepam, traMADol, glucose, dextrose, glucagon (rDNA), dextrose, ondansetron, acetaminophen      Assessment: Sepsis on admit  High fevers now going down   WBC elevation  Trend down    UTI with SPC in place  UA very abnormal and urine cx Enterobacter and GNR   Bi lateral lower leg venous ulcers non healing   Wound cx with MRSA and Pseudomonas  H/o MRSA colonization  CKD+  Encephalopathy from infection and metabolic process slow to resolve  Poor mobility +      With slow clinical improvement and now cx are final will be able to choose oral abx for d/c when ready she will need follow up in wound care      Labs, Microbiology, Radiology and all the pertinent results from current hospitalization and  care every where were reviewed  by me as a part of the evaluation   Plan:   1. Cont IV Cefepime will cover Pseudomonas and Enterobacter  2. Cont IV Vancomycin for MRSA on the wound cx  Pharmacy dosing   3. Bi lateral lower leg ulcers non healing for a while and she is followed in wound care center  4. Watch creat closely  5. Home on oral Cipro x 500 mg x bid x 14 days and Oral Doxycycline x 100 mg bid x 14  days for MRSA on the legs and to cover Pseudomonas  6. Wound care pictures reviewed and will need follow up in wound care with Tarik Anderson     Discussed with patient/Family  D/w RN    Thanks for allowing me to participate in your patient's care and please call me with any questions or concerns.     José Miguel Myers MD  Infectious Disease  [facility] ([facility]) Physician  Phone: [phone]   Fax : [phone]

## 2019-06-13 VITALS
OXYGEN SATURATION: 100 % | BODY MASS INDEX: 34.89 KG/M2 | HEIGHT: 59 IN | DIASTOLIC BLOOD PRESSURE: 58 MMHG | WEIGHT: 173.06 LBS | HEART RATE: 72 BPM | RESPIRATION RATE: 17 BRPM | TEMPERATURE: 97.9 F | SYSTOLIC BLOOD PRESSURE: 159 MMHG

## 2019-06-13 LAB
ALBUMIN SERPL-MCNC: 2.8 G/DL (ref 3.4–5)
ANION GAP SERPL CALCULATED.3IONS-SCNC: 12 MMOL/L (ref 3–16)
BASOPHILS ABSOLUTE: 0.1 K/UL (ref 0–0.2)
BASOPHILS RELATIVE PERCENT: 0.6 %
BLOOD CULTURE, ROUTINE: NORMAL
BUN BLDV-MCNC: 9 MG/DL (ref 7–20)
CALCIUM SERPL-MCNC: 8.3 MG/DL (ref 8.3–10.6)
CHLORIDE BLD-SCNC: 104 MMOL/L (ref 99–110)
CO2: 22 MMOL/L (ref 21–32)
CREAT SERPL-MCNC: 1 MG/DL (ref 0.6–1.2)
CULTURE, BLOOD 2: NORMAL
EOSINOPHILS ABSOLUTE: 0.3 K/UL (ref 0–0.6)
EOSINOPHILS RELATIVE PERCENT: 3.7 %
GFR AFRICAN AMERICAN: >60
GFR NON-AFRICAN AMERICAN: 54
GLUCOSE BLD-MCNC: 142 MG/DL (ref 70–99)
GLUCOSE BLD-MCNC: 142 MG/DL (ref 70–99)
GLUCOSE BLD-MCNC: 254 MG/DL (ref 70–99)
GLUCOSE BLD-MCNC: 270 MG/DL (ref 70–99)
GLUCOSE BLD-MCNC: 329 MG/DL (ref 70–99)
HCT VFR BLD CALC: 26.5 % (ref 36–48)
HEMOGLOBIN: 9 G/DL (ref 12–16)
LYMPHOCYTES ABSOLUTE: 1 K/UL (ref 1–5.1)
LYMPHOCYTES RELATIVE PERCENT: 10.3 %
MCH RBC QN AUTO: 32.8 PG (ref 26–34)
MCHC RBC AUTO-ENTMCNC: 34.1 G/DL (ref 31–36)
MCV RBC AUTO: 96.2 FL (ref 80–100)
MONOCYTES ABSOLUTE: 0.5 K/UL (ref 0–1.3)
MONOCYTES RELATIVE PERCENT: 5.1 %
NEUTROPHILS ABSOLUTE: 7.4 K/UL (ref 1.7–7.7)
NEUTROPHILS RELATIVE PERCENT: 80.3 %
PDW BLD-RTO: 13 % (ref 12.4–15.4)
PERFORMED ON: ABNORMAL
PHOSPHORUS: 2.5 MG/DL (ref 2.5–4.9)
PLATELET # BLD: 299 K/UL (ref 135–450)
PMV BLD AUTO: 8.1 FL (ref 5–10.5)
POTASSIUM SERPL-SCNC: 2.7 MMOL/L (ref 3.5–5.1)
POTASSIUM SERPL-SCNC: 3.9 MMOL/L (ref 3.5–5.1)
RBC # BLD: 2.75 M/UL (ref 4–5.2)
SODIUM BLD-SCNC: 138 MMOL/L (ref 136–145)
VANCOMYCIN RANDOM: 11 UG/ML
WBC # BLD: 9.3 K/UL (ref 4–11)

## 2019-06-13 PROCEDURE — 2580000003 HC RX 258: Performed by: INTERNAL MEDICINE

## 2019-06-13 PROCEDURE — 97530 THERAPEUTIC ACTIVITIES: CPT

## 2019-06-13 PROCEDURE — 80069 RENAL FUNCTION PANEL: CPT

## 2019-06-13 PROCEDURE — 36415 COLL VENOUS BLD VENIPUNCTURE: CPT

## 2019-06-13 PROCEDURE — 80202 ASSAY OF VANCOMYCIN: CPT

## 2019-06-13 PROCEDURE — 85025 COMPLETE CBC W/AUTO DIFF WBC: CPT

## 2019-06-13 PROCEDURE — 6370000000 HC RX 637 (ALT 250 FOR IP): Performed by: INTERNAL MEDICINE

## 2019-06-13 PROCEDURE — 6360000002 HC RX W HCPCS: Performed by: INTERNAL MEDICINE

## 2019-06-13 PROCEDURE — 94760 N-INVAS EAR/PLS OXIMETRY 1: CPT

## 2019-06-13 PROCEDURE — 84132 ASSAY OF SERUM POTASSIUM: CPT

## 2019-06-13 PROCEDURE — 97116 GAIT TRAINING THERAPY: CPT

## 2019-06-13 PROCEDURE — 99232 SBSQ HOSP IP/OBS MODERATE 35: CPT | Performed by: INTERNAL MEDICINE

## 2019-06-13 RX ORDER — POTASSIUM CHLORIDE 7.45 MG/ML
10 INJECTION INTRAVENOUS PRN
Status: DISCONTINUED | OUTPATIENT
Start: 2019-06-13 | End: 2019-06-13 | Stop reason: HOSPADM

## 2019-06-13 RX ORDER — DOXYCYCLINE HYCLATE 100 MG
100 TABLET ORAL 2 TIMES DAILY
Qty: 28 TABLET | Refills: 0 | Status: SHIPPED | OUTPATIENT
Start: 2019-06-13 | End: 2019-06-27

## 2019-06-13 RX ORDER — CIPROFLOXACIN 500 MG/1
500 TABLET, FILM COATED ORAL 2 TIMES DAILY
Qty: 28 TABLET | Refills: 0 | Status: SHIPPED | OUTPATIENT
Start: 2019-06-13 | End: 2019-06-27 | Stop reason: ALTCHOICE

## 2019-06-13 RX ORDER — POTASSIUM CHLORIDE 20 MEQ/1
40 TABLET, EXTENDED RELEASE ORAL PRN
Status: DISCONTINUED | OUTPATIENT
Start: 2019-06-13 | End: 2019-06-13 | Stop reason: HOSPADM

## 2019-06-13 RX ADMIN — POTASSIUM CHLORIDE 10 MEQ: 7.46 INJECTION, SOLUTION INTRAVENOUS at 10:20

## 2019-06-13 RX ADMIN — FERROUS SULFATE TAB EC 324 MG (65 MG FE EQUIVALENT) 324 MG: 324 (65 FE) TABLET DELAYED RESPONSE at 10:12

## 2019-06-13 RX ADMIN — METOPROLOL TARTRATE 25 MG: 25 TABLET ORAL at 09:22

## 2019-06-13 RX ADMIN — PANCRELIPASE 2 CAPSULE: 60000; 12000; 38000 CAPSULE, DELAYED RELEASE PELLETS ORAL at 09:22

## 2019-06-13 RX ADMIN — INSULIN GLARGINE 20 UNITS: 100 INJECTION, SOLUTION SUBCUTANEOUS at 09:22

## 2019-06-13 RX ADMIN — CEFEPIME HYDROCHLORIDE 2 G: 2 INJECTION, POWDER, FOR SOLUTION INTRAVENOUS at 15:47

## 2019-06-13 RX ADMIN — FERROUS SULFATE TAB EC 324 MG (65 MG FE EQUIVALENT) 324 MG: 324 (65 FE) TABLET DELAYED RESPONSE at 15:47

## 2019-06-13 RX ADMIN — VANCOMYCIN HYDROCHLORIDE 750 MG: 750 INJECTION, POWDER, LYOPHILIZED, FOR SOLUTION INTRAVENOUS at 18:06

## 2019-06-13 RX ADMIN — VENLAFAXINE 150 MG: 50 TABLET ORAL at 09:22

## 2019-06-13 RX ADMIN — ASPIRIN 325 MG ORAL TABLET 325 MG: 325 PILL ORAL at 09:22

## 2019-06-13 RX ADMIN — INSULIN LISPRO 12 UNITS: 100 INJECTION, SOLUTION INTRAVENOUS; SUBCUTANEOUS at 18:04

## 2019-06-13 RX ADMIN — ATORVASTATIN CALCIUM 10 MG: 10 TABLET, FILM COATED ORAL at 09:22

## 2019-06-13 RX ADMIN — POTASSIUM BICARBONATE 40 MEQ: 782 TABLET, EFFERVESCENT ORAL at 15:47

## 2019-06-13 RX ADMIN — PANCRELIPASE 2 CAPSULE: 60000; 12000; 38000 CAPSULE, DELAYED RELEASE PELLETS ORAL at 18:06

## 2019-06-13 RX ADMIN — TIMOLOL MALEATE 1 DROP: 5 SOLUTION OPHTHALMIC at 11:16

## 2019-06-13 RX ADMIN — INSULIN LISPRO 9 UNITS: 100 INJECTION, SOLUTION INTRAVENOUS; SUBCUTANEOUS at 18:02

## 2019-06-13 RX ADMIN — INSULIN LISPRO 3 UNITS: 100 INJECTION, SOLUTION INTRAVENOUS; SUBCUTANEOUS at 00:42

## 2019-06-13 ASSESSMENT — PAIN SCALES - GENERAL
PAINLEVEL_OUTOF10: 0
PAINLEVEL_OUTOF10: 0

## 2019-06-13 NOTE — PROGRESS NOTES
Physical Therapy  Facility/Department: [address] PROGRESSIVE CARE  Daily Treatment Note  This note serves as D/C summary if patient is discharged prior to next treatment session. NAME: Aleksey Cruz  : 1945  MRN: [id number]    Date of Service: 2019    Discharge Recommendations:  Patient would benefit from continued therapy after discharge, 24 hour supervision or assist, Home with Home health PT   PT Equipment Recommendations  Equipment Needed: No    Assessment   Body structures, Functions, Activity limitations: Decreased functional mobility ; Decreased endurance;Decreased strength;Decreased balance;Decreased safe awareness  Assessment: Pt able to complete transfers, and short-distance ambulation with walker support, CGA, no LOB. Daughter present for session, and stated she is taking 3 days off work to stay with pt to assist with transition home. Pt also had daily HHA prior to admission. PT recommends initial 24/7 assist upon D/C to ensure pt is able to safely complete mobility and self-care tasks. Also recommend home PT level 1. Prognosis: Fair;Guarded  Decision Making: Medium Complexity  Activity Tolerance  Activity Tolerance: Patient limited by fatigue;Patient limited by endurance; Patient Tolerated treatment well     Patient Diagnosis(es): The primary encounter diagnosis was Urinary tract infection without hematuria, site unspecified. A diagnosis of Bilateral leg ulcer, limited to breakdown of skin (HCC) was also pertinent to this visit.      has a past medical history of Anemia, Atrial fibrillation ([address] CAD (coronary artery disease), Chronic back pain, CKD (chronic kidney disease), stage III ([address] Diabetes mellitus ([address] Diastolic CHF ([address] Hyperlipidemia, Hypertension, MI (myocardial infarction) ([address] MRSA (methicillin resistant staph aureus) culture positive, MRSA colonization, Obesity (BMI 30-39.9), Osteoarthritis, Pancreatitis, Sepsis ([address] Stasis ulcer ([address] and Suprapubic

## 2019-06-13 NOTE — PROGRESS NOTES
bilaterally without Rales/Wheezes/Rhonchi. Cardiovascular: Regular rate and rhythm with normal S1/S2 without murmurs, rubs or gallops. Abdomen: Soft, non-tender, non-distended with normal bowel sounds. Musculoskeletal: No clubbing, cyanosis or edema bilaterally. Full range of motion without deformity. Skin: Skin color, texture, turgor normal.  No rashes or lesions. Neurologic:  Neurovascularly intact without any focal sensory/motor deficits. Cranial nerves: II-XII intact, grossly non-focal.  Psychiatric: Alert and oriented, thought content appropriate, normal insight  Capillary Refill: Brisk,< 3 seconds   Peripheral Pulses: +2 palpable, equal bilaterally       Labs:   Recent Labs     06/11/19  0558 06/12/19  0742 06/13/19  0728   WBC 14.1* 11.0 9.3   HGB 7.8* 8.2* 9.0*   HCT 23.6* 24.7* 26.5*    271 299     Recent Labs     06/11/19  0557 06/12/19  0742 06/13/19  0728    138 138   K 3.4* 3.6 2.7*    106 104   CO2 18* 21 22   BUN 17 12 9   CREATININE 1.3* 1.1 1.0   CALCIUM 8.2* 8.3 8.3   PHOS 2.6 2.5 2.5     No results for input(s): AST, ALT, BILIDIR, BILITOT, ALKPHOS in the last 72 hours. No results for input(s): INR in the last 72 hours. No results for input(s): Faby Reasoner in the last 72 hours. Urinalysis:      Lab Results   Component Value Date    NITRU POSITIVE 06/08/2019    WBCUA 328 06/08/2019    BACTERIA 1+ 06/08/2019    RBCUA 5 06/08/2019    BLOODU MODERATE 06/08/2019    SPECGRAV 1.012 06/08/2019    GLUCOSEU Negative 06/08/2019       Radiology:  US RENAL COMPLETE   Final Result   Findings are suggestive of bilateral nephrolithiasis, without hydronephrosis. XR CHEST PORTABLE   Final Result   Pulmonary vascular congestion. Assessment/Plan:    Active Hospital Problems    Diagnosis    S[address]is [address] [A41.9]       Acute Metabolic Encephalopathy secondary to complicated UTI due to chronic suprapubic cystostomy catheter and possible LE wound infection.    Wound culture with Pseudomonas aeruginosa and Staph aureus MRSA  Urine Culture growing Enterobacter cloacae   On IV cefepime and iv vancomycin   Monitor fever curve/WBC  ID following   D/c on po cipro and doxy x 14 days once ready     Sepsis POA - due to above   LA normal.   Leukocytosis resolved     Chronic bilateral leg wounds with possible infection and cellulitis. Wound care as needed     Acute on Chronic dCHF  Afib RVR on presentation. HR has been well controlled   On metoprolol  S/p a dose of lasix - diuretic now on hold till infection under control. KACIE on CKD stage 3  Back to baseline   Daily bmp  Avoid nephrotoxins   Nephrology recs appreciated     Acute on chronic anemia  H/H stable   Iron studies showed high ferritin and low TIBC  Cont po iron     DMII - with uncontrolled hyperglycemia. Increase ISS dose  Increase lantus and prandial insulin dose   Carb control diet. Hypokalemia  Replete prn  Recheck k level later today     DVT Prophylaxis: SCD - leg wounds bleeding. Diet: DIET GENERAL; Carb Control: 4 carb choices (60 gms)/meal  Dietary Nutrition Supplements: Diabetic Oral Supplement  Code Status: DNR-CCA      Dispo - cc. Prognosis guarded.  Code status DNRCCA  Daughter was updated at bedside today   D/c home on [place] [facility] AT [facility] once ok from [address]     Alfonso Guzman MD

## 2019-06-13 NOTE — PLAN OF CARE
Nutrition Problem: Inadequate oral intake  Intervention: Food and/or Nutrient Delivery: Continue current diet, Start ONS  Nutritional Goals: consume >/= to 50 %
Problem: Falls - Risk of:  Goal: Will remain free from falls  Description  Will remain free from falls  6/11/2019 1047 by Santa So RN  Outcome: Ongoing   Fall risk assessment completed every shift. All precautions in place. Pt has call light within reach at all times. Room clear of clutter. Pt aware to call for assistance when getting up. Problem: Risk for Impaired Skin Integrity  Goal: Tissue integrity - skin and mucous membranes  Description  Structural intactness and normal physiological function of skin and  mucous membranes. 6/11/2019 1047 by Santa So RN  Outcome: Ongoing   Skin assessment completed every shift. Pt assessed for incontinence, appropriate barrier cream applied prn. Pt encouraged to turn/rotate every 2 hours. Assistance provided if pt unable to do so themselves. Problem: SAFETY  Goal: Free from accidental physical injury  6/11/2019 1047 by Santa So RN  Outcome: Ongoing   Patient assessed for fall risk; fall precautions initiated. Patient and family instructed about safety devices. Environment kept free of clutter and adequate lighting provided. Bed locked and in lowest position. Call light within reach. Will continue to monitor. Problem: PAIN  Goal: Patient's pain/discomfort is manageable  6/11/2019 1047 by Santa So RN  Outcome: Ongoing   Pain/discomfort being managed with PRN analgesics per MD orders. Pt able to express presence and absence of pain and rate pain appropriately using numerical scale.
Problem: Falls - Risk of:  Goal: Will remain free from falls  Description  Will remain free from falls  6/11/2019 2301 by Cindi Navarrete RN  Outcome: Ongoing  6/11/2019 1047 by Leonard Garvey RN  Outcome: Ongoing  Goal: Absence of physical injury  Description  Absence of physical injury  Outcome: Ongoing     Problem: Risk for Impaired Skin Integrity  Goal: Tissue integrity - skin and mucous membranes  Description  Structural intactness and normal physiological function of skin and  mucous membranes. 6/11/2019 2301 by Cindi Navarrete RN  Outcome: Ongoing  6/11/2019 1047 by Leonard Garvey RN  Outcome: Ongoing     Problem: SAFETY  Goal: Free from accidental physical injury  6/11/2019 2301 by Cindi Navarrete RN  Outcome: Ongoing  6/11/2019 1047 by Leonard Garvey RN  Outcome: Ongoing  Goal: Free from intentional harm  Outcome: Ongoing     Problem: DAILY CARE  Goal: Daily care needs are met  Outcome: Ongoing     Problem: PAIN  Goal: Patient's pain/discomfort is manageable  6/11/2019 2301 by Cindi Navarrete RN  Outcome: Ongoing  6/11/2019 1047 by Leonard Garvey RN  Outcome: Ongoing     Problem: SKIN INTEGRITY  Goal: Skin integrity is maintained or improved  Outcome: Ongoing     Problem: KNOWLEDGE DEFICIT  Goal: Patient/S.O. demonstrates understanding of disease process, treatment plan, medications, and discharge instructions. Outcome: Ongoing     Problem: DISCHARGE BARRIERS  Goal: Patient's continuum of care needs are met  Outcome: Ongoing     Problem: Pain:  Description  Pain management should include both nonpharmacologic and pharmacologic interventions.   Goal: Pain level will decrease  Description  Pain level will decrease  Outcome: Ongoing  Goal: Control of acute pain  Description  Control of acute pain  Outcome: Ongoing  Goal: Control of chronic pain  Description  Control of chronic pain  Outcome: Ongoing
Problem: Falls - Risk of:  Goal: Will remain free from falls  Description  Will remain free from falls  6/9/2019 2332 by Sharda Nielson RN  Outcome: Ongoing     Problem: Falls - Risk of:  Goal: Absence of physical injury  Description  Absence of physical injury  6/9/2019 2332 by Sharda Nielson RN  Outcome: Ongoing     Problem: Risk for Impaired Skin Integrity  Goal: Tissue integrity - skin and mucous membranes  Description  Structural intactness and normal physiological function of skin and  mucous membranes. 6/9/2019 2332 by Sharda Nielson RN  Outcome: Ongoing     Problem: SAFETY  Goal: Free from accidental physical injury  6/9/2019 2332 by Sharda Nielson RN  Outcome: Ongoing     Problem: SAFETY  Goal: Free from intentional harm  6/9/2019 2332 by Sharda Nielson RN  Outcome: Ongoing     Problem: DAILY CARE  Goal: Daily care needs are met  6/9/2019 2332 by Sharda Nielson RN  Outcome: Ongoing     Problem: PAIN  Goal: Patient's pain/discomfort is manageable  6/9/2019 2332 by Sharda Nielson RN  Outcome: Ongoing  Note:   Pt does have PRN pain meds. Pt repositioned and provided with distraction to help with an intermittent pain. Problem: SKIN INTEGRITY  Goal: Skin integrity is maintained or improved  6/9/2019 2332 by Sharda Nielson RN  Outcome: Ongoing     Problem: KNOWLEDGE DEFICIT  Goal: Patient/S.O. demonstrates understanding of disease process, treatment plan, medications, and discharge instructions.   6/9/2019 2332 by Sharda Nielson RN  Outcome: Ongoing     Problem: DISCHARGE BARRIERS  Goal: Patient's continuum of care needs are met  6/9/2019 2332 by Sharda Nielson RN  Outcome: Ongoing
Problem: Falls - Risk of:  Goal: Will remain free from falls  Description  Will remain free from falls  Outcome: Ongoing   Fall risk assessment completed every shift. All precautions in place. Pt has call light within reach at all times. Room clear of clutter. Pt aware to call for assistance when getting up. Problem: SAFETY  Goal: Free from accidental physical injury  Outcome: Ongoing   Patient assessed for fall risk; fall precautions initiated. Patient and family instructed about safety devices. Environment kept free of clutter and adequate lighting provided. Bed locked and in lowest position. Call light within reach. Will continue to monitor. Problem: DAILY CARE  Goal: Daily care needs are met  Outcome: Ongoing   Patient's ability assessed to perform self care and independent activity encouraged according to that ability. Assisted with ADL's as needed. Risk for skin breakdown assessed. Potential discharge needs assessed. Patient and family included in daily care decisions. Problem: PAIN  Goal: Patient's pain/discomfort is manageable  Outcome: Ongoing   Pain/discomfort being managed with PRN analgesics per MD orders. Pt able to express presence and absence of pain and rate pain appropriately using numerical scale.
Problem: Falls - Risk of:  Goal: Will remain free from falls  Description  Will remain free from falls  Outcome: Ongoing  Goal: Absence of physical injury  Description  Absence of physical injury  Outcome: Ongoing     Problem: Risk for Impaired Skin Integrity  Goal: Tissue integrity - skin and mucous membranes  Description  Structural intactness and normal physiological function of skin and  mucous membranes.   Outcome: Ongoing
Problem: Falls - Risk of:  Goal: Will remain free from falls  Description  Will remain free from falls  Outcome: Ongoing  Goal: Absence of physical injury  Description  Absence of physical injury  Outcome: Ongoing     Problem: Risk for Impaired Skin Integrity  Goal: Tissue integrity - skin and mucous membranes  Description  Structural intactness and normal physiological function of skin and  mucous membranes.   Outcome: Ongoing     Problem: SAFETY  Goal: Free from accidental physical injury  Outcome: Ongoing  Goal: Free from intentional harm  Outcome: Ongoing     Problem: SKIN INTEGRITY  Goal: Skin integrity is maintained or improved  Outcome: Ongoing     Problem: Nutrition  Goal: Optimal nutrition therapy  6/12/2019 1425 by Deandre Mcqueen RD, LD  Outcome: Ongoing
pain  Outcome: Ongoing

## 2019-06-13 NOTE — CARE COORDINATION
[facility] Nurse  Follow-up Progress Note       NAME:  Radha Cantrell  MEDICAL RECORD NUMBER:  [id number]  AGE:  66 y.o. GENDER:  female  :  1945  TODAY'S DATE:  2019    Subjective:   Wound Identification:  Wound Type: {WOUND LCNV:613225219}  Contributing Factors: {HEALTH FACTORS:545941116}        Patient Goal of Care:  [] Wound Healing  [] Odor Control  [] Palliative Care  [] Pain Control   [] Other:     Objective:    BP (!) 159/58   Pulse 72   Temp 97.9 °F (36.6 °C) (Oral)   Resp 17   Ht 4' 11\" (1.499 m)   Wt 173 lb 1 oz (78.5 kg)   SpO2 100%   BMI 34.95 kg/m²   Jose Risk Score: Jose Scale Score: 15  Assessment:   Measurements:  Wound 19 Tibial Left; Lower (Active)   Wound Image    2019 11:49 AM   Wound Venous 2019  5:14 PM   Dressing Status Changed 2019  5:14 PM   Dressing Changed Changed/New 2019  5:14 PM   Dressing/Treatment Silicone Dressing;ABD; Ace Wrap;Dry Dressing 2019  5:14 PM   Wound Cleansed Rinsed/Irrigated with saline 2019  5:14 PM   Dressing Change Due 19  5:14 PM   Wound Length (cm) 12 cm 2019 11:49 AM   Wound Width (cm) 6.5 cm 2019 11:49 AM   Wound Depth (cm) 0.1 cm 2019 11:49 AM   Wound Surface Area (cm^2) 78 cm^2 2019 11:49 AM   Change in Wound Size % (l*w) -1.3 2019 11:49 AM   Wound Volume (cm^3) 7.8 cm^3 2019 11:49 AM   Wound Assessment Clean;Drainage;Fragile; Red 2019  5:14 PM   Drainage Amount Scant 2019  5:14 PM   Drainage Description Serosanguinous 2019  5:14 PM   Odor None 2019  5:14 PM   Marilynn-wound Assessment Intact; Maceration 2019  5:14 PM   Non-staged Wound Description Partial thickness 2019  5:14 PM   Red%Wound Bed 100 2019  5:14 PM   Number of days: 5       Wound 19 Tibial Right; Lower (Active)   Wound Image    2019 11:49 AM   Wound Venous 2019  5:14 PM   Dressing Status Changed 6/13/2019  5:14 PM   Dressing Changed Changed/New 6/13/2019  5:14 PM   Dressing/Treatment Silicone Dressing;ABD; Ace Wrap;Dry Dressing 6/13/2019  5:14 PM   Wound Cleansed Rinsed/Irrigated with saline 6/13/2019  5:14 PM   Dressing Change Due 06/13/19 6/12/2019  5:08 PM   Wound Length (cm) 10.5 cm 6/11/2019 11:49 AM   Wound Width (cm) 5 cm 6/11/2019 11:49 AM   Wound Depth (cm) 0.1 cm 6/11/2019 11:49 AM   Wound Surface Area (cm^2) 52.5 cm^2 6/11/2019 11:49 AM   Change in Wound Size % (l*w) -116.05 6/11/2019 11:49 AM   Wound Volume (cm^3) 5.25 cm^3 6/11/2019 11:49 AM   Wound Assessment Drainage;Fragile; Red 6/13/2019  5:14 PM   Drainage Amount Scant 6/13/2019  5:14 PM   Drainage Description Serosanguinous 6/13/2019  5:14 PM   Odor None 6/13/2019  5:14 PM   Marilynn-wound Assessment Intact; Maceration 6/13/2019  5:14 PM   Non-staged Wound Description Partial thickness 6/13/2019  5:14 PM   Red%Wound Bed 100 6/13/2019  5:14 PM   Number of days: 5       Wound 06/10/19 Buttocks Left;Right MASD/ friction shear  (Active)   Wound Other 6/11/2019 10:36 AM   Dressing Status Changed 6/11/2019 10:36 AM   Dressing Changed Changed/New 6/11/2019 10:36 AM   Dressing/Treatment Moisture barrier 6/12/2019  8:00 AM   Wound Cleansed Wound cleanser 6/11/2019 10:36 AM   Dressing Change Due 06/11/19 6/11/2019 10:36 AM   Wound Assessment SAMARA 6/13/2019  8:33 AM   Drainage Amount SAMARA 6/13/2019  8:33 AM   Drainage Description Sanguinous 6/11/2019 10:36 AM   Odor None 6/11/2019 10:36 AM   Marilynn-wound Assessment Dry; Intact; Blanchable erythema 6/11/2019 10:36 AM   Non-staged Wound Description Partial thickness 6/11/2019 10:36 AM   [place]%Wound Bed 100 6/11/2019 10:36 AM   Number of days: 2       Response to treatment:  [address] TOLERATED:[id number]}     Pain Assessment:  Severity:  {NUMBERS 0-10:20300} / 10  Quality of pain: {PAIN QUALITY:20811}  Wound Pain Timing/Severity: {PAIN ASSESSMENT:55309}  Premedicated: {Yes No M/M:[id number]}  Plan:   Plan of Care: Wound

## 2019-06-13 NOTE — PROGRESS NOTES
[facility]  Nephrology   Progress Note        We are following the patient for KACIE/CKD stage 3 Baseline Cr 1.3-1.5 mg  Had fever to 103 in ER  Has h/o ch LE wounds followed at wound care center Has SPT in place  Noted to have progressive increase in Cr from 1.3 to 1.6 mg.  Has been on Vancomycin and Zosyn Takes Aleve prn at home      SUBJECTIVE:    OBJECTIVE: Doing OK BP stable overnight    SPT in place UOP 1750 ml/24 hours    PHYSICAL:    TEMPERATURE:  Current - Temp: 98 °F (36.7 °C);  Max - Temp  Av.4 °F (36.9 °C)  Min: 98 °F (36.7 °C)  Max: 99.7 °F (37.6 °C)  RESPIRATIONS RANGE: Resp  Av.8  Min: 16  Max: 20  PULSE RANGE: Pulse  Av.4  Min: 77  Max: 144  BLOOD PRESSURE RANGE:  Systolic (03CQY), DLB:481 , Min:124 , VDM:899   ; Diastolic (25QVF), JTS:12, Min:64, Max:114    PULSE OXIMETRY RANGE: SpO2  Av.2 %  Min: 95 %  Max: 100 %  24HR INTAKE/OUTPUT:      Intake/Output Summary (Last 24 hours) at 2019 1204  Last data filed at 2019 0545  Gross per 24 hour   Intake 240 ml   Output 850 ml   Net -610 ml       CONSTITUTIONAL:  awake, alert, cooperative, no apparent distress  HEENT:  sclera clear  NECK:  Supple, symmetrical, trachea midline, no adenopathy, thyroid symmetric, not enlarged and no tenderness, skin normal  LUNGS:  No increased work of breathing, good air exchange, clear to auscultation bilaterally, no crackles or wheezing  CARDIOVASCULAR:  Normal apical impulse, regular rate and rhythm, normal S1 and S2  ABDOMEN:  No scars, normal bowel sounds, soft, non-distended, non-tender, no masses palpated,SPT in place  NEUROLOGIC:  alert, oriented, normal speech, no focal findings  SKIN: no bruising or bleeding  EXTREMITIES: bilateral LE ulcers     Medications     dextrose          Data      CBC:   Recent Labs     19  0558 19  0742 19  0728   WBC 14.1* 11.0 9.3   RBC 2.39* 2.50* 2.75*   HGB 7.8* 8.2* 9.0*   HCT 23.6* 24.7* 26.5*    864 033 BMP:    Recent Labs     06/11/19  0557 06/12/19  0742 06/13/19  0728    138 138   K 3.4* 3.6 2.7*    106 104   CO2 18* 21 22   BUN 17 12 9   CREATININE 1.3* 1.1 1.0   CALCIUM 8.2* 8.3 8.3   GLUCOSE 222* 296* 142*     Phosphorus:    Recent Labs     06/11/19  0557 06/12/19  0742 06/13/19  0728   PHOS 2.6 2.5 2.5     Magnesium:    Recent Labs     06/11/19  0557   MG 2.30         ASSESSMENT     Patient Active Problem List   Diagnosis    Acute on chronic renal insufficiency    FTT (failure to thrive) in adult    Type 1 diabetes mellitus with hyperglycemia (HCC)    Chronic venous hypertension (idiopathic) with ulcer and inflammation of bilateral lower extremity (HCC)    DMII (diabetes mellitus, type 2) (HCC)    Cellulitis of left lower extremity    Protein-calorie malnutrition, moderate (HCC)    MRSA colonization    Chronic diastolic heart failure (HCC)    Coronary artery disease involving native coronary artery of native heart without angina pectoris    Aortic atherosclerosis (HCC)    Essential hypertension    Cellulitis, lower extremities bilaterally    MRSA nasal colonization    Normocytic anemia    Altered mental status    Elevated lactic acid level    Uncontrolled diabetes mellitus ([facility] [address]    Sepsis ([facility] [address]       PLAN    1-KACIE/CKD baseline Cr 1.3-1.5 mg Suspect in the setting of SIRS, complicated UTI.   Underlying CKD likely due to D Nephropathy Has h/o D Retinopathy Cr improved to 1.0 mg today Will sign off Please call if any questions Thanks   2-Hypokalemia K+ 2.7 meq replace  3-Anemia normocytic Fe stores OK SPEP no monoclonal protein  4 HTN BP had been relatively low,  BP improved.  5 S/P SPT  6 UTI Enterobacter  7 Ch LE wounds MRSA on Vancomycin       Hanh Bryant MD, [address]

## 2019-06-13 NOTE — PROGRESS NOTES
Infectious Disease Follow up Notes  Admit Date: 6/8/2019  Hospital Day: 6    Antibiotics : IV Cefepime  IV Vancomycin     CHIEF COMPLAINT:     Sepsis  Fevers  WBC elevation  UTI   lOWER LEG wounds    Subjective interval History :  66 y.o. woman admitted with high fevers from home and with change in mentation and she has spc in place has abnormal UA and urine cx positive for Enterobacter, she has poor mobility and venous stasis ulcer non healing for months and is followed in wound care center now with more weeping bleeding and pain and wound cx positive for Pseudomonas and MRSA. WBC elevation on admit and fever down since IV abx and we are consulted given the complicated infectious process her mentation bit better still very weak and tired. Sitting up in the chair, less confused leg dressing changes done and healing and urine clearing up d/w RN at bed side      Past Medical History:    Past Medical History:   Diagnosis Date    Anemia     Atrial fibrillation (HCC)     CAD (coronary artery disease)     Chronic back pain     CKD (chronic kidney disease), stage III (HCC)     Diabetes mellitus (HCC)     Diastolic CHF ([address]     Hyperlipidemia     Hypertension     MI (myocardial infarction) ([address]     MRSA (methicillin resistant staph aureus) culture positive 04/13/2018    leg ulcer    MRSA colonization 10/16/2018    Obesity (BMI 30-39. 9)     Osteoarthritis     Pancreatitis     Sepsis ([address]     Stasis ulcer ([address]     bilateral lower extremities     Suprapubic catheter ([address]        Past Surgical History:    Past Surgical History:   Procedure Laterality Date    APPENDECTOMY      BACK SURGERY      x2    CATARACT REMOVAL      bilateral     CHOLECYSTECTOMY      COLONOSCOPY      EYE SURGERY      FRACTURE SURGERY      Left ankle    HYSTERECTOMY      INSET/CHANGE LOMELI CATHETER - COMPLICATED  32/1009    suprapubic cath   Farrar daily      vitamin D3 (CHOLECALCIFEROL) 400 units TABS tablet Take 400 Units by mouth daily          Allergies:  Sulfa antibiotics    Immunizations :   Immunization History   Administered Date(s) Administered    Influenza Vaccine, unspecified formulation 10/08/2010, 08/15/2014, 09/08/2017, 09/29/2018    Influenza, High Dose (Fluzone 65 yrs and older) 09/29/2018    Influenza, Genetta Kiang, 3 Years and older, IM (Fluzone 3 yrs and older or Afluria 5 yrs and older) 09/08/2017    Pneumococcal 13-valent Conjugate ([id number]) 10/15/2018    Pneumococcal Polysaccharide ([id number]) 08/15/2016       Social History:     Social History     Tobacco Use    Smoking status: Never Smoker    Smokeless tobacco: Never Used   Substance Use Topics    Alcohol use: Yes     Comment: occassional    Drug use: No     Social History     Tobacco Use   Smoking Status Never Smoker   Smokeless Tobacco Never Used      Family History   Problem Relation Age of Onset    Heart Disease Father     Heart Failure Father     Diabetes Father     Diabetes Daughter     Diabetes Daughter         REVIEW OF SYSTEMS:    No fever / chills / sweats. No weight loss. No visual change, eye pain, eye discharge. No oral lesion, sore throat, dysphagia. Denies cough / sputum/Sob   Denies chest pain, palpitations/ dizziness  Denies nausea/ vomiting/abdominal pain/diarrhea. Denies dysuria or change in urinary function. Denies joint swelling or pain. No myalgia, arthralgia. No rashes, skin lesions   Denies focal weakness, sensory change or other neurologic symptoms  No lymph node swelling or tenderness.     LEG ulcer, fevers chills, wbc elevation and confusion        PHYSICAL EXAM:      Vitals:    /64   Pulse 79   Temp 98 °F (36.7 °C) (Oral)   Resp 18   Ht 4' 11\" (1.499 m)   Wt 173 lb 1 oz (78.5 kg)   SpO2 96%   BMI 34.95 kg/m²   General Appearance: alert,in some acute distress, +  pallor, no icterus obesity +  Skin: warm and dry, no rash or erythema  Head: normocephalic and atraumatic  Eyes: pupils equal, round, and reactive to light, conjunctivae normal  ENT: tympanic membrane, external ear and ear canal normal bilaterally, nose without deformity, nasal mucosa and turbinates normal without polyps  Neck: supple and non-tender without mass, no thyromegaly  no cervical lymphadenopathy  Pulmonary/Chest: clear to auscultation bilaterally- no wheezes, rales or rhonchi, normal air movement, no respiratory distress  Cardiovascular: normal rate, regular rhythm, normal S1 and S2, no murmurs, rubs, clicks, or gallops, no carotid bruits  Abdomen: soft, non-tender, non-distended, normal bowel sounds, no masses or organomegaly  Extremities: no cyanosis, clubbing or + edema  Bi lateral lower leg ulcers+ venous stasis skin peeling+   Musculoskeletal: normal range of motion, no joint swelling, deformity or tenderness  Neurologic: reflexes normal and symmetric, no cranial nerve deficit  Psych:  Orientation, sensorium, mood normal  Lines:  Supra pubic catheter+              Data Review:    Lab Results   Component Value Date    WBC 9.3 06/13/2019    HGB 9.0 (L) 06/13/2019    HCT 26.5 (L) 06/13/2019    MCV 96.2 06/13/2019     06/13/2019     Lab Results   Component Value Date    CREATININE 1.0 06/13/2019    BUN 9 06/13/2019     06/13/2019    K 2.7 (LL) 06/13/2019     06/13/2019    CO2 22 06/13/2019       Hepatic Function Panel:   Lab Results   Component Value Date    ALKPHOS 122 06/09/2019    ALT 8 06/09/2019    AST 10 06/09/2019    PROT 5.5 06/11/2019    BILITOT 0.3 06/09/2019    BILIDIR <0.2 06/09/2019    IBILI see below 06/09/2019    LABALBU 2.8 06/13/2019       UA:  Lab Results   Component Value Date    COLORU YELLOW 06/08/2019    CLARITYU CLOUDY 06/08/2019    GLUCOSEU Negative 06/08/2019    BILIRUBINUR Negative 06/08/2019    KETUA Negative 06/08/2019    SPECGRAV 1.012 06/08/2019    BLOODU MODERATE 06/08/2019    PHUR 5.5 06/08/2019    PROTEINU 30 06/08/2019    UROBILINOGEN 0.2 06/08/2019    NITRU POSITIVE 06/08/2019    LEUKOCYTESUR LARGE 06/08/2019    LABMICR YES 06/08/2019    URINETYPE Clean catch 06/08/2019      Urine Microscopic:   Lab Results   Component Value Date    BACTERIA 1+ 06/08/2019    COMU see below 04/13/2018    HYALCAST 2 06/08/2019    WBCUA 328 06/08/2019    RBCUA 5 06/08/2019    EPIU 0 06/08/2019       MICRO: cultures reviewed and updated by me     [id number]       Order Status: Completed Specimen: Urine, clean catch Updated: 06/11/19 0711    Urine Culture, Routine --    Organism Enterobacter cloacaeAbnormal     Urine Culture, Routine --    >100,000 CFU/ml   ID and sensitivity to follow     Organism Gram negative rodAbnormal     Urine Culture, Routine --    75,000 CFU/ml   ID and sensitivity to follow    Narrative:     ORDER#: [id number]                          ORDERED BY: Angel Dia  SOURCE: Urine Clean Catch                  COLLECTED:  06/08/19 12:22  ANTIBIOTICS AT REGIS. :                      RECEIVED :  06/08/19 12:49  Performed at:  [facility]  [address] [facility] [address]   Phone [phone]   Wound Culture [[id number]] (Abnormal)  Collected: 06/08/19 1224   Order Status: Completed Specimen: Leg Updated: 06/11/19 0615    WOUND/ABSCESS --Abnormal     Light growth Mixed skin rex,   No further workup   Abnormal     Gram Stain Result 3+ WBC's (Polymorphonuclear)   1+ Gram positive cocci   Abnormal     Organism Pseudomonas aeruginosaAbnormal     WOUND/ABSCESS Light growth    Organism Staph aureus MRSAAbnormal     WOUND/ABSCESS --Abnormal     Light growth   CONTACT PRECAUTIONS INDICATED   PBP2= POSITIVE   Abnormal    Narrative:     ORDER#: [id number]                          ORDERED BY: Angel Dia  SOURCE: Leg Right                          COLLECTED:  06/08/19 12:24  ANTIBIOTICS AT REGIS. :                      RECEIVED :  06/08/19 12:36  CALL  [place]  [id number] tel. [phone],  Microbiology results called to and read back by Bill Cramer RN,  06/10/2019 08:29, by Violeta Mcclure  Performed at:  [address], [facility] [address]   Phone [phone]   Culture Blood #1 [[id number]] Collected: 06/08/19 1223   Order Status: Completed Specimen: Blood Updated: 06/09/19 1315    Blood Culture, Routine No Growth to date.  Any change in status will be called. Narrative:     ORDER#: [id number]                          ORDERED BY: JESSICA Salas  SOURCE: Blood Antecubital-Right            COLLECTED:  06/08/19 12:23  ANTIBIOTICS AT REGIS. :                      RECEIVED :  06/08/19 12:36  If child <=2 yrs old please draw pediatric bottle. ~Blood Culture #1  Performed at:  [address], [facility] [address]   Phone [phone]   Culture Blood #2 [[id number]] Collected: 06/08/19 1219   Order Status: Completed Specimen: Blood Updated: 06/09/19 1315    Culture, Blood 2 No Growth to date.  Any change in status will be called. Narrative:     ORDER#: [id number]                          ORDERED BY: Kaylan Qiuroz  SOURCE: Blood Antecubital-Left             COLLECTED:  06/08/19 12:19  ANTIBIOTICS AT REGIS. :                      RECEIVED :  06/08/19 12:36  If child <=2 yrs old please draw pediatric bottle. ~Blood Culture #2  Performed at:  [address], [facility] [address]   Phone [phone]       Susceptibility     Pseudomonas aeruginosa (1)     Antibiotic Interpretation NEVILLE Status    cefepime Sensitive 4 mcg/mL     ciprofloxacin Sensitive <=1 mcg/mL     gentamicin Sensitive <=4 mcg/mL     meropenem Sensitive <=1 mcg/mL     piperacillin-tazobactam Sensitive <=16 mcg/mL     tobramycin Sensitive <=4 mcg/mL     Staph aureus mrsa (2)     Antibiotic Interpretation NEVILLE Status    ceFAZolin Resistant >16 mcg/mL     clindamycin Resistant >2 mcg/mL     erythromycin Resistant >4 mcg/mL     oxacillin Resistant >2 mcg/mL     tetracycline Sensitive <=0.5 mcg/mL     trimethoprim-sulfamethoxazole Resistant >2/38 mcg/mL     vancomycin Sensitive 1 mcg/mL       Susceptibility     Enterobacter cloacae (1)     Antibiotic Interpretation NEVILLE Status    amoxicillin-clavulanate Resistant >16/8 mcg/mL     ampicillin Resistant >16 mcg/mL     ceFAZolin Resistant >16 mcg/mL     cefepime Sensitive <=1 mcg/mL     cefTRIAXone Sensitive <=1 mcg/mL     ciprofloxacin Sensitive <=0.5 mcg/mL     gentamicin Sensitive <=2 mcg/mL     levofloxacin Sensitive <=1 mcg/mL     meropenem Sensitive <=0.25 mcg/mL     nitrofurantoin Resistant >64 mcg/mL     piperacillin-tazobactam Sensitive <=2/4 mcg/mL     tetracycline Sensitive <=2 mcg/mL     trimethoprim-sulfamethoxazole Sensitive <=0.5/9.5 mcg/mL         IMAGING:    US RENAL COMPLETE   Final Result   Findings are suggestive of bilateral nephrolithiasis, without hydronephrosis. XR CHEST PORTABLE   Final Result   Pulmonary vascular congestion.                All the pertinent images and reports for the current Hospitalization were reviewed by me     Scheduled Meds:   insulin lispro  12 Units Subcutaneous TID     insulin glargine  20 Units Subcutaneous Daily    vancomycin  750 mg Intravenous Q24H    insulin lispro  0-18 Units Subcutaneous TID WC    insulin lispro  0-9 Units Subcutaneous Nightly    cefepime  2 g Intravenous Q24H    vancomycin (VANCOCIN) intermittent dosing (placeholder)   Other RX Placeholder    aspirin  325 mg Oral Daily    atorvastatin  10 mg Oral Daily    lipase-protease-amylase  2 capsule Oral TID     metoprolol tartrate  25 mg Oral BID    venlafaxine  150 mg Oral BID    timolol  1 drop Right Eye Daily    ferrous sulfate  324 mg Oral BID WC       Continuous Infusions:   dextrose         PRN Meds:  potassium chloride **OR** potassium alternative oral replacement **OR** potassium chloride, LORazepam, traMADol, glucose, dextrose, glucagon (rDNA), dextrose, ondansetron, acetaminophen      Assessment:     Sepsis on admit  High fevers now going down   WBC elevation  Trend down    UTI with SPC in place  UA very abnormal and urine cx Enterobacter and GNR   Bi lateral lower leg venous ulcers non healing   Wound cx with MRSA and Pseudomonas  H/o MRSA colonization  CKD+  Encephalopathy from infection and metabolic process slow to resolve  Poor mobility +      With slow clinical improvement and now cx are final will be able to choose oral abx for d/c when ready she will need follow up in wound care      Leg wound less red and no bleeding with dressing changes she follows in wound care will need coordination d/w RN      Labs, Microbiology, Radiology and all the pertinent results from current hospitalization and  care every where were reviewed  by me as a part of the evaluation   Plan:   1. Cont IV Cefepime will cover Pseudomonas and Enterobacter  2. Cont IV Vancomycin for MRSA on the wound cx  Pharmacy dosing   3. Bi lateral lower leg ulcers non healing for a while and she is followed in wound care center  4. Watch creat closely  5. Home on oral Cipro x 500 mg x bid x 14 days and Oral Doxycycline x 100 mg bid x 14  days for MRSA on the legs and to cover Pseudomonas  6. Follow up in wound care as planned d/w wound care RN    Discussed with patient/Family  D/w RN    Thanks for allowing me to participate in your patient's care and please call me with any questions or concerns.     Chela Govea MD  Infectious Disease  [facility]) Physician  Phone: [phone]   Fax : [phone]

## 2019-06-13 NOTE — DISCHARGE INSTR - COC
extremity ([facility] I87.333, L97.919, L97.929    DMII (diabetes mellitus, type 2) ([facility] E11.9    Cellulitis of left lower extremity L03. 116    Protein-calorie malnutrition, moderate ([facility]) E44.0    MRSA colonization Z22.322    Chronic diastolic heart failure ([facility]) I50.32    Coronary artery disease involving native coronary artery of native heart without angina pectoris I25.10    Aortic atherosclerosis ([facility]) I70.0    Essential hypertension I10    Cellulitis, lower extremities bilaterally L03.90    MRSA nasal colonization Z22.322    Normocytic anemia D64.9    Altered mental status R41.82    Elevated lactic acid level R79.89    Uncontrolled diabetes mellitus ([facility]) E11.65    Sepsis ([facility]) A41.9       Isolation/Infection:   Isolation          Contact        Patient Infection Status     Infection Encounter Level? Onset Date Added Added By Resolved Resolved By Review Date    MRSA No  04/16/18 Wound Culture       Nares colonization 10/15/2018          Nurse Assessment:  Last Vital Signs: /64   Pulse 79   Temp 98 °F (36.7 °C) (Oral)   Resp 18   Ht 4' 11\" (1.499 m)   Wt 173 lb 1 oz (78.5 kg)   SpO2 96%   BMI 34.95 kg/m²     Last documented pain score (0-10 scale): Pain Level: 0  Last Weight:   Wt Readings from Last 1 Encounters:   06/13/19 173 lb 1 oz (78.5 kg)     Mental Status:  oriented and alert    IV Access:  - None    Nursing Mobility/ADLs:  Walking   Assisted  Transfer  Assisted  Bathing  Assisted  Dressing  Assisted  Toileting  Assisted  Feeding  Independent  Med Admin  Assisted  Med Delivery   whole    Wound Care Documentation and Therapy:  Wound 06/08/19 Tibial Left; Lower (Active)   Wound Image    6/11/2019 11:49 AM   Wound Venous 6/12/2019  5:08 PM   Dressing Status Clean;Dry; Intact 6/13/2019  8:33 AM   Dressing Changed Dressing reinforced 6/13/2019  8:33 AM   Dressing/Treatment Silicone Dressing; Wound gel;ABD; Ace Wrap;Dry Dressing 6/12/2019  5:08 PM   Wound Cleansed Rinsed/Irrigated with saline 6/12/2019  5:08 PM   Dressing Change Due 06/13/19 6/12/2019  5:08 PM   Wound Length (cm) 12 cm 6/11/2019 11:49 AM   Wound Width (cm) 6.5 cm 6/11/2019 11:49 AM   Wound Depth (cm) 0.1 cm 6/11/2019 11:49 AM   Wound Surface Area (cm^2) 78 cm^2 6/11/2019 11:49 AM   Change in Wound Size % (l*w) -1.3 6/11/2019 11:49 AM   Wound Volume (cm^3) 7.8 cm^3 6/11/2019 11:49 AM   Wound Assessment SAMARA 6/13/2019  8:33 AM   Drainage Amount SAMARA 6/13/2019  8:33 AM   Drainage Description Serosanguinous; Sanguinous 6/12/2019  5:08 PM   Odor None 6/12/2019  5:08 PM   Marilynn-wound Assessment SAMARA 6/13/2019  8:33 AM   Non-staged Wound Description Partial thickness 6/12/2019  5:08 PM   Red%Wound Bed 100 6/12/2019  5:08 PM   Number of days: 4       Wound 06/08/19 Tibial Right; Lower (Active)   Wound Image    6/11/2019 11:49 AM   Wound Venous 6/12/2019  5:08 PM   Dressing Status Clean;Dry; Intact 6/13/2019  8:33 AM   Dressing Changed Changed/New 6/12/2019  5:08 PM   Dressing/Treatment Silicone Dressing; Wound gel;ABD; Ace Wrap;Dry Dressing 6/12/2019  5:08 PM   Wound Cleansed Rinsed/Irrigated with saline 6/12/2019  5:08 PM   Dressing Change Due 06/13/19 6/12/2019  5:08 PM   Wound Length (cm) 10.5 cm 6/11/2019 11:49 AM   Wound Width (cm) 5 cm 6/11/2019 11:49 AM   Wound Depth (cm) 0.1 cm 6/11/2019 11:49 AM   Wound Surface Area (cm^2) 52.5 cm^2 6/11/2019 11:49 AM   Change in Wound Size % (l*w) -116.05 6/11/2019 11:49 AM   Wound Volume (cm^3) 5.25 cm^3 6/11/2019 11:49 AM   Wound Assessment SAMARA 6/13/2019  8:33 AM   Drainage Amount SAMARA 6/13/2019  8:33 AM   Drainage Description Sanguinous; Serosanguinous 6/12/2019  5:08 PM   Odor None 6/12/2019  5:08 PM   Marilynn-wound Assessment SAMARA 6/13/2019  8:33 AM   Non-staged Wound Description Partial thickness 6/12/2019  5:08 PM   Red%Wound Bed 100 6/12/2019  5:08 PM   Number of days: 4       Wound 06/10/19 Buttocks Left;Right MASD/ friction shear  (Active)   Wound Other 6/11/2019 10:36 AM   Dressing Status Changed 6/11/2019 10:36 AM   Dressing Changed Changed/New 6/11/2019 10:36 AM   Dressing/Treatment Moisture barrier 6/12/2019  8:00 AM   Wound Cleansed Wound cleanser 6/11/2019 10:36 AM   Dressing Change Due 06/11/19 6/11/2019 10:36 AM   Wound Assessment SAMARA 6/13/2019  8:33 AM   Drainage Amount SAMARA 6/13/2019  8:33 AM   Drainage Description Sanguinous 6/11/2019 10:36 AM   Odor None 6/11/2019 10:36 AM   Marilynn-wound Assessment Dry; Intact; Blanchable erythema 6/11/2019 10:36 AM   Non-staged Wound Description Partial thickness 6/11/2019 10:36 AM   [place]%Wound Bed 100 6/11/2019 10:36 AM   Number of days: 2        Elimination:  Continence:   · Bowel: Yes  · Bladder: SP catheter  Urinary Catheter: chronic SP catheter   Colostomy/Ileostomy/Ileal Conduit: No       Date of Last BM: 6/13/2019    Intake/Output Summary (Last 24 hours) at 6/13/2019 1051  Last data filed at 6/13/2019 0545  Gross per 24 hour   Intake 240 ml   Output 850 ml   Net -610 ml     I/O last 3 completed shifts: In: 240 [P.O.:240]  Out: 1750 [Urine:1750]    Safety Concerns:     History of Falls (last 30 days) and At Risk for Falls    Impairments/Disabilities:      None    Nutrition Therapy:  Current Nutrition Therapy:   - Oral Diet:  Carb Control 4 carbs/meal (1800kcals/day)    Routes of Feeding: Oral  Liquids: Thin Liquids  Daily Fluid Restriction: no  Last Modified Barium Swallow with Video (Video Swallowing Test): not done    Treatments at the Time of Hospital Discharge:   Respiratory Treatments: PRN  Oxygen Therapy:  is not on home oxygen therapy.   Ventilator:    - No ventilator support    Rehab Therapies: Nurse, Physical Therapy, Occupational Therapy and HHA  Weight Bearing Status/Restrictions: No weight bearing restirctions  Other Medical Equipment (for information only, NOT a DME order):  walker  Other Treatments: Wound care treatment     Wound care Treatment: Bilateral lower legs   Cleanse open wounds with Wound Cleanser   Apply Moisturizing Lotion (purple label) dry intact skin. Apply Silicone Mepitel gauze dressing over open wounds   Secure with dry ABD pad, Kerlix Roll,  and Ace Wrap from Toes to Knees   Change dressing DAILY or as needed for soiling    Electronically signed by Rosalie Calvillo RN on 6/13/2019 at 5:19 PM      [facility]: [address] agency to establish plan of care for patient over 60 day period  Nursing:  Initial home SN evaluation visit to occur within 24-48 hours of hospital discharge  [facility] nursing visits daily, then QOD with progression as warranted for:  1)  medication management  2)  VS and clinical assessment  3)  S&S chronic disease exacerbation education + when to contact MD/NP  4)  care coordination  Medication Reconciliation during 1st SN visit  Nurse telephone visit on the days that visit is not being made in person for first 30 days                               PT/OT/Speech   Evaluations in home within 24-48 hours of hospital discharge to include DME and home safety   Frontload therapy 5 days, then 3x a week   OT evaluation for [address] needs for personal care    Palliative Care referral within 5 days of hospital discharge    evaluation within 24-48 hours of hospital discharge to evaluate resources & insurance for potential  [place], [place], LTC, and Medicaid options   Dietician evaluation within 5 days of hospital discharge   PCP visit within 3 days of hospital discharge, followed by PCP visit @ 10 days, and 21 days   TeleHealth (Home care Vitals) if patient agreeable and qualifies.        Patient's personal belongings (please select all that are sent with patient):  Glasses, Dentures upper and lower    RN SIGNATURE:  Electronically signed by Svitlana Rodriguez RN on 6/13/19 at 7:48 PM    CASE MANAGEMENT/SOCIAL WORK SECTION    Inpatient Status Date: ***    Readmission Risk Assessment Score:  15    Discharging to Facility/ Agency   · Name:  [facility]    · Address: [address] [place], [place] [address]  · Phone: [phone]  · Fax: [phone]    / signature: {Esignature:[id number]}    PHYSICIAN SECTION    Prognosis: Fair    Condition at Discharge: Stable    Rehab Potential (if transferring to Rehab): Good    Recommended Labs or Other Treatments After Discharge: pt/ot/rn    Physician Certification: I certify the above information and transfer of Nasra Vann  is necessary for the continuing treatment of the diagnosis listed and that she requires Home Care for greater 30 days.      Update Admission H&P: No change in H&P    PHYSICIAN SIGNATURE:  Electronically signed by Kristian Wolfe MD on 6/13/19 at 4:56 PM

## 2019-06-14 NOTE — CARE COORDINATION
[facility]  Noted patient discharged last night. Faxed orders to [facility] to resume home care services.    Home care to see patient by   6/16/19  Apolonia Patiño LPN    [address] Cell [phone], Fax [phone]

## 2019-06-16 NOTE — DISCHARGE SUMMARY
Physician Discharge Summary     Patient ID:  Imelda Linares  [id number]  22 y.o.  1945    Admit date: 6/8/2019    Discharge date and time: 6/13/2019  8:38 PM     Admitting Physician: Emmie Swain MD     Discharge Physician: Makenzie Beal MD    Admission Diagnoses: Sepsis [facility]) [A41.9]    Discharge Diagnoses: Wound cx with MRSA and Pseudomonas    Admission Condition: fair    Discharged Condition: good    Indication for Admission: Encephalopathy from infection and metabolic process    Hospital Course:   66 y.o. woman admitted with high fevers from home and with change in mentation. She has spc in place has abnormal UA and urine cx positive for Enterobacter, She has poor mobility and venous stasis ulcer non healing for months and is followed in wound care center now with more weeping bleeding and pain and wound cx positive for Pseudomonas and MRSA. ID consulted. Treated with iv abx. Home on oral Cipro x 500 mg x bid x 14 days and Oral Doxycycline x 100 mg bid x 14  days for MRSA on the legs and to cover Pseudomonas. Pt refused SNF. HHC was arranged. Dc'd home in stable condition.      Consults: ID    Discharge Exam:  General Appearance: alert,in some acute distress, +  pallor, no icterus obesity +  Skin: warm and dry, no rash or erythema  Head: normocephalic and atraumatic  Eyes: pupils equal, round, and reactive to light, conjunctivae normal  ENT: tympanic membrane, external ear and ear canal normal bilaterally, nose without deformity, nasal mucosa and turbinates normal without polyps  Neck: supple and non-tender without mass, no thyromegaly  no cervical lymphadenopathy  Pulmonary/Chest: clear to auscultation bilaterally- no wheezes, rales or rhonchi, normal air movement, no respiratory distress  Cardiovascular: normal rate, regular rhythm, normal S1 and S2, no murmurs, rubs, clicks, or gallops, no carotid bruits  Abdomen: soft, non-tender, non-distended, normal bowel sounds, no masses or organomegaly  Extremities: no cyanosis, clubbing or + edema  Bi lateral lower leg ulcers+ venous stasis skin peeling+   Musculoskeletal: normal range of motion, no joint swelling, deformity or tenderness  Neurologic: reflexes normal and symmetric, no cranial nerve deficit  Psych:  Orientation, sensorium, mood normal  Lines:  Supra pubic catheter+        Disposition: {dispositiohomeess/preliminary results:  Outstanding Order Results     No orders found from 5/10/2019 to 6/9/2019. Patient Instructions:   Discharge Medication List as of 6/13/2019  8:13 PM      START taking these medications    Details   ciprofloxacin (CIPRO) 500 MG tablet Take 1 tablet by mouth 2 times daily for 14 days, Disp-28 tablet, R-0Normal      doxycycline hyclate (VIBRA-TABS) 100 MG tablet Take 1 tablet by mouth 2 times daily for 14 days, Disp-28 tablet, R-0Normal         CONTINUE these medications which have NOT CHANGED    Details   ibuprofen (ADVIL;MOTRIN) 200 MG tablet Take 400 mg by mouth every 6 hours as needed for PainHistorical Med      LORazepam (ATIVAN) 0.5 MG tablet Take 0.5 mg by mouth nightly as needed for Anxiety. Historical Med      traMADol (ULTRAM) 50 MG tablet Take 50 mg by mouth every 8 hours as needed for Pain. Historical Med      raNITIdine HCl (ZANTAC 150 MAXIMUM STRENGTH PO) Take 150 mg by mouth 2 times dailyHistorical Med      ferrous sulfate 325 (65 Fe) MG tablet Take 325 mg by mouth 2 times daily Historical Med      insulin lispro (HUMALOG) 100 UNIT/ML injection vial Inject 5 Units into the skin 3 times daily (with meals), Disp-1 vial, R-0Normal      Timolol (TIMOPTIC) 0.5 % (DAILY) SOLN ophthalmic solution Place 1 drop into the right eye daily Historical Med      aspirin 325 MG tablet Take 325 mg by mouth daily Historical Med      atorvastatin (LIPITOR) 10 MG tablet Take 1 tablet by mouth daily, Disp-30 tablet, R-0Normal      metoprolol tartrate (LOPRESSOR) 25 MG tablet Take 1 tablet by mouth 2 times daily, Disp-60 tablet, R-0Normal      furosemide (LASIX) 40 MG tablet Take 0.5 tablets by mouth daily, Disp-60 tablet, R-0Normal      Biotin (BIOTIN 5000) 5 MG CAPS Take 1 tablet by mouth dailyHistorical Med      Calcium Carbonate Antacid (TUMS ULTRA 1000) 1000 MG CHEW Take 1 tablet by mouth as needed (acid reflux) Historical Med      lipase-protease-amylase (CREON) 24889 units delayed release capsule Take 24,000 Units by mouth 3 times daily (with meals), Oral, 3 TIMES DAILY WITH MEALS, Until Discontinued, Historical Med      gabapentin (NEURONTIN) 300 MG capsule Take 300 mg by mouth 2 times daily. Historical Med      isosorbide mononitrate (IMDUR) 30 MG extended release tablet Take 30 mg by mouth dailyHistorical Med      venlafaxine (EFFEXOR) 75 MG tablet Take 150 mg by mouth 2 times daily Historical Med      cetirizine (ZYRTEC) 10 MG tablet Take 10 mg by mouth dailyHistorical Med      vitamin D3 (CHOLECALCIFEROL) 400 units TABS tablet Take 400 Units by mouth daily Historical Med           Activity: activity as tolerated  Diet: regular diet  Wound Care: none needed    Follow-up with pcp in 1 week.     Signed:  David Coley MD  Time spent: 36 mins    6/16/2019  12:21 AM

## 2019-06-17 LAB — URINE ELECTROPHORESIS INTERP: NORMAL

## 2019-06-20 ENCOUNTER — HOSPITAL ENCOUNTER (OUTPATIENT)
Dept: WOUND CARE | Age: 74
Discharge: HOME | End: 2019-06-20
Payer: MEDICARE

## 2019-06-20 VITALS
HEART RATE: 72 BPM | RESPIRATION RATE: 18 BRPM | SYSTOLIC BLOOD PRESSURE: 139 MMHG | TEMPERATURE: 98.7 F | DIASTOLIC BLOOD PRESSURE: 78 MMHG

## 2019-06-20 DIAGNOSIS — I87.333 CHRONIC VENOUS HYPERTENSION (IDIOPATHIC) WITH ULCER AND INFLAMMATION OF BILATERAL LOWER EXTREMITY (HCC): Primary | ICD-10-CM

## 2019-06-20 DIAGNOSIS — L97.919 CHRONIC VENOUS HYPERTENSION (IDIOPATHIC) WITH ULCER AND INFLAMMATION OF BILATERAL LOWER EXTREMITY (HCC): Primary | ICD-10-CM

## 2019-06-20 DIAGNOSIS — L97.929 CHRONIC VENOUS HYPERTENSION (IDIOPATHIC) WITH ULCER AND INFLAMMATION OF BILATERAL LOWER EXTREMITY (HCC): Primary | ICD-10-CM

## 2019-06-20 PROCEDURE — 11045 DBRDMT SUBQ TISS EACH ADDL: CPT | Performed by: SURGERY

## 2019-06-20 PROCEDURE — 11045 DBRDMT SUBQ TISS EACH ADDL: CPT

## 2019-06-20 PROCEDURE — 11042 DBRDMT SUBQ TIS 1ST 20SQCM/<: CPT | Performed by: SURGERY

## 2019-06-20 PROCEDURE — 11042 DBRDMT SUBQ TIS 1ST 20SQCM/<: CPT

## 2019-06-20 RX ORDER — LIDOCAINE HYDROCHLORIDE 40 MG/ML
SOLUTION TOPICAL ONCE
Status: DISCONTINUED | OUTPATIENT
Start: 2019-06-20 | End: 2019-06-21 | Stop reason: HOSPADM

## 2019-06-20 ASSESSMENT — PAIN DESCRIPTION - LOCATION: LOCATION: LEG

## 2019-06-20 ASSESSMENT — PAIN DESCRIPTION - DESCRIPTORS: DESCRIPTORS: ACHING

## 2019-06-20 ASSESSMENT — PAIN DESCRIPTION - ONSET: ONSET: ON-GOING

## 2019-06-20 ASSESSMENT — PAIN SCALES - GENERAL: PAINLEVEL_OUTOF10: 7

## 2019-06-20 ASSESSMENT — PAIN DESCRIPTION - FREQUENCY: FREQUENCY: INTERMITTENT

## 2019-06-20 ASSESSMENT — PAIN DESCRIPTION - PROGRESSION: CLINICAL_PROGRESSION: NOT CHANGED

## 2019-06-20 ASSESSMENT — PAIN - FUNCTIONAL ASSESSMENT: PAIN_FUNCTIONAL_ASSESSMENT: PREVENTS OR INTERFERES SOME ACTIVE ACTIVITIES AND ADLS

## 2019-06-20 ASSESSMENT — PAIN DESCRIPTION - PAIN TYPE: TYPE: ACUTE PAIN

## 2019-06-20 ASSESSMENT — PAIN DESCRIPTION - ORIENTATION: ORIENTATION: LEFT;RIGHT

## 2019-06-20 NOTE — PROGRESS NOTES
COLONOSCOPY      EYE SURGERY      FRACTURE SURGERY      Left ankle    HYSTERECTOMY      INSET/CHANGE LOMELI CATHETER - COMPLICATED  [id number]    suprapubic cath    JOINT REPLACEMENT      Right knee    KNEE SURGERY         FAMILY HISTORY    Family History   Problem Relation Age of Onset    Heart Disease Father     Heart Failure Father     Diabetes Father     Diabetes Daughter     Diabetes Daughter        SOCIAL HISTORY    Social History     Tobacco Use    Smoking status: Never Smoker    Smokeless tobacco: Never Used   Substance Use Topics    Alcohol use: Yes     Comment: occassional    Drug use: No       ALLERGIES    Allergies   Allergen Reactions    Sulfa Antibiotics Rash       MEDICATIONS    Current Outpatient Medications on File Prior to Encounter   Medication Sig Dispense Refill    ciprofloxacin (CIPRO) 500 MG tablet Take 1 tablet by mouth 2 times daily for 14 days 28 tablet 0    doxycycline hyclate (VIBRA-TABS) 100 MG tablet Take 1 tablet by mouth 2 times daily for 14 days 28 tablet 0    ibuprofen (ADVIL;MOTRIN) 200 MG tablet Take 400 mg by mouth every 6 hours as needed for Pain      traMADol (ULTRAM) 50 MG tablet Take 50 mg by mouth every 8 hours as needed for Pain.       raNITIdine HCl (ZANTAC 150 MAXIMUM STRENGTH PO) Take 150 mg by mouth 2 times daily      ferrous sulfate 325 (65 Fe) MG tablet Take 325 mg by mouth 2 times daily       insulin lispro (HUMALOG) 100 UNIT/ML injection vial Inject 5 Units into the skin 3 times daily (with meals) (Patient taking differently: Inject into the skin Humalog insulin pump: 1.8 Unit basal rate continous and as needed bolus per insulin pump) 1 vial 0    Timolol (TIMOPTIC) 0.5 % (DAILY) SOLN ophthalmic solution Place 1 drop into the right eye daily       aspirin 325 MG tablet Take 325 mg by mouth daily       atorvastatin (LIPITOR) 10 MG tablet Take 1 tablet by mouth daily 30 tablet 0    metoprolol tartrate (LOPRESSOR) 25 MG tablet Take 1 tablet by mouth 2 times daily 60 tablet 0    furosemide (LASIX) 40 MG tablet Take 0.5 tablets by mouth daily 60 tablet 0    Biotin (BIOTIN 5000) 5 MG CAPS Take 1 tablet by mouth daily      lipase-protease-amylase (CREON) 87760 units delayed release capsule Take 24,000 Units by mouth 3 times daily (with meals)      gabapentin (NEURONTIN) 300 MG capsule Take 300 mg by mouth 2 times daily.  isosorbide mononitrate (IMDUR) 30 MG extended release tablet Take 30 mg by mouth daily      venlafaxine (EFFEXOR) 75 MG tablet Take 150 mg by mouth 2 times daily       cetirizine (ZYRTEC) 10 MG tablet Take 10 mg by mouth daily      vitamin D3 (CHOLECALCIFEROL) 400 units TABS tablet Take 400 Units by mouth daily       LORazepam (ATIVAN) 0.5 MG tablet Take 0.5 mg by mouth nightly as needed for Anxiety.  Calcium Carbonate Antacid (TUMS ULTRA 1000) 1000 MG CHEW Take 1 tablet by mouth as needed (acid reflux)        No current facility-administered medications on file prior to encounter.         REVIEW OF SYSTEMS    A comprehensive review of systems was negative except for: Respiratory: positive for dyspnea on exertion  Musculoskeletal: positive for back pain and Left leg pain    Objective:      /78   Pulse 72   Temp 98.7 °F (37.1 °C) (Oral)   Resp 18     Wt Readings from Last 3 Encounters:   06/13/19 173 lb 1 oz (78.5 kg)   05/05/19 192 lb 14.4 oz (87.5 kg)   11/20/18 157 lb 6.5 oz (71.4 kg)       PHYSICAL EXAM    General Appearance: alert and oriented to person, place and time, well developed and well- nourished, in no acute distress  Skin: warm and dry, no rash or erythema  Head: normocephalic and atraumatic  Eyes: pupils equal, round, and reactive to light, extraocular eye movements intact, conjunctivae normal  Neck: supple and non-tender without mass, no thyromegaly or thyroid nodules, no cervical lymphadenopathy  Pulmonary/Chest: clear to auscultation left chest- no wheezes, rales or rhonchi, normal air movement, no respiratory distress, right base rales appreciated did not clear with cough. No shortness of breath while speaking daughter suspects she does have shortness of breath and has gained 10 pounds in a week Cardiovascular: normal rate, regular rhythm, normal S1 and S2, no murmurs, rubs, clicks, or gallops, distal pulses +2 left dorsalis pedis, no others palpable Doppler signals biphasic in all 4 pedal arteries no carotid bruits neck veins slightly elevated left side  Abdomen: soft, non-tender, non-distended obese right upper quadrant gallbladder scar LRR, suprapubic tube placed by Tony Perry normal bowel sounds, no masses or organomegaly  Extremities: no cyanosis, clubbing massive edema left thigh and calf moderate edema right thigh and calf . Bilateral ulcerations of the calf and one of the medial left thigh see pictures below  Musculoskeletal: normal range of motion, no joint swelling, deformity or tenderness  Neurologic: reflexes normal and symmetric, no cranial nerve deficit, had trouble walking from the house to the car today, coordination and speech normal      Assessment:        Problem List Items Addressed This Visit     Chronic venous hypertension (idiopathic) with ulcer and inflammation of bilateral lower extremity ([address] - Primary             Excisional Debridement Procedure Note  Indications:  Based on my examination of this patient's wound(s)/ulcer(s) today, debridement is required to promote healing and evaluate the wound base. Performed by: Saad Miranda MD    Consent obtained? Yes    Time out taken: Yes    Pain Control: Anesthetic: 4% Lidocaine Liquid Topical(10 mL's)     Debridement:Excisional Debridement    Using curette the wound/ulcer was sharply debrided    down through and included excision of  subcutaneous tissue.         Devitalized Tissue Debrided:  slough and necrotic/eschar      Pre Debridement Measurements:  Are located in the [facility]  Documentation Flow Sheet Wound/Ulcer #: 1, 2 and 3     Post  Debridement Measurements:  Wound 05/23/19 Leg Right;Medial Wound #1; re-opened approx 2 months ago (Active)   Wound Image   5/23/2019  3:26 PM   Wound Venous 5/23/2019  3:26 PM   Dressing Status Old drainage 6/20/2019  3:07 PM   Dressing Changed Changed/New 6/20/2019  3:57 PM   Dressing/Treatment Dry Dressing;Alginate with Ag;ABD; Ace Wrap 6/20/2019  3:57 PM   Wound Length (cm) 11.4 cm 6/20/2019  3:07 PM   Wound Width (cm) 13 cm 6/20/2019  3:07 PM   Wound Depth (cm) 0.1 cm 6/20/2019  3:07 PM   Wound Surface Area (cm^2) 148.2 cm^2 6/20/2019  3:07 PM   Change in Wound Size % (l*w) -27.87 6/20/2019  3:07 PM   Wound Volume (cm^3) 14.82 cm^3 6/20/2019  3:07 PM   Wound Healing % -28 6/20/2019  3:07 PM   Post-Procedure Length (cm) 11.5 cm 6/20/2019  3:26 PM   Post-Procedure Width (cm) 13.1 cm 6/20/2019  3:26 PM   Post-Procedure Depth (cm) 0.1 cm 6/20/2019  3:26 PM   Post-Procedure Surface Area (cm^2) 150.65 cm^2 6/20/2019  3:26 PM   Post-Procedure Volume (cm^3) 15.06 cm^3 6/20/2019  3:26 PM   Wound Assessment Granulation tissue;Slough 6/20/2019  3:07 PM   Drainage Amount Large 6/20/2019  3:07 PM   Drainage Description Serosanguinous 6/20/2019  3:07 PM   Odor None 6/20/2019  3:07 PM   Margins Attached edges 6/20/2019  3:07 PM   Marilynn-wound Assessment Dry;Pink;Fragile 6/20/2019  3:07 PM   Non-staged Wound Description Full thickness 6/20/2019  3:07 PM   [place]%Wound Bed 30 5/30/2019  3:44 PM   Red%Wound Bed 90 6/20/2019  3:07 PM   Yellow%Wound Bed 10 6/20/2019  3:07 PM   Number of days: 28       Wound 05/23/19 Leg Left;Medial;Lower Wound #2; per family open for months (Active)   Wound Image   5/23/2019  3:26 PM   Wound Venous 5/23/2019  3:26 PM   Dressing Status Old drainage 6/20/2019  3:07 PM   Dressing Changed Changed/New 6/20/2019  3:57 PM   Dressing/Treatment ABD; Alginate with Ag; Ace Wrap;Dry Dressing 6/20/2019  3:57 PM   Wound Length (cm) 10.5 cm 6/20/2019  3:07 PM   Wound Width (cm) 12.5 cm 6/20/2019  3:07 PM   Wound Depth (cm) 0.1 cm 6/20/2019  3:07 PM   Wound Surface Area (cm^2) 131.25 cm^2 6/20/2019  3:07 PM   Change in Wound Size % (l*w) 15.87 6/20/2019  3:07 PM   Wound Volume (cm^3) 13.12 cm^3 6/20/2019  3:07 PM   Wound Healing % 16 6/20/2019  3:07 PM   Post-Procedure Length (cm) 10.6 cm 6/20/2019  3:26 PM   Post-Procedure Width (cm) 12.6 cm 6/20/2019  3:26 PM   Post-Procedure Depth (cm) 0.1 cm 6/20/2019  3:26 PM   Post-Procedure Surface Area (cm^2) 133.56 cm^2 6/20/2019  3:26 PM   Post-Procedure Volume (cm^3) 13.36 cm^3 6/20/2019  3:26 PM   Wound Assessment Granulation tissue;Slough 6/20/2019  3:07 PM   Drainage Amount Large 6/20/2019  3:07 PM   Drainage Description Serosanguinous 6/20/2019  3:07 PM   Odor None 6/20/2019  3:07 PM   Margins Attached edges 6/20/2019  3:07 PM   Marilynn-wound Assessment Pink; Maceration 6/20/2019  3:07 PM   Non-staged Wound Description Full thickness 6/20/2019  3:07 PM   [place]%Wound Bed 40 5/30/2019  3:44 PM   Red%Wound Bed 80 6/20/2019  3:07 PM   Yellow%Wound Bed 20 6/20/2019  3:07 PM   Number of days: 28       Wound 05/23/19 Thigh Left;Medial Wound #3; approx 4/25/19 (Active)   Wound Image   5/23/2019  3:26 PM   Wound Venous 5/23/2019  3:26 PM   Dressing Status Old drainage 6/20/2019  3:07 PM   Dressing Changed Changed/New 6/20/2019  3:57 PM   Dressing/Treatment Alginate with Ag;Dry Dressing 6/20/2019  3:57 PM   Wound Length (cm) 1.2 cm 6/20/2019  3:07 PM   Wound Width (cm) 1.4 cm 6/20/2019  3:07 PM   Wound Depth (cm) 0.1 cm 6/20/2019  3:07 PM   Wound Surface Area (cm^2) 1.68 cm^2 6/20/2019  3:07 PM   Change in Wound Size % (l*w) 13.85 6/20/2019  3:07 PM   Wound Volume (cm^3) 0.17 cm^3 6/20/2019  3:07 PM   Wound Healing % 15 6/20/2019  3:07 PM   Post-Procedure Length (cm) 1.3 cm 6/20/2019  3:26 PM   Post-Procedure Width (cm) 1.5 cm 6/20/2019  3:26 PM   Post-Procedure Depth (cm) 0.1 cm 6/20/2019  3:26 PM   Post-Procedure Surface Area (cm^2) 1.95 cm^2 6/20/2019 3:26 PM   Post-Procedure Volume (cm^3) 0.2 cm^3 6/20/2019  3:26 PM   Wound Assessment Granulation tissue;Slough 6/20/2019  3:07 PM   Drainage Amount Small 6/20/2019  3:07 PM   Drainage Description Serosanguinous 6/20/2019  3:07 PM   Odor None 6/20/2019  3:07 PM   Margins Attached edges 6/20/2019  3:07 PM   Marilynn-wound Assessment Pink 6/20/2019  3:07 PM   Non-staged Wound Description Full thickness 6/20/2019  3:07 PM   [place]%Wound Bed 20 5/30/2019  3:44 PM   Red%Wound Bed 90 6/20/2019  3:07 PM   Yellow%Wound Bed 10 6/20/2019  3:07 PM   Number of days: 28       Wound 05/30/19 Leg Left;Lateral;Lower #4 (Active)   Wound Image   6/20/2019  3:07 PM   Dressing Status Intact;Dry;Clean 6/20/2019  3:07 PM   Dressing Changed Changed/New 5/30/2019  4:24 PM   Dressing/Treatment Other (comment) 5/30/2019  4:24 PM   Wound Length (cm) 0 cm 6/20/2019  3:07 PM   Wound Width (cm) 0 cm 6/20/2019  3:07 PM   Wound Depth (cm) 0 cm 6/20/2019  3:07 PM   Wound Surface Area (cm^2) 0 cm^2 6/20/2019  3:07 PM   Change in Wound Size % (l*w) 100 6/20/2019  3:07 PM   Wound Volume (cm^3) 0 cm^3 6/20/2019  3:07 PM   Wound Healing % 100 6/20/2019  3:07 PM   Post-Procedure Length (cm) 6.5 cm 5/30/2019  4:17 PM   Post-Procedure Width (cm) 5.6 cm 5/30/2019  4:17 PM   Post-Procedure Depth (cm) 0.1 cm 5/30/2019  4:17 PM   Post-Procedure Surface Area (cm^2) 36.4 cm^2 5/30/2019  4:17 PM   Post-Procedure Volume (cm^3) 3.64 cm^3 5/30/2019  4:17 PM   Wound Assessment Epithelialization 6/20/2019  3:07 PM   Drainage Amount Moderate 5/30/2019  3:44 PM   Drainage Description Green;Purulent;Serosanguinous 5/30/2019  3:44 PM   Odor Mild 5/30/2019  3:44 PM   Margins Attached edges 5/30/2019  3:44 PM   Marilynn-wound Assessment Pink 5/30/2019  3:44 PM   Non-staged Wound Description Full thickness 5/30/2019  3:44 PM   Red%Wound Bed 80 5/30/2019  3:44 PM   Yellow%Wound Bed 20 5/30/2019  3:44 PM   Number of days: 21       Percent of Wound/Ulcer Debrided: 100%    Total Surface Saline    [] Hydrogel         [] Mepitel                          [] Bactroban/Mupirocin  [] Polysporin                  [] Other:    [] Pack wound loosely with       [] Iodoform   [] Plain Packing       [] Other   [x] Cover and Secure with:                       [] Gauze [] Brigette   [x] Kerlix              [] Ace Wrap       [] Cover Roll Tape         [x] ABD  PADS                           [] Other: Avoid contact of tape with skin.   [] Change dressing:       [] Daily               [] Every Other Day        [] Three times per week              [] Once a week  [x] TWICE A DAY         [x] Other: DOUBLE LAYER ACE WRAPS TOES TO KNEE RIGHT LEG, TOES TO THIGH TO LEFT LEG     Edema Control:   Apply:  [] Compression Stocking           []Right Leg         []Left Leg              [] Tubigrip          []Right Leg Double Layer          []Left Leg Double Layer                                                  []Right Leg Single Layer           []Left Leg Single Layer              [] SpandaGrip    []Right Leg         []Left Leg                                      []Low compression 5-10 mm/Hg                                                 []Medium compression 10-20 mm/Hg                                      []High compression  20-30 mm/Hg              every morning immediately when getting up should be applied to affected leg(s) from mid foot to knee making sure to cover the heel.  Remove every night before going to bed.              [] Elevate leg(s) above the level of the heart when sitting.                    [] Avoid prolonged standing in one place.              [] Elevate arm/hand above the level of the heart         []RightArm         []LeftArm                Compression:  Apply: [] Multilayer Compression Wrap Applied in Clinic-   []RightLeg            []Left Leg--              [] Multi-layer compression.  Do not get leg(s) with wrap wet.  If wraps become too tight call the center or completely remove the wrap.                        [x] Elevate leg(s) above the level of the heart when sitting.                          [x] Avoid prolonged standing in one place.     Off-Loading:   [] Off-loading when        [] walking           [] in bed [] sitting  [] Total non-weight bearing  [] Right Leg  [] Left Leg          [] Assistive Device         [] Walker            [] Cane   [] Wheelchair      [] Crutches              [] Surgical shoe    [] Podus Boot(s)   [] Foam Boot(s)  [] Roll About              [] Cast Boot       [] CROW Boot  [] Other:        Dietary:  [] Diet as tolerated:        [x] Calorie Diabetic Diet: [] No Added Salt:  [] Increase Protein:        [] Other:   Activity:  [x] Activity as tolerated:  [] Patient has no activity restrictions     [] Strict Bedrest:            [] Remain off Work:     [] May return to full duty work: Maddy Barboza to work with [employer]     If you are still having pain after you go home:  [x] Elevate the affected limb.        [x] Use over-the-counter medications you would normally use for pain as permitted by your family doctor.   [x] For persistent pain not relieved by the above interventions, please call your family doctor.             Return Appointment:  [] Wound and dressing supply provider:   [x] Critical access hospital or [address] [place] [address]  [] Wound Zora Ohms or NP scheduled for Wound Assessment:   [x] Return Appointment: With DR Giovanna Dunlap  in  1 Week(s)  [] Ordered tests:      Nurse Case Manger:  MELISSA     Electronically signed by Alverna Gottron, RN on 6/20/2019 at 3:20 PM   [address] [place] [address] Information: Should you experience any significant changes in your wound(s) or have questions about your wound care, please contact the [address] [place] [address] [phone] [address] 8:00 am - 4:30 pm and Friday 8:00 am - 12:30 pm.  If you need help with your wound outside these hours and cannot wait

## 2019-06-27 ENCOUNTER — HOSPITAL ENCOUNTER (OUTPATIENT)
Dept: WOUND CARE | Age: 74
Discharge: HOME | End: 2019-06-27
Payer: MEDICARE

## 2019-06-27 VITALS
HEART RATE: 78 BPM | SYSTOLIC BLOOD PRESSURE: 143 MMHG | TEMPERATURE: 98 F | DIASTOLIC BLOOD PRESSURE: 76 MMHG | RESPIRATION RATE: 20 BRPM

## 2019-06-27 DIAGNOSIS — L97.919 CHRONIC VENOUS HYPERTENSION (IDIOPATHIC) WITH ULCER AND INFLAMMATION OF BILATERAL LOWER EXTREMITY (HCC): Primary | ICD-10-CM

## 2019-06-27 DIAGNOSIS — L97.929 CHRONIC VENOUS HYPERTENSION (IDIOPATHIC) WITH ULCER AND INFLAMMATION OF BILATERAL LOWER EXTREMITY (HCC): Primary | ICD-10-CM

## 2019-06-27 DIAGNOSIS — I87.333 CHRONIC VENOUS HYPERTENSION (IDIOPATHIC) WITH ULCER AND INFLAMMATION OF BILATERAL LOWER EXTREMITY (HCC): Primary | ICD-10-CM

## 2019-06-27 PROCEDURE — 11045 DBRDMT SUBQ TISS EACH ADDL: CPT | Performed by: SURGERY

## 2019-06-27 PROCEDURE — 11042 DBRDMT SUBQ TIS 1ST 20SQCM/<: CPT | Performed by: SURGERY

## 2019-06-27 PROCEDURE — 11045 DBRDMT SUBQ TISS EACH ADDL: CPT

## 2019-06-27 PROCEDURE — 11042 DBRDMT SUBQ TIS 1ST 20SQCM/<: CPT

## 2019-06-27 RX ORDER — LIDOCAINE HYDROCHLORIDE 40 MG/ML
SOLUTION TOPICAL ONCE
Status: DISCONTINUED | OUTPATIENT
Start: 2019-06-27 | End: 2019-06-28 | Stop reason: HOSPADM

## 2019-06-27 ASSESSMENT — PAIN DESCRIPTION - FREQUENCY: FREQUENCY: INTERMITTENT

## 2019-06-27 ASSESSMENT — PAIN DESCRIPTION - DESCRIPTORS: DESCRIPTORS: BURNING

## 2019-06-27 ASSESSMENT — PAIN DESCRIPTION - LOCATION: LOCATION: LEG

## 2019-06-27 ASSESSMENT — PAIN DESCRIPTION - ONSET: ONSET: ON-GOING

## 2019-06-27 ASSESSMENT — PAIN DESCRIPTION - PAIN TYPE: TYPE: CHRONIC PAIN

## 2019-06-27 ASSESSMENT — PAIN SCALES - GENERAL: PAINLEVEL_OUTOF10: 6

## 2019-06-27 ASSESSMENT — PAIN DESCRIPTION - PROGRESSION: CLINICAL_PROGRESSION: NOT CHANGED

## 2019-06-27 ASSESSMENT — PAIN DESCRIPTION - ORIENTATION: ORIENTATION: RIGHT;LEFT

## 2019-06-27 NOTE — PROGRESS NOTES
[address]   Progress Note and Procedure Note      Dayne Denney  MEDICAL RECORD NUMBER:  [id number]  AGE: 66 y.o. GENDER: female  : 1945  EPISODE DATE:  2019    Subjective:     Chief Complaint   Patient presents with    Wound Check     wounds bilateral legs         HISTORY of PRESENT ILLNESS HPI     Dayne Denney is a 66 y.o. female who presents today for wound/ulcer evaluation. History of Wound Context: Years of edema years of ulceration left leg never healed right leg has healed but then would break open when it swelled again had zippered stockings from [employer] but would ulcerate anyway  Wound/Ulcer Pain Timing/Severity: constant, moderate  Quality of pain: dull, aching  Severity:  10 / 10   Modifying Factors: Pain worsens with walking  Associated Signs/Symptoms: edema, erythema, drainage, odor and pain    Ulcer Identification:  Ulcer Type: venous    Contributing Factors: edema, venous stasis, lymphedema, diabetes, decreased mobility and obesity    Wound: N/A        PAST MEDICAL HISTORY        Diagnosis Date    Anemia     Atrial fibrillation ([address]     CAD (coronary artery disease)     Chronic back pain     CKD (chronic kidney disease), stage III ([address]     Diabetes mellitus ([address]     Diastolic CHF ([address]     Hyperlipidemia     Hypertension     MI (myocardial infarction) ([address]     MRSA (methicillin resistant staph aureus) culture positive 2018    leg ulcer    MRSA (methicillin resistant staph aureus) culture positive 2019    leg    MRSA colonization 10/16/2018    Obesity (BMI 30-39. 9)     Osteoarthritis     Pancreatitis     Sepsis ([address]     Stasis ulcer ([address]     bilateral lower extremities     Suprapubic catheter ([address]        PAST SURGICAL HISTORY    Past Surgical History:   Procedure Laterality Date    APPENDECTOMY      BACK SURGERY      x2    CATARACT REMOVAL      bilateral     CHOLECYSTECTOMY      COLONOSCOPY      EYE SURGERY      FRACTURE SURGERY      Left ankle    HYSTERECTOMY      INSET/CHANGE LOMELI CATHETER - COMPLICATED  65/4572    suprapubic cath    JOINT REPLACEMENT      Right knee    KNEE SURGERY         FAMILY HISTORY    Family History   Problem Relation Age of Onset    Heart Disease Father     Heart Failure Father     Diabetes Father     Diabetes Daughter     Diabetes Daughter        SOCIAL HISTORY    Social History     Tobacco Use    Smoking status: Never Smoker    Smokeless tobacco: Never Used   Substance Use Topics    Alcohol use: Yes     Comment: occassional    Drug use: No       ALLERGIES    Allergies   Allergen Reactions    Sulfa Antibiotics Rash       MEDICATIONS    Current Outpatient Medications on File Prior to Encounter   Medication Sig Dispense Refill    doxycycline hyclate (VIBRA-TABS) 100 MG tablet Take 1 tablet by mouth 2 times daily for 14 days 28 tablet 0    ibuprofen (ADVIL;MOTRIN) 200 MG tablet Take 400 mg by mouth every 6 hours as needed for Pain      traMADol (ULTRAM) 50 MG tablet Take 50 mg by mouth every 8 hours as needed for Pain.       raNITIdine HCl (ZANTAC 150 MAXIMUM STRENGTH PO) Take 150 mg by mouth 2 times daily      ferrous sulfate 325 (65 Fe) MG tablet Take 325 mg by mouth 2 times daily       insulin lispro (HUMALOG) 100 UNIT/ML injection vial Inject 5 Units into the skin 3 times daily (with meals) (Patient taking differently: Inject into the skin Humalog insulin pump: 1.8 Unit basal rate continous and as needed bolus per insulin pump) 1 vial 0    Timolol (TIMOPTIC) 0.5 % (DAILY) SOLN ophthalmic solution Place 1 drop into the right eye daily       aspirin 325 MG tablet Take 325 mg by mouth daily       atorvastatin (LIPITOR) 10 MG tablet Take 1 tablet by mouth daily 30 tablet 0    metoprolol tartrate (LOPRESSOR) 25 MG tablet Take 1 tablet by mouth 2 times daily 60 tablet 0    furosemide (LASIX) 40 MG tablet Take 0.5 tablets by mouth daily 60 tablet 0    Biotin (BIOTIN 5000) 5 MG CAPS Take 1 tablet by mouth daily      lipase-protease-amylase (CREON) 53605 units delayed release capsule Take 24,000 Units by mouth 3 times daily (with meals)      gabapentin (NEURONTIN) 300 MG capsule Take 300 mg by mouth 2 times daily.  isosorbide mononitrate (IMDUR) 30 MG extended release tablet Take 30 mg by mouth daily      venlafaxine (EFFEXOR) 75 MG tablet Take 150 mg by mouth 2 times daily       cetirizine (ZYRTEC) 10 MG tablet Take 10 mg by mouth daily      vitamin D3 (CHOLECALCIFEROL) 400 units TABS tablet Take 400 Units by mouth daily       LORazepam (ATIVAN) 0.5 MG tablet Take 0.5 mg by mouth nightly as needed for Anxiety.  Calcium Carbonate Antacid (TUMS ULTRA 1000) 1000 MG CHEW Take 1 tablet by mouth as needed (acid reflux)        No current facility-administered medications on file prior to encounter. REVIEW OF SYSTEMS    A comprehensive review of systems was negative except for: Respiratory: positive for dyspnea on exertion  Musculoskeletal: positive for back pain and Left leg pain    Objective:      BP (!) 143/76   Pulse 78   Temp 98 °F (36.7 °C) (Oral)   Resp 20     Wt Readings from Last 3 Encounters:   06/13/19 173 lb 1 oz (78.5 kg)   05/05/19 192 lb 14.4 oz (87.5 kg)   11/20/18 157 lb 6.5 oz (71.4 kg)       PHYSICAL EXAM    General Appearance: alert and oriented to person, place and time, well developed and well- nourished, in no acute distress  Skin: warm and dry, no rash or erythema  Head: normocephalic and atraumatic  Eyes: pupils equal, round, and reactive to light, extraocular eye movements intact, conjunctivae normal  Neck: supple and non-tender without mass, no thyromegaly or thyroid nodules, no cervical lymphadenopathy  Pulmonary/Chest: clear to auscultation left chest- no wheezes, rales or rhonchi, normal air movement, no respiratory distress, right base rales appreciated did not clear with cough.  No shortness of breath while speaking daughter suspects she does have shortness of breath and has gained 10 pounds in a week Cardiovascular: normal rate, regular rhythm, normal S1 and S2, no murmurs, rubs, clicks, or gallops, distal pulses +2 left dorsalis pedis, no others palpable Doppler signals biphasic in all 4 pedal arteries no carotid bruits neck veins slightly elevated left side  Abdomen: soft, non-tender, non-distended obese right upper quadrant gallbladder scar LRR, suprapubic tube placed by Hailey Nichols normal bowel sounds, no masses or organomegaly  Extremities: no cyanosis, clubbing massive edema left thigh and calf moderate edema right thigh and calf . Bilateral ulcerations of the calf and one of the medial left thigh see pictures below  Musculoskeletal: normal range of motion, no joint swelling, deformity or tenderness  Neurologic: reflexes normal and symmetric, no cranial nerve deficit, had trouble walking from the house to the car today, coordination and speech normal      Assessment:        Problem List Items Addressed This Visit     Chronic venous hypertension (idiopathic) with ulcer and inflammation of bilateral lower extremity ([address] - Primary               Excisional Debridement Procedure Note  Indications:  Based on my examination of this patient's wound(s)/ulcer(s) today, debridement is required to promote healing and evaluate the wound base. Performed by: Chayito Tee MD    Consent obtained? Yes    Time out taken: Yes    Pain Control: Anesthetic: 4% Lidocaine Liquid Topical(10 ml)     Debridement:Excisional Debridement    Using curette the wound/ulcer was sharply debrided    down through and included excision of  subcutaneous tissue.         Devitalized Tissue Debrided:  fibrin, biofilm, slough and necrotic/eschar      Pre Debridement Measurements:  Are located in the Wound/Ulcer Documentation Flow Sheet   Wound/Ulcer #: 1, 2 and 3     Post  Debridement Measurements:  Wound 05/23/19 Leg Right;Medial Wound #1; re-opened approx 2 months 3:48 PM   Change in Wound Size % (l*w) 15.87 6/27/2019  3:48 PM   Wound Volume (cm^3) 13.12 cm^3 6/27/2019  3:48 PM   Wound Healing % 16 6/27/2019  3:48 PM   Post-Procedure Length (cm) 10.6 cm 6/27/2019  4:02 PM   Post-Procedure Width (cm) 12.6 cm 6/27/2019  4:02 PM   Post-Procedure Depth (cm) 0.1 cm 6/27/2019  4:02 PM   Post-Procedure Surface Area (cm^2) 133.56 cm^2 6/27/2019  4:02 PM   Post-Procedure Volume (cm^3) 13.36 cm^3 6/27/2019  4:02 PM   Wound Assessment Granulation tissue;Slough 6/27/2019  3:48 PM   Drainage Amount Large 6/27/2019  3:48 PM   Drainage Description Serosanguinous 6/27/2019  3:48 PM   Odor None 6/27/2019  3:48 PM   Margins Attached edges 6/27/2019  3:48 PM   Marilynn-wound Assessment Dry;Fragile;Pink 6/27/2019  3:48 PM   Non-staged Wound Description Full thickness 6/27/2019  3:48 PM   [place]%Wound Bed 40 5/30/2019  3:44 PM   Red%Wound Bed 80 6/27/2019  3:48 PM   Yellow%Wound Bed 20 6/27/2019  3:48 PM   Number of days: 35       Wound 05/23/19 Thigh Left;Medial Wound #3; approx 4/25/19 (Active)   Wound Image   5/23/2019  3:26 PM   Wound Venous 6/27/2019  3:48 PM   Dressing Status Intact; Old drainage 6/27/2019  3:48 PM   Dressing Changed Changed/New 6/20/2019  3:57 PM   Dressing/Treatment Alginate with Ag;Dry Dressing 6/20/2019  3:57 PM   Wound Length (cm) 1.2 cm 6/27/2019  3:48 PM   Wound Width (cm) 2 cm 6/27/2019  3:48 PM   Wound Depth (cm) 0.1 cm 6/27/2019  3:48 PM   Wound Surface Area (cm^2) 2.4 cm^2 6/27/2019  3:48 PM   Change in Wound Size % (l*w) -23.08 6/27/2019  3:48 PM   Wound Volume (cm^3) 0.24 cm^3 6/27/2019  3:48 PM   Wound Healing % -20 6/27/2019  3:48 PM   Post-Procedure Length (cm) 1.3 cm 6/27/2019  4:02 PM   Post-Procedure Width (cm) 2.1 cm 6/27/2019  4:02 PM   Post-Procedure Depth (cm) 0.1 cm 6/27/2019  4:02 PM   Post-Procedure Surface Area (cm^2) 2.73 cm^2 6/27/2019  4:02 PM   Post-Procedure Volume (cm^3) 0.27 cm^3 6/27/2019  4:02 PM   Wound Assessment Granulation tissue;Slough 6/27/2019  3:48 PM   Drainage Amount Small 6/27/2019  3:48 PM   Drainage Description Serosanguinous 6/27/2019  3:48 PM   Odor None 6/27/2019  3:48 PM   Margins Attached edges 6/27/2019  3:48 PM   Marilynn-wound Assessment Clean;Dry;Fragile;Pink 6/27/2019  3:48 PM   Non-staged Wound Description Full thickness 6/27/2019  3:48 PM   [place]%Wound Bed 90 6/27/2019  3:48 PM   Red%Wound Bed 90 6/20/2019  3:07 PM   Yellow%Wound Bed 10 6/27/2019  3:48 PM   Number of days: 35       Percent of Wound/Ulcer Debrided: 100%    Total Surface Area Debrided:  254.95 sq cm    Diabetic/Pressure/Non Pressure Ulcers only:  Ulcer: N/A    Bleeding: Minimal    Hemostasis Achieved: by pressure    Procedural Pain: 1  / 10     Post Procedural Pain: 0 / 10     Response to treatment:  Well tolerated by patient. Discharge Instructions            Discharge [address] [place] [address] and Hyperbaric Oxygen Therapy   Physician Orders and Discharge Instructions  [place] [address], [place] [address]  Telephone: [phone]      FAX [phone]     NAME: Karina Benavides OF BIRTH:  1945  MEDICAL RECORD NUMBER:  [id number]  DATE:  6/27/2019     Wound Cleansing:   Do not scrub or use excessive force. Cleanse wound prior to applying a clean dressing with:  [] Normal Saline            [] Keep Wound Dry in Shower    [] Wound Cleanser   [] Cleanse wound with Mild Soap & Water  [] May Shower at Discharge   [] Other:        Topical Treatments:  Do not apply lotions, creams, or ointments to wound bed unless directed. [] Apply moisturizing lotion to skin surrounding the wound prior to dressing change.  [] Apply antifungal ointment to skin surrounding the wound prior to dressing change.  [] Apply thin film of moisture barrier ointment to skin immediately around wound.   [] Other:       Dressings:                  Wound Location-  BILATERAL LOWER EXTREMITIES   AND LEFT MEDIAL THIGH WOUND  [x] Apply Primary Dressing:                                           [] MediHoney Gel          [x] Alginate with Silver  [] Alginate              [] Collagen         [] Collagen with Silver   [] Santyl with Moisten saline gauze                [] Hydrocolloid     [] MediHoney Alginate [] Foam with Silver              [] Foam   [] Hydrofera Blue         [] Mepilex Border                         [] Moisten with Saline    [] Hydrogel         [] Mepitel                          [] Bactroban/Mupirocin  [] Polysporin                  [] Other:    [] Pack wound loosely with       [] Iodoform   [] Plain Packing       [] Other   [x] Cover and Secure with:                       [] Gauze [] Brigette   [x] Kerlix              [] Ace Wrap       [] Cover Roll Tape         [x] ABD  PADS                           [] Other: Avoid contact of tape with skin.   [] Change dressing:       [x] Daily    OR MORE OFTEN AS NEEDED          [] Every Other Day        [] Three times per week              [] Once a week  [] TWICE A DAY         [x] Other: DOUBLE LAYER ACE WRAPS TOES TO KNEE RIGHT LEG, TOES TO THIGH TO LEFT LEG     Edema Control:   Apply:  [] Compression Stocking           []Right Leg         []Left Leg              [] Tubigrip          []Right Leg Double Layer          []Left Leg Double Layer                                                  []Right Leg Single Layer           []Left Leg Single Layer              [] SpandaGrip    []Right Leg         []Left Leg                                      []Low compression 5-10 mm/Hg                                                 []Medium compression 10-20 mm/Hg                                      []High compression  20-30 mm/Hg              every morning immediately when getting up should be applied to affected leg(s) from mid foot to knee making sure to cover the heel.  Remove every night before going to bed.              [] Elevate leg(s) above the level of the heart when sitting.                    [] Avoid prolonged standing in one place.              [] Elevate arm/hand above the level of the heart         []RightArm         []LeftArm                Compression:  Apply: [] Multilayer Compression Wrap Applied in Clinic-   []RightLeg            []Left Leg--              [] Multi-layer compression.  Do not get leg(s) with wrap wet.  If wraps become too tight call the center or completely remove the wrap.                        [x] Elevate leg(s) above the level of the heart when sitting.                          [x] Avoid prolonged standing in one place.     Off-Loading:   [] Off-loading when        [] walking           [] in bed [] sitting  [] Total non-weight bearing  [] Right Leg  [] Left Leg          [] Assistive Device         [] Walker            [] Cane   [] Wheelchair      [] Crutches              [] Surgical shoe    [] Podus Boot(s)   [] Foam Boot(s)  [] Roll About              [] Cast Boot       [] CROW Boot  [] Other:        Dietary:  [] Diet as tolerated:        [x] Calorie Diabetic Diet: [] No Added Salt:  [] Increase Protein:        [] Other:   Activity:  [x] Activity as tolerated:  [] Patient has no activity restrictions     [] Strict Bedrest:            [] Remain off Work:     [] May return to full duty work: [employer] to work with [address]     If you are still having pain after you go home:  [x] Elevate the affected limb.        [x] Use over-the-counter medications you would normally use for pain as permitted by your family doctor.   [x] For persistent pain not relieved by the above interventions, please call your family doctor.             Return Appointment:  [] Wound and dressing supply provider:   [x] [facility] or [address] [place] [address]  [] Wound Assessment:  [] Physician or NP scheduled for Wound Assessment:   [x] Return Appointment: With DR Brea Batres

## 2019-07-11 ENCOUNTER — HOSPITAL ENCOUNTER (OUTPATIENT)
Dept: WOUND CARE | Age: 74
Discharge: HOME | End: 2019-07-11
Payer: MEDICARE

## 2019-07-11 VITALS
RESPIRATION RATE: 18 BRPM | HEART RATE: 93 BPM | SYSTOLIC BLOOD PRESSURE: 150 MMHG | TEMPERATURE: 98.3 F | DIASTOLIC BLOOD PRESSURE: 72 MMHG

## 2019-07-11 DIAGNOSIS — L97.919 CHRONIC VENOUS HYPERTENSION (IDIOPATHIC) WITH ULCER AND INFLAMMATION OF BILATERAL LOWER EXTREMITY (HCC): Primary | ICD-10-CM

## 2019-07-11 DIAGNOSIS — L97.929 CHRONIC VENOUS HYPERTENSION (IDIOPATHIC) WITH ULCER AND INFLAMMATION OF BILATERAL LOWER EXTREMITY (HCC): Primary | ICD-10-CM

## 2019-07-11 DIAGNOSIS — I87.333 CHRONIC VENOUS HYPERTENSION (IDIOPATHIC) WITH ULCER AND INFLAMMATION OF BILATERAL LOWER EXTREMITY (HCC): Primary | ICD-10-CM

## 2019-07-11 PROCEDURE — 11045 DBRDMT SUBQ TISS EACH ADDL: CPT

## 2019-07-11 PROCEDURE — 11042 DBRDMT SUBQ TIS 1ST 20SQCM/<: CPT

## 2019-07-11 PROCEDURE — 11042 DBRDMT SUBQ TIS 1ST 20SQCM/<: CPT | Performed by: SURGERY

## 2019-07-11 PROCEDURE — 11045 DBRDMT SUBQ TISS EACH ADDL: CPT | Performed by: SURGERY

## 2019-07-11 RX ORDER — LIDOCAINE HYDROCHLORIDE 40 MG/ML
SOLUTION TOPICAL ONCE
Status: DISCONTINUED | OUTPATIENT
Start: 2019-07-11 | End: 2019-07-12 | Stop reason: HOSPADM

## 2019-07-11 ASSESSMENT — PAIN DESCRIPTION - LOCATION: LOCATION: LEG

## 2019-07-11 ASSESSMENT — PAIN DESCRIPTION - ORIENTATION: ORIENTATION: LEFT;RIGHT

## 2019-07-11 ASSESSMENT — PAIN DESCRIPTION - PAIN TYPE: TYPE: ACUTE PAIN

## 2019-07-11 ASSESSMENT — PAIN DESCRIPTION - FREQUENCY: FREQUENCY: INTERMITTENT

## 2019-07-11 ASSESSMENT — PAIN - FUNCTIONAL ASSESSMENT: PAIN_FUNCTIONAL_ASSESSMENT: PREVENTS OR INTERFERES SOME ACTIVE ACTIVITIES AND ADLS

## 2019-07-11 ASSESSMENT — PAIN DESCRIPTION - DESCRIPTORS: DESCRIPTORS: BURNING;ACHING;DISCOMFORT

## 2019-07-11 ASSESSMENT — PAIN SCALES - GENERAL: PAINLEVEL_OUTOF10: 7

## 2019-07-11 ASSESSMENT — PAIN DESCRIPTION - ONSET: ONSET: ON-GOING

## 2019-07-11 ASSESSMENT — PAIN DESCRIPTION - PROGRESSION: CLINICAL_PROGRESSION: NOT CHANGED

## 2019-07-11 NOTE — PROGRESS NOTES
Status Intact; Old drainage 6/27/2019  3:48 PM   Dressing Changed Changed/New 7/11/2019  5:00 PM   Dressing/Treatment Other (comment) 7/11/2019  5:00 PM   Wound Length (cm) 10.5 cm 7/11/2019  3:36 PM   Wound Width (cm) 9 cm 7/11/2019  3:36 PM   Wound Depth (cm) 0.1 cm 7/11/2019  3:36 PM   Wound Surface Area (cm^2) 94.5 cm^2 7/11/2019  3:36 PM   Change in Wound Size % (l*w) 18.46 7/11/2019  3:36 PM   Wound Volume (cm^3) 9.45 cm^3 7/11/2019  3:36 PM   Wound Healing % 18 7/11/2019  3:36 PM   Post-Procedure Length (cm) 10.6 cm 7/11/2019  3:38 PM   Post-Procedure Width (cm) 9.1 cm 7/11/2019  3:38 PM   Post-Procedure Depth (cm) 0.1 cm 7/11/2019  3:38 PM   Post-Procedure Surface Area (cm^2) 96.46 cm^2 7/11/2019  3:38 PM   Post-Procedure Volume (cm^3) 9.65 cm^3 7/11/2019  3:38 PM   Wound Assessment Granulation tissue;Slough 7/11/2019  3:36 PM   Drainage Amount Moderate 7/11/2019  3:36 PM   Drainage Description Serosanguinous 7/11/2019  3:36 PM   Odor None 7/11/2019  3:36 PM   Margins Undefined edges 7/11/2019  3:36 PM   Marilynn-wound Assessment Yellow-brown 7/11/2019  3:36 PM   Non-staged Wound Description Full thickness 7/11/2019  3:36 PM   [place]%Wound Bed 30 5/30/2019  3:44 PM   Red%Wound Bed 90 7/11/2019  3:36 PM   Yellow%Wound Bed 10 7/11/2019  3:36 PM   Number of days: 49       Wound 05/23/19 Leg Left;Medial;Lower Wound #2; per family open for months (Active)   Wound Image   7/11/2019  3:36 PM   Wound Venous 6/27/2019  3:48 PM   Dressing Status Intact; Old drainage 6/27/2019  3:48 PM   Dressing Changed Changed/New 7/11/2019  5:00 PM   Dressing/Treatment Other (comment) 7/11/2019  5:00 PM   Wound Length (cm) 12 cm 7/11/2019  3:36 PM   Wound Width (cm) 12 cm 7/11/2019  3:36 PM   Wound Depth (cm) 0.1 cm 7/11/2019  3:36 PM   Wound Surface Area (cm^2) 144 cm^2 7/11/2019  3:36 PM   Change in Wound Size % (l*w) 7.69 7/11/2019  3:36 PM   Wound Volume (cm^3) 14.4 cm^3 7/11/2019  3:36 PM   Wound Healing % 8 7/11/2019  3:36 PM DRESSING DAILY OR MORE OFTEN AS NEEDED FOR DRAINAGE        DOUBLE LAYER ACE WRAPS TOES TO KNEE RIGHT LEG, TOES TO THIGH TO LEFT LEG       Compression:  Apply: [] Multilayer Compression Wrap Applied in Clinic-   []RightLeg            []Left Leg--              [] Multi-layer compression.  Do not get leg(s) with wrap wet.  If wraps become too tight call the center or completely remove the wrap.                        [x] Elevate leg(s) above the level of the heart when sitting.                          [x] Avoid prolonged standing in one place.        Dietary:  [] Diet as tolerated:        [x] Calorie Diabetic Diet: [] No Added Salt:  [] Increase Protein:        [] Other:   Activity:  [x] Activity as tolerated:  [] Patient has no activity restrictions     [] Strict Bedrest:            [] Remain off Work:     [] May return to full duty work: [phone] to work with [address]     If you are still having pain after you go home:  [x] Elevate the affected limb.        [x] Use over-the-counter medications you would normally use for pain as permitted by your family doctor.   [x] For persistent pain not relieved by the above interventions, please call your family doctor.             Return Appointment:  [] Wound and dressing supply provider:   [x] [facility] or [address] [place] [address]  [] Wound Jimenez Johnson or NP scheduled for Wound Assessment:   [x] Return Appointment: With DR Camacho Bridges  in  1 Week(s)  [] Ordered tests:      Nurse Case Manger:  MELISSA     Electronically signed by Tana Quinteros RN on 7/11/2019 at 4:35 PM   [address] [place] [address] Information: Should you experience any significant changes in your wound(s) or have questions about your wound care, please contact the [address] [place], [place] [address]ie [facility] [phone] [address] 8:00 am - 4:30 pm and Friday 8:00 am - 12:30 pm.  If you need help with your wound outside these hours and

## 2019-07-18 ENCOUNTER — HOSPITAL ENCOUNTER (OUTPATIENT)
Dept: WOUND CARE | Age: 74
Discharge: HOME | End: 2019-07-18
Payer: MEDICARE

## 2019-07-18 VITALS
HEART RATE: 81 BPM | TEMPERATURE: 98.2 F | DIASTOLIC BLOOD PRESSURE: 64 MMHG | SYSTOLIC BLOOD PRESSURE: 146 MMHG | RESPIRATION RATE: 18 BRPM

## 2019-07-18 DIAGNOSIS — L97.919 CHRONIC VENOUS HYPERTENSION (IDIOPATHIC) WITH ULCER AND INFLAMMATION OF BILATERAL LOWER EXTREMITY (HCC): Primary | ICD-10-CM

## 2019-07-18 DIAGNOSIS — I87.333 CHRONIC VENOUS HYPERTENSION (IDIOPATHIC) WITH ULCER AND INFLAMMATION OF BILATERAL LOWER EXTREMITY (HCC): Primary | ICD-10-CM

## 2019-07-18 DIAGNOSIS — L97.929 CHRONIC VENOUS HYPERTENSION (IDIOPATHIC) WITH ULCER AND INFLAMMATION OF BILATERAL LOWER EXTREMITY (HCC): Primary | ICD-10-CM

## 2019-07-18 PROCEDURE — 11042 DBRDMT SUBQ TIS 1ST 20SQCM/<: CPT | Performed by: SURGERY

## 2019-07-18 PROCEDURE — 11042 DBRDMT SUBQ TIS 1ST 20SQCM/<: CPT

## 2019-07-18 PROCEDURE — 11045 DBRDMT SUBQ TISS EACH ADDL: CPT

## 2019-07-18 PROCEDURE — 11045 DBRDMT SUBQ TISS EACH ADDL: CPT | Performed by: SURGERY

## 2019-07-18 RX ORDER — LIDOCAINE HYDROCHLORIDE 40 MG/ML
SOLUTION TOPICAL ONCE
Status: DISCONTINUED | OUTPATIENT
Start: 2019-07-18 | End: 2019-07-19 | Stop reason: HOSPADM

## 2019-07-18 ASSESSMENT — PAIN DESCRIPTION - DESCRIPTORS: DESCRIPTORS: ACHING

## 2019-07-18 ASSESSMENT — PAIN DESCRIPTION - LOCATION: LOCATION: LEG

## 2019-07-18 ASSESSMENT — PAIN DESCRIPTION - PROGRESSION: CLINICAL_PROGRESSION: NOT CHANGED

## 2019-07-18 ASSESSMENT — PAIN DESCRIPTION - ONSET: ONSET: ON-GOING

## 2019-07-18 ASSESSMENT — PAIN DESCRIPTION - FREQUENCY: FREQUENCY: INTERMITTENT

## 2019-07-18 ASSESSMENT — PAIN - FUNCTIONAL ASSESSMENT: PAIN_FUNCTIONAL_ASSESSMENT: PREVENTS OR INTERFERES SOME ACTIVE ACTIVITIES AND ADLS

## 2019-07-18 ASSESSMENT — PAIN SCALES - GENERAL: PAINLEVEL_OUTOF10: 7

## 2019-07-18 ASSESSMENT — PAIN DESCRIPTION - ORIENTATION: ORIENTATION: LEFT;RIGHT

## 2019-07-18 ASSESSMENT — PAIN DESCRIPTION - PAIN TYPE: TYPE: ACUTE PAIN

## 2019-07-18 NOTE — PROGRESS NOTES
Cardiovascular: normal rate, regular rhythm, normal S1 and S2, no murmurs, rubs, clicks, or gallops, distal pulses +2 left dorsalis pedis, no others palpable Doppler signals biphasic in all 4 pedal arteries no carotid bruits neck veins slightly elevated left side  Abdomen: soft, non-tender, non-distended obese right upper quadrant gallbladder scar LRR, suprapubic tube placed by Garlon Sack normal bowel sounds, no masses or organomegaly  Extremities: no cyanosis, clubbing massive edema left thigh and calf moderate edema right thigh and calf . Bilateral ulcerations of the calf and one of the medial left thigh see pictures below  Musculoskeletal: normal range of motion, no joint swelling, deformity or tenderness  Neurologic: reflexes normal and symmetric, no cranial nerve deficit, had trouble walking from the house to the car today, coordination and speech normal      Assessment:        Problem List Items Addressed This Visit     Chronic venous hypertension (idiopathic) with ulcer and inflammation of bilateral lower extremity ([address] - Primary             Excisional Debridement Procedure Note  Indications:  Based on my examination of this patient's wound(s)/ulcer(s) today, debridement is required to promote healing and evaluate the wound base. Performed by: Moe Casey MD    Consent obtained? Yes    Time out taken: Yes    Pain Control: Anesthetic: 4% Lidocaine Liquid Topical(10mL's)     Debridement:Excisional Debridement    Using curette the wound/ulcer was sharply debrided    down through and included excision of  subcutaneous tissue.         Devitalized Tissue Debrided:  biofilm, slough and necrotic/eschar      Pre Debridement Measurements:  Are located in the Wound/Ulcer Documentation Flow Sheet   Wound/Ulcer #: 1, 2, 3 and 5     Post  Debridement Measurements:  Wound 05/23/19 Leg Right;Medial Wound #1; re-opened approx 2 months ago (Active)   Wound Image    7/11/2019  3:36 PM   Wound Venous 6/27/2019  3:48 PM   Dressing Status Old drainage 7/18/2019  3:23 PM   Dressing Changed Changed/New 7/11/2019  5:00 PM   Dressing/Treatment Other (comment) 7/18/2019  4:27 PM   Wound Length (cm) 8.5 cm 7/18/2019  3:23 PM   Wound Width (cm) 7.5 cm 7/18/2019  3:23 PM   Wound Depth (cm) 0.1 cm 7/18/2019  3:23 PM   Wound Surface Area (cm^2) 63.75 cm^2 7/18/2019  3:23 PM   Change in Wound Size % (l*w) 45 7/18/2019  3:23 PM   Wound Volume (cm^3) 6.38 cm^3 7/18/2019  3:23 PM   Wound Healing % 45 7/18/2019  3:23 PM   Post-Procedure Length (cm) 8.6 cm 7/18/2019  4:05 PM   Post-Procedure Width (cm) 7.6 cm 7/18/2019  4:05 PM   Post-Procedure Depth (cm) 0.1 cm 7/18/2019  4:05 PM   Post-Procedure Surface Area (cm^2) 65.36 cm^2 7/18/2019  4:05 PM   Post-Procedure Volume (cm^3) 6.54 cm^3 7/18/2019  4:05 PM   Wound Assessment Granulation tissue;Slough 7/18/2019  3:23 PM   Drainage Amount Moderate 7/18/2019  3:23 PM   Drainage Description Serosanguinous 7/18/2019  3:23 PM   Odor None 7/18/2019  3:23 PM   Margins Undefined edges 7/18/2019  3:23 PM   Marilynn-wound Assessment Pink 7/18/2019  3:23 PM   Non-staged Wound Description Full thickness 7/18/2019  3:23 PM   [place]%Wound Bed 60 7/18/2019  3:23 PM   Red%Wound Bed 10 7/18/2019  3:23 PM   Yellow%Wound Bed 40 7/18/2019  3:23 PM   Number of days: 56       Wound 05/23/19 Leg Left;Medial;Lower Wound #2; per family open for months (Active)   Wound Image   7/11/2019  3:36 PM   Wound Venous 6/27/2019  3:48 PM   Dressing Status Old drainage 7/18/2019  3:23 PM   Dressing Changed Changed/New 7/11/2019  5:00 PM   Dressing/Treatment Other (comment) 7/18/2019  4:27 PM   Wound Length (cm) 11 cm 7/18/2019  3:23 PM   Wound Width (cm) 11.5 cm 7/18/2019  3:23 PM   Wound Depth (cm) 0.1 cm 7/18/2019  3:23 PM   Wound Surface Area (cm^2) 126.5 cm^2 7/18/2019  3:23 PM   Change in Wound Size % (l*w) 18.91 7/18/2019  3:23 PM   Wound Volume (cm^3) 12.65 cm^3 7/18/2019  3:23 PM   Wound Healing % 19 7/18/2019 7/18/2019  3:23 PM   Marilynn-wound Assessment Dry; Intact 7/18/2019  3:23 PM   Non-staged Wound Description Full thickness 7/18/2019  3:23 PM   [place]%Wound Bed 20 7/18/2019  3:23 PM   Red%Wound Bed 90 6/20/2019  3:07 PM   Yellow%Wound Bed 80 7/18/2019  3:23 PM   Number of days: 56       Wound 07/18/19 Leg Right; Anterior;Proximal Wound #5; noted 7/18/19 (Active)   Wound Image   7/18/2019  3:23 PM   Dressing Status Old drainage 7/18/2019  3:23 PM   Dressing/Treatment Other (comment) 7/18/2019  4:27 PM   Wound Length (cm) 5 cm 7/18/2019  3:23 PM   Wound Width (cm) 4.4 cm 7/18/2019  3:23 PM   Wound Depth (cm) 0.1 cm 7/18/2019  3:23 PM   Wound Surface Area (cm^2) 22 cm^2 7/18/2019  3:23 PM   Wound Volume (cm^3) 2.2 cm^3 7/18/2019  3:23 PM   Post-Procedure Length (cm) 5.1 cm 7/18/2019  4:05 PM   Post-Procedure Width (cm) 4.5 cm 7/18/2019  4:05 PM   Post-Procedure Depth (cm) 0.1 cm 7/18/2019  4:05 PM   Post-Procedure Surface Area (cm^2) 22.95 cm^2 7/18/2019  4:05 PM   Post-Procedure Volume (cm^3) 2.3 cm^3 7/18/2019  4:05 PM   Wound Assessment Granulation tissue;Slough 7/18/2019  3:23 PM   Drainage Amount Moderate 7/18/2019  3:23 PM   Drainage Description Serosanguinous 7/18/2019  3:23 PM   Odor None 7/18/2019  3:23 PM   Margins Undefined edges 7/18/2019  3:23 PM   Marilynn-wound Assessment Pink;Fragile 7/18/2019  3:23 PM   Non-staged Wound Description Full thickness 7/18/2019  3:23 PM   [place]%Wound Bed 90 7/18/2019  3:23 PM   Yellow%Wound Bed 10 7/18/2019  3:23 PM   Number of days: 0       Percent of Wound/Ulcer Debrided: 100%    Total Surface Area Debrided:  217.85 sq cm    Diabetic/Pressure/Non Pressure Ulcers only:  Ulcer: N/A    Bleeding: Estimated amount of blood loss is 10ml. Hemostasis Achieved: by pressure    Procedural Pain: 4  / 10     Post Procedural Pain: 0 / 10     Response to treatment:  Well tolerated by patient.                        Discharge Instructions         Discharge [address]

## 2019-07-25 ENCOUNTER — HOSPITAL ENCOUNTER (OUTPATIENT)
Dept: WOUND CARE | Age: 74
Discharge: HOME | End: 2019-07-25
Payer: MEDICARE

## 2019-07-25 DIAGNOSIS — L03.119 CELLULITIS OF LOWER EXTREMITY, UNSPECIFIED LATERALITY: Primary | ICD-10-CM

## 2019-07-25 DIAGNOSIS — L97.102 VENOUS STASIS ULCER OF THIGH WITH FAT LAYER EXPOSED, UNSPECIFIED LATERALITY, UNSPECIFIED WHETHER VARICOSE VEINS PRESENT (HCC): ICD-10-CM

## 2019-07-25 DIAGNOSIS — I83.001 VENOUS STASIS ULCER OF THIGH WITH FAT LAYER EXPOSED, UNSPECIFIED LATERALITY, UNSPECIFIED WHETHER VARICOSE VEINS PRESENT (HCC): ICD-10-CM

## 2019-07-25 PROBLEM — I83.009 STASIS ULCER (HCC): Status: ACTIVE | Noted: 2019-07-25

## 2019-07-25 PROBLEM — L97.909 STASIS ULCER (HCC): Status: ACTIVE | Noted: 2019-07-25

## 2019-08-08 ENCOUNTER — HOSPITAL ENCOUNTER (OUTPATIENT)
Dept: WOUND CARE | Age: 74
Discharge: HOME | End: 2019-08-08
Payer: MEDICARE

## 2019-08-08 VITALS
TEMPERATURE: 99.3 F | HEART RATE: 80 BPM | RESPIRATION RATE: 18 BRPM | SYSTOLIC BLOOD PRESSURE: 156 MMHG | DIASTOLIC BLOOD PRESSURE: 69 MMHG

## 2019-08-08 DIAGNOSIS — L97.929 CHRONIC VENOUS HYPERTENSION (IDIOPATHIC) WITH ULCER AND INFLAMMATION OF BILATERAL LOWER EXTREMITY (HCC): Primary | ICD-10-CM

## 2019-08-08 DIAGNOSIS — I87.333 CHRONIC VENOUS HYPERTENSION (IDIOPATHIC) WITH ULCER AND INFLAMMATION OF BILATERAL LOWER EXTREMITY (HCC): Primary | ICD-10-CM

## 2019-08-08 DIAGNOSIS — L97.919 CHRONIC VENOUS HYPERTENSION (IDIOPATHIC) WITH ULCER AND INFLAMMATION OF BILATERAL LOWER EXTREMITY (HCC): Primary | ICD-10-CM

## 2019-08-08 PROCEDURE — 11042 DBRDMT SUBQ TIS 1ST 20SQCM/<: CPT

## 2019-08-08 PROCEDURE — 11045 DBRDMT SUBQ TISS EACH ADDL: CPT

## 2019-08-08 PROCEDURE — 11042 DBRDMT SUBQ TIS 1ST 20SQCM/<: CPT | Performed by: SURGERY

## 2019-08-08 PROCEDURE — 11045 DBRDMT SUBQ TISS EACH ADDL: CPT | Performed by: SURGERY

## 2019-08-08 RX ORDER — LIDOCAINE HYDROCHLORIDE 40 MG/ML
SOLUTION TOPICAL ONCE
Status: DISCONTINUED | OUTPATIENT
Start: 2019-08-08 | End: 2019-08-09 | Stop reason: HOSPADM

## 2019-08-08 NOTE — PROGRESS NOTES
 COLONOSCOPY      EYE SURGERY      FRACTURE SURGERY      Left ankle    HYSTERECTOMY      INSET/CHANGE LOMELI CATHETER - COMPLICATED  78/3119    suprapubic cath    JOINT REPLACEMENT      Right knee    KNEE SURGERY         FAMILY HISTORY    Family History   Problem Relation Age of Onset    Heart Disease Father     Heart Failure Father     Diabetes Father     Diabetes Daughter     Diabetes Daughter        SOCIAL HISTORY    Social History     Tobacco Use    Smoking status: Never Smoker    Smokeless tobacco: Never Used   Substance Use Topics    Alcohol use: Yes     Comment: occassional    Drug use: No       ALLERGIES    Allergies   Allergen Reactions    Sulfa Antibiotics Rash       MEDICATIONS    Current Outpatient Medications on File Prior to Encounter   Medication Sig Dispense Refill    ibuprofen (ADVIL;MOTRIN) 200 MG tablet Take 400 mg by mouth every 6 hours as needed for Pain      LORazepam (ATIVAN) 0.5 MG tablet Take 0.5 mg by mouth nightly as needed for Anxiety.  traMADol (ULTRAM) 50 MG tablet Take 50 mg by mouth every 8 hours as needed for Pain.       raNITIdine HCl (ZANTAC 150 MAXIMUM STRENGTH PO) Take 150 mg by mouth 2 times daily      ferrous sulfate 325 (65 Fe) MG tablet Take 325 mg by mouth 2 times daily       insulin lispro (HUMALOG) 100 UNIT/ML injection vial Inject 5 Units into the skin 3 times daily (with meals) (Patient taking differently: Inject into the skin Humalog insulin pump: 1.8 Unit basal rate continous and as needed bolus per insulin pump) 1 vial 0    Timolol (TIMOPTIC) 0.5 % (DAILY) SOLN ophthalmic solution Place 1 drop into the right eye daily       aspirin 325 MG tablet Take 325 mg by mouth daily       atorvastatin (LIPITOR) 10 MG tablet Take 1 tablet by mouth daily 30 tablet 0    metoprolol tartrate (LOPRESSOR) 25 MG tablet Take 1 tablet by mouth 2 times daily 60 tablet 0    furosemide (LASIX) 40 MG tablet Take 0.5 tablets by mouth daily 60 tablet 0    8/8/2019  3:03 PM   Margins Attached edges 7/18/2019  3:23 PM   Marilynn-wound Assessment Dry; Intact 8/8/2019  3:03 PM   Non-staged Wound Description Full thickness 7/18/2019  3:23 PM   [place]%Wound Bed 20 7/18/2019  3:23 PM   Red%Wound Bed 90 6/20/2019  3:07 PM   Yellow%Wound Bed 80 7/18/2019  3:23 PM   Number of days: 76       Wound 07/18/19 Leg Right; Anterior;Proximal Wound #5; noted 7/18/19 (Active)   Wound Image   7/18/2019  3:23 PM   Wound Venous 7/18/2019  3:23 PM   Dressing Status Old drainage 8/8/2019  2:58 PM   Dressing/Treatment Other (comment) 7/18/2019  4:27 PM   Wound Cleansed Rinsed/Irrigated with saline 8/8/2019  2:58 PM   Wound Length (cm) 1.2 cm 8/8/2019  2:58 PM   Wound Width (cm) 2.4 cm 8/8/2019  2:58 PM   Wound Depth (cm) 0.1 cm 8/8/2019  2:58 PM   Wound Surface Area (cm^2) 2.88 cm^2 8/8/2019  2:58 PM   Change in Wound Size % (l*w) 86.91 8/8/2019  2:58 PM   Wound Volume (cm^3) 0.29 cm^3 8/8/2019  2:58 PM   Wound Healing % 87 8/8/2019  2:58 PM   Post-Procedure Length (cm) 1.3 cm 8/8/2019  3:03 PM   Post-Procedure Width (cm) 2.5 cm 8/8/2019  3:03 PM   Post-Procedure Depth (cm) 0.1 cm 8/8/2019  3:03 PM   Post-Procedure Surface Area (cm^2) 3.25 cm^2 8/8/2019  3:03 PM   Post-Procedure Volume (cm^3) 0.32 cm^3 8/8/2019  3:03 PM   Wound Assessment Granulation tissue;Red;Slough; Yellow 8/8/2019  2:58 PM   Drainage Amount Moderate 8/8/2019  2:58 PM   Drainage Description Serosanguinous 8/8/2019  2:58 PM   Odor None 8/8/2019  2:58 PM   Margins Attached edges 8/8/2019  2:58 PM   Marilynn-wound Assessment Dry;Pink 8/8/2019  2:58 PM   Non-staged Wound Description Full thickness 8/8/2019  2:58 PM   [place]%Wound Bed 90 7/18/2019  3:23 PM   Red%Wound Bed 80% 8/8/2019  2:58 PM   Yellow%Wound Bed 20% 8/8/2019  2:58 PM   Number of days: 21       Wound 08/08/19 Ankle Anterior;Right #6(acquired on 7/25/19) (Active)   Wound Image   8/8/2019  3:24 PM   Dressing Status Old drainage 8/8/2019  3:24 PM   Wound Length (cm) 0.4 cm sitting.                          [x] Avoid prolonged standing in one place.        Dietary:  [] Diet as tolerated:        [x] Calorie Diabetic Diet: [] No Added Salt:  [] Increase Protein:        [] Other:   Activity:  [x] Activity as tolerated:  [] Patient has no activity restrictions     [] Strict Bedrest:            [] Remain off Work:     [] May return to full duty work:                                       [] Return to work with [address]     If you are still having pain after you go home:  [x] Elevate the affected limb.        [x] Use over-the-counter medications you would normally use for pain as permitted by your family doctor. [x] For persistent pain not relieved by the above interventions, please call your family doctor.             Return Appointment:  [] Wound and dressing supply provider:   [x] [facility] or [address] [place] [address]  [] Wound Kade Bloom or NP scheduled for Wound Assessment:   [x] Return Appointment: With DR Avani Young  in  1 Week(s)  [] Ordered tests:      Nurse Case Manger:  MELISSA     Electronically signed by Leatha Perera RN on 8/8/2019 at 3:26 PM    [address] [place] [address] Information: Should you experience any significant changes in your wound(s) or have questions about your wound care, please contact the [address] [place] [address] [phone]  [address] 8:00 am - 4:30 pm and Friday 8:00 am - 12:30 pm.  If you need help with your wound outside these hours and cannot wait until we are again available, contact your PCP or go to the hospital emergency room.      PLEASE NOTE: IF YOU ARE UNABLE TO OBTAIN WOUND SUPPLIES, CONTINUE TO USE THE SUPPLIES YOU HAVE AVAILABLE UNTIL YOU ARE ABLE TO [facility] [address].  IT IS MOST IMPORTANT TO KEEP THE WOUND COVERED AT ALL TIMES.     Physician Signature:_______________________     Date: ___________ Time:  ____________                                ED Weiss Smart                      Electronically

## 2019-08-15 ENCOUNTER — HOSPITAL ENCOUNTER (OUTPATIENT)
Dept: WOUND CARE | Age: 74
Discharge: HOME | End: 2019-08-15
Payer: MEDICARE

## 2019-08-22 ENCOUNTER — HOSPITAL ENCOUNTER (OUTPATIENT)
Dept: WOUND CARE | Age: 74
Discharge: HOME | End: 2019-08-22
Payer: MEDICARE

## 2019-08-22 VITALS
RESPIRATION RATE: 18 BRPM | HEART RATE: 66 BPM | DIASTOLIC BLOOD PRESSURE: 78 MMHG | TEMPERATURE: 97.9 F | SYSTOLIC BLOOD PRESSURE: 166 MMHG

## 2019-08-22 DIAGNOSIS — I87.333 CHRONIC VENOUS HYPERTENSION (IDIOPATHIC) WITH ULCER AND INFLAMMATION OF BILATERAL LOWER EXTREMITY (HCC): Primary | ICD-10-CM

## 2019-08-22 DIAGNOSIS — L97.919 CHRONIC VENOUS HYPERTENSION (IDIOPATHIC) WITH ULCER AND INFLAMMATION OF BILATERAL LOWER EXTREMITY (HCC): Primary | ICD-10-CM

## 2019-08-22 DIAGNOSIS — L97.929 CHRONIC VENOUS HYPERTENSION (IDIOPATHIC) WITH ULCER AND INFLAMMATION OF BILATERAL LOWER EXTREMITY (HCC): Primary | ICD-10-CM

## 2019-08-22 PROCEDURE — 11045 DBRDMT SUBQ TISS EACH ADDL: CPT | Performed by: SURGERY

## 2019-08-22 PROCEDURE — 11045 DBRDMT SUBQ TISS EACH ADDL: CPT

## 2019-08-22 PROCEDURE — 11042 DBRDMT SUBQ TIS 1ST 20SQCM/<: CPT | Performed by: SURGERY

## 2019-08-22 PROCEDURE — 11042 DBRDMT SUBQ TIS 1ST 20SQCM/<: CPT

## 2019-08-22 RX ORDER — LIDOCAINE HYDROCHLORIDE 40 MG/ML
SOLUTION TOPICAL ONCE
Status: DISCONTINUED | OUTPATIENT
Start: 2019-08-22 | End: 2019-08-23 | Stop reason: HOSPADM

## 2019-08-22 ASSESSMENT — PAIN DESCRIPTION - FREQUENCY: FREQUENCY: INTERMITTENT

## 2019-08-22 ASSESSMENT — PAIN DESCRIPTION - ORIENTATION: ORIENTATION: LEFT

## 2019-08-22 ASSESSMENT — PAIN DESCRIPTION - LOCATION: LOCATION: LEG

## 2019-08-22 ASSESSMENT — PAIN DESCRIPTION - ONSET: ONSET: ON-GOING

## 2019-08-22 ASSESSMENT — PAIN DESCRIPTION - DESCRIPTORS: DESCRIPTORS: BURNING

## 2019-08-22 ASSESSMENT — PAIN DESCRIPTION - PROGRESSION: CLINICAL_PROGRESSION: NOT CHANGED

## 2019-08-22 ASSESSMENT — PAIN DESCRIPTION - PAIN TYPE: TYPE: ACUTE PAIN

## 2019-08-22 ASSESSMENT — PAIN SCALES - GENERAL: PAINLEVEL_OUTOF10: 7

## 2019-08-22 NOTE — PROGRESS NOTES
SURGICAL HISTORY    Past Surgical History:   Procedure Laterality Date    APPENDECTOMY      BACK SURGERY      x2    CATARACT REMOVAL      bilateral     CHOLECYSTECTOMY      COLONOSCOPY      EYE SURGERY      FRACTURE SURGERY      Left ankle    HYSTERECTOMY      INSET/CHANGE LOMELI CATHETER - COMPLICATED  60/9418    suprapubic cath    JOINT REPLACEMENT      Right knee    KNEE SURGERY         FAMILY HISTORY    Family History   Problem Relation Age of Onset    Heart Disease Father     Heart Failure Father     Diabetes Father     Diabetes Daughter     Diabetes Daughter        SOCIAL HISTORY    Social History     Tobacco Use    Smoking status: Never Smoker    Smokeless tobacco: Never Used   Substance Use Topics    Alcohol use: Yes     Comment: occassional    Drug use: No       ALLERGIES    Allergies   Allergen Reactions    Sulfa Antibiotics Rash       MEDICATIONS    Current Outpatient Medications on File Prior to Encounter   Medication Sig Dispense Refill    ibuprofen (ADVIL;MOTRIN) 200 MG tablet Take 400 mg by mouth every 6 hours as needed for Pain      traMADol (ULTRAM) 50 MG tablet Take 50 mg by mouth every 8 hours as needed for Pain.       raNITIdine HCl (ZANTAC 150 MAXIMUM STRENGTH PO) Take 150 mg by mouth 2 times daily      ferrous sulfate 325 (65 Fe) MG tablet Take 325 mg by mouth 2 times daily       insulin lispro (HUMALOG) 100 UNIT/ML injection vial Inject 5 Units into the skin 3 times daily (with meals) (Patient taking differently: Inject into the skin Humalog insulin pump: 1.8 Unit basal rate continous and as needed bolus per insulin pump) 1 vial 0    atorvastatin (LIPITOR) 10 MG tablet Take 1 tablet by mouth daily 30 tablet 0    metoprolol tartrate (LOPRESSOR) 25 MG tablet Take 1 tablet by mouth 2 times daily 60 tablet 0    furosemide (LASIX) 40 MG tablet Take 0.5 tablets by mouth daily 60 tablet 0    Biotin (BIOTIN 5000) 5 MG CAPS Take 1 tablet by mouth daily      lipase-protease-amylase (CREON) 62731 units delayed release capsule Take 24,000 Units by mouth 3 times daily (with meals)      gabapentin (NEURONTIN) 300 MG capsule Take 300 mg by mouth 2 times daily.  isosorbide mononitrate (IMDUR) 30 MG extended release tablet Take 30 mg by mouth daily      venlafaxine (EFFEXOR) 75 MG tablet Take 150 mg by mouth 2 times daily       cetirizine (ZYRTEC) 10 MG tablet Take 10 mg by mouth daily      vitamin D3 (CHOLECALCIFEROL) 400 units TABS tablet Take 400 Units by mouth daily       Calcium Carbonate Antacid (TUMS ULTRA 1000) 1000 MG CHEW Take 1 tablet by mouth as needed (acid reflux)        No current facility-administered medications on file prior to encounter. REVIEW OF SYSTEMS    A comprehensive review of systems was negative except for: Respiratory: positive for dyspnea on exertion  Musculoskeletal: positive for back pain and Left leg pain    Objective:      BP (!) 166/78   Pulse 66   Temp 97.9 °F (36.6 °C) (Oral)   Resp 18     Wt Readings from Last 3 Encounters:   06/13/19 173 lb 1 oz (78.5 kg)   05/05/19 192 lb 14.4 oz (87.5 kg)   11/20/18 157 lb 6.5 oz (71.4 kg)       PHYSICAL EXAM    General Appearance: alert and oriented to person, place and time, well developed and well- nourished, in no acute distress  Skin: warm and dry, no rash or erythema  Head: normocephalic and atraumatic  Eyes: pupils equal, round, and reactive to light, extraocular eye movements intact, conjunctivae normal  Neck: supple and non-tender without mass, no thyromegaly or thyroid nodules, no cervical lymphadenopathy  Pulmonary/Chest: clear to auscultation left chest- no wheezes, rales or rhonchi, normal air movement, no respiratory distress, right base rales appreciated did not clear with cough.  No shortness of breath while speaking daughter suspects she does have shortness of breath and has gained 10 pounds in a week Cardiovascular: normal rate, regular rhythm, normal S1 and S2, no murmurs, rubs, clicks, or gallops, distal pulses +2 left dorsalis pedis, no others palpable Doppler signals biphasic in all 4 pedal arteries no carotid bruits neck veins slightly elevated left side  Abdomen: soft, non-tender, non-distended obese right upper quadrant gallbladder scar LRR, suprapubic tube placed by Lizette Maki normal bowel sounds, no masses or organomegaly  Extremities: no cyanosis, clubbing massive edema left thigh and calf moderate edema right thigh and calf . Bilateral ulcerations of the calf and one of the medial left thigh see pictures below  Musculoskeletal: normal range of motion, no joint swelling, deformity or tenderness  Neurologic: reflexes normal and symmetric, no cranial nerve deficit, had trouble walking from the house to the car today, coordination and speech normal      Assessment:        Problem List Items Addressed This Visit     Chronic venous hypertension (idiopathic) with ulcer and inflammation of bilateral lower extremity ([address] - Primary             Excisional Debridement Procedure Note  Indications:  Based on my examination of this patient's wound(s)/ulcer(s) today, debridement is required to promote healing and evaluate the wound base. Performed by: Jennifer Joseph MD    Consent obtained? Yes    Time out taken: Yes    Pain Control: Anesthetic: 4% Lidocaine Liquid Topical(7.5 ml)     Debridement:Excisional Debridement    Using curette the wound/ulcer was sharply debrided    down through and included excision of  dermis and subcutaneous tissue. Devitalized Tissue Debrided:  fibrin, biofilm and slough      Pre Debridement Measurements:  Are located in the Wound/Ulcer Documentation Flow Sheet   Wound/Ulcer #: 1, 8 and 9     Post  Debridement Measurements:  Wound 05/23/19 Leg Right;Medial Wound #1; re-opened approx 2 months ago (Active)   Wound Image    7/11/2019  3:36 PM   Wound Venous 8/22/2019  3:14 PM   Dressing Status Intact; Old drainage 8/22/2019  3:14 PM

## 2019-09-05 ENCOUNTER — HOSPITAL ENCOUNTER (OUTPATIENT)
Dept: WOUND CARE | Age: 74
Discharge: HOME | End: 2019-09-05
Payer: MEDICARE

## 2019-09-05 VITALS
BODY MASS INDEX: 37.36 KG/M2 | DIASTOLIC BLOOD PRESSURE: 74 MMHG | HEART RATE: 83 BPM | TEMPERATURE: 98.3 F | SYSTOLIC BLOOD PRESSURE: 136 MMHG | RESPIRATION RATE: 18 BRPM | WEIGHT: 184.97 LBS

## 2019-09-05 DIAGNOSIS — L97.929 CHRONIC VENOUS HYPERTENSION (IDIOPATHIC) WITH ULCER AND INFLAMMATION OF BILATERAL LOWER EXTREMITY (HCC): Primary | ICD-10-CM

## 2019-09-05 DIAGNOSIS — L97.919 CHRONIC VENOUS HYPERTENSION (IDIOPATHIC) WITH ULCER AND INFLAMMATION OF BILATERAL LOWER EXTREMITY (HCC): Primary | ICD-10-CM

## 2019-09-05 DIAGNOSIS — I87.333 CHRONIC VENOUS HYPERTENSION (IDIOPATHIC) WITH ULCER AND INFLAMMATION OF BILATERAL LOWER EXTREMITY (HCC): Primary | ICD-10-CM

## 2019-09-05 PROCEDURE — 11042 DBRDMT SUBQ TIS 1ST 20SQCM/<: CPT | Performed by: SURGERY

## 2019-09-05 PROCEDURE — 11042 DBRDMT SUBQ TIS 1ST 20SQCM/<: CPT

## 2019-09-05 PROCEDURE — 11045 DBRDMT SUBQ TISS EACH ADDL: CPT

## 2019-09-05 PROCEDURE — 11045 DBRDMT SUBQ TISS EACH ADDL: CPT | Performed by: SURGERY

## 2019-09-05 RX ORDER — LIDOCAINE HYDROCHLORIDE 40 MG/ML
SOLUTION TOPICAL ONCE
Status: DISCONTINUED | OUTPATIENT
Start: 2019-09-05 | End: 2019-09-06 | Stop reason: HOSPADM

## 2019-09-05 ASSESSMENT — PAIN SCALES - GENERAL: PAINLEVEL_OUTOF10: 0

## 2019-09-12 ENCOUNTER — HOSPITAL ENCOUNTER (OUTPATIENT)
Dept: WOUND CARE | Age: 74
Discharge: HOME | End: 2019-09-12
Payer: MEDICARE

## 2019-09-12 VITALS
DIASTOLIC BLOOD PRESSURE: 59 MMHG | SYSTOLIC BLOOD PRESSURE: 149 MMHG | TEMPERATURE: 97.9 F | HEART RATE: 67 BPM | RESPIRATION RATE: 18 BRPM

## 2019-09-12 DIAGNOSIS — L97.919 CHRONIC VENOUS HYPERTENSION (IDIOPATHIC) WITH ULCER AND INFLAMMATION OF BILATERAL LOWER EXTREMITY (HCC): Primary | ICD-10-CM

## 2019-09-12 DIAGNOSIS — L97.929 CHRONIC VENOUS HYPERTENSION (IDIOPATHIC) WITH ULCER AND INFLAMMATION OF BILATERAL LOWER EXTREMITY (HCC): Primary | ICD-10-CM

## 2019-09-12 DIAGNOSIS — I87.333 CHRONIC VENOUS HYPERTENSION (IDIOPATHIC) WITH ULCER AND INFLAMMATION OF BILATERAL LOWER EXTREMITY (HCC): Primary | ICD-10-CM

## 2019-09-12 PROCEDURE — 11042 DBRDMT SUBQ TIS 1ST 20SQCM/<: CPT

## 2019-09-12 PROCEDURE — 11045 DBRDMT SUBQ TISS EACH ADDL: CPT | Performed by: SURGERY

## 2019-09-12 PROCEDURE — 11045 DBRDMT SUBQ TISS EACH ADDL: CPT

## 2019-09-12 PROCEDURE — 11042 DBRDMT SUBQ TIS 1ST 20SQCM/<: CPT | Performed by: SURGERY

## 2019-09-12 RX ORDER — LIDOCAINE HYDROCHLORIDE 40 MG/ML
SOLUTION TOPICAL ONCE
Status: DISCONTINUED | OUTPATIENT
Start: 2019-09-12 | End: 2019-09-13 | Stop reason: HOSPADM

## 2019-09-12 ASSESSMENT — PAIN SCALES - GENERAL: PAINLEVEL_OUTOF10: 3

## 2019-09-12 ASSESSMENT — PAIN - FUNCTIONAL ASSESSMENT: PAIN_FUNCTIONAL_ASSESSMENT: PREVENTS OR INTERFERES SOME ACTIVE ACTIVITIES AND ADLS

## 2019-09-12 ASSESSMENT — PAIN DESCRIPTION - FREQUENCY: FREQUENCY: INTERMITTENT

## 2019-09-12 ASSESSMENT — PAIN DESCRIPTION - ONSET: ONSET: ON-GOING

## 2019-09-12 ASSESSMENT — PAIN DESCRIPTION - PROGRESSION: CLINICAL_PROGRESSION: NOT CHANGED

## 2019-09-12 ASSESSMENT — PAIN DESCRIPTION - DESCRIPTORS: DESCRIPTORS: ACHING

## 2019-09-12 ASSESSMENT — PAIN DESCRIPTION - ORIENTATION: ORIENTATION: RIGHT;LEFT

## 2019-09-12 ASSESSMENT — PAIN DESCRIPTION - LOCATION: LOCATION: LEG

## 2019-09-12 ASSESSMENT — PAIN DESCRIPTION - PAIN TYPE: TYPE: ACUTE PAIN

## 2019-09-12 NOTE — PROGRESS NOTES
catheter ([address]        PAST SURGICAL HISTORY    Past Surgical History:   Procedure Laterality Date    APPENDECTOMY      BACK SURGERY      x2    CATARACT REMOVAL      bilateral     CHOLECYSTECTOMY      COLONOSCOPY      EYE SURGERY      FRACTURE SURGERY      Left ankle    HYSTERECTOMY      INSET/CHANGE LOMELI CATHETER - COMPLICATED  39/5595    suprapubic cath    JOINT REPLACEMENT      Right knee    KNEE SURGERY         FAMILY HISTORY    Family History   Problem Relation Age of Onset    Heart Disease Father     Heart Failure Father     Diabetes Father     Diabetes Daughter     Diabetes Daughter        SOCIAL HISTORY    Social History     Tobacco Use    Smoking status: Never Smoker    Smokeless tobacco: Never Used   Substance Use Topics    Alcohol use: Yes     Comment: occassional    Drug use: No       ALLERGIES    Allergies   Allergen Reactions    Sulfa Antibiotics Rash       MEDICATIONS    Current Outpatient Medications on File Prior to Encounter   Medication Sig Dispense Refill    raNITIdine HCl (ZANTAC 150 MAXIMUM STRENGTH PO) Take 150 mg by mouth 2 times daily      ferrous sulfate 325 (65 Fe) MG tablet Take 325 mg by mouth 2 times daily       insulin lispro (HUMALOG) 100 UNIT/ML injection vial Inject 5 Units into the skin 3 times daily (with meals) (Patient taking differently: Inject into the skin Humalog insulin pump: 1.8 Unit basal rate continous and as needed bolus per insulin pump) 1 vial 0    atorvastatin (LIPITOR) 10 MG tablet Take 1 tablet by mouth daily 30 tablet 0    metoprolol tartrate (LOPRESSOR) 25 MG tablet Take 1 tablet by mouth 2 times daily 60 tablet 0    furosemide (LASIX) 40 MG tablet Take 0.5 tablets by mouth daily 60 tablet 0    Biotin (BIOTIN 5000) 5 MG CAPS Take 1 tablet by mouth daily      lipase-protease-amylase (CREON) 07573 units delayed release capsule Take 12,000 Units by mouth 3 times daily (with meals)       gabapentin (NEURONTIN) 300 MG capsule Take 300 mg by mouth 2 times daily.  isosorbide mononitrate (IMDUR) 30 MG extended release tablet Take 30 mg by mouth daily      venlafaxine (EFFEXOR) 75 MG tablet Take 150 mg by mouth 2 times daily       cetirizine (ZYRTEC) 10 MG tablet Take 10 mg by mouth daily      vitamin D3 (CHOLECALCIFEROL) 400 units TABS tablet Take 400 Units by mouth daily       ibuprofen (ADVIL;MOTRIN) 200 MG tablet Take 400 mg by mouth every 6 hours as needed for Pain      traMADol (ULTRAM) 50 MG tablet Take 50 mg by mouth every 8 hours as needed for Pain. No current facility-administered medications on file prior to encounter. REVIEW OF SYSTEMS    A comprehensive review of systems was negative except for: Respiratory: positive for dyspnea on exertion  Musculoskeletal: positive for back pain and Left leg pain    Objective:      BP (!) 149/59   Pulse 67   Temp 97.9 °F (36.6 °C) (Oral)   Resp 18     Wt Readings from Last 3 Encounters:   09/05/19 184 lb 15.5 oz (83.9 kg)   06/13/19 173 lb 1 oz (78.5 kg)   05/05/19 192 lb 14.4 oz (87.5 kg)       PHYSICAL EXAM    General Appearance: alert and oriented to person, place and time, well developed and well- nourished, in no acute distress  Skin: warm and dry, no rash or erythema  Head: normocephalic and atraumatic  Eyes: pupils equal, round, and reactive to light, extraocular eye movements intact, conjunctivae normal  Neck: supple and non-tender without mass, no thyromegaly or thyroid nodules, no cervical lymphadenopathy  Pulmonary/Chest: clear to auscultation left chest- no wheezes, rales or rhonchi, normal air movement, no respiratory distress, right base rales appreciated did not clear with cough.  No shortness of breath while speaking daughter suspects she does have shortness of breath and has gained 10 pounds in a week Cardiovascular: normal rate, regular rhythm, normal S1 and S2, no murmurs, rubs, clicks, or gallops, distal pulses +2 left dorsalis pedis, no others palpable 9/12/2019  2:55 PM   Drainage Description Serosanguinous 9/12/2019  2:55 PM   Odor None 9/12/2019  2:55 PM   Margins Attached edges 9/12/2019  2:55 PM   Marilynn-wound Assessment Fragile;Pink 9/12/2019  2:55 PM   Non-staged Wound Description Full thickness 9/12/2019  2:55 PM   [place]%Wound Bed 20 9/5/2019  2:56 PM   Red%Wound Bed 70% 9/12/2019  2:55 PM   Yellow%Wound Bed 30% 9/12/2019  2:55 PM   Black%Wound Bed 10 8/22/2019  3:14 PM   Number of days: 21       Wound 09/05/19 Leg Right;Medial;Proximal;Lower Wound #10, noted 9/5/19 (Active)   Wound Image   9/5/2019  2:56 PM   Dressing Status Old drainage 9/12/2019  2:55 PM   Dressing Changed Changed/New 9/12/2019  3:42 PM   Dressing/Treatment Other (comment) 9/12/2019  3:42 PM   Wound Cleansed Rinsed/Irrigated with saline 9/12/2019  2:55 PM   Wound Length (cm) 1.1 cm 9/12/2019  2:55 PM   Wound Width (cm) 2.8 cm 9/12/2019  2:55 PM   Wound Depth (cm) 0.1 cm 9/12/2019  2:55 PM   Wound Surface Area (cm^2) 3.08 cm^2 9/12/2019  2:55 PM   Change in Wound Size % (l*w) 78 9/12/2019  2:55 PM   Wound Volume (cm^3) 0.31 cm^3 9/12/2019  2:55 PM   Wound Healing % 78 9/12/2019  2:55 PM   Post-Procedure Length (cm) 1.2 cm 9/12/2019  3:12 PM   Post-Procedure Width (cm) 2.9 cm 9/12/2019  3:12 PM   Post-Procedure Depth (cm) 0.1 cm 9/12/2019  3:12 PM   Post-Procedure Surface Area (cm^2) 3.48 cm^2 9/12/2019  3:12 PM   Post-Procedure Volume (cm^3) 0.35 cm^3 9/12/2019  3:12 PM   Wound Assessment Granulation tissue;Red;Slough; Yellow 9/12/2019  2:55 PM   Drainage Amount Moderate 9/12/2019  2:55 PM   Drainage Description Serosanguinous 9/12/2019  2:55 PM   Odor None 9/5/2019  2:56 PM   Margins Attached edges 9/12/2019  2:55 PM   Marilynn-wound Assessment Fragile;Pink 9/12/2019  2:55 PM   Non-staged Wound Description Full thickness 9/12/2019  2:55 PM   Red%Wound Bed 20% 9/12/2019  2:55 PM   Yellow%Wound Bed 80% 9/12/2019  2:55 PM   Number of days: 7       Percent of Wound/Ulcer Debrided: 100%    Total Surface Area Debrided:  33.86 sq cm    Diabetic/Pressure/Non Pressure Ulcers only:  Ulcer: N/A    Bleeding: Minimal    Hemostasis Achieved: by pressure    Procedural Pain: 3  / 10     Post Procedural Pain: 0 / 10     Response to treatment:  Well tolerated by patient. Discharge Instructions         Discharge [address] and Hyper[facility]ic Oxygen Therapy   Physician Orders and Discharge Instructions  [address]   [address]  Telephone: [phone]      FAX [phone]     NAME: Mushtaq Hightower OF BIRTH:  1945  MEDICAL RECORD NUMBER:  [id number]  DATE:  9/12/2019     Wound Cleansing:   Do not scrub or use excessive force. Cleanse wound prior to applying a clean dressing with:  [] Normal Saline            [] Keep Wound Dry in Shower    [] Wound Cleanser   [] Cleanse wound with Mild Soap & Water  [] May Shower at Discharge   [] Other:        Topical Treatments:  Do not apply lotions, creams, or ointments to wound bed unless directed. [] Apply moisturizing lotion to skin surrounding the wound prior to dressing change.  [] Apply antifungal ointment to skin surrounding the wound prior to dressing change.  [] Apply thin film of moisture barrier ointment to skin immediately around wound.   [] Other:       Dressings:                  Wound Location-  BILATERAL LOWER EXTREMITIES           ALGINATE WITH SILVER  EXTRASORB  COFLEX WITH CALOMINE  HOME CARE TO CHANGE DRESSING ON MONDAYS, WOUND CARE WILL CHANGE ON THURSDAYS     6\" ACE WRAPS BILATERAL THIGHS ABOVE UNNA BOOTS              Compression:  Apply: [] Multilayer Compression Wrap Applied in Clinic-   []RightLeg            []Left Leg--              [] Multi-layer compression.  Do not get leg(s) with wrap wet.  If wraps become too tight call the center or completely remove the wrap.                        [x] Elevate

## 2019-09-12 NOTE — PLAN OF CARE
Peyton Stains Application   Below Knee    NAME:  Devendra Centeno  YOB: 1945  MEDICAL RECORD NUMBER:  [id number]  DATE:  9/12/2019    Dinesh Dumont boot: Applied moisturizing agent to dry skin as needed. Appied primary and secondary dressing as ordered. Applied Unna roll from toes to knee overlapping each time. Applied ace wrap or coban from toes to below the knee. Secured with tape and/or metal clips covered with tape. Instructed patient/caregiver to keep dressing dry and intact. DO NOT REMOVE DRESSING. Instructed pt/family/caregiver to report excessive draining, loose bandage, wet dressing, severe pain or tingling in toes. Applied Peyton Stains dressing below the knee to right lower leg. Applied Peyton Stains dressing below the knee to left lower leg. Unna Boot(s) were applied per  Guidelines.      Electronically signed by Lauren Neumann RN on 9/12/2019 at 3:45 PM

## 2019-09-19 ENCOUNTER — HOSPITAL ENCOUNTER (OUTPATIENT)
Dept: WOUND CARE | Age: 74
Discharge: HOME | End: 2019-09-19
Payer: MEDICARE

## 2019-09-19 VITALS
DIASTOLIC BLOOD PRESSURE: 69 MMHG | HEART RATE: 80 BPM | RESPIRATION RATE: 18 BRPM | SYSTOLIC BLOOD PRESSURE: 140 MMHG | TEMPERATURE: 98.1 F

## 2019-09-19 DIAGNOSIS — I87.333 CHRONIC VENOUS HYPERTENSION (IDIOPATHIC) WITH ULCER AND INFLAMMATION OF BILATERAL LOWER EXTREMITY (HCC): Primary | ICD-10-CM

## 2019-09-19 DIAGNOSIS — L97.919 CHRONIC VENOUS HYPERTENSION (IDIOPATHIC) WITH ULCER AND INFLAMMATION OF BILATERAL LOWER EXTREMITY (HCC): Primary | ICD-10-CM

## 2019-09-19 DIAGNOSIS — L97.929 CHRONIC VENOUS HYPERTENSION (IDIOPATHIC) WITH ULCER AND INFLAMMATION OF BILATERAL LOWER EXTREMITY (HCC): Primary | ICD-10-CM

## 2019-09-19 PROCEDURE — 11045 DBRDMT SUBQ TISS EACH ADDL: CPT | Performed by: SURGERY

## 2019-09-19 PROCEDURE — 11042 DBRDMT SUBQ TIS 1ST 20SQCM/<: CPT | Performed by: SURGERY

## 2019-09-19 ASSESSMENT — PAIN SCALES - GENERAL: PAINLEVEL_OUTOF10: 0

## 2019-09-19 NOTE — PROGRESS NOTES
[address]   Progress Note and Procedure Note      Luanne Garcia  MEDICAL RECORD NUMBER:  [id number]  AGE: 66 y.o. GENDER: female  : 1945  EPISODE DATE:  2019    Subjective:     Chief Complaint   Patient presents with    Wound Check     Follow-up visit for wounds to the right and left lower legs. HISTORY of PRESENT ILLNESS HPI     Luanne Garcia is a 66 y.o. female who presents today for wound/ulcer evaluation. History of Wound Context: Years of edema years of ulceration left leg never healed right leg has healed but then would break open when it swelled again had zippered stockings from [employer] but would ulcerate anyway  Wound/Ulcer Pain Timing/Severity: constant, moderate  Quality of pain: dull, aching  Severity:  10 / 10   Modifying Factors: Pain worsens with walking  Associated Signs/Symptoms: edema, erythema, drainage, odor and pain    Ulcer Identification:  Ulcer Type: venous    Contributing Factors: edema, venous stasis, lymphedema, diabetes, decreased mobility and obesity    Wound: N/A  This week  she had a some type of aid ,cleaned her legs with peroxide the first time the second time she soaked her dressings and peroxide and left him on not sure why she was doing this      PAST MEDICAL HISTORY        Diagnosis Date    Anemia     Atrial fibrillation ([address]     CAD (coronary artery disease)     Chronic back pain     CKD (chronic kidney disease), stage III ([address]     Diabetes mellitus ([address]     Diastolic CHF ([address]     Hyperlipidemia     Hypertension     MI (myocardial infarction) ([address]     MRSA (methicillin resistant staph aureus) culture positive 2018    leg ulcer    MRSA (methicillin resistant staph aureus) culture positive 2019    leg    MRSA colonization 10/16/2018    Obesity (BMI 30-39. 9)     Osteoarthritis     Pancreatitis     Sepsis ([address]     Stasis ulcer ([address]     bilateral lower extremities     Suprapubic catheter ([address]        PAST SURGICAL HISTORY    Past Surgical History:   Procedure Laterality Date    APPENDECTOMY      BACK SURGERY      x2    CATARACT REMOVAL      bilateral     CHOLECYSTECTOMY      COLONOSCOPY      EYE SURGERY      FRACTURE SURGERY      Left ankle    HYSTERECTOMY      INSET/CHANGE LOMELI CATHETER - COMPLICATED  10/1002    suprapubic cath    JOINT REPLACEMENT      Right knee    KNEE SURGERY         FAMILY HISTORY    Family History   Problem Relation Age of Onset    Heart Disease Father     Heart Failure Father     Diabetes Father     Diabetes Daughter     Diabetes Daughter        SOCIAL HISTORY    Social History     Tobacco Use    Smoking status: Never Smoker    Smokeless tobacco: Never Used   Substance Use Topics    Alcohol use: Yes     Comment: occassional    Drug use: No       ALLERGIES    Allergies   Allergen Reactions    Sulfa Antibiotics Rash       MEDICATIONS    Current Outpatient Medications on File Prior to Encounter   Medication Sig Dispense Refill    ibuprofen (ADVIL;MOTRIN) 200 MG tablet Take 400 mg by mouth every 6 hours as needed for Pain      traMADol (ULTRAM) 50 MG tablet Take 50 mg by mouth every 8 hours as needed for Pain.       raNITIdine HCl (ZANTAC 150 MAXIMUM STRENGTH PO) Take 150 mg by mouth 2 times daily      ferrous sulfate 325 (65 Fe) MG tablet Take 325 mg by mouth 2 times daily       insulin lispro (HUMALOG) 100 UNIT/ML injection vial Inject 5 Units into the skin 3 times daily (with meals) (Patient taking differently: Inject into the skin Humalog insulin pump: 1.8 Unit basal rate continous and as needed bolus per insulin pump) 1 vial 0    atorvastatin (LIPITOR) 10 MG tablet Take 1 tablet by mouth daily 30 tablet 0    metoprolol tartrate (LOPRESSOR) 25 MG tablet Take 1 tablet by mouth 2 times daily 60 tablet 0    furosemide (LASIX) 40 MG tablet Take 0.5 tablets by mouth daily 60 tablet 0    Biotin (BIOTIN 5000) 5 MG CAPS Take 1 tablet by mouth Doppler signals biphasic in all 4 pedal arteries no carotid bruits neck veins slightly elevated left side  Abdomen: soft, non-tender, non-distended obese right upper quadrant gallbladder scar LRR, suprapubic tube placed by [place] Olivo normal bowel sounds, no masses or organomegaly  Extremities: no cyanosis, clubbing massive edema left thigh and calf moderate edema right thigh and calf . Bilateral ulcerations of the calf and one of the medial left thigh see pictures below  Musculoskeletal: normal range of motion, no joint swelling, deformity or tenderness  Neurologic: reflexes normal and symmetric, no cranial nerve deficit, had trouble walking from the house to the car today, coordination and speech normal      Assessment:        Problem List Items Addressed This Visit     Chronic venous hypertension (idiopathic) with ulcer and inflammation of bilateral lower extremity ([address] - Primary                 Excisional Debridement Procedure Note  Indications:  Based on my examination of this patient's wound(s)/ulcer(s) today, debridement is required to promote healing and evaluate the wound base. Performed by: Edy Little MD    Consent obtained? Yes    Time out taken: Yes    Pain Control: Anesthetic: 4% Lidocaine Liquid Topical(7.5 ml)     Debridement:Excisional Debridement    Using curette the wound/ulcer was sharply debrided    down through and included excision of  subcutaneous tissue.         Devitalized Tissue Debrided:  biofilm and slough      Pre Debridement Measurements:  Are located in the Wound/Ulcer Documentation Flow Sheet   Wound/Ulcer #: 1, 2, 8, 9 and 10     Post  Debridement Measurements:  Wound 05/23/19 Leg Right;Medial;Distal Wound #1; re-opened approx 2 months ago (Active)   Wound Image   9/5/2019  2:56 PM   Wound Venous 8/22/2019  3:14 PM   Dressing Status Old drainage 9/19/2019  2:37 PM   Dressing Changed Changed/New 9/19/2019  3:42 PM   Dressing/Treatment Other (comment) 9/19/2019 tissue;Pink;Red;Slough; Yellow 9/19/2019  2:37 PM   Drainage Amount Large 9/19/2019  2:37 PM   Drainage Description Brown;Serosanguinous 9/19/2019  2:37 PM   Odor None 9/19/2019  2:37 PM   Margins Attached edges 9/19/2019  2:37 PM   Marilynn-wound Assessment Fragile;Pink 9/19/2019  2:37 PM   Non-staged Wound Description Full thickness 9/19/2019  2:37 PM   [place]%Wound Bed 15% 9/19/2019  2:37 PM   Red%Wound Bed 70% 9/19/2019  2:37 PM   Yellow%Wound Bed 15% 9/19/2019  2:37 PM   Black%Wound Bed 10 8/22/2019  3:14 PM   Number of days: 28       Wound 09/05/19 Leg Right;Medial;Proximal;Lower Wound #10, noted 9/5/19 (Active)   Wound Image   9/5/2019  2:56 PM   Wound Venous 9/12/2019  3:42 PM   Dressing Status Old drainage 9/19/2019  2:37 PM   Dressing Changed Changed/New 9/19/2019  3:42 PM   Dressing/Treatment Other (comment) 9/19/2019  3:42 PM   Wound Cleansed Rinsed/Irrigated with saline 9/19/2019  2:37 PM   Wound Length (cm) 1.4 cm 9/19/2019  2:37 PM   Wound Width (cm) 2.4 cm 9/19/2019  2:37 PM   Wound Depth (cm) 0.1 cm 9/19/2019  2:37 PM   Wound Surface Area (cm^2) 3.36 cm^2 9/19/2019  2:37 PM   Change in Wound Size % (l*w) 76 9/19/2019  2:37 PM   Wound Volume (cm^3) 0.34 cm^3 9/19/2019  2:37 PM   Wound Healing % 76 9/19/2019  2:37 PM   Post-Procedure Length (cm) 1.5 cm 9/19/2019  3:04 PM   Post-Procedure Width (cm) 2.5 cm 9/19/2019  3:04 PM   Post-Procedure Depth (cm) 0.1 cm 9/19/2019  3:04 PM   Post-Procedure Surface Area (cm^2) 3.75 cm^2 9/19/2019  3:04 PM   Post-Procedure Volume (cm^3) 0.38 cm^3 9/19/2019  3:04 PM   Wound Assessment Granulation tissue;Red;Slough; Yellow 9/12/2019  2:55 PM   Drainage Amount Moderate 9/19/2019  2:37 PM   Drainage Description Serosanguinous 9/19/2019  2:37 PM   Odor None 9/19/2019  2:37 PM   Margins Attached edges 9/19/2019  2:37 PM   Marilynn-wound Assessment Fragile;Pink 9/19/2019  2:37 PM   Non-staged Wound Description Full thickness 9/19/2019  2:37 PM   [place]%Wound Bed 40% 9/19/2019  2:37 PM

## 2019-09-26 ENCOUNTER — HOSPITAL ENCOUNTER (OUTPATIENT)
Dept: WOUND CARE | Age: 74
Discharge: HOME | End: 2019-09-26
Payer: MEDICARE

## 2019-10-02 ENCOUNTER — TELEPHONE (OUTPATIENT)
Dept: SURGERY | Age: 74
End: 2019-10-02

## 2019-10-03 ENCOUNTER — HOSPITAL ENCOUNTER (OUTPATIENT)
Dept: WOUND CARE | Age: 74
Discharge: HOME | End: 2019-10-03
Payer: MEDICARE

## 2019-10-03 VITALS
BODY MASS INDEX: 36.11 KG/M2 | RESPIRATION RATE: 18 BRPM | DIASTOLIC BLOOD PRESSURE: 72 MMHG | TEMPERATURE: 98.3 F | SYSTOLIC BLOOD PRESSURE: 135 MMHG | WEIGHT: 178.79 LBS | HEART RATE: 71 BPM

## 2019-10-03 DIAGNOSIS — L97.919 CHRONIC VENOUS HYPERTENSION (IDIOPATHIC) WITH ULCER AND INFLAMMATION OF BILATERAL LOWER EXTREMITY (HCC): Primary | ICD-10-CM

## 2019-10-03 DIAGNOSIS — L97.929 CHRONIC VENOUS HYPERTENSION (IDIOPATHIC) WITH ULCER AND INFLAMMATION OF BILATERAL LOWER EXTREMITY (HCC): Primary | ICD-10-CM

## 2019-10-03 DIAGNOSIS — I87.333 CHRONIC VENOUS HYPERTENSION (IDIOPATHIC) WITH ULCER AND INFLAMMATION OF BILATERAL LOWER EXTREMITY (HCC): Primary | ICD-10-CM

## 2019-10-03 PROCEDURE — 11042 DBRDMT SUBQ TIS 1ST 20SQCM/<: CPT

## 2019-10-03 PROCEDURE — 11045 DBRDMT SUBQ TISS EACH ADDL: CPT

## 2019-10-03 PROCEDURE — 11042 DBRDMT SUBQ TIS 1ST 20SQCM/<: CPT | Performed by: SURGERY

## 2019-10-03 PROCEDURE — 11045 DBRDMT SUBQ TISS EACH ADDL: CPT | Performed by: SURGERY

## 2019-10-03 RX ORDER — LIDOCAINE HYDROCHLORIDE 40 MG/ML
SOLUTION TOPICAL ONCE
Status: DISCONTINUED | OUTPATIENT
Start: 2019-10-03 | End: 2019-10-04 | Stop reason: HOSPADM

## 2019-10-03 ASSESSMENT — PAIN DESCRIPTION - PROGRESSION: CLINICAL_PROGRESSION: NOT CHANGED

## 2019-10-03 ASSESSMENT — PAIN - FUNCTIONAL ASSESSMENT: PAIN_FUNCTIONAL_ASSESSMENT: PREVENTS OR INTERFERES SOME ACTIVE ACTIVITIES AND ADLS

## 2019-10-03 ASSESSMENT — PAIN DESCRIPTION - FREQUENCY: FREQUENCY: INTERMITTENT

## 2019-10-03 ASSESSMENT — PAIN DESCRIPTION - ONSET: ONSET: ON-GOING

## 2019-10-03 ASSESSMENT — PAIN DESCRIPTION - ORIENTATION: ORIENTATION: RIGHT;LEFT

## 2019-10-03 ASSESSMENT — PAIN DESCRIPTION - DESCRIPTORS: DESCRIPTORS: ACHING

## 2019-10-03 ASSESSMENT — PAIN DESCRIPTION - LOCATION: LOCATION: LEG

## 2019-10-03 ASSESSMENT — PAIN DESCRIPTION - PAIN TYPE: TYPE: ACUTE PAIN

## 2019-10-03 ASSESSMENT — PAIN SCALES - GENERAL: PAINLEVEL_OUTOF10: 5

## 2019-10-17 ENCOUNTER — HOSPITAL ENCOUNTER (OUTPATIENT)
Dept: WOUND CARE | Age: 74
Discharge: HOME | End: 2019-10-17
Payer: MEDICARE

## 2019-10-17 VITALS
RESPIRATION RATE: 18 BRPM | TEMPERATURE: 98.4 F | SYSTOLIC BLOOD PRESSURE: 139 MMHG | HEART RATE: 92 BPM | DIASTOLIC BLOOD PRESSURE: 72 MMHG

## 2019-10-17 DIAGNOSIS — I87.333 CHRONIC VENOUS HYPERTENSION (IDIOPATHIC) WITH ULCER AND INFLAMMATION OF BILATERAL LOWER EXTREMITY (HCC): Primary | ICD-10-CM

## 2019-10-17 DIAGNOSIS — L97.929 CHRONIC VENOUS HYPERTENSION (IDIOPATHIC) WITH ULCER AND INFLAMMATION OF BILATERAL LOWER EXTREMITY (HCC): Primary | ICD-10-CM

## 2019-10-17 DIAGNOSIS — L97.919 CHRONIC VENOUS HYPERTENSION (IDIOPATHIC) WITH ULCER AND INFLAMMATION OF BILATERAL LOWER EXTREMITY (HCC): Primary | ICD-10-CM

## 2019-10-17 PROCEDURE — 11042 DBRDMT SUBQ TIS 1ST 20SQCM/<: CPT | Performed by: SURGERY

## 2019-10-17 PROCEDURE — 11042 DBRDMT SUBQ TIS 1ST 20SQCM/<: CPT

## 2019-10-17 RX ORDER — LIDOCAINE HYDROCHLORIDE 40 MG/ML
SOLUTION TOPICAL ONCE
Status: DISCONTINUED | OUTPATIENT
Start: 2019-10-17 | End: 2019-10-18 | Stop reason: HOSPADM

## 2019-10-17 ASSESSMENT — PAIN DESCRIPTION - LOCATION: LOCATION: LEG

## 2019-10-17 ASSESSMENT — PAIN DESCRIPTION - FREQUENCY: FREQUENCY: INTERMITTENT

## 2019-10-17 ASSESSMENT — PAIN - FUNCTIONAL ASSESSMENT: PAIN_FUNCTIONAL_ASSESSMENT: PREVENTS OR INTERFERES SOME ACTIVE ACTIVITIES AND ADLS

## 2019-10-17 ASSESSMENT — PAIN DESCRIPTION - ORIENTATION: ORIENTATION: RIGHT;LEFT

## 2019-10-17 ASSESSMENT — PAIN DESCRIPTION - DESCRIPTORS: DESCRIPTORS: ACHING

## 2019-10-17 ASSESSMENT — PAIN DESCRIPTION - ONSET: ONSET: ON-GOING

## 2019-10-17 ASSESSMENT — PAIN DESCRIPTION - PROGRESSION: CLINICAL_PROGRESSION: NOT CHANGED

## 2019-10-17 ASSESSMENT — PAIN DESCRIPTION - PAIN TYPE: TYPE: CHRONIC PAIN

## 2019-10-17 ASSESSMENT — PAIN SCALES - GENERAL: PAINLEVEL_OUTOF10: 7

## 2019-10-25 ENCOUNTER — TELEPHONE (OUTPATIENT)
Dept: SURGERY | Age: 74
End: 2019-10-25

## 2019-10-31 ENCOUNTER — HOSPITAL ENCOUNTER (OUTPATIENT)
Dept: WOUND CARE | Age: 74
Discharge: HOME | End: 2019-10-31
Payer: MEDICARE

## 2019-10-31 VITALS
SYSTOLIC BLOOD PRESSURE: 155 MMHG | DIASTOLIC BLOOD PRESSURE: 78 MMHG | RESPIRATION RATE: 18 BRPM | TEMPERATURE: 98.9 F | HEART RATE: 79 BPM

## 2019-10-31 DIAGNOSIS — L97.919 CHRONIC VENOUS HYPERTENSION (IDIOPATHIC) WITH ULCER AND INFLAMMATION OF BILATERAL LOWER EXTREMITY (HCC): Primary | ICD-10-CM

## 2019-10-31 DIAGNOSIS — L97.929 CHRONIC VENOUS HYPERTENSION (IDIOPATHIC) WITH ULCER AND INFLAMMATION OF BILATERAL LOWER EXTREMITY (HCC): Primary | ICD-10-CM

## 2019-10-31 DIAGNOSIS — I87.333 CHRONIC VENOUS HYPERTENSION (IDIOPATHIC) WITH ULCER AND INFLAMMATION OF BILATERAL LOWER EXTREMITY (HCC): Primary | ICD-10-CM

## 2019-10-31 PROCEDURE — 11045 DBRDMT SUBQ TISS EACH ADDL: CPT

## 2019-10-31 PROCEDURE — 11045 DBRDMT SUBQ TISS EACH ADDL: CPT | Performed by: SURGERY

## 2019-10-31 PROCEDURE — 11042 DBRDMT SUBQ TIS 1ST 20SQCM/<: CPT | Performed by: SURGERY

## 2019-10-31 PROCEDURE — 11042 DBRDMT SUBQ TIS 1ST 20SQCM/<: CPT

## 2019-10-31 RX ORDER — LIDOCAINE HYDROCHLORIDE 40 MG/ML
SOLUTION TOPICAL ONCE
Status: DISCONTINUED | OUTPATIENT
Start: 2019-10-31 | End: 2019-11-01 | Stop reason: HOSPADM

## 2019-10-31 ASSESSMENT — PAIN SCALES - GENERAL
PAINLEVEL_OUTOF10: 0
PAINLEVEL_OUTOF10: 0

## 2019-11-14 ENCOUNTER — HOSPITAL ENCOUNTER (OUTPATIENT)
Dept: WOUND CARE | Age: 74
Discharge: HOME | End: 2019-11-14
Payer: MEDICARE

## 2019-11-14 VITALS
TEMPERATURE: 98.1 F | SYSTOLIC BLOOD PRESSURE: 150 MMHG | DIASTOLIC BLOOD PRESSURE: 66 MMHG | RESPIRATION RATE: 18 BRPM | HEART RATE: 63 BPM

## 2019-11-14 DIAGNOSIS — I87.333 CHRONIC VENOUS HYPERTENSION (IDIOPATHIC) WITH ULCER AND INFLAMMATION OF BILATERAL LOWER EXTREMITY (HCC): Primary | ICD-10-CM

## 2019-11-14 DIAGNOSIS — L97.929 CHRONIC VENOUS HYPERTENSION (IDIOPATHIC) WITH ULCER AND INFLAMMATION OF BILATERAL LOWER EXTREMITY (HCC): Primary | ICD-10-CM

## 2019-11-14 DIAGNOSIS — L97.919 CHRONIC VENOUS HYPERTENSION (IDIOPATHIC) WITH ULCER AND INFLAMMATION OF BILATERAL LOWER EXTREMITY (HCC): Primary | ICD-10-CM

## 2019-11-14 PROCEDURE — 11045 DBRDMT SUBQ TISS EACH ADDL: CPT

## 2019-11-14 PROCEDURE — 11045 DBRDMT SUBQ TISS EACH ADDL: CPT | Performed by: SURGERY

## 2019-11-14 PROCEDURE — 11042 DBRDMT SUBQ TIS 1ST 20SQCM/<: CPT

## 2019-11-14 PROCEDURE — 11042 DBRDMT SUBQ TIS 1ST 20SQCM/<: CPT | Performed by: SURGERY

## 2019-11-14 RX ORDER — LIDOCAINE HYDROCHLORIDE 40 MG/ML
SOLUTION TOPICAL ONCE
Status: DISCONTINUED | OUTPATIENT
Start: 2019-11-14 | End: 2019-11-15 | Stop reason: HOSPADM

## 2019-11-14 ASSESSMENT — PAIN SCALES - GENERAL: PAINLEVEL_OUTOF10: 0

## 2019-12-05 ENCOUNTER — HOSPITAL ENCOUNTER (OUTPATIENT)
Dept: WOUND CARE | Age: 74
Discharge: HOME | End: 2019-12-05
Payer: MEDICARE

## 2019-12-05 VITALS
TEMPERATURE: 98.2 F | BODY MASS INDEX: 36.11 KG/M2 | SYSTOLIC BLOOD PRESSURE: 173 MMHG | DIASTOLIC BLOOD PRESSURE: 72 MMHG | WEIGHT: 178.79 LBS | HEART RATE: 75 BPM | RESPIRATION RATE: 18 BRPM

## 2019-12-05 DIAGNOSIS — I87.333 CHRONIC VENOUS HYPERTENSION (IDIOPATHIC) WITH ULCER AND INFLAMMATION OF BILATERAL LOWER EXTREMITY (HCC): Primary | ICD-10-CM

## 2019-12-05 DIAGNOSIS — L97.919 CHRONIC VENOUS HYPERTENSION (IDIOPATHIC) WITH ULCER AND INFLAMMATION OF BILATERAL LOWER EXTREMITY (HCC): Primary | ICD-10-CM

## 2019-12-05 DIAGNOSIS — L97.929 CHRONIC VENOUS HYPERTENSION (IDIOPATHIC) WITH ULCER AND INFLAMMATION OF BILATERAL LOWER EXTREMITY (HCC): Primary | ICD-10-CM

## 2019-12-05 PROCEDURE — 11045 DBRDMT SUBQ TISS EACH ADDL: CPT | Performed by: SURGERY

## 2019-12-05 PROCEDURE — 11045 DBRDMT SUBQ TISS EACH ADDL: CPT

## 2019-12-05 PROCEDURE — 11042 DBRDMT SUBQ TIS 1ST 20SQCM/<: CPT

## 2019-12-05 PROCEDURE — 11042 DBRDMT SUBQ TIS 1ST 20SQCM/<: CPT | Performed by: SURGERY

## 2019-12-05 RX ORDER — LIDOCAINE HYDROCHLORIDE 40 MG/ML
SOLUTION TOPICAL ONCE
Status: DISCONTINUED | OUTPATIENT
Start: 2019-12-05 | End: 2019-12-06 | Stop reason: HOSPADM

## 2019-12-05 ASSESSMENT — PAIN SCALES - GENERAL
PAINLEVEL_OUTOF10: 0
PAINLEVEL_OUTOF10: 0

## 2019-12-12 ENCOUNTER — HOSPITAL ENCOUNTER (OUTPATIENT)
Dept: WOUND CARE | Age: 74
Discharge: HOME | End: 2019-12-12
Payer: COMMERCIAL

## 2019-12-19 ENCOUNTER — HOSPITAL ENCOUNTER (OUTPATIENT)
Dept: WOUND CARE | Age: 74
Discharge: HOME | End: 2019-12-19
Payer: MEDICARE

## 2019-12-19 VITALS
RESPIRATION RATE: 18 BRPM | TEMPERATURE: 98.6 F | SYSTOLIC BLOOD PRESSURE: 173 MMHG | DIASTOLIC BLOOD PRESSURE: 65 MMHG | HEART RATE: 72 BPM

## 2019-12-19 DIAGNOSIS — L97.919 CHRONIC VENOUS HYPERTENSION (IDIOPATHIC) WITH ULCER AND INFLAMMATION OF BILATERAL LOWER EXTREMITY (HCC): ICD-10-CM

## 2019-12-19 DIAGNOSIS — L97.929 CHRONIC VENOUS HYPERTENSION (IDIOPATHIC) WITH ULCER AND INFLAMMATION OF BILATERAL LOWER EXTREMITY (HCC): ICD-10-CM

## 2019-12-19 DIAGNOSIS — I87.333 CHRONIC VENOUS HYPERTENSION (IDIOPATHIC) WITH ULCER AND INFLAMMATION OF BILATERAL LOWER EXTREMITY (HCC): ICD-10-CM

## 2019-12-19 DIAGNOSIS — L03.116 CELLULITIS OF LEFT LOWER EXTREMITY: Primary | ICD-10-CM

## 2019-12-19 PROCEDURE — 11042 DBRDMT SUBQ TIS 1ST 20SQCM/<: CPT | Performed by: SURGERY

## 2019-12-19 PROCEDURE — 6370000000 HC RX 637 (ALT 250 FOR IP): Performed by: SURGERY

## 2019-12-19 PROCEDURE — 11045 DBRDMT SUBQ TISS EACH ADDL: CPT | Performed by: SURGERY

## 2019-12-19 PROCEDURE — 11045 DBRDMT SUBQ TISS EACH ADDL: CPT

## 2019-12-19 PROCEDURE — 11042 DBRDMT SUBQ TIS 1ST 20SQCM/<: CPT

## 2019-12-19 RX ORDER — BACITRACIN ZINC AND POLYMYXIN B SULFATE 500; 1000 [USP'U]/G; [USP'U]/G
OINTMENT TOPICAL ONCE
Status: CANCELLED | OUTPATIENT
Start: 2019-12-19

## 2019-12-19 RX ORDER — LIDOCAINE 40 MG/G
CREAM TOPICAL ONCE
Status: CANCELLED | OUTPATIENT
Start: 2019-12-19

## 2019-12-19 RX ORDER — GENTAMICIN SULFATE 1 MG/G
OINTMENT TOPICAL ONCE
Status: CANCELLED | OUTPATIENT
Start: 2019-12-19

## 2019-12-19 RX ORDER — LIDOCAINE 50 MG/G
0.5 OINTMENT TOPICAL ONCE
Status: CANCELLED | OUTPATIENT
Start: 2019-12-19

## 2019-12-19 RX ORDER — CLOBETASOL PROPIONATE 0.5 MG/G
OINTMENT TOPICAL ONCE
Status: CANCELLED | OUTPATIENT
Start: 2019-12-19

## 2019-12-19 RX ORDER — BETAMETHASONE DIPROPIONATE 0.05 %
OINTMENT (GRAM) TOPICAL ONCE
Status: CANCELLED | OUTPATIENT
Start: 2019-12-19

## 2019-12-19 RX ORDER — BACITRACIN, NEOMYCIN, POLYMYXIN B 400; 3.5; 5 [USP'U]/G; MG/G; [USP'U]/G
OINTMENT TOPICAL ONCE
Status: CANCELLED | OUTPATIENT
Start: 2019-12-19

## 2019-12-19 RX ORDER — GINSENG 100 MG
CAPSULE ORAL ONCE
Status: CANCELLED | OUTPATIENT
Start: 2019-12-19

## 2019-12-19 RX ORDER — LIDOCAINE HYDROCHLORIDE 40 MG/ML
5 SOLUTION TOPICAL ONCE
Status: COMPLETED | OUTPATIENT
Start: 2019-12-19 | End: 2019-12-19

## 2019-12-19 RX ORDER — LIDOCAINE HYDROCHLORIDE 40 MG/ML
5 SOLUTION TOPICAL ONCE
Status: CANCELLED | OUTPATIENT
Start: 2019-12-19

## 2019-12-19 RX ADMIN — LIDOCAINE HYDROCHLORIDE 7.5 ML: 40 SOLUTION TOPICAL at 14:54

## 2019-12-19 ASSESSMENT — PAIN SCALES - GENERAL
PAINLEVEL_OUTOF10: 0
PAINLEVEL_OUTOF10: 0

## 2020-01-03 ENCOUNTER — APPOINTMENT (OUTPATIENT)
Dept: GENERAL RADIOLOGY | Age: 75
DRG: 638 | End: 2020-01-03
Payer: MEDICARE

## 2020-01-03 ENCOUNTER — HOSPITAL ENCOUNTER (INPATIENT)
Age: 75
LOS: 5 days | Discharge: HOME HEALTH | DRG: 638 | End: 2020-01-08
Attending: EMERGENCY MEDICINE | Admitting: INTERNAL MEDICINE
Payer: MEDICARE

## 2020-01-03 PROBLEM — L03.116 BILATERAL LOWER LEG CELLULITIS: Status: ACTIVE | Noted: 2020-01-03

## 2020-01-03 PROBLEM — L03.115 BILATERAL LOWER LEG CELLULITIS: Status: ACTIVE | Noted: 2020-01-03

## 2020-01-03 LAB
A/G RATIO: 0.7 (ref 1.1–2.2)
ALBUMIN SERPL-MCNC: 3 G/DL (ref 3.4–5)
ALP BLD-CCNC: 136 U/L (ref 40–129)
ALT SERPL-CCNC: 8 U/L (ref 10–40)
ANION GAP SERPL CALCULATED.3IONS-SCNC: 16 MMOL/L (ref 3–16)
AST SERPL-CCNC: 18 U/L (ref 15–37)
BASOPHILS ABSOLUTE: 0.1 K/UL (ref 0–0.2)
BASOPHILS RELATIVE PERCENT: 1.3 %
BILIRUB SERPL-MCNC: <0.2 MG/DL (ref 0–1)
BILIRUBIN URINE: NEGATIVE
BLOOD, URINE: ABNORMAL
BUN BLDV-MCNC: 20 MG/DL (ref 7–20)
CALCIUM SERPL-MCNC: 8.4 MG/DL (ref 8.3–10.6)
CHLORIDE BLD-SCNC: 96 MMOL/L (ref 99–110)
CLARITY: CLEAR
CO2: 22 MMOL/L (ref 21–32)
COLOR: YELLOW
CREAT SERPL-MCNC: 1.5 MG/DL (ref 0.6–1.2)
EOSINOPHILS ABSOLUTE: 0.4 K/UL (ref 0–0.6)
EOSINOPHILS RELATIVE PERCENT: 4.9 %
EPITHELIAL CELLS, UA: 1 /HPF (ref 0–5)
GFR AFRICAN AMERICAN: 41
GFR NON-AFRICAN AMERICAN: 34
GLOBULIN: 4.3 G/DL
GLUCOSE BLD-MCNC: 120 MG/DL
GLUCOSE BLD-MCNC: 120 MG/DL (ref 70–99)
GLUCOSE BLD-MCNC: 57 MG/DL (ref 70–99)
GLUCOSE BLD-MCNC: 60 MG/DL (ref 70–99)
GLUCOSE URINE: NEGATIVE MG/DL
HCT VFR BLD CALC: 28.3 % (ref 36–48)
HEMOGLOBIN: 9.5 G/DL (ref 12–16)
HYALINE CASTS: 6 /LPF (ref 0–8)
KETONES, URINE: NEGATIVE MG/DL
LACTIC ACID, SEPSIS: 1 MMOL/L (ref 0.4–1.9)
LEUKOCYTE ESTERASE, URINE: ABNORMAL
LYMPHOCYTES ABSOLUTE: 1 K/UL (ref 1–5.1)
LYMPHOCYTES RELATIVE PERCENT: 11.7 %
MCH RBC QN AUTO: 33.3 PG (ref 26–34)
MCHC RBC AUTO-ENTMCNC: 33.7 G/DL (ref 31–36)
MCV RBC AUTO: 98.8 FL (ref 80–100)
MICROSCOPIC EXAMINATION: YES
MONOCYTES ABSOLUTE: 0.5 K/UL (ref 0–1.3)
MONOCYTES RELATIVE PERCENT: 5.6 %
NEUTROPHILS ABSOLUTE: 6.8 K/UL (ref 1.7–7.7)
NEUTROPHILS RELATIVE PERCENT: 76.5 %
NITRITE, URINE: NEGATIVE
PDW BLD-RTO: 13.4 % (ref 12.4–15.4)
PERFORMED ON: ABNORMAL
PERFORMED ON: ABNORMAL
PH UA: 5.5 (ref 5–8)
PLATELET # BLD: 220 K/UL (ref 135–450)
PMV BLD AUTO: 8.5 FL (ref 5–10.5)
POTASSIUM SERPL-SCNC: 3.8 MMOL/L (ref 3.5–5.1)
PROTEIN UA: ABNORMAL MG/DL
RBC # BLD: 2.87 M/UL (ref 4–5.2)
RBC UA: 2 /HPF (ref 0–4)
SODIUM BLD-SCNC: 134 MMOL/L (ref 136–145)
SPECIFIC GRAVITY UA: 1.01 (ref 1–1.03)
TOTAL PROTEIN: 7.3 G/DL (ref 6.4–8.2)
URINE REFLEX TO CULTURE: YES
URINE TYPE: ABNORMAL
UROBILINOGEN, URINE: 0.2 E.U./DL
WBC # BLD: 8.9 K/UL (ref 4–11)
WBC UA: 12 /HPF (ref 0–5)

## 2020-01-03 PROCEDURE — 2500000003 HC RX 250 WO HCPCS: Performed by: PHYSICIAN ASSISTANT

## 2020-01-03 PROCEDURE — 83605 ASSAY OF LACTIC ACID: CPT

## 2020-01-03 PROCEDURE — 81001 URINALYSIS AUTO W/SCOPE: CPT

## 2020-01-03 PROCEDURE — 1200000000 HC SEMI PRIVATE

## 2020-01-03 PROCEDURE — 85025 COMPLETE CBC W/AUTO DIFF WBC: CPT

## 2020-01-03 PROCEDURE — 73590 X-RAY EXAM OF LOWER LEG: CPT

## 2020-01-03 PROCEDURE — 99285 EMERGENCY DEPT VISIT HI MDM: CPT

## 2020-01-03 PROCEDURE — 87086 URINE CULTURE/COLONY COUNT: CPT

## 2020-01-03 PROCEDURE — 96365 THER/PROPH/DIAG IV INF INIT: CPT

## 2020-01-03 PROCEDURE — 80053 COMPREHEN METABOLIC PANEL: CPT

## 2020-01-03 PROCEDURE — 87040 BLOOD CULTURE FOR BACTERIA: CPT

## 2020-01-03 RX ORDER — RANITIDINE 150 MG/1
150 TABLET ORAL NIGHTLY
Status: ON HOLD | COMMUNITY
Start: 2019-10-12 | End: 2020-03-05 | Stop reason: HOSPADM

## 2020-01-03 RX ORDER — FUROSEMIDE 20 MG/1
20 TABLET ORAL EVERY OTHER DAY
COMMUNITY
Start: 2019-10-23 | End: 2020-09-22

## 2020-01-03 RX ORDER — CLINDAMYCIN PHOSPHATE 600 MG/50ML
600 INJECTION INTRAVENOUS ONCE
Status: COMPLETED | OUTPATIENT
Start: 2020-01-03 | End: 2020-01-04

## 2020-01-03 RX ORDER — IBUPROFEN 600 MG/1
300 TABLET ORAL 2 TIMES DAILY
Status: ON HOLD | COMMUNITY
Start: 2019-10-12 | End: 2020-02-12 | Stop reason: HOSPADM

## 2020-01-03 RX ADMIN — CLINDAMYCIN PHOSPHATE 600 MG: 600 INJECTION, SOLUTION INTRAVENOUS at 23:27

## 2020-01-03 ASSESSMENT — ENCOUNTER SYMPTOMS
NAUSEA: 0
BACK PAIN: 0
VOMITING: 0
COUGH: 0
ABDOMINAL PAIN: 0
SHORTNESS OF BREATH: 0
COLOR CHANGE: 1

## 2020-01-03 NOTE — ED NOTES
Bed: B-06  Expected date:   Expected time:   Means of arrival: [facility]  Comments:  74F UTI     Abbie Olson RN  01/03/20 [id number]

## 2020-01-04 PROBLEM — E66.01 MORBID OBESITY DUE TO EXCESS CALORIES (HCC): Status: ACTIVE | Noted: 2020-01-04

## 2020-01-04 PROBLEM — N18.30 CHRONIC RENAL FAILURE, STAGE 3 (MODERATE) (HCC): Status: ACTIVE | Noted: 2020-01-04

## 2020-01-04 PROBLEM — E16.2 HYPOGLYCEMIA: Status: ACTIVE | Noted: 2020-01-04

## 2020-01-04 PROBLEM — E78.5 HYPERLIPIDEMIA: Status: ACTIVE | Noted: 2020-01-04

## 2020-01-04 LAB
ANION GAP SERPL CALCULATED.3IONS-SCNC: 16 MMOL/L (ref 3–16)
BASOPHILS ABSOLUTE: 0 K/UL (ref 0–0.2)
BASOPHILS RELATIVE PERCENT: 0.3 %
BUN BLDV-MCNC: 21 MG/DL (ref 7–20)
CALCIUM SERPL-MCNC: 8.6 MG/DL (ref 8.3–10.6)
CHLORIDE BLD-SCNC: 96 MMOL/L (ref 99–110)
CO2: 21 MMOL/L (ref 21–32)
CREAT SERPL-MCNC: 1.4 MG/DL (ref 0.6–1.2)
EOSINOPHILS ABSOLUTE: 0.2 K/UL (ref 0–0.6)
EOSINOPHILS RELATIVE PERCENT: 1.2 %
GFR AFRICAN AMERICAN: 44
GFR NON-AFRICAN AMERICAN: 36
GLUCOSE BLD-MCNC: 205 MG/DL (ref 70–99)
GLUCOSE BLD-MCNC: 330 MG/DL (ref 70–99)
GLUCOSE BLD-MCNC: 358 MG/DL (ref 70–99)
GLUCOSE BLD-MCNC: 404 MG/DL (ref 70–99)
GLUCOSE BLD-MCNC: 446 MG/DL (ref 70–99)
HCT VFR BLD CALC: 27.9 % (ref 36–48)
HEMOGLOBIN: 9.4 G/DL (ref 12–16)
LACTIC ACID, SEPSIS: 1.1 MMOL/L (ref 0.4–1.9)
LYMPHOCYTES ABSOLUTE: 0.3 K/UL (ref 1–5.1)
LYMPHOCYTES RELATIVE PERCENT: 2.4 %
MCH RBC QN AUTO: 33.4 PG (ref 26–34)
MCHC RBC AUTO-ENTMCNC: 33.9 G/DL (ref 31–36)
MCV RBC AUTO: 98.5 FL (ref 80–100)
MONOCYTES ABSOLUTE: 0.4 K/UL (ref 0–1.3)
MONOCYTES RELATIVE PERCENT: 3.3 %
NEUTROPHILS ABSOLUTE: 12.7 K/UL (ref 1.7–7.7)
NEUTROPHILS RELATIVE PERCENT: 92.8 %
PDW BLD-RTO: 12.9 % (ref 12.4–15.4)
PERFORMED ON: ABNORMAL
PLATELET # BLD: 223 K/UL (ref 135–450)
PMV BLD AUTO: 9 FL (ref 5–10.5)
POTASSIUM REFLEX MAGNESIUM: 4 MMOL/L (ref 3.5–5.1)
RBC # BLD: 2.83 M/UL (ref 4–5.2)
SODIUM BLD-SCNC: 133 MMOL/L (ref 136–145)
VANCOMYCIN RANDOM: 12.7 UG/ML
WBC # BLD: 13.7 K/UL (ref 4–11)

## 2020-01-04 PROCEDURE — 94640 AIRWAY INHALATION TREATMENT: CPT

## 2020-01-04 PROCEDURE — 36415 COLL VENOUS BLD VENIPUNCTURE: CPT

## 2020-01-04 PROCEDURE — 87205 SMEAR GRAM STAIN: CPT

## 2020-01-04 PROCEDURE — 80202 ASSAY OF VANCOMYCIN: CPT

## 2020-01-04 PROCEDURE — 2580000003 HC RX 258: Performed by: INTERNAL MEDICINE

## 2020-01-04 PROCEDURE — 80048 BASIC METABOLIC PNL TOTAL CA: CPT

## 2020-01-04 PROCEDURE — 87070 CULTURE OTHR SPECIMN AEROBIC: CPT

## 2020-01-04 PROCEDURE — 87077 CULTURE AEROBIC IDENTIFY: CPT

## 2020-01-04 PROCEDURE — 6360000002 HC RX W HCPCS: Performed by: INTERNAL MEDICINE

## 2020-01-04 PROCEDURE — 87186 SC STD MICRODIL/AGAR DIL: CPT

## 2020-01-04 PROCEDURE — 96366 THER/PROPH/DIAG IV INF ADDON: CPT

## 2020-01-04 PROCEDURE — 2700000000 HC OXYGEN THERAPY PER DAY

## 2020-01-04 PROCEDURE — 99222 1ST HOSP IP/OBS MODERATE 55: CPT | Performed by: INTERNAL MEDICINE

## 2020-01-04 PROCEDURE — 6370000000 HC RX 637 (ALT 250 FOR IP): Performed by: INTERNAL MEDICINE

## 2020-01-04 PROCEDURE — 1200000000 HC SEMI PRIVATE

## 2020-01-04 PROCEDURE — 85025 COMPLETE CBC W/AUTO DIFF WBC: CPT

## 2020-01-04 PROCEDURE — 83605 ASSAY OF LACTIC ACID: CPT

## 2020-01-04 PROCEDURE — 94761 N-INVAS EAR/PLS OXIMETRY MLT: CPT

## 2020-01-04 RX ORDER — DEXTROSE MONOHYDRATE 50 MG/ML
100 INJECTION, SOLUTION INTRAVENOUS PRN
Status: DISCONTINUED | OUTPATIENT
Start: 2020-01-04 | End: 2020-01-08 | Stop reason: HOSPADM

## 2020-01-04 RX ORDER — CETIRIZINE HYDROCHLORIDE 10 MG/1
5 TABLET ORAL DAILY
Status: DISCONTINUED | OUTPATIENT
Start: 2020-01-04 | End: 2020-01-08 | Stop reason: HOSPADM

## 2020-01-04 RX ORDER — GABAPENTIN 300 MG/1
300 CAPSULE ORAL 3 TIMES DAILY
Status: DISCONTINUED | OUTPATIENT
Start: 2020-01-04 | End: 2020-01-08 | Stop reason: HOSPADM

## 2020-01-04 RX ORDER — NICOTINE POLACRILEX 4 MG
15 LOZENGE BUCCAL PRN
Status: DISCONTINUED | OUTPATIENT
Start: 2020-01-04 | End: 2020-01-08 | Stop reason: HOSPADM

## 2020-01-04 RX ORDER — INSULIN GLARGINE 100 [IU]/ML
25 INJECTION, SOLUTION SUBCUTANEOUS NIGHTLY
Status: DISCONTINUED | OUTPATIENT
Start: 2020-01-04 | End: 2020-01-08 | Stop reason: HOSPADM

## 2020-01-04 RX ORDER — ISOSORBIDE MONONITRATE 30 MG/1
30 TABLET, EXTENDED RELEASE ORAL DAILY
Status: DISCONTINUED | OUTPATIENT
Start: 2020-01-04 | End: 2020-01-08 | Stop reason: HOSPADM

## 2020-01-04 RX ORDER — DEXTROSE MONOHYDRATE 25 G/50ML
12.5 INJECTION, SOLUTION INTRAVENOUS PRN
Status: DISCONTINUED | OUTPATIENT
Start: 2020-01-04 | End: 2020-01-08 | Stop reason: HOSPADM

## 2020-01-04 RX ORDER — SODIUM CHLORIDE 0.9 % (FLUSH) 0.9 %
10 SYRINGE (ML) INJECTION EVERY 12 HOURS SCHEDULED
Status: DISCONTINUED | OUTPATIENT
Start: 2020-01-04 | End: 2020-01-08 | Stop reason: HOSPADM

## 2020-01-04 RX ORDER — ONDANSETRON 2 MG/ML
4 INJECTION INTRAMUSCULAR; INTRAVENOUS EVERY 4 HOURS PRN
Status: DISCONTINUED | OUTPATIENT
Start: 2020-01-04 | End: 2020-01-08 | Stop reason: HOSPADM

## 2020-01-04 RX ORDER — ACETAMINOPHEN 325 MG/1
650 TABLET ORAL EVERY 4 HOURS PRN
Status: DISCONTINUED | OUTPATIENT
Start: 2020-01-04 | End: 2020-01-08 | Stop reason: HOSPADM

## 2020-01-04 RX ORDER — INSULIN GLARGINE 100 [IU]/ML
10 INJECTION, SOLUTION SUBCUTANEOUS ONCE
Status: COMPLETED | OUTPATIENT
Start: 2020-01-04 | End: 2020-01-04

## 2020-01-04 RX ORDER — VENLAFAXINE 37.5 MG/1
150 TABLET ORAL 2 TIMES DAILY
Status: DISCONTINUED | OUTPATIENT
Start: 2020-01-04 | End: 2020-01-08 | Stop reason: HOSPADM

## 2020-01-04 RX ORDER — TRAMADOL HYDROCHLORIDE 50 MG/1
50 TABLET ORAL EVERY 8 HOURS PRN
Status: DISCONTINUED | OUTPATIENT
Start: 2020-01-04 | End: 2020-01-06

## 2020-01-04 RX ORDER — SODIUM CHLORIDE 0.9 % (FLUSH) 0.9 %
10 SYRINGE (ML) INJECTION PRN
Status: DISCONTINUED | OUTPATIENT
Start: 2020-01-04 | End: 2020-01-08 | Stop reason: HOSPADM

## 2020-01-04 RX ORDER — FAMOTIDINE 20 MG/1
20 TABLET, FILM COATED ORAL DAILY
Status: DISCONTINUED | OUTPATIENT
Start: 2020-01-04 | End: 2020-01-08 | Stop reason: HOSPADM

## 2020-01-04 RX ORDER — ATORVASTATIN CALCIUM 10 MG/1
10 TABLET, FILM COATED ORAL DAILY
Status: DISCONTINUED | OUTPATIENT
Start: 2020-01-04 | End: 2020-01-08 | Stop reason: HOSPADM

## 2020-01-04 RX ORDER — IPRATROPIUM BROMIDE AND ALBUTEROL SULFATE 2.5; .5 MG/3ML; MG/3ML
1 SOLUTION RESPIRATORY (INHALATION) EVERY 4 HOURS PRN
Status: DISCONTINUED | OUTPATIENT
Start: 2020-01-04 | End: 2020-01-08 | Stop reason: HOSPADM

## 2020-01-04 RX ADMIN — INSULIN GLARGINE 10 UNITS: 100 INJECTION, SOLUTION SUBCUTANEOUS at 14:56

## 2020-01-04 RX ADMIN — INSULIN GLARGINE 25 UNITS: 100 INJECTION, SOLUTION SUBCUTANEOUS at 22:59

## 2020-01-04 RX ADMIN — ATORVASTATIN CALCIUM 10 MG: 10 TABLET, FILM COATED ORAL at 09:54

## 2020-01-04 RX ADMIN — ACETAMINOPHEN 650 MG: 325 TABLET ORAL at 15:25

## 2020-01-04 RX ADMIN — ISOSORBIDE MONONITRATE 30 MG: 30 TABLET, EXTENDED RELEASE ORAL at 09:55

## 2020-01-04 RX ADMIN — PANCRELIPASE 1 CAPSULE: 60000; 12000; 38000 CAPSULE, DELAYED RELEASE PELLETS ORAL at 16:35

## 2020-01-04 RX ADMIN — SODIUM CHLORIDE, PRESERVATIVE FREE 10 ML: 5 INJECTION INTRAVENOUS at 09:54

## 2020-01-04 RX ADMIN — METOPROLOL TARTRATE 25 MG: 25 TABLET ORAL at 02:25

## 2020-01-04 RX ADMIN — CETIRIZINE HYDROCHLORIDE 5 MG: 10 TABLET, FILM COATED ORAL at 09:54

## 2020-01-04 RX ADMIN — SODIUM CHLORIDE, PRESERVATIVE FREE 10 ML: 5 INJECTION INTRAVENOUS at 22:14

## 2020-01-04 RX ADMIN — GABAPENTIN 300 MG: 300 CAPSULE ORAL at 13:23

## 2020-01-04 RX ADMIN — INSULIN LISPRO 12 UNITS: 100 INJECTION, SOLUTION INTRAVENOUS; SUBCUTANEOUS at 12:25

## 2020-01-04 RX ADMIN — INSULIN LISPRO 10 UNITS: 100 INJECTION, SOLUTION INTRAVENOUS; SUBCUTANEOUS at 16:38

## 2020-01-04 RX ADMIN — TRAMADOL HYDROCHLORIDE 50 MG: 50 TABLET, FILM COATED ORAL at 13:22

## 2020-01-04 RX ADMIN — VANCOMYCIN HYDROCHLORIDE 1000 MG: 1 INJECTION, POWDER, LYOPHILIZED, FOR SOLUTION INTRAVENOUS at 22:59

## 2020-01-04 RX ADMIN — VENLAFAXINE 150 MG: 37.5 TABLET ORAL at 22:13

## 2020-01-04 RX ADMIN — VENLAFAXINE 150 MG: 37.5 TABLET ORAL at 02:24

## 2020-01-04 RX ADMIN — GABAPENTIN 300 MG: 300 CAPSULE ORAL at 09:55

## 2020-01-04 RX ADMIN — CEFEPIME HYDROCHLORIDE 2 G: 2 INJECTION, POWDER, FOR SOLUTION INTRAVENOUS at 03:35

## 2020-01-04 RX ADMIN — VENLAFAXINE 150 MG: 37.5 TABLET ORAL at 09:54

## 2020-01-04 RX ADMIN — GABAPENTIN 300 MG: 300 CAPSULE ORAL at 22:13

## 2020-01-04 RX ADMIN — INSULIN LISPRO 2 UNITS: 100 INJECTION, SOLUTION INTRAVENOUS; SUBCUTANEOUS at 22:16

## 2020-01-04 RX ADMIN — PANCRELIPASE 1 CAPSULE: 60000; 12000; 38000 CAPSULE, DELAYED RELEASE PELLETS ORAL at 10:45

## 2020-01-04 RX ADMIN — INSULIN LISPRO 12 UNITS: 100 INJECTION, SOLUTION INTRAVENOUS; SUBCUTANEOUS at 09:55

## 2020-01-04 RX ADMIN — FAMOTIDINE 20 MG: 20 TABLET, FILM COATED ORAL at 09:54

## 2020-01-04 RX ADMIN — METOPROLOL TARTRATE 25 MG: 25 TABLET ORAL at 22:13

## 2020-01-04 RX ADMIN — IPRATROPIUM BROMIDE AND ALBUTEROL SULFATE 1 AMPULE: .5; 3 SOLUTION RESPIRATORY (INHALATION) at 04:21

## 2020-01-04 RX ADMIN — ENOXAPARIN SODIUM 40 MG: 40 INJECTION SUBCUTANEOUS at 09:53

## 2020-01-04 RX ADMIN — VANCOMYCIN HYDROCHLORIDE 1000 MG: 1 INJECTION, POWDER, LYOPHILIZED, FOR SOLUTION INTRAVENOUS at 02:23

## 2020-01-04 RX ADMIN — PANCRELIPASE 1 CAPSULE: 60000; 12000; 38000 CAPSULE, DELAYED RELEASE PELLETS ORAL at 12:31

## 2020-01-04 ASSESSMENT — PAIN DESCRIPTION - FREQUENCY
FREQUENCY: CONTINUOUS

## 2020-01-04 ASSESSMENT — PAIN DESCRIPTION - ORIENTATION
ORIENTATION: RIGHT;LEFT

## 2020-01-04 ASSESSMENT — PAIN DESCRIPTION - PROGRESSION
CLINICAL_PROGRESSION: NOT CHANGED

## 2020-01-04 ASSESSMENT — PAIN SCALES - GENERAL
PAINLEVEL_OUTOF10: 7
PAINLEVEL_OUTOF10: 3
PAINLEVEL_OUTOF10: 0
PAINLEVEL_OUTOF10: 0
PAINLEVEL_OUTOF10: 3
PAINLEVEL_OUTOF10: 6

## 2020-01-04 ASSESSMENT — PAIN DESCRIPTION - DESCRIPTORS
DESCRIPTORS: ACHING

## 2020-01-04 ASSESSMENT — PAIN - FUNCTIONAL ASSESSMENT
PAIN_FUNCTIONAL_ASSESSMENT: ACTIVITIES ARE NOT PREVENTED

## 2020-01-04 ASSESSMENT — PAIN DESCRIPTION - ONSET
ONSET: ON-GOING

## 2020-01-04 ASSESSMENT — PAIN DESCRIPTION - LOCATION
LOCATION: LEG

## 2020-01-04 ASSESSMENT — PAIN DESCRIPTION - PAIN TYPE
TYPE: ACUTE PAIN

## 2020-01-04 NOTE — CONSULTS
be determined based on culture results, renal function, and clinical response. Thank you for the consult. Will continue to follow.   Tammi AmandaD

## 2020-01-04 NOTE — ED PROVIDER NOTES
[address] [facility]        Pt Name: Schuyler Hatch  MRN: [id number]  [place] 1945  Date of evaluation: 1/3/2020  Provider: SHANNAN Juarez  PCP: Dennie Coats    This patient was seen and evaluated by the attending physician Zabrina Go MD.      [address] [place] [address]       Chief Complaint   Patient presents with    Hypoglycemia     pt on insulin pump and states too busy to eat  pt was given oral glucose per squad        HISTORY OF PRESENT ILLNESS   (Location/Symptom, Timing/Onset, Context/Setting, Quality, Duration, Modifying Factors, Severity)  Note limiting factors. Schuyler Hatch is a 66 y.o. female presents to the emergency department today with complaints of low blood sugar. The patient is on an insulin pump and states she has been in too much of a hurry for the last few days to stop and eat appropriately. She feels better now. She is noted to have wrappings on her legs, when questioned about these she reports that she had been in wound care, and they had been largely healed and she graduated from wound care on El[employer] Day. She states that she recently finished a second round of Ceftin. She does not have any pain in the area. She does not think she has had any fever or chills. She has no further complaints at this time. Nursing Notes were all reviewed and agreed with or any disagreements were addressed in the HPI. REVIEW OF SYSTEMS    (2-9 systems for level 4, 10 or more for level 5)     Review of Systems   Constitutional: Negative for chills and fever. Respiratory: Negative for cough and shortness of breath. Cardiovascular: Negative for chest pain and palpitations. Gastrointestinal: Negative for abdominal pain, nausea and vomiting. Musculoskeletal: Negative for arthralgias and back pain. Skin: Positive for color change, rash and wound.    Neurological: Negative for dizziness, skin 3 times daily (with meals)    ISOSORBIDE MONONITRATE (IMDUR) 30 MG EXTENDED RELEASE TABLET    Take 30 mg by mouth daily    LIPASE-PROTEASE-AMYLASE (CREON) 99478 UNITS DELAYED RELEASE CAPSULE    Take 12,000 Units by mouth 3 times daily (with meals)     METOPROLOL TARTRATE (LOPRESSOR) 25 MG TABLET    Take 1 tablet by mouth 2 times daily    RANITIDINE (ZANTAC) 150 MG TABLET    Take 150 mg by mouth daily    TRAMADOL (ULTRAM) 50 MG TABLET    Take 50 mg by mouth every 8 hours as needed for Pain. VENLAFAXINE (EFFEXOR) 75 MG TABLET    Take 150 mg by mouth 2 times daily     VITAMIN D3 (CHOLECALCIFEROL) 400 UNITS TABS TABLET    Take 400 Units by mouth daily          ALLERGIES     Sulfa antibiotics    FAMILYHISTORY       Family History   Problem Relation Age of Onset    Heart Disease Father     Heart Failure Father     Diabetes Father     Diabetes Daughter     Diabetes Daughter           SOCIAL HISTORY       Social History     Tobacco Use    Smoking status: Never Smoker    Smokeless tobacco: Never Used   Substance Use Topics    Alcohol use: Yes     Comment: occassional    Drug use: No       SCREENINGS             PHYSICAL EXAM    (up to 7 for level 4, 8 or more for level 5)     ED Triage Vitals [01/03/20 1832]   BP Temp Temp Source Pulse Resp SpO2 Height Weight   (!) 130/54 97.2 °F (36.2 °C) Oral 59 18 92 % 4' 11\" (1.499 m) 191 lb 6.4 oz (86.8 kg)       Physical Exam  Vitals signs and nursing note reviewed. Constitutional:       General: She is not in acute distress. Appearance: She is well-developed. She is obese. She is not ill-appearing, toxic-appearing or diaphoretic. HENT:      Head: Normocephalic and atraumatic. Mouth/Throat:      Mouth: Mucous membranes are moist.      Pharynx: Oropharynx is clear. Eyes:      Conjunctiva/sclera: Conjunctivae normal.      Pupils: Pupils are equal, round, and reactive to light. Neck:      Musculoskeletal: Normal range of motion and neck supple. Cardiovascular:      Rate and Rhythm: Normal rate and regular rhythm. Comments: Distal pulses easily obtained with bedside doppler  Pulmonary:      Effort: Pulmonary effort is normal. No respiratory distress. Abdominal:      General: Bowel sounds are normal. There is no distension. Palpations: Abdomen is soft. Tenderness: There is no tenderness. Musculoskeletal:      Right lower leg: She exhibits swelling. She exhibits no tenderness. Left lower leg: She exhibits swelling. She exhibits no tenderness. Legs:       Comments: See images below for photos of wound   Skin:     General: Skin is warm and dry. Neurological:      Mental Status: She is alert and oriented to person, place, and time. Psychiatric:         Behavior: Behavior normal. Behavior is cooperative. Thought Content:  Thought content normal.                      DIAGNOSTIC RESULTS   LABS:    Labs Reviewed   CBC WITH AUTO DIFFERENTIAL - Abnormal; Notable for the following components:       Result Value    RBC 2.87 (*)     Hemoglobin 9.5 (*)     Hematocrit 28.3 (*)     All other components within normal limits    Narrative:     Performed at:  [address] [address]   Phone [phone]   COMPREHENSIVE METABOLIC PANEL - Abnormal; Notable for the following components:    Sodium 134 (*)     Chloride 96 (*)     Glucose 57 (*)     CREATININE 1.5 (*)     GFR Non- 34 (*)     GFR  41 (*)     Alb 3.0 (*)     Albumin/Globulin Ratio 0.7 (*)     Alkaline Phosphatase 136 (*)     ALT 8 (*)     All other components within normal limits    Narrative:     Performed at:  [address] [address]   Phone [phone]   URINE RT REFLEX TO CULTURE - Abnormal; Notable for the following components:    Blood, Urine MODERATE (*)     Protein, UA TRACE (*)     Leukocyte Esterase, Urine SMALL (*)     All other components within normal limits    Narrative:     Performed at:  [place] [address] [address] [facility] [employer] [address]   Phone [phone]   MICROSCOPIC URINALYSIS - Abnormal; Notable for the following components:    WBC, UA 12 (*)     All other components within normal limits    Narrative:     Performed at:  [address] [employer] [address]   Phone [phone]   POCT GLUCOSE - Abnormal; Notable for the following components:    POC Glucose 60 (*)     All other components within normal limits    Narrative:     Performed at:  [address] [employer] [address]   Phone [phone]   POCT GLUCOSE - Abnormal; Notable for the following components:    POC Glucose 120 (*)     All other components within normal limits    Narrative:     Performed at:  [address] [employer] [address]   Phone [phone]   POCT GLUCOSE - Normal   CULTURE BLOOD #1   CULTURE BLOOD #2   URINE CULTURE   LACTATE, SEPSIS    Narrative:     Performed at:  [address] [employer] [address]   Phone [phone]   LACTATE, SEPSIS       All other labs were within normal range or not returned as of this dictation. EKG: All EKG's are interpreted by the Emergency Department Physician in the absence of a cardiologist.  Please see their note for interpretation of EKG.       RADIOLOGY:   Non-plain film images such as CT, Ultrasound and MRI are read by the radiologist. Plain radiographic images are visualized and preliminarily interpreted by the  ED Provider with the below findings:    Interpretation per the Radiologist below, if available at the time of this note:    XR TIBIA FIBULA LEFT (2 VIEWS)   Final Result   Postsurgical changes noted bilaterally without significant change from the   prior study. No definite acute fracture or dislocation. No definite   periosteal reaction. There is persistent clinical concern for osteomyelitis MRI would be the test   of choice for further evaluation. XR TIBIA FIBULA RIGHT (2 VIEWS)   Final Result   Postsurgical changes noted bilaterally without significant change from the   prior study. No definite acute fracture or dislocation. No definite   periosteal reaction. There is persistent clinical concern for osteomyelitis MRI would be the test   of choice for further evaluation. Xr Tibia Fibula Left (2 Views)    Result Date: 1/3/2020  EXAMINATION: 2 XRAY VIEWS OF THE LEFT TIBIA AND FIBULA; 2 XRAY VIEWS OF THE RIGHT TIBIA AND FIBULA 1/3/2020 8:44 pm COMPARISON: 10/14/2018 HISTORY: ORDERING SYSTEM PROVIDED HISTORY: infection TECHNOLOGIST PROVIDED HISTORY: Reason for exam:->infection Reason for Exam: infection Acuity: Unknown Type of Exam: Unknown FINDINGS: Studies are somewhat limited due to underlying osteopenia. Total right knee arthroplasty incompletely evaluated. The tibial component of the prosthesis appears intact. No acute osseous abnormality is noted. Peripheral vascular calcification is appreciated. Diffuse induration of the subcutaneous tissues compatible with edema or cellulitis is noted. Findings are similar to the comparison. Left tibia and fibula: Postsurgical changes from placement of inter fragmentary screws through the medial malleolus and percutaneous in the distal fibula noted. Mild lucency surrounding the hardware demonstrates no significant change from the comparison. No acute fracture or dislocation noted. Peripheral vascular calcifications are noted. Induration of the subcutaneous fat noted similar to the prior study. Postsurgical changes noted bilaterally without significant change from the prior study. No definite acute fracture or dislocation.   No definite periosteal reaction. There is persistent clinical concern for osteomyelitis MRI would be the test of choice for further evaluation. Xr Tibia Fibula Right (2 Views)    Result Date: 1/3/2020  EXAMINATION: 2 XRAY VIEWS OF THE LEFT TIBIA AND FIBULA; 2 XRAY VIEWS OF THE RIGHT TIBIA AND FIBULA 1/3/2020 8:44 pm COMPARISON: 10/14/2018 HISTORY: ORDERING SYSTEM PROVIDED HISTORY: infection TECHNOLOGIST PROVIDED HISTORY: Reason for exam:->infection Reason for Exam: infection Acuity: Unknown Type of Exam: Unknown FINDINGS: Studies are somewhat limited due to underlying osteopenia. Total right knee arthroplasty incompletely evaluated. The tibial component of the prosthesis appears intact. No acute osseous abnormality is noted. Peripheral vascular calcification is appreciated. Diffuse induration of the subcutaneous tissues compatible with edema or cellulitis is noted. Findings are similar to the comparison. Left tibia and fibula: Postsurgical changes from placement of inter fragmentary screws through the medial malleolus and percutaneous in the distal fibula noted. Mild lucency surrounding the hardware demonstrates no significant change from the comparison. No acute fracture or dislocation noted. Peripheral vascular calcifications are noted. Induration of the subcutaneous fat noted similar to the prior study. Postsurgical changes noted bilaterally without significant change from the prior study. No definite acute fracture or dislocation. No definite periosteal reaction. There is persistent clinical concern for osteomyelitis MRI would be the test of choice for further evaluation.            PROCEDURES   Unless otherwise noted below, none     Procedures    CRITICAL CARE TIME   N/A    CONSULTS:  IP CONSULT TO HOSPITALIST  PHARMACY TO DOSE VANCOMYCIN  IP CONSULT TO INFECTIOUS DISEASES      EMERGENCY DEPARTMENT COURSE and DIFFERENTIAL DIAGNOSIS/MDM:   Vitals:    Vitals:    01/03/20 1947 01/03/20 2001 01/03/20 2017 01/03/20 2032   BP: (ADVIL;MOTRIN) 200 MG TABLET    Take 400 mg by mouth every 6 hours as needed for Pain    RANITIDINE HCL (ZANTAC 150 MAXIMUM STRENGTH PO)    Take 150 mg by mouth 2 times daily              (Please note that portions of this note were completed with a voice recognition program.  Efforts were made to edit the dictations but occasionally words are mis-transcribed.)    SHANNAN Lagunas (electronically signed)           Rosa Maria Lagunas  01/03/20 8589

## 2020-01-04 NOTE — ED PROVIDER NOTES
Attending Supervisory Note/Shared Visit   I have personally performed a face to face diagnostic evaluation on this patient. I have reviewed the mid-levels findings and agree. History and Exam by me shows an alert moderately obese white female, insulin-dependent diabetic, presents with a history of being hypoglycemic per EMS. She states she has not been eating well the last couple of days because she is [place] in a hurry\". She does have some chronic wounds on her legs and states that she had been going to the wound care clinic but \"graduated\" a week ago. Heart: Regular rate and rhythm. No murmurs gallops noted. Lungs: Breath sounds equal bilaterally and clear. Abdomen: Obese, soft, nontender. No masses organomegaly. Suprapubic catheter is present. Musculoskeletal: Large superficial ulcerations over the left lower leg with diffuse erythema of the left lower leg. There is some erythema extending up to the medial left thigh. Mild erythema to the right lower leg with some superficial ulcerations. Refer to pictures below. Lab reviewed. Lactic acid is 1.0. Urine shows 12 white cells and 2 red cells. H&H is 9.5 and 28.3. White blood cell count 8900 with 77 neutrophils and 12 lymphs. Sodium 134 with potassium 3.8. BUN of 20 with a creatinine of 1.5. Left tib-fib x-ray: Postsurgical changes noted bilaterally without significant change from prior study. No definite acute fracture dislocation. No periosteal reaction. Her blood sugar was 60 on arrival.  This was treated orally. She is had some chronic ulcerations on her legs but has some significant erythema bilaterally, more extensive on the left extending up to the left medial thigh with some induration. I am concerned about a significant cellulitis in a diabetic who presented hypoglycemic. Will obtain blood cultures. IV clindamycin will be ordered. The hospitalist will be consulted for admission.   Test results, diagnosis, and treatment plan were discussed with the patient. She understands the treatment plan and need for admission and is agreeable.     (Please note that portions of this note were completed with a voice recognition program.  Efforts were made to edit the dictations but occasionally words are mis-transcribed.)    Terri Vega MD  Attending Emergency Physician        Lourdes Payton MD  01/03/20 1338

## 2020-01-04 NOTE — H&P
Pancreatitis     Sepsis ([facility]     Stasis ulcer ([facility]     bilateral lower extremities     Suprapubic catheter ([facility]        Past Surgical History:        Procedure Laterality Date    APPENDECTOMY      BACK SURGERY      x2    CATARACT REMOVAL      bilateral     CHOLECYSTECTOMY      COLONOSCOPY      EYE SURGERY      FRACTURE SURGERY      Left ankle    HYSTERECTOMY      INSET/CHANGE LOMELI CATHETER - COMPLICATED  43/8417    suprapubic cath    JOINT REPLACEMENT      Right knee    KNEE SURGERY         Allergies:  Sulfa antibiotics    Medications Prior to Admission:    Prior to Admission medications    Medication Sig Start Date End Date Taking? Authorizing Provider   ibuprofen (ADVIL;MOTRIN) 600 MG tablet Take 600 mg by mouth 2 times daily 10/12/19   Historical Provider, MD   ranitidine (ZANTAC) 150 MG tablet Take 150 mg by mouth daily 10/12/19   Historical Provider, MD   furosemide (LASIX) 20 MG tablet Take 20 mg by mouth daily 10/23/19   Historical Provider, MD   traMADol (ULTRAM) 50 MG tablet Take 50 mg by mouth every 8 hours as needed for Pain.     Historical Provider, MD   ferrous sulfate 325 (65 Fe) MG tablet Take 325 mg by mouth 2 times daily     Historical Provider, MD   insulin lispro (HUMALOG) 100 UNIT/ML injection vial Inject 5 Units into the skin 3 times daily (with meals)  Patient taking differently: Inject into the skin Humalog insulin pump: 1.8 Unit basal rate continous and as needed bolus per insulin pump 11/20/18   Garret Sims, APRN - NP   atorvastatin (LIPITOR) 10 MG tablet Take 1 tablet by mouth daily 4/20/18   Lloyd Lopes MD   metoprolol tartrate (LOPRESSOR) 25 MG tablet Take 1 tablet by mouth 2 times daily 4/19/18   Lloyd Tamez MD   Biotin (BIOTIN 5000) 5 MG CAPS Take 1 tablet by mouth daily    Historical Provider, MD   lipase-protease-amylase (CREON) 00304 units delayed release capsule Take 12,000 Units by mouth 3 times daily (with meals)     Historical Provider, MD gabapentin (NEURONTIN) 300 MG capsule Take 300 mg by mouth 3 times daily. Historical Provider, MD   isosorbide mononitrate (IMDUR) 30 MG extended release tablet Take 30 mg by mouth daily    Historical Provider, MD   venlafaxine (EFFEXOR) 75 MG tablet Take 150 mg by mouth 2 times daily     Historical Provider, MD   cetirizine (ZYRTEC) 10 MG tablet Take 10 mg by mouth daily    Historical Provider, MD   vitamin D3 (CHOLECALCIFEROL) 400 units TABS tablet Take 400 Units by mouth daily     Historical Provider, MD       Family History:       Problem Relation Age of Onset    Heart Disease Father     Heart Failure Father     Diabetes Father     Diabetes Daughter     Diabetes Daughter      Social History:   TOBACCO:   reports that she has never smoked. She has never used smokeless tobacco.  ETOH:   reports current alcohol use. OCCUPATION:      REVIEW OF SYSTEMS:  A full review of systems was performed and is negative except for that which appears in the HPI    Physical Exam:    Vitals: BP (!) 156/73   Pulse 81   Temp 98.5 °F (36.9 °C) (Oral)   Resp 20   Ht 4' 11\" (1.499 m)   Wt 191 lb 6.4 oz (86.8 kg)   SpO2 94%   BMI 38.66 kg/m²   General appearance: WD/WN 66y.o. year-old  female who is alert, appears stated age and is cooperative  HEENT: Head: Normocephalic, no lesions, without obvious abnormality. Eye: Normal external eye, conjunctiva, lids cornea, GEORGE. Ears: Normal external ears. Non-tender. Nose: Normal external nose, mucus membranes and septum. Pharynx: Dental Hygiene adequate. Normal buccal mucosa. Normal pharynx. Neck: no adenopathy, no carotid bruit, no JVD, supple, symmetrical, trachea midline and thyroid not enlarged, symmetric, no tenderness/mass/nodules  Lungs: clear to auscultation bilaterally and no use of accessory muscles.   Heart: regular rate and rhythm, S1, S2 normal, no murmur, click, rub or gallop and normal apical impulse  Abdomen: soft, non-tender; bowel sounds normal;

## 2020-01-04 NOTE — ED NOTES
Report called to floor RN. No questions at this time.  Denies any further questions     Imani Cantrell RN  01/03/20 3226

## 2020-01-04 NOTE — PROGRESS NOTES
(1.499 m)   Wt 184 lb 15.5 oz (83.9 kg)   SpO2 98%   BMI 37.36 kg/m²     General appearance: No apparent distress, appears stated age and cooperative. HEENT: Pupils equal, round, and reactive to light. Conjunctivae/corneas clear. Neck: Supple, with full range of motion. No jugular venous distention. Trachea midline. Respiratory:  Normal respiratory effort. Clear to auscultation, bilaterally without Rales/Wheezes/Rhonchi. Cardiovascular: Regular rate and rhythm with normal S1/S2 without murmurs, rubs or gallops. Abdomen: Soft, non-tender, non-distended with normal bowel sounds. Musculoskeletal: Bilateral lower extremities wrapped   skin: Skin color, texture, turgor normal.  No rashes or lesions. Neurologic:  Neurovascularly intact without any focal sensory/motor deficits. Cranial nerves: II-XII intact, grossly non-focal.  Psychiatric: Alert and oriented, thought content appropriate, normal insight  Capillary Refill: Brisk,< 3 seconds   Peripheral Pulses: +2 palpable, equal bilaterally       Labs:   Recent Labs     01/03/20  1844 01/04/20  0556   WBC 8.9 13.7*   HGB 9.5* 9.4*   HCT 28.3* 27.9*    223     Recent Labs     01/03/20  1844 01/04/20  0556   * 133*   K 3.8 4.0   CL 96* 96*   CO2 22 21   BUN 20 21*   CREATININE 1.5* 1.4*   CALCIUM 8.4 8.6     Recent Labs     01/03/20  1844   AST 18   ALT 8*   BILITOT <0.2   ALKPHOS 136*     No results for input(s): INR in the last 72 hours. No results for input(s): Kate Dapper in the last 72 hours. Urinalysis:      Lab Results   Component Value Date    NITRU Negative 01/03/2020    WBCUA 12 01/03/2020    BACTERIA 1+ 06/08/2019    RBCUA 2 01/03/2020    BLOODU MODERATE 01/03/2020    SPECGRAV 1.008 01/03/2020    GLUCOSEU Negative 01/03/2020       Radiology:  XR TIBIA FIBULA LEFT (2 VIEWS)   Final Result   Postsurgical changes noted bilaterally without significant change from the   prior study. No definite acute fracture or dislocation.   No multiple co-morbidities     DVT Prophylaxis: Heparin  Diet: DIET CARB CONTROL;  Code Status: Limited    PT/OT Eval Status:  Will order    Dispo -inpatient, possible discharge tomorrow    Daniel Inman MD

## 2020-01-04 NOTE — CONSULTS
Infectious Diseases Inpatient Consult Note      Reason for Consult: Bilateral lower extremity cellulitis with open wound and drainage    Requesting Physician: Dr. Jess Ho    Primary Care Physician:  Tommie No    History Obtained From: Patient and medical records    CHIEF COMPLAINT:     Chief Complaint   Patient presents with    Hypoglycemia     pt on insulin pump and states too busy to eat  pt was given oral glucose per squad      Lower leg cellulitis with open wound and drainage    HISTORY OF PRESENT ILLNESS:  66 y.o. woman with a history of recurrent cellulitis nonhealing lower extremity open ulcer and wound admitted with worsening of leg  wounds with drainage and associated ongoing cellulitis. She has a history of poor mobility atrial fibrillation, congestive heart failure, diabetes, CKD, MDRO infection colonization. Given the ongoing extensive cellulitis we are consulted for antibiotic recommendations. Past Medical History:    Past Medical History:   Diagnosis Date    Anemia     Atrial fibrillation ([address]     CAD (coronary artery disease)     Chronic back pain     CKD (chronic kidney disease), stage III (HCC)     Diabetes mellitus (HCC)     Diastolic CHF (HCC)     Hyperlipidemia     Hypertension     MI (myocardial infarction) ([address]     MRSA (methicillin resistant staph aureus) culture positive 04/13/2018    leg ulcer    MRSA (methicillin resistant staph aureus) culture positive 06/08/2019    leg    MRSA colonization 10/16/2018    Obesity (BMI 30-39. 9)     Osteoarthritis     Pancreatitis     Sepsis ([address]     Stasis ulcer ([address]     bilateral lower extremities     Suprapubic catheter ([address]        Past Surgical History:    Past Surgical History:   Procedure Laterality Date    APPENDECTOMY      BACK SURGERY      x2    CATARACT REMOVAL      bilateral     CHOLECYSTECTOMY      COLONOSCOPY      EYE SURGERY      FRACTURE SURGERY      Left ankle    HYSTERECTOMY      INSET/CHANGE LOMELI CATHETER - COMPLICATED  80/0275    suprapubic cath    JOINT REPLACEMENT      Right knee    KNEE SURGERY         Current Medications:    No outpatient medications have been marked as taking for the 1/3/20 encounter [facility] Encounter). Allergies:  Sulfa antibiotics    Immunizations :   Immunization History   Administered Date(s) Administered    Influenza Vaccine, unspecified formulation 10/08/2010, 08/15/2014, 09/08/2017, 09/29/2018    Influenza, High Dose (Fluzone 65 yrs and older) 09/29/2018    Influenza, Quadv, IM, (6 mo and older Fluzone, Flulaval, Fluarix and 3 yrs and older Afluria) 09/08/2017    Pneumococcal Conjugate 13-valent (Samanta Heather) 10/15/2018    Pneumococcal Polysaccharide ([id number]) 08/15/2016         Social History:   Social History     Tobacco Use    Smoking status: Never Smoker    Smokeless tobacco: Never Used   Substance Use Topics    Alcohol use: Yes     Comment: occassional    Drug use: No     Social History     Tobacco Use   Smoking Status Never Smoker   Smokeless Tobacco Never Used      Family History   Problem Relation Age of Onset    Heart Disease Father     Heart Failure Father     Diabetes Father     Diabetes Daughter     Diabetes Daughter          REVIEW OF SYSTEMS:    No fever / chills / sweats. No weight loss. No visual change, eye pain, eye discharge. No oral lesion, sore throat, dysphagia. Denies cough / sputum/Sob   Denies chest pain, palpitations/ dizziness  Denies nausea/ vomiting/abdominal pain/diarrhea. Denies dysuria or change in urinary function. Denies joint swelling or pain. No myalgia, arthralgia. No rashes, skin lesions   Denies focal weakness, sensory change or other neurologic symptoms  No lymph node swelling or tenderness.     Low-grade fevers, bilateral lower extremity cellulitis with open nonhealing wound, drainage, redness    PHYSICAL EXAM:      Vitals:  T max  99  BP (!) 91/56   Pulse 88   Temp 99 °F (37.2 °C) (Oral)   Resp 16    4' 11\" (1.499 m)   Wt 184 lb 15.5 oz (83.9 kg)   SpO2 98%   BMI 37.36 kg/m²     General Appearance: alert,in no acute distress, no pallor, no icterus chronically ill-appearing woman skin: warm and dry, no rash or erythema  Head: normocephalic and atraumatic  Eyes: pupils equal, round, and reactive to light, conjunctivae normal  ENT: tympanic membrane, external ear and ear canal normal bilaterally, nose without deformity, nasal mucosa and turbinates normal without polyps  Neck: supple and non-tender without mass, no thyromegaly  no cervical lymphadenopathy  Pulmonary/Chest: clear to auscultation bilaterally- no wheezes, rales or rhonchi, normal air movement, no respiratory distress  Cardiovascular: normal rate, regular rhythm, normal S1 and S2, no murmurs, rubs, clicks, or gallops, no carotid bruits  Abdomen: soft, non-tender, non-distended, normal bowel sounds, no masses or organomegaly  Extremities: no cyanosis, clubbing or edema  Musculoskeletal: normal range of motion, no joint swelling, deformity or tenderness  Neurologic: reflexes normal and symmetric, no cranial nerve deficit  Psych:  Orientation, sensorium, mood normal  Lines:  Iv  Bilateral lower extremity cellulitis involving lower extremity with open wound and drainage and scabbing wound care pictures reviewed         DATA:    Lab Results   Component Value Date    WBC 13.7 (H) 01/04/2020    HGB 9.4 (L) 01/04/2020    HCT 27.9 (L) 01/04/2020    MCV 98.5 01/04/2020     01/04/2020     Lab Results   Component Value Date    CREATININE 1.4 (H) 01/04/2020    BUN 21 (H) 01/04/2020     (L) 01/04/2020    K 4.0 01/04/2020    CL 96 (L) 01/04/2020    CO2 21 01/04/2020       Hepatic Function Panel:   Lab Results   Component Value Date    ALKPHOS 136 01/03/2020    ALT 8 01/03/2020    AST 18 01/03/2020    PROT 7.3 01/03/2020    BILITOT <0.2 01/03/2020    BILIDIR <0.2 06/09/2019    IBILI see below 06/09/2019    LABALBU 3.0 01/03/2020 Pseudomonas aeruginosa (1)     Antibiotic Interpretation NEVILLE Status    cefepime Sensitive 4 mcg/mL     ciprofloxacin Sensitive <=1 mcg/mL     gentamicin Sensitive <=4 mcg/mL     meropenem Sensitive <=1 mcg/mL     piperacillin-tazobactam Sensitive <=16 mcg/mL     tobramycin Sensitive <=4 mcg/mL     Staph aureus mrsa (2)     Antibiotic Interpretation NEVILLE Status    ceFAZolin Resistant >16 mcg/mL     clindamycin Resistant >2 mcg/mL     erythromycin Resistant >4 mcg/mL     oxacillin Resistant >2 mcg/mL     tetracycline Sensitive <=0.5 mcg/mL     trimethoprim-sulfamethoxazole Resistant >2/38 mcg/mL     vancomycin Sensitive 1 mcg/mL           Urine Culture  No results for input(s): LABURIN in the last 72 hours. Imaging:   XR TIBIA FIBULA LEFT (2 VIEWS)   Final Result   Postsurgical changes noted bilaterally without significant change from the   prior study. No definite acute fracture or dislocation. No definite   periosteal reaction. There is persistent clinical concern for osteomyelitis MRI would be the test   of choice for further evaluation. XR TIBIA FIBULA RIGHT (2 VIEWS)   Final Result   Postsurgical changes noted bilaterally without significant change from the   prior study. No definite acute fracture or dislocation. No definite   periosteal reaction. There is persistent clinical concern for osteomyelitis MRI would be the test   of choice for further evaluation. All pertinent images and reports for the current Hospitalization were reviewed by me.     IMPRESSION:    Patient Active Problem List   Diagnosis    Acute on chronic renal insufficiency    FTT (failure to thrive) in adult    Type 1 diabetes mellitus with hyperglycemia (HCC)    Chronic venous hypertension (idiopathic) with ulcer and inflammation of bilateral lower extremity (HCC)    DMII (diabetes mellitus, type 2) (HCC)    Cellulitis of left lower extremity    Protein-calorie malnutrition, moderate (HCC)    MRSA colonization    Chronic diastolic heart failure (HCC)    Coronary artery disease involving native coronary artery of native heart without angina pectoris    Aortic atherosclerosis (HCC)    Essential hypertension    Cellulitis, lower extremities bilaterally    MRSA nasal colonization    Normocytic anemia    Altered mental status    Elevated lactic acid level    Uncontrolled diabetes mellitus (HCC)    Sepsis (HCC)    Stasis ulcer (HCC)    Bilateral lower leg cellulitis    Chronic renal failure, stage 3 (moderate) (HCC)    Hyperlipidemia    Hypoglycemia    Morbid obesity due to excess calories (HCC)     Recurrent bilateral lower extremity cellulitis,  Lower extremity open wound nonhealing  MRSA colonization and infection  Diabetes mellitus  Atrial fibrillation  Chronic kidney disease  Poor mobility  Suprapubic catheter in place  WBC elevation  Low-grade fevers  Venous stasis ulcer        Labs, Microbiology, Radiology and pertinent results from current hospitalization and care every where were reviewed by me as a part of the consultation. PLAN :  1.  Blood culture and urine culture in process  2. Send wound culture to check for any  MDRO infection  3. Continue IV vancomycin by levels given the CKD  4. Continue IV cefepime 2 g every 24 hrs  5. Wound care , ace wraps and elevate the legs  6. Contact isolation            Discussed with patient/Family and Nursing     Thanks for allowing me to participate in your patient's care please call me with any questions or concerns.     Dr. Louisa Santos MD  [facility] Physician  Phone: [phone]   Fax : [phone]

## 2020-01-05 LAB
ANION GAP SERPL CALCULATED.3IONS-SCNC: 14 MMOL/L (ref 3–16)
BASOPHILS ABSOLUTE: 0 K/UL (ref 0–0.2)
BASOPHILS RELATIVE PERCENT: 0.5 %
BUN BLDV-MCNC: 30 MG/DL (ref 7–20)
CALCIUM SERPL-MCNC: 8.2 MG/DL (ref 8.3–10.6)
CHLORIDE BLD-SCNC: 96 MMOL/L (ref 99–110)
CO2: 23 MMOL/L (ref 21–32)
CREAT SERPL-MCNC: 2.1 MG/DL (ref 0.6–1.2)
EOSINOPHILS ABSOLUTE: 0.4 K/UL (ref 0–0.6)
EOSINOPHILS RELATIVE PERCENT: 5.3 %
GFR AFRICAN AMERICAN: 28
GFR NON-AFRICAN AMERICAN: 23
GLUCOSE BLD-MCNC: 157 MG/DL (ref 70–99)
GLUCOSE BLD-MCNC: 162 MG/DL (ref 70–99)
GLUCOSE BLD-MCNC: 257 MG/DL (ref 70–99)
GLUCOSE BLD-MCNC: 276 MG/DL (ref 70–99)
GLUCOSE BLD-MCNC: 83 MG/DL (ref 70–99)
HCT VFR BLD CALC: 22.1 % (ref 36–48)
HEMOGLOBIN: 7.6 G/DL (ref 12–16)
IRON SATURATION: 52 % (ref 15–50)
IRON: 91 UG/DL (ref 37–145)
LYMPHOCYTES ABSOLUTE: 1.1 K/UL (ref 1–5.1)
LYMPHOCYTES RELATIVE PERCENT: 15.2 %
MAGNESIUM: 2.2 MG/DL (ref 1.8–2.4)
MCH RBC QN AUTO: 34 PG (ref 26–34)
MCHC RBC AUTO-ENTMCNC: 34.5 G/DL (ref 31–36)
MCV RBC AUTO: 98.8 FL (ref 80–100)
MONOCYTES ABSOLUTE: 0.5 K/UL (ref 0–1.3)
MONOCYTES RELATIVE PERCENT: 6.7 %
NEUTROPHILS ABSOLUTE: 5.2 K/UL (ref 1.7–7.7)
NEUTROPHILS RELATIVE PERCENT: 72.3 %
ORGANISM: ABNORMAL
PDW BLD-RTO: 13.2 % (ref 12.4–15.4)
PERFORMED ON: ABNORMAL
PERFORMED ON: NORMAL
PLATELET # BLD: 187 K/UL (ref 135–450)
PMV BLD AUTO: 8.8 FL (ref 5–10.5)
POTASSIUM REFLEX MAGNESIUM: 3.5 MMOL/L (ref 3.5–5.1)
RBC # BLD: 2.23 M/UL (ref 4–5.2)
SODIUM BLD-SCNC: 133 MMOL/L (ref 136–145)
TOTAL IRON BINDING CAPACITY: 174 UG/DL (ref 260–445)
URINE CULTURE, ROUTINE: ABNORMAL
VANCOMYCIN RANDOM: 21.5 UG/ML
WBC # BLD: 7.1 K/UL (ref 4–11)

## 2020-01-05 PROCEDURE — 83540 ASSAY OF IRON: CPT

## 2020-01-05 PROCEDURE — 99221 1ST HOSP IP/OBS SF/LOW 40: CPT | Performed by: SURGERY

## 2020-01-05 PROCEDURE — 83550 IRON BINDING TEST: CPT

## 2020-01-05 PROCEDURE — 80048 BASIC METABOLIC PNL TOTAL CA: CPT

## 2020-01-05 PROCEDURE — 2580000003 HC RX 258: Performed by: INTERNAL MEDICINE

## 2020-01-05 PROCEDURE — 1200000000 HC SEMI PRIVATE

## 2020-01-05 PROCEDURE — 6370000000 HC RX 637 (ALT 250 FOR IP): Performed by: INTERNAL MEDICINE

## 2020-01-05 PROCEDURE — 6360000002 HC RX W HCPCS: Performed by: INTERNAL MEDICINE

## 2020-01-05 PROCEDURE — 83735 ASSAY OF MAGNESIUM: CPT

## 2020-01-05 PROCEDURE — 80202 ASSAY OF VANCOMYCIN: CPT

## 2020-01-05 PROCEDURE — 36415 COLL VENOUS BLD VENIPUNCTURE: CPT

## 2020-01-05 PROCEDURE — 85025 COMPLETE CBC W/AUTO DIFF WBC: CPT

## 2020-01-05 RX ORDER — SODIUM CHLORIDE 9 MG/ML
INJECTION, SOLUTION INTRAVENOUS CONTINUOUS
Status: DISCONTINUED | OUTPATIENT
Start: 2020-01-05 | End: 2020-01-08 | Stop reason: HOSPADM

## 2020-01-05 RX ADMIN — INSULIN GLARGINE 25 UNITS: 100 INJECTION, SOLUTION SUBCUTANEOUS at 21:52

## 2020-01-05 RX ADMIN — METOPROLOL TARTRATE 25 MG: 25 TABLET ORAL at 21:52

## 2020-01-05 RX ADMIN — GABAPENTIN 300 MG: 300 CAPSULE ORAL at 21:52

## 2020-01-05 RX ADMIN — CETIRIZINE HYDROCHLORIDE 5 MG: 10 TABLET, FILM COATED ORAL at 09:02

## 2020-01-05 RX ADMIN — PANCRELIPASE 1 CAPSULE: 60000; 12000; 38000 CAPSULE, DELAYED RELEASE PELLETS ORAL at 09:02

## 2020-01-05 RX ADMIN — TRAMADOL HYDROCHLORIDE 50 MG: 50 TABLET, FILM COATED ORAL at 11:49

## 2020-01-05 RX ADMIN — ATORVASTATIN CALCIUM 10 MG: 10 TABLET, FILM COATED ORAL at 09:03

## 2020-01-05 RX ADMIN — GABAPENTIN 300 MG: 300 CAPSULE ORAL at 15:04

## 2020-01-05 RX ADMIN — FAMOTIDINE 20 MG: 20 TABLET, FILM COATED ORAL at 09:01

## 2020-01-05 RX ADMIN — INSULIN LISPRO 2 UNITS: 100 INJECTION, SOLUTION INTRAVENOUS; SUBCUTANEOUS at 11:59

## 2020-01-05 RX ADMIN — INSULIN LISPRO 6 UNITS: 100 INJECTION, SOLUTION INTRAVENOUS; SUBCUTANEOUS at 09:03

## 2020-01-05 RX ADMIN — PANCRELIPASE 1 CAPSULE: 60000; 12000; 38000 CAPSULE, DELAYED RELEASE PELLETS ORAL at 17:19

## 2020-01-05 RX ADMIN — VENLAFAXINE 150 MG: 37.5 TABLET ORAL at 21:52

## 2020-01-05 RX ADMIN — GABAPENTIN 300 MG: 300 CAPSULE ORAL at 09:03

## 2020-01-05 RX ADMIN — PANCRELIPASE 1 CAPSULE: 60000; 12000; 38000 CAPSULE, DELAYED RELEASE PELLETS ORAL at 11:49

## 2020-01-05 RX ADMIN — ISOSORBIDE MONONITRATE 30 MG: 30 TABLET, EXTENDED RELEASE ORAL at 09:01

## 2020-01-05 RX ADMIN — CEFEPIME HYDROCHLORIDE 2 G: 2 INJECTION, POWDER, FOR SOLUTION INTRAVENOUS at 02:27

## 2020-01-05 RX ADMIN — INSULIN LISPRO 2 UNITS: 100 INJECTION, SOLUTION INTRAVENOUS; SUBCUTANEOUS at 17:19

## 2020-01-05 RX ADMIN — VENLAFAXINE 150 MG: 37.5 TABLET ORAL at 09:02

## 2020-01-05 RX ADMIN — SODIUM CHLORIDE: 9 INJECTION, SOLUTION INTRAVENOUS at 11:50

## 2020-01-05 ASSESSMENT — PAIN DESCRIPTION - ORIENTATION
ORIENTATION: RIGHT;LEFT
ORIENTATION: LEFT;RIGHT

## 2020-01-05 ASSESSMENT — PAIN - FUNCTIONAL ASSESSMENT
PAIN_FUNCTIONAL_ASSESSMENT: ACTIVITIES ARE NOT PREVENTED
PAIN_FUNCTIONAL_ASSESSMENT: ACTIVITIES ARE NOT PREVENTED

## 2020-01-05 ASSESSMENT — PAIN DESCRIPTION - PROGRESSION
CLINICAL_PROGRESSION: NOT CHANGED
CLINICAL_PROGRESSION: NOT CHANGED

## 2020-01-05 ASSESSMENT — PAIN SCALES - WONG BAKER: WONGBAKER_NUMERICALRESPONSE: 0

## 2020-01-05 ASSESSMENT — PAIN DESCRIPTION - ONSET
ONSET: ON-GOING
ONSET: ON-GOING

## 2020-01-05 ASSESSMENT — PAIN SCALES - GENERAL
PAINLEVEL_OUTOF10: 5
PAINLEVEL_OUTOF10: 3
PAINLEVEL_OUTOF10: 0

## 2020-01-05 ASSESSMENT — PAIN DESCRIPTION - LOCATION
LOCATION: LEG
LOCATION: LEG

## 2020-01-05 ASSESSMENT — PAIN DESCRIPTION - PAIN TYPE
TYPE: ACUTE PAIN
TYPE: ACUTE PAIN

## 2020-01-05 ASSESSMENT — PAIN DESCRIPTION - DESCRIPTORS
DESCRIPTORS: ACHING
DESCRIPTORS: ACHING

## 2020-01-05 ASSESSMENT — PAIN DESCRIPTION - FREQUENCY
FREQUENCY: CONTINUOUS
FREQUENCY: CONTINUOUS

## 2020-01-05 NOTE — PROGRESS NOTES
Clinical Pharmacy Note  Renal Dose Adjustment    Ria Judd is receiving the following renally eliminated medications: Lovenox, cefepime. Based on the patient's estimated creatinine clearance of 17 ml/min (IBW and creat 2.1), the doses have been adjusted. Pharmacy will continue to monitor and adjust dose as needed for changes in renal function.       Jimena Vivar 1/5/2020 1:29 PM

## 2020-01-05 NOTE — PLAN OF CARE
Problem: Falls - Risk of:  Goal: Will remain free from falls  Description  Will remain free from falls  1/5/2020 0718 by Deepa Pemberton RN  Outcome: Ongoing  Note:   Fall risk assessment completed . Fall precautions in place, bed alarm on, side rails 2/4 up, call light in reach, educated pt on calling for assistance when needed, room clear of clutter. Pt verbalized understanding. 1/5/2020 0510 by Juvenal Sprague RN  Outcome: Ongoing  Goal: Absence of physical injury  Description  Absence of physical injury  1/5/2020 0718 by Deepa Pemberton RN  Outcome: Ongoing  1/5/2020 0510 by Juvenal Sprague RN  Outcome: Ongoing     Problem: Body Temperature - Imbalanced:  Goal: Ability to maintain a body temperature in the normal range will improve  Description  Ability to maintain a body temperature in the normal range will improve  1/5/2020 0718 by Deepa Pemberton RN  Outcome: Ongoing  Note:   Patient will have body temperature within normal limits this shift. 1/5/2020 0510 by Juvenal Sprague RN  Outcome: Ongoing     Problem: Mobility - Impaired:  Goal: Mobility will improve to maximum level  Description  Mobility will improve to maximum level  1/5/2020 0718 by Deepa Pemberton RN  Outcome: Ongoing  1/5/2020 0510 by Juvenal Sprague RN  Outcome: Ongoing     Problem: Pain:  Goal: Pain level will decrease  Description  Pain level will decrease  1/5/2020 0718 by Deepa Pemberton RN  Outcome: Ongoing  Note:   Pain /discomfort being managed with PRN analgesics per MD orders. Patient able to express presence and absence of pain and rate pain appropriately using numerical scale.      1/5/2020 0510 by Juvenal Sprague RN  Outcome: Ongoing  Goal: Control of acute pain  Description  Control of acute pain  1/5/2020 0718 by Deepa Pemberton RN  Outcome: Ongoing  1/5/2020 0510 by Juvenal Sprague RN  Outcome: Ongoing  Goal: Control of chronic pain  Description  Control of chronic pain  1/5/2020 0718 by

## 2020-01-05 NOTE — PROGRESS NOTES
[address] filed at 1/5/2020 0542  Gross per 24 hour   Intake 540 ml   Output 700 ml   Net -160 ml       Physical Exam Performed:    /68   Pulse 60   Temp 97.6 °F (36.4 °C) (Oral)   Resp 16   Ht 4' 11\" (1.499 m)   Wt 181 lb (82.1 kg)   SpO2 94%   BMI 36.56 kg/m²     General appearance: No apparent distress, appears stated age and cooperative. HEENT: Pupils equal, round, and reactive to light. Conjunctivae/corneas clear. Neck: Supple, with full range of motion. No jugular venous distention. Trachea midline. Respiratory:  Normal respiratory effort. Clear to auscultation, bilaterally without Rales/Wheezes/Rhonchi. Cardiovascular: Regular rate and rhythm with normal S1/S2 without murmurs, rubs or gallops. Abdomen: Soft, non-tender, non-distended with normal bowel sounds. Musculoskeletal: Bilateral lower extremities wrapped   skin: Skin color, texture, turgor normal.  No rashes or lesions. Neurologic:  Neurovascularly intact without any focal sensory/motor deficits. Cranial nerves: II-XII intact, grossly non-focal.  Psychiatric: Alert and oriented, thought content appropriate, normal insight  Capillary Refill: Brisk,< 3 seconds   Peripheral Pulses: +2 palpable, equal bilaterally       Labs:   Recent Labs     01/03/20  1844 01/04/20  0556 01/05/20  0512   WBC 8.9 13.7* 7.1   HGB 9.5* 9.4* 7.6*   HCT 28.3* 27.9* 22.1*    223 187     Recent Labs     01/03/20  1844 01/04/20  0556 01/05/20  0512   * 133* 133*   K 3.8 4.0 3.5   CL 96* 96* 96*   CO2 22 21 23   BUN 20 21* 30*   CREATININE 1.5* 1.4* 2.1*   CALCIUM 8.4 8.6 8.2*     Recent Labs     01/03/20  1844   AST 18   ALT 8*   BILITOT <0.2   ALKPHOS 136*     No results for input(s): INR in the last 72 hours. No results for input(s): Caleb Donavon in the last 72 hours.     Urinalysis:      Lab Results   Component Value Date    NITRU Negative 01/03/2020    WBCUA 12 01/03/2020    BACTERIA 1+ 06/08/2019    RBCUA 2 01/03/2020    BLOODU nutritional needs     Chronic diastolic heart failure ([facility] - currently compensated; monitor I&Os and daily weights.      Chronic Renal Failure stage III - Renal function is at/near historical baseline and is stable; Monitor renal function and avoid Nephrotoxic agents as able. Creatinine slightly higher on day 3 of admission. Will give low amount of normal saline infusion and recheck tomorrow morning.     Essential (primary) hypertension - continue home meds and monitor blood pressure     Hyperlipidemia - No current evidence of Rhabdomyolysis or other adverse effects. Continue statin therapy while in the hospital     Morbid obesity due to excess calories (Body mass index is 38.66 kg/m². ) - Complicating assessment and treatment. Placing patient at risk for multiple co-morbidities     DVT Prophylaxis: Heparin  Diet: DIET CARB CONTROL;  Code Status: Limited    PT/OT Eval Status:  Will order    Dispo -inpatient, possible discharge tomorrow    Marce Courtney MD

## 2020-01-05 NOTE — PROGRESS NOTES
Clinical Pharmacy Note  Vancomycin Consult    Miko Brush is a 66 y.o. female ordered Vancomycin for cellulitis; consult received from Dr. Haley Solorzano to manage therapy. Also receiving cefepime. Patient Active Problem List   Diagnosis    Acute on chronic renal insufficiency    FTT (failure to thrive) in adult    Type 1 diabetes mellitus with hyperglycemia (HCC)    Chronic venous hypertension (idiopathic) with ulcer and inflammation of bilateral lower extremity (HCC)    DMII (diabetes mellitus, type 2) (HCC)    Cellulitis of left lower extremity    Protein-calorie malnutrition, moderate (HCC)    MRSA colonization    Chronic diastolic heart failure ([address]    Coronary artery disease involving native coronary artery of native heart without angina pectoris    Aortic atherosclerosis (HCC)    Essential hypertension    Cellulitis, lower extremities bilaterally    MRSA nasal colonization    Normocytic anemia    Altered mental status    Elevated lactic acid level    Uncontrolled diabetes mellitus (HCC)    Sepsis (HCC)    Stasis ulcer (HCC)    Bilateral lower leg cellulitis    Chronic renal failure, stage 3 (moderate) (HCC)    Hyperlipidemia    Hypoglycemia    Morbid obesity due to excess calories (HCC)       Allergies:  Sulfa antibiotics     Temp max:  Temp (24hrs), Av.5 °F (36.9 °C), Min:98.1 °F (36.7 °C), Max:99 °F (37.2 °C)      Recent Labs     20  1844 20  0556   WBC 8.9 13.7*       Recent Labs     20  1844 20  0556   BUN 20 21*   CREATININE 1.5* 1.4*         Intake/Output Summary (Last 24 hours) at 2020 2232  Last data filed at 2020 2226  Gross per 24 hour   Intake 480 ml   Output 500 ml   Net -20 ml       Culture Results:  pending    Ht Readings from Last 1 Encounters:   20 4' 11\" (1.499 m)        Wt Readings from Last 1 Encounters:   20 184 lb 15.5 oz (83.9 kg)         CrCl cannot be calculated (Unknown ideal weight. ).     Assessment/Plan:  Will redose vancomycin 1000 mg IV x1 dose. Regimen projects a trough level of 10-15 mg/L. Timing of trough level will be determined based on culture results, renal function, and clinical response. Thank you for the consult. Will continue to follow.

## 2020-01-05 NOTE — PLAN OF CARE
Problem: Falls - Risk of:  Goal: Will remain free from falls  Description  Will remain free from falls  Outcome: Ongoing  Goal: Absence of physical injury  Description  Absence of physical injury  Outcome: Ongoing   Fall risk assessment completed every shift. All precautions in place. Pt has call light within reach at all times. Room clear of clutter. Pt aware to call for assistance when getting up. Problem: Body Temperature - Imbalanced:  Goal: Ability to maintain a body temperature in the normal range will improve  Description  Ability to maintain a body temperature in the normal range will improve  Outcome: Ongoing   Pt remains afebrile. Will monitor. Problem: Mobility - Impaired:  Goal: Mobility will improve to maximum level  Description  Mobility will improve to maximum level  Outcome: Ongoing     Problem: Pain:  Goal: Pain level will decrease  Description  Pain level will decrease  Outcome: Ongoing  Goal: Control of acute pain  Description  Control of acute pain  Outcome: Ongoing  Goal: Control of chronic pain  Description  Control of chronic pain  Outcome: Ongoing   No c/o pain this shift. Will continue to assess and medicate as needed. Problem: Skin Integrity - Impaired:  Goal: Will show no infection signs and symptoms  Description  Will show no infection signs and symptoms  Outcome: Ongoing  Pt is currently being treated for cellulitis in [place]. Will administer antibiotics as ordered. [place] wrapped in Kerlix. Will monitor.   Goal: Absence of new skin breakdown  Description  Absence of new skin breakdown  Outcome: Ongoing Calm

## 2020-01-05 NOTE — CONSULTS
[facility] [address] 35   Progress Note and Procedure Note      Gerardo Lamb  MEDICAL RECORD NUMBER:  [id number]  AGE: 66 y.o. GENDER: female  : 1945  EPISODE DATE:  20    Subjective:     Chief Complaint   Patient presents with    Hypoglycemia     pt on insulin pump and states too busy to eat  pt was given oral glucose per squad          HISTORY of PRESENT ILLNESS BRITT King was admitted to the hospital yesterday and we are consulted to continue her wound care and for possible cellulitis of her legs   T is a 66 y.o. female who presents today for wound/ulcer evaluation. History of Wound Context: Years of edema years of ulceration left leg never healed right leg has healed but then would break open when it swelled again had zippered stockings from [employer] but would ulcerate anyway.   Her daughters thought that the legs looked worse she was supposed to be seen 2 weeks from 1219 but was not able to make it or we were closed at the wound clinic  Wound/Ulcer Pain Timing/Severity: constant, moderate  Quality of pain: dull, aching  Severity:  10 / 10   Modifying Factors: Pain worsens with walking  Associated Signs/Symptoms: edema, erythema, drainage, odor and pain    Ulcer Identification:  Ulcer Type: venous    Contributing Factors: edema, venous stasis, lymphedema, diabetes, decreased mobility and obesity    Wound: N/A  This week  she had a some type of aid ,cleaned her legs with peroxide the first time the second time she soaked her dressings and peroxide and left him on not sure why she was doing this      PAST MEDICAL HISTORY        Diagnosis Date    Anemia     [facility]     CAD (coronary artery disease)     Chronic back pain     CKD (chronic kidney disease), stage III ([address]     Diabetes mellitus ([facility] [address] [facility]     Diastolic CHF ([facility] [facility]     Hyperlipidemia     Hypertension     MI (myocardial infarction) ([facility] [address] [facility]     MRSA (methicillin resistant staph aureus) culture positive 04/13/2018    leg ulcer    MRSA (methicillin resistant staph aureus) culture positive 06/08/2019    leg    MRSA colonization 10/16/2018    Obesity (BMI 30-39. 9)     Osteoarthritis     Pancreatitis     Sepsis ([address]     Stasis ulcer ([address]     bilateral lower extremities     Suprapubic catheter ([address]        PAST SURGICAL HISTORY    Past Surgical History:   Procedure Laterality Date    APPENDECTOMY      BACK SURGERY      x2    CATARACT REMOVAL      bilateral     CHOLECYSTECTOMY      COLONOSCOPY      EYE SURGERY      FRACTURE SURGERY      Left ankle    HYSTERECTOMY      INSET/CHANGE LOMELI CATHETER - COMPLICATED  96/3219    suprapubic cath    JOINT REPLACEMENT      Right knee    KNEE SURGERY         FAMILY HISTORY    Family History   Problem Relation Age of Onset    Heart Disease Father     Heart Failure Father     Diabetes Father     Diabetes Daughter     Diabetes Daughter        SOCIAL HISTORY    Social History     Tobacco Use    Smoking status: Never Smoker    Smokeless tobacco: Never Used   Substance Use Topics    Alcohol use: Yes     Comment: occassional    Drug use: No       ALLERGIES    Allergies   Allergen Reactions    Sulfa Antibiotics Rash       MEDICATIONS    No current facility-administered medications on file prior to encounter. Current Outpatient Medications on File Prior to Encounter   Medication Sig Dispense Refill    ibuprofen (ADVIL;MOTRIN) 600 MG tablet Take 600 mg by mouth 2 times daily      ranitidine (ZANTAC) 150 MG tablet Take 150 mg by mouth daily      furosemide (LASIX) 20 MG tablet Take 20 mg by mouth daily      traMADol (ULTRAM) 50 MG tablet Take 50 mg by mouth every 8 hours as needed for Pain.       ferrous sulfate 325 (65 Fe) MG tablet Take 325 mg by mouth 2 times daily       insulin lispro (HUMALOG) 100 UNIT/ML injection vial Inject 5 Units into the skin 3 times daily (with meals) (Patient taking differently: Inject into the skin Humalog insulin pump: 1.8 Unit basal rate continous and as needed bolus per insulin pump) 1 vial 0    atorvastatin (LIPITOR) 10 MG tablet Take 1 tablet by mouth daily 30 tablet 0    metoprolol tartrate (LOPRESSOR) 25 MG tablet Take 1 tablet by mouth 2 times daily 60 tablet 0    Biotin (BIOTIN 5000) 5 MG CAPS Take 1 tablet by mouth daily      lipase-protease-amylase (CREON) 64954 units delayed release capsule Take 12,000 Units by mouth 3 times daily (with meals)       gabapentin (NEURONTIN) 300 MG capsule Take 300 mg by mouth 3 times daily.  isosorbide mononitrate (IMDUR) 30 MG extended release tablet Take 30 mg by mouth daily      venlafaxine (EFFEXOR) 75 MG tablet Take 150 mg by mouth 2 times daily       cetirizine (ZYRTEC) 10 MG tablet Take 10 mg by mouth daily      vitamin D3 (CHOLECALCIFEROL) 400 units TABS tablet Take 400 Units by mouth daily          REVIEW OF SYSTEMS    A comprehensive review of systems was negative except for: Respiratory: positive for dyspnea on exertion  Musculoskeletal: positive for back pain and Left leg pain    Objective:      /68   Pulse 58   Temp 97.6 °F (36.4 °C) (Oral)   Resp 16   Ht 4' 11\" (1.499 m)   Wt 181 lb (82.1 kg)   SpO2 94%   BMI 36.56 kg/m²     Wt Readings from Last 3 Encounters:   01/05/20 181 lb (82.1 kg)   12/05/19 178 lb 12.7 oz (81.1 kg)   10/03/19 178 lb 12.7 oz (81.1 kg)       PHYSICAL EXAM    General Appearance: alert and somewhat oriented to person, place and time, is forgetful well developed and well- nourished, in no acute distress  Skin: warm and dry, no rash or erythema  Head: normocephalic and atraumatic  Neck: supple and non-tender without mass, no thyromegaly or thyroid nodules, no cervical lymphadenopathy  Pulmonary/Chest: clear to auscultation left chest- no wheezes, rales or rhonchi, normal air movement, no respiratory distress, right base rales appreciated did not clear with cough.  No shortness of breath while speaking daughter 12/19/2019  2:44 PM   Odor None 12/19/2019  2:44 PM   Margins Attached edges 12/19/2019  2:44 PM   Marilynn-wound Assessment Dry;Pink 12/19/2019  2:44 PM   Non-staged Wound Description Full thickness 12/19/2019  2:44 PM   [place]%Wound Bed 90 10/3/2019  2:37 PM   Red%Wound Bed 95 12/19/2019  2:44 PM   Yellow%Wound Bed 5 12/19/2019  2:44 PM   Number of days: 119       Wound 08/22/19 Left; Anterior; Lower; Lateral #9 - left anterior lower leg - converted from wound #2 on 8/22/2019 (Active)   Wound Image   12/5/2019  3:07 PM   Wound Venous 9/12/2019  3:42 PM   Dressing Status Old drainage 12/19/2019  2:44 PM   Dressing Changed Changed/New 12/5/2019  4:25 PM   Dressing/Treatment Other (comment) 12/5/2019  4:25 PM   Wound Cleansed Rinsed/Irrigated with saline 11/14/2019  3:06 PM   Wound Length (cm) 1.4 cm 12/19/2019  2:44 PM   Wound Width (cm) 2 cm 12/19/2019  2:44 PM   Wound Depth (cm) 0.1 cm 12/19/2019  2:44 PM   Wound Surface Area (cm^2) 2.8 cm^2 12/19/2019  2:44 PM   Change in Wound Size % (l*w) 79.64 12/19/2019  2:44 PM   Wound Volume (cm^3) 0.28 cm^3 12/19/2019  2:44 PM   Wound Healing % 80 12/19/2019  2:44 PM   Post-Procedure Length (cm) 1.6 cm 12/19/2019  3:54 PM   Post-Procedure Width (cm) 3.3 cm 12/19/2019  3:54 PM   Post-Procedure Depth (cm) 0.1 cm 12/19/2019  3:54 PM   Post-Procedure Surface Area (cm^2) 5.28 cm^2 12/19/2019  3:54 PM   Post-Procedure Volume (cm^3) 0.53 cm^3 12/19/2019  3:54 PM   Wound Assessment Granulation tissue;Slough 12/19/2019  2:44 PM   Drainage Amount Small 12/19/2019  2:44 PM   Drainage Description Serosanguinous 12/19/2019  2:44 PM   Odor None 12/19/2019  2:44 PM   Margins Attached edges 12/19/2019  2:44 PM   Marilynn-wound Assessment Excoriated 12/19/2019  2:44 PM   Non-staged Wound Description Full thickness 12/19/2019  2:44 PM   [place]%Wound Bed 10 12/19/2019  2:44 PM   Red%Wound Bed 80 12/19/2019  2:44 PM   Yellow%Wound Bed 10 12/19/2019  2:44 PM   Black%Wound Bed 10 8/22/2019  3:14 PM   Number 0.7 cm 12/19/2019  2:44 PM   Wound Width (cm) 1 cm 12/19/2019  2:44 PM   Wound Depth (cm) 0.1 cm 12/19/2019  2:44 PM   Wound Surface Area (cm^2) 0.7 cm^2 12/19/2019  2:44 PM   Change in Wound Size % (l*w) 97.67 12/19/2019  2:44 PM   Wound Volume (cm^3) 0.07 cm^3 12/19/2019  2:44 PM   Wound Healing % 98 12/19/2019  2:44 PM   Post-Procedure Length (cm) 0.9 cm 12/19/2019  3:51 PM   Post-Procedure Width (cm) 1.2 cm 12/19/2019  3:51 PM   Post-Procedure Depth (cm) 0.1 cm 12/19/2019  3:51 PM   Post-Procedure Surface Area (cm^2) 1.08 cm^2 12/19/2019  3:51 PM   Post-Procedure Volume (cm^3) 0.11 cm^3 12/19/2019  3:51 PM   Wound Assessment Granulation tissue;Slough 12/19/2019  2:44 PM   Drainage Amount Moderate 12/19/2019  2:44 PM   Drainage Description Serosanguinous 12/19/2019  2:44 PM   Odor None 12/19/2019  2:44 PM   Margins Attached edges 12/19/2019  2:44 PM   Marilynn-wound Assessment Dry;Pink 12/19/2019  2:44 PM   Non-staged Wound Description Full thickness 12/19/2019  2:44 PM   [place]%Wound Bed 85 12/5/2019  3:07 PM   Red%Wound Bed 95 12/19/2019  2:44 PM   Yellow%Wound Bed 5 12/19/2019  2:44 PM   Number of days: 91     Dressing changed today we used Adaptic Curlex and 6 inch Ace wraps to maintain her control of her edema not too impressed that she has new cellulitis in her legs frequently look like this white count is normal hemoglobin is significantly low at 7.6 does have chronic anemia and says she used to take iron.   The last hemoglobin I see in the chart was 9 and 26 back on 6/13/2019'  The family has requested that we not put her on Lovenox reportedly because it causes clots I suspect they mean it may cause bleeding                  Electronically signed by Oscar Llamas MD on 1/5/2020 at 9:32 AM

## 2020-01-06 LAB
ANION GAP SERPL CALCULATED.3IONS-SCNC: 12 MMOL/L (ref 3–16)
BASOPHILS ABSOLUTE: 0 K/UL (ref 0–0.2)
BASOPHILS RELATIVE PERCENT: 0.7 %
BILIRUBIN URINE: NEGATIVE
BLOOD, URINE: ABNORMAL
BUN BLDV-MCNC: 25 MG/DL (ref 7–20)
CALCIUM SERPL-MCNC: 8.3 MG/DL (ref 8.3–10.6)
CHLORIDE BLD-SCNC: 103 MMOL/L (ref 99–110)
CLARITY: CLEAR
CO2: 25 MMOL/L (ref 21–32)
COLOR: YELLOW
CREAT SERPL-MCNC: 1.4 MG/DL (ref 0.6–1.2)
EOSINOPHILS ABSOLUTE: 0.3 K/UL (ref 0–0.6)
EOSINOPHILS RELATIVE PERCENT: 5.1 %
EPITHELIAL CELLS, UA: 0 /HPF (ref 0–5)
GFR AFRICAN AMERICAN: 44
GFR NON-AFRICAN AMERICAN: 36
GLUCOSE BLD-MCNC: 131 MG/DL (ref 70–99)
GLUCOSE BLD-MCNC: 133 MG/DL (ref 70–99)
GLUCOSE BLD-MCNC: 147 MG/DL (ref 70–99)
GLUCOSE BLD-MCNC: 168 MG/DL (ref 70–99)
GLUCOSE BLD-MCNC: 172 MG/DL (ref 70–99)
GLUCOSE URINE: NEGATIVE MG/DL
HCT VFR BLD CALC: 25.2 % (ref 36–48)
HEMOGLOBIN: 8.6 G/DL (ref 12–16)
HYALINE CASTS: 1 /LPF (ref 0–8)
KETONES, URINE: NEGATIVE MG/DL
LEUKOCYTE ESTERASE, URINE: NEGATIVE
LYMPHOCYTES ABSOLUTE: 0.8 K/UL (ref 1–5.1)
LYMPHOCYTES RELATIVE PERCENT: 14.2 %
MAGNESIUM: 2.3 MG/DL (ref 1.8–2.4)
MCH RBC QN AUTO: 33.7 PG (ref 26–34)
MCHC RBC AUTO-ENTMCNC: 34.2 G/DL (ref 31–36)
MCV RBC AUTO: 98.6 FL (ref 80–100)
MICROSCOPIC EXAMINATION: YES
MONOCYTES ABSOLUTE: 0.4 K/UL (ref 0–1.3)
MONOCYTES RELATIVE PERCENT: 6.3 %
NEUTROPHILS ABSOLUTE: 4.4 K/UL (ref 1.7–7.7)
NEUTROPHILS RELATIVE PERCENT: 73.7 %
NITRITE, URINE: NEGATIVE
PDW BLD-RTO: 13.1 % (ref 12.4–15.4)
PERFORMED ON: ABNORMAL
PH UA: 6 (ref 5–8)
PLATELET # BLD: 196 K/UL (ref 135–450)
PMV BLD AUTO: 8.8 FL (ref 5–10.5)
POTASSIUM REFLEX MAGNESIUM: 3.4 MMOL/L (ref 3.5–5.1)
PROTEIN UA: 30 MG/DL
RBC # BLD: 2.55 M/UL (ref 4–5.2)
RBC UA: 8 /HPF (ref 0–4)
SODIUM BLD-SCNC: 140 MMOL/L (ref 136–145)
SPECIFIC GRAVITY UA: 1.01 (ref 1–1.03)
URINE REFLEX TO CULTURE: ABNORMAL
URINE TYPE: ABNORMAL
UROBILINOGEN, URINE: 0.2 E.U./DL
VANCOMYCIN RANDOM: 17.3 UG/ML
WBC # BLD: 5.9 K/UL (ref 4–11)
WBC UA: 3 /HPF (ref 0–5)

## 2020-01-06 PROCEDURE — 80202 ASSAY OF VANCOMYCIN: CPT

## 2020-01-06 PROCEDURE — 99232 SBSQ HOSP IP/OBS MODERATE 35: CPT | Performed by: INTERNAL MEDICINE

## 2020-01-06 PROCEDURE — 81001 URINALYSIS AUTO W/SCOPE: CPT

## 2020-01-06 PROCEDURE — 6370000000 HC RX 637 (ALT 250 FOR IP): Performed by: INTERNAL MEDICINE

## 2020-01-06 PROCEDURE — APPSS30 APP SPLIT SHARED TIME 16-30 MINUTES: Performed by: NURSE PRACTITIONER

## 2020-01-06 PROCEDURE — 85025 COMPLETE CBC W/AUTO DIFF WBC: CPT

## 2020-01-06 PROCEDURE — 99232 SBSQ HOSP IP/OBS MODERATE 35: CPT | Performed by: SURGERY

## 2020-01-06 PROCEDURE — 2580000003 HC RX 258: Performed by: INTERNAL MEDICINE

## 2020-01-06 PROCEDURE — 1200000000 HC SEMI PRIVATE

## 2020-01-06 PROCEDURE — 36415 COLL VENOUS BLD VENIPUNCTURE: CPT

## 2020-01-06 PROCEDURE — 80048 BASIC METABOLIC PNL TOTAL CA: CPT

## 2020-01-06 PROCEDURE — 6360000002 HC RX W HCPCS: Performed by: INTERNAL MEDICINE

## 2020-01-06 PROCEDURE — APPNB30 APP NON BILLABLE TIME 0-30 MINS: Performed by: NURSE PRACTITIONER

## 2020-01-06 PROCEDURE — 97530 THERAPEUTIC ACTIVITIES: CPT

## 2020-01-06 PROCEDURE — 83735 ASSAY OF MAGNESIUM: CPT

## 2020-01-06 RX ORDER — DOXYCYCLINE HYCLATE 100 MG/1
100 CAPSULE ORAL 2 TIMES DAILY
Qty: 10 CAPSULE | Refills: 0 | Status: SHIPPED | OUTPATIENT
Start: 2020-01-06 | End: 2020-01-11

## 2020-01-06 RX ORDER — CIPROFLOXACIN 500 MG/1
500 TABLET, FILM COATED ORAL 2 TIMES DAILY
Qty: 14 TABLET | Refills: 0 | Status: CANCELLED | OUTPATIENT
Start: 2020-01-06 | End: 2020-01-13

## 2020-01-06 RX ORDER — DOCUSATE SODIUM 100 MG/1
100 CAPSULE, LIQUID FILLED ORAL 2 TIMES DAILY
Status: DISCONTINUED | OUTPATIENT
Start: 2020-01-06 | End: 2020-01-08 | Stop reason: HOSPADM

## 2020-01-06 RX ORDER — POLYETHYLENE GLYCOL 3350 17 G/17G
17 POWDER, FOR SOLUTION ORAL DAILY
Status: DISCONTINUED | OUTPATIENT
Start: 2020-01-06 | End: 2020-01-08 | Stop reason: HOSPADM

## 2020-01-06 RX ADMIN — PANCRELIPASE 1 CAPSULE: 60000; 12000; 38000 CAPSULE, DELAYED RELEASE PELLETS ORAL at 10:40

## 2020-01-06 RX ADMIN — METOPROLOL TARTRATE 25 MG: 25 TABLET ORAL at 21:32

## 2020-01-06 RX ADMIN — INSULIN GLARGINE 25 UNITS: 100 INJECTION, SOLUTION SUBCUTANEOUS at 21:33

## 2020-01-06 RX ADMIN — SODIUM CHLORIDE, PRESERVATIVE FREE 10 ML: 5 INJECTION INTRAVENOUS at 10:41

## 2020-01-06 RX ADMIN — PANCRELIPASE 1 CAPSULE: 60000; 12000; 38000 CAPSULE, DELAYED RELEASE PELLETS ORAL at 17:57

## 2020-01-06 RX ADMIN — INSULIN LISPRO 2 UNITS: 100 INJECTION, SOLUTION INTRAVENOUS; SUBCUTANEOUS at 10:39

## 2020-01-06 RX ADMIN — CETIRIZINE HYDROCHLORIDE 5 MG: 10 TABLET, FILM COATED ORAL at 10:39

## 2020-01-06 RX ADMIN — POLYETHYLENE GLYCOL 3350 17 G: 17 POWDER, FOR SOLUTION ORAL at 18:00

## 2020-01-06 RX ADMIN — ATORVASTATIN CALCIUM 10 MG: 10 TABLET, FILM COATED ORAL at 10:39

## 2020-01-06 RX ADMIN — METOPROLOL TARTRATE 25 MG: 25 TABLET ORAL at 10:39

## 2020-01-06 RX ADMIN — VENLAFAXINE 150 MG: 37.5 TABLET ORAL at 21:32

## 2020-01-06 RX ADMIN — ISOSORBIDE MONONITRATE 30 MG: 30 TABLET, EXTENDED RELEASE ORAL at 10:40

## 2020-01-06 RX ADMIN — VENLAFAXINE 150 MG: 37.5 TABLET ORAL at 10:39

## 2020-01-06 RX ADMIN — PANCRELIPASE 1 CAPSULE: 60000; 12000; 38000 CAPSULE, DELAYED RELEASE PELLETS ORAL at 14:14

## 2020-01-06 RX ADMIN — DOCUSATE SODIUM 100 MG: 100 CAPSULE, LIQUID FILLED ORAL at 21:33

## 2020-01-06 RX ADMIN — SODIUM CHLORIDE: 9 INJECTION, SOLUTION INTRAVENOUS at 01:47

## 2020-01-06 RX ADMIN — GABAPENTIN 300 MG: 300 CAPSULE ORAL at 10:40

## 2020-01-06 RX ADMIN — GABAPENTIN 300 MG: 300 CAPSULE ORAL at 21:32

## 2020-01-06 RX ADMIN — INSULIN LISPRO 2 UNITS: 100 INJECTION, SOLUTION INTRAVENOUS; SUBCUTANEOUS at 14:14

## 2020-01-06 RX ADMIN — GABAPENTIN 300 MG: 300 CAPSULE ORAL at 14:14

## 2020-01-06 RX ADMIN — CEFEPIME HYDROCHLORIDE 1 G: 1 INJECTION, POWDER, FOR SOLUTION INTRAMUSCULAR; INTRAVENOUS at 01:51

## 2020-01-06 RX ADMIN — FAMOTIDINE 20 MG: 20 TABLET, FILM COATED ORAL at 10:39

## 2020-01-06 RX ADMIN — INSULIN LISPRO 1 UNITS: 100 INJECTION, SOLUTION INTRAVENOUS; SUBCUTANEOUS at 21:33

## 2020-01-06 ASSESSMENT — PAIN SCALES - GENERAL: PAINLEVEL_OUTOF10: 0

## 2020-01-06 NOTE — PROGRESS NOTES
[facility]        PATIENT NAME: Miesha Ponce     TODAY'S DATE: 1/6/2020    CHIEF COMPLAINT:  Leg wounds    SUBJECTIVE:    NO acute events overnight    Pt sleeping, awakens easily. No complaints. REVIEW OF SYSTEMS:  CONSTITUTIONAL:  negative  HEENT:  negative  RESPIRATORY:  negative  CARDIOVASCULAR:  negative  GASTROINTESTINAL:  negative   GENITOURINARY:  negative  HEMATOLOGIC/LYMPHATIC:  negative  NEUROLOGICAL:  negative  SKIN: negative except BLE leg wounds       OBJECTIVE:  VITALS:  BP (!) 145/48   Pulse 65   Temp 98.2 °F (36.8 °C) (Oral)   Resp 18   Ht 4' 11\" (1.499 m)   Wt 183 lb 13.8 oz (83.4 kg)   SpO2 99%   BMI 37.14 kg/m²     INTAKE/OUTPUT:    I/O last 3 completed shifts: In: 120 [P.O.:120]  Out: 1600 [Urine:1600]  No intake/output data recorded. Drain/tube Output:           CONSTITUTIONAL:  awake and alert  EYES:  sclera clear  ENT:  normocepalic, without obvious abnormality  NECK: symmetrical, trachea midline  RESP: no increased WOB  VASCULAR:  pedal pulses normal both DP's and PT's  SKIN:tace BLE edema  WOUND: BLE venous ulcers, chronic, no acute infection,       Data:  CBC:   Recent Labs     01/04/20  0556 01/05/20  0512 01/06/20  0530   WBC 13.7* 7.1 5.9   HGB 9.4* 7.6* 8.6*   HCT 27.9* 22.1* 25.2*    187 196     BMP:    Recent Labs     01/04/20  0556 01/05/20  0512 01/06/20  0531   * 133* 140   K 4.0 3.5 3.4*   CL 96* 96* 103   CO2 21 23 25   BUN 21* 30* 25*   CREATININE 1.4* 2.1* 1.4*   GLUCOSE 330* 257* 133*     Hepatic:   Recent Labs     01/03/20  1844   AST 18   ALT 8*   BILITOT <0.2   ALKPHOS 136*     Mag:      Recent Labs     01/05/20  0512 01/06/20  0531   MG 2.20 2.30      Phos:   No results for input(s): PHOS in the last 72 hours. INR: No results for input(s): INR in the last 72 hours. Lipase and amylase: No results for input(s): LIPASE, AMYLASE in the last 72 hours.       CULTURES:   Blood culture  Recent Labs 01/03/20  2156   BC No Growth to date. Any change in status will be called. Wound abscess  Recent Labs     01/04/20  1828   WNDABS Growth too young. further report to follow         Scheduled Meds:   vancomycin  750 mg Intravenous Once    [Held by provider] enoxaparin  30 mg Subcutaneous Daily    cefepime  1 g Intravenous Q24H    venlafaxine  150 mg Oral BID    famotidine  20 mg Oral Daily    metoprolol tartrate  25 mg Oral BID    lipase-protease-amylase  1 capsule Oral TID WC    isosorbide mononitrate  30 mg Oral Daily    gabapentin  300 mg Oral TID    cetirizine  5 mg Oral Daily    atorvastatin  10 mg Oral Daily    sodium chloride flush  10 mL Intravenous 2 times per day    insulin lispro  0-12 Units Subcutaneous TID WC    insulin lispro  0-6 Units Subcutaneous Nightly    vancomycin (VANCOCIN) intermittent dosing (placeholder)   Other RX Placeholder    insulin glargine  25 Units Subcutaneous Nightly     Continuous Infusions:   sodium chloride 75 mL/hr at 01/06/20 0147    dextrose       PRN Meds:.traMADol, sodium chloride flush, magnesium hydroxide, ondansetron, glucose, dextrose, glucagon (rDNA), dextrose, acetaminophen, ipratropium-albuterol      ASSESSMENT/PLAN:      66 y.o. female admitted with   1. Cellulitis of lower extremity, unspecified laterality    2.  Hypoglycemia         with a history of:    Patient Active Problem List   Diagnosis    Acute on chronic renal insufficiency    FTT (failure to thrive) in adult    Type 1 diabetes mellitus with hyperglycemia (HCC)    Chronic venous hypertension (idiopathic) with ulcer and inflammation of bilateral lower extremity (HCC)    DMII (diabetes mellitus, type 2) (HCC)    Cellulitis of left lower extremity    Protein-calorie malnutrition, moderate (HCC)    MRSA colonization    Chronic diastolic heart failure ([facility] [address]    Coronary artery disease involving native coronary artery of native heart without angina pectoris    Aortic atherosclerosis ([facility] [address] [facility]    Essential hypertension    Cellulitis, lower extremities bilaterally    MRSA nasal colonization    Normocytic anemia    Altered mental status    Elevated lactic acid level    Uncontrolled diabetes mellitus (HCC)    Sepsis (HCC)    Stasis ulcer (HCC)    Bilateral lower leg cellulitis    Chronic renal failure, stage 3 (moderate) (HCC)    Hyperlipidemia    Hypoglycemia    Morbid obesity due to excess calories (HCC)       Local wound care - adaptic, kerlex and ACE - changed today  Does not appears to have acute cellulitis, appears more chronic changes. Continue medical care  Ok to DC anytime from vascular surgery standpoint. Discussed with patient and nursing. Discussed with Dr. Missy Varela:  · Educated patient on plan of care and disease process. - all questions answered  · Recommend  a smoke-free lifestyle. Zuleyma Rosario CNP  Pt seen and examined. Agree with FRANCHESCA Edgar note. Labs reviewed.   Both legs changed they look healthy the ulcers are shallow did not sharply debrided today rewrapped with Adaptic Curlex and Ace wraps still not impressed that she has cellulitis I think this is her chronic state of red-pink color of her legs

## 2020-01-06 NOTE — PROGRESS NOTES
Physical Therapy    Mancel Nipper  1/6/2020  To room to assist with nursing in transfer sit to  the [address] [place] [address] stedy and on to the recliner. Removed her street clothes along with nursing and donned gown. Not discharging to home today- cancelled. Will see for formal PT assessment tomorrow AM along with OT to make recommedations. At conclusion, remains in recliner in care of nursing. Chair alarm on.   Electronically signed by Leslie Rojo PT on 1/6/2020 at 4:46 PM

## 2020-01-06 NOTE — PROGRESS NOTES
Bedside introduction complete with day shift RN and patient. Patient denies any needs at this time. Update the whiteboard with RN and PCA name and number. Patient able to make needs known, using call light appropriately. Will continue to monitor and assess for needs and comfort.

## 2020-01-06 NOTE — DISCHARGE SUMMARY
Hospital Medicine Discharge Summary    Patient ID: Lelia Combs      Patient's PCP: Mattie Mead    Admit Date: 1/3/2020     Discharge Date:   1/6/20    Admitting Physician: Zia Espinoza MD     Discharge Physician: Coco Conroy MD     Discharge Diagnoses: Active Hospital Problems    Diagnosis Date Noted    Chronic renal failure, stage 3 (moderate) ([facility] [N18.3] 01/04/2020    Hyperlipidemia [E78.5] 01/04/2020    Hypoglycemia [E16.2] 01/04/2020    Morbid obesity due to excess calories ([address] [E66.01] 01/04/2020    Bilateral lower leg cellulitis [L03.116, L03.115] 01/03/2020    Chronic diastolic heart failure (HCC) [I50.32]     Essential hypertension [I10]     DMII (diabetes mellitus, type 2) ([facility] [address] [E11.9] 04/13/2018    Protein-calorie malnutrition, moderate ([facility] [address] [E44.0] 04/13/2018       The patient was seen and examined on day of discharge and this discharge summary is in conjunction with any daily progress note from day of discharge. Hospital Course:   Patient is a 64-year-old woman with a history of hypertension, hyperlipidemia, diabetes mellitus, chronic diastolic congestive heart failure, chronic renal failure and ongoing issues with her lower extremities including recurrent cellulitis and chronic venous hypertension with inflammation. she was initially brought to the emergency room for evaluation of hypoglycemia. She wears an insulin pump and reported that she had been too busy over the past few days to eat adequately. She was given oral glucose by EMS and her glucose at time of arrival in the emergency room was 57. She did eat after arrival in her glucose improved. During the course of her evaluation she was found to have bright red erythema and areas denuded of skin in her bilateral lower extremities. She is being admitted for evaluation and treatment of bilateral lower extremity cellulitis.  Associated signs and symptoms do not include fever or chills, nausea, vomiting, abdominal pain, hemoptysis, hematochezia, diarrhea, constipation or urinary symptoms. Bilateral lower leg cellulitis - blood and wound cultures collected. ID consulted. Pt started on Vancomycin and Cefepime initially. Appears to be consistent with a Strep sp infection. ID may deescalate to [facility] after evaluation.   Patient states leg wounds are similar appearance to their chronic state. Vascular surgery consulted and stated that patient's wounds appear similar as previous. No acute process. DC today with abx per ID. Enterococcus from wound culture, sens pending     Anemia. Chronic in nature but acute component with a drop of 2 g overnight. Iron and TIBC ordered. Fecal occult blood test ordered as well. Transfuse if less than 7. Stable at DC     Hypoglycemia - due to having active insulin pump and poor oral intake. Reevaluate appropriateness of insulin pump in this patient. For now, will start SSI and a CCD for Diabetes mellitus II. resolved after IV fluids and a meal.  Patient was placed on lantus and SSI in hospital. Resolved. Patient can continue her home regimen at [place]     Protein-calorie malnutrition, moderate (HCC) - Patient encouraged to eat a balanced diet, including protein-rich foods. Dietary has been consulted to assess nutritional needs     Chronic diastolic heart failure (HCC) - currently compensated; monitor I&Os and daily weights.      Chronic Renal Failure stage III - Renal function is at/near historical baseline and is stable; Monitor renal function and avoid Nephrotoxic agents as able. Creatinine slightly higher on day 3 of admission. Will give low amount of normal saline infusion and recheck tomorrow morning.     Essential (primary) hypertension - continue home meds and monitor blood pressure     Hyperlipidemia - No current evidence of Rhabdomyolysis or other adverse effects.  Continue statin therapy while in the hospital     Morbid obesity due to excess calories (Body mass index is 38.66 kg/m².) - Complicating assessment and treatment. Placing patient at risk for multiple co-morbidities       Exam:     BP (!) 145/48   Pulse 65   Temp 98.2 °F (36.8 °C) (Oral)   Resp 18   Ht 4' 11\" (1.499 m)   Wt 183 lb 13.8 oz (83.4 kg)   SpO2 99%   BMI 37.14 kg/m²     General appearance: No apparent distress, appears stated age and cooperative. HEENT: Pupils equal, round, and reactive to light. Conjunctivae/corneas clear. Neck: Supple, with full range of motion. No jugular venous distention. Trachea midline. Respiratory:  Normal respiratory effort. Clear to auscultation, bilaterally without Rales/Wheezes/Rhonchi. Cardiovascular: Regular rate and rhythm with normal S1/S2 without murmurs, rubs or gallops. Abdomen: Soft, non-tender, non-distended with normal bowel sounds. Musculoskeletal: Bilateral lower extremities wrapped   skin: Skin color, texture, turgor normal.  No rashes or lesions. Neurologic:  Neurovascularly intact without any focal sensory/motor deficits. Cranial nerves: II-XII intact, grossly non-focal.  Psychiatric: Alert and oriented, thought content appropriate, normal insight  Capillary Refill: Brisk,< 3 seconds   Peripheral Pulses: +2 palpable, equal bilaterally        Consults:     IP CONSULT TO HOSPITALIST  PHARMACY TO DOSE VANCOMYCIN  IP CONSULT TO INFECTIOUS DISEASES  IP CONSULT TO DIETITIAN  IP CONSULT TO VASCULAR SURGERY  IP CONSULT TO HOME CARE NEEDS    Significant Diagnostic Studies:     XR TIBIA FIBULA LEFT (2 VIEWS)   Final Result   Postsurgical changes noted bilaterally without significant change from the   prior study. No definite acute fracture or dislocation. No definite   periosteal reaction. There is persistent clinical concern for osteomyelitis MRI would be the test   of choice for further evaluation. XR TIBIA FIBULA RIGHT (2 VIEWS)   Final Result   Postsurgical changes noted bilaterally without significant change from the   prior study.   No definite acute fracture or dislocation. No definite   periosteal reaction. There is persistent clinical concern for osteomyelitis MRI would be the test   of choice for further evaluation. Disposition:  Home with HH     Condition at Discharge: Stable    Discharge Instructions/Follow-up:  PCP    Code Status:  Limited     Activity: activity as tolerated    Diet: diabetic diet      Labs: For convenience and continuity at follow-up the following most recent labs are provided:      CBC:    Lab Results   Component Value Date    WBC 5.9 01/06/2020    HGB 8.6 01/06/2020    HCT 25.2 01/06/2020     01/06/2020       Renal:    Lab Results   Component Value Date     01/06/2020    K 3.4 01/06/2020     01/06/2020    CO2 25 01/06/2020    BUN 25 01/06/2020    CREATININE 1.4 01/06/2020    CALCIUM 8.3 01/06/2020    PHOS 2.5 06/13/2019       Discharge Medications:     Current Discharge Medication List           Details   doxycycline hyclate (VIBRAMYCIN) 100 MG capsule Take 1 capsule by mouth 2 times daily for 5 days  Qty: 10 capsule, Refills: 0              Details   ibuprofen (ADVIL;MOTRIN) 600 MG tablet Take 600 mg by mouth 2 times daily      ranitidine (ZANTAC) 150 MG tablet Take 150 mg by mouth daily      furosemide (LASIX) 20 MG tablet Take 20 mg by mouth daily      traMADol (ULTRAM) 50 MG tablet Take 50 mg by mouth every 8 hours as needed for Pain.       ferrous sulfate 325 (65 Fe) MG tablet Take 325 mg by mouth 2 times daily       insulin lispro (HUMALOG) 100 UNIT/ML injection vial Inject 5 Units into the skin 3 times daily (with meals)  Qty: 1 vial, Refills: 0      atorvastatin (LIPITOR) 10 MG tablet Take 1 tablet by mouth daily  Qty: 30 tablet, Refills: 0      metoprolol tartrate (LOPRESSOR) 25 MG tablet Take 1 tablet by mouth 2 times daily  Qty: 60 tablet, Refills: 0      Biotin (BIOTIN 5000) 5 MG CAPS Take 1 tablet by mouth daily      lipase-protease-amylase (CREON) 67038 units delayed release capsule Take 12,000 Units by mouth 3 times daily (with meals)       gabapentin (NEURONTIN) 300 MG capsule Take 300 mg by mouth 3 times daily. isosorbide mononitrate (IMDUR) 30 MG extended release tablet Take 30 mg by mouth daily      venlafaxine (EFFEXOR) 75 MG tablet Take 150 mg by mouth 2 times daily       cetirizine (ZYRTEC) 10 MG tablet Take 10 mg by mouth daily      vitamin D3 (CHOLECALCIFEROL) 400 units TABS tablet Take 400 Units by mouth daily              Future Appointments   Date Time Provider Betty Gonsalez   1/9/2020  2:30 PM Kingston Roman MD [address] [place] [address]       Time Spent on discharge is more than 30 minutes in the examination, evaluation, counseling and review of medications and discharge plan. Signed:    Uma Gallegos MD   1/6/2020      Thank you [address] [place] [address] for the opportunity to be involved in this patient's care. If you have any questions or concerns please feel free to contact me at [phone].

## 2020-01-06 NOTE — PROGRESS NOTES
Infectious Disease Follow up Notes  Admit Date: 1/3/2020  Hospital Day: 4    Antibiotics :   IV Vancomycin  IV Cefepime      CHIEF COMPLAINT:     Lower leg cellulitis  Open wounds  CKD    Subjective interval History :  66 y.o. woman with a history of recurrent cellulitis nonhealing lower extremity open ulcer and wound admitted with worsening of leg  wounds with drainage and associated ongoing cellulitis. She has a history of poor mobility atrial fibrillation, congestive heart failure, diabetes, CKD, MDRO infection colonization. Given the ongoing extensive cellulitis we are consulted for antibiotic recommendations. Bi lateral leg ulcer and cellulitis better after IV abx and local wound care and pain decreased no fevers no chills SPC Is working well.         Past Medical History:    Past Medical History:   Diagnosis Date    Anemia     Atrial fibrillation (HCC)     CAD (coronary artery disease)     Chronic back pain     CKD (chronic kidney disease), stage III (HCC)     Diabetes mellitus ([address]     Diastolic CHF ([address]     Hyperlipidemia     Hypertension     MI (myocardial infarction) ([address]     MRSA (methicillin resistant staph aureus) culture positive 04/13/2018    leg ulcer    MRSA (methicillin resistant staph aureus) culture positive 06/08/2019    leg    MRSA colonization 10/16/2018    Obesity (BMI 30-39. 9)     Osteoarthritis     Pancreatitis     Sepsis ([address]     Stasis ulcer ([address]     bilateral lower extremities     Suprapubic catheter ([address]        Past Surgical History:    Past Surgical History:   Procedure Laterality Date    APPENDECTOMY      BACK SURGERY      x2    CATARACT REMOVAL      bilateral     CHOLECYSTECTOMY      COLONOSCOPY      EYE SURGERY      FRACTURE SURGERY      Left ankle    HYSTERECTOMY      INSET/CHANGE LOMELI CATHETER - COMPLICATED  07/1520    suprapubic cath    JOINT REPLACEMENT Order Status: Completed Specimen: Blood Updated: 01/04/20 2315    Blood Culture, Routine No Growth to date.  Any change in status will be called. Narrative:     ORDER#: [id number]                          ORDERED BY: NAEEM Blas  SOURCE: Blood                              COLLECTED:  01/03/20 21:56  ANTIBIOTICS AT REGIS. :                      RECEIVED :  01/03/20 22:02  If child <=2 yrs old please draw pediatric bottle. ~Blood Culture #1  Performed at:  [facility]  [address] [place], [place] [address]   Phone [phone]   Culture Blood #2 [[id number]] Collected: 01/03/20 2156   Order Status: Completed Specimen: Blood Updated: 01/04/20 2315    Culture, Blood 2 No Growth to date.  Any change in status will be called. Narrative:     ORDER#: [id number]                          ORDERED BY: NAEEM Blas  SOURCE: Blood                              COLLECTED:  01/03/20 21:56  ANTIBIOTICS AT REGIS. :                      RECEIVED :  01/03/20 22:02  If child <=2 yrs old please draw pediatric bottle. ~Blood Culture #2  Performed at:  [facility]  [address] [place], [place] [address]   Phone [phone]       Susceptibility      Pseudomonas aeruginosa (1)              Antibiotic Interpretation NEVILLE Status     cefepime Sensitive 4 mcg/mL       ciprofloxacin Sensitive <=1 mcg/mL       gentamicin Sensitive <=4 mcg/mL       meropenem Sensitive <=1 mcg/mL       piperacillin-tazobactam Sensitive <=16 mcg/mL       tobramycin Sensitive <=4 mcg/mL       Staph aureus mrsa (2)              Antibiotic Interpretation NEVILLE Status     ceFAZolin Resistant >16 mcg/mL       clindamycin Resistant >2 mcg/mL       erythromycin Resistant >4 mcg/mL       oxacillin Resistant >2 mcg/mL       tetracycline Sensitive <=0.5 mcg/mL       trimethoprim-sulfamethoxazole Resistant >2/38 mcg/mL       vancomycin Sensitive 1 mcg/mL            IMAGING:    XR TIBIA FIBULA LEFT (2 VIEWS) Final Result   Postsurgical changes noted bilaterally without significant change from the   prior study. No definite acute fracture or dislocation. No definite   periosteal reaction. There is persistent clinical concern for osteomyelitis MRI would be the test   of choice for further evaluation. XR TIBIA FIBULA RIGHT (2 VIEWS)   Final Result   Postsurgical changes noted bilaterally without significant change from the   prior study. No definite acute fracture or dislocation. No definite   periosteal reaction. There is persistent clinical concern for osteomyelitis MRI would be the test   of choice for further evaluation.                All the pertinent images and reports for the current Hospitalization were reviewed by me     Scheduled Meds:   vancomycin  750 mg Intravenous Once    [Held by provider] enoxaparin  30 mg Subcutaneous Daily    cefepime  1 g Intravenous Q24H    venlafaxine  150 mg Oral BID    famotidine  20 mg Oral Daily    metoprolol tartrate  25 mg Oral BID    lipase-protease-amylase  1 capsule Oral TID WC    isosorbide mononitrate  30 mg Oral Daily    gabapentin  300 mg Oral TID    cetirizine  5 mg Oral Daily    atorvastatin  10 mg Oral Daily    sodium chloride flush  10 mL Intravenous 2 times per day    insulin lispro  0-12 Units Subcutaneous TID WC    insulin lispro  0-6 Units Subcutaneous Nightly    vancomycin (VANCOCIN) intermittent dosing (placeholder)   Other RX Placeholder    insulin glargine  25 Units Subcutaneous Nightly       Continuous Infusions:   sodium chloride 75 mL/hr at 01/06/20 0147    dextrose         PRN Meds:  traMADol, sodium chloride flush, magnesium hydroxide, ondansetron, glucose, dextrose, glucagon (rDNA), dextrose, acetaminophen, ipratropium-albuterol      Assessment:     Patient Active Problem List   Diagnosis    Acute on chronic renal insufficiency    FTT (failure to thrive) in adult    Type 1 diabetes mellitus with hyperglycemia with any questions or concerns.     Noris Beltran MD  Infectious Disease  [facility] Physician  Phone: [phone]   Fax : [phone]

## 2020-01-06 NOTE — PROGRESS NOTES
Clinical Pharmacy Note  Vancomycin Consult    Esperanza Estrada is a 66 y.o. female ordered Vancomycin for cellulitis; consult received from Dr. Alecia Primrose to manage therapy. Also receiving .     Patient Active Problem List   Diagnosis    Acute on chronic renal insufficiency    FTT (failure to thrive) in adult    Type 1 diabetes mellitus with hyperglycemia (HCC)    Chronic venous hypertension (idiopathic) with ulcer and inflammation of bilateral lower extremity (HCC)    DMII (diabetes mellitus, type 2) (HCC)    Cellulitis of left lower extremity    Protein-calorie malnutrition, moderate (HCC)    MRSA colonization    Chronic diastolic heart failure ([facility]    Coronary artery disease involving native coronary artery of native heart without angina pectoris    Aortic atherosclerosis (HCC)    Essential hypertension    Cellulitis, lower extremities bilaterally    MRSA nasal colonization    Normocytic anemia    Altered mental status    Elevated lactic acid level    Uncontrolled diabetes mellitus (HCC)    Sepsis (HCC)    Stasis ulcer ([facility] [address] [facility]    Bilateral lower leg cellulitis    Chronic renal failure, stage 3 (moderate) (HCC)    Hyperlipidemia    Hypoglycemia    Morbid obesity due to excess calories (HCC)       Allergies:  Sulfa antibiotics     Temp max:  Temp (24hrs), Av.9 °F (36.6 °C), Min:97.6 °F (36.4 °C), Max:98.1 °F (36.7 °C)      Recent Labs     20  1844 20  0556 20  0512   WBC 8.9 13.7* 7.1       Recent Labs     20  1844 20  0556 20  0512   BUN 20 21* 30*   CREATININE 1.5* 1.4* 2.1*         Intake/Output Summary (Last 24 hours) at 2020 1917  Last data filed at 2020 0542  Gross per 24 hour   Intake 540 ml   Output 700 ml   Net -160 ml       Culture Results:      Ht Readings from Last 1 Encounters:   20 4' 11\" (1.499 m)        Wt Readings from Last 1 Encounters:   20 181 lb (82.1 kg)         CrCl cannot be calculated (Unknown ideal

## 2020-01-06 NOTE — PROGRESS NOTES
Patient up to chair eating dinner. No insulin coverage needed for dinner for blood sugar of 131. Suprapubic catheter in place. Brasher bag changed to collect urine sample. Chair alarm on. Call light and belongings within reach. Patient denies any further needs at this time.

## 2020-01-06 NOTE — CARE COORDINATION
Sw aware of patient's dc hoe today with Angie N Maria A Odom. Sw confirmed this with patient. She reported that she lives with her supportive daughter. Sw spoke with daughter, Gwendolyn Hilton, who confirmed above. She will  patient later today. Rn aware. [facility] liaison aware of dc.      Electronically signed by Reyes Breslow on 1/6/2020 at 11:49 AM

## 2020-01-06 NOTE — DISCHARGE INSTR - COC
Continuity of Care Form    Patient Name: Rose Jeffries   :  1945  MRN:  [id number]    Admit date:  1/3/2020  Discharge date:  20    Code Status Order: Limited   Advance Directives:   [facility] Documentation     Date/Time Healthcare Directive Type of Healthcare Directive Copy in [address] Agent's Name Healthcare Agent's Phone Number    [phone]  Yes, patient has an advance directive for healthcare treatment -- -- -- -- --          Admitting Physician:  Lethea Bernheim, MD  PCP: Shawna Cunningham    [facility]/3112-01  Discharging Unit Phone Number: [phone]    Emergency Contact:   Extended Emergency Contact Information  Primary Emergency Contact: Meliza Patel  Address: [address] Phone: [phone]  Relation: Child  Secondary Emergency Contact: [address] Phone: [phone]  Relation: Child    Past Surgical History:  Past Surgical History:   Procedure Laterality Date    APPENDECTOMY      BACK SURGERY      x2    CATARACT REMOVAL      bilateral     CHOLECYSTECTOMY      COLONOSCOPY      EYE SURGERY      FRACTURE SURGERY      Left ankle    HYSTERECTOMY      INSET/CHANGE LOMELI CATHETER - COMPLICATED      suprapubic cath    JOINT REPLACEMENT      Right knee    KNEE SURGERY         Immunization History:   Immunization History   Administered Date(s) Administered    Influenza Vaccine, unspecified formulation 10/08/2010, 08/15/2014, 2017, 2018    Influenza, High Dose (Fluzone 65 yrs and older) 2018    Influenza, Quadv, IM, (6 mo and older Fluzone, Flulaval, Fluarix and 3 yrs and older Afluria) 2017    Pneumococcal Conjugate 13-valent (Kkybixv30) 10/15/2018    Pneumococcal Polysaccharide ([id number]) 08/15/2016       Active Problems:  Patient Active Problem List   Diagnosis Code    Acute on chronic renal insufficiency N28.9, N18.9    FTT (failure to thrive) in adult R62.7    Type 1 diabetes mellitus with hyperglycemia (HCC) E10.65    Chronic venous hypertension (idiopathic) with ulcer and inflammation of bilateral lower extremity ([facility] [address] I87.333, L97.919, L97.929    DMII (diabetes mellitus, type 2) ([facility]) E11.9    Cellulitis of left lower extremity L03. 116    Protein-calorie malnutrition, moderate ([facility]) E44.0    MRSA colonization Z22.322    Chronic diastolic heart failure ([facility]) I50.32    Coronary artery disease involving native coronary artery of native heart without angina pectoris I25.10    Aortic atherosclerosis ([facility]) I70.0    Essential hypertension I10    Cellulitis, lower extremities bilaterally L03.90    MRSA nasal colonization Z22.322    Normocytic anemia D64.9    Altered mental status R41.82    Elevated lactic acid level R79.89    Uncontrolled diabetes mellitus ([facility]) E11.65    Sepsis ([facility]) A41.9    Stasis ulcer ([facility]) I83.009, L97.909    Bilateral lower leg cellulitis L03.116, L03.115    Chronic renal failure, stage 3 (moderate) ([facility]) N18.3    Hyperlipidemia E78.5    Hypoglycemia E16.2    Morbid obesity due to excess calories ([facility]) E66.01       Isolation/Infection:   Isolation          Contact        Patient Infection Status     Infection Onset Added Last Indicated Last Indicated By Review Planned Expiration Resolved Resolved By    MRSA  04/16/18 06/08/19 Wound Culture        Nares colonization 10/15/2018          Nurse Assessment:  Last Vital Signs: BP (!) 145/48   Pulse 65   Temp 98.2 °F (36.8 °C) (Oral)   Resp 18   Ht 4' 11\" (1.499 m)   Wt 183 lb 13.8 oz (83.4 kg)   SpO2 99%   BMI 37.14 kg/m²     Last documented pain score (0-10 scale): Pain Level: 0  Last Weight:   Wt Readings from Last 1 Encounters:   01/06/20 183 lb 13.8 oz (83.4 kg)     Mental Status:  disoriented and alert    IV Access:  - MAR);Other (comment) 12/19/2019  5:21 PM   Wound Length (cm) 1.8 cm 12/19/2019  2:44 PM   Wound Width (cm) 1.6 cm 12/19/2019  2:44 PM   Wound Depth (cm) 0.1 cm 12/19/2019  2:44 PM   Wound Surface Area (cm^2) 2.88 cm^2 12/19/2019  2:44 PM   Change in Wound Size % (l*w) 81.05 12/19/2019  2:44 PM   Wound Volume (cm^3) 0.29 cm^3 12/19/2019  2:44 PM   Wound Healing % 81 12/19/2019  2:44 PM   Post-Procedure Length (cm) 2 cm 12/19/2019  3:51 PM   Post-Procedure Width (cm) 1.8 cm 12/19/2019  3:51 PM   Post-Procedure Depth (cm) 0.1 cm 12/19/2019  3:51 PM   Post-Procedure Surface Area (cm^2) 3.6 cm^2 12/19/2019  3:51 PM   Post-Procedure Volume (cm^3) 0.36 cm^3 12/19/2019  3:51 PM   Wound Assessment Granulation tissue;Slough 12/19/2019  2:44 PM   Drainage Amount Small 12/19/2019  2:44 PM   Drainage Description Serosanguinous 12/19/2019  2:44 PM   Odor None 12/19/2019  2:44 PM   Margins Attached edges 12/19/2019  2:44 PM   Marilynn-wound Assessment Dry;Pink 12/19/2019  2:44 PM   Non-staged Wound Description Full thickness 12/19/2019  2:44 PM   Red%Wound Bed 95 12/19/2019  2:44 PM   Yellow%Wound Bed 5 12/19/2019  2:44 PM   Number of days: 136       Wound 08/22/19 Left; Anterior; Lower; Lateral #9 - left anterior lower leg - converted from wound #2 on 8/22/2019 (Active)   Wound Image   12/5/2019  3:07 PM   Dressing Status Old drainage 12/19/2019  2:44 PM   Dressing Changed Changed/New 12/19/2019  5:21 PM   Dressing/Treatment Alginate with Ag;Pharmaceutical agent (see MAR); Other (comment) 12/19/2019  5:21 PM   Wound Length (cm) 1.4 cm 12/19/2019  2:44 PM   Wound Width (cm) 2 cm 12/19/2019  2:44 PM   Wound Depth (cm) 0.1 cm 12/19/2019  2:44 PM   Wound Surface Area (cm^2) 2.8 cm^2 12/19/2019  2:44 PM   Change in Wound Size % (l*w) 79.64 12/19/2019  2:44 PM   Wound Volume (cm^3) 0.28 cm^3 12/19/2019  2:44 PM   Wound Healing % 80 12/19/2019  2:44 PM   Post-Procedure Length (cm) 1.6 cm 12/19/2019  3:54 PM   Post-Procedure Width (cm) 3.3 cm at 1/5/2020 2154  Gross per 24 hour   Intake 120 ml   Output 1600 ml   Net -1480 ml     I/O last 3 completed shifts: In: 120 [P.O.:120]  Out: 1600 [Urine:1600]    Safety Concerns: At Risk for Falls    Impairments/Disabilities:      Vision    Nutrition Therapy:  Current Nutrition Therapy:   - Oral Diet:  Carb Control 4 carbs/meal (1800kcals/day)    Routes of Feeding: Oral  Liquids: No Restrictions  Daily Fluid Restriction: no  Last Modified Barium Swallow with Video (Video Swallowing Test): not done    Treatments at the Time of Hospital Discharge:   Respiratory Treatments: N/A  Oxygen Therapy:  is on oxygen at 2 L/min per nasal cannula.   Ventilator:    - No ventilator support    Rehab Therapies: VH-ZP-OH-HHA  Weight Bearing Status/Restrictions: No weight bearing restirctions  Other Medical Equipment (for information only, NOT a DME order):  N/A  Other Treatments: [facility]: LEVEL [address] to establish plan of care for patient over 60 day period  Nursing:  Initial home SN evaluation visit to occur within 24-48 hours of hospital discharge  [facility] nursing visits daily, then QOD with progression as warranted for:  1)  medication management  2)  VS and clinical assessment  3)  S&S chronic disease exacerbation education + when to contact MD/NP  4)  care coordination  Medication Reconciliation during 1st SN visit  Nurse telephone visit on the days that visit is not being made in person for first 30 days                               PT/OT/Speech   Evaluations in home within 24-48 hours of hospital discharge to include DME and home safety   Frontload therapy 5 days, then 3x a week   OT evaluation for [address] needs for personal care    Palliative Care referral within 5 days of hospital discharge    evaluation within 24-48 hours of hospital discharge to evaluate resources & insurance for potential  [place], [place], LTC, and Medicaid options   Dietician evaluation within 5 days of hospital discharge   PCP visit within 3 days of hospital discharge, followed by PCP visit @ 10 days, and 21 days   TeleHealth ([facility]) if patient agreeable and qualifies. Patient's personal belongings (please select all that are sent with patient):  Glasses    RN SIGNATURE:  Electronically signed by Deb Maher RN on 1/6/20 at 12:14 PM    CASE MANAGEMENT/SOCIAL WORK SECTION    Inpatient Status Date: 1/3/2020    Readmission Risk Assessment Score:   13    Discharging to Facility/ Agency   · Name:  [facility]    · Address: [address] [place] [address], [address] [place] [address]., [place] [address]  · Phone: [phone]  · Fax: [phone]    / signature: Electronically signed by Diana Tinsley on 1/6/20 at 11:46 AM    PHYSICIAN SECTION    Prognosis: Fair    Condition at Discharge: Stable    Rehab Potential (if transferring to Rehab): Fair    Recommended Labs or Other Treatments After Discharge: PT, OT, HHA, VN    Physician Certification: I certify the above information and transfer of Yoni Plunkett  is necessary for the continuing treatment of the diagnosis listed and that she requires Home Care for less 30 days.      Update Admission H&P: No change in H&P    PHYSICIAN SIGNATURE:  Electronically signed by Brittni Penaloza MD on 1/6/20 at 10:29 AM

## 2020-01-06 NOTE — PLAN OF CARE
and sheering, and change dressings as needed. Will preform skin assessment every shift.    Electronically signed by Frank Trujillo RN on 1/6/2020 at 3:28 PM

## 2020-01-06 NOTE — PLAN OF CARE
Problem: Falls - Risk of:  Goal: Will remain free from falls  Description  Will remain free from falls  Outcome: Ongoing  Note:   Patient educated on fall prevention. Call light is within reach, bed locked in lowest position, personal items within reach, and bed alarm is on. Will round on patient per unit guidelines. Goal: Absence of physical injury  Description  Absence of physical injury  Outcome: Ongoing  Note:   Pt assessed for fall risk and fall precautions put into place. Bed in lowest position and wheels locked, call light within reach. Nonskid footwear in place. Patient educated on appropriate method of transfer and to call for assistance. Problem: Body Temperature - Imbalanced:  Goal: Ability to maintain a body temperature in the normal range will improve  Description  Ability to maintain a body temperature in the normal range will improve  Outcome: Ongoing  Note:   Patient will maintain a body temperature within the normal range. Problem: Mobility - Impaired:  Goal: Mobility will improve to maximum level  Description  Mobility will improve to maximum level  Outcome: Ongoing  Note:   Early and frequent ambulqtion encouraged. Educated patient on importance of early ambulation. Patient assisted with ambulation. Will continue to monitor. Problem: Pain:  Goal: Pain level will decrease  Description  Pain level will decrease  Outcome: Ongoing  Note:   Educated patient on pain management. Will assess patients pain level per unit protocol, and provide pain management measures as needed. Goal: Control of acute pain  Description  Control of acute pain  Outcome: Ongoing  Note:   Patient educated on acute pain. Taught patient to use call light to ask for pain medication. PRN pain medication given for acute pain. Will continue to monitor pain per unit protocol.      Goal: Control of chronic pain  Description  Control of chronic pain  Outcome: Ongoing  Note:   Patient educated on chronic pain. Taught patient to use call light to ask for pain medication. Will continue to monitor pain per unit protocol. Problem: Skin Integrity - Impaired:  Goal: Will show no infection signs and symptoms  Description  Will show no infection signs and symptoms  Outcome: Ongoing  Note:   No increased swelling, redness, or warmth noted. Educated patient on signs and symptoms of infection. Will continue to monitor. Goal: Absence of new skin breakdown  Description  Absence of new skin breakdown  Outcome: Ongoing  Note:   Will monitor skin and mucous members. Will turn patient every 2 hours, monitor for friction and sheering, and change dressings as needed. Will preform skin assessment every shift.

## 2020-01-07 ENCOUNTER — APPOINTMENT (OUTPATIENT)
Dept: GENERAL RADIOLOGY | Age: 75
DRG: 638 | End: 2020-01-07
Payer: MEDICARE

## 2020-01-07 LAB
ANION GAP SERPL CALCULATED.3IONS-SCNC: 17 MMOL/L (ref 3–16)
BASOPHILS ABSOLUTE: 0 K/UL (ref 0–0.2)
BASOPHILS RELATIVE PERCENT: 0.5 %
BLOOD CULTURE, ROUTINE: NORMAL
BUN BLDV-MCNC: 17 MG/DL (ref 7–20)
CALCIUM SERPL-MCNC: 8.4 MG/DL (ref 8.3–10.6)
CHLORIDE BLD-SCNC: 102 MMOL/L (ref 99–110)
CO2: 21 MMOL/L (ref 21–32)
CREAT SERPL-MCNC: 1 MG/DL (ref 0.6–1.2)
CULTURE, BLOOD 2: NORMAL
EOSINOPHILS ABSOLUTE: 0.3 K/UL (ref 0–0.6)
EOSINOPHILS RELATIVE PERCENT: 4.1 %
GFR AFRICAN AMERICAN: >60
GFR NON-AFRICAN AMERICAN: 54
GLUCOSE BLD-MCNC: 159 MG/DL (ref 70–99)
GLUCOSE BLD-MCNC: 163 MG/DL (ref 70–99)
GLUCOSE BLD-MCNC: 284 MG/DL (ref 70–99)
GLUCOSE BLD-MCNC: 296 MG/DL (ref 70–99)
GLUCOSE BLD-MCNC: 348 MG/DL (ref 70–99)
HCT VFR BLD CALC: 25.7 % (ref 36–48)
HEMOGLOBIN: 8.8 G/DL (ref 12–16)
LYMPHOCYTES ABSOLUTE: 0.8 K/UL (ref 1–5.1)
LYMPHOCYTES RELATIVE PERCENT: 11.4 %
MAGNESIUM: 2.1 MG/DL (ref 1.8–2.4)
MCH RBC QN AUTO: 33.6 PG (ref 26–34)
MCHC RBC AUTO-ENTMCNC: 34.2 G/DL (ref 31–36)
MCV RBC AUTO: 98.4 FL (ref 80–100)
MONOCYTES ABSOLUTE: 0.4 K/UL (ref 0–1.3)
MONOCYTES RELATIVE PERCENT: 5.8 %
NEUTROPHILS ABSOLUTE: 5.5 K/UL (ref 1.7–7.7)
NEUTROPHILS RELATIVE PERCENT: 78.2 %
PDW BLD-RTO: 13.1 % (ref 12.4–15.4)
PERFORMED ON: ABNORMAL
PLATELET # BLD: 207 K/UL (ref 135–450)
PMV BLD AUTO: 8.6 FL (ref 5–10.5)
POTASSIUM REFLEX MAGNESIUM: 3.4 MMOL/L (ref 3.5–5.1)
RBC # BLD: 2.62 M/UL (ref 4–5.2)
SODIUM BLD-SCNC: 140 MMOL/L (ref 136–145)
VANCOMYCIN RANDOM: 11.8 UG/ML
WBC # BLD: 7.1 K/UL (ref 4–11)

## 2020-01-07 PROCEDURE — 85025 COMPLETE CBC W/AUTO DIFF WBC: CPT

## 2020-01-07 PROCEDURE — 36415 COLL VENOUS BLD VENIPUNCTURE: CPT

## 2020-01-07 PROCEDURE — 6360000002 HC RX W HCPCS

## 2020-01-07 PROCEDURE — APPNB30 APP NON BILLABLE TIME 0-30 MINS: Performed by: NURSE PRACTITIONER

## 2020-01-07 PROCEDURE — 97530 THERAPEUTIC ACTIVITIES: CPT

## 2020-01-07 PROCEDURE — 2580000003 HC RX 258

## 2020-01-07 PROCEDURE — 97162 PT EVAL MOD COMPLEX 30 MIN: CPT

## 2020-01-07 PROCEDURE — 6360000002 HC RX W HCPCS: Performed by: INTERNAL MEDICINE

## 2020-01-07 PROCEDURE — 80048 BASIC METABOLIC PNL TOTAL CA: CPT

## 2020-01-07 PROCEDURE — 80202 ASSAY OF VANCOMYCIN: CPT

## 2020-01-07 PROCEDURE — APPSS30 APP SPLIT SHARED TIME 16-30 MINUTES: Performed by: NURSE PRACTITIONER

## 2020-01-07 PROCEDURE — 6370000000 HC RX 637 (ALT 250 FOR IP): Performed by: INTERNAL MEDICINE

## 2020-01-07 PROCEDURE — 2580000003 HC RX 258: Performed by: INTERNAL MEDICINE

## 2020-01-07 PROCEDURE — 1200000000 HC SEMI PRIVATE

## 2020-01-07 PROCEDURE — 71045 X-RAY EXAM CHEST 1 VIEW: CPT

## 2020-01-07 PROCEDURE — 97166 OT EVAL MOD COMPLEX 45 MIN: CPT

## 2020-01-07 PROCEDURE — 83735 ASSAY OF MAGNESIUM: CPT

## 2020-01-07 RX ADMIN — DOCUSATE SODIUM 100 MG: 100 CAPSULE, LIQUID FILLED ORAL at 09:04

## 2020-01-07 RX ADMIN — CEFEPIME HYDROCHLORIDE 1 G: 1 INJECTION, POWDER, FOR SOLUTION INTRAMUSCULAR; INTRAVENOUS at 15:01

## 2020-01-07 RX ADMIN — PANCRELIPASE 1 CAPSULE: 60000; 12000; 38000 CAPSULE, DELAYED RELEASE PELLETS ORAL at 12:04

## 2020-01-07 RX ADMIN — GABAPENTIN 300 MG: 300 CAPSULE ORAL at 09:04

## 2020-01-07 RX ADMIN — METOPROLOL TARTRATE 25 MG: 25 TABLET ORAL at 21:02

## 2020-01-07 RX ADMIN — VANCOMYCIN HYDROCHLORIDE 1000 MG: 1 INJECTION, POWDER, LYOPHILIZED, FOR SOLUTION INTRAVENOUS at 09:02

## 2020-01-07 RX ADMIN — DOCUSATE SODIUM 100 MG: 100 CAPSULE, LIQUID FILLED ORAL at 21:02

## 2020-01-07 RX ADMIN — POLYETHYLENE GLYCOL 3350 17 G: 17 POWDER, FOR SOLUTION ORAL at 09:05

## 2020-01-07 RX ADMIN — VENLAFAXINE 37.5 MG: 37.5 TABLET ORAL at 09:03

## 2020-01-07 RX ADMIN — ISOSORBIDE MONONITRATE 30 MG: 30 TABLET, EXTENDED RELEASE ORAL at 09:03

## 2020-01-07 RX ADMIN — INSULIN LISPRO 3 UNITS: 100 INJECTION, SOLUTION INTRAVENOUS; SUBCUTANEOUS at 21:03

## 2020-01-07 RX ADMIN — CEFEPIME HYDROCHLORIDE 1 G: 1 INJECTION, POWDER, FOR SOLUTION INTRAMUSCULAR; INTRAVENOUS at 02:30

## 2020-01-07 RX ADMIN — INSULIN LISPRO 2 UNITS: 100 INJECTION, SOLUTION INTRAVENOUS; SUBCUTANEOUS at 09:04

## 2020-01-07 RX ADMIN — GABAPENTIN 300 MG: 300 CAPSULE ORAL at 21:01

## 2020-01-07 RX ADMIN — FAMOTIDINE 20 MG: 20 TABLET, FILM COATED ORAL at 09:05

## 2020-01-07 RX ADMIN — INSULIN LISPRO 6 UNITS: 100 INJECTION, SOLUTION INTRAVENOUS; SUBCUTANEOUS at 12:04

## 2020-01-07 RX ADMIN — METOPROLOL TARTRATE 25 MG: 25 TABLET ORAL at 09:03

## 2020-01-07 RX ADMIN — SODIUM CHLORIDE, PRESERVATIVE FREE 10 ML: 5 INJECTION INTRAVENOUS at 21:12

## 2020-01-07 RX ADMIN — ATORVASTATIN CALCIUM 10 MG: 10 TABLET, FILM COATED ORAL at 09:05

## 2020-01-07 RX ADMIN — GABAPENTIN 300 MG: 300 CAPSULE ORAL at 14:46

## 2020-01-07 RX ADMIN — SODIUM CHLORIDE: 9 INJECTION, SOLUTION INTRAVENOUS at 09:12

## 2020-01-07 RX ADMIN — CETIRIZINE HYDROCHLORIDE 5 MG: 10 TABLET, FILM COATED ORAL at 09:05

## 2020-01-07 RX ADMIN — INSULIN GLARGINE 25 UNITS: 100 INJECTION, SOLUTION SUBCUTANEOUS at 21:02

## 2020-01-07 RX ADMIN — VENLAFAXINE 150 MG: 37.5 TABLET ORAL at 21:01

## 2020-01-07 RX ADMIN — INSULIN LISPRO 8 UNITS: 100 INJECTION, SOLUTION INTRAVENOUS; SUBCUTANEOUS at 17:16

## 2020-01-07 RX ADMIN — PANCRELIPASE 1 CAPSULE: 60000; 12000; 38000 CAPSULE, DELAYED RELEASE PELLETS ORAL at 17:16

## 2020-01-07 RX ADMIN — PANCRELIPASE 1 CAPSULE: 60000; 12000; 38000 CAPSULE, DELAYED RELEASE PELLETS ORAL at 09:03

## 2020-01-07 ASSESSMENT — PAIN SCALES - GENERAL: PAINLEVEL_OUTOF10: 0

## 2020-01-07 NOTE — CARE COORDINATION
Sw aware of therapy recommendations for ongoing therapy prior to returning home. Sw spoke with patient and Iman Vivar present in room regarding above. Iman Vivar reported that they do not want snf for patient as patient does not do well in snf setting. However, if patient is eligible, they would be interested in acute rehab. Sw provided acute rehab list to patient and Iman Vivar. Sw explained that Md would have to order acute rehab and rehab team would have to review for admission. The Plan for Transition of Care is related to the following treatment goals: rehab then home    The Patient and/or patient representative Tlparker Vivar was provided with a choice of provider and agrees   with the discharge plan. [x] Yes [] No    Freedom of choice list was provided with basic dialogue that supports the patient's individualized plan of care/goals, treatment preferences and shares the quality data associated with the providers. [x] Yes [] No    Electronically signed by Anay Galindo on 1/7/2020 at 2:39 PM    2:52 PM  Tina aware of acute rehab order; family prefer [facility]. Sw made referral to Jenna Thacker on ARU.     Electronically signed by Anay Galindo on 1/7/2020 at 2:52 PM

## 2020-01-07 NOTE — DISCHARGE SUMMARY
Hospital Medicine Discharge Summary    Patient ID: Brandan Yao      Patient's PCP: Richard Doan    Admit Date: 1/3/2020     Discharge Date:   1/6/20    Admitting Physician: Nina Johnston MD     Discharge Physician: Stephanie Rhodes MD     Discharge Diagnoses: Active Hospital Problems    Diagnosis Date Noted    Chronic renal failure, stage 3 (moderate) ([facility] [address] [N18.3] 01/04/2020    Hyperlipidemia [E78.5] 01/04/2020    Hypoglycemia [E16.2] 01/04/2020    Morbid obesity due to excess calories ([address] [E66.01] 01/04/2020    Bilateral lower leg cellulitis [L03.116, L03.115] 01/03/2020    Chronic diastolic heart failure (HCC) [I50.32]     Essential hypertension [I10]     DMII (diabetes mellitus, type 2) ([facility] [address] [E11.9] 04/13/2018    Protein-calorie malnutrition, moderate ([facility] [address] [E44.0] 04/13/2018       The patient was seen and examined on day of discharge and this discharge summary is in conjunction with any daily progress note from day of discharge. Hospital Course:   Patient is a 78-year-old woman with a history of hypertension, hyperlipidemia, diabetes mellitus, chronic diastolic congestive heart failure, chronic renal failure and ongoing issues with her lower extremities including recurrent cellulitis and chronic venous hypertension with inflammation. she was initially brought to the emergency room for evaluation of hypoglycemia. She wears an insulin pump and reported that she had been too busy over the past few days to eat adequately. She was given oral glucose by EMS and her glucose at time of arrival in the emergency room was 57. She did eat after arrival in her glucose improved. During the course of her evaluation she was found to have bright red erythema and areas denuded of skin in her bilateral lower extremities. She is being admitted for evaluation and treatment of bilateral lower extremity cellulitis.  Associated signs and symptoms do not include fever or chills, nausea, vomiting, abdominal pain, hemoptysis, hematochezia, diarrhea, constipation or urinary symptoms. Bilateral lower leg cellulitis - blood and wound cultures collected. ID consulted. Pt started on Vancomycin and Cefepime initially. Appears to be consistent with a Strep sp infection. ID may deescalate to [facility] after evaluation.   Patient states leg wounds are similar appearance to their chronic state. Vascular surgery consulted and stated that patient's wounds appear similar as previous. No acute process. DC today with abx per ID. Enterococcus from wound culture. Doxycycline twice daily for 5 days per infectious disease     Anemia. Chronic in nature but acute component with a drop of 2 g overnight. Iron and TIBC ordered. Fecal occult blood test ordered as well. Transfuse if less than 7. Stable at DC     Hypoglycemia - due to having active insulin pump and poor oral intake. Reevaluate appropriateness of insulin pump in this patient. For now, will start SSI and a CCD for Diabetes mellitus II. resolved after IV fluids and a meal.  Patient was placed on lantus and SSI in hospital. Resolved. Patient can continue her home regimen at [place]     Protein-calorie malnutrition, moderate (HCC) - Patient encouraged to eat a balanced diet, including protein-rich foods. Dietary has been consulted to assess nutritional needs     Chronic diastolic heart failure (HCC) - currently compensated; monitor I&Os and daily weights.      Chronic Renal Failure stage III - Renal function is at/near historical baseline and is stable; Monitor renal function and avoid Nephrotoxic agents as able. Creatinine slightly higher on day 3 of admission. Will give low amount of normal saline infusion and recheck tomorrow morning.     Essential (primary) hypertension - continue home meds and monitor blood pressure     Hyperlipidemia - No current evidence of Rhabdomyolysis or other adverse effects.  Continue statin therapy while in the hospital     Morbid obesity due to excess calories (Body mass index is 38.66 kg/m². ) - Complicating assessment and treatment. Placing patient at risk for multiple co-morbidities     Patient was originally supposed to discharge on January 6 but the patient's daughter said that her mentation was not at baseline. Urinalysis was ordered to rule out urinary tract infection and it was negative. The following morning she was also slightly confused but by the time I saw her in the late morning her mentation had improved. Patient was alert to name, location, year, president. She was also able to tell us that her daughter was having breast surgery that day for breast cancer and ended up only having a lumpectomy and reduction instead. The patient's daughter stated that she would like for a acute rehab evaluation to be performed. This was ordered and we are awaiting input. The patient is stable for discharge we are just awaiting whether she will be going to a rehab facility or to home with home health care. Exam:     BP (!) 154/68   Pulse 81   Temp 97.9 °F (36.6 °C) (Oral)   Resp 18   Ht 4' 11\" (1.499 m)   Wt 176 lb 12.9 oz (80.2 kg)   SpO2 93%   BMI 35.71 kg/m²     General appearance: No apparent distress, appears stated age and cooperative. HEENT: Pupils equal, round, and reactive to light. Conjunctivae/corneas clear. Neck: Supple, with full range of motion. No jugular venous distention. Trachea midline. Respiratory:  Normal respiratory effort. Clear to auscultation, bilaterally without Rales/Wheezes/Rhonchi. Cardiovascular: Regular rate and rhythm with normal S1/S2 without murmurs, rubs or gallops. Abdomen: Soft, non-tender, non-distended with normal bowel sounds. Musculoskeletal: Bilateral lower extremities wrapped   skin: Skin color, texture, turgor normal.  No rashes or lesions. Neurologic:  Neurovascularly intact without any focal sensory/motor deficits.  Cranial nerves: II-XII intact, grossly non-focal.  Psychiatric: Alert

## 2020-01-07 NOTE — PROGRESS NOTES
Occupational Therapy   Occupational Therapy Initial Assessment  Date: 2020   Patient Name: Dipesh Mitchell  MRN: [id number]     : 1945    Date of Service: 2020    Discharge Recommendations:  Patient would benefit from continued therapy after discharge, 3-5 sessions per week  OT Equipment Recommendations  Other: defer to [facility] scored a 9/24 on the AM-PAC ADL Inpatient form. Current research shows that an AM-PAC score of 17 or less is typically not associated with a discharge to the patient's home setting. Based on the patients AM-PAC score and their current ADL deficits, it is recommended that the patient have 3-5 sessions per week of Occupational Therapy at d/c to increase the patients independence. Assessment   Performance deficits / Impairments: Decreased functional mobility ; Decreased ADL status; Decreased strength;Decreased safe awareness;Decreased balance;Decreased endurance;Decreased cognition;Decreased high-level IADLs  Assessment: Pt is a 67 y/o female admitted with gama LE cellulitis and hypoglycemia. PTA pt lives at home with daughter and is usually oriented. Today, pt oriented to name only. Pt with minimal initiation for trnasfers, therefore required max A x 2 for bed mobility and to stand to trent becerra. Pt initially required max A for static sitting balance EOB but improved to CGA with time and improved positioning of hip. ANticipate pt will require max/total assist for ADLs at this time d/t decreased strength/balance and cognition. Pt would benefit from skilled therapy prior to returning home at discharge  Prognosis: Good  Decision Making: Medium Complexity  History: see above  Assistance / Modification: hernan  OT Education: OT Role;Plan of Care  REQUIRES OT FOLLOW UP: Yes  Activity Tolerance  Activity Tolerance: Treatment limited secondary to decreased cognition  Safety Devices  Safety Devices in place: Yes  Type of devices: Call light within reach; Left in chair;Chair alarm in place;Nurse notified           Patient Diagnosis(es): The primary encounter diagnosis was Cellulitis of lower extremity, unspecified laterality. A diagnosis of Hypoglycemia was also pertinent to this visit. has a past medical history of Anemia, Atrial fibrillation ([facility] [address] CAD (coronary artery disease), Chronic back pain, CKD (chronic kidney disease), stage III ([address] Diabetes mellitus ([address] Diastolic CHF ([facility] [address] Hyperlipidemia, Hypertension, MI (myocardial infarction) ([facility] [address] MRSA (methicillin resistant staph aureus) culture positive, MRSA (methicillin resistant staph aureus) culture positive, MRSA colonization, Obesity (BMI 30-39.9), Osteoarthritis, Pancreatitis, Sepsis ([facility] [address] Stasis ulcer ([facility] [address] and Suprapubic catheter ([facility] [address] has a past surgical history that includes Hysterectomy; knee surgery; back surgery; Cholecystectomy; Cataract removal; Appendectomy; Colonoscopy; eye surgery; fracture surgery; joint replacement; and Insert/Change Brasher Catheter - Complicat (54/2554). Restrictions  Restrictions/Precautions  Restrictions/Precautions: Fall Risk  Position Activity Restriction  Other position/activity restrictions: Suprapubic catheter    Subjective   General  Chart Reviewed: Yes  Patient assessed for rehabilitation services?: Yes  Additional Pertinent Hx: Per H&P: \"Patient is a 66-year-old woman with a history of hypertension, hyperlipidemia, diabetes mellitus, chronic diastolic congestive heart failure, chronic renal failure and ongoing issues with her lower extremities including recurrent cellulitis and chronic venous hypertension with inflammation. she was initially brought to the emergency room for evaluation of hypoglycemia. She wears an insulin pump and reported that she had been too busy over the past few days to eat adequately. She was given oral glucose by EMS and her glucose at time of arrival in the emergency room was 57.  She did eat after arrival in her glucose improved. During the course of her evaluation she was found to have bright red erythema and areas denuded of skin in her bilateral lower extremities. She is being admitted for evaluation and treatment of bilateral lower extremity cellulitis. Associated signs and symptoms do not include fever or chills, nausea, vomiting, abdominal pain, hemoptysis, hematochezia, diarrhea, constipation or urinary symptoms. \"   Referring Practitioner: Julieta Luna MD  Subjective  Subjective: Pt seen bedside and agreeable to therapy. Pt only oriented to name only. Daughter present at end of session and reports pt is usually very oriented. General Comment  Comments: Per RN ok for therapy    Social/Functional History  Social/Functional History  Lives With: Daughter  Type of Home: House  Home Layout: One level  Home Access: Stairs to enter with rails  Entrance Stairs - Number of Steps: 1-2  Bathroom Shower/Tub: Walk-in shower  Bathroom Toilet: (safety frame over toilet)  Bathroom Equipment: Shower chair  Bathroom Accessibility: Walker accessible  Home Equipment: Rolling walker, [employer], Lift chair, Reacher  ADL Assistance: (assist with showers, independent with sponge bathing)  Homemaking Assistance: Needs assistance  Ambulation Assistance: Independent(with RW)  Transfer Assistance: Independent  Additional Comments: social history obtained from previous admit in June.         Objective   Vision: Impaired(Blind R eye)  Vision Exceptions: Wears glasses at all times  Hearing: Within functional limits    Orientation  Overall Orientation Status: Impaired  Orientation Level: (name only)     Balance  Sitting Balance: (initially max A, improved to CGA with time )  Functional Mobility  Functional Mobility Comments: bed > chair via trent stedy;  stood from stedy with max A  ADL  Toileting: (Suprapubic catheter )  Additional Comments: anticipate pt will require max/total assist for ADLs based on balance and cognition observed        Bed mobility  Supine to Sit: 2 Person assistance;Maximum assistance  Transfers  Sit to stand: 2 Person assistance;Maximum assistance(bed > stedy;)  Stand to sit: 2 Person assistance;Maximum assistance  Transfer Comments: pt able to stand from trent stedy seat with max A     Cognition  Overall Cognitive Status: Exceptions  Arousal/Alertness: Delayed responses to stimuli  Following Commands: Inconsistently follows commands  Safety Judgement: Decreased awareness of need for safety  Problem Solving: Decreased awareness of errors  Insights: Decreased awareness of deficits  Initiation: Requires cues for some  Sequencing: Requires cues for some                    LUE Strength  LUE Strength Comment: unable to formally assess 2/2 dec cognition/poor initiation- functional for stedy use  RUE Strength  RUE Strength Comment: unable to formally assess 2/2 dec cognition/poor initiation- functional for stedy use        Plan   Plan  Times per week: 3-5  Current Treatment Recommendations: Strengthening, Safety Education & Training, ROM, Gait Training, Balance Training, Self-Care / ADL, Functional Mobility Training, Endurance Training, Equipment Evaluation, Education, & procurement, Cognitive Reorientation    AM-PAC Score  AM-PAC Inpatient Daily Activity Raw Score: 9 (01/07/20 0836)  AM-PAC Inpatient ADL T-Scale Score : 25.33 (01/07/20 0836)  ADL Inpatient CMS 0-100% Score: 79.59 (01/07/20 0836)  ADL Inpatient CMS G-Code Modifier : CL (01/07/20 0743)    Goals  Short term goals  Time Frame for Short term goals: Prior to DC:   Short term goal 1: Pt will complete ADL transfer with min A   Short term goal 2: Pt will tolerate standing > 3 min for functional task with suprvision  Short term goal 3: Pt will complete LB dressing with CGA  Short term goal 4: Pt will complete bed mobility with min A  Long term goals  Time Frame for Long term goals : stgs=ltgs  Patient Goals   Patient goals : no goal stated 2/2 impaired cognition       Therapy Time Individual Concurrent Group Co-treatment   Time In 0800         Time Out 0830         Minutes 30         Timed Code Treatment Minutes: 30 Minutes     This note to serve as OT d/c summary if pt is d/c-ed prior to next therapy session.     Nicole Monge, OTR/L

## 2020-01-07 NOTE — PLAN OF CARE
Problem: Falls - Risk of:  Goal: Will remain free from falls  Description  Will remain free from falls  Outcome: Ongoing  Note:   Fall risk assessment completed every shift. All precautions in place. Pt has call light within reach at all times. Room clear of clutter. Pt aware to call for assistance when getting up. Goal: Absence of physical injury  Description  Absence of physical injury  Outcome: Ongoing     Problem: Discharge Planning:  Goal: Discharged to appropriate level of care  Description  Discharged to appropriate level of care  Outcome: Ongoing     Problem: Mobility - Impaired:  Goal: Mobility will improve to maximum level  Description  Mobility will improve to maximum level  Outcome: Ongoing     Problem: Pain:  Description  Pain management should include both nonpharmacologic and pharmacologic interventions.   Goal: Pain level will decrease  Description  Pain level will decrease  Outcome: Ongoing  Goal: Control of acute pain  Description  Control of acute pain  Outcome: Ongoing  Goal: Control of chronic pain  Description  Control of chronic pain  Outcome: Ongoing     Problem: Skin Integrity - Impaired:  Goal: Will show no infection signs and symptoms  Description  Will show no infection signs and symptoms  Outcome: Ongoing  Goal: Absence of new skin breakdown  Description  Absence of new skin breakdown  Outcome: Ongoing     Problem: Coping:  Goal: Ability to remain calm will improve  Description  Ability to remain calm will improve  Outcome: Ongoing     Problem: Safety:  Goal: Ability to remain free from injury will improve  Description  Ability to remain free from injury will improve  Outcome: Ongoing     Problem: Self-Care:  Goal: Ability to participate in self-care as condition permits will improve  Description  Ability to participate in self-care as condition permits will improve  Outcome: Ongoing

## 2020-01-07 NOTE — PROGRESS NOTES
Clinical Pharmacy Note  Renal Dose Adjustment    Ria Moreno is receiving cefepime 1gm every 24 hours. This medication is renally eliminated. Based on the patient's improvement in creatinine from 2.1 to 1.0 in harlan last 48 hours and urine output, the dose has been adjusted to cefepime 1gm every 12 hours per protocol. Pharmacy will continue to monitor and adjust dose as needed for changes in renal function.    [facility]  1/7/2020  1:56 PM

## 2020-01-07 NOTE — PLAN OF CARE
to the best of their abilities. Will continue to monitor.    1/7/2020 0427 by Azeem Amezquita RN  Outcome: Ongoing    Electronically signed by Tatiana Steward RN on 1/7/2020 at 11:29 AM

## 2020-01-07 NOTE — CONSULTS
Referral to [address] [place] [address] received. Patient discussed with PM&R physician and the patient is not an appropriate candidate for rehab. Per therapy they are recommending a low intensity program as the patient is limited by her cognitive status and fatigue. The patient is currently requiring Max A of 2 with the stedy and is not able to ambulate at all. Above information communicated with Miguel Angel Lassiter.

## 2020-01-07 NOTE — PROGRESS NOTES
Physical Therapy    Facility/Department: [facility] ORTHOPEDICS  Initial Assessment    NAME: Mitch Gomez  : 1945  MRN: [id number]    Date of Service: 2020    Assessment /Discharge Recommendations:  -will follow and make additional recommendations as her mentation clears. .. Mitch Gomez scored a  on the AM-PAC short mobility form. Current research shows that an AM-PAC score of 17 or less is typically not associated with a discharge to the patient's home setting. Based on the patients AM-PAC score and their current functional mobility deficits, it is recommended that the patient have 3-5 sessions per week of Physical Therapy at d/c to increase the patients independence. Activity limitations: Decreased functional mobility ; Decreased ADL status; Decreased balance;Decreased cognition;Decreased endurance  Prognosis: Good  Decision Making: Medium Complexity  REQUIRES PT FOLLOW UP: Yes  Activity Tolerance  Activity Tolerance: Patient limited by cognitive status; Patient limited by fatigue       Patient Diagnosis(es): The primary encounter diagnosis was Cellulitis of lower extremity, unspecified laterality. A diagnosis of Hypoglycemia was also pertinent to this visit. has a past medical history of Anemia, Atrial fibrillation ([address] CAD (coronary artery disease), Chronic back pain, CKD (chronic kidney disease), stage III ([address] Diabetes mellitus ([address] Diastolic CHF ([address] Hyperlipidemia, Hypertension, MI (myocardial infarction) ([address] MRSA (methicillin resistant staph aureus) culture positive, MRSA (methicillin resistant staph aureus) culture positive, MRSA colonization, Obesity (BMI 30-39.9), Osteoarthritis, Pancreatitis, Sepsis ([address] Stasis ulcer ([address] and Suprapubic catheter ([address] has a past surgical history that includes Hysterectomy; knee surgery; back surgery; Cholecystectomy;  Cataract removal; Appendectomy; Colonoscopy; eye surgery; fracture surgery; joint replacement; and Insert/Change Brasher Catheter - Complicat ([id number]). Restrictions  Restrictions/Precautions  Restrictions/Precautions: Fall Risk  Position Activity Restriction  Other position/activity restrictions: Suprapubic catheter  Vision/Hearing  Vision: Impaired  Vision Exceptions: Wears glasses at all times  Hearing: Within functional limits     Subjective  General  Chart Reviewed: Yes  Patient assessed for rehabilitation services?: Yes  Additional Pertinent Hx: here due to lower extremity cellulitis- discharge was attempted yesterday but she was not able to maintain a static stance on the walker   Response To Previous Treatment: Not applicable  Family / Caregiver Present: (her daughter arrived at conclusion of session)  Follows Commands: Impaired  Subjective  Subjective: arrived to room along with OT- patient asleep but able to awaken to voice but limited alertness and eyes open with consistent verbal cues  Pain Screening  Patient Currently in Pain: Denies  Orientation  Orientation  Overall Orientation Status: Impaired  Orientation Level: Oriented to person  Social/Functional History  Social/Functional History  Lives With: Daughter  Type of Home: House  Home Layout: One level  Home Access: Stairs to enter with rails  Entrance Stairs - Number of Steps: 1-2  Bathroom Shower/Tub: Walk-in shower  Bathroom Toilet: (safety frame over toilet)  Bathroom Equipment: Shower chair  Bathroom Accessibility: Walker accessible  Home Equipment: Rolling walker, [address], Lift chair, Reacher  ADL Assistance: (assist with showers, independent with sponge bathing)  Homemaking Assistance: Needs assistance  Ambulation Assistance: Independent(with RW)  Transfer Assistance: Independent  Additional Comments: social history obtained from previous admit in June.    Objective  Motor Control  Gross Motor?: WFL  Sensation  Overall Sensation Status: (not assessed at this session)  Bed mobility  Supine to Sit: 2 Person assistance;Maximum

## 2020-01-07 NOTE — CARE COORDINATION
Sw aware of acute rehab declining patient. Tina spoke with patient's daughter, Manjinder Del Rosario, regarding above and offered other acute rehab options. She declined and reported that she would bring patient home with home care.      Electronically signed by Cheng Hansen on 1/7/2020 at 4:41 PM

## 2020-01-08 VITALS
OXYGEN SATURATION: 95 % | DIASTOLIC BLOOD PRESSURE: 66 MMHG | RESPIRATION RATE: 16 BRPM | HEART RATE: 89 BPM | WEIGHT: 184.97 LBS | TEMPERATURE: 98.2 F | BODY MASS INDEX: 37.29 KG/M2 | SYSTOLIC BLOOD PRESSURE: 179 MMHG | HEIGHT: 59 IN

## 2020-01-08 LAB
ANION GAP SERPL CALCULATED.3IONS-SCNC: 13 MMOL/L (ref 3–16)
BASOPHILS ABSOLUTE: 0 K/UL (ref 0–0.2)
BASOPHILS RELATIVE PERCENT: 0.5 %
BUN BLDV-MCNC: 12 MG/DL (ref 7–20)
CALCIUM SERPL-MCNC: 8.2 MG/DL (ref 8.3–10.6)
CHLORIDE BLD-SCNC: 104 MMOL/L (ref 99–110)
CO2: 23 MMOL/L (ref 21–32)
CREAT SERPL-MCNC: 0.9 MG/DL (ref 0.6–1.2)
EOSINOPHILS ABSOLUTE: 0.3 K/UL (ref 0–0.6)
EOSINOPHILS RELATIVE PERCENT: 4.1 %
GFR AFRICAN AMERICAN: >60
GFR NON-AFRICAN AMERICAN: >60
GLUCOSE BLD-MCNC: 102 MG/DL (ref 70–99)
GLUCOSE BLD-MCNC: 118 MG/DL (ref 70–99)
GLUCOSE BLD-MCNC: 249 MG/DL (ref 70–99)
GRAM STAIN RESULT: ABNORMAL
HCT VFR BLD CALC: 25.6 % (ref 36–48)
HEMOGLOBIN: 8.9 G/DL (ref 12–16)
LYMPHOCYTES ABSOLUTE: 0.9 K/UL (ref 1–5.1)
LYMPHOCYTES RELATIVE PERCENT: 12.6 %
MAGNESIUM: 2 MG/DL (ref 1.8–2.4)
MCH RBC QN AUTO: 33.8 PG (ref 26–34)
MCHC RBC AUTO-ENTMCNC: 34.7 G/DL (ref 31–36)
MCV RBC AUTO: 97.5 FL (ref 80–100)
MONOCYTES ABSOLUTE: 0.4 K/UL (ref 0–1.3)
MONOCYTES RELATIVE PERCENT: 5.9 %
NEUTROPHILS ABSOLUTE: 5.6 K/UL (ref 1.7–7.7)
NEUTROPHILS RELATIVE PERCENT: 76.9 %
ORGANISM: ABNORMAL
PDW BLD-RTO: 12.8 % (ref 12.4–15.4)
PERFORMED ON: ABNORMAL
PERFORMED ON: ABNORMAL
PLATELET # BLD: 206 K/UL (ref 135–450)
PMV BLD AUTO: 8.4 FL (ref 5–10.5)
POTASSIUM REFLEX MAGNESIUM: 3.2 MMOL/L (ref 3.5–5.1)
RBC # BLD: 2.63 M/UL (ref 4–5.2)
SODIUM BLD-SCNC: 140 MMOL/L (ref 136–145)
VANCOMYCIN RANDOM: 13.2 UG/ML
WBC # BLD: 7.2 K/UL (ref 4–11)
WOUND/ABSCESS: ABNORMAL

## 2020-01-08 PROCEDURE — 36415 COLL VENOUS BLD VENIPUNCTURE: CPT

## 2020-01-08 PROCEDURE — 85025 COMPLETE CBC W/AUTO DIFF WBC: CPT

## 2020-01-08 PROCEDURE — 97116 GAIT TRAINING THERAPY: CPT

## 2020-01-08 PROCEDURE — APPSS30 APP SPLIT SHARED TIME 16-30 MINUTES: Performed by: NURSE PRACTITIONER

## 2020-01-08 PROCEDURE — 6360000002 HC RX W HCPCS: Performed by: PHARMACIST

## 2020-01-08 PROCEDURE — 83735 ASSAY OF MAGNESIUM: CPT

## 2020-01-08 PROCEDURE — 2580000003 HC RX 258

## 2020-01-08 PROCEDURE — 97530 THERAPEUTIC ACTIVITIES: CPT

## 2020-01-08 PROCEDURE — 6360000002 HC RX W HCPCS

## 2020-01-08 PROCEDURE — 80202 ASSAY OF VANCOMYCIN: CPT

## 2020-01-08 PROCEDURE — 2580000003 HC RX 258: Performed by: PHARMACIST

## 2020-01-08 PROCEDURE — 6370000000 HC RX 637 (ALT 250 FOR IP): Performed by: INTERNAL MEDICINE

## 2020-01-08 PROCEDURE — 97535 SELF CARE MNGMENT TRAINING: CPT

## 2020-01-08 PROCEDURE — 80048 BASIC METABOLIC PNL TOTAL CA: CPT

## 2020-01-08 PROCEDURE — APPNB30 APP NON BILLABLE TIME 0-30 MINS: Performed by: NURSE PRACTITIONER

## 2020-01-08 RX ORDER — LEVOFLOXACIN 500 MG/1
500 TABLET, FILM COATED ORAL DAILY
Qty: 7 TABLET | Refills: 0 | Status: SHIPPED | OUTPATIENT
Start: 2020-01-08 | End: 2020-01-13

## 2020-01-08 RX ADMIN — CETIRIZINE HYDROCHLORIDE 5 MG: 10 TABLET, FILM COATED ORAL at 11:03

## 2020-01-08 RX ADMIN — ISOSORBIDE MONONITRATE 30 MG: 30 TABLET, EXTENDED RELEASE ORAL at 11:02

## 2020-01-08 RX ADMIN — METOPROLOL TARTRATE 25 MG: 25 TABLET ORAL at 11:03

## 2020-01-08 RX ADMIN — CEFEPIME HYDROCHLORIDE 2 G: 2 INJECTION, POWDER, FOR SOLUTION INTRAVENOUS at 14:59

## 2020-01-08 RX ADMIN — FAMOTIDINE 20 MG: 20 TABLET, FILM COATED ORAL at 11:02

## 2020-01-08 RX ADMIN — GABAPENTIN 300 MG: 300 CAPSULE ORAL at 14:57

## 2020-01-08 RX ADMIN — INSULIN LISPRO 4 UNITS: 100 INJECTION, SOLUTION INTRAVENOUS; SUBCUTANEOUS at 12:33

## 2020-01-08 RX ADMIN — VENLAFAXINE 150 MG: 37.5 TABLET ORAL at 11:02

## 2020-01-08 RX ADMIN — CEFEPIME HYDROCHLORIDE 1 G: 1 INJECTION, POWDER, FOR SOLUTION INTRAMUSCULAR; INTRAVENOUS at 03:00

## 2020-01-08 RX ADMIN — ATORVASTATIN CALCIUM 10 MG: 10 TABLET, FILM COATED ORAL at 11:02

## 2020-01-08 RX ADMIN — GABAPENTIN 300 MG: 300 CAPSULE ORAL at 11:02

## 2020-01-08 RX ADMIN — VANCOMYCIN HYDROCHLORIDE 1000 MG: 1 INJECTION, POWDER, LYOPHILIZED, FOR SOLUTION INTRAVENOUS at 12:34

## 2020-01-08 RX ADMIN — PANCRELIPASE 1 CAPSULE: 60000; 12000; 38000 CAPSULE, DELAYED RELEASE PELLETS ORAL at 11:02

## 2020-01-08 ASSESSMENT — PAIN SCALES - GENERAL: PAINLEVEL_OUTOF10: 0

## 2020-01-08 NOTE — PROGRESS NOTES
Patient oriented to self only. Bed alarm on. Patient denies any pain. Iv fluids infusing as ordered. Will continue to monitor and assess.  Electronically signed by Alisa Urias RN on 1/8/2020 at 5:58 AM

## 2020-01-08 NOTE — PROGRESS NOTES
Time Out 1120         Minutes 30                 Electronically signed by Kiko Knott, [id number] on 1/8/2020 at 11:37 AM    If discharged prior to next OT session please see last daily note for discharge status

## 2020-01-08 NOTE — CONSULTS
Consult Note  Physical Medicine and Rehabilitation    Patient: Luiz Ríos  [id number]  Date: 1/8/2020      Chief Complaint: fatigue    History of Present Illness/Hospital Course:  Ms. Evelyn Jovel is a 65 yo F with pmh chronic dCHF, HTN, HLD, DM2, CKD who initially presented 1/3/2020 with hypoglycemia and fall. Patient has insulin pump and per report had not eaten adequately over the preceding days due to being \"too busy. \" She was also found to have cellulitis of BLEs for which she was treated with antibiotics. Currently, patient is somewhat confused and has difficulty providing history. She reports fatigue and generalized weakness. She has some pain related to wounds on her extremities. She indicates that she does not want to rehab program in ARU or SNF setting. Prior Level of Function:  Modified independent for mobility (walker) and ADLs except assist for bathing  Requires assist for IADLs    Current Level of Function:  Max assist x 2 person    Pertinent Social History:  Support: Lives with daughter per chart. Patient states she lives with \"some person\" but cannot remember name. Home set-up: 1 level home with 1-2 steps to enter    Past Medical History:   Diagnosis Date    Anemia     Atrial fibrillation (HCC)     CAD (coronary artery disease)     Chronic back pain     CKD (chronic kidney disease), stage III (HCC)     Diabetes mellitus (HCC)     Diastolic CHF (HCC)     Hyperlipidemia     Hypertension     MI (myocardial infarction) ([address]     MRSA (methicillin resistant staph aureus) culture positive 04/13/2018    leg ulcer    MRSA (methicillin resistant staph aureus) culture positive 1/4/20;06/08/2019    leg    MRSA colonization 10/16/2018    Obesity (BMI 30-39. 9)     Osteoarthritis     Pancreatitis     Sepsis ([address]     Stasis ulcer ([address]     bilateral lower extremities     Suprapubic catheter [facility])        Past Surgical History:   Procedure Laterality Date    APPENDECTOMY      BACK SURGERY      x2    CATARACT REMOVAL      bilateral     CHOLECYSTECTOMY      COLONOSCOPY      EYE SURGERY      FRACTURE SURGERY      Left ankle    HYSTERECTOMY      INSET/CHANGE LOMELI CATHETER - COMPLICATED  [id number]/[id number]    suprapubic cath    JOINT REPLACEMENT      Right knee    KNEE SURGERY         Family History   Problem Relation Age of Onset    Heart Disease Father     Heart Failure Father     Diabetes Father     Diabetes Daughter     Diabetes Daughter        Social History     Socioeconomic History    Marital status:       Spouse name: Not on file    Number of children: Not on file    Years of education: Not on file    Highest education level: Not on file   Occupational History    Not on file   Social Needs    Financial resource strain: Not on file    Food insecurity:     Worry: Not on file     Inability: Not on file    Transportation needs:     Medical: Not on file     Non-medical: Not on file   Tobacco Use    Smoking status: Never Smoker    Smokeless tobacco: Never Used   Substance and Sexual Activity    Alcohol use: Yes     Comment: occassional    Drug use: No    Sexual activity: Never   Lifestyle    Physical activity:     Days per week: Not on file     Minutes per session: Not on file    Stress: Not on file   Relationships    Social connections:     Talks on phone: Not on file     Gets together: Not on file     Attends [employer] service: Not on file     Active member of club or organization: Not on file     Attends meetings of clubs or organizations: Not on file     Relationship status: Not on file    Intimate partner violence:     Fear of current or ex partner: Not on file     Emotionally abused: Not on file     Physically abused: Not on file     Forced sexual activity: Not on file   Other Topics Concern    Not on file   Social History Narrative    Not on file           REVIEW OF SYSTEMS:   CONSTITUTIONAL: negative for fevers, chills, diaphoresis, appetite change, night sweats, unexpected weight change, + fatigue  EYES: negative for blurred vision, eye discharge, visual disturbance and icterus  HEENT: negative for hearing loss, tinnitus, ear drainage, sinus pressure, nasal congestion, epistaxis and snoring  RESPIRATORY: Negative for hemoptysis, cough, sputum production  CARDIOVASCULAR: negative for chest pain, palpitations, exertional chest pressure/discomfort, syncope, +edema  GASTROINTESTINAL: negative for nausea, vomiting, diarrhea, blood in stool, abdominal pain, constipation  GENITOURINARY: negative for frequency, dysuria, urinary incontinence, decreased urine volume, and hematuria  HEMATOLOGIC/LYMPHATIC: negative for easy bruising, bleeding and lymphadenopathy  ALLERGIC/IMMUNOLOGIC: negative for recurrent infections, angioedema, anaphylaxis and drug reactions  ENDOCRINE: negative for weight changes and diabetic symptoms including polyuria, polydipsia and polyphagia  MUSCULOSKELETAL: negative for pain, joint swelling, decreased range of motion  NEUROLOGICAL: negative for headaches, slurred speech, unilateral weakness  PSYCHIATRIC/BEHAVIORAL: negative for hallucinations, behavioral problems, +confusion. Physical Examination:  Vitals:   Patient Vitals for the past 24 hrs:   BP Temp Temp src Pulse Resp SpO2 Weight   01/08/20 0822 (!) 179/66 98.2 °F (36.8 °C) Oral 89 16 95 % --   01/08/20 0623 -- -- -- -- -- -- 184 lb 15.5 oz (83.9 kg)   01/07/20 2025 (!) 184/70 99.3 °F (37.4 °C) Oral 92 16 95 % --   01/07/20 1951 -- -- -- 80 -- -- --     Const: Alert. WDWN. No distress  Eyes: Conjunctiva noninjected, no icterus noted; pupils equal, round, and reactive to light. HENT: Atraumatic, normocephalic; Oral mucosa moist  Neck: Trachea midline, neck supple. No thyromegaly noted. CV: Regular rate and rhythm, no murmur rub or gallop noted  Resp: Lungs clear to auscultation bilaterally, decreased at the bases. No rales wheezes or ronchi, no retractions. Respirations unlabored.    GI: Soft, nontender, nondistended. Normal bowel sounds. No palpable masses. Skin: Multiple wounds on RUE and BLE dressed, c/d/i. Normal temperature and turgor. No rashes noted. Ext: +LE edema  MSK: No joint tenderness, erythema, warmth noted. AROM intact. Neuro: Alert, oriented to person and hospital but not to time or situation. Speech fluent. No cranial nerve deficits appreciated. Sensation intact to light touch. Motor examination reveals normal strength in all four limbs diffusely except 3/5 hip flexion. Reflexes diminished, symmetric. Psych: Stable mood, normal judgement, normal affect     Lab Results   Component Value Date    WBC 7.2 01/08/2020    HGB 8.9 (L) 01/08/2020    HCT 25.6 (L) 01/08/2020    MCV 97.5 01/08/2020     01/08/2020     Lab Results   Component Value Date    INR 1.05 05/21/2018    INR 4.50 05/07/2018    INR 4.80 05/04/2018    PROTIME 11.9 05/21/2018    PROTIME 19.7 (H) 04/19/2018    PROTIME 18.2 (H) 04/19/2018     Lab Results   Component Value Date    CREATININE 0.9 01/08/2020    BUN 12 01/08/2020     01/08/2020    K 3.2 (L) 01/08/2020     01/08/2020    CO2 23 01/08/2020     Lab Results   Component Value Date    ALT 8 (L) 01/03/2020    AST 18 01/03/2020    ALKPHOS 136 (H) 01/03/2020    BILITOT <0.2 01/03/2020       Most recent echocardiogram (2018) revealed:   Normal left ventricle size, wall thickness, and systolic function with an   estimated ejection fraction of 55-60%. No regional wall motion abnormalities   are seen. Normal right ventricular size and function. Mild mitral regurgitation.     Most recent EKG revealed:  Sinus tachycardiaRightward axisLow voltage QRSCannot rule out Inferior infarct , age undeterminedabnormal R wave progressionProlonged QTAbnormal ECGWhen compared with ECG of 04-MAY-2019 21:52,Inferior infarct is now PresentQT has lengthenedConfirmed by Kirby, [address] [place] [address] ([facility]) on 6/9/2019 3:35:47 PM    Most recent imaging studies revealed:    Xray left and right tib/fib  Postsurgical changes noted bilaterally without significant change from the   prior study.  No definite acute fracture or dislocation.  No definite   periosteal reaction.       There is persistent clinical concern for osteomyelitis MRI would be the test   of choice for further evaluation. On my review, CXR displays elevation of right hemidiaphragm, no consolidations or effusions. Assessment:  1. Debility   2. Metabolic encephalopathy vs baseline cognitive impairment  3. DM2 with Hypoglycemia - due to poor oral intake in setting of insulin pump  4. Cellulitis of BLE   5. Anemia  6. Hypokalemia  7. Chronic dCHF  8. HTN  9. CKD    Impairments: generalized weakness, decreased endurance, balance, cognition    Recommendations:    --Based on low activity tolerance, do not feel patient is appropriate for ARU. --Would consider SNF given requiring assist of 2 people for mobility, however patient/daughter currently refusing this. I provided education on different levels of care and risks of falls/injury without therapy. --If patient chooses to discharge home, would maximize home care services. Thank you for this consult. Please contact me with any questions or concerns. Teja Schuler.  Perri Wilkerson MD 1/8/2020, 8:38 AM

## 2020-01-08 NOTE — PLAN OF CARE
non-pharmacologic interventions will be implemented as needed. Will continue to monitor and assess patient.      1/8/2020 0725 by Elvia Story RN  Outcome: Ongoing  Goal: Control of chronic pain  Description  Control of chronic pain  1/8/2020 1220 by Wimla Tapia RN  Outcome: Ongoing  1/8/2020 0725 by Elvia Story RN  Outcome: Ongoing     Problem: Skin Integrity - Impaired:  Goal: Will show no infection signs and symptoms  Description  Will show no infection signs and symptoms  1/8/2020 1220 by Wilma Tapia RN  Outcome: Ongoing  1/8/2020 0725 by Elvia Story RN  Outcome: Ongoing  Goal: Absence of new skin breakdown  Description  Absence of new skin breakdown  1/8/2020 1220 by Wilma Tapia RN  Outcome: Ongoing  1/8/2020 0725 by lEvia Story RN  Outcome: Ongoing     Problem: Coping:  Goal: Ability to remain calm will improve  Description  Ability to remain calm will improve  1/8/2020 1220 by Wilma Tapia RN  Outcome: Ongoing  1/8/2020 0725 by Elvia Story RN  Outcome: Ongoing     Problem: Safety:  Goal: Ability to remain free from injury will improve  Description  Ability to remain free from injury will improve  1/8/2020 1220 by Wilma Tapia RN  Outcome: Ongoing  1/8/2020 0725 by Elvia Story RN  Outcome: Ongoing     Problem: Self-Care:  Goal: Ability to participate in self-care as condition permits will improve  Description  Ability to participate in self-care as condition permits will improve  1/8/2020 1220 by Wilma Tapia RN  Outcome: Ongoing  1/8/2020 0726 by Elvia Story RN  Outcome: Ongoing  1/8/2020 0725 by Elvia Story RN  Outcome: Ongoing     Problem: Infection - Methicillin-Resistant Staphylococcus Aureus Infection:  Goal: Absence of methicillin-resistant Staphylococcus aureus infection  Description  Absence of methicillin-resistant Staphylococcus aureus infection  Outcome: Ongoing

## 2020-01-08 NOTE — PROGRESS NOTES
MRI checklist completed and placed into patient's chart. Patient resting in bed at this time. Patient reports \"discomfort\" in his back, but declines the need for pharmacologic interventions. Patient repositioned and reports increased comfort. Patient denies physical and/or emotional needs. Call light, telephone, and bed side table are within reach. Fall precautions in place; bed alarm and non-slip socks on. Will continue to monitor and assess.

## 2020-01-08 NOTE — PROGRESS NOTES
Aortic atherosclerosis (HCC)    Essential hypertension    Cellulitis, lower extremities bilaterally    MRSA nasal colonization    Normocytic anemia    Altered mental status    Elevated lactic acid level    Uncontrolled diabetes mellitus (HCC)    Sepsis (HCC)    Stasis ulcer (HCC)    Bilateral lower leg cellulitis    Chronic renal failure, stage 3 (moderate) (HCC)    Hyperlipidemia    Hypoglycemia    Morbid obesity due to excess calories (HCC)       Local wound care - adaptic, kerlex and ACE - changed today  Does not appears to have acute cellulitis, appears more chronic changes. Continue medical care  Ok to DC anytime from vascular surgery standpoint. Discussed with patient and nursing. Discussed with Dr. Joy Davis:  · Educated patient on plan of care and disease process. - all questions answered  · Recommend  a smoke-free lifestyle.      Dayana Dhaliwal, CNP

## 2020-01-08 NOTE — DISCHARGE SUMMARY
Hospital Medicine Discharge Summary    Patient ID: Andrey Orta      Patient's PCP: Louie Arroyo    Admit Date: 1/3/2020     Discharge Date:   1/8/20    Admitting Physician: Siobhan Guaman MD     Discharge Physician: Betsy Salomon MD     Discharge Diagnoses: Active Hospital Problems    Diagnosis Date Noted    Chronic renal failure, stage 3 (moderate) ([facility] [N18.3] 01/04/2020    Hyperlipidemia [E78.5] 01/04/2020    Hypoglycemia [E16.2] 01/04/2020    Morbid obesity due to excess calories ([address] [E66.01] 01/04/2020    Bilateral lower leg cellulitis [L03.116, L03.115] 01/03/2020    Chronic diastolic heart failure (HCC) [I50.32]     Essential hypertension [I10]     DMII (diabetes mellitus, type 2) ([facility] [E11.9] 04/13/2018    Protein-calorie malnutrition, moderate ([facility] [E44.0] 04/13/2018       The patient was seen and examined on day of discharge and this discharge summary is in conjunction with any daily progress note from day of discharge. Hospital Course:   Patient is a 12-year-old woman with a history of hypertension, hyperlipidemia, diabetes mellitus, chronic diastolic congestive heart failure, chronic renal failure and ongoing issues with her lower extremities including recurrent cellulitis and chronic venous hypertension with inflammation. she was initially brought to the emergency room for evaluation of hypoglycemia. She wears an insulin pump and reported that she had been too busy over the past few days to eat adequately. She was given oral glucose by EMS and her glucose at time of arrival in the emergency room was 57. She did eat after arrival in her glucose improved. During the course of her evaluation she was found to have bright red erythema and areas denuded of skin in her bilateral lower extremities. She is being admitted for evaluation and treatment of bilateral lower extremity cellulitis.  Associated signs and symptoms do not include fever or chills, nausea, vomiting, abdominal pain, hemoptysis, hematochezia, diarrhea, constipation or urinary symptoms. Bilateral lower leg cellulitis due to DM2 - blood and wound cultures collected. ID consulted. Pt started on Vancomycin and Cefepime initially. Appears to be consistent with a Strep sp infection. ID may deescalate to Anc after evaluation.   Patient states leg wounds are similar appearance to their chronic state. Vascular surgery consulted and stated that patient's wounds appear similar as previous. No acute process. DC today with abx per ID. Enterococcus from wound culture. Doxycycline twice daily for 5 days per infectious disease     Anemia. Chronic in nature but acute component with a drop of 2 g overnight. Iron and TIBC ordered. Fecal occult blood test ordered as well. Transfuse if less than 7. Stable at NE     Hypoglycemia - due to having active insulin pump and poor oral intake. Reevaluate appropriateness of insulin pump in this patient. For now, will start SSI and a CCD for Diabetes mellitus II. resolved after IV fluids and a meal.  Patient was placed on lantus and SSI in hospital. Resolved. Patient can continue her home regimen at NE     Protein-calorie malnutrition, moderate (HCC) - Patient encouraged to eat a balanced diet, including protein-rich foods. Dietary has been consulted to assess nutritional needs     Chronic diastolic heart failure (HCC) - currently compensated; monitor I&Os and daily weights.      Chronic Renal Failure stage III - Renal function is at/near historical baseline and is stable; Monitor renal function and avoid Nephrotoxic agents as able. Creatinine slightly higher on day 3 of admission. Will give low amount of normal saline infusion and recheck tomorrow morning.     Essential (primary) hypertension - continue home meds and monitor blood pressure     Hyperlipidemia - No current evidence of Rhabdomyolysis or other adverse effects.  Continue statin therapy while in the hospital     Morbid murmurs, rubs or gallops. Abdomen: Soft, non-tender, non-distended with normal bowel sounds. Musculoskeletal: Bilateral lower extremities wrapped   skin: Skin color, texture, turgor normal.  No rashes or lesions. Neurologic:  Neurovascularly intact without any focal sensory/motor deficits. Cranial nerves: II-XII intact, grossly non-focal.  Psychiatric: Alert and oriented, thought content appropriate, normal insight  Capillary Refill: Brisk,< 3 seconds   Peripheral Pulses: +2 palpable, equal bilaterally        Consults:     IP CONSULT TO HOSPITALIST  PHARMACY TO DOSE VANCOMYCIN  IP CONSULT TO INFECTIOUS DISEASES  IP CONSULT TO DIETITIAN  IP CONSULT TO VASCULAR SURGERY  IP CONSULT TO HOME CARE NEEDS  IP CONSULT TO PHYSICAL MEDICINE REHAB    Significant Diagnostic Studies:     XR CHEST PORTABLE   Final Result   No evidence of acute cardiopulmonary disease. XR TIBIA FIBULA LEFT (2 VIEWS)   Final Result   Postsurgical changes noted bilaterally without significant change from the   prior study. No definite acute fracture or dislocation. No definite   periosteal reaction. There is persistent clinical concern for osteomyelitis MRI would be the test   of choice for further evaluation. XR TIBIA FIBULA RIGHT (2 VIEWS)   Final Result   Postsurgical changes noted bilaterally without significant change from the   prior study. No definite acute fracture or dislocation. No definite   periosteal reaction. There is persistent clinical concern for osteomyelitis MRI would be the test   of choice for further evaluation. Disposition:  Home with HH     Condition at Discharge: Stable    Discharge Instructions/Follow-up:  PCP    Code Status:  Limited     Activity: activity as tolerated    Diet: diabetic diet      Labs:  For convenience and continuity at follow-up the following most recent labs are provided:      CBC:    Lab Results   Component Value Date    WBC 7.2 01/08/2020    HGB 8.9 01/08/2020 HCT 25.6 01/08/2020     01/08/2020       Renal:    Lab Results   Component Value Date     01/08/2020    K 3.2 01/08/2020     01/08/2020    CO2 23 01/08/2020    BUN 12 01/08/2020    CREATININE 0.9 01/08/2020    CALCIUM 8.2 01/08/2020    PHOS 2.5 06/13/2019       Discharge Medications:     Current Discharge Medication List           Details   doxycycline hyclate (VIBRAMYCIN) 100 MG capsule Take 1 capsule by mouth 2 times daily for 5 days  Qty: 10 capsule, Refills: 0              Details   ibuprofen (ADVIL;MOTRIN) 600 MG tablet Take 600 mg by mouth 2 times daily      ranitidine (ZANTAC) 150 MG tablet Take 150 mg by mouth daily      furosemide (LASIX) 20 MG tablet Take 20 mg by mouth daily      traMADol (ULTRAM) 50 MG tablet Take 50 mg by mouth every 8 hours as needed for Pain. ferrous sulfate 325 (65 Fe) MG tablet Take 325 mg by mouth 2 times daily       insulin lispro (HUMALOG) 100 UNIT/ML injection vial Inject 5 Units into the skin 3 times daily (with meals)  Qty: 1 vial, Refills: 0      atorvastatin (LIPITOR) 10 MG tablet Take 1 tablet by mouth daily  Qty: 30 tablet, Refills: 0      metoprolol tartrate (LOPRESSOR) 25 MG tablet Take 1 tablet by mouth 2 times daily  Qty: 60 tablet, Refills: 0      Biotin (BIOTIN 5000) 5 MG CAPS Take 1 tablet by mouth daily      lipase-protease-amylase (CREON) 11036 units delayed release capsule Take 12,000 Units by mouth 3 times daily (with meals)       gabapentin (NEURONTIN) 300 MG capsule Take 300 mg by mouth 3 times daily.        isosorbide mononitrate (IMDUR) 30 MG extended release tablet Take 30 mg by mouth daily      venlafaxine (EFFEXOR) 75 MG tablet Take 150 mg by mouth 2 times daily       cetirizine (ZYRTEC) 10 MG tablet Take 10 mg by mouth daily      vitamin D3 (CHOLECALCIFEROL) 400 units TABS tablet Take 400 Units by mouth daily              Future Appointments   Date Time Provider Betty Gonsalez   1/9/2020  2:30 PM Amado Krishna MD [facility]

## 2020-01-08 NOTE — PLAN OF CARE
BP (!) 184/70   Pulse 92   Temp 99.3 °F (37.4 °C) (Oral)   Resp 16   Ht 4' 11\" (1.499 m)   Wt 184 lb 15.5 oz (83.9 kg)   SpO2 95%   BMI 37.36 kg/m²

## 2020-01-08 NOTE — PROGRESS NOTES
Patient resting with eyes closed. Pt assessed for pain. Pt denies any pain at this time. Will continue to monitor pt and assess for pain throughout rest of shift. Fall risk assessment completed. Fall precautions in place. Call light within reach. Pt educated on calling for assistance before getting up. Walkway free of clutter. Will continue to monitor.

## 2020-01-08 NOTE — PROGRESS NOTES
catheter ([facility] [address] has a past surgical history that includes Hysterectomy; knee surgery; back surgery; Cholecystectomy; Cataract removal; Appendectomy; Colonoscopy; eye surgery; fracture surgery; joint replacement; and Insert/Change Brasher Catheter - Complicat (24/3471). Restrictions  Restrictions/Precautions  Restrictions/Precautions: Fall Risk  Position Activity Restriction  Other position/activity restrictions: Suprapubic catheter  Subjective  General  Chart Reviewed: Yes  Additional Pertinent Hx: here due to lower extremity cellulitis- discharge was attempted yesterday but she was not able to maintain a static stance on the walker   Response To Previous Treatment: Patient with no complaints from previous session. Family / Caregiver Present: No  Subjective  Subjective: arrived to room along with OT- patient in bathroom sitting on the toilet - PCA states she was able to use the rolling walker today  Pain Screening  Patient Currently in Pain: Denies  Social/Functional History  Social/Functional History  Lives With: Daughter  Type of Home: House  Home Layout: One level  Home Access: Stairs to enter with rails  Entrance Stairs - Number of Steps: 1-2  Bathroom Shower/Tub: Walk-in shower  Bathroom Toilet: (safety frame over toilet)  Bathroom Equipment: Shower chair  Bathroom Accessibility: Walker accessible  Home Equipment: Rolling walker, [employer], Lift chair, Reacher  ADL Assistance: (assist with showers, independent with sponge bathing)  Homemaking Assistance: Needs assistance  Ambulation Assistance: Independent(with RW)  Transfer Assistance: Independent  Additional Comments: social history obtained from previous admit in June. Objective  Bed mobility  Supine to Sit: Minimal assistance; Moderate assistance(PCA report)  Transfers  Sit to Stand: Minimal Assistance  Stand to sit: Minimal Assistance  Ambulation  Ambulation?: Yes  Ambulation 1  Surface: level tile  Device: Rolling Walker  Assistance: Minimal

## 2020-01-08 NOTE — CARE COORDINATION
[facility]  Received referral from case management [employer] with patent regarding [facility] services, Faxed orders to [address] care to see patient by   1/10/2020  Trevor Da Silva LPN    [facility] CTN Cell [phone], Fax [phone]

## 2020-01-16 ENCOUNTER — HOSPITAL ENCOUNTER (OUTPATIENT)
Dept: WOUND CARE | Age: 75
Discharge: HOME | End: 2020-01-16
Payer: MEDICARE

## 2020-01-16 VITALS
RESPIRATION RATE: 18 BRPM | SYSTOLIC BLOOD PRESSURE: 135 MMHG | HEART RATE: 65 BPM | DIASTOLIC BLOOD PRESSURE: 70 MMHG | TEMPERATURE: 98.2 F

## 2020-01-16 PROCEDURE — 11042 DBRDMT SUBQ TIS 1ST 20SQCM/<: CPT

## 2020-01-16 PROCEDURE — 11045 DBRDMT SUBQ TISS EACH ADDL: CPT

## 2020-01-16 PROCEDURE — 11042 DBRDMT SUBQ TIS 1ST 20SQCM/<: CPT | Performed by: SURGERY

## 2020-01-16 PROCEDURE — 11045 DBRDMT SUBQ TISS EACH ADDL: CPT | Performed by: SURGERY

## 2020-01-16 RX ORDER — BACITRACIN, NEOMYCIN, POLYMYXIN B 400; 3.5; 5 [USP'U]/G; MG/G; [USP'U]/G
OINTMENT TOPICAL ONCE
Status: CANCELLED | OUTPATIENT
Start: 2020-01-16

## 2020-01-16 RX ORDER — GINSENG 100 MG
CAPSULE ORAL ONCE
Status: CANCELLED | OUTPATIENT
Start: 2020-01-16

## 2020-01-16 RX ORDER — GENTAMICIN SULFATE 1 MG/G
OINTMENT TOPICAL ONCE
Status: CANCELLED | OUTPATIENT
Start: 2020-01-16

## 2020-01-16 RX ORDER — LIDOCAINE 50 MG/G
0.5 OINTMENT TOPICAL ONCE
Status: CANCELLED | OUTPATIENT
Start: 2020-01-16

## 2020-01-16 RX ORDER — LIDOCAINE 40 MG/G
CREAM TOPICAL ONCE
Status: CANCELLED | OUTPATIENT
Start: 2020-01-16

## 2020-01-16 RX ORDER — BETAMETHASONE DIPROPIONATE 0.05 %
OINTMENT (GRAM) TOPICAL ONCE
Status: CANCELLED | OUTPATIENT
Start: 2020-01-16

## 2020-01-16 RX ORDER — CLOBETASOL PROPIONATE 0.5 MG/G
OINTMENT TOPICAL ONCE
Status: CANCELLED | OUTPATIENT
Start: 2020-01-16

## 2020-01-16 RX ORDER — LIDOCAINE HYDROCHLORIDE 40 MG/ML
5 SOLUTION TOPICAL ONCE
Status: CANCELLED | OUTPATIENT
Start: 2020-01-16

## 2020-01-16 RX ORDER — BACITRACIN ZINC AND POLYMYXIN B SULFATE 500; 1000 [USP'U]/G; [USP'U]/G
OINTMENT TOPICAL ONCE
Status: CANCELLED | OUTPATIENT
Start: 2020-01-16

## 2020-01-16 ASSESSMENT — PAIN SCALES - GENERAL
PAINLEVEL_OUTOF10: 0
PAINLEVEL_OUTOF10: 0

## 2020-01-16 NOTE — PROGRESS NOTES
[facility]   Progress Note and Procedure Note        Merle Akbar  MEDICAL RECORD NUMBER:  [id number]  AGE: 66 y.o. GENDER: female  : 1945  EPISODE DATE: 20     Subjective:           Chief Complaint   Patient presents with    Wound Check       Follow up Wounds Left and Right Lower Legs         HISTORY of PRESENT ILLNESS HPI      Merle Akbar is a 66 y.o. female who presents today for wound/ulcer evaluation. History of Wound Context: Years of edema years of ulceration left leg never healed right leg has healed but then would break open when it swelled again had zippered stockings from [id number] but would ulcerate anyway  Wound/Ulcer Pain Timing/Severity: constant, moderate  Quality of pain: dull, aching  Severity:  10 / 10   Modifying Factors: Pain worsens with walking  Associated Signs/Symptoms: edema, erythema, drainage, odor and pain     Ulcer Identification:  Ulcer Type: venous     Contributing Factors: edema, venous stasis, lymphedema, diabetes, decreased mobility and obesity     Wound: N/A  This week  she had a some type of aid ,cleaned her legs with peroxide the first time the second time she soaked her dressings and peroxide and left him on not sure why she was doing this                             PAST MEDICAL HISTORY     Past Medical History             Diagnosis Date    Anemia      Atrial fibrillation ([address]      CAD (coronary artery disease)      Chronic back pain      CKD (chronic kidney disease), stage III ([address]      Diabetes mellitus ([address]      Diastolic CHF ([address]      Hyperlipidemia      Hypertension      MI (myocardial infarction) ([address]      MRSA (methicillin resistant staph aureus) culture positive 2018     leg ulcer    MRSA (methicillin resistant staph aureus) culture positive 2019     leg    MRSA colonization 10/16/2018    Obesity (BMI 30-39. 9)      Osteoarthritis      Pancreatitis      Sepsis (HCC)      Stasis ulcer ([address]       bilateral lower extremities     Suprapubic catheter ([address]              PAST SURGICAL HISTORY     Past Surgical History         Past Surgical History:   Procedure Laterality Date    APPENDECTOMY        BACK SURGERY         x2    CATARACT REMOVAL         bilateral     CHOLECYSTECTOMY        COLONOSCOPY        EYE SURGERY        FRACTURE SURGERY         Left ankle    HYSTERECTOMY        INSET/CHANGE LOMELI CATHETER - COMPLICATED   82/7920     suprapubic cath    JOINT REPLACEMENT         Right knee    KNEE SURGERY                FAMILY HISTORY     Family History         Family History   Problem Relation Age of Onset    Heart Disease Father      Heart Failure Father      Diabetes Father      Diabetes Daughter      Diabetes Daughter              SOCIAL HISTORY     Social History            Tobacco Use    Smoking status: Never Smoker    Smokeless tobacco: Never Used   Substance Use Topics    Alcohol use: Yes       Comment: occassional    Drug use:  No         ALLERGIES          Allergies   Allergen Reactions    Sulfa Antibiotics Rash         MEDICATIONS            Current Outpatient Medications on File Prior to Encounter   Medication Sig Dispense Refill    ibuprofen (ADVIL;MOTRIN) 200 MG tablet Take 400 mg by mouth every 6 hours as needed for Pain        traMADol (ULTRAM) 50 MG tablet Take 50 mg by mouth every 8 hours as needed for Pain.        raNITIdine HCl (ZANTAC 150 MAXIMUM STRENGTH PO) Take 150 mg by mouth 2 times daily        ferrous sulfate 325 (65 Fe) MG tablet Take 325 mg by mouth 2 times daily         insulin lispro (HUMALOG) 100 UNIT/ML injection vial Inject 5 Units into the skin 3 times daily (with meals) (Patient taking differently: Inject into the skin Humalog insulin pump: 1.8 Unit basal rate continous and as needed bolus per insulin pump) 1 vial 0    atorvastatin (LIPITOR) 10 MG tablet Take 1 tablet by mouth daily 30 tablet 0    metoprolol tartrate (LOPRESSOR) 25 MG tablet Take 1 tablet by mouth 2 times daily 60 tablet 0    furosemide (LASIX) 40 MG tablet Take 0.5 tablets by mouth daily 60 tablet 0    Biotin (BIOTIN 5000) 5 MG CAPS Take 1 tablet by mouth daily        lipase-protease-amylase (CREON) 74245 units delayed release capsule Take 12,000 Units by mouth 3 times daily (with meals)         gabapentin (NEURONTIN) 300 MG capsule Take 300 mg by mouth 2 times daily.         isosorbide mononitrate (IMDUR) 30 MG extended release tablet Take 30 mg by mouth daily        venlafaxine (EFFEXOR) 75 MG tablet Take 150 mg by mouth 2 times daily         cetirizine (ZYRTEC) 10 MG tablet Take 10 mg by mouth daily        vitamin D3 (CHOLECALCIFEROL) 400 units TABS tablet Take 400 Units by mouth daily           No current facility-administered medications on file prior to encounter.          REVIEW OF SYSTEMS     A comprehensive review of systems was negative except for: Respiratory: positive for dyspnea on exertion  Musculoskeletal: positive for back pain and Left leg pain     Objective:       BP (!) 173/65   Pulse 72   Temp 98.6 °F (37 °C) (Oral)   Resp 18          Wt Readings from Last 3 Encounters:   12/05/19 178 lb 12.7 oz (81.1 kg)   10/03/19 178 lb 12.7 oz (81.1 kg)   09/05/19 184 lb 15.5 oz (83.9 kg)         PHYSICAL EXAM     General Appearance: alert and oriented to person, place and time, well developed and well- nourished, in no acute distress  Skin: warm and dry, no rash or erythema  Head: normocephalic and atraumatic  Eyes: pupils equal, round, and reactive to light, extraocular eye movements intact, conjunctivae normal  Neck: supple and non-tender without mass, no thyromegaly or thyroid nodules, no cervical lymphadenopathy  Pulmonary/Chest: clear to auscultation left chest- no wheezes, rales or rhonchi, normal air movement, no respiratory distress, right base rales appreciated did not clear with cough.  No shortness of breath while speaking daughter suspects she does have shortness of breath and has gained 10 pounds in a week Cardiovascular: normal rate, regular rhythm, normal S1 and S2, no murmurs, rubs, clicks, or gallops, distal pulses +2 left dorsalis pedis, no others palpable Doppler signals biphasic in all 4 pedal arteries no carotid bruits neck veins slightly elevated left side  Abdomen: soft, non-tender, non-distended obese right upper quadrant gallbladder scar LRR, suprapubic tube placed by Richard Weir normal bowel sounds, no masses or organomegaly  Extremities: no cyanosis, clubbing massive edema left thigh and calf moderate edema right thigh and calf . Bilateral ulcerations of the calf and one of the medial left thigh see pictures below  Musculoskeletal: normal range of motion, no joint swelling, deformity or tenderness  Neurologic: reflexes normal and symmetric, no cranial nerve deficit, had trouble walking from the house to the car today, coordination and speech normal        Assessment:              Problem List Items Addressed This Visit           Chronic venous hypertension (idiopathic) with ulcer and inflammation of bilateral lower extremity (HCC)      Relevant Medications      lidocaine (XYLOCAINE) 4 % external solution 5 mL (Completed)      Cellulitis of left lower extremity - Primary      Relevant Medications      lidocaine (XYLOCAINE) 4 % external solution 5 mL (Completed)               Excisional Debridement Procedure Note  Indications:  Based on my examination of this patient's wound(s)/ulcer(s) today, debridement is required to promote healing and evaluate the wound base. Performed by: Arlene Carpenter MD    Consent obtained? Yes    Time out taken: Yes    Pain Control: Anesthetic: 4% Lidocaine Liquid Topical(7.5 ml)     Debridement:Excisional Debridement    Using curette the wound/ulcer was sharply debrided    down through and included excision of  epidermis, dermis and subcutaneous tissue.         Devitalized Tissue Debrided:  biofilm and slough      Pre Debridement Measurements:  Are located in the [place]  Documentation Flow Sheet   Wound/Ulcer #: 1, 2, 9 and 10     Post  Debridement Measurements:  Wound 08/22/19 Left; Anterior; Lower; Lateral #9 - left anterior lower leg - converted from wound #2 on 8/22/2019 (Active)   Wound Image   1/16/2020  3:08 PM   Wound Venous 1/16/2020  3:08 PM   Dressing Status Old drainage 1/16/2020  3:08 PM   Dressing Changed Changed/New 1/16/2020  4:08 PM   Dressing/Treatment Alginate with Ag; Other (comment) 1/16/2020  4:08 PM   Wound Cleansed Rinsed/Irrigated with saline 1/16/2020  3:08 PM   Wound Length (cm) 13 cm 1/16/2020  3:08 PM   Wound Width (cm) 10 cm 1/16/2020  3:08 PM   Wound Depth (cm) 0.1 cm 1/16/2020  3:08 PM   Wound Surface Area (cm^2) 130 cm^2 1/16/2020  3:08 PM   Change in Wound Size % (l*w) -845.45 1/16/2020  3:08 PM   Wound Volume (cm^3) 13 cm^3 1/16/2020  3:08 PM   Wound Healing % -842 1/16/2020  3:08 PM   Post-Procedure Length (cm) 13.1 cm 1/16/2020  3:52 PM   Post-Procedure Width (cm) 10.1 cm 1/16/2020  3:52 PM   Post-Procedure Depth (cm) 0.1 cm 1/16/2020  3:52 PM   Post-Procedure Surface Area (cm^2) 132.31 cm^2 1/16/2020  3:52 PM   Post-Procedure Volume (cm^3) 13.23 cm^3 1/16/2020  3:52 PM   Wound Assessment Granulation tissue;Pink;Red;Slough; Yellow 1/16/2020  3:08 PM   Drainage Amount Large 1/16/2020  3:08 PM   Drainage Description Serosanguinous; Tan 1/16/2020  3:08 PM   Odor None 1/16/2020  3:08 PM   Margins Attached edges 1/16/2020  3:08 PM   Marilynn-wound Assessment Dry;Pink 1/16/2020  3:08 PM   Non-staged Wound Description Full thickness 1/16/2020  3:08 PM   [place]%Wound Bed 40% 1/16/2020  3:08 PM   Red%Wound Bed 50% 1/16/2020  3:08 PM   Yellow%Wound Bed 10% 1/16/2020  3:08 PM   Black%Wound Bed 10 8/22/2019  3:14 PM   Number of days: 147       Wound 09/05/19 Leg Right;Medial;Proximal;Lower Wound #10, noted 9/5/19 (Active)   Wound Image   1/16/2020  3:08 PM   Wound Venous 1/16/2020  3:08 PM Dressing Status Old drainage 1/16/2020  3:08 PM   Dressing Changed Changed/New 1/16/2020  4:08 PM   Dressing/Treatment Alginate with Ag; Other (comment) 1/16/2020  4:08 PM   Wound Cleansed Rinsed/Irrigated with saline 1/16/2020  3:08 PM   Wound Length (cm) 11.5 cm 1/16/2020  3:08 PM   Wound Width (cm) 11.2 cm 1/16/2020  3:08 PM   Wound Depth (cm) 0.1 cm 1/16/2020  3:08 PM   Wound Surface Area (cm^2) 128.8 cm^2 1/16/2020  3:08 PM   Change in Wound Size % (l*w) -820 1/16/2020  3:08 PM   Wound Volume (cm^3) 12.88 cm^3 1/16/2020  3:08 PM   Wound Healing % -820 1/16/2020  3:08 PM   Post-Procedure Length (cm) 11.6 cm 1/16/2020  3:52 PM   Post-Procedure Width (cm) 11.3 cm 1/16/2020  3:52 PM   Post-Procedure Depth (cm) 0.1 cm 1/16/2020  3:52 PM   Post-Procedure Surface Area (cm^2) 131.08 cm^2 1/16/2020  3:52 PM   Post-Procedure Volume (cm^3) 13.11 cm^3 1/16/2020  3:52 PM   Wound Assessment Granulation tissue;Red;Slough; Yellow 1/16/2020  3:08 PM   Drainage Amount Large 1/16/2020  3:08 PM   Drainage Description Serosanguinous; Tan 1/16/2020  3:08 PM   Odor None 1/16/2020  3:08 PM   Margins Attached edges 1/16/2020  3:08 PM   Marilynn-wound Assessment Red 1/16/2020  3:08 PM   Non-staged Wound Description Full thickness 1/16/2020  3:08 PM   [place]%Wound Bed 20 12/19/2019  2:44 PM   Red%Wound Bed 90% 1/16/2020  3:08 PM   Yellow%Wound Bed 10% 1/16/2020  3:08 PM   Number of days: 133       Wound 09/19/19 Leg Left; Lower;Medial;Proximal #2 left lower leg medial proximal  (Active)   Wound Image   1/16/2020  3:08 PM   Wound Venous 1/16/2020  3:08 PM   Dressing Status Old drainage 1/16/2020  3:08 PM   Dressing Changed Changed/New 1/16/2020  4:08 PM   Dressing/Treatment Alginate with Ag; Other (comment) 1/16/2020  4:08 PM   Wound Cleansed Rinsed/Irrigated with saline 1/16/2020  3:08 PM   Dressing Change Due 01/07/20 1/6/2020 10:33 AM   Wound Length (cm) 9.8 cm 1/16/2020  3:08 PM   Wound Width (cm) 6.1 cm 1/16/2020  3:08 PM   Wound Depth (cm) 0.2 cm 1/16/2020  3:08 PM   Wound Surface Area (cm^2) 59.78 cm^2 1/16/2020  3:08 PM   Change in Wound Size % (l*w) -99.27 1/16/2020  3:08 PM   Wound Volume (cm^3) 11.96 cm^3 1/16/2020  3:08 PM   Wound Healing % -299 1/16/2020  3:08 PM   Post-Procedure Length (cm) 9.9 cm 1/16/2020  3:52 PM   Post-Procedure Width (cm) 6.2 cm 1/16/2020  3:52 PM   Post-Procedure Depth (cm) 0.2 cm 1/16/2020  3:52 PM   Post-Procedure Surface Area (cm^2) 61.38 cm^2 1/16/2020  3:52 PM   Post-Procedure Volume (cm^3) 12.28 cm^3 1/16/2020  3:52 PM   Wound Assessment Granulation tissue;Red;Slough; Yellow 1/16/2020  3:08 PM   Drainage Amount Small 1/16/2020  3:08 PM   Drainage Description Serosanguinous; Tan 1/16/2020  3:08 PM   Odor None 1/16/2020  3:08 PM   Margins Attached edges 1/16/2020  3:08 PM   Marilynn-wound Assessment Red 1/16/2020  3:08 PM   Non-staged Wound Description Full thickness 1/16/2020  3:08 PM   [place]%Wound Bed 85 12/5/2019  3:07 PM   Red%Wound Bed 75% 1/16/2020  3:08 PM   Yellow%Wound Bed 25% 1/16/2020  3:08 PM   Number of days: 119       Wound 01/16/20 Thigh Left;Medial;Distal #1(acquired on 1/7/2020) (Active)   Wound Image   1/16/2020  3:08 PM   Wound Venous 1/16/2020  3:08 PM   Dressing Status Old drainage 1/16/2020  3:08 PM   Dressing Changed Changed/New 1/16/2020  4:08 PM   Dressing/Treatment Alginate with Ag; Other (comment); Ace wrap 1/16/2020  4:08 PM   Wound Cleansed Rinsed/Irrigated with saline 1/16/2020  3:08 PM   Wound Length (cm) 1.5 cm 1/16/2020  3:08 PM   Wound Width (cm) 1 cm 1/16/2020  3:08 PM   Wound Depth (cm) 0.3 cm 1/16/2020  3:08 PM   Wound Surface Area (cm^2) 1.5 cm^2 1/16/2020  3:08 PM   Wound Volume (cm^3) 0.45 cm^3 1/16/2020  3:08 PM   Post-Procedure Length (cm) 1.6 cm 1/16/2020  3:52 PM   Post-Procedure Width (cm) 1.1 cm 1/16/2020  3:52 PM   Post-Procedure Depth (cm) 0.3 cm 1/16/2020  3:52 PM   Post-Procedure Surface Area (cm^2) 1.76 cm^2 1/16/2020  3:52 PM   Post-Procedure Volume (cm^3) 0.53 cm^3

## 2020-01-16 NOTE — PLAN OF CARE
[employer] Application   Below Knee    NAME:  Susi Dover  YOB: 1945  MEDICAL RECORD NUMBER:  [id number]  DATE:  1/16/2020    Ang melgozaot: Applied moisturizing agent to dry skin as needed. Appied primary and secondary dressing as ordered. Applied Unna roll from toes to knee overlapping each time. Applied ace wrap or coban from toes to below the knee. Secured with tape and/or metal clips covered with tape. Instructed patient/caregiver to keep dressing dry and intact. DO NOT REMOVE DRESSING. Instructed pt/family/caregiver to report excessive draining, loose bandage, wet dressing, severe pain or tingling in toes. Applied [employer] dressing below the knee to right lower leg. Applied [employer] dressing below the knee to left lower leg. Assisted With Cathy Bhardwaj RN and Meliza CARD. Unna Boot(s) were applied per  Guidelines.      Electronically signed by Jose Briones RN on 1/16/2020 at 4:14 PM

## 2020-01-30 ENCOUNTER — HOSPITAL ENCOUNTER (OUTPATIENT)
Dept: WOUND CARE | Age: 75
Discharge: HOME | End: 2020-01-30
Payer: MEDICARE

## 2020-01-30 VITALS
HEART RATE: 75 BPM | DIASTOLIC BLOOD PRESSURE: 64 MMHG | RESPIRATION RATE: 18 BRPM | TEMPERATURE: 98.3 F | SYSTOLIC BLOOD PRESSURE: 145 MMHG

## 2020-01-30 PROCEDURE — 11042 DBRDMT SUBQ TIS 1ST 20SQCM/<: CPT

## 2020-01-30 PROCEDURE — 6370000000 HC RX 637 (ALT 250 FOR IP): Performed by: SURGERY

## 2020-01-30 PROCEDURE — 11042 DBRDMT SUBQ TIS 1ST 20SQCM/<: CPT | Performed by: SURGERY

## 2020-01-30 PROCEDURE — 11045 DBRDMT SUBQ TISS EACH ADDL: CPT | Performed by: SURGERY

## 2020-01-30 PROCEDURE — 11045 DBRDMT SUBQ TISS EACH ADDL: CPT

## 2020-01-30 RX ORDER — GINSENG 100 MG
CAPSULE ORAL ONCE
Status: CANCELLED | OUTPATIENT
Start: 2020-01-30

## 2020-01-30 RX ORDER — LIDOCAINE HYDROCHLORIDE 40 MG/ML
5 SOLUTION TOPICAL ONCE
Status: CANCELLED | OUTPATIENT
Start: 2020-01-30

## 2020-01-30 RX ORDER — LIDOCAINE 50 MG/G
0.5 OINTMENT TOPICAL ONCE
Status: CANCELLED | OUTPATIENT
Start: 2020-01-30

## 2020-01-30 RX ORDER — CLOBETASOL PROPIONATE 0.5 MG/G
OINTMENT TOPICAL ONCE
Status: CANCELLED | OUTPATIENT
Start: 2020-01-30

## 2020-01-30 RX ORDER — BACITRACIN, NEOMYCIN, POLYMYXIN B 400; 3.5; 5 [USP'U]/G; MG/G; [USP'U]/G
OINTMENT TOPICAL ONCE
Status: CANCELLED | OUTPATIENT
Start: 2020-01-30

## 2020-01-30 RX ORDER — GENTAMICIN SULFATE 1 MG/G
OINTMENT TOPICAL ONCE
Status: CANCELLED | OUTPATIENT
Start: 2020-01-30

## 2020-01-30 RX ORDER — LIDOCAINE HYDROCHLORIDE 40 MG/ML
5 SOLUTION TOPICAL ONCE
Status: COMPLETED | OUTPATIENT
Start: 2020-01-30 | End: 2020-01-30

## 2020-01-30 RX ORDER — BETAMETHASONE DIPROPIONATE 0.05 %
OINTMENT (GRAM) TOPICAL ONCE
Status: CANCELLED | OUTPATIENT
Start: 2020-01-30

## 2020-01-30 RX ORDER — BACITRACIN ZINC AND POLYMYXIN B SULFATE 500; 1000 [USP'U]/G; [USP'U]/G
OINTMENT TOPICAL ONCE
Status: CANCELLED | OUTPATIENT
Start: 2020-01-30

## 2020-01-30 RX ORDER — LIDOCAINE 40 MG/G
CREAM TOPICAL ONCE
Status: CANCELLED | OUTPATIENT
Start: 2020-01-30

## 2020-01-30 RX ADMIN — LIDOCAINE HYDROCHLORIDE 5 ML: 40 SOLUTION TOPICAL at 15:14

## 2020-01-30 ASSESSMENT — PAIN DESCRIPTION - PROGRESSION: CLINICAL_PROGRESSION: NOT CHANGED

## 2020-01-30 ASSESSMENT — PAIN DESCRIPTION - LOCATION: LOCATION: LEG

## 2020-01-30 ASSESSMENT — PAIN DESCRIPTION - ONSET: ONSET: ON-GOING

## 2020-01-30 ASSESSMENT — PAIN - FUNCTIONAL ASSESSMENT: PAIN_FUNCTIONAL_ASSESSMENT: ACTIVITIES ARE NOT PREVENTED

## 2020-01-30 ASSESSMENT — PAIN DESCRIPTION - DESCRIPTORS: DESCRIPTORS: ACHING

## 2020-01-30 ASSESSMENT — PAIN DESCRIPTION - PAIN TYPE: TYPE: ACUTE PAIN

## 2020-01-30 ASSESSMENT — PAIN SCALES - GENERAL
PAINLEVEL_OUTOF10: 8
PAINLEVEL_OUTOF10: 0

## 2020-01-30 ASSESSMENT — PAIN DESCRIPTION - FREQUENCY: FREQUENCY: CONTINUOUS

## 2020-01-30 ASSESSMENT — PAIN DESCRIPTION - ORIENTATION: ORIENTATION: RIGHT;LEFT

## 2020-01-30 NOTE — PROGRESS NOTES
ulcer ([address]       bilateral lower extremities     Suprapubic catheter ([address]              PAST SURGICAL HISTORY     Past Surgical History         Past Surgical History:   Procedure Laterality Date    APPENDECTOMY        BACK SURGERY         x2    CATARACT REMOVAL         bilateral     CHOLECYSTECTOMY        COLONOSCOPY        EYE SURGERY        FRACTURE SURGERY         Left ankle    HYSTERECTOMY        INSET/CHANGE LOMELI CATHETER - COMPLICATED   02/6364     suprapubic cath    JOINT REPLACEMENT         Right knee    KNEE SURGERY                FAMILY HISTORY     Family History         Family History   Problem Relation Age of Onset    Heart Disease Father      Heart Failure Father      Diabetes Father      Diabetes Daughter      Diabetes Daughter              SOCIAL HISTORY     Social History            Tobacco Use    Smoking status: Never Smoker    Smokeless tobacco: Never Used   Substance Use Topics    Alcohol use: Yes       Comment: occassional    Drug use:  No         ALLERGIES          Allergies   Allergen Reactions    Sulfa Antibiotics Rash         MEDICATIONS            Current Outpatient Medications on File Prior to Encounter   Medication Sig Dispense Refill    ibuprofen (ADVIL;MOTRIN) 200 MG tablet Take 400 mg by mouth every 6 hours as needed for Pain        traMADol (ULTRAM) 50 MG tablet Take 50 mg by mouth every 8 hours as needed for Pain.        raNITIdine HCl (ZANTAC 150 MAXIMUM STRENGTH PO) Take 150 mg by mouth 2 times daily        ferrous sulfate 325 (65 Fe) MG tablet Take 325 mg by mouth 2 times daily         insulin lispro (HUMALOG) 100 UNIT/ML injection vial Inject 5 Units into the skin 3 times daily (with meals) (Patient taking differently: Inject into the skin Humalog insulin pump: 1.8 Unit basal rate continous and as needed bolus per insulin pump) 1 vial 0    atorvastatin (LIPITOR) 10 MG tablet Take 1 tablet by mouth daily 30 tablet 0    metoprolol tartrate (LOPRESSOR) Wound Venous 1/16/2020  3:08 PM   Dressing Status Old drainage 1/30/2020  2:50 PM   Dressing Changed Changed/New 1/16/2020  4:08 PM   Dressing/Treatment Alginate with Ag; Other (comment) 1/16/2020  4:08 PM   Wound Cleansed Rinsed/Irrigated with saline; Soap and water 1/30/2020  2:50 PM   Wound Length (cm) 7.4 cm 1/30/2020  2:50 PM   Wound Width (cm) 7.8 cm 1/30/2020  2:50 PM   Wound Depth (cm) 0.1 cm 1/30/2020  2:50 PM   Wound Surface Area (cm^2) 57.72 cm^2 1/30/2020  2:50 PM   Change in Wound Size % (l*w) -312.29 1/30/2020  2:50 PM   Wound Volume (cm^3) 5.77 cm^3 1/30/2020  2:50 PM   Wound Healing % -312 1/30/2020  2:50 PM   Post-Procedure Length (cm) 7.5 cm 1/30/2020  3:27 PM   Post-Procedure Width (cm) 7.9 cm 1/30/2020  3:27 PM   Post-Procedure Depth (cm) 0.1 cm 1/30/2020  3:27 PM   Post-Procedure Surface Area (cm^2) 59.25 cm^2 1/30/2020  3:27 PM   Post-Procedure Volume (cm^3) 5.92 cm^3 1/30/2020  3:27 PM   Wound Assessment Granulation tissue;Pink;Slough; Yellow 1/30/2020  2:50 PM   Drainage Amount Large 1/30/2020  2:50 PM   Drainage Description Brown;Green;Purulent;Yellow 1/30/2020  2:50 PM   Odor Mild 1/30/2020  2:50 PM   Margins Attached edges 1/30/2020  2:50 PM   Marilynn-wound Assessment Pink 1/30/2020  2:50 PM   Non-staged Wound Description Full thickness 1/30/2020  2:50 PM   [place]ink%Wound Bed 20% 1/30/2020  2:50 PM   Red%Wound Bed 90% 1/16/2020  3:08 PM   Yellow%Wound Bed 80% 1/30/2020  2:50 PM   Number of days: 147       Wound 09/19/19 Leg Left; Lower;Medial;Proximal #2 left lower leg medial proximal  (Active)   Wound Image   1/16/2020  3:08 PM   Wound Venous 1/16/2020  3:08 PM   Dressing Status Old drainage 1/30/2020  2:50 PM   Dressing Changed Changed/New 1/16/2020  4:08 PM   Dressing/Treatment Alginate with Ag; Other (comment) 1/16/2020  4:08 PM   Wound Cleansed Wound cleanser; Soap and water 1/30/2020  2:50 PM   Dressing Change Due 01/07/20 1/6/2020 10:33 AM   Wound Length (cm) 9 cm 1/30/2020  2:50 PM   Wound Post-Procedure Depth (cm) 0.1 cm 1/30/2020  3:27 PM   Post-Procedure Surface Area (cm^2) 2.25 cm^2 1/30/2020  3:27 PM   Post-Procedure Volume (cm^3) 0.22 cm^3 1/30/2020  3:27 PM   Wound Assessment Brown 1/30/2020  2:50 PM   Drainage Amount None 1/30/2020  2:50 PM   Drainage Description Serous 1/16/2020  3:08 PM   Odor None 1/30/2020  2:50 PM   Margins Attached edges 1/30/2020  2:50 PM   Marilynn-wound Assessment Pink 1/30/2020  2:50 PM   Non-staged Wound Description Full thickness 1/30/2020  2:50 PM   [place]%Wound Bed 80% 1/16/2020  3:08 PM   Yellow%Wound Bed 20% 1/16/2020  3:08 PM   Black%Wound Bed 100% 1/30/2020  2:50 PM   Number of days: 13       Percent of Wound/Ulcer Debrided: 80%    Total Surface Area Debrided:  218.07 sq cm x0.8 = 174.46    Diabetic/Pressure/Non Pressure Ulcers only:  Ulcer: N/A    Bleeding: Minimal    Hemostasis Achieved: by pressure    Procedural Pain: 3  / 10     Post Procedural Pain: 0 / 10     Response to treatment:  Well tolerated by patient.

## 2020-01-30 NOTE — PLAN OF CARE
[employer] Application   Below Knee    NAME:  Esperanza Estrada  YOB: 1945  MEDICAL RECORD NUMBER:  [id number]  DATE:  1/30/2020    Carly Sunshine boot: Applied moisturizing agent to dry skin as needed. Appied primary and secondary dressing as ordered. Applied Unna roll from toes to knee overlapping each time. Applied ace wrap or coban from toes to below the knee. Secured with tape and/or metal clips covered with tape. Instructed patient/caregiver to keep dressing dry and intact. DO NOT REMOVE DRESSING. Instructed pt/family/caregiver to report excessive draining, loose bandage, wet dressing, severe pain or tingling in toes. Applied [employer] dressing below the knee to right lower leg. Applied [employer] dressing below the knee to left lower leg. Unna Boot(s) were applied per  Guidelines.      Electronically signed by Enzo Platt RN on 1/30/2020 at 3:43 PM

## 2020-02-10 ENCOUNTER — APPOINTMENT (OUTPATIENT)
Dept: MRI IMAGING | Age: 75
DRG: 696 | End: 2020-02-10
Payer: MEDICARE

## 2020-02-10 ENCOUNTER — APPOINTMENT (OUTPATIENT)
Dept: CT IMAGING | Age: 75
DRG: 696 | End: 2020-02-10
Payer: MEDICARE

## 2020-02-10 ENCOUNTER — HOSPITAL ENCOUNTER (INPATIENT)
Age: 75
LOS: 2 days | Discharge: HOME HEALTH | DRG: 696 | End: 2020-02-12
Attending: EMERGENCY MEDICINE | Admitting: INTERNAL MEDICINE
Payer: MEDICARE

## 2020-02-10 PROBLEM — R31.0 GROSS HEMATURIA: Status: ACTIVE | Noted: 2020-02-10

## 2020-02-10 LAB
A/G RATIO: 0.8 (ref 1.1–2.2)
ABO/RH: NORMAL
ALBUMIN SERPL-MCNC: 2.9 G/DL (ref 3.4–5)
ALP BLD-CCNC: 126 U/L (ref 40–129)
ALT SERPL-CCNC: 8 U/L (ref 10–40)
ANION GAP SERPL CALCULATED.3IONS-SCNC: 15 MMOL/L (ref 3–16)
ANTIBODY SCREEN: NORMAL
AST SERPL-CCNC: 12 U/L (ref 15–37)
BASOPHILS ABSOLUTE: 0.1 K/UL (ref 0–0.2)
BASOPHILS RELATIVE PERCENT: 0.8 %
BILIRUB SERPL-MCNC: <0.2 MG/DL (ref 0–1)
BILIRUBIN URINE: ABNORMAL
BLOOD, URINE: ABNORMAL
BUN BLDV-MCNC: 12 MG/DL (ref 7–20)
CALCIUM SERPL-MCNC: 8.9 MG/DL (ref 8.3–10.6)
CHLORIDE BLD-SCNC: 99 MMOL/L (ref 99–110)
CLARITY: ABNORMAL
CO2: 22 MMOL/L (ref 21–32)
COLOR: ABNORMAL
CREAT SERPL-MCNC: 1.2 MG/DL (ref 0.6–1.2)
EOSINOPHILS ABSOLUTE: 0.4 K/UL (ref 0–0.6)
EOSINOPHILS RELATIVE PERCENT: 6 %
ESTIMATED AVERAGE GLUCOSE: 151.3 MG/DL
FERRITIN: 335.3 NG/ML (ref 15–150)
GFR AFRICAN AMERICAN: 53
GFR NON-AFRICAN AMERICAN: 44
GLOBULIN: 3.6 G/DL
GLUCOSE BLD-MCNC: 133 MG/DL (ref 70–99)
GLUCOSE BLD-MCNC: 169 MG/DL (ref 70–99)
GLUCOSE BLD-MCNC: 207 MG/DL (ref 70–99)
GLUCOSE BLD-MCNC: 315 MG/DL (ref 70–99)
GLUCOSE BLD-MCNC: 346 MG/DL (ref 70–99)
GLUCOSE URINE: 100 MG/DL
HBA1C MFR BLD: 6.9 %
HCT VFR BLD CALC: 25.6 % (ref 36–48)
HCT VFR BLD CALC: 26.4 % (ref 36–48)
HEMOGLOBIN: 8.8 G/DL (ref 12–16)
HEMOGLOBIN: 9 G/DL (ref 12–16)
KETONES, URINE: 15 MG/DL
LEUKOCYTE ESTERASE, URINE: ABNORMAL
LYMPHOCYTES ABSOLUTE: 1.2 K/UL (ref 1–5.1)
LYMPHOCYTES RELATIVE PERCENT: 15.9 %
MCH RBC QN AUTO: 33.8 PG (ref 26–34)
MCHC RBC AUTO-ENTMCNC: 34.2 G/DL (ref 31–36)
MCV RBC AUTO: 98.7 FL (ref 80–100)
MICROSCOPIC EXAMINATION: YES
MONOCYTES ABSOLUTE: 0.4 K/UL (ref 0–1.3)
MONOCYTES RELATIVE PERCENT: 5.9 %
NEUTROPHILS ABSOLUTE: 5.3 K/UL (ref 1.7–7.7)
NEUTROPHILS RELATIVE PERCENT: 71.4 %
NITRITE, URINE: POSITIVE
PDW BLD-RTO: 13.4 % (ref 12.4–15.4)
PERFORMED ON: ABNORMAL
PH UA: 7 (ref 5–8)
PLATELET # BLD: 190 K/UL (ref 135–450)
PMV BLD AUTO: 8.9 FL (ref 5–10.5)
POTASSIUM REFLEX MAGNESIUM: 3.8 MMOL/L (ref 3.5–5.1)
PROTEIN UA: >=300 MG/DL
RBC # BLD: 2.59 M/UL (ref 4–5.2)
RBC UA: >100 /HPF (ref 0–2)
SODIUM BLD-SCNC: 136 MMOL/L (ref 136–145)
SPECIFIC GRAVITY UA: 1.01 (ref 1–1.03)
TOTAL PROTEIN: 6.5 G/DL (ref 6.4–8.2)
URINE REFLEX TO CULTURE: YES
URINE TYPE: ABNORMAL
UROBILINOGEN, URINE: 4 E.U./DL
WBC # BLD: 7.5 K/UL (ref 4–11)
WBC UA: ABNORMAL /HPF (ref 0–5)

## 2020-02-10 PROCEDURE — 85014 HEMATOCRIT: CPT

## 2020-02-10 PROCEDURE — 80053 COMPREHEN METABOLIC PANEL: CPT

## 2020-02-10 PROCEDURE — 87040 BLOOD CULTURE FOR BACTERIA: CPT

## 2020-02-10 PROCEDURE — 74176 CT ABD & PELVIS W/O CONTRAST: CPT

## 2020-02-10 PROCEDURE — 2580000003 HC RX 258: Performed by: INTERNAL MEDICINE

## 2020-02-10 PROCEDURE — 6360000002 HC RX W HCPCS: Performed by: INTERNAL MEDICINE

## 2020-02-10 PROCEDURE — 6370000000 HC RX 637 (ALT 250 FOR IP): Performed by: INTERNAL MEDICINE

## 2020-02-10 PROCEDURE — 87086 URINE CULTURE/COLONY COUNT: CPT

## 2020-02-10 PROCEDURE — 94760 N-INVAS EAR/PLS OXIMETRY 1: CPT

## 2020-02-10 PROCEDURE — 86900 BLOOD TYPING SEROLOGIC ABO: CPT

## 2020-02-10 PROCEDURE — 1200000000 HC SEMI PRIVATE

## 2020-02-10 PROCEDURE — 82728 ASSAY OF FERRITIN: CPT

## 2020-02-10 PROCEDURE — 99284 EMERGENCY DEPT VISIT MOD MDM: CPT

## 2020-02-10 PROCEDURE — 86901 BLOOD TYPING SEROLOGIC RH(D): CPT

## 2020-02-10 PROCEDURE — 85018 HEMOGLOBIN: CPT

## 2020-02-10 PROCEDURE — 83036 HEMOGLOBIN GLYCOSYLATED A1C: CPT

## 2020-02-10 PROCEDURE — 85025 COMPLETE CBC W/AUTO DIFF WBC: CPT

## 2020-02-10 PROCEDURE — 6370000000 HC RX 637 (ALT 250 FOR IP): Performed by: EMERGENCY MEDICINE

## 2020-02-10 PROCEDURE — 36415 COLL VENOUS BLD VENIPUNCTURE: CPT

## 2020-02-10 PROCEDURE — 81001 URINALYSIS AUTO W/SCOPE: CPT

## 2020-02-10 PROCEDURE — 72148 MRI LUMBAR SPINE W/O DYE: CPT

## 2020-02-10 PROCEDURE — 86850 RBC ANTIBODY SCREEN: CPT

## 2020-02-10 RX ORDER — LEVOFLOXACIN 500 MG/1
500 TABLET, FILM COATED ORAL ONCE
Status: COMPLETED | OUTPATIENT
Start: 2020-02-10 | End: 2020-02-10

## 2020-02-10 RX ORDER — AMOXICILLIN 250 MG
1 CAPSULE ORAL
COMMUNITY
End: 2020-09-22

## 2020-02-10 RX ORDER — TRAMADOL HYDROCHLORIDE 50 MG/1
50 TABLET ORAL EVERY 8 HOURS PRN
Status: DISCONTINUED | OUTPATIENT
Start: 2020-02-10 | End: 2020-02-12 | Stop reason: HOSPADM

## 2020-02-10 RX ORDER — NICOTINE POLACRILEX 4 MG
15 LOZENGE BUCCAL PRN
Status: DISCONTINUED | OUTPATIENT
Start: 2020-02-10 | End: 2020-02-12 | Stop reason: HOSPADM

## 2020-02-10 RX ORDER — FAMOTIDINE 20 MG/1
20 TABLET, FILM COATED ORAL DAILY
Status: DISCONTINUED | OUTPATIENT
Start: 2020-02-10 | End: 2020-02-12 | Stop reason: HOSPADM

## 2020-02-10 RX ORDER — DORZOLAMIDE HYDROCHLORIDE AND TIMOLOL MALEATE 20; 5 MG/ML; MG/ML
1 SOLUTION/ DROPS OPHTHALMIC 2 TIMES DAILY
Status: DISCONTINUED | OUTPATIENT
Start: 2020-02-10 | End: 2020-02-12 | Stop reason: HOSPADM

## 2020-02-10 RX ORDER — ATROPINE SULFATE 10 MG/ML
1 SOLUTION/ DROPS OPHTHALMIC 2 TIMES DAILY
Status: DISCONTINUED | OUTPATIENT
Start: 2020-02-10 | End: 2020-02-12 | Stop reason: HOSPADM

## 2020-02-10 RX ORDER — VENLAFAXINE 37.5 MG/1
150 TABLET ORAL 2 TIMES DAILY
Status: DISCONTINUED | OUTPATIENT
Start: 2020-02-10 | End: 2020-02-12 | Stop reason: HOSPADM

## 2020-02-10 RX ORDER — ACETAMINOPHEN 500 MG
1000 TABLET ORAL EVERY 8 HOURS PRN
Status: DISCONTINUED | OUTPATIENT
Start: 2020-02-10 | End: 2020-02-10 | Stop reason: SDUPTHER

## 2020-02-10 RX ORDER — OXYCODONE HYDROCHLORIDE 5 MG/1
7.5 TABLET ORAL EVERY 4 HOURS PRN
Status: DISCONTINUED | OUTPATIENT
Start: 2020-02-10 | End: 2020-02-10

## 2020-02-10 RX ORDER — PREDNISOLONE ACETATE 10 MG/ML
1 SUSPENSION/ DROPS OPHTHALMIC 2 TIMES DAILY
Status: DISCONTINUED | OUTPATIENT
Start: 2020-02-10 | End: 2020-02-12 | Stop reason: HOSPADM

## 2020-02-10 RX ORDER — PREDNISOLONE ACETATE 10 MG/ML
1 SUSPENSION/ DROPS OPHTHALMIC 2 TIMES DAILY
COMMUNITY
Start: 2019-12-28 | End: 2020-09-22

## 2020-02-10 RX ORDER — ATORVASTATIN CALCIUM 10 MG/1
10 TABLET, FILM COATED ORAL NIGHTLY
Status: DISCONTINUED | OUTPATIENT
Start: 2020-02-10 | End: 2020-02-12 | Stop reason: HOSPADM

## 2020-02-10 RX ORDER — FUROSEMIDE 40 MG/1
40 TABLET ORAL EVERY OTHER DAY
COMMUNITY
End: 2020-09-22

## 2020-02-10 RX ORDER — ACETAMINOPHEN 500 MG
1000 TABLET ORAL EVERY 6 HOURS PRN
Status: DISCONTINUED | OUTPATIENT
Start: 2020-02-10 | End: 2020-02-12 | Stop reason: HOSPADM

## 2020-02-10 RX ORDER — LORAZEPAM 2 MG/ML
1 INJECTION INTRAMUSCULAR
Status: COMPLETED | OUTPATIENT
Start: 2020-02-10 | End: 2020-02-10

## 2020-02-10 RX ORDER — POTASSIUM CHLORIDE 7.45 MG/ML
10 INJECTION INTRAVENOUS PRN
Status: DISCONTINUED | OUTPATIENT
Start: 2020-02-10 | End: 2020-02-12 | Stop reason: HOSPADM

## 2020-02-10 RX ORDER — SODIUM CHLORIDE 0.9 % (FLUSH) 0.9 %
10 SYRINGE (ML) INJECTION PRN
Status: DISCONTINUED | OUTPATIENT
Start: 2020-02-10 | End: 2020-02-12 | Stop reason: HOSPADM

## 2020-02-10 RX ORDER — FLUCONAZOLE 150 MG/1
150 TABLET ORAL
Status: ON HOLD | COMMUNITY
Start: 2020-01-26 | End: 2020-02-10 | Stop reason: ALTCHOICE

## 2020-02-10 RX ORDER — POTASSIUM CHLORIDE 7.45 MG/ML
10 INJECTION INTRAVENOUS PRN
Status: DISCONTINUED | OUTPATIENT
Start: 2020-02-10 | End: 2020-02-10 | Stop reason: SDUPTHER

## 2020-02-10 RX ORDER — DEXTROSE MONOHYDRATE 25 G/50ML
12.5 INJECTION, SOLUTION INTRAVENOUS PRN
Status: DISCONTINUED | OUTPATIENT
Start: 2020-02-10 | End: 2020-02-12 | Stop reason: HOSPADM

## 2020-02-10 RX ORDER — LEVOFLOXACIN 750 MG/1
750 TABLET ORAL DAILY
Qty: 5 TABLET | Refills: 0 | Status: SHIPPED | OUTPATIENT
Start: 2020-02-10 | End: 2020-02-12 | Stop reason: HOSPADM

## 2020-02-10 RX ORDER — INSULIN GLARGINE 100 [IU]/ML
0.25 INJECTION, SOLUTION SUBCUTANEOUS NIGHTLY
Status: DISCONTINUED | OUTPATIENT
Start: 2020-02-10 | End: 2020-02-11

## 2020-02-10 RX ORDER — OMEGA-3S/DHA/EPA/FISH OIL/D3 300MG-1000
400 CAPSULE ORAL DAILY
Status: DISCONTINUED | OUTPATIENT
Start: 2020-02-10 | End: 2020-02-12 | Stop reason: HOSPADM

## 2020-02-10 RX ORDER — MAGNESIUM SULFATE 1 G/100ML
1 INJECTION INTRAVENOUS PRN
Status: DISCONTINUED | OUTPATIENT
Start: 2020-02-10 | End: 2020-02-12 | Stop reason: HOSPADM

## 2020-02-10 RX ORDER — POTASSIUM CHLORIDE 20 MEQ/1
40 TABLET, EXTENDED RELEASE ORAL PRN
Status: DISCONTINUED | OUTPATIENT
Start: 2020-02-10 | End: 2020-02-12 | Stop reason: HOSPADM

## 2020-02-10 RX ORDER — ONDANSETRON 2 MG/ML
4 INJECTION INTRAMUSCULAR; INTRAVENOUS EVERY 6 HOURS PRN
Status: DISCONTINUED | OUTPATIENT
Start: 2020-02-10 | End: 2020-02-12 | Stop reason: HOSPADM

## 2020-02-10 RX ORDER — DEXTROSE MONOHYDRATE 50 MG/ML
100 INJECTION, SOLUTION INTRAVENOUS PRN
Status: DISCONTINUED | OUTPATIENT
Start: 2020-02-10 | End: 2020-02-12 | Stop reason: HOSPADM

## 2020-02-10 RX ORDER — DORZOLAMIDE HYDROCHLORIDE AND TIMOLOL MALEATE 20; 5 MG/ML; MG/ML
1 SOLUTION/ DROPS OPHTHALMIC 2 TIMES DAILY
COMMUNITY
Start: 2019-12-31 | End: 2020-09-22

## 2020-02-10 RX ORDER — ATROPINE SULFATE 10 MG/ML
1 SOLUTION/ DROPS OPHTHALMIC 2 TIMES DAILY
COMMUNITY
Start: 2019-12-28 | End: 2020-09-22

## 2020-02-10 RX ORDER — OXYCODONE HYDROCHLORIDE 5 MG/1
5 TABLET ORAL EVERY 6 HOURS PRN
Status: DISCONTINUED | OUTPATIENT
Start: 2020-02-10 | End: 2020-02-10

## 2020-02-10 RX ORDER — SODIUM CHLORIDE 0.9 % (FLUSH) 0.9 %
10 SYRINGE (ML) INJECTION EVERY 12 HOURS SCHEDULED
Status: DISCONTINUED | OUTPATIENT
Start: 2020-02-10 | End: 2020-02-12 | Stop reason: HOSPADM

## 2020-02-10 RX ORDER — 0.9 % SODIUM CHLORIDE 0.9 %
1000 INTRAVENOUS SOLUTION INTRAVENOUS ONCE
Status: COMPLETED | OUTPATIENT
Start: 2020-02-10 | End: 2020-02-10

## 2020-02-10 RX ADMIN — PANCRELIPASE 12000 UNITS: 60000; 12000; 38000 CAPSULE, DELAYED RELEASE PELLETS ORAL at 17:23

## 2020-02-10 RX ADMIN — LORAZEPAM 1 MG: 2 INJECTION INTRAMUSCULAR; INTRAVENOUS at 18:30

## 2020-02-10 RX ADMIN — DORZOLAMIDE HYDROCHLORIDE AND TIMOLOL MALEATE 1 DROP: 20; 5 SOLUTION/ DROPS OPHTHALMIC at 21:37

## 2020-02-10 RX ADMIN — INSULIN GLARGINE 21 UNITS: 100 INJECTION, SOLUTION SUBCUTANEOUS at 21:37

## 2020-02-10 RX ADMIN — PANCRELIPASE 12000 UNITS: 60000; 12000; 38000 CAPSULE, DELAYED RELEASE PELLETS ORAL at 12:50

## 2020-02-10 RX ADMIN — FAMOTIDINE 20 MG: 20 TABLET, FILM COATED ORAL at 10:50

## 2020-02-10 RX ADMIN — ATROPINE SULFATE 1 DROP: 10 SOLUTION OPHTHALMIC at 21:37

## 2020-02-10 RX ADMIN — LEVOFLOXACIN 500 MG: 500 TABLET, FILM COATED ORAL at 05:45

## 2020-02-10 RX ADMIN — ATORVASTATIN CALCIUM 10 MG: 10 TABLET, FILM COATED ORAL at 21:37

## 2020-02-10 RX ADMIN — CHOLECALCIFEROL (VITAMIN D3) 10 MCG (400 UNIT) TABLET 400 UNITS: at 10:50

## 2020-02-10 RX ADMIN — VENLAFAXINE 150 MG: 37.5 TABLET ORAL at 10:50

## 2020-02-10 RX ADMIN — VENLAFAXINE 150 MG: 37.5 TABLET ORAL at 21:37

## 2020-02-10 RX ADMIN — SODIUM CHLORIDE 1000 ML: 9 INJECTION, SOLUTION INTRAVENOUS at 10:49

## 2020-02-10 RX ADMIN — PREDNISOLONE ACETATE 1 DROP: 10 SUSPENSION/ DROPS OPHTHALMIC at 21:37

## 2020-02-10 RX ADMIN — INSULIN LISPRO 2 UNITS: 100 INJECTION, SOLUTION INTRAVENOUS; SUBCUTANEOUS at 12:50

## 2020-02-10 RX ADMIN — CEFTRIAXONE 1 G: 1 INJECTION, POWDER, FOR SOLUTION INTRAMUSCULAR; INTRAVENOUS at 10:49

## 2020-02-10 RX ADMIN — SODIUM CHLORIDE, PRESERVATIVE FREE 10 ML: 5 INJECTION INTRAVENOUS at 11:01

## 2020-02-10 RX ADMIN — INSULIN LISPRO 4 UNITS: 100 INJECTION, SOLUTION INTRAVENOUS; SUBCUTANEOUS at 17:23

## 2020-02-10 RX ADMIN — SODIUM CHLORIDE, PRESERVATIVE FREE 10 ML: 5 INJECTION INTRAVENOUS at 21:37

## 2020-02-10 RX ADMIN — PANCRELIPASE 12000 UNITS: 60000; 12000; 38000 CAPSULE, DELAYED RELEASE PELLETS ORAL at 10:50

## 2020-02-10 ASSESSMENT — PAIN SCALES - GENERAL
PAINLEVEL_OUTOF10: 0
PAINLEVEL_OUTOF10: 0

## 2020-02-10 NOTE — H&P
SURGERY      Left ankle    HYSTERECTOMY      INSET/CHANGE LOMELI CATHETER - COMPLICATED  81/4054    suprapubic cath    JOINT REPLACEMENT      Right knee    KNEE SURGERY         Medications Prior to Admission:    Prior to Admission medications    Medication Sig Start Date End Date Taking? Authorizing Provider   levofloxacin (LEVAQUIN) 750 MG tablet Take 1 tablet by mouth daily for 5 days 2/10/20 2/15/20 Yes Mary Kate Garcia MD   ibuprofen (ADVIL;MOTRIN) 600 MG tablet Take 600 mg by mouth 2 times daily 10/12/19   Historical Provider, MD   ranitidine (ZANTAC) 150 MG tablet Take 150 mg by mouth daily 10/12/19   Historical Provider, MD   furosemide (LASIX) 20 MG tablet Take 20 mg by mouth daily 10/23/19   Historical Provider, MD   traMADol (ULTRAM) 50 MG tablet Take 50 mg by mouth every 8 hours as needed for Pain. Historical Provider, MD   ferrous sulfate 325 (65 Fe) MG tablet Take 325 mg by mouth 2 times daily     Historical Provider, MD   insulin lispro (HUMALOG) 100 UNIT/ML injection vial Inject 5 Units into the skin 3 times daily (with meals)  Patient taking differently: Inject into the skin Humalog insulin pump: 1.8 Unit basal rate continous and as needed bolus per insulin pump 11/20/18   Alie Delay Zoyaer, APRN - NP   atorvastatin (LIPITOR) 10 MG tablet Take 1 tablet by mouth daily 4/20/18   Lloyd Lew MD   metoprolol tartrate (LOPRESSOR) 25 MG tablet Take 1 tablet by mouth 2 times daily 4/19/18   Lloyd Lew MD   Biotin (BIOTIN 5000) 5 MG CAPS Take 1 tablet by mouth daily    Historical Provider, MD   lipase-protease-amylase (CREON) 95541 units delayed release capsule Take 12,000 Units by mouth 3 times daily (with meals)     Historical Provider, MD   gabapentin (NEURONTIN) 300 MG capsule Take 300 mg by mouth 3 times daily.      Historical Provider, MD   isosorbide mononitrate (IMDUR) 30 MG extended release tablet Take 30 mg by mouth daily    Historical Provider, MD   venlafaxine (EFFEXOR) 75 MG tablet Take 150 mg by mouth 2 times daily     Historical Provider, MD   cetirizine (ZYRTEC) 10 MG tablet Take 10 mg by mouth daily    Historical Provider, MD   vitamin D3 (CHOLECALCIFEROL) 400 units TABS tablet Take 400 Units by mouth daily     Historical Provider, MD       Allergies:  Sulfa antibiotics    Social History:       reports that she has never smoked. She has never used smokeless tobacco. She reports current alcohol use. She reports that she does not use drugs. Family History:  Reviewed in detail and negative for DM, Early CAD, Cancer, CVA. Positive as follows:        Problem Relation Age of Onset    Heart Disease Father     Heart Failure Father     Diabetes Father     Diabetes Daughter     Diabetes Daughter        REVIEW OF SYSTEMS:   Positive review  noted in the HPI. All other systems reviewed and negative. PHYSICAL EXAM:    BP (!) 158/69   Pulse 83   Temp 97.4 °F (36.3 °C) (Oral)   Resp 16   Wt 184 lb 15.5 oz (83.9 kg)   SpO2 96%   BMI 37.36 kg/m²   General Appearance: alert and oriented to person, place and time, well developed and well- nourished, in no acute distress  Skin: warm and dry, no rash or erythema  Head: normocephalic and atraumatic  Eyes: pupils equal, round, and reactive to light, extraocular eye movements intact, conjunctivae normal  ENT: tympanic membrane, external ear and ear canal normal bilaterally, nose without deformity, nasal mucosa and turbinates normal without polyps  Neck: supple and non-tender without mass, no thyromegaly or thyroid nodules, no cervical lymphadenopathy  Pulmonary/Chest: clear to auscultation bilaterally- no wheezes, rales or rhonchi, normal air movement, no respiratory distress  Cardiovascular: normal rate, regular rhythm, normal S1 and S2, no murmurs, rubs, clicks, or gallops, Peripheral pulses good, Cap refill <3 sec, no carotid bruits  Abdomen: soft, non-tender, non-distended, normal bowel sounds, no masses or organomegaly.  [address]

## 2020-02-10 NOTE — PROGRESS NOTES
One time dose of 1mg Ativan ordered by Dr. Hayes Holstein PRN for MRI. This RN awaiting direction from MRI to administer. Will administer prior to patient leaving for scan.

## 2020-02-10 NOTE — CARE COORDINATION
Tina spoke with patient and contact, Ish Wisdom, present in room. Patient lives with her daughter (Meliza--works at [employer]) and has [address] and private duty nursing 8 hours/day. Patient uses a walker. Plan is for patient to return home with same services. Tina informed [facility] liaison of admission.      Electronically signed by Leena Hess on 2/10/2020 at 4:41 PM

## 2020-02-10 NOTE — ED PROVIDER NOTES
eMERGENCY dEPARTMENT eNCOUnter      [facility]    Chief Complaint   Patient presents with    Hematuria       HPI    Ayaan Rebolledo is a 66 y.o. female who presents with gross hematuria. This patient has a suprapubic catheter and she states she has frequent urinary tract infections. She denies any fever and deftly does have any pain with urination. She has no vomiting and no exacerbating or relieving factors. She is not on blood thinners and she denies any trauma to the area. PAST MEDICAL HISTORY    Past Medical History:   Diagnosis Date    Anemia     Atrial fibrillation ([address]     CAD (coronary artery disease)     Chronic back pain     CKD (chronic kidney disease), stage III (HCC)     Diabetes mellitus (HCC)     Diastolic CHF (HCC)     Hyperlipidemia     Hypertension     MI (myocardial infarction) ([address]     MRSA (methicillin resistant staph aureus) culture positive 04/13/2018    leg ulcer    MRSA (methicillin resistant staph aureus) culture positive 1/4/20;06/08/2019    leg    MRSA colonization 10/16/2018    Obesity (BMI 30-39. 9)     Osteoarthritis     Pancreatitis     Sepsis ([address]     Stasis ulcer ([address]     bilateral lower extremities     Suprapubic catheter ([address]        SURGICAL HISTORY    Past Surgical History:   Procedure Laterality Date    APPENDECTOMY      BACK SURGERY      x2    CATARACT REMOVAL      bilateral     CHOLECYSTECTOMY      COLONOSCOPY      EYE SURGERY      FRACTURE SURGERY      Left ankle    HYSTERECTOMY      INSET/CHANGE LOMELI CATHETER - COMPLICATED  30/4828    suprapubic cath    JOINT REPLACEMENT      Right knee    KNEE SURGERY         CURRENT MEDICATIONS    Current Outpatient Rx   Medication Sig Dispense Refill    levofloxacin (LEVAQUIN) 750 MG tablet Take 1 tablet by mouth daily for 5 days 5 tablet 0    ibuprofen (ADVIL;MOTRIN) 600 MG tablet Take 600 mg by mouth 2 times daily      ranitidine (ZANTAC) 150 MG tablet Take 150 mg by mouth daily      furosemide (LASIX) 20 MG tablet Take 20 mg by mouth daily      traMADol (ULTRAM) 50 MG tablet Take 50 mg by mouth every 8 hours as needed for Pain.  ferrous sulfate 325 (65 Fe) MG tablet Take 325 mg by mouth 2 times daily       insulin lispro (HUMALOG) 100 UNIT/ML injection vial Inject 5 Units into the skin 3 times daily (with meals) (Patient taking differently: Inject into the skin Humalog insulin pump: 1.8 Unit basal rate continous and as needed bolus per insulin pump) 1 vial 0    atorvastatin (LIPITOR) 10 MG tablet Take 1 tablet by mouth daily 30 tablet 0    metoprolol tartrate (LOPRESSOR) 25 MG tablet Take 1 tablet by mouth 2 times daily 60 tablet 0    Biotin (BIOTIN 5000) 5 MG CAPS Take 1 tablet by mouth daily      lipase-protease-amylase (CREON) 39829 units delayed release capsule Take 12,000 Units by mouth 3 times daily (with meals)       gabapentin (NEURONTIN) 300 MG capsule Take 300 mg by mouth 3 times daily.  isosorbide mononitrate (IMDUR) 30 MG extended release tablet Take 30 mg by mouth daily      venlafaxine (EFFEXOR) 75 MG tablet Take 150 mg by mouth 2 times daily       cetirizine (ZYRTEC) 10 MG tablet Take 10 mg by mouth daily      vitamin D3 (CHOLECALCIFEROL) 400 units TABS tablet Take 400 Units by mouth daily          ALLERGIES    Allergies   Allergen Reactions    Sulfa Antibiotics Rash       FAMILY HISTORY    Family History   Problem Relation Age of Onset    Heart Disease Father     Heart Failure Father     Diabetes Father     Diabetes Daughter     Diabetes Daughter        SOCIAL HISTORY    Social History     Socioeconomic History    Marital status:       Spouse name: None    Number of children: None    Years of education: None    Highest education level: None   Occupational History    None   Social Needs    Financial resource strain: None    Food insecurity:     Worry: None     Inability: None    Transportation needs:     Medical: None     Non-medical: None Tobacco Use    Smoking status: Never Smoker    Smokeless tobacco: Never Used   Substance and Sexual Activity    Alcohol use: Yes     Comment: occassional    Drug use: No    Sexual activity: Never   Lifestyle    Physical activity:     Days per week: None     Minutes per session: None    Stress: None   Relationships    Social connections:     Talks on phone: None     Gets together: None     Attends Latter day service: None     Active member of club or organization: None     Attends meetings of clubs or organizations: None     Relationship status: None    Intimate partner violence:     Fear of current or ex partner: None     Emotionally abused: None     Physically abused: None     Forced sexual activity: None   Other Topics Concern    None   Social History Narrative    None       REVIEW OF SYSTEMS    Constitutional:  Denies fever, chills, weight loss or weakness   Eyes:  Denies photophobia or discharge   HENT:  Denies sore throat or ear pain   Respiratory:  Denies cough or shortness of breath   Cardiovascular:  Denies chest pain, palpitations or swelling   GI:  Denies abdominal pain, nausea, vomiting, or diarrhea   Musculoskeletal:  Denies back pain   Skin:  Denies rash   Neurologic:  Denies headache, focal weakness or sensory changes   Endocrine:  Denies polyuria or polydypsia   Lymphatic:  Denies swollen glands   Psychiatric:  Denies depression, suicidal ideation or homicidal ideation   All systems negative except as marked. PHYSICAL EXAM    VITAL SIGNS: BP (!) 154/70   Pulse 73   Temp 98.5 °F (36.9 °C) (Oral)   Resp 16   Wt 184 lb 15.5 oz (83.9 kg)   SpO2 98%   BMI 37.36 kg/m²    Constitutional:  Well developed, Well nourished, No acute distress, Non-toxic appearance. HENT:  Normocephalic, Atraumatic, Bilateral external ears normal, Oropharynx moist, No oral exudates, Nose normal. Neck- Normal range of motion, No tenderness, Supple, No stridor.    Eyes:  PERRL, EOMI, Conjunctiva normal, No

## 2020-02-10 NOTE — PROGRESS NOTES
MRI checklist completed by patient's daughter in law. Patient reporting claustrophobia. This RN messaged Dr. Priya Nicholas regarding medication to complete scan.

## 2020-02-10 NOTE — DISCHARGE INSTR - COC
Continuity of Care Form    Patient Name: Miesha Ponce   :  1945  MRN:  [id number]    Admit date:  2/10/2020  Discharge date:  2020    Code Status Order: Full Code   Advance Directives:   Advance Care Flowsheet Documentation     Date/Time Healthcare Directive Type of Healthcare Directive Copy in [address] Agent's Name Healthcare Agent's Phone Number    02/10/20 [phone]  Yes, patient has an advance directive for healthcare treatment  Durable power of  for health care;Living will  Yes, copy in chart  Healthcare power of   Edith Dunn  [phone]          Admitting Physician:  Yumi Goncalves MD  PCP: Ari Luna MD    Discharging Nurse: [facility] Unit/Room#: K7U-8649/3118-01  Discharging Unit Phone Number: [phone]    Emergency Contact:   Extended Emergency Contact Information  Primary Emergency Contact: Meliza Patel  Address: [address] Phone: [phone]  Relation: Child  Secondary Emergency Contact: Mari Holguin [address] Phone: [phone]  Relation: Child    Past Surgical History:  Past Surgical History:   Procedure Laterality Date    APPENDECTOMY      BACK SURGERY      x2    CATARACT REMOVAL      bilateral     CHOLECYSTECTOMY      COLONOSCOPY      EYE SURGERY      FRACTURE SURGERY      Left ankle    HYSTERECTOMY      INSET/CHANGE LOMELI CATHETER - COMPLICATED      suprapubic cath    JOINT REPLACEMENT      Right knee    KNEE SURGERY         Immunization History:   Immunization History   Administered Date(s) Administered    Influenza Vaccine, unspecified formulation 10/08/2010, 08/15/2014, 2017, 2018    Influenza, High Dose (Fluzone 65 yrs and older) 2018    Influenza, Quadv, IM, (6 mo and older Fluzone, Flulaval, Fluarix and 3 yrs and older Afluria) 2017    Pneumococcal Conjugate 02/10/20 184 lb 15.5 oz (83.9 kg)     Mental Status:  oriented, alert and forgetful and confused at times    IV Access:  - None    Nursing Mobility/ADLs:  Walking   Assisted  Transfer  Assisted  Bathing  Assisted  Dressing  Assisted  Toileting  Assisted  Feeding  Assisted  Med Admin  Assisted  Med Delivery   none    Wound Care Documentation and Therapy:  Wound 08/22/19 Left; Anterior; Lower; Lateral #9 - left anterior lower leg - converted from wound #2 on 8/22/2019 (Active)   Wound Image   2/10/2020  2:14 PM   Wound Venous 2/10/2020  2:14 PM   Dressing Status Changed 2/10/2020  2:14 PM   Dressing Changed Changed/New 2/10/2020  2:14 PM   Dressing/Treatment Ace wrap; Other (comment) 2/10/2020  9:27 AM   Wound Length (cm) 3 cm 2/10/2020  2:14 PM   Wound Width (cm) 3.5 cm 2/10/2020  2:14 PM   Wound Surface Area (cm^2) 10.5 cm^2 2/10/2020  2:14 PM   Change in Wound Size % (l*w) 23.64 2/10/2020  2:14 PM   Wound Assessment Red;Pink; Tan 2/10/2020  2:14 PM   Drainage Amount Moderate 2/10/2020  9:27 AM   Drainage Description Brown;Green;Purulent;Yellow 2/10/2020  9:27 AM   Odor Strong 2/10/2020  9:27 AM   Margins Undefined edges 2/10/2020  9:27 AM   Marilynn-wound Assessment Pink; White 2/10/2020  9:27 AM   Number of days: 172       Wound 09/05/19 Leg Right;Medial;Proximal;Lower Wound #10, noted 9/5/19 (Active)   Wound Image   2/10/2020  2:14 PM   Wound Venous 2/10/2020  2:14 PM   Dressing Status Changed 2/10/2020  2:14 PM   Dressing Changed Changed/New 2/10/2020  2:14 PM   Dressing/Treatment Non adherent;Dry dressing; Ace wrap 2/10/2020  2:14 PM   Wound Length (cm) 5 cm 2/10/2020  2:14 PM   Wound Width (cm) 4 cm 2/10/2020  2:14 PM   Wound Surface Area (cm^2) 20 cm^2 2/10/2020  2:14 PM   Change in Wound Size % (l*w) -42.86 2/10/2020  2:14 PM   Wound Assessment Tan;Red;Pink;Fragile 2/10/2020  2:14 PM   Drainage Amount Scant 2/10/2020  2:14 PM   Drainage Description Serosanguinous 2/10/2020  2:14 PM   Odor Mild 2/10/2020  9:27 AM Margins Attached edges 2/10/2020  9:27 AM   Marilynn-wound Assessment Pink 2/10/2020  2:14 PM   Number of days: 158       Wound 09/19/19 Leg Left; Lower;Medial;Proximal #2 left lower leg medial proximal  (Active)   Wound Image   2/10/2020  2:14 PM   Wound Venous 2/10/2020  2:14 PM   Dressing Status Changed 2/10/2020  2:14 PM   Dressing Changed Changed/New 2/10/2020  2:14 PM   Dressing/Treatment Non adherent; Ace wrap;Dry dressing 2/10/2020  2:14 PM   Wound Length (cm) 8 cm 2/10/2020  2:14 PM   Wound Width (cm) 4 cm 2/10/2020  2:14 PM   Wound Surface Area (cm^2) 32 cm^2 2/10/2020  2:14 PM   Change in Wound Size % (l*w) -6.67 2/10/2020  2:14 PM   Wound Assessment Red;Granulation tissue;Pink;Slough 2/10/2020  2:14 PM   Drainage Amount Moderate 2/10/2020  2:14 PM   Drainage Description Serosanguinous;Brown;Green 2/10/2020  2:14 PM   Odor None 2/10/2020  2:14 PM   Margins Undefined edges 2/10/2020  9:27 AM   Marilynn-wound Assessment Denuded 2/10/2020  2:14 PM   Number of days: 144       Wound 02/10/20 Buttocks Left (Active)   Wound Pressure Stage  2 2/10/2020  2:14 PM   Dressing Status Clean;Dry; Intact 2/10/2020  2:14 PM   Dressing Changed Changed/New 2/10/2020  9:27 AM   Dressing/Treatment Silicone border 6/51/2017  2:14 PM   Dressing Change Due 02/13/20 2/10/2020  2:14 PM   Wound Length (cm) 3 cm 2/10/2020  2:14 PM   Wound Width (cm) 3 cm 2/10/2020  2:14 PM   Wound Surface Area (cm^2) 9 cm^2 2/10/2020  2:14 PM   Wound Assessment Red;Pink;Fragile 2/10/2020  2:14 PM   Drainage Amount Scant 2/10/2020  2:14 PM   Drainage Description Serosanguinous 2/10/2020  2:14 PM   Odor None 2/10/2020  2:14 PM   Margins Defined edges 2/10/2020  2:14 PM   Marilynn-wound Assessment Pink;Denuded 2/10/2020  2:14 PM   Number of days: 0       Wound 02/10/20 Buttocks Right (Active)   Wound Pressure Stage  3 2/10/2020  2:14 PM   Dressing Status Clean;Dry; Intact 2/10/2020  2:14 PM   Dressing Changed Changed/New 2/10/2020  9:27 AM   Dressing/Treatment

## 2020-02-10 NOTE — PLAN OF CARE
Problem: Falls - Risk of:  Goal: Will remain free from falls  Description  Will remain free from falls  Outcome: Ongoing  Note:   Patient remains free from falls during this shift. Fall precautions remain in place. Patient educated on the need to implement call light use prior to ambulation. Will continue to monitor and assess. Goal: Absence of physical injury  Description  Absence of physical injury  Outcome: Ongoing  Note:   Patient remains free from physical harm during this shift. Will continue to monitor and assess patient. Problem: Risk for Impaired Skin Integrity  Goal: Tissue integrity - skin and mucous membranes  Description  Structural intactness and normal physiological function of skin and  mucous membranes. Outcome: Ongoing  Note:   Patient has multiple areas of skin breakdown found on admission. Wound care consult placed. Dressings changed. Preventative dressing applied to sacrum. Interdry placed in abdominal folds. Specialty bed requested. Problem: Infection:  Goal: Will remain free from infection  Description  Will remain free from infection  Outcome: Ongoing  Note:   Patient remains free from new signs and symptoms of infection during this shift. Infection prevention measures are in place. Will continue to monitor for alterations in patient condition throughout the shift. Problem: Safety:  Goal: Free from accidental physical injury  Description  Free from accidental physical injury  Outcome: Ongoing  Note:   Patient remains free from physical harm during this shift. Will continue to monitor and assess patient. Goal: Free from intentional harm  Description  Free from intentional harm  Outcome: Ongoing  Note:   Patient remains free from physical harm during this shift. Will continue to monitor and assess patient.

## 2020-02-10 NOTE — CARE COORDINATION
[facility] Nurse  Consult Note       NAME:  Nata Gomez  MEDICAL RECORD NUMBER:  [id number]  AGE: 66 y.o. GENDER: female  : 1945  TODAY'S DATE:  2/10/2020    Subjective   Reason for WOCN Evaluation and Assessment: Multiple stage 2 and BLE venous ulcers      Ria Matt is a 66 y.o. female referred by:   [] Physician  [x] Nursing  [] Other:     Wound Identification:  Wound Type: pressure , venous  Contributing Factors: venous stasis, chronic pressure and decreased mobility, diabetes, obesity, edema    Wound History: Follows Dr. Javed Alegre for wound care. Last seen on . Chronic venous ulcers that will not heal.  Current Wound Care Treatment:  Alginate AG, dry dressing, Ace wrap, Silicone border for buttock wounds     Patient Goal of Care:  [x] Wound Healing  [] Odor Control  [] Palliative Care  [] Pain Control   [] Other:         PAST MEDICAL HISTORY        Diagnosis Date    Anemia     Atrial fibrillation (HCC)     CAD (coronary artery disease)     Chronic back pain     CKD (chronic kidney disease), stage III (HCC)     Diabetes mellitus ([address]     Diastolic CHF ([address]     Hyperlipidemia     Hypertension     MI (myocardial infarction) ([address]     MRSA (methicillin resistant staph aureus) culture positive 2018    leg ulcer    MRSA (methicillin resistant staph aureus) culture positive 20;2019    leg    MRSA colonization 10/16/2018    Obesity (BMI 30-39. 9)     Osteoarthritis     Pancreatitis     Sepsis ([address]     Stasis ulcer ([address]     bilateral lower extremities     Suprapubic catheter ([address]        PAST SURGICAL HISTORY    Past Surgical History:   Procedure Laterality Date    APPENDECTOMY      BACK SURGERY      x2    CATARACT REMOVAL      bilateral     CHOLECYSTECTOMY      COLONOSCOPY      EYE SURGERY      FRACTURE SURGERY      Left ankle    HYSTERECTOMY      INSET/CHANGE LOMELI CATHETER - COMPLICATED      suprapubic cath   [facility] JOINT REPLACEMENT      Right knee    KNEE SURGERY         FAMILY HISTORY    Family History   Problem Relation Age of Onset    Heart Disease Father     Heart Failure Father     Diabetes Father     Diabetes Daughter     Diabetes Daughter        SOCIAL HISTORY    Social History     Tobacco Use    Smoking status: Never Smoker    Smokeless tobacco: Never Used   Substance Use Topics    Alcohol use: Yes     Comment: occassional    Drug use: No       ALLERGIES    Allergies   Allergen Reactions    Sulfa Antibiotics Rash       MEDICATIONS    No current facility-administered medications on file prior to encounter. Current Outpatient Medications on File Prior to Encounter   Medication Sig Dispense Refill    furosemide (LASIX) 40 MG tablet Take 40 mg by mouth every other day      dorzolamide-timolol (COSOPT) 22.3-6.8 MG/ML ophthalmic solution Place 1 drop into the right eye 2 times daily      atropine 1 % ophthalmic solution Place 1 drop into the right eye 2 times daily      prednisoLONE acetate (PRED FORTE) 1 % ophthalmic suspension Place 1 drop into the right eye 2 times daily      aspirin 325 MG EC tablet Take 325 mg by mouth daily      senna-docusate (PERICOLACE) 8.6-50 MG per tablet Take 1 tablet by mouth Daily with lunch      Cholecalciferol (VITAMIN D3) 125 MCG (5000 UT) TABS Take 5,000 Units by mouth daily      Lactobacillus (PROBIOTIC ACIDOPHILUS PO) Take 1 tablet by mouth nightly      ibuprofen (ADVIL;MOTRIN) 600 MG tablet Take 300 mg by mouth 2 times daily       ranitidine (ZANTAC) 150 MG tablet Take 150 mg by mouth nightly       furosemide (LASIX) 20 MG tablet Take 20 mg by mouth every other day       traMADol (ULTRAM) 50 MG tablet Take 50 mg by mouth every 8 hours as needed for Pain.       ferrous sulfate 325 (65 Fe) MG tablet Take 325 mg by mouth 2 times daily (with meals) Breakfast and lunch      insulin lispro (HUMALOG) 100 UNIT/ML injection vial Inject 5 Units into the skin 3 times daily (with meals) (Patient taking differently: Inject into the skin Humalog insulin pump: 1.2 Unit basal rate continous and as needed bolus per insulin pump) 1 vial 0    atorvastatin (LIPITOR) 10 MG tablet Take 1 tablet by mouth daily (Patient taking differently: Take 10 mg by mouth Daily with lunch ) 30 tablet 0    metoprolol tartrate (LOPRESSOR) 25 MG tablet Take 1 tablet by mouth 2 times daily (Patient taking differently: Take 25 mg by mouth 2 times daily (with meals) Breakfast and dinner) 60 tablet 0    Biotin (BIOTIN 5000) 5 MG CAPS Take 1 tablet by mouth nightly       lipase-protease-amylase (CREON) 79874 units delayed release capsule Take 12,000 Units by mouth 3 times daily (with meals)       gabapentin (NEURONTIN) 300 MG capsule Take 300 mg by mouth 2 times daily.  Noon and bedtime      isosorbide mononitrate (IMDUR) 30 MG extended release tablet Take 30 mg by mouth Daily with lunch       venlafaxine (EFFEXOR) 75 MG tablet Take 150 mg by mouth 2 times daily (with meals) Breakfast and lunch      cetirizine (ZYRTEC) 10 MG tablet Take 10 mg by mouth daily         Objective    BP (!) 156/65   Pulse 96   Temp 98 °F (36.7 °C) (Oral)   Resp 16   Wt 184 lb 15.5 oz (83.9 kg)   SpO2 98%   BMI 37.36 kg/m²     LABS:  WBC:    Lab Results   Component Value Date    WBC 7.5 02/10/2020     H/H:    Lab Results   Component Value Date    HGB 9.0 02/10/2020    HCT 26.4 02/10/2020     PTT:    Lab Results   Component Value Date    APTT 30.6 05/21/2018   [APTT}  PT/INR:    Lab Results   Component Value Date    PROTIME 11.9 05/21/2018    INR 1.05 05/21/2018     HgBA1c:    Lab Results   Component Value Date    LABA1C 6.9 02/10/2020       Assessment   Jose Risk Score: Jose Scale Score: 13    Patient Active Problem List   Diagnosis Code    Acute on chronic renal insufficiency N28.9, N18.9    FTT (failure to thrive) in adult R62.7    Type 1 diabetes mellitus with hyperglycemia (HCC) E10.65    Chronic venous hypertension (idiopathic) with ulcer and inflammation of bilateral lower extremity ([facility] I87.333, L97.919, L97.929    DMII (diabetes mellitus, type 2) ([facility]) E11.9    Cellulitis of left lower extremity L03. 116    Protein-calorie malnutrition, moderate ([facility]) E44.0    MRSA colonization Z22.322    Chronic diastolic heart failure ([facility]) I50.32    Coronary artery disease involving native coronary artery of native heart without angina pectoris I25.10    Aortic atherosclerosis ([facility]) I70.0    Essential hypertension I10    Cellulitis, lower extremities bilaterally L03.90    MRSA nasal colonization Z22.322    Normocytic anemia D64.9    Altered mental status R41.82    Elevated lactic acid level R79.89    Uncontrolled diabetes mellitus ([facility]) E11.65    Sepsis ([facility]) A41.9    Stasis ulcer ([facility]) I83.009, L97.909    Bilateral lower leg cellulitis L03.116, L03.115    Chronic renal failure, stage 3 (moderate) ([facility]) N18.3    Hyperlipidemia E78.5    Hypoglycemia E16.2    Morbid obesity due to excess calories ([facility] E66.01    Gross hematuria R31.0       Measurements:  Wound 08/22/19 Left; Anterior; Lower; Lateral #9 - left anterior lower leg - converted from wound #2 on 8/22/2019 (Active)   Wound Image   2/10/2020  2:14 PM   Wound Venous 2/10/2020  2:14 PM   Dressing Status Changed 2/10/2020  2:14 PM   Dressing Changed Changed/New 2/10/2020  2:14 PM   Dressing/Treatment Ace wrap; Other (comment) 2/10/2020  9:27 AM   Wound Cleansed Rinsed/Irrigated with saline; Soap and water 1/30/2020  2:50 PM   Wound Length (cm) 3 cm 2/10/2020  2:14 PM   Wound Width (cm) 3.5 cm 2/10/2020  2:14 PM   Wound Depth (cm) 0.1 cm 1/30/2020  2:50 PM   Wound Surface Area (cm^2) 10.5 cm^2 2/10/2020  2:14 PM   Change in Wound Size % (l*w) 23.64 2/10/2020  2:14 PM   Wound Volume (cm^3) 9 cm^3 1/30/2020  2:50 PM   Wound Healing % -552 1/30/2020  2:50 PM   Post-Procedure Length (cm) 12.1 cm 1/30/2020  3:27 PM   Post-Procedure Width (cm) 7.6 cm 1/30/2020  3:27 PM Post-Procedure Depth (cm) 0.1 cm 1/30/2020  3:27 PM   Post-Procedure Surface Area (cm^2) 91.96 cm^2 1/30/2020  3:27 PM   Post-Procedure Volume (cm^3) 9.2 cm^3 1/30/2020  3:27 PM   Wound Assessment Red;Pink; Tan 2/10/2020  2:14 PM   Drainage Amount Moderate 2/10/2020  9:27 AM   Drainage Description Brown;Green;Purulent;Yellow 2/10/2020  9:27 AM   Odor Strong 2/10/2020  9:27 AM   Margins Undefined edges 2/10/2020  9:27 AM   Marilynn-wound Assessment Pink; White 2/10/2020  9:27 AM   Non-staged Wound Description Full thickness 1/30/2020  2:50 PM   Pink%Wound Bed 40% 1/30/2020  2:50 PM   Red%Wound Bed 40% 1/30/2020  2:50 PM   Yellow%Wound Bed 20% 1/30/2020  2:50 PM   Number of days: 172       Wound 09/05/19 Leg Right;Medial;Proximal;Lower Wound #10, noted 9/5/19 (Active)   Wound Image   2/10/2020  2:14 PM   Wound Venous 2/10/2020  2:14 PM   Dressing Status Changed 2/10/2020  2:14 PM   Dressing Changed Changed/New 2/10/2020  2:14 PM   Dressing/Treatment Non adherent;Dry dressing; Ace wrap 2/10/2020  2:14 PM   Wound Cleansed Rinsed/Irrigated with saline; Soap and water 1/30/2020  2:50 PM   Wound Length (cm) 5 cm 2/10/2020  2:14 PM   Wound Width (cm) 4 cm 2/10/2020  2:14 PM   Wound Depth (cm) 0.1 cm 1/30/2020  2:50 PM   Wound Surface Area (cm^2) 20 cm^2 2/10/2020  2:14 PM   Change in Wound Size % (l*w) -42.86 2/10/2020  2:14 PM   Wound Volume (cm^3) 5.77 cm^3 1/30/2020  2:50 PM   Wound Healing % -312 1/30/2020  2:50 PM   Post-Procedure Length (cm) 7.5 cm 1/30/2020  3:27 PM   Post-Procedure Width (cm) 7.9 cm 1/30/2020  3:27 PM   Post-Procedure Depth (cm) 0.1 cm 1/30/2020  3:27 PM   Post-Procedure Surface Area (cm^2) 59.25 cm^2 1/30/2020  3:27 PM   Post-Procedure Volume (cm^3) 5.92 cm^3 1/30/2020  3:27 PM   Wound Assessment Tan;Red;Pink;Fragile 2/10/2020  2:14 PM   Drainage Amount Scant 2/10/2020  2:14 PM   Drainage Description Serosanguinous 2/10/2020  2:14 PM   Odor Mild 2/10/2020  9:27 AM   Margins Attached edges 2/10/2020  9:27 AM   Marilynn-wound Assessment Pink 2/10/2020  2:14 PM   Non-staged Wound Description Full thickness 1/30/2020  2:50 PM   Pink%Wound Bed 20% 1/30/2020  2:50 PM   Red%Wound Bed 90% 1/16/2020  3:08 PM   Yellow%Wound Bed 80% 1/30/2020  2:50 PM   Number of days: 158       Wound 09/19/19 Leg Left; Lower;Medial;Proximal #2 left lower leg medial proximal  (Active)   Wound Image   2/10/2020  2:14 PM   Wound Venous 2/10/2020  2:14 PM   Dressing Status Changed 2/10/2020  2:14 PM   Dressing Changed Changed/New 2/10/2020  2:14 PM   Dressing/Treatment Non adherent; Ace wrap;Dry dressing 2/10/2020  2:14 PM   Wound Cleansed Wound cleanser; Soap and water 1/30/2020  2:50 PM   Wound Length (cm) 8 cm 2/10/2020  2:14 PM   Wound Width (cm) 4 cm 2/10/2020  2:14 PM   Wound Depth (cm) 0.1 cm 1/30/2020  2:50 PM   Wound Surface Area (cm^2) 32 cm^2 2/10/2020  2:14 PM   Change in Wound Size % (l*w) -6.67 2/10/2020  2:14 PM   Wound Volume (cm^3) 6.3 cm^3 1/30/2020  2:50 PM   Wound Healing % -110 1/30/2020  2:50 PM   Post-Procedure Length (cm) 9.1 cm 1/30/2020  3:27 PM   Post-Procedure Width (cm) 7.1 cm 1/30/2020  3:27 PM   Post-Procedure Depth (cm) 0.1 cm 1/30/2020  3:27 PM   Post-Procedure Surface Area (cm^2) 64.61 cm^2 1/30/2020  3:27 PM   Post-Procedure Volume (cm^3) 6.46 cm^3 1/30/2020  3:27 PM   Wound Assessment Red;Granulation tissue;Pink;Slough 2/10/2020  2:14 PM   Drainage Amount Moderate 2/10/2020  2:14 PM   Drainage Description Serosanguinous;Brown;Green 2/10/2020  2:14 PM   Odor None 2/10/2020  2:14 PM   Margins Undefined edges 2/10/2020  9:27 AM   Marilynn-wound Assessment Denuded 2/10/2020  2:14 PM   Non-staged Wound Description Full thickness 1/30/2020  2:50 PM   Red%Wound Bed 60% 1/30/2020  2:50 PM   Yellow%Wound Bed 40% 1/30/2020  2:50 PM   Number of days: 144       Wound 02/10/20 Buttocks Left (Active)   Wound Pressure Stage  2 2/10/2020  2:14 PM   Dressing Status Clean;Dry; Intact 2/10/2020  2:14 PM   Dressing Changed Changed/New 2/10/2020  9:27 AM   Dressing/Treatment Silicone border 4/23/2057  2:14 PM   Dressing Change Due 02/13/20 2/10/2020  2:14 PM   Wound Length (cm) 3 cm 2/10/2020  2:14 PM   Wound Width (cm) 3 cm 2/10/2020  2:14 PM   Wound Surface Area (cm^2) 9 cm^2 2/10/2020  2:14 PM   Wound Assessment Red;Pink;Fragile 2/10/2020  2:14 PM   Drainage Amount Scant 2/10/2020  2:14 PM   Drainage Description Serosanguinous 2/10/2020  2:14 PM   Odor None 2/10/2020  2:14 PM   Margins Defined edges 2/10/2020  2:14 PM   Marilynn-wound Assessment Pink;Denuded 2/10/2020  2:14 PM   Number of days: 0       Wound 02/10/20 Buttocks Right (Active)   Wound Pressure Stage  3 2/10/2020  2:14 PM   Dressing Status Clean;Dry; Intact 2/10/2020  2:14 PM   Dressing Changed Changed/New 2/10/2020  9:27 AM   Dressing/Treatment Silicone border 4/38/3674  2:14 PM   Wound Length (cm) 3.5 cm 2/10/2020  2:14 PM   Wound Width (cm) 3 cm 2/10/2020  2:14 PM   Wound Surface Area (cm^2) 10.5 cm^2 2/10/2020  2:14 PM   Wound Assessment Slough;Red;Pink;Fragile 2/10/2020  2:14 PM   Drainage Amount Scant 2/10/2020  2:14 PM   Drainage Description Serosanguinous 2/10/2020  2:14 PM   Odor None 2/10/2020  2:14 PM   Marilynn-wound Assessment Pink;Denuded 2/10/2020  2:14 PM   Number of days: 0       Wound 02/10/20 Leg Left #3 (Active)   Wound Venous 2/10/2020  2:14 PM   Dressing Status Changed 2/10/2020  2:14 PM   Dressing Changed Changed/New 2/10/2020  2:14 PM   Wound Length (cm) 3 cm 2/10/2020  2:14 PM   Wound Width (cm) 1 cm 2/10/2020  2:14 PM   Wound Surface Area (cm^2) 3 cm^2 2/10/2020  2:14 PM   Number of days: 0       Wound 02/10/20 Leg Left #4 (Active)   Wound Venous 2/10/2020  2:14 PM   Dressing Status Changed 2/10/2020  2:14 PM   Dressing Changed Changed/New 2/10/2020  2:14 PM   Wound Length (cm) 1 cm 2/10/2020  2:14 PM   Wound Width (cm) 0.5 cm 2/10/2020  2:14 PM   Wound Surface Area (cm^2) 0.5 cm^2 2/10/2020  2:14 PM   Number of days: 0     Pt awake and alert sitting up in bed. Family friend at bedside. Pt turned and repositioned with bedside RN. Pt BLE wounds undressed. Currently using adaptic, kerlex, ace wrap. Several wounds noted, see LDA's. Wounds cleansed and re-dressed. Will plan for this as a daily dressing change. Pt buttock assessed. Stage 3 to R buttock, and stage 2 to L buttock. Currently have silicone border over these wounds, will continue with these treatment. Sacral border in place for prevention. Pt has skin tears and redness under pannus. Pt has a suprapubic catheter. Try to keep this area dry, plan to use inner dry in pannus. Per bedside RN, specialty bed has been ordered, awaiting arrival.    Response to treatment:  Well tolerated by patient. Plan   Plan of Care:  -BLE venous ulcers- adaptic, kerlex, ace wrap, daily- done by bedside RN  -sacral border to sacral area, peel back each shift to assess skin, change every 3 days and as needed  -turn and reposition every 2 hours  -chair cushion, encourage to reposition self frequently while in chair  -elevate heels, apply liquid barrier film twice daily  -inner dry to abd pannus    Specialty Bed Required : Yes   [x] Low Air Loss   [] Pressure Redistribution  [] Fluid Immersion  [] Bariatric  [] Total Pressure Relief  [] Other:     Current Diet: DIET CARB CONTROL;   Dietician consult:  No    Discharge Plan:  Placement for patient upon discharge: home with support    Patient appropriate for Outpatient [address]: Follow with vascular- Dr. Baylee Urias    Ref[facility]:  []   [] [facility]  [] Supplies  [] Other    Patient/Caregiver Teaching:  Level of patient/caregiver understanding able to:   [] Indicates understanding       [] Needs reinforcement  [] Unsuccessful      [x] Verbal Understanding  [] Demonstrated understanding       [] No evidence of learning  [] Refused teaching         [] N/A       Electronically signed by Phillip Gong RN, on 2/10/2020 at 2:35 PM

## 2020-02-11 LAB
ANION GAP SERPL CALCULATED.3IONS-SCNC: 11 MMOL/L (ref 3–16)
APTT: 31.2 SEC (ref 24.2–36.2)
BASOPHILS ABSOLUTE: 0 K/UL (ref 0–0.2)
BASOPHILS RELATIVE PERCENT: 0.4 %
BUN BLDV-MCNC: 10 MG/DL (ref 7–20)
CALCIUM SERPL-MCNC: 8.5 MG/DL (ref 8.3–10.6)
CHLORIDE BLD-SCNC: 103 MMOL/L (ref 99–110)
CO2: 22 MMOL/L (ref 21–32)
CREAT SERPL-MCNC: 1 MG/DL (ref 0.6–1.2)
EOSINOPHILS ABSOLUTE: 0.2 K/UL (ref 0–0.6)
EOSINOPHILS RELATIVE PERCENT: 3.9 %
GFR AFRICAN AMERICAN: >60
GFR NON-AFRICAN AMERICAN: 54
GLUCOSE BLD-MCNC: 344 MG/DL (ref 70–99)
GLUCOSE BLD-MCNC: 348 MG/DL (ref 70–99)
GLUCOSE BLD-MCNC: 354 MG/DL (ref 70–99)
GLUCOSE BLD-MCNC: 366 MG/DL (ref 70–99)
GLUCOSE BLD-MCNC: 420 MG/DL (ref 70–99)
HCT VFR BLD CALC: 25 % (ref 36–48)
HEMOGLOBIN: 8.6 G/DL (ref 12–16)
INR BLD: 1.06 (ref 0.86–1.14)
LYMPHOCYTES ABSOLUTE: 0.8 K/UL (ref 1–5.1)
LYMPHOCYTES RELATIVE PERCENT: 13.9 %
MCH RBC QN AUTO: 34.1 PG (ref 26–34)
MCHC RBC AUTO-ENTMCNC: 34.4 G/DL (ref 31–36)
MCV RBC AUTO: 99.1 FL (ref 80–100)
MONOCYTES ABSOLUTE: 0.3 K/UL (ref 0–1.3)
MONOCYTES RELATIVE PERCENT: 5.8 %
NEUTROPHILS ABSOLUTE: 4.2 K/UL (ref 1.7–7.7)
NEUTROPHILS RELATIVE PERCENT: 76 %
PDW BLD-RTO: 13.4 % (ref 12.4–15.4)
PERFORMED ON: ABNORMAL
PLATELET # BLD: 171 K/UL (ref 135–450)
PMV BLD AUTO: 8.6 FL (ref 5–10.5)
POTASSIUM REFLEX MAGNESIUM: 3.6 MMOL/L (ref 3.5–5.1)
PROTHROMBIN TIME: 12.3 SEC (ref 10–13.2)
RBC # BLD: 2.53 M/UL (ref 4–5.2)
SODIUM BLD-SCNC: 136 MMOL/L (ref 136–145)
URINE CULTURE, ROUTINE: NORMAL
WBC # BLD: 5.5 K/UL (ref 4–11)

## 2020-02-11 PROCEDURE — 97163 PT EVAL HIGH COMPLEX 45 MIN: CPT

## 2020-02-11 PROCEDURE — 97530 THERAPEUTIC ACTIVITIES: CPT

## 2020-02-11 PROCEDURE — 94760 N-INVAS EAR/PLS OXIMETRY 1: CPT

## 2020-02-11 PROCEDURE — 85730 THROMBOPLASTIN TIME PARTIAL: CPT

## 2020-02-11 PROCEDURE — 85610 PROTHROMBIN TIME: CPT

## 2020-02-11 PROCEDURE — 6370000000 HC RX 637 (ALT 250 FOR IP): Performed by: INTERNAL MEDICINE

## 2020-02-11 PROCEDURE — 1200000000 HC SEMI PRIVATE

## 2020-02-11 PROCEDURE — 85025 COMPLETE CBC W/AUTO DIFF WBC: CPT

## 2020-02-11 PROCEDURE — 80048 BASIC METABOLIC PNL TOTAL CA: CPT

## 2020-02-11 PROCEDURE — 6360000002 HC RX W HCPCS: Performed by: INTERNAL MEDICINE

## 2020-02-11 PROCEDURE — 2580000003 HC RX 258: Performed by: INTERNAL MEDICINE

## 2020-02-11 PROCEDURE — 97110 THERAPEUTIC EXERCISES: CPT

## 2020-02-11 PROCEDURE — 36415 COLL VENOUS BLD VENIPUNCTURE: CPT

## 2020-02-11 RX ORDER — INSULIN GLARGINE 100 [IU]/ML
30 INJECTION, SOLUTION SUBCUTANEOUS NIGHTLY
Status: DISCONTINUED | OUTPATIENT
Start: 2020-02-11 | End: 2020-02-12 | Stop reason: HOSPADM

## 2020-02-11 RX ADMIN — SODIUM CHLORIDE, PRESERVATIVE FREE 10 ML: 5 INJECTION INTRAVENOUS at 21:33

## 2020-02-11 RX ADMIN — TRAMADOL HYDROCHLORIDE 50 MG: 50 TABLET, FILM COATED ORAL at 12:04

## 2020-02-11 RX ADMIN — VENLAFAXINE 150 MG: 37.5 TABLET ORAL at 21:33

## 2020-02-11 RX ADMIN — INSULIN LISPRO 5 UNITS: 100 INJECTION, SOLUTION INTRAVENOUS; SUBCUTANEOUS at 16:42

## 2020-02-11 RX ADMIN — CEFTRIAXONE 1 G: 1 INJECTION, POWDER, FOR SOLUTION INTRAMUSCULAR; INTRAVENOUS at 09:34

## 2020-02-11 RX ADMIN — INSULIN LISPRO 6 UNITS: 100 INJECTION, SOLUTION INTRAVENOUS; SUBCUTANEOUS at 12:04

## 2020-02-11 RX ADMIN — ATORVASTATIN CALCIUM 10 MG: 10 TABLET, FILM COATED ORAL at 21:33

## 2020-02-11 RX ADMIN — CHOLECALCIFEROL (VITAMIN D3) 10 MCG (400 UNIT) TABLET 400 UNITS: at 09:33

## 2020-02-11 RX ADMIN — PANCRELIPASE 12000 UNITS: 60000; 12000; 38000 CAPSULE, DELAYED RELEASE PELLETS ORAL at 12:04

## 2020-02-11 RX ADMIN — DORZOLAMIDE HYDROCHLORIDE AND TIMOLOL MALEATE 1 DROP: 20; 5 SOLUTION/ DROPS OPHTHALMIC at 22:49

## 2020-02-11 RX ADMIN — PREDNISOLONE ACETATE 1 DROP: 10 SUSPENSION/ DROPS OPHTHALMIC at 09:34

## 2020-02-11 RX ADMIN — PREDNISOLONE ACETATE 1 DROP: 10 SUSPENSION/ DROPS OPHTHALMIC at 22:49

## 2020-02-11 RX ADMIN — INSULIN LISPRO 5 UNITS: 100 INJECTION, SOLUTION INTRAVENOUS; SUBCUTANEOUS at 09:48

## 2020-02-11 RX ADMIN — FAMOTIDINE 20 MG: 20 TABLET, FILM COATED ORAL at 09:33

## 2020-02-11 RX ADMIN — INSULIN GLARGINE 30 UNITS: 100 INJECTION, SOLUTION SUBCUTANEOUS at 22:55

## 2020-02-11 RX ADMIN — SODIUM CHLORIDE, PRESERVATIVE FREE 10 ML: 5 INJECTION INTRAVENOUS at 09:49

## 2020-02-11 RX ADMIN — PANCRELIPASE 12000 UNITS: 60000; 12000; 38000 CAPSULE, DELAYED RELEASE PELLETS ORAL at 16:43

## 2020-02-11 RX ADMIN — PANCRELIPASE 12000 UNITS: 60000; 12000; 38000 CAPSULE, DELAYED RELEASE PELLETS ORAL at 09:33

## 2020-02-11 RX ADMIN — ATROPINE SULFATE 1 DROP: 10 SOLUTION OPHTHALMIC at 09:34

## 2020-02-11 RX ADMIN — VENLAFAXINE 150 MG: 37.5 TABLET ORAL at 09:33

## 2020-02-11 RX ADMIN — ATROPINE SULFATE 1 DROP: 10 SOLUTION OPHTHALMIC at 22:49

## 2020-02-11 RX ADMIN — DORZOLAMIDE HYDROCHLORIDE AND TIMOLOL MALEATE 1 DROP: 20; 5 SOLUTION/ DROPS OPHTHALMIC at 09:34

## 2020-02-11 ASSESSMENT — PAIN DESCRIPTION - ORIENTATION
ORIENTATION: RIGHT;LEFT
ORIENTATION: RIGHT;LEFT

## 2020-02-11 ASSESSMENT — PAIN DESCRIPTION - ONSET
ONSET: ON-GOING
ONSET: ON-GOING

## 2020-02-11 ASSESSMENT — PAIN DESCRIPTION - FREQUENCY
FREQUENCY: CONTINUOUS
FREQUENCY: CONTINUOUS

## 2020-02-11 ASSESSMENT — PAIN DESCRIPTION - PAIN TYPE
TYPE: ACUTE PAIN
TYPE: ACUTE PAIN

## 2020-02-11 ASSESSMENT — PAIN SCALES - GENERAL
PAINLEVEL_OUTOF10: 9
PAINLEVEL_OUTOF10: 6
PAINLEVEL_OUTOF10: 0
PAINLEVEL_OUTOF10: 0

## 2020-02-11 ASSESSMENT — PAIN DESCRIPTION - LOCATION
LOCATION: BUTTOCKS
LOCATION: BUTTOCKS

## 2020-02-11 ASSESSMENT — PAIN - FUNCTIONAL ASSESSMENT
PAIN_FUNCTIONAL_ASSESSMENT: PREVENTS OR INTERFERES SOME ACTIVE ACTIVITIES AND ADLS
PAIN_FUNCTIONAL_ASSESSMENT: PREVENTS OR INTERFERES SOME ACTIVE ACTIVITIES AND ADLS

## 2020-02-11 ASSESSMENT — PAIN DESCRIPTION - DESCRIPTORS
DESCRIPTORS: ACHING;CONSTANT
DESCRIPTORS: ACHING;CONSTANT

## 2020-02-11 ASSESSMENT — PAIN DESCRIPTION - PROGRESSION
CLINICAL_PROGRESSION: GRADUALLY IMPROVING
CLINICAL_PROGRESSION: GRADUALLY IMPROVING
CLINICAL_PROGRESSION: NOT CHANGED
CLINICAL_PROGRESSION: GRADUALLY IMPROVING
CLINICAL_PROGRESSION: GRADUALLY IMPROVING

## 2020-02-11 ASSESSMENT — PAIN SCALES - WONG BAKER
WONGBAKER_NUMERICALRESPONSE: 0

## 2020-02-11 NOTE — PROGRESS NOTES
Impaired  Orientation Level: Disoriented to place; Disoriented to time;Disoriented to situation;Oriented to person(Pt required additonal time with all orientation questions ecept to self. Pt also responded to questions during eval that did not match the question asked)     Social/Functional History  Social/Functional History  Lives With: Daughter  Type of Home: House  Home Layout: One level  Home Access: Stairs to enter with rails  Entrance Stairs - Number of Steps: 1-2  Bathroom Shower/Tub: Walk-in shower  Bathroom Equipment: (safety frame over toilet )  Bathroom Accessibility: Walker accessible  Home Equipment: Rolling walker, [employer], Lift chair, Reacher  ADL Assistance: (needs assist with showers but indepednet with sponge bathing )  Homemaking Assistance: Needs assistance  Ambulation Assistance: Independent(with RW)  Transfer Assistance: Independent  Additional Comments: Pt stated she has an aide that her daughter hired that watches her from 8-8 M through F and a nurse that comes twice a week.  Pt is a poor historian with confusuion     Objective  Observation/Palpation  Observation: Pt has a thornton catheter and has BLE's ACE wrapped   AROM RLE (degrees)  RLE General AROM: unable to formally assess due to patient refusing to move due to pain  AROM LLE (degrees)  LLE General AROM: unable to formally assess due to patient refusing to move due to pain  Strength RLE  Comment: unable to formally assess due to patient refusing to move due to pain  Strength LLE  Comment: unable to formally assess due to patient refusing to move due to pain  Sensation  Overall Sensation Status: WFL  Bed mobility  Rolling to Left: Maximum assistance  Scootin Person assistance;Dependent/Total(To scoot patient up towards the head of the bed )  Transfers  Comment: unable to assess due to pt refusal due to pain   Ambulation  Ambulation?: No   Balance  Posture: Poor(Pt leans trunk significantly towards the left side while laying

## 2020-02-11 NOTE — PROGRESS NOTES
rhonchi. No dullness on percussion. CV: Regular rate. Regular rhythm. No murmur or rub. No edema. GI: Non-tender. Non-distended. No hernia. SPC +  Skin: Warm, dry, normal texture and turgor. No nodule on exposed extremities. Lymph: No cervical LAD. No supraclavicular LAD. M/S: No cyanosis. No clubbing. No joint deformity. Neuro: Moves all four extremities. CN 2-12 tested, no defect noted. Psych: Oriented x 3. No anxiety. Awake. Alert. Intact judgement and insight. Data    LABS  CBC:   Recent Labs     02/10/20  0425 02/10/20  0854 02/11/20  0506   WBC 7.5  --  5.5   HGB 8.8* 9.0* 8.6*   HCT 25.6* 26.4* 25.0*   MCV 98.7  --  99.1     --  171     BMP:   Recent Labs     02/10/20  0425 02/11/20  0506    136   K 3.8 3.6   CL 99 103   CO2 22 22   BUN 12 10   CREATININE 1.2 1.0   GLUCOSE 169* 344*     POC GLUCOSE:    Recent Labs     02/10/20  0914 02/10/20  1238 02/10/20  1626 02/10/20  2021 02/11/20  0641   POCGLU 133* 207* 315* 346* 354*     LIVER PROFILE:   Recent Labs     02/10/20  0425   AST 12*   ALT 8*   LABALBU 2.9*   BILITOT <0.2   ALKPHOS 126     PT/INR:   Recent Labs     02/11/20  0506   PROTIME 12.3   INR 1.06     APTT:   Recent Labs     02/11/20  0506   APTT 31.2     UA:  Recent Labs     02/10/20  0425 02/10/20  0615   COLORU RED*  --    PHUR 7.0  --    WBCUA  --  see below   RBCUA  --  >100*   CLARITYU CLOUDY*  --    SPECGRAV 1.010  --    LEUKOCYTESUR LARGE*  --    UROBILINOGEN 4.0*  --    BILIRUBINUR MODERATE*  --    BLOODU LARGE*  --    GLUCOSEU 100*  --    KETUA 15*  --      Microbiology:  Wound Culture: No results for input(s): WNDABS, ORG in the last 72 hours. Invalid input(s):  LABGRAM  Nasal Culture: No results for input(s): ORG, MRSAPCR in the last 72 hours. Blood Culture: No results for input(s): BC, BLOODCULT2 in the last 72 hours. Fungal Culture:   No results for input(s): FUNGSM in the last 72 hours. No results for input(s): FUNCXBLD in the last 72 hours.   CSF

## 2020-02-11 NOTE — PROGRESS NOTES
Pt Name: Luiz Ríos  Medical Record Number: [id number]  Date of Birth 1945   Today's Date: 2/11/2020      Subjective:  Awake in bed, family at bedside. No longer with gross hematuria. CBI off. Discontinued urethral catheter. SP tube intact without leakage around catheter. ROS: Constitutional: No fever    Vitals:  Vitals:    02/11/20 0801 02/11/20 1133 02/11/20 1358 02/11/20 1537   BP: (!) 157/79 (!) 145/78  (!) 153/60   Pulse: 101 99  92   Resp: 16 16 16 16   Temp: 98.7 °F (37.1 °C) 98.3 °F (36.8 °C)  98.2 °F (36.8 °C)   TempSrc: Oral Oral  Oral   SpO2: 95% 97% 97% 95%   Weight:       Height:         I/O last 3 completed shifts: In: 36 [P.O.:920]  Out: 16985 [Urine:35269]    Exam:  General: Awake, oriented, no acute distress  Respiratory: Nonlabored breathing  Abdomen: Soft, non-tender, non-distended, no masses  : SP tube intact.    Skin: Skin color, texture, turgor normal, no rashes or lesions  Neurologic: no gross deficits    CURRENT MEDICATIONS   Scheduled Meds:   insulin glargine  30 Units Subcutaneous Nightly    atorvastatin  10 mg Oral Nightly    lipase-protease-amylase  12,000 Units Oral TID WC    famotidine  20 mg Oral Daily    vitamin D3  400 Units Oral Daily    venlafaxine  150 mg Oral BID    sodium chloride flush  10 mL Intravenous 2 times per day    cefTRIAXone (ROCEPHIN) IV  1 g Intravenous Q24H    insulin lispro  0-6 Units Subcutaneous TID WC    atropine  1 drop Right Eye BID    dorzolamide-timolol  1 drop Right Eye BID    prednisoLONE acetate  1 drop Right Eye BID     Continuous Infusions:   dextrose       PRN Meds:.traMADol, sodium chloride flush, potassium chloride **OR** potassium alternative oral replacement **OR** potassium chloride, magnesium sulfate, magnesium hydroxide, ondansetron, glucose, dextrose, glucagon (rDNA), dextrose, acetaminophen    LABS     Recent Labs     02/10/20  0425 02/10/20  0854 02/11/20  0506   WBC 7.5  --  5.5   HGB 8.8* 9.0* 8.6*   HCT 25.6* 26.4* 25.0*     --  171     --  136   K 3.8  --  3.6   CL 99  --  103   CO2 22  --  22   BUN 12  --  10   CREATININE 1.2  --  1.0   CALCIUM 8.9  --  8.5   INR  --   --  1.06   AST 12*  --   --    ALT 8*  --   --    BILITOT <0.2  --   --      Urine culture- no growth. ASSESSMENT   1. Hospital day # 1  2. 74y.o. female with hematuira  3. Urine culture negative   PLAN   1. Hematuria spontaneously resolved. H/H stable  2. In discussion with daughter - POA and patient, they are unsure whether they would like to proceed with cystoscopy to further evaluate the hematuria. Stated if there was any cancer present, she would not proceed with treatment. Will discuss with Dr. Candy Keith.      FRANCHESCA Saunders - CNP 2/11/2020 6:17 PM

## 2020-02-11 NOTE — PROGRESS NOTES
Pt is  Educated and can demonstrate how to use IS. Placed at bedside and encouraged to utilize every 2 hours while awake.

## 2020-02-11 NOTE — PLAN OF CARE
Problem: Falls - Risk of:  Goal: Will remain free from falls  Description  Will remain free from falls  2/11/2020 0735 by Simon Gallardo RN  Outcome: Ongoing  Note:   Fall risk assessment completed. Fall precautions in place. Bed in lowest position, wheels locked, bed/chair exit alarm in place, call light within reach, and non skid footwear on. Walkway free of clutter. Pt alert and oriented and able to make needs known. Pt educated to use call light when needing to get up, and pt utilizes call light to make needs known. Will continue to monitor. Problem: Falls - Risk of:  Goal: Absence of physical injury  Description  Absence of physical injury  2/11/2020 0735 by Simon Gallardo RN  Outcome: Ongoing  Note:   Pt absent from physical injury on this shift      Problem: Risk for Impaired Skin Integrity  Goal: Tissue integrity - skin and mucous membranes  Description  Structural intactness and normal physiological function of skin and  mucous membranes. 2/11/2020 0735 by Simon Gallardo RN  Outcome: Ongoing  Note:   Skin being assessed during this shift. Jose protocol in place. Feet being elevated. Encouraging pt to turn every 2 hours. No signs of new skin breakdown. Will continue to monitor and reassess.        Problem: Infection:  Goal: Will remain free from infection  Description  Will remain free from infection  2/11/2020 0735 by Simon Gallardo RN  Outcome: Ongoing  Note:   Pt will remain free from infection      Problem: Safety:  Goal: Free from accidental physical injury  Description  Free from accidental physical injury  2/11/2020 0735 by Simon Gallardo RN  Outcome: Ongoing  Note:   Free from accidental physical injury on this shift   2/11/2020 0418 by Lance Meadows RN  Outcome: Ongoing  Note:   Fall precaution in place        Problem: Safety:  Goal: Free from intentional harm  Description  Free from intentional harm  2/11/2020 0735 by Simon Gallardo RN  Outcome: Ongoing  Note: Free from intentional harm

## 2020-02-12 VITALS
HEART RATE: 111 BPM | BODY MASS INDEX: 32.81 KG/M2 | HEIGHT: 58 IN | OXYGEN SATURATION: 96 % | SYSTOLIC BLOOD PRESSURE: 160 MMHG | RESPIRATION RATE: 18 BRPM | WEIGHT: 156.31 LBS | TEMPERATURE: 98.6 F | DIASTOLIC BLOOD PRESSURE: 74 MMHG

## 2020-02-12 LAB
GLUCOSE BLD-MCNC: 278 MG/DL (ref 70–99)
GLUCOSE BLD-MCNC: 314 MG/DL (ref 70–99)
PERFORMED ON: ABNORMAL
PERFORMED ON: ABNORMAL

## 2020-02-12 PROCEDURE — 97530 THERAPEUTIC ACTIVITIES: CPT

## 2020-02-12 PROCEDURE — 97166 OT EVAL MOD COMPLEX 45 MIN: CPT

## 2020-02-12 PROCEDURE — 97116 GAIT TRAINING THERAPY: CPT

## 2020-02-12 PROCEDURE — 6370000000 HC RX 637 (ALT 250 FOR IP): Performed by: INTERNAL MEDICINE

## 2020-02-12 PROCEDURE — 6360000002 HC RX W HCPCS: Performed by: INTERNAL MEDICINE

## 2020-02-12 PROCEDURE — 2580000003 HC RX 258: Performed by: INTERNAL MEDICINE

## 2020-02-12 RX ADMIN — CHOLECALCIFEROL (VITAMIN D3) 10 MCG (400 UNIT) TABLET 400 UNITS: at 09:13

## 2020-02-12 RX ADMIN — FAMOTIDINE 20 MG: 20 TABLET, FILM COATED ORAL at 09:13

## 2020-02-12 RX ADMIN — PANCRELIPASE 12000 UNITS: 60000; 12000; 38000 CAPSULE, DELAYED RELEASE PELLETS ORAL at 09:13

## 2020-02-12 RX ADMIN — INSULIN LISPRO 5 UNITS: 100 INJECTION, SOLUTION INTRAVENOUS; SUBCUTANEOUS at 09:12

## 2020-02-12 RX ADMIN — ATROPINE SULFATE 1 DROP: 10 SOLUTION OPHTHALMIC at 09:15

## 2020-02-12 RX ADMIN — VENLAFAXINE 150 MG: 37.5 TABLET ORAL at 09:13

## 2020-02-12 RX ADMIN — INSULIN LISPRO 3 UNITS: 100 INJECTION, SOLUTION INTRAVENOUS; SUBCUTANEOUS at 12:11

## 2020-02-12 RX ADMIN — PREDNISOLONE ACETATE 1 DROP: 10 SUSPENSION/ DROPS OPHTHALMIC at 09:15

## 2020-02-12 RX ADMIN — SODIUM CHLORIDE, PRESERVATIVE FREE 10 ML: 5 INJECTION INTRAVENOUS at 09:20

## 2020-02-12 RX ADMIN — DORZOLAMIDE HYDROCHLORIDE AND TIMOLOL MALEATE 1 DROP: 20; 5 SOLUTION/ DROPS OPHTHALMIC at 09:15

## 2020-02-12 RX ADMIN — INSULIN LISPRO 4 UNITS: 100 INJECTION, SOLUTION INTRAVENOUS; SUBCUTANEOUS at 09:12

## 2020-02-12 RX ADMIN — CEFTRIAXONE 1 G: 1 INJECTION, POWDER, FOR SOLUTION INTRAMUSCULAR; INTRAVENOUS at 09:13

## 2020-02-12 RX ADMIN — PANCRELIPASE 12000 UNITS: 60000; 12000; 38000 CAPSULE, DELAYED RELEASE PELLETS ORAL at 12:11

## 2020-02-12 RX ADMIN — INSULIN LISPRO 5 UNITS: 100 INJECTION, SOLUTION INTRAVENOUS; SUBCUTANEOUS at 12:12

## 2020-02-12 ASSESSMENT — PAIN SCALES - WONG BAKER
WONGBAKER_NUMERICALRESPONSE: 0

## 2020-02-12 ASSESSMENT — PAIN DESCRIPTION - PROGRESSION
CLINICAL_PROGRESSION: GRADUALLY IMPROVING

## 2020-02-12 ASSESSMENT — PAIN SCALES - GENERAL: PAINLEVEL_OUTOF10: 0

## 2020-02-12 NOTE — PROGRESS NOTES
Pt Name: Miko Brush  Medical Record Number: [id number]  Date of Birth 1945   Today's Date: 2/12/2020      Subjective:  Awake in bed, continues to have clear/yellow urine, no leakage from SP site. Tried to re-iterate to patient a cystoscopy to see source of bleeding and she continues to respond \"I'll think about it\". ROS: Constitutional: No fever    Vitals:  Vitals:    02/11/20 1537 02/11/20 1941 02/12/20 0046 02/12/20 0421   BP: (!) 153/60 (!) 154/83 (!) 168/77 (!) 155/83   Pulse: 92 108 98 98   Resp: 16 16 16 16   Temp: 98.2 °F (36.8 °C) 98.4 °F (36.9 °C) 98.6 °F (37 °C) 98.5 °F (36.9 °C)   TempSrc: Oral Oral Oral Oral   SpO2: 95% 95% 95% 96%   Weight:    156 lb 4.9 oz (70.9 kg)   Height:         I/O last 3 completed shifts: In: 360 [P.O.:360]  Out: 7400 [Urine:7400]    Exam:  General: Awake, oriented, no acute distress  Respiratory: Nonlabored breathing  Abdomen: Soft, non-tender, non-distended, no masses  : SP tube intact.    Skin: Skin color, texture, turgor normal, no rashes or lesions  Neurologic: no gross deficits    CURRENT MEDICATIONS   Scheduled Meds:   insulin glargine  30 Units Subcutaneous Nightly    insulin lispro  5 Units Subcutaneous TID     atorvastatin  10 mg Oral Nightly    lipase-protease-amylase  12,000 Units Oral TID WC    famotidine  20 mg Oral Daily    vitamin D3  400 Units Oral Daily    venlafaxine  150 mg Oral BID    sodium chloride flush  10 mL Intravenous 2 times per day    cefTRIAXone (ROCEPHIN) IV  1 g Intravenous Q24H    insulin lispro  0-6 Units Subcutaneous TID     atropine  1 drop Right Eye BID    dorzolamide-timolol  1 drop Right Eye BID    prednisoLONE acetate  1 drop Right Eye BID     Continuous Infusions:   dextrose       PRN Meds:.traMADol, sodium chloride flush, potassium chloride **OR** potassium alternative oral replacement **OR** potassium chloride, magnesium sulfate, magnesium hydroxide, ondansetron, glucose, dextrose, glucagon (rDNA), dextrose, acetaminophen    LABS     Recent Labs     02/10/20  0425 02/10/20  0854 02/11/20  0506   WBC 7.5  --  5.5   HGB 8.8* 9.0* 8.6*   HCT 25.6* 26.4* 25.0*     --  171     --  136   K 3.8  --  3.6   CL 99  --  103   CO2 22  --  22   BUN 12  --  10   CREATININE 1.2  --  1.0   CALCIUM 8.9  --  8.5   INR  --   --  1.06   AST 12*  --   --    ALT 8*  --   --    BILITOT <0.2  --   --      Urine culture- no growth. ASSESSMENT   1. Hospital day # 2  2. 74y.o. female with hematuira  3. Urine culture negative   PLAN   1. Hematuria spontaneously resolved. H/H stable  2. In discussion with daughter - POA and patient, they are unsure whether they would like to proceed with cystoscopy to further evaluate the hematuria. Stated if there was any cancer present, she would not proceed with treatment. Recommend f/u outpatient with Dr. Christina Ortiz or Dr. Jhonny Preston to further discuss. Will sign off, call with questions.     [place] FRANCHESCA Michelle - CNP 2/12/2020 8:55 AM

## 2020-02-12 NOTE — PROGRESS NOTES
Physical Therapy  Facility/Department: [address] ORTHOPEDICS  Daily Treatment Note  This note serves as patient discharge summary if pt discharges prior to next PT visit  Co Treat with OT    NAME: Schuyler Hatch  : 1945  MRN: [id number]    Date of Service: 2020    Discharge Recommendations:  Home with Home health PT, Patient would benefit from continued therapy after discharge, S Level 1, 2-3 sessions per week, 24 hour supervision or assist   Schuyler Hatch scored a  on the AM-PAC short mobility form. Current research shows that an AM-PAC score of 18 or greater is typically associated with a discharge to the patient's home setting. Based on the patients AM-PAC score and their current functional mobility deficits, it is recommended that the patient have 2-3 sessions per week of Physical Therapy at d/c to increase the patients independence. PT Equipment Recommendations  Equipment Needed: No    Assessment   Body structures, Functions, Activity limitations: Decreased functional mobility ; Decreased ADL status; Decreased cognition;Decreased safe awareness;Decreased balance; Increased pain;Decreased endurance;Decreased strength  Assessment: As per H and P, \" The patient is a 66 y.o. female with a past medical history of coronary artery disease, A. fib, chronic kidney disease stage III, diabetes, suprapubic catheter who presents to [facility] with gross hematuria. Patient was recently discharged on the , was admitted for bilateral cellulitis due to cefepime. Wound culture at that time grew enterococcus. Patient was discharged on doxycycline. Patient states that since the last few days she has been having problems urinating. Patient has a suprapubic catheter and usually does not pass urine from below. Since the last few days has been having dribbling. The home nurse change the catheter and after which patient had a lot of dark altered blood which has been continuous. \" Today, the ([facility] [address] Stasis ulcer ([facility] [address] and Suprapubic catheter ([facility] [address] has a past surgical history that includes Hysterectomy; knee surgery; back surgery; Cholecystectomy; Cataract removal; Appendectomy; Colonoscopy; eye surgery; fracture surgery; joint replacement; and Insert/Change Brasher Catheter - Complicat ([id number]). Restrictions  Restrictions/Precautions  Restrictions/Precautions: Fall Risk  Position Activity Restriction  Other position/activity restrictions: Suprapubic catheter     Subjective   General  Chart Reviewed: Yes  Additional Pertinent Hx: As per H and P, \" The patient is a 66 y.o. female with a past medical history of coronary artery disease, A. fib, chronic kidney disease stage III, diabetes, suprapubic catheter who presents to [facility] with gross hematuria. Patient was recently discharged on the eighth of Jan, was admitted for bilateral cellulitis due to cefepime. Wound culture at that time grew enterococcus. Patient was discharged on doxycycline. Patient states that since the last few days she has been having problems urinating. Patient has a suprapubic catheter and usually does not pass urine from below. Since the last few days has been having dribbling. The home nurse change the catheter and after which patient had a lot of dark altered blood which has been continuous. \"  Response To Previous Treatment: Patient with no complaints from previous session. Family / Caregiver Present: No  Referring Practitioner: Lokesh Hare MD  Subjective  Subjective: Pt stated she is excited to go home today  General Comment  Comments: Supine in bed upon arrival. Pt agreeable to PT/OT       Orientation  Orientation  Overall Orientation Status: Impaired  Orientation Level: Disoriented to place; Disoriented to time;Disoriented to situation;Oriented to person(Pt required additonal time with all orientation questions and was unable to answer all except to self during session.)     Objective   Bed mobility  Supine to Sit: Minimal assistance;2 Person assistance  Scooting: Minimal assistance;2 Person assistance  Comment: All bed mobiltiy with HOB elevated and use of bed rails   Transfers  Sit to Stand: Minimal Assistance;2 Person Assistance(EOB to RW)  Stand to sit: Minimal Assistance(RW to recliner )  Ambulation  Ambulation?: Yes  Ambulation 1  Surface: level tile  Device: Rolling Walker  Assistance: Minimal assistance(Yoan to maintain balance and Max VC to keep walker close to pt during ambulation)  Gait Deviations: Slow Susu;Decreased step length;Decreased step height  Distance: 20'  Balance  Posture: Poor(Pt leans trunk significantly towards the left side while laying and in long sitting in hospital bed)  Sitting - Static: Fair  Sitting - Dynamic: Fair  Standing - Static: Fair(with RW support )  Standing - Dynamic: Fair(with RW support )     AM-PAC Score  AM-PAC Inpatient Mobility Raw Score : 12 (02/12/20 1129)  AM-PAC Inpatient T-Scale Score : 35.33 (02/12/20 1129)  Mobility Inpatient CMS 0-100% Score: 68.66 (02/12/20 1129)  Mobility Inpatient CMS G-Code Modifier : CL (02/12/20 1129)     Goals  Patient Goals   Patient goals : \"To go home\"    Plan    Plan  Times per week: 3-5  Current Treatment Recommendations: Functional Mobility Training  Safety Devices  Type of devices:  All fall risk precautions in place, Nurse notified, Call light within reach, Chair alarm in place, Left in chair, Gait belt     Therapy Time   Individual Concurrent Group Co-treatment   Time In       1042   Time Out       1115   Minutes       [facility], Student Physical Therapist   I attest that I was present for and made a skilled & mindful clinical judgement during the evaluation and/or treatment of this patient on 2/12/2020  Electronically signed by Chilango Fofana PT [id number] on 2/12/2020 at 12:08 PM

## 2020-02-12 NOTE — PLAN OF CARE
Problem: Falls - Risk of:  Goal: Will remain free from falls  Description  Will remain free from falls  Outcome: Ongoing     Problem: Falls - Risk of:  Goal: Absence of physical injury  Description  Absence of physical injury  Outcome: Ongoing     Pt remains free from falls this shift. Fall precautions in place. Will continue to monitor.

## 2020-02-12 NOTE — PROGRESS NOTES
normal.  Neck: Trachea midline. No obvious mass. Resp: No accessory muscle use. No crackles. No wheezes. No rhonchi. No dullness on percussion. CV: Regular rate. Regular rhythm. No murmur or rub. No edema. GI: Non-tender. Non-distended. No hernia. SPC +  Skin: Warm, dry, normal texture and turgor. No nodule on exposed extremities. Lymph: No cervical LAD. No supraclavicular LAD. M/S: No cyanosis. No clubbing. No joint deformity. Neuro: Moves all four extremities. CN 2-12 tested, no defect noted. Psych: Oriented x 3. No anxiety. Awake. Alert. Intact judgement and insight. Data    LABS  CBC:   Recent Labs     02/10/20  0425 02/10/20  0854 02/11/20  0506   WBC 7.5  --  5.5   HGB 8.8* 9.0* 8.6*   HCT 25.6* 26.4* 25.0*   MCV 98.7  --  99.1     --  171     BMP:   Recent Labs     02/10/20  0425 02/11/20  0506    136   K 3.8 3.6   CL 99 103   CO2 22 22   BUN 12 10   CREATININE 1.2 1.0   GLUCOSE 169* 344*     POC GLUCOSE:    Recent Labs     02/11/20  0641 02/11/20  1135 02/11/20  1629 02/11/20  2132 02/12/20  0626   POCGLU 354* 420* 366* 348* 314*     LIVER PROFILE:   Recent Labs     02/10/20  0425   AST 12*   ALT 8*   LABALBU 2.9*   BILITOT <0.2   ALKPHOS 126     PT/INR:   Recent Labs     02/11/20  0506   PROTIME 12.3   INR 1.06     APTT:   Recent Labs     02/11/20  0506   APTT 31.2     UA:  Recent Labs     02/10/20  0425 02/10/20  0615   COLORU RED*  --    PHUR 7.0  --    WBCUA  --  see below   RBCUA  --  >100*   CLARITYU CLOUDY*  --    SPECGRAV 1.010  --    LEUKOCYTESUR LARGE*  --    UROBILINOGEN 4.0*  --    BILIRUBINUR MODERATE*  --    BLOODU LARGE*  --    GLUCOSEU 100*  --    KETUA 15*  --      Microbiology:  Wound Culture: No results for input(s): WNDABS, ORG in the last 72 hours. Invalid input(s):  LABGRAM  Nasal Culture: No results for input(s): ORG, MRSAPCR in the last 72 hours. Blood Culture:   Recent Labs     02/10/20  0853 02/10/20  0910   BC No Growth to date.   Any change in status will be called. --    BLOODCULT2  --  No Growth to date. Any change in status will be called. Fungal Culture:   No results for input(s): FUNGSM in the last 72 hours. No results for input(s): FUNCXBLD in the last 72 hours. CSF Culture:  No results for input(s): COLORCSF, APPEARCSF, CFTUBE, CLOTCSF, WBCCSF, RBCCSF, NEUTCSF, NUMCELLSCSF, LYMPHSCSF, MONOCSF, GLUCCSF, VOLCSF in the last 72 hours. Respiratory Culture:  No results for input(s): Starlene Lightning in the last 72 hours. AFB:No results for input(s): AFBSMEAR in the last 72 hours. Urine Culture  Recent Labs     02/10/20  0615   LABURIN No growth at 18-36 hours       RADIOLOGY:    MRI LUMBAR SPINE WO CONTRAST   Final Result   Status post posterior instrumented fusion and bilateral laminectomies at L4-5. Mild abnormal bone marrow signal in the L1 and L2 vertebral bodies, and   endplate irregularities, likely related to sequela of degenerative endplate   changes. No significant abnormal T2 hyperintensity in the L1 or L2 vertebral   bodies to suggest acute osteomyelitis/discitis. The endplate irregularity is   grossly stable since November 19, 2018. Multilevel degenerative disc disease as described above, exacerbating   congenitally narrow lumbar spinal canal.      Spinal canal narrowing, moderate to severe at L1-2 and L2-3, mild at L3-4 and   L5-S1. Foraminal narrowing, moderate to severe at bilateral L5-S1, moderate at   bilateral L1-2 and right L4-5, mild at bilateral L2-3 and left L4-5. CT ABDOMEN PELVIS WO CONTRAST Additional Contrast? None   Final Result   A few punctate stones in the left kidney. No hydronephrosis. Erosive changes again noted at L1-L2 with increase in sclerosis and   progressive disc height loss. Findings concerning for underlying discitis   which may be chronic. This can be better evaluated with MRI.              CONSULTS:    IP CONSULT TO UROLOGY    ASSESSMENT AND PLAN:      Active

## 2020-02-12 NOTE — DISCHARGE SUMMARY
Hospital Medicine Discharge Summary      Patient ID: Merle Akbar      Patient's PCP: Isha Turner MD    Admit Date: 2/10/2020     Discharge Date: 2/12/2020  The patient was seen and examined on day of discharge and this discharge summary is in conjunction with any daily progress note from day of discharge. Admitting Physician: Jennifer Olivarez MD    Discharge Physician: Oscar Yoder MD     Admitted for   Chief Complaint   Patient presents with    Hematuria       Admitting Diagnosis Gross hematuria [R31.0]    Discharge Diagnoses: Active Hospital Problems    Diagnosis Date Noted    Gross hematuria [R31.0] 02/10/2020    Type 1 diabetes mellitus with hyperglycemia ([facility] [address] [E10.65] 10/04/2017       Follow Up: Primary Care Physician in one week    PCP to Follow up on   Needs cytoscopy           Hospital Course: The patient is a 77 y. o. female with a past medical history of coronary artery disease, A. fib, chronic kidney disease stage III, diabetes, suprapubic catheter who presents to [facility] with gross hematuria.  Patient was recently discharged on the eighth of Jan, was admitted for bilateral cellulitis due to cefepime.  Wound culture at that time grew enterococcus.  Patient was discharged on doxycycline  Patient states that since the last few days she has been having problems urinating. Patient has been dribbling urine and has had issues with his suprapubic catheter with leaking. The catheter was changed after which patient had a lot of bloody urine. There was some concern about urinary tract infection but UTI has been ruled out. Urology is following the patient. Patient will have a cystoscopy as an outpatient patient blood sugar has been running high in the hospital but the patient uses insulin pump at home. In the hospital was using Lantus.   Patient will resume her pump on discharge     Hematuria- possibly needs cystoscopy, be done as an outpatient  -Unclear cause,( urine c/s NGTD, UTI ruled out )  -stop bladder irrigation  -Urology consulted  -Had Enterobacter and Pseudomonas     Coronary artery disease  Continue home medications     Hypertension  -Monitor blood pressure and continue home medications     Hyperlipidemia  Continue statin     Chronic venous stasis  Supportive treatment     DM2- uncontrolled, resume insulin pum  -Monitor blood sugar   - Lantus with sliding scale insulin        Abrl CT back  - MRI shows extensive spinal canal narrowing, with laminectomy    - no discitis     History of MRSA/ MRDO  Colonization     Stage 2 Pressure Ulcer Left buttocks POA  Venous Ulcers L anterior lower lateral leg, L medial proximal leg and 2 more L lower leg POA  Venous ulcer R medial proximal lower leg POA    Consults:     IP CONSULT TO UROLOGY  IP CONSULT TO HOME CARE NEEDS        Disposition: home    Discharged Condition: Stable    Code Status: Full Code    Activity: activity as tolerated    Diet: diabetic diet          Labs:  For convenience and continuity at follow-up the following most recent labs are provided:    CBC:   Lab Results   Component Value Date    WBC 5.5 02/11/2020    HGB 8.6 02/11/2020    HCT 25.0 02/11/2020     02/11/2020       RENAL:   Lab Results   Component Value Date     02/11/2020    K 3.6 02/11/2020     02/11/2020    CO2 22 02/11/2020    BUN 10 02/11/2020    CREATININE 1.0 02/11/2020           Discharge Medications:    Rod Callahan   Home Medication Instructions GLT:[id number]    Printed on:02/12/20 1351   Medication Information                      aspirin 325 MG EC tablet  Take 325 mg by mouth daily             atorvastatin (LIPITOR) 10 MG tablet  Take 1 tablet by mouth daily             atropine 1 % ophthalmic solution  Place 1 drop into the right eye 2 times daily             Biotin (BIOTIN 5000) 5 MG CAPS  Take 1 tablet by mouth nightly              cetirizine (ZYRTEC) 10 MG tablet  Take 10 mg by mouth daily             Cholecalciferol however, inadvertent computerized transcription errors may be present. Thank you Paty Jeter MD for the opportunity to be involved in this patient's care. If you have any questions or concerns please feel free to contact me at [phone].

## 2020-02-12 NOTE — PROGRESS NOTES
status; Decreased balance;Decreased safe awareness;Decreased cognition  Assessment: Pt is 66 y.o. F who presents with blood in urine and is being treated for gross hematuria. PTA pt lives at home with daughter in one story home with 1-2 RAYMOND. Pt has caregivers from 8-5pm each day and has 24/7 supervision/assist. Pt has assist for ADL tasks and IADL tasks. Pt completes functional mobility with RW. Currently, pt presents with ROM/strength in [facility] for self-care tasks and transfers. Pt completed bed mobility with min A and sit <> stand transfers with min Ax2. Pt completed functional mobility with RW with min A, cues for safety and assist for LOB. Pt declined completing ADL tasks - anticipate requiring mod/max A for ADL needs. Pt is adamant about discharge home however would benefit from continued therapy to increase mobility, safety, and independence. Despite AMPAC score, pt plans to d/c home - recommend 24/7 supervision and hands on assistance d/t pt being high fall risk. Prognosis: Fair  Decision Making: Medium Complexity  History: PMH: MRSA, DM, CKD, CAD  Exam: ADLs, transfers, func mob, bed mob  OT Education: OT Role;ADL Adaptive Strategies; Plan of Care;Transfer Training;Precautions  REQUIRES OT FOLLOW UP: Yes  Activity Tolerance  Activity Tolerance: Patient Tolerated treatment well;Treatment limited secondary to decreased cognition  Safety Devices  Safety Devices in place: Yes(RN Author Mirtha) notified)  Type of devices: Nurse notified;Gait belt;Call light within reach; Chair alarm in place; Left in chair           Patient Diagnosis(es): The encounter diagnosis was Gross hematuria.      has a past medical history of Anemia, Atrial fibrillation ([address] CAD (coronary artery disease), Chronic back pain, CKD (chronic kidney disease), stage III ([address] Diabetes mellitus ([address] Diastolic CHF ([address] Hyperlipidemia, Hypertension, MI (myocardial infarction) ([address] MRSA (methicillin resistant staph aureus) culture positive, MRSA to require mod A for bathing/dressing/toileting needs. Pt declines further ADL tasks at this time. Tone RUE  RUE Tone: Normotonic  Tone LUE  LUE Tone: Normotonic  Coordination  Movements Are Fluid And Coordinated: Yes     Bed mobility  Supine to Sit: Minimal assistance;2 Person assistance(HOB elevated, use of hand rail )  Scooting: Minimal assistance;2 Person assistance     Transfers  Sit to stand: Minimal assistance;2 Person assistance  Stand to sit: Minimal assistance;2 Person assistance  Transfer Comments: Min Ax2 for sit <> stand from EOB to RW and sit to recliner chair - cues for safe hand placement      Cognition  Overall Cognitive Status: Exceptions  Arousal/Alertness: Delayed responses to stimuli  Following Commands: Follows one step commands with increased time; Follows one step commands with repetition  Memory: Decreased recall of recent events;Decreased short term memory  Safety Judgement: Decreased awareness of need for assistance;Decreased awareness of need for safety  Problem Solving: Assistance required to generate solutions;Assistance required to implement solutions  Insights: Decreased awareness of deficits  Initiation: Requires cues for some  Sequencing: Requires cues for some        Sensation  Overall Sensation Status: WFL        LUE AROM (degrees)  LUE AROM : WFL  Left Hand AROM (degrees)  Left Hand AROM: WFL  RUE AROM (degrees)  RUE AROM : WFL  Right Hand AROM (degrees)  Right Hand AROM: WFL     LUE Strength  Gross LUE Strength: WFL  RUE Strength  Gross RUE Strength: WFL          Plan   Plan  Times per week: 3-5  Times per day: Daily  Current Treatment Recommendations: Strengthening, Functional Mobility Training, Endurance Training, Balance Training, Safety Education & Training, Equipment Evaluation, Education, & procurement, Patient/Caregiver Education & Training, Self-Care / ADL      AM-PAC Score        AM-PAC Inpatient Daily Activity Raw Score: 16 (02/12/20 1122)  AM-PAC Inpatient ADL

## 2020-02-12 NOTE — PROGRESS NOTES
Patient alert and oriented, discharged to home with documented belongings. IV removed with no complications. Transported out of [facility] by wheelchair. Reviewed discharge, follow up, and medication instructions with patient and family member, patient and family member verbalized understanding. Will continue to monitor and reassess.   Electronically signed by Siobhan Kim RN on 2/12/2020 at 2:11 PM

## 2020-02-14 LAB
BLOOD CULTURE, ROUTINE: NORMAL
CULTURE, BLOOD 2: NORMAL

## 2020-02-26 ENCOUNTER — HOSPITAL ENCOUNTER (INPATIENT)
Age: 75
LOS: 7 days | Discharge: HOME HEALTH | DRG: 637 | End: 2020-03-05
Attending: EMERGENCY MEDICINE | Admitting: FAMILY MEDICINE
Payer: MEDICARE

## 2020-02-26 ENCOUNTER — APPOINTMENT (OUTPATIENT)
Dept: CT IMAGING | Age: 75
DRG: 637 | End: 2020-02-26
Payer: MEDICARE

## 2020-02-26 ENCOUNTER — APPOINTMENT (OUTPATIENT)
Dept: GENERAL RADIOLOGY | Age: 75
DRG: 637 | End: 2020-02-26
Payer: MEDICARE

## 2020-02-26 LAB
BASE EXCESS VENOUS: -3.3 MMOL/L
BASOPHILS ABSOLUTE: 0 K/UL (ref 0–0.2)
BASOPHILS RELATIVE PERCENT: 0.6 %
BETA-HYDROXYBUTYRATE: 0.14 MMOL/L (ref 0–0.27)
CARBOXYHEMOGLOBIN: 1.8 %
EOSINOPHILS ABSOLUTE: 0.2 K/UL (ref 0–0.6)
EOSINOPHILS RELATIVE PERCENT: 2.5 %
GLUCOSE BLD-MCNC: >600 MG/DL (ref 70–99)
HCO3 VENOUS: 23 MMOL/L (ref 23–29)
HCT VFR BLD CALC: 25.8 % (ref 36–48)
HEMOGLOBIN: 8.6 G/DL (ref 12–16)
LACTIC ACID: 3.2 MMOL/L (ref 0.4–2)
LIPASE: <3 U/L (ref 13–60)
LYMPHOCYTES ABSOLUTE: 0.8 K/UL (ref 1–5.1)
LYMPHOCYTES RELATIVE PERCENT: 11.6 %
MCH RBC QN AUTO: 33.6 PG (ref 26–34)
MCHC RBC AUTO-ENTMCNC: 33.2 G/DL (ref 31–36)
MCV RBC AUTO: 101.2 FL (ref 80–100)
METHEMOGLOBIN VENOUS: 0.9 %
MONOCYTES ABSOLUTE: 0.3 K/UL (ref 0–1.3)
MONOCYTES RELATIVE PERCENT: 4.2 %
NEUTROPHILS ABSOLUTE: 5.3 K/UL (ref 1.7–7.7)
NEUTROPHILS RELATIVE PERCENT: 81.1 %
O2 CONTENT, VEN: 7 ML/DL
O2 SAT, VEN: 57 %
O2 THERAPY: ABNORMAL
PCO2, VEN: 47 MMHG (ref 40–50)
PDW BLD-RTO: 13.8 % (ref 12.4–15.4)
PERFORMED ON: ABNORMAL
PH VENOUS: 7.3 (ref 7.35–7.45)
PLATELET # BLD: 187 K/UL (ref 135–450)
PMV BLD AUTO: 8.7 FL (ref 5–10.5)
PO2, VEN: 34 MMHG
RBC # BLD: 2.55 M/UL (ref 4–5.2)
TCO2 CALC VENOUS: 25 MMOL/L
TROPONIN: <0.01 NG/ML
WBC # BLD: 6.5 K/UL (ref 4–11)

## 2020-02-26 PROCEDURE — 93005 ELECTROCARDIOGRAM TRACING: CPT | Performed by: EMERGENCY MEDICINE

## 2020-02-26 PROCEDURE — 84484 ASSAY OF TROPONIN QUANT: CPT

## 2020-02-26 PROCEDURE — 70450 CT HEAD/BRAIN W/O DYE: CPT

## 2020-02-26 PROCEDURE — 36415 COLL VENOUS BLD VENIPUNCTURE: CPT

## 2020-02-26 PROCEDURE — 82010 KETONE BODYS QUAN: CPT

## 2020-02-26 PROCEDURE — 83690 ASSAY OF LIPASE: CPT

## 2020-02-26 PROCEDURE — 87040 BLOOD CULTURE FOR BACTERIA: CPT

## 2020-02-26 PROCEDURE — 71046 X-RAY EXAM CHEST 2 VIEWS: CPT

## 2020-02-26 PROCEDURE — 99291 CRITICAL CARE FIRST HOUR: CPT

## 2020-02-26 PROCEDURE — 80053 COMPREHEN METABOLIC PANEL: CPT

## 2020-02-26 PROCEDURE — 85025 COMPLETE CBC W/AUTO DIFF WBC: CPT

## 2020-02-26 PROCEDURE — 96361 HYDRATE IV INFUSION ADD-ON: CPT

## 2020-02-26 PROCEDURE — 2580000003 HC RX 258: Performed by: EMERGENCY MEDICINE

## 2020-02-26 PROCEDURE — 83605 ASSAY OF LACTIC ACID: CPT

## 2020-02-26 PROCEDURE — 82803 BLOOD GASES ANY COMBINATION: CPT

## 2020-02-26 RX ORDER — 0.9 % SODIUM CHLORIDE 0.9 %
2000 INTRAVENOUS SOLUTION INTRAVENOUS ONCE
Status: COMPLETED | OUTPATIENT
Start: 2020-02-26 | End: 2020-02-27

## 2020-02-26 RX ADMIN — SODIUM CHLORIDE 2000 ML: 9 INJECTION, SOLUTION INTRAVENOUS at 22:43

## 2020-02-26 ASSESSMENT — PAIN SCALES - GENERAL: PAINLEVEL_OUTOF10: 0

## 2020-02-27 PROBLEM — R73.9 HYPERGLYCEMIA: Status: ACTIVE | Noted: 2020-02-27

## 2020-02-27 LAB
A/G RATIO: 0.8 (ref 1.1–2.2)
ALBUMIN SERPL-MCNC: 2.9 G/DL (ref 3.4–5)
ALP BLD-CCNC: 128 U/L (ref 40–129)
ALT SERPL-CCNC: 7 U/L (ref 10–40)
ANION GAP SERPL CALCULATED.3IONS-SCNC: 11 MMOL/L (ref 3–16)
ANION GAP SERPL CALCULATED.3IONS-SCNC: 15 MMOL/L (ref 3–16)
ANION GAP SERPL CALCULATED.3IONS-SCNC: 15 MMOL/L (ref 3–16)
AST SERPL-CCNC: 9 U/L (ref 15–37)
BASOPHILS ABSOLUTE: 0 K/UL (ref 0–0.2)
BASOPHILS RELATIVE PERCENT: 0.5 %
BILIRUB SERPL-MCNC: <0.2 MG/DL (ref 0–1)
BILIRUBIN URINE: NEGATIVE
BLOOD, URINE: ABNORMAL
BUN BLDV-MCNC: 11 MG/DL (ref 7–20)
BUN BLDV-MCNC: 12 MG/DL (ref 7–20)
BUN BLDV-MCNC: 14 MG/DL (ref 7–20)
CALCIUM SERPL-MCNC: 7.7 MG/DL (ref 8.3–10.6)
CALCIUM SERPL-MCNC: 7.9 MG/DL (ref 8.3–10.6)
CALCIUM SERPL-MCNC: 8 MG/DL (ref 8.3–10.6)
CHLORIDE BLD-SCNC: 103 MMOL/L (ref 99–110)
CHLORIDE BLD-SCNC: 105 MMOL/L (ref 99–110)
CHLORIDE BLD-SCNC: 95 MMOL/L (ref 99–110)
CHP ED QC CHECK: YES
CLARITY: CLEAR
CO2: 20 MMOL/L (ref 21–32)
CO2: 21 MMOL/L (ref 21–32)
CO2: 23 MMOL/L (ref 21–32)
COLOR: YELLOW
CREAT SERPL-MCNC: 1 MG/DL (ref 0.6–1.2)
CREAT SERPL-MCNC: 1.3 MG/DL (ref 0.6–1.2)
CREAT SERPL-MCNC: 1.4 MG/DL (ref 0.6–1.2)
EOSINOPHILS ABSOLUTE: 0.2 K/UL (ref 0–0.6)
EOSINOPHILS RELATIVE PERCENT: 4.1 %
GFR AFRICAN AMERICAN: 44
GFR AFRICAN AMERICAN: 48
GFR AFRICAN AMERICAN: >60
GFR NON-AFRICAN AMERICAN: 36
GFR NON-AFRICAN AMERICAN: 40
GFR NON-AFRICAN AMERICAN: 54
GLOBULIN: 3.6 G/DL
GLUCOSE BLD-MCNC: 103 MG/DL (ref 70–99)
GLUCOSE BLD-MCNC: 219 MG/DL (ref 70–99)
GLUCOSE BLD-MCNC: 267 MG/DL (ref 70–99)
GLUCOSE BLD-MCNC: 368 MG/DL (ref 70–99)
GLUCOSE BLD-MCNC: 390 MG/DL (ref 70–99)
GLUCOSE BLD-MCNC: 392 MG/DL (ref 70–99)
GLUCOSE BLD-MCNC: 465 MG/DL (ref 70–99)
GLUCOSE BLD-MCNC: 524 MG/DL
GLUCOSE BLD-MCNC: 524 MG/DL (ref 70–99)
GLUCOSE BLD-MCNC: 583 MG/DL (ref 70–99)
GLUCOSE BLD-MCNC: 86 MG/DL (ref 70–99)
GLUCOSE URINE: >=1000 MG/DL
HCT VFR BLD CALC: 27.4 % (ref 36–48)
HEMOGLOBIN: 9.3 G/DL (ref 12–16)
KETONES, URINE: NEGATIVE MG/DL
LEUKOCYTE ESTERASE, URINE: NEGATIVE
LYMPHOCYTES ABSOLUTE: 0.7 K/UL (ref 1–5.1)
LYMPHOCYTES RELATIVE PERCENT: 13.3 %
MAGNESIUM: 1.8 MG/DL (ref 1.8–2.4)
MAGNESIUM: 1.9 MG/DL (ref 1.8–2.4)
MCH RBC QN AUTO: 33.8 PG (ref 26–34)
MCHC RBC AUTO-ENTMCNC: 33.9 G/DL (ref 31–36)
MCV RBC AUTO: 99.5 FL (ref 80–100)
MICROSCOPIC EXAMINATION: YES
MONOCYTES ABSOLUTE: 0.2 K/UL (ref 0–1.3)
MONOCYTES RELATIVE PERCENT: 4.1 %
NEUTROPHILS ABSOLUTE: 4.2 K/UL (ref 1.7–7.7)
NEUTROPHILS RELATIVE PERCENT: 78 %
NITRITE, URINE: NEGATIVE
PDW BLD-RTO: 13.9 % (ref 12.4–15.4)
PERFORMED ON: ABNORMAL
PH UA: 6 (ref 5–8)
PLATELET # BLD: 200 K/UL (ref 135–450)
PMV BLD AUTO: 8.4 FL (ref 5–10.5)
POTASSIUM REFLEX MAGNESIUM: 3.4 MMOL/L (ref 3.5–5.1)
POTASSIUM REFLEX MAGNESIUM: 3.4 MMOL/L (ref 3.5–5.1)
POTASSIUM SERPL-SCNC: 3.7 MMOL/L (ref 3.5–5.1)
PROTEIN UA: NEGATIVE MG/DL
RBC # BLD: 2.75 M/UL (ref 4–5.2)
RBC UA: ABNORMAL /HPF (ref 0–4)
SODIUM BLD-SCNC: 131 MMOL/L (ref 136–145)
SODIUM BLD-SCNC: 137 MMOL/L (ref 136–145)
SODIUM BLD-SCNC: 140 MMOL/L (ref 136–145)
SPECIFIC GRAVITY UA: 1.01 (ref 1–1.03)
TOTAL PROTEIN: 6.5 G/DL (ref 6.4–8.2)
URINE REFLEX TO CULTURE: ABNORMAL
URINE TYPE: ABNORMAL
UROBILINOGEN, URINE: 0.2 E.U./DL
WBC # BLD: 5.4 K/UL (ref 4–11)
WBC UA: ABNORMAL /HPF (ref 0–5)
YEAST: PRESENT /HPF

## 2020-02-27 PROCEDURE — 83735 ASSAY OF MAGNESIUM: CPT

## 2020-02-27 PROCEDURE — 87070 CULTURE OTHR SPECIMN AEROBIC: CPT

## 2020-02-27 PROCEDURE — 6370000000 HC RX 637 (ALT 250 FOR IP): Performed by: EMERGENCY MEDICINE

## 2020-02-27 PROCEDURE — 6360000002 HC RX W HCPCS: Performed by: EMERGENCY MEDICINE

## 2020-02-27 PROCEDURE — 96375 TX/PRO/DX INJ NEW DRUG ADDON: CPT

## 2020-02-27 PROCEDURE — 1200000000 HC SEMI PRIVATE

## 2020-02-27 PROCEDURE — 97167 OT EVAL HIGH COMPLEX 60 MIN: CPT | Performed by: PHYSICIAN ASSISTANT

## 2020-02-27 PROCEDURE — 97535 SELF CARE MNGMENT TRAINING: CPT | Performed by: PHYSICIAN ASSISTANT

## 2020-02-27 PROCEDURE — 85025 COMPLETE CBC W/AUTO DIFF WBC: CPT

## 2020-02-27 PROCEDURE — 2580000003 HC RX 258: Performed by: INTERNAL MEDICINE

## 2020-02-27 PROCEDURE — 2580000003 HC RX 258: Performed by: FAMILY MEDICINE

## 2020-02-27 PROCEDURE — 96365 THER/PROPH/DIAG IV INF INIT: CPT

## 2020-02-27 PROCEDURE — 97530 THERAPEUTIC ACTIVITIES: CPT | Performed by: PHYSICIAN ASSISTANT

## 2020-02-27 PROCEDURE — 6370000000 HC RX 637 (ALT 250 FOR IP): Performed by: INTERNAL MEDICINE

## 2020-02-27 PROCEDURE — 36415 COLL VENOUS BLD VENIPUNCTURE: CPT

## 2020-02-27 PROCEDURE — 97530 THERAPEUTIC ACTIVITIES: CPT | Performed by: PHYSICAL THERAPIST

## 2020-02-27 PROCEDURE — 80048 BASIC METABOLIC PNL TOTAL CA: CPT

## 2020-02-27 PROCEDURE — 97162 PT EVAL MOD COMPLEX 30 MIN: CPT | Performed by: PHYSICAL THERAPIST

## 2020-02-27 PROCEDURE — 6360000002 HC RX W HCPCS: Performed by: INTERNAL MEDICINE

## 2020-02-27 PROCEDURE — 6360000002 HC RX W HCPCS: Performed by: FAMILY MEDICINE

## 2020-02-27 PROCEDURE — 6370000000 HC RX 637 (ALT 250 FOR IP): Performed by: FAMILY MEDICINE

## 2020-02-27 PROCEDURE — 97116 GAIT TRAINING THERAPY: CPT | Performed by: PHYSICAL THERAPIST

## 2020-02-27 PROCEDURE — 87205 SMEAR GRAM STAIN: CPT

## 2020-02-27 PROCEDURE — 81001 URINALYSIS AUTO W/SCOPE: CPT

## 2020-02-27 PROCEDURE — 2580000003 HC RX 258: Performed by: EMERGENCY MEDICINE

## 2020-02-27 PROCEDURE — 6370000000 HC RX 637 (ALT 250 FOR IP)

## 2020-02-27 RX ORDER — FAMOTIDINE 20 MG/1
20 TABLET, FILM COATED ORAL 2 TIMES DAILY
Status: DISCONTINUED | OUTPATIENT
Start: 2020-02-27 | End: 2020-02-27 | Stop reason: DRUGHIGH

## 2020-02-27 RX ORDER — PREDNISOLONE ACETATE 10 MG/ML
1 SUSPENSION/ DROPS OPHTHALMIC 2 TIMES DAILY
Status: DISCONTINUED | OUTPATIENT
Start: 2020-02-27 | End: 2020-03-05 | Stop reason: HOSPADM

## 2020-02-27 RX ORDER — DEXTROSE MONOHYDRATE 25 G/50ML
12.5 INJECTION, SOLUTION INTRAVENOUS PRN
Status: DISCONTINUED | OUTPATIENT
Start: 2020-02-27 | End: 2020-03-05 | Stop reason: HOSPADM

## 2020-02-27 RX ORDER — SODIUM CHLORIDE 0.9 % (FLUSH) 0.9 %
10 SYRINGE (ML) INJECTION EVERY 12 HOURS SCHEDULED
Status: DISCONTINUED | OUTPATIENT
Start: 2020-02-27 | End: 2020-03-05 | Stop reason: HOSPADM

## 2020-02-27 RX ORDER — ATROPINE SULFATE 10 MG/ML
1 SOLUTION/ DROPS OPHTHALMIC 2 TIMES DAILY
Status: DISCONTINUED | OUTPATIENT
Start: 2020-02-27 | End: 2020-03-05 | Stop reason: HOSPADM

## 2020-02-27 RX ORDER — INSULIN GLARGINE 100 [IU]/ML
5 INJECTION, SOLUTION SUBCUTANEOUS NIGHTLY
Status: DISCONTINUED | OUTPATIENT
Start: 2020-02-27 | End: 2020-02-28

## 2020-02-27 RX ORDER — FLUCONAZOLE 100 MG/1
200 TABLET ORAL DAILY
Status: DISCONTINUED | OUTPATIENT
Start: 2020-02-27 | End: 2020-02-27 | Stop reason: DRUGHIGH

## 2020-02-27 RX ORDER — DORZOLAMIDE HYDROCHLORIDE AND TIMOLOL MALEATE 20; 5 MG/ML; MG/ML
1 SOLUTION/ DROPS OPHTHALMIC 2 TIMES DAILY
Status: DISCONTINUED | OUTPATIENT
Start: 2020-02-27 | End: 2020-03-05 | Stop reason: HOSPADM

## 2020-02-27 RX ORDER — INSULIN GLARGINE 100 [IU]/ML
0.25 INJECTION, SOLUTION SUBCUTANEOUS NIGHTLY
Status: DISCONTINUED | OUTPATIENT
Start: 2020-02-27 | End: 2020-02-27

## 2020-02-27 RX ORDER — ACETAMINOPHEN 325 MG/1
650 TABLET ORAL EVERY 6 HOURS PRN
Status: DISCONTINUED | OUTPATIENT
Start: 2020-02-27 | End: 2020-03-05 | Stop reason: HOSPADM

## 2020-02-27 RX ORDER — DEXTROSE MONOHYDRATE 50 MG/ML
100 INJECTION, SOLUTION INTRAVENOUS PRN
Status: DISCONTINUED | OUTPATIENT
Start: 2020-02-27 | End: 2020-03-05 | Stop reason: HOSPADM

## 2020-02-27 RX ORDER — FAMOTIDINE 20 MG/1
20 TABLET, FILM COATED ORAL DAILY
Status: DISCONTINUED | OUTPATIENT
Start: 2020-02-27 | End: 2020-03-05 | Stop reason: HOSPADM

## 2020-02-27 RX ORDER — POLYETHYLENE GLYCOL 3350 17 G/17G
17 POWDER, FOR SOLUTION ORAL DAILY PRN
Status: DISCONTINUED | OUTPATIENT
Start: 2020-02-27 | End: 2020-03-05 | Stop reason: HOSPADM

## 2020-02-27 RX ORDER — ATORVASTATIN CALCIUM 10 MG/1
10 TABLET, FILM COATED ORAL
Status: DISCONTINUED | OUTPATIENT
Start: 2020-02-27 | End: 2020-03-05 | Stop reason: HOSPADM

## 2020-02-27 RX ORDER — POTASSIUM CHLORIDE 7.45 MG/ML
10 INJECTION INTRAVENOUS
Status: COMPLETED | OUTPATIENT
Start: 2020-02-27 | End: 2020-02-27

## 2020-02-27 RX ORDER — VENLAFAXINE 50 MG/1
150 TABLET ORAL 2 TIMES DAILY WITH MEALS
Status: DISCONTINUED | OUTPATIENT
Start: 2020-02-27 | End: 2020-03-05 | Stop reason: HOSPADM

## 2020-02-27 RX ORDER — ISOSORBIDE MONONITRATE 30 MG/1
30 TABLET, EXTENDED RELEASE ORAL
Status: DISCONTINUED | OUTPATIENT
Start: 2020-02-27 | End: 2020-03-05 | Stop reason: HOSPADM

## 2020-02-27 RX ORDER — PROMETHAZINE HYDROCHLORIDE 25 MG/1
12.5 TABLET ORAL EVERY 4 HOURS PRN
Status: DISCONTINUED | OUTPATIENT
Start: 2020-02-27 | End: 2020-03-05 | Stop reason: HOSPADM

## 2020-02-27 RX ORDER — HEPARIN SODIUM 5000 [USP'U]/ML
5000 INJECTION, SOLUTION INTRAVENOUS; SUBCUTANEOUS EVERY 8 HOURS SCHEDULED
Status: DISCONTINUED | OUTPATIENT
Start: 2020-02-27 | End: 2020-02-28

## 2020-02-27 RX ORDER — POTASSIUM CHLORIDE 750 MG/1
40 TABLET, FILM COATED, EXTENDED RELEASE ORAL ONCE
Status: COMPLETED | OUTPATIENT
Start: 2020-02-27 | End: 2020-02-27

## 2020-02-27 RX ORDER — NICOTINE POLACRILEX 4 MG
15 LOZENGE BUCCAL PRN
Status: DISCONTINUED | OUTPATIENT
Start: 2020-02-27 | End: 2020-03-05 | Stop reason: HOSPADM

## 2020-02-27 RX ORDER — SODIUM CHLORIDE 0.9 % (FLUSH) 0.9 %
10 SYRINGE (ML) INJECTION PRN
Status: DISCONTINUED | OUTPATIENT
Start: 2020-02-27 | End: 2020-03-05 | Stop reason: HOSPADM

## 2020-02-27 RX ORDER — INSULIN GLARGINE 100 [IU]/ML
10 INJECTION, SOLUTION SUBCUTANEOUS NIGHTLY
Status: DISCONTINUED | OUTPATIENT
Start: 2020-02-27 | End: 2020-02-27

## 2020-02-27 RX ORDER — POTASSIUM CHLORIDE 750 MG/1
40 TABLET, FILM COATED, EXTENDED RELEASE ORAL ONCE
Status: COMPLETED | OUTPATIENT
Start: 2020-02-27 | End: 2020-02-28

## 2020-02-27 RX ORDER — ONDANSETRON 2 MG/ML
4 INJECTION INTRAMUSCULAR; INTRAVENOUS EVERY 4 HOURS PRN
Status: DISCONTINUED | OUTPATIENT
Start: 2020-02-27 | End: 2020-03-05 | Stop reason: HOSPADM

## 2020-02-27 RX ORDER — FLUCONAZOLE 100 MG/1
100 TABLET ORAL DAILY
Status: DISCONTINUED | OUTPATIENT
Start: 2020-02-27 | End: 2020-03-02

## 2020-02-27 RX ORDER — SODIUM CHLORIDE 9 MG/ML
INJECTION, SOLUTION INTRAVENOUS CONTINUOUS
Status: DISCONTINUED | OUTPATIENT
Start: 2020-02-27 | End: 2020-02-27

## 2020-02-27 RX ORDER — INSULIN GLARGINE 100 [IU]/ML
0.25 INJECTION, SOLUTION SUBCUTANEOUS ONCE
Status: COMPLETED | OUTPATIENT
Start: 2020-02-27 | End: 2020-02-27

## 2020-02-27 RX ADMIN — POTASSIUM CHLORIDE 40 MEQ: 750 TABLET, FILM COATED, EXTENDED RELEASE ORAL at 16:51

## 2020-02-27 RX ADMIN — ACETAMINOPHEN 650 MG: 325 TABLET ORAL at 13:00

## 2020-02-27 RX ADMIN — PREDNISOLONE ACETATE 1 DROP: 10 SUSPENSION/ DROPS OPHTHALMIC at 09:32

## 2020-02-27 RX ADMIN — METOPROLOL TARTRATE 25 MG: 25 TABLET, FILM COATED ORAL at 16:51

## 2020-02-27 RX ADMIN — POTASSIUM CHLORIDE 10 MEQ: 7.46 INJECTION, SOLUTION INTRAVENOUS at 18:14

## 2020-02-27 RX ADMIN — ATORVASTATIN CALCIUM 10 MG: 10 TABLET, FILM COATED ORAL at 13:00

## 2020-02-27 RX ADMIN — INSULIN LISPRO 4 UNITS: 100 INJECTION, SOLUTION INTRAVENOUS; SUBCUTANEOUS at 14:06

## 2020-02-27 RX ADMIN — INSULIN LISPRO 3 UNITS: 100 INJECTION, SOLUTION INTRAVENOUS; SUBCUTANEOUS at 21:27

## 2020-02-27 RX ADMIN — PANCRELIPASE 12000 UNITS: 60000; 12000; 38000 CAPSULE, DELAYED RELEASE PELLETS ORAL at 16:53

## 2020-02-27 RX ADMIN — SODIUM CHLORIDE: 9 INJECTION, SOLUTION INTRAVENOUS at 02:36

## 2020-02-27 RX ADMIN — INSULIN GLARGINE 5 UNITS: 100 INJECTION, SOLUTION SUBCUTANEOUS at 21:27

## 2020-02-27 RX ADMIN — POTASSIUM CHLORIDE 10 MEQ: 7.46 INJECTION, SOLUTION INTRAVENOUS at 17:20

## 2020-02-27 RX ADMIN — METOPROLOL TARTRATE 25 MG: 25 TABLET, FILM COATED ORAL at 09:30

## 2020-02-27 RX ADMIN — VANCOMYCIN HYDROCHLORIDE 1000 MG: 1 INJECTION, POWDER, LYOPHILIZED, FOR SOLUTION INTRAVENOUS at 19:30

## 2020-02-27 RX ADMIN — ISOSORBIDE MONONITRATE 30 MG: 30 TABLET, EXTENDED RELEASE ORAL at 13:00

## 2020-02-27 RX ADMIN — PANCRELIPASE 12000 UNITS: 60000; 12000; 38000 CAPSULE, DELAYED RELEASE PELLETS ORAL at 14:12

## 2020-02-27 RX ADMIN — INSULIN GLARGINE 21 UNITS: 100 INJECTION, SOLUTION SUBCUTANEOUS at 02:44

## 2020-02-27 RX ADMIN — CEFEPIME HYDROCHLORIDE 1 G: 1 INJECTION, POWDER, FOR SOLUTION INTRAMUSCULAR; INTRAVENOUS at 00:08

## 2020-02-27 RX ADMIN — HEPARIN SODIUM 5000 UNITS: 5000 INJECTION INTRAVENOUS; SUBCUTANEOUS at 21:28

## 2020-02-27 RX ADMIN — INSULIN HUMAN 10 UNITS: 100 INJECTION, SOLUTION PARENTERAL at 00:44

## 2020-02-27 RX ADMIN — DORZOLAMIDE HYDROCHLORIDE AND TIMOLOL MALEATE 1 DROP: 20; 5 SOLUTION/ DROPS OPHTHALMIC at 21:48

## 2020-02-27 RX ADMIN — ATROPINE SULFATE 1 DROP: 10 SOLUTION OPHTHALMIC at 09:32

## 2020-02-27 RX ADMIN — PREDNISOLONE ACETATE 1 DROP: 10 SUSPENSION/ DROPS OPHTHALMIC at 21:48

## 2020-02-27 RX ADMIN — HEPARIN SODIUM 5000 UNITS: 5000 INJECTION INTRAVENOUS; SUBCUTANEOUS at 14:06

## 2020-02-27 RX ADMIN — INSULIN LISPRO 10 UNITS: 100 INJECTION, SOLUTION INTRAVENOUS; SUBCUTANEOUS at 10:04

## 2020-02-27 RX ADMIN — PANCRELIPASE 12000 UNITS: 60000; 12000; 38000 CAPSULE, DELAYED RELEASE PELLETS ORAL at 09:31

## 2020-02-27 RX ADMIN — ASPIRIN 325 MG: 325 TABLET, DELAYED RELEASE ORAL at 09:30

## 2020-02-27 RX ADMIN — VENLAFAXINE 150 MG: 50 TABLET ORAL at 09:30

## 2020-02-27 RX ADMIN — SODIUM CHLORIDE, PRESERVATIVE FREE 10 ML: 5 INJECTION INTRAVENOUS at 09:32

## 2020-02-27 RX ADMIN — ATROPINE SULFATE 1 DROP: 10 SOLUTION OPHTHALMIC at 21:48

## 2020-02-27 RX ADMIN — FLUCONAZOLE 100 MG: 100 TABLET ORAL at 14:05

## 2020-02-27 RX ADMIN — DORZOLAMIDE HYDROCHLORIDE AND TIMOLOL MALEATE 1 DROP: 20; 5 SOLUTION/ DROPS OPHTHALMIC at 09:32

## 2020-02-27 RX ADMIN — FAMOTIDINE 20 MG: 20 TABLET, FILM COATED ORAL at 09:30

## 2020-02-27 RX ADMIN — CEFEPIME HYDROCHLORIDE 1 G: 1 INJECTION, POWDER, FOR SOLUTION INTRAMUSCULAR; INTRAVENOUS at 18:15

## 2020-02-27 RX ADMIN — VENLAFAXINE 150 MG: 50 TABLET ORAL at 16:51

## 2020-02-27 RX ADMIN — HEPARIN SODIUM 5000 UNITS: 5000 INJECTION INTRAVENOUS; SUBCUTANEOUS at 05:56

## 2020-02-27 ASSESSMENT — PAIN DESCRIPTION - ONSET
ONSET: ON-GOING
ONSET: ON-GOING

## 2020-02-27 ASSESSMENT — PAIN DESCRIPTION - PAIN TYPE
TYPE: ACUTE PAIN
TYPE: CHRONIC PAIN

## 2020-02-27 ASSESSMENT — PAIN SCALES - GENERAL
PAINLEVEL_OUTOF10: 10
PAINLEVEL_OUTOF10: 5
PAINLEVEL_OUTOF10: 0
PAINLEVEL_OUTOF10: 5
PAINLEVEL_OUTOF10: 8

## 2020-02-27 ASSESSMENT — PAIN DESCRIPTION - DESCRIPTORS
DESCRIPTORS: ACHING
DESCRIPTORS: CONSTANT;ACHING

## 2020-02-27 ASSESSMENT — PAIN - FUNCTIONAL ASSESSMENT
PAIN_FUNCTIONAL_ASSESSMENT: PREVENTS OR INTERFERES SOME ACTIVE ACTIVITIES AND ADLS
PAIN_FUNCTIONAL_ASSESSMENT: ACTIVITIES ARE NOT PREVENTED

## 2020-02-27 ASSESSMENT — PAIN DESCRIPTION - ORIENTATION
ORIENTATION: RIGHT;LEFT
ORIENTATION: RIGHT;LEFT

## 2020-02-27 ASSESSMENT — PAIN DESCRIPTION - PROGRESSION
CLINICAL_PROGRESSION: NOT CHANGED
CLINICAL_PROGRESSION: NOT CHANGED

## 2020-02-27 ASSESSMENT — PAIN DESCRIPTION - FREQUENCY
FREQUENCY: CONTINUOUS
FREQUENCY: CONTINUOUS

## 2020-02-27 ASSESSMENT — PAIN DESCRIPTION - LOCATION
LOCATION: LEG
LOCATION: LEG

## 2020-02-27 ASSESSMENT — PAIN DESCRIPTION - DIRECTION: RADIATING_TOWARDS: NO

## 2020-02-27 ASSESSMENT — PAIN SCALES - WONG BAKER
WONGBAKER_NUMERICALRESPONSE: 0
WONGBAKER_NUMERICALRESPONSE: 2
WONGBAKER_NUMERICALRESPONSE: 2

## 2020-02-27 NOTE — PROGRESS NOTES
Occupational Therapy   Occupational Therapy Initial Assessment  Date: 2020   Patient Name: Regino Rivera  MRN: [id number]     : 1945    Date of Service: 2020    Discharge Recommendations:  24 hour supervision or assist, Home with Home health OT, S Level 1  OT Equipment Recommendations  Equipment Needed: No    Assessment   Performance deficits / Impairments: Decreased functional mobility ; Decreased cognition;Decreased high-level IADLs;Decreased ADL status; Decreased endurance;Decreased strength;Decreased balance;Decreased safe awareness  Assessment: Pt is admitted from home with family and caregivers who provide 24hr A. Pt is presently below her baseline, and will require increased A with mobility and ADLs. Recommend 24hr hands on A with all ADLS and mobility and home OT level S1. Treatment Diagnosis: Impaired ADLs  Prognosis: Fair  Decision Making: High Complexity  OT Education: Plan of Care;Precautions;Transfer Training;IADL Safety;Equipment; Family Education  Patient Education: Discussed need for increased A at home upon DC, someone walking with pt for mobility. Anticipate increased A with ADLS. Barriers to Learning: Cognition  REQUIRES OT FOLLOW UP: Yes  Activity Tolerance  Activity Tolerance: Patient limited by fatigue  Safety Devices  Safety Devices in place: Yes  Type of devices: Left in chair;Call light within reach; Chair alarm in place;Nurse notified; Patient at risk for falls  Restraints  Initially in place: No           Patient Diagnosis(es): The primary encounter diagnosis was Hyperglycemia. Diagnoses of Lactic acidosis, Altered mental status, unspecified altered mental status type, and Cellulitis of lower extremity, unspecified laterality were also pertinent to this visit.      has a past medical history of Anemia, Atrial fibrillation ([address] CAD (coronary artery disease), Chronic back pain, CKD (chronic kidney disease), stage III ([address] Diabetes mellitus ([address] Diastolic CHF ([facility] recent admits for LE cellulitis and UTI. Subjective  Subjective: Pt reports some LE pain, unrated. She is reluctant to work with therapy here- \"I have therapists at home. \"    Social/Functional History  Social/Functional History  Lives With: Daughter(and friend)  Type of Home: House  Home Layout: One level  Home Access: Stairs to enter with rails  Entrance Stairs - Number of Steps: 1-2  Bathroom Shower/Tub: Walk-in shower  Bathroom Toilet: Standard  Bathroom Equipment: Shower chair, Grab bars in shower, Commode  Bathroom Accessibility: Walker accessible  Home Equipment: Rolling walker, Wheelchair-manual, [address], [address] chair  Receives Help From: Family, Home health  ADL Assistance: Needs assistance  Bath: Minimal assistance  Homemaking Responsibilities: (Pt does help fold clothes)  Ambulation Assistance: Needs assistance(wh walker)  Transfer Assistance: Needs assistance(Supervision)  Active : No  Occupation: Retired  Leisure & Hobbies: Reading, TV, feed the deer. IADL Comments: Pt has an aide from 9am-5pm Mon through Fri. Nurse 2 days per week for dressing changes for LE wounds. Overall has 24hr A. Pt sleeps in her recliner. 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)  Hearing: Exceptions to [facility]  Hearing Exceptions: Hard of hearing/hearing concerns    Orientation  Overall Orientation Status: Impaired  Orientation Level: Disoriented to situation;Oriented to person     Balance  Sitting Balance: Supervision  Standing Balance: Contact guard assistance  Functional Mobility  Functional - Mobility Device: Rolling Walker  Assist Level: Contact guard assistance  ADL  Feeding: Setup  Grooming: Minimal assistance  UE Bathing: Minimal assistance  LE Bathing: Moderate assistance  UE Dressing: Minimal assistance  LE Dressing: Moderate assistance  Toileting:  Moderate assistance  Tone RUE  RUE Tone: Normotonic  Tone LUE  LUE Tone: Normotonic  Coordination  Movements Are Fluid And

## 2020-02-27 NOTE — ED PROVIDER NOTES
Attends Mandaen service: Not on file     Active member of club or organization: Not on file     Attends meetings of clubs or organizations: Not on file     Relationship status: Not on file    Intimate partner violence:     Fear of current or ex partner: Not on file     Emotionally abused: Not on file     Physically abused: Not on file     Forced sexual activity: Not on file   Other Topics Concern    Not on file   Social History Narrative    Not on file       SCREENINGS      @SNIT([id number])@      PHYSICAL EXAM    (up to 7 for level 4, 8 or more for level 5)     ED Triage Vitals [02/26/20 2201]   BP Temp Temp Source Pulse Resp SpO2 Height Weight   125/61 97.5 °F (36.4 °C) Oral 72 20 95 % 4' 10\" (1.473 m) 181 lb (82.1 kg)       Physical Exam  Vitals signs and nursing note reviewed. Constitutional:       Appearance: She is well-developed. HENT:      Head: Normocephalic and atraumatic. Mouth/Throat:      Mouth: Mucous membranes are moist.      Pharynx: Oropharynx is clear. Eyes:      Extraocular Movements: Extraocular movements intact. Conjunctiva/sclera: Conjunctivae normal.      Pupils: Pupils are equal, round, and reactive to light. Neck:      Musculoskeletal: Normal range of motion. Trachea: No tracheal deviation. Cardiovascular:      Rate and Rhythm: Normal rate and regular rhythm. Heart sounds: Normal heart sounds. Pulmonary:      Effort: Pulmonary effort is normal.      Breath sounds: Normal breath sounds. No wheezing or rhonchi. Abdominal:      General: There is distension. Palpations: Abdomen is soft. Tenderness: There is no abdominal tenderness. There is no guarding or rebound. Musculoskeletal: Normal range of motion. Right lower leg: Edema present. Left lower leg: Edema present. Skin:     General: Skin is warm and dry. Capillary Refill: Capillary refill takes 2 to 3 seconds. Findings: Erythema present.    Neurological:      General: No [facility] [place] [facility]  [address] [facility] [place] [address]   Phone [phone]   BLOOD GAS, VENOUS - Abnormal; Notable for the following components:    pH, Pedro 7.299 (*)     All other components within normal limits    Narrative:     wrong tube drawn, spoke to megan cuello to redraw  Performed at:  [facility]  [address] [facility] [place] [address]   Phone [phone]   LACTIC ACID, PLASMA - Abnormal; Notable for the following components:    Lactic Acid 3.2 (*)     All other components within normal limits    Narrative:     Performed at:  [facility]  [address] [facility] [employer] [address]   Phone [phone]   COMPREHENSIVE METABOLIC PANEL - Abnormal; Notable for the following components:    Alb 2.9 (*)     Albumin/Globulin Ratio 0.8 (*)     ALT 7 (*)     AST 9 (*)     All other components within normal limits    Narrative:     Performed at:  [facility]  [address] [facility] [employer] [address]   Phone [phone]   LIPASE - Abnormal; Notable for the following components:    Lipase <3.0 (*)     All other components within normal limits    Narrative:     Performed at:  [facility]  [address] [facility] [employer] [address]   Phone [phone]   POCT GLUCOSE - Abnormal; Notable for the following components:    POC Glucose >600 (*)     All other components within normal limits    Narrative:     Performed at:  [facility]  [address] [employer] [address]   Phone [phone]   BETA-HYDROXYBUTYRATE    Narrative:     Performed at:  [facility]  [address] [place] [address] [place] [address]   Phone [phone]   TROPONIN    Narrative:     Performed at:  [facility]  [address] [place] [address]   Phone [phone]       All other labs were within normal range or not returned as of this dictation. EMERGENCY DEPARTMENT COURSE and DIFFERENTIAL DIAGNOSIS/MDM:   Vitals:    Vitals:    02/26/20 2201   BP: 125/61   Pulse: 72   Resp: 20   Temp: 97.5 °F (36.4 °C)   TempSrc: Oral   SpO2: 95%   Weight: 181 lb (82.1 kg)   Height: 4' 10\" (1.473 m)           MDM  Number of Diagnoses or Management Options  Diagnosis management comments: 77-year-old female presents the emergency department for evaluation of hyperglycemia and altered mental status. Family ports that the patient is had similar presentations when she is become septic in the past.  Patient's blood glucose was noted to be critically high by EMS and on arrival to the ED. family states that she does appear to be confused but does have slurred speech. Patient is moving all extremities. Cranial nerves II 12 are grossly intact she is afebrile not tachycardic. Urine in the catheter appears clear and yellow. She has bilateral lower extremity edema and cellulitis up to the distal knee. Concern is the patient either has HHS or DKA. We will also screen for infections. Differential also includes CVA and ACS. Amount and/or Complexity of Data Reviewed  Decide to obtain previous medical records or to obtain history from someone other than the patient: yes          REASSESSMENT      On reevaluation patient is resting comfortably. Laboratory work-up was remarkable for an elevated creatinine and decreased GFR compared to baseline. She was noted to be hyperglycemic patient initially started on IV fluids other concerns for possible DKA or sepsis. Lactate was elevated but she does not have a leukocytosis does not meet Sirs criteria and this may be secondary to dehydration. After receiving 2 L the patient's blood glucose was 522 and she was given IV insulin. Chest x-ray showed pulmonary edema and CT of the head was negative for acute abnormalities.   Patient will be admitted to the hospital for further medical management evaluation. CRITICAL CARE TIME   Total Critical Care time was 35 minutes, excluding separatelyreportable procedures. There was a high probability ofclinically significant/life threatening deterioration in the patient's condition which required my urgent intervention. CONSULTS:  None    PROCEDURES:  Unless otherwise noted below, none     Procedures    FINAL IMPRESSION      1. Hyperglycemia    2. Lactic acidosis    3. Altered mental status, unspecified altered mental status type    4. Cellulitis of lower extremity, unspecified laterality          DISPOSITION/PLAN   DISPOSITION        PATIENT REFERREDTO:  No follow-up provider specified.     DISCHARGEMEDICATIONS:  New Prescriptions    No medications on file          (Please note that portions of this note were completed with a voice recognition program.  Efforts were made to edit the dictations but occasionally words are mis-transcribed.)    Dylan Valdez MD (electronically signed)  Attending Emergency Physician          Dylan Valdez MD  02/27/20 1829

## 2020-02-27 NOTE — H&P
Hospital Medicine History & Physical      PCP: Shonna Cole MD    Date of Admission: 2/26/2020    Date of Service: Pt seen/examined on 2/27/20 and Admitted to Inpatient    Chief Complaint:  AMS      History Of Present Illness:    Ms. Melisa Fitch is a 68-year-old woman with a history of hypertension, hyperlipidemia, diabetes mellitus, MDRO, chronic suprapubic catheter, chronic diastolic congestive heart failure, chronic renal failure and ongoing issues with her lower extremities including recurrent cellulitis and chronic venous hypertension with inflammation who presented to the ED for concerns by her daughter of AMS and possible lower extremity wound infection. Pt's daughter states she was somewhat combative and confused yesterday. Also states that several weeks ago, she spilled a bag of her urine which has had MDRO organisms previously onto her legs and believes that chronic erythema of legs has worsened over the past few days. Pt has several shallow open wounds but none beyond baseline. Pt denies fevers, chills, focal symptoms of infection including cough, congestion, n/v, abdominal pain, hematuria. In the ED she was noted to be hyperglycemic with BG >600. VBC with pH 7.299. Vital signs wnl, no leukocytosis. She was given insulin and cefipime for concern for infection. Medicine was called to admit.     Past Medical History:        Diagnosis Date    Anemia     Atrial fibrillation ([address]     CAD (coronary artery disease)     Chronic back pain     CKD (chronic kidney disease), stage III (HCC)     Diabetes mellitus (HCC)     Diastolic CHF (HCC)     Hyperlipidemia     Hypertension     MI (myocardial infarction) ([address]     MRSA (methicillin resistant staph aureus) culture positive 04/13/2018    leg ulcer    MRSA (methicillin resistant staph aureus) culture positive Age of Onset    Heart Disease Father     Heart Failure Father     Diabetes Father     Diabetes Daughter     Diabetes Daughter        REVIEW OF SYSTEMS:   Positive for lower extremity wounds and pain, and as noted in the HPI. All other systems reviewed and negative. PHYSICAL EXAM:    BP (!) 142/91   Pulse 102   Temp 98.6 °F (37 °C) (Oral)   Resp 18   Ht 4' 11\" (1.499 m)   Wt 176 lb 12.9 oz (80.2 kg)   SpO2 98%   BMI 35.71 kg/m²     General appearance: Mildly combative and agitated, otherwise appears stated age and cooperative. HEENT Normal cephalic, atraumatic without obvious deformity. Pupils equal, round, and reactive to light. Extra ocular muscles intact. Conjunctivae/corneas clear. Neck: Supple, No jugular venous distention/bruits. Trachea midline without thyromegaly or adenopathy with full range of motion. Lungs: Clear to auscultation, bilaterally without Rales/Wheezes/Rhonchi with good respiratory effort. Heart: Regular rate and rhythm with Normal S1/S2 without murmurs, rubs or gallops, point of maximum impulse non-displaced  Abdomen: Soft, non-tender or non-distended without rigidity or guarding and positive bowel sounds all four quadrants. Extremities: No clubbing, cyanosis. Full range of motion without deformity and normal gait intact. Skin: Chronic venous stasis bilaterally of lower legs with multiple shallow ulcerations, no purulence, erythema demarkated with permanent marker  Neurologic: Alert and oriented X 3, neurovascularly intact with sensory/motor intact upper extremities/lower extremities, bilaterally.   Cranial nerves: II-XII intact, grossly non-focal.  Mental status: Agitated and mildly combative, not compliant with answering questions  Capillary Refill: Acceptable  < 3 seconds  Peripheral Pulses: +3 Easily felt, not easily obliterated with pressure    CT Head:  No acute intracranial abnormality  CXR:  I have reviewed the CXR with the following interpretation: Mild with erythema:  - possible infection although uncertain given otherwise reassuring clinical exam  - will consult ID given h/o MDRO  - s/p cefepime, cont cefepime and vanc for now    #Candiuria:  - colonization vs active infection  - foul smell from site of suprapubic catheter insertion site on exam  - consider urology to exchange catheter pending ID evaluation    #Chronic CHF with pulmonary edema on exam:  - s/p fluid rescucitation for HHS in the ED  - hold home lasix for now and reassess fluid status tomorrow when improved      DVT Prophylaxis: lovenox  Diet: DIET CARB CONTROL; Carb Control: 4 carb choices (60 gms)/meal  Code Status: Full Code  PT/OT Eval Status: pending    Dispo - pending PT/OT       Sharlene Heredia MD    Thank you Eliz Burnett MD for the opportunity to be involved in this patient's care. If you have any questions or concerns please feel free to contact me at [phone].

## 2020-02-27 NOTE — PROGRESS NOTES
Pt arrived to room 7472__accompanied by RN. VIRAL. Assessment complete. No s/s of distress, pt tolerated well. Pt A/O x4. O2 sat _96_% on _RA__. Oriented patient to call light, television controls, and room, verbalized understanding. Education initiated and all questions answered at this time. Non skid footwear placed on feet. Bed in lowest position with wheels locked. Patient instructed to call out for needs, denies needs at this time. Call light within reach. Will cont to monitor.

## 2020-02-27 NOTE — PROGRESS NOTES
Physical Therapy    Facility/Department: [address] PROGRESSIVE CARE  Initial Assessment    NAME: Lelia Combs  : 1945  MRN: [id number]    Date of Service: 2020    Discharge Recommendations:  24 hour supervision or assist, Patient would benefit from continued therapy after discharge, S Level 1   PT Equipment Recommendations  Equipment Needed: No  Lelia Cmobs scored a 15/24 on the AM-PAC short mobility form. Current research shows that an AM-PAC score of 18 or greater is typically associated with a discharge to the patient's home setting. Despite the patients AM-PAC score and their current functional mobility deficits, it is recommended that the patient have 2-3 sessions per week of Physical Therapy at d/c to increase the patients independence as pt has 24/7 assist at home. Assessment   Body structures, Functions, Activity limitations: Decreased functional mobility   Assessment: pt is a 65 yo female who was adm to hosp with confusion; pt currently is slightly below her baseline of being able to complete transfers and gait with rollator with SBA; pt has 24/7 assist at home and feel she will be safe to return home with 24/7 assist and home PT. Due to pt's short stature and height of specialty mattress and recliner chair recommend nursing use stedy  Prognosis: Good  Decision Making: Medium Complexity  PT Education: General Safety;PT Role  Barriers to Learning: confusion  REQUIRES PT FOLLOW UP: Yes  Activity Tolerance  Activity Tolerance: Patient Tolerated treatment well;Patient limited by endurance       Patient Diagnosis(es): The primary encounter diagnosis was Hyperglycemia. Diagnoses of Lactic acidosis, Altered mental status, unspecified altered mental status type, and Cellulitis of lower extremity, unspecified laterality were also pertinent to this visit.      has a past medical history of Anemia, Atrial fibrillation ([address] CAD (coronary artery disease), Chronic back pain, CKD (chronic kidney disease), stage III ([address] Diabetes mellitus ([address] Diastolic CHF ([address] Hyperlipidemia, Hypertension, MI (myocardial infarction) ([address] MRSA (methicillin resistant staph aureus) culture positive, MRSA (methicillin resistant staph aureus) culture positive, MRSA colonization, Obesity (BMI 30-39.9), Osteoarthritis, Pancreatitis, Sepsis ([facility] Stasis ulcer ([facility] and Suprapubic catheter ([facility] has a past surgical history that includes Hysterectomy; knee surgery; back surgery; Cholecystectomy; Cataract removal; Appendectomy; Colonoscopy; eye surgery; fracture surgery; joint replacement; and Insert/Change Brasher Catheter - Complicat ([id number]). Restrictions  Restrictions/Precautions  Restrictions/Precautions: Fall Risk, Contact Precautions  Position Activity Restriction  Other position/activity restrictions: MRSA in nares  Vision/Hearing  Vision: Impaired  Vision Exceptions: Wears glasses at all times  Hearing: Exceptions to [facility] Exceptions: Hard of hearing/hearing concerns     Subjective  General  Chart Reviewed: Yes  Patient assessed for rehabilitation services?: Yes  Additional Pertinent Hx: per H&P Brandie Zhang is a 66 y.o. female who presents to the emergency department for evaluation of confusion and hyperglycemia. Family reports that the patient had a gradual onset of altered mental status that began earlier in the day. The family member who called EMS is not present the information was relayed to the family members present. Patient has a history of chronic lower extremity wounds and an indwelling Brasher catheter. Patient has recent admissions for cellulitis of the lower extremities as well as UTI. On presentation patient is denying abdominal pain chest pains shortness of breath. Family states that the redness to lower extremities appears to be at baseline. Patient uses an insulin pump apparently remained hyperglycemic despite adjusting her insulin.    Response To Previous Treatment: Not WFL  Comment: fatigues quickly     Sensation  Overall Sensation Status: (N/T)  Bed mobility  Sit to Supine: Maximum assistance; Moderate assistance;2 Person assistance  Transfers  Sit to Stand:  Moderate Assistance;Minimal Assistance;2 Person Assistance(initially from EOB but only CGA/min A from chair)  Stand to sit: Contact guard assistance  Comment: feel pt maybe needed more assist from EOB as she initially didn't want to get up  Ambulation  Ambulation?: Yes  Ambulation 1  Surface: level tile  Device: Rolling Walker  Assistance: Contact guard assistance;Minimal assistance  Quality of Gait: small slow steps; needed some assist to maneuver walker but usually uses a rollator  Distance: 25'  Comments: pt needed to be lifted onto chair with johnathan pad as pt is too short to be able to scoot back onto chair; waffle cushion in chair and LEs elevated     Balance  Sitting - Static: Good  Sitting - Dynamic: Fair  Standing - Static: Fair  Standing - Dynamic: [address]  Times per week: 3-5  Current Treatment Recommendations: Functional Mobility Training  Safety Devices  Type of devices: Call light within reach, Chair alarm in place, Left in chair, Nurse notified      AM-PAC Score  AM-PAC Inpatient Mobility Raw Score : 15 (02/27/20 1239)  AM-PAC Inpatient T-Scale Score : 39.45 (02/27/20 1239)  Mobility Inpatient CMS 0-100% Score: 57.7 (02/27/20 1239)  Mobility Inpatient CMS G-Code Modifier : CK (02/27/20 1239)          Goals  Short term goals  Time Frame for Short term goals: by discharge  Short term goal 1: no bed mob goal as pt sleeps in a recliner  Short term goal 2: transfers SBA  Short term goal 3: amb 25-50' with RW SBA  Patient Goals   Patient goals : to return home soon       Therapy Time   Individual Concurrent Group Co-treatment   Time In 1130         Time Out 1220         Minutes 50              If patient is discharged prior to the next physical therapy visit;  Please see last written PT note for

## 2020-02-27 NOTE — ED NOTES
Bed: B-10  Expected date: 2/26/20  Expected time: 9:39 PM  Means of arrival: [facility]  Comments:  Geoffrey Saldana, KYAW  02/26/20 5693

## 2020-02-28 ENCOUNTER — APPOINTMENT (OUTPATIENT)
Dept: GENERAL RADIOLOGY | Age: 75
DRG: 637 | End: 2020-02-28
Payer: MEDICARE

## 2020-02-28 LAB
ANION GAP SERPL CALCULATED.3IONS-SCNC: 9 MMOL/L (ref 3–16)
BASE EXCESS ARTERIAL: -1.6 MMOL/L (ref -3–3)
BASOPHILS ABSOLUTE: 0 K/UL (ref 0–0.2)
BASOPHILS RELATIVE PERCENT: 0.3 %
BUN BLDV-MCNC: 10 MG/DL (ref 7–20)
CALCIUM SERPL-MCNC: 8 MG/DL (ref 8.3–10.6)
CARBOXYHEMOGLOBIN ARTERIAL: 0.2 % (ref 0–1.5)
CHLORIDE BLD-SCNC: 105 MMOL/L (ref 99–110)
CO2: 20 MMOL/L (ref 21–32)
CREAT SERPL-MCNC: 1 MG/DL (ref 0.6–1.2)
EKG ATRIAL RATE: 72 BPM
EKG ATRIAL RATE: 99 BPM
EKG DIAGNOSIS: NORMAL
EKG DIAGNOSIS: NORMAL
EKG P AXIS: 45 DEGREES
EKG P AXIS: 53 DEGREES
EKG P-R INTERVAL: 168 MS
EKG P-R INTERVAL: 186 MS
EKG Q-T INTERVAL: 332 MS
EKG Q-T INTERVAL: 400 MS
EKG QRS DURATION: 80 MS
EKG QRS DURATION: 94 MS
EKG QTC CALCULATION (BAZETT): 426 MS
EKG QTC CALCULATION (BAZETT): 438 MS
EKG R AXIS: 16 DEGREES
EKG R AXIS: 29 DEGREES
EKG T AXIS: 100 DEGREES
EKG T AXIS: 137 DEGREES
EKG VENTRICULAR RATE: 72 BPM
EKG VENTRICULAR RATE: 99 BPM
EOSINOPHILS ABSOLUTE: 0.2 K/UL (ref 0–0.6)
EOSINOPHILS RELATIVE PERCENT: 2.6 %
GFR AFRICAN AMERICAN: >60
GFR NON-AFRICAN AMERICAN: 54
GLUCOSE BLD-MCNC: 166 MG/DL (ref 70–99)
GLUCOSE BLD-MCNC: 199 MG/DL (ref 70–99)
GLUCOSE BLD-MCNC: 204 MG/DL (ref 70–99)
GLUCOSE BLD-MCNC: 216 MG/DL (ref 70–99)
GLUCOSE BLD-MCNC: 218 MG/DL (ref 70–99)
GLUCOSE BLD-MCNC: 232 MG/DL (ref 70–99)
GLUCOSE BLD-MCNC: 364 MG/DL (ref 70–99)
HCO3 ARTERIAL: 22.7 MMOL/L (ref 21–29)
HCT VFR BLD CALC: 24.5 % (ref 36–48)
HEMOGLOBIN, ART, EXTENDED: 8.5 G/DL (ref 12–16)
HEMOGLOBIN: 8.4 G/DL (ref 12–16)
LACTIC ACID: 1 MMOL/L (ref 0.4–2)
LYMPHOCYTES ABSOLUTE: 0.5 K/UL (ref 1–5.1)
LYMPHOCYTES RELATIVE PERCENT: 6.2 %
MCH RBC QN AUTO: 34 PG (ref 26–34)
MCHC RBC AUTO-ENTMCNC: 34.4 G/DL (ref 31–36)
MCV RBC AUTO: 99 FL (ref 80–100)
METHEMOGLOBIN ARTERIAL: 0.9 %
MONOCYTES ABSOLUTE: 0.2 K/UL (ref 0–1.3)
MONOCYTES RELATIVE PERCENT: 2.1 %
NEUTROPHILS ABSOLUTE: 7.2 K/UL (ref 1.7–7.7)
NEUTROPHILS RELATIVE PERCENT: 88.8 %
O2 CONTENT ARTERIAL: 12 ML/DL
O2 SAT, ARTERIAL: 98.5 %
O2 THERAPY: ABNORMAL
PCO2 ARTERIAL: 35.7 MMHG (ref 35–45)
PDW BLD-RTO: 14.2 % (ref 12.4–15.4)
PERFORMED ON: ABNORMAL
PH ARTERIAL: 7.41 (ref 7.35–7.45)
PLATELET # BLD: 170 K/UL (ref 135–450)
PMV BLD AUTO: 8.8 FL (ref 5–10.5)
PO2 ARTERIAL: 134 MMHG (ref 75–108)
POTASSIUM REFLEX MAGNESIUM: 4.7 MMOL/L (ref 3.5–5.1)
PROCALCITONIN: 0.1 NG/ML (ref 0–0.15)
RBC # BLD: 2.48 M/UL (ref 4–5.2)
SODIUM BLD-SCNC: 134 MMOL/L (ref 136–145)
TCO2 ARTERIAL: 23.8 MMOL/L
VANCOMYCIN RANDOM: 15 UG/ML
WBC # BLD: 8.1 K/UL (ref 4–11)

## 2020-02-28 PROCEDURE — 80048 BASIC METABOLIC PNL TOTAL CA: CPT

## 2020-02-28 PROCEDURE — 71045 X-RAY EXAM CHEST 1 VIEW: CPT

## 2020-02-28 PROCEDURE — 2580000003 HC RX 258: Performed by: INTERNAL MEDICINE

## 2020-02-28 PROCEDURE — 84145 PROCALCITONIN (PCT): CPT

## 2020-02-28 PROCEDURE — 6360000002 HC RX W HCPCS: Performed by: FAMILY MEDICINE

## 2020-02-28 PROCEDURE — 94760 N-INVAS EAR/PLS OXIMETRY 1: CPT

## 2020-02-28 PROCEDURE — 6370000000 HC RX 637 (ALT 250 FOR IP): Performed by: INTERNAL MEDICINE

## 2020-02-28 PROCEDURE — 99223 1ST HOSP IP/OBS HIGH 75: CPT | Performed by: INTERNAL MEDICINE

## 2020-02-28 PROCEDURE — 36600 WITHDRAWAL OF ARTERIAL BLOOD: CPT

## 2020-02-28 PROCEDURE — 2500000003 HC RX 250 WO HCPCS: Performed by: NURSE PRACTITIONER

## 2020-02-28 PROCEDURE — 93010 ELECTROCARDIOGRAM REPORT: CPT | Performed by: INTERNAL MEDICINE

## 2020-02-28 PROCEDURE — 93005 ELECTROCARDIOGRAM TRACING: CPT | Performed by: FAMILY MEDICINE

## 2020-02-28 PROCEDURE — 36415 COLL VENOUS BLD VENIPUNCTURE: CPT

## 2020-02-28 PROCEDURE — 82803 BLOOD GASES ANY COMBINATION: CPT

## 2020-02-28 PROCEDURE — 6370000000 HC RX 637 (ALT 250 FOR IP): Performed by: FAMILY MEDICINE

## 2020-02-28 PROCEDURE — 6360000002 HC RX W HCPCS: Performed by: INTERNAL MEDICINE

## 2020-02-28 PROCEDURE — 85025 COMPLETE CBC W/AUTO DIFF WBC: CPT

## 2020-02-28 PROCEDURE — 83605 ASSAY OF LACTIC ACID: CPT

## 2020-02-28 PROCEDURE — 80202 ASSAY OF VANCOMYCIN: CPT

## 2020-02-28 PROCEDURE — 1200000000 HC SEMI PRIVATE

## 2020-02-28 PROCEDURE — 2580000003 HC RX 258: Performed by: FAMILY MEDICINE

## 2020-02-28 PROCEDURE — 97530 THERAPEUTIC ACTIVITIES: CPT

## 2020-02-28 PROCEDURE — 6370000000 HC RX 637 (ALT 250 FOR IP)

## 2020-02-28 PROCEDURE — 6370000000 HC RX 637 (ALT 250 FOR IP): Performed by: NURSE PRACTITIONER

## 2020-02-28 RX ORDER — LORAZEPAM 0.5 MG/1
0.5 TABLET ORAL NIGHTLY PRN
Status: DISCONTINUED | OUTPATIENT
Start: 2020-02-28 | End: 2020-03-05 | Stop reason: HOSPADM

## 2020-02-28 RX ORDER — METRONIDAZOLE 500 MG/1
500 TABLET ORAL EVERY 8 HOURS SCHEDULED
Status: DISCONTINUED | OUTPATIENT
Start: 2020-02-28 | End: 2020-03-02

## 2020-02-28 RX ORDER — INSULIN GLARGINE 100 [IU]/ML
10 INJECTION, SOLUTION SUBCUTANEOUS NIGHTLY
Status: DISCONTINUED | OUTPATIENT
Start: 2020-02-28 | End: 2020-02-29

## 2020-02-28 RX ORDER — DIPHENHYDRAMINE HCL 25 MG
25 TABLET ORAL EVERY 6 HOURS PRN
Status: DISCONTINUED | OUTPATIENT
Start: 2020-02-28 | End: 2020-03-05 | Stop reason: HOSPADM

## 2020-02-28 RX ORDER — DILTIAZEM HYDROCHLORIDE 5 MG/ML
10 INJECTION INTRAVENOUS ONCE
Status: COMPLETED | OUTPATIENT
Start: 2020-02-28 | End: 2020-02-28

## 2020-02-28 RX ADMIN — INSULIN LISPRO 2 UNITS: 100 INJECTION, SOLUTION INTRAVENOUS; SUBCUTANEOUS at 14:27

## 2020-02-28 RX ADMIN — PANCRELIPASE 12000 UNITS: 60000; 12000; 38000 CAPSULE, DELAYED RELEASE PELLETS ORAL at 14:23

## 2020-02-28 RX ADMIN — METOPROLOL TARTRATE 25 MG: 25 TABLET, FILM COATED ORAL at 08:16

## 2020-02-28 RX ADMIN — DIPHENHYDRAMINE HCL 25 MG: 25 TABLET ORAL at 19:47

## 2020-02-28 RX ADMIN — PANCRELIPASE 12000 UNITS: 60000; 12000; 38000 CAPSULE, DELAYED RELEASE PELLETS ORAL at 20:40

## 2020-02-28 RX ADMIN — VENLAFAXINE 150 MG: 50 TABLET ORAL at 20:54

## 2020-02-28 RX ADMIN — ISOSORBIDE MONONITRATE 30 MG: 30 TABLET, EXTENDED RELEASE ORAL at 14:27

## 2020-02-28 RX ADMIN — ACETAMINOPHEN 650 MG: 325 TABLET ORAL at 20:45

## 2020-02-28 RX ADMIN — CEFEPIME HYDROCHLORIDE 2 G: 2 INJECTION, POWDER, FOR SOLUTION INTRAVENOUS at 20:39

## 2020-02-28 RX ADMIN — CEFEPIME HYDROCHLORIDE 1 G: 1 INJECTION, POWDER, FOR SOLUTION INTRAMUSCULAR; INTRAVENOUS at 06:20

## 2020-02-28 RX ADMIN — DORZOLAMIDE HYDROCHLORIDE AND TIMOLOL MALEATE 1 DROP: 20; 5 SOLUTION/ DROPS OPHTHALMIC at 08:17

## 2020-02-28 RX ADMIN — LORAZEPAM 0.5 MG: 0.5 TABLET ORAL at 20:45

## 2020-02-28 RX ADMIN — METRONIDAZOLE 500 MG: 500 TABLET ORAL at 23:59

## 2020-02-28 RX ADMIN — ATROPINE SULFATE 1 DROP: 10 SOLUTION OPHTHALMIC at 08:17

## 2020-02-28 RX ADMIN — VANCOMYCIN HYDROCHLORIDE 750 MG: 750 INJECTION, POWDER, LYOPHILIZED, FOR SOLUTION INTRAVENOUS at 14:23

## 2020-02-28 RX ADMIN — SODIUM CHLORIDE, PRESERVATIVE FREE 10 ML: 5 INJECTION INTRAVENOUS at 08:16

## 2020-02-28 RX ADMIN — POTASSIUM CHLORIDE 40 MEQ: 750 TABLET, FILM COATED, EXTENDED RELEASE ORAL at 01:04

## 2020-02-28 RX ADMIN — HEPARIN SODIUM 5000 UNITS: 5000 INJECTION INTRAVENOUS; SUBCUTANEOUS at 06:20

## 2020-02-28 RX ADMIN — SODIUM CHLORIDE, PRESERVATIVE FREE 10 ML: 5 INJECTION INTRAVENOUS at 21:36

## 2020-02-28 RX ADMIN — DORZOLAMIDE HYDROCHLORIDE AND TIMOLOL MALEATE 1 DROP: 20; 5 SOLUTION/ DROPS OPHTHALMIC at 21:19

## 2020-02-28 RX ADMIN — INSULIN LISPRO 5 UNITS: 100 INJECTION, SOLUTION INTRAVENOUS; SUBCUTANEOUS at 21:19

## 2020-02-28 RX ADMIN — ATORVASTATIN CALCIUM 10 MG: 10 TABLET, FILM COATED ORAL at 14:28

## 2020-02-28 RX ADMIN — PREDNISOLONE ACETATE 1 DROP: 10 SUSPENSION/ DROPS OPHTHALMIC at 21:53

## 2020-02-28 RX ADMIN — INSULIN LISPRO 4 UNITS: 100 INJECTION, SOLUTION INTRAVENOUS; SUBCUTANEOUS at 08:23

## 2020-02-28 RX ADMIN — DILTIAZEM HYDROCHLORIDE 10 MG: 5 INJECTION INTRAVENOUS at 03:10

## 2020-02-28 RX ADMIN — FLUCONAZOLE 100 MG: 100 TABLET ORAL at 08:15

## 2020-02-28 RX ADMIN — METOPROLOL TARTRATE 25 MG: 25 TABLET, FILM COATED ORAL at 20:45

## 2020-02-28 RX ADMIN — ATROPINE SULFATE 1 DROP: 10 SOLUTION OPHTHALMIC at 21:34

## 2020-02-28 RX ADMIN — ASPIRIN 325 MG: 325 TABLET, DELAYED RELEASE ORAL at 08:15

## 2020-02-28 RX ADMIN — VENLAFAXINE 150 MG: 50 TABLET ORAL at 08:15

## 2020-02-28 RX ADMIN — PANCRELIPASE 12000 UNITS: 60000; 12000; 38000 CAPSULE, DELAYED RELEASE PELLETS ORAL at 08:16

## 2020-02-28 RX ADMIN — PREDNISOLONE ACETATE 1 DROP: 10 SUSPENSION/ DROPS OPHTHALMIC at 08:17

## 2020-02-28 RX ADMIN — FAMOTIDINE 20 MG: 20 TABLET, FILM COATED ORAL at 08:16

## 2020-02-28 RX ADMIN — INSULIN GLARGINE 10 UNITS: 100 INJECTION, SOLUTION SUBCUTANEOUS at 21:19

## 2020-02-28 ASSESSMENT — PAIN DESCRIPTION - LOCATION: LOCATION: CHEST

## 2020-02-28 ASSESSMENT — PAIN SCALES - WONG BAKER
WONGBAKER_NUMERICALRESPONSE: 0
WONGBAKER_NUMERICALRESPONSE: 8
WONGBAKER_NUMERICALRESPONSE: 0

## 2020-02-28 ASSESSMENT — PAIN SCALES - GENERAL
PAINLEVEL_OUTOF10: 0
PAINLEVEL_OUTOF10: 1

## 2020-02-28 ASSESSMENT — PAIN DESCRIPTION - ONSET: ONSET: SUDDEN

## 2020-02-28 ASSESSMENT — PAIN DESCRIPTION - PROGRESSION: CLINICAL_PROGRESSION: RAPIDLY WORSENING

## 2020-02-28 ASSESSMENT — PAIN DESCRIPTION - PAIN TYPE: TYPE: ACUTE PAIN

## 2020-02-28 ASSESSMENT — PAIN - FUNCTIONAL ASSESSMENT
PAIN_FUNCTIONAL_ASSESSMENT: ACTIVITIES ARE NOT PREVENTED
PAIN_FUNCTIONAL_ASSESSMENT: PREVENTS OR INTERFERES SOME ACTIVE ACTIVITIES AND ADLS

## 2020-02-28 NOTE — PROGRESS NOTES
Dr Magnolia De La Cruz here dressing removed pt stated that she does wet to dry dressing daily  Dressing change done supra pubic cath swabed by dr Magnolia De La Cruz

## 2020-02-28 NOTE — CARE COORDINATION
Wound consult noted. Chart reviewed. ID consulted. Pt has followed with Dr. Shalonda Domínguez at wound clinic.last tx there was aquacel ag. Spoke with RN. Would cont to use aquacel ag; change every other day.  Milana Root, MSN, RN, [facility]

## 2020-02-28 NOTE — PROGRESS NOTES
Occupational Therapy  Facility/Department: [address] PROGRESSIVE CARE  Daily Treatment Note and Tentative D/C    This note to serve as d/c summary should pt d/c prior to next session. NAME: Jez Martines  : 1945  MRN: [id number]    Date of Service: 2020    Discharge Recommendations: Jez Martines scored a 15/24 on the AM-PAC ADL Inpatient form. Current research shows that an AM-PAC score of 18 or greater is typically associated with a discharge to the patient's home setting. Based on the patients AM-PAC score and their current ADL deficits, it is recommended that the patient have 2-3 sessions per week of Occupational Therapy at d/c to increase the patients independence. 24 hour supervision or assist, Home with Home health OT, S Level 1  OT Equipment Recommendations  Equipment Needed: No    Assessment   Performance deficits / Impairments: Decreased functional mobility ; Decreased cognition;Decreased high-level IADLs;Decreased ADL status; Decreased endurance;Decreased strength;Decreased balance;Decreased safe awareness  Assessment: Pt tolerated session well. Pt completes sit/stand from chair to/from stedy with supervision. Use of stedy this date due to pt unable to reach feet to ground. Pt reports 24hr assist upon D/C for mobility and ADLs as needed. Continue with POC. Prognosis: Fair  OT Education: OT Role;Plan of Care;Transfer Training  REQUIRES OT FOLLOW UP: Yes  Activity Tolerance  Activity Tolerance: Patient Tolerated treatment well;Patient limited by fatigue  Activity Tolerance: pt with min SOB noted after sit/stands  Safety Devices  Safety Devices in place: Yes  Type of devices: Left in chair;Call light within reach; Chair alarm in place;Nurse notified; Patient at risk for falls;Gait belt         Patient Diagnosis(es): The primary encounter diagnosis was Hyperglycemia.  Diagnoses of Lactic acidosis, Altered mental status, unspecified altered mental status type, and Cellulitis of lower Insert/Change Thornton Catheter - Complicat ([id number]). Family / Caregiver Present: No  Referring Practitioner: Maximus  Diagnosis: Hyperglycemia. Pt admitted on 2-26 via ED with elevated BS, not responding to insulin. PT has had two recent admits for LE cellulitis and UTI. Subjective  Subjective: Pt seated up in chair upon arrival. Pt agreeable to OT treatment. Pt reports no pain throughout session. General Comment  Comments: okay for therapy per RN. Orientation  Orientation  Overall Orientation Status: Within Functional Limits  Orientation Level: Oriented to person  Objective    ADL  Toileting: Dependent/Total(thornton)  Additional Comments: Pt declining need for ADLs at this time; pt reports assist with all ADLs at baseline        Balance  Sitting Balance: Supervision(seated in chair)  Standing Balance: Supervision(pt completes standing in stedy with supervision; use of stedy this date due to pt short and difficulty reaching feet to floor from chair)  Bed mobility  Supine to Sit: Unable to assess  Sit to Supine: Unable to assess  Scooting: Unable to assess  Comment: up in chair prior to and after session  Transfers  Sit to stand: Stand by assistance  Stand to sit: Stand by assistance  Transfer Comments: Pt completed x5 reps x2 sets to/from stedy from chair; stedy used this date due to pt very short and difficulty reaching feet to ground                        Cognition  Overall Cognitive Status: Exceptions  Arousal/Alertness: Appropriate responses to stimuli  Following Commands:  Follows one step commands with repetition  Memory: Decreased recall of recent events  Safety Judgement: Decreased awareness of need for assistance  Problem Solving: Decreased awareness of errors  Insights: Decreased awareness of deficits  Initiation: Requires cues for some  Sequencing: Requires cues for some              Plan   Plan  Times per week: 3-5 xs  Times per day: Daily  Plan weeks: 1-2 weeks  Current Treatment Recommendations: Strengthening, Safety Education & Training, Patient/Caregiver Education & Training, Balance Training, Self-Care / ADL, Functional Mobility Training, Neuromuscular Re-education, Equipment Evaluation, Education, & procurement, Home Management Training, Endurance Training, Cognitive Reorientation    AM-PAC Score        AM-PAC Inpatient Daily Activity Raw Score: 15 (02/28/20 1505)  AM-PAC Inpatient ADL T-Scale Score : 34.69 (02/28/20 1505)  ADL Inpatient CMS 0-100% Score: 56.46 (02/28/20 1505)  ADL Inpatient CMS G-Code Modifier : CK (02/28/20 1505)    Goals  Short term goals  Time Frame for Short term goals: 1-2 weeks; progressing 2/28  Short term goal 1: Min A bathing  Short term goal 2: Min A dressing  Short term goal 3: Min A toileting  Short term goal 4: S functional transfers  Short term goal 5: S grooming at sink  Short term goal 6: Pt will tolerate standing for 2-3 minutes for ADLs  Patient Goals   Patient goals : Pt wants to go home.        Therapy Time   Individual Concurrent Group Co-treatment   Time In 1427         Time Out 1455         Minutes 28         Timed Code Treatment Minutes: Noel 74, OTR/L

## 2020-02-28 NOTE — CARE COORDINATION
INITIAL CASE MANAGEMENT ASSESSMENT    Reviewed chart, met with patient to assess possible discharge needs. Explained Case Management role/services. Living Situation: Patient lives with her daughter in a house with 24/7 care. She has private duty aides 8 hours per day and a [address] from [facility] for wound care. ADLs: Requires assistance     DME: Shower chair, Grab bars in shower, Commode,   Rolling walker, Wheelchair-manual, Reacher, Lift chair    PT/OT Recs: Patient refuses to participate in therapy. Active Services: [facility] for wound care/private duty [facility]HA 8h/day, from 9am-5pm Mon through Fri. Transportation: Daughter transports     Medications: [employer]/no barriers    PCP: Dr. Zev Nagy      HD/PD: N/A    PLAN/COMMENTS: Patient will return to home with same care. SW/CM provided contact information for patient or family to call with any questions. SW/CM will follow and assist as needed. Electronically signed by Sabrina Roche RN on 2/28/2020 at 3:16 PM

## 2020-02-28 NOTE — CONSULTS
Infectious Diseases Inpatient Consult Note      Reason for Consult:  Sepsis, and lower leg cellulitis      Requesting Physician:  Dr. Annie Ferrer     Primary Care Physician:  Gayla Colvin MD    History Obtained From:  Pt and Medical records      CHIEF COMPLAINT:     Chief Complaint   Patient presents with    Hyperglycemia     Pt came in via squad after blood sugar would not respond to insulin. Pt alert on arrival.       Lower leg cellulitis and elevated BG and Lactic acidosis     HISTORY OF PRESENT ILLNESS:  66 y. o.woman with recurrent admit for lower leg cellulitis, history of hypertension, hyperlipidemia, diabetes, MDRO infection colonization, suprapubic catheter, lower leg recurrent cellulitis, with venous stasis ulcer admitted with high blood glucose change in mental status ongoing drainage from the lower leg ulcers. Noted to have blood glucose above 600 with lactic acidosis on admission with acute creatinine elevation. Blood cultures were obtained wound cultures are in process she was started on IV antibiotics and we are consulted for antibiotic recommendations. Past Medical History:    Past Medical History:   Diagnosis Date    Anemia     Atrial fibrillation ([address]     CAD (coronary artery disease)     Chronic back pain     CKD (chronic kidney disease), stage III (HCC)     Diabetes mellitus (HCC)     Diastolic CHF (HCC)     Hyperlipidemia     Hypertension     MI (myocardial infarction) ([address]     MRSA (methicillin resistant staph aureus) culture positive 04/13/2018    leg ulcer    MRSA (methicillin resistant staph aureus) culture positive 1/4/20;06/08/2019    leg    MRSA colonization 10/16/2018    Obesity (BMI 30-39. 9)     Osteoarthritis     Pancreatitis     Sepsis ([address]     Stasis ulcer ([address]     bilateral lower extremities     Suprapubic catheter [facility])        Past Surgical History:    Past Surgical History:   Procedure Laterality Date    APPENDECTOMY      BACK SURGERY      x2 formulation 10/08/2010, 08/15/2014, 09/08/2017, 09/29/2018    Influenza, High Dose (Fluzone 65 yrs and older) 09/29/2018    Influenza, Quadv, IM, (6 mo and older Fluzone, Flulaval, Fluarix and 3 yrs and older Afluria) 09/08/2017    Pneumococcal Conjugate 13-valent (Dorisann Peel) 10/15/2018    Pneumococcal Polysaccharide ([id number]) 08/15/2016         Social History:    Social History     Tobacco Use    Smoking status: Never Smoker    Smokeless tobacco: Never Used   Substance Use Topics    Alcohol use: Yes     Comment: occassional    Drug use: No     Social History     Tobacco Use   Smoking Status Never Smoker   Smokeless Tobacco Never Used      Family History   Problem Relation Age of Onset    Heart Disease Father     Heart Failure Father     Diabetes Father     Diabetes Daughter     Diabetes Daughter         REVIEW OF SYSTEMS:    No fever / chills / sweats. No weight loss. No visual change, eye pain, eye discharge. No oral lesion, sore throat, dysphagia. Denies cough / sputum/Sob   Denies chest pain, palpitations/ dizziness  Denies nausea/ vomiting/abdominal pain/diarrhea. Denies dysuria or change in urinary function. Denies joint swelling or pain. No myalgia, arthralgia. No rashes, skin lesions   Denies focal weakness, sensory change or other neurologic symptoms  No lymph node swelling or tenderness.       Lower leg ulcers, cellulitis, suprapubic catheter, fatigue, tiredness, left lower leg open wound with drainage and odor     PHYSICAL EXAM:      Vitals:    BP (!) 130/54 Comment: manule  Pulse 82   Temp 99.2 °F (37.3 °C) (Oral)   Resp 16   Ht 4' 11\" (1.499 m)   Wt 184 lb 8.4 oz (83.7 kg)   SpO2 100%   BMI 37.27 kg/m²     General Appearance: alert,in some acute distress, no pallor, no icterus   Skin: warm and dry, no rash or erythema  Head: normocephalic and atraumatic  Eyes: pupils equal, round, and reactive to light, conjunctivae normal  ENT: tympanic membrane, external ear and ear

## 2020-02-29 ENCOUNTER — APPOINTMENT (OUTPATIENT)
Dept: GENERAL RADIOLOGY | Age: 75
DRG: 637 | End: 2020-02-29
Payer: MEDICARE

## 2020-02-29 LAB
ANION GAP SERPL CALCULATED.3IONS-SCNC: 11 MMOL/L (ref 3–16)
BASOPHILS ABSOLUTE: 0 K/UL (ref 0–0.2)
BASOPHILS RELATIVE PERCENT: 0.5 %
BUN BLDV-MCNC: 11 MG/DL (ref 7–20)
CALCIUM SERPL-MCNC: 8.6 MG/DL (ref 8.3–10.6)
CHLORIDE BLD-SCNC: 102 MMOL/L (ref 99–110)
CO2: 23 MMOL/L (ref 21–32)
CREAT SERPL-MCNC: 1 MG/DL (ref 0.6–1.2)
EOSINOPHILS ABSOLUTE: 0.4 K/UL (ref 0–0.6)
EOSINOPHILS RELATIVE PERCENT: 6.4 %
GFR AFRICAN AMERICAN: >60
GFR NON-AFRICAN AMERICAN: 54
GLUCOSE BLD-MCNC: 112 MG/DL (ref 70–99)
GLUCOSE BLD-MCNC: 151 MG/DL (ref 70–99)
GLUCOSE BLD-MCNC: 205 MG/DL (ref 70–99)
GLUCOSE BLD-MCNC: 205 MG/DL (ref 70–99)
GLUCOSE BLD-MCNC: 250 MG/DL (ref 70–99)
GLUCOSE BLD-MCNC: 277 MG/DL (ref 70–99)
GRAM STAIN RESULT: ABNORMAL
HCT VFR BLD CALC: 24.6 % (ref 36–48)
HEMOGLOBIN: 8.5 G/DL (ref 12–16)
LYMPHOCYTES ABSOLUTE: 0.6 K/UL (ref 1–5.1)
LYMPHOCYTES RELATIVE PERCENT: 9.1 %
MCH RBC QN AUTO: 34.2 PG (ref 26–34)
MCHC RBC AUTO-ENTMCNC: 34.6 G/DL (ref 31–36)
MCV RBC AUTO: 98.7 FL (ref 80–100)
MONOCYTES ABSOLUTE: 0.2 K/UL (ref 0–1.3)
MONOCYTES RELATIVE PERCENT: 3.2 %
NEUTROPHILS ABSOLUTE: 5.3 K/UL (ref 1.7–7.7)
NEUTROPHILS RELATIVE PERCENT: 80.8 %
PDW BLD-RTO: 13.9 % (ref 12.4–15.4)
PERFORMED ON: ABNORMAL
PLATELET # BLD: 173 K/UL (ref 135–450)
PMV BLD AUTO: 8.5 FL (ref 5–10.5)
POTASSIUM REFLEX MAGNESIUM: 4.8 MMOL/L (ref 3.5–5.1)
RBC # BLD: 2.49 M/UL (ref 4–5.2)
SODIUM BLD-SCNC: 136 MMOL/L (ref 136–145)
VANCOMYCIN RANDOM: 15.2 UG/ML
WBC # BLD: 6.5 K/UL (ref 4–11)
WOUND/ABSCESS: ABNORMAL

## 2020-02-29 PROCEDURE — 6360000002 HC RX W HCPCS: Performed by: PHARMACIST

## 2020-02-29 PROCEDURE — 74018 RADEX ABDOMEN 1 VIEW: CPT

## 2020-02-29 PROCEDURE — 2580000003 HC RX 258: Performed by: PHARMACIST

## 2020-02-29 PROCEDURE — 6370000000 HC RX 637 (ALT 250 FOR IP): Performed by: FAMILY MEDICINE

## 2020-02-29 PROCEDURE — 6370000000 HC RX 637 (ALT 250 FOR IP): Performed by: INTERNAL MEDICINE

## 2020-02-29 PROCEDURE — 6360000002 HC RX W HCPCS: Performed by: FAMILY MEDICINE

## 2020-02-29 PROCEDURE — 36415 COLL VENOUS BLD VENIPUNCTURE: CPT

## 2020-02-29 PROCEDURE — 6370000000 HC RX 637 (ALT 250 FOR IP)

## 2020-02-29 PROCEDURE — 2580000003 HC RX 258: Performed by: INTERNAL MEDICINE

## 2020-02-29 PROCEDURE — 80048 BASIC METABOLIC PNL TOTAL CA: CPT

## 2020-02-29 PROCEDURE — 85025 COMPLETE CBC W/AUTO DIFF WBC: CPT

## 2020-02-29 PROCEDURE — 1200000000 HC SEMI PRIVATE

## 2020-02-29 PROCEDURE — 2580000003 HC RX 258: Performed by: FAMILY MEDICINE

## 2020-02-29 PROCEDURE — 94760 N-INVAS EAR/PLS OXIMETRY 1: CPT

## 2020-02-29 PROCEDURE — 80202 ASSAY OF VANCOMYCIN: CPT

## 2020-02-29 RX ORDER — INSULIN GLARGINE 100 [IU]/ML
5 INJECTION, SOLUTION SUBCUTANEOUS ONCE
Status: COMPLETED | OUTPATIENT
Start: 2020-02-29 | End: 2020-02-29

## 2020-02-29 RX ORDER — INSULIN GLARGINE 100 [IU]/ML
20 INJECTION, SOLUTION SUBCUTANEOUS NIGHTLY
Status: DISCONTINUED | OUTPATIENT
Start: 2020-02-29 | End: 2020-03-05 | Stop reason: HOSPADM

## 2020-02-29 RX ORDER — LORAZEPAM 0.5 MG/1
0.5 TABLET ORAL ONCE
Status: COMPLETED | OUTPATIENT
Start: 2020-02-29 | End: 2020-02-29

## 2020-02-29 RX ORDER — METOPROLOL TARTRATE 50 MG/1
50 TABLET, FILM COATED ORAL 2 TIMES DAILY WITH MEALS
Status: DISCONTINUED | OUTPATIENT
Start: 2020-02-29 | End: 2020-03-05 | Stop reason: HOSPADM

## 2020-02-29 RX ORDER — SENNA PLUS 8.6 MG/1
1 TABLET ORAL DAILY
Status: DISCONTINUED | OUTPATIENT
Start: 2020-02-29 | End: 2020-03-05 | Stop reason: HOSPADM

## 2020-02-29 RX ORDER — FUROSEMIDE 40 MG/1
40 TABLET ORAL EVERY OTHER DAY
Status: DISCONTINUED | OUTPATIENT
Start: 2020-02-29 | End: 2020-03-02

## 2020-02-29 RX ADMIN — FUROSEMIDE 40 MG: 40 TABLET ORAL at 18:18

## 2020-02-29 RX ADMIN — ACETAMINOPHEN 650 MG: 325 TABLET ORAL at 20:23

## 2020-02-29 RX ADMIN — PANCRELIPASE 12000 UNITS: 60000; 12000; 38000 CAPSULE, DELAYED RELEASE PELLETS ORAL at 11:52

## 2020-02-29 RX ADMIN — LORAZEPAM 0.5 MG: 0.5 TABLET ORAL at 20:23

## 2020-02-29 RX ADMIN — SODIUM CHLORIDE, PRESERVATIVE FREE 10 ML: 5 INJECTION INTRAVENOUS at 21:53

## 2020-02-29 RX ADMIN — SODIUM CHLORIDE, PRESERVATIVE FREE 10 ML: 5 INJECTION INTRAVENOUS at 09:45

## 2020-02-29 RX ADMIN — INSULIN GLARGINE 5 UNITS: 100 INJECTION, SOLUTION SUBCUTANEOUS at 09:45

## 2020-02-29 RX ADMIN — DORZOLAMIDE HYDROCHLORIDE AND TIMOLOL MALEATE 1 DROP: 20; 5 SOLUTION/ DROPS OPHTHALMIC at 20:28

## 2020-02-29 RX ADMIN — FAMOTIDINE 20 MG: 20 TABLET, FILM COATED ORAL at 09:05

## 2020-02-29 RX ADMIN — DORZOLAMIDE HYDROCHLORIDE AND TIMOLOL MALEATE 1 DROP: 20; 5 SOLUTION/ DROPS OPHTHALMIC at 09:44

## 2020-02-29 RX ADMIN — ATROPINE SULFATE 1 DROP: 10 SOLUTION OPHTHALMIC at 21:52

## 2020-02-29 RX ADMIN — METRONIDAZOLE 500 MG: 500 TABLET ORAL at 07:57

## 2020-02-29 RX ADMIN — INSULIN LISPRO 3 UNITS: 100 INJECTION, SOLUTION INTRAVENOUS; SUBCUTANEOUS at 11:52

## 2020-02-29 RX ADMIN — VENLAFAXINE 150 MG: 50 TABLET ORAL at 18:08

## 2020-02-29 RX ADMIN — ASPIRIN 325 MG: 325 TABLET, DELAYED RELEASE ORAL at 09:05

## 2020-02-29 RX ADMIN — CEFEPIME HYDROCHLORIDE 2 G: 2 INJECTION, POWDER, FOR SOLUTION INTRAVENOUS at 16:53

## 2020-02-29 RX ADMIN — CEFEPIME HYDROCHLORIDE 2 G: 2 INJECTION, POWDER, FOR SOLUTION INTRAVENOUS at 04:57

## 2020-02-29 RX ADMIN — VANCOMYCIN HYDROCHLORIDE 750 MG: 750 INJECTION, POWDER, LYOPHILIZED, FOR SOLUTION INTRAVENOUS at 18:09

## 2020-02-29 RX ADMIN — PREDNISOLONE ACETATE 1 DROP: 10 SUSPENSION/ DROPS OPHTHALMIC at 09:45

## 2020-02-29 RX ADMIN — SENNOSIDES 8.6 MG: 8.6 TABLET, FILM COATED ORAL at 11:52

## 2020-02-29 RX ADMIN — METOPROLOL TARTRATE 50 MG: 50 TABLET, FILM COATED ORAL at 18:07

## 2020-02-29 RX ADMIN — INSULIN LISPRO 4 UNITS: 100 INJECTION, SOLUTION INTRAVENOUS; SUBCUTANEOUS at 11:52

## 2020-02-29 RX ADMIN — PREDNISOLONE ACETATE 1 DROP: 10 SUSPENSION/ DROPS OPHTHALMIC at 21:11

## 2020-02-29 RX ADMIN — METRONIDAZOLE 500 MG: 500 TABLET ORAL at 13:42

## 2020-02-29 RX ADMIN — METOPROLOL TARTRATE 50 MG: 50 TABLET, FILM COATED ORAL at 09:43

## 2020-02-29 RX ADMIN — PANCRELIPASE 12000 UNITS: 60000; 12000; 38000 CAPSULE, DELAYED RELEASE PELLETS ORAL at 18:08

## 2020-02-29 RX ADMIN — ATORVASTATIN CALCIUM 10 MG: 10 TABLET, FILM COATED ORAL at 11:52

## 2020-02-29 RX ADMIN — PANCRELIPASE 12000 UNITS: 60000; 12000; 38000 CAPSULE, DELAYED RELEASE PELLETS ORAL at 09:05

## 2020-02-29 RX ADMIN — VENLAFAXINE 150 MG: 50 TABLET ORAL at 09:44

## 2020-02-29 RX ADMIN — FLUCONAZOLE 100 MG: 100 TABLET ORAL at 09:05

## 2020-02-29 RX ADMIN — INSULIN GLARGINE 20 UNITS: 100 INJECTION, SOLUTION SUBCUTANEOUS at 21:59

## 2020-02-29 RX ADMIN — INSULIN LISPRO 3 UNITS: 100 INJECTION, SOLUTION INTRAVENOUS; SUBCUTANEOUS at 18:18

## 2020-02-29 RX ADMIN — INSULIN LISPRO 2 UNITS: 100 INJECTION, SOLUTION INTRAVENOUS; SUBCUTANEOUS at 18:18

## 2020-02-29 RX ADMIN — ISOSORBIDE MONONITRATE 30 MG: 30 TABLET, EXTENDED RELEASE ORAL at 11:52

## 2020-02-29 RX ADMIN — LORAZEPAM 0.5 MG: 0.5 TABLET ORAL at 09:43

## 2020-02-29 RX ADMIN — METRONIDAZOLE 500 MG: 500 TABLET ORAL at 21:59

## 2020-02-29 RX ADMIN — ATROPINE SULFATE 1 DROP: 10 SOLUTION OPHTHALMIC at 09:44

## 2020-02-29 RX ADMIN — INSULIN LISPRO 6 UNITS: 100 INJECTION, SOLUTION INTRAVENOUS; SUBCUTANEOUS at 09:05

## 2020-02-29 ASSESSMENT — PAIN DESCRIPTION - LOCATION: LOCATION: HEAD

## 2020-02-29 ASSESSMENT — PAIN DESCRIPTION - ORIENTATION: ORIENTATION: ANTERIOR

## 2020-02-29 ASSESSMENT — PAIN DESCRIPTION - DESCRIPTORS: DESCRIPTORS: ACHING

## 2020-02-29 ASSESSMENT — PAIN SCALES - GENERAL
PAINLEVEL_OUTOF10: 0
PAINLEVEL_OUTOF10: 6

## 2020-02-29 ASSESSMENT — PAIN DESCRIPTION - FREQUENCY: FREQUENCY: CONTINUOUS

## 2020-02-29 ASSESSMENT — PAIN - FUNCTIONAL ASSESSMENT: PAIN_FUNCTIONAL_ASSESSMENT: ACTIVITIES ARE NOT PREVENTED

## 2020-02-29 ASSESSMENT — PAIN DESCRIPTION - PAIN TYPE: TYPE: ACUTE PAIN

## 2020-02-29 ASSESSMENT — PAIN DESCRIPTION - PROGRESSION: CLINICAL_PROGRESSION: NOT CHANGED

## 2020-02-29 ASSESSMENT — PAIN DESCRIPTION - ONSET: ONSET: ON-GOING

## 2020-02-29 NOTE — PROGRESS NOTES
respiratory effort. Clear to auscultation, bilaterally without Rales/Wheezes/Rhonchi. Cardiovascular: Regular rate and rhythm with normal S1/S2 without murmurs, rubs or gallops. Abdomen: Soft, non-tender, non-distended with normal bowel sounds. Musculoskeletal: No clubbing, cyanosis or edema bilaterally. Full range of motion without deformity. Skin: Chronic venous stasis bilaterally of lower legs with multiple shallow ulcerations, no purulence, improving erythema demarkated with permanent marker  Neurologic:  Neurovascularly intact without any focal sensory/motor deficits. Cranial nerves: II-XII intact, grossly non-focal.  Psychiatric: Alert and oriented, thought content appropriate, normal insight  Capillary Refill: Brisk,< 3 seconds   Peripheral Pulses: +2 palpable, equal bilaterally       Labs:   Recent Labs     02/26/20 2219 02/27/20 0540 02/28/20  0539   WBC 6.5 5.4 8.1   HGB 8.6* 9.3* 8.4*   HCT 25.8* 27.4* 24.5*    200 170     Recent Labs     02/27/20 0540 02/27/20  1704 02/28/20  0539    137 134*   K 3.4* 3.4* 4.7    103 105   CO2 20* 23 20*   BUN 12 11 10   CREATININE 1.3* 1.0 1.0   CALCIUM 7.9* 8.0* 8.0*     Recent Labs     02/26/20 2219   AST 9*   ALT 7*   BILITOT <0.2   ALKPHOS 128     No results for input(s): INR in the last 72 hours. Recent Labs     02/26/20 2219   TROPONINI <0.01       Urinalysis:      Lab Results   Component Value Date    NITRU Negative 02/27/2020    WBCUA 3-5 02/27/2020    BACTERIA 1+ 06/08/2019    RBCUA 0-2 02/27/2020    BLOODU MODERATE 02/27/2020    SPECGRAV 1.008 02/27/2020    GLUCOSEU >=1000 02/27/2020       Radiology:  XR CHEST PORTABLE   Final Result   Mild vascular indistinctness, either due to low lung volumes or mild edema      Bandlike opacity at the right lung base. Bandlike morphology favors   atelectasis rather than pneumonia         CT HEAD WO CONTRAST   Final Result   No acute intracranial abnormality.          XR CHEST STANDARD (2 VW) Final Result   Mild prominence of the interstitial markings. Correlation for edema is   recommended. Assessment/Plan:    Active Hospital Problems    Diagnosis Date Noted    Hyperglycemia [R73.9] 02/27/2020     Ms. Sammi Hendrickson is a 66 y.o. lady with DM2, MDRO, indwelling suprapubic catheter, chronic venous stasis with shallow wounds, who presents to the ED with AMS and HHS possibly due to infectious cause.     # metabolic AMS:  improving  - treating HHS per below  - urine culture concerning for yeast, bld ctx's x2 pending,  - holding deliriogenic agents  - infectious source possible     #HHS:  resolved  - on insulin pump at home, reportedly 5U with meals  - eats meals inconsistently, so pump may not be most reliable option for management  - no lantus listed on med rec.   Will do 10U for now     #Chronic venous stasis with erythema: improving  - possible infection although uncertain given otherwise reassuring clinical exam  - will consult ID given h/o MDRO  - s/p cefepime, cont cefepime and vanc for now     #Candiuria:  - colonization vs active infection  - foul smell from site of suprapubic catheter insertion site on exam  - consider urology to exchange catheter pending ID evaluation     #Chronic CHF with pulmonary edema on exam:  - s/p fluid rescucitation for HHS in the ED  - hold home lasix for now and reassess fluid status tomorrow when improved        DVT Prophylaxis: lovenox  Diet: DIET CARB CONTROL; Carb Control: 4 carb choices (60 gms)/meal  Code Status: Full Code  PT/OT Eval Status: pending     Dispo - pending PT/OT      Yris Camara MD

## 2020-02-29 NOTE — PROGRESS NOTES
Hospitalist Progress Note      PCP: Alec Mejia MD    Date of Admission: 2/26/2020    Chief Complaint: leg ulcer discomfort    Subjective:   Patient confused, unsure where she is. Abdomen distended, but no complaints of pain, n/v. No CP, SOB, other complaints, very fatigued. Medications:  Reviewed    Infusion Medications    dilTIAZem (CARDIZEM) 125 mg in dextrose 5% 125 mL infusion      dextrose       Scheduled Medications    LORazepam  0.5 mg Oral Once    metoprolol tartrate  50 mg Oral BID WC    insulin glargine  20 Units Subcutaneous Nightly    cefepime  2 g Intravenous Q12H    metroNIDAZOLE  500 mg Oral 3 times per day    venlafaxine  150 mg Oral BID WC    prednisoLONE acetate  1 drop Right Eye BID    lipase-protease-amylase  12,000 Units Oral TID WC    isosorbide mononitrate  30 mg Oral Lunch    dorzolamide-timolol  1 drop Right Eye BID    atropine  1 drop Right Eye BID    atorvastatin  10 mg Oral Lunch    aspirin  325 mg Oral Daily    sodium chloride flush  10 mL Intravenous 2 times per day    insulin lispro  0-12 Units Subcutaneous TID WC    insulin lispro  0-6 Units Subcutaneous Nightly    famotidine  20 mg Oral Daily    fluconazole  100 mg Oral Daily    vancomycin (VANCOCIN) intermittent dosing (placeholder)   Other RX Placeholder     PRN Meds: LORazepam, diphenhydrAMINE, sodium chloride flush, polyethylene glycol, promethazine, ondansetron, glucose, dextrose, glucagon (rDNA), dextrose, acetaminophen      Intake/Output Summary (Last 24 hours) at 2/29/2020 0859  Last data filed at 2/28/2020 2327  Gross per 24 hour   Intake 1080 ml   Output 2300 ml   Net -1220 ml       Exam:    BP (!) 149/74   Pulse 92   Temp 98.8 °F (37.1 °C)   Resp (!) 32   Ht 4' 11\" (1.499 m)   Wt 184 lb 8.4 oz (83.7 kg)   SpO2 96%   BMI 37.27 kg/m²     General appearance: Somnulent but easily aroused, appears stated age and cooperative. HEENT: Pupils equal, round, and reactive to light. Conjunctivae/corneas clear. Neck: Supple, with full range of motion. No jugular venous distention. Trachea midline. Respiratory:  Normal respiratory effort. Clear to auscultation, bilaterally without Rales/Wheezes/Rhonchi. Cardiovascular: Regular rate and rhythm with normal S1/S2 without murmurs, rubs or gallops. Abdomen: Soft, non-tender, non-distended with normal bowel sounds. Musculoskeletal: No clubbing, cyanosis or edema bilaterally. Full range of motion without deformity. Skin: Chronic venous stasis bilaterally of lower legs with multiple shallow ulcerations, no purulence, improving erythema demarkated with permanent marker  Neurologic:  Neurovascularly intact without any focal sensory/motor deficits. Cranial nerves: II-XII intact, grossly non-focal.  Psychiatric: Somnulent but easily aroused, oriented to person only  Capillary Refill: Brisk,< 3 seconds   Peripheral Pulses: +2 palpable, equal bilaterally       Labs:   Recent Labs     02/27/20  0540 02/28/20  0539 02/29/20  0602   WBC 5.4 8.1 6.5   HGB 9.3* 8.4* 8.5*   HCT 27.4* 24.5* 24.6*    170 173     Recent Labs     02/27/20  1704 02/28/20  0539 02/29/20  0602    134* 136   K 3.4* 4.7 4.8    105 102   CO2 23 20* 23   BUN 11 10 11   CREATININE 1.0 1.0 1.0   CALCIUM 8.0* 8.0* 8.6     Recent Labs     02/26/20  2219   AST 9*   ALT 7*   BILITOT <0.2   ALKPHOS 128     No results for input(s): INR in the last 72 hours. Recent Labs     02/26/20  2219   TROPONINI <0.01       Urinalysis:      Lab Results   Component Value Date    NITRU Negative 02/27/2020    WBCUA 3-5 02/27/2020    BACTERIA 1+ 06/08/2019    RBCUA 0-2 02/27/2020    BLOODU MODERATE 02/27/2020    SPECGRAV 1.008 02/27/2020    GLUCOSEU >=1000 02/27/2020       Radiology:  XR CHEST PORTABLE   Final Result   Mild vascular indistinctness, either due to low lung volumes or mild edema      Bandlike opacity at the right lung base.   Bandlike morphology favors   atelectasis rather than pneumonia         CT HEAD WO CONTRAST   Final Result   No acute intracranial abnormality. XR CHEST STANDARD (2 VW)   Final Result   Mild prominence of the interstitial markings. Correlation for edema is   recommended. Assessment/Plan:    Active Hospital Problems    Diagnosis Date Noted    Hyperglycemia [R73.9] 02/27/2020     Ms. Zaina Kinsey is a 66 y.o. lady with DM2, MDRO, indwelling suprapubic catheter, chronic venous stasis with shallow wounds, who presents to the ED with AMS and HHS possibly due to infectious cause.     # metabolic AMS:  Waxes and wanes  - treating HHS per below  - holding deliriogenic agents  - treat infection per below     #HHS:  resolved  - on insulin pump at home, reportedly 5U with meals, basal rate 1.3U/hr = 31 U per day  - eats meals inconsistently, so pump may not be most reliable option for management  - no lantus listed on med rec.   Will do 20U for now  - restart scheduled lispro 3U with meals and low dose SSI     #Chronic venous stasis with erythema: improving  - possible infection although uncertain given otherwise reassuring clinical exam  - will consult ID given h/o MDRO   -  cont cefepime and vanc for now    - likely deescalate on 3/1 to levoquin 500mg qday for 7 days and flagyl given odor for 7 days    #Candiuria with fungal dermatitis around suprapubic catheter:  - colonization vs active infection  - foul smell from site of suprapubic catheter insertion site on exam   - cont fluconazole x 10 days  - improve skin hygeine     #Chronic CHF with pulmonary edema on exam:  - s/p fluid rescucitation for HHS in the ED  - hold home lasix for now and reassess fluid status tomorrow when improved        DVT Prophylaxis: lovenox  Diet: DIET CARB CONTROL; Carb Control: 4 carb choices (60 gms)/meal  Code Status: Full Code  PT/OT Eval Status: pending     Dispo - pending PT/OT      Richard Oleary MD

## 2020-03-01 ENCOUNTER — APPOINTMENT (OUTPATIENT)
Dept: CT IMAGING | Age: 75
DRG: 637 | End: 2020-03-01
Payer: MEDICARE

## 2020-03-01 ENCOUNTER — APPOINTMENT (OUTPATIENT)
Dept: GENERAL RADIOLOGY | Age: 75
DRG: 637 | End: 2020-03-01
Payer: MEDICARE

## 2020-03-01 LAB
ANION GAP SERPL CALCULATED.3IONS-SCNC: 16 MMOL/L (ref 3–16)
BASE EXCESS ARTERIAL: 2.3 MMOL/L (ref -3–3)
BASOPHILS ABSOLUTE: 0 K/UL (ref 0–0.2)
BASOPHILS RELATIVE PERCENT: 0.4 %
BUN BLDV-MCNC: 13 MG/DL (ref 7–20)
CALCIUM SERPL-MCNC: 8.8 MG/DL (ref 8.3–10.6)
CARBOXYHEMOGLOBIN ARTERIAL: 1.3 % (ref 0–1.5)
CHLORIDE BLD-SCNC: 96 MMOL/L (ref 99–110)
CO2: 25 MMOL/L (ref 21–32)
CREAT SERPL-MCNC: 1.1 MG/DL (ref 0.6–1.2)
EOSINOPHILS ABSOLUTE: 0.4 K/UL (ref 0–0.6)
EOSINOPHILS RELATIVE PERCENT: 5.4 %
GFR AFRICAN AMERICAN: 58
GFR NON-AFRICAN AMERICAN: 48
GLUCOSE BLD-MCNC: 114 MG/DL (ref 70–99)
GLUCOSE BLD-MCNC: 119 MG/DL (ref 70–99)
GLUCOSE BLD-MCNC: 157 MG/DL (ref 70–99)
GLUCOSE BLD-MCNC: 158 MG/DL (ref 70–99)
GLUCOSE BLD-MCNC: 179 MG/DL (ref 70–99)
GLUCOSE BLD-MCNC: 214 MG/DL (ref 70–99)
HCO3 ARTERIAL: 26.2 MMOL/L (ref 21–29)
HCT VFR BLD CALC: 26.9 % (ref 36–48)
HEMOGLOBIN, ART, EXTENDED: 8.5 G/DL (ref 12–16)
HEMOGLOBIN: 9.4 G/DL (ref 12–16)
LYMPHOCYTES ABSOLUTE: 0.6 K/UL (ref 1–5.1)
LYMPHOCYTES RELATIVE PERCENT: 7.7 %
MAGNESIUM: 1.9 MG/DL (ref 1.8–2.4)
MCH RBC QN AUTO: 34 PG (ref 26–34)
MCHC RBC AUTO-ENTMCNC: 34.8 G/DL (ref 31–36)
MCV RBC AUTO: 97.7 FL (ref 80–100)
METHEMOGLOBIN ARTERIAL: 0.9 %
MONOCYTES ABSOLUTE: 0.4 K/UL (ref 0–1.3)
MONOCYTES RELATIVE PERCENT: 5.2 %
NEUTROPHILS ABSOLUTE: 6.6 K/UL (ref 1.7–7.7)
NEUTROPHILS RELATIVE PERCENT: 81.3 %
O2 CONTENT ARTERIAL: 11 ML/DL
O2 SAT, ARTERIAL: 95.6 %
O2 THERAPY: ABNORMAL
PCO2 ARTERIAL: 36.7 MMHG (ref 35–45)
PDW BLD-RTO: 14 % (ref 12.4–15.4)
PERFORMED ON: ABNORMAL
PH ARTERIAL: 7.46 (ref 7.35–7.45)
PLATELET # BLD: 185 K/UL (ref 135–450)
PMV BLD AUTO: 8.7 FL (ref 5–10.5)
PO2 ARTERIAL: 69.1 MMHG (ref 75–108)
POTASSIUM REFLEX MAGNESIUM: 3.4 MMOL/L (ref 3.5–5.1)
PRO-BNP: ABNORMAL PG/ML (ref 0–449)
RBC # BLD: 2.75 M/UL (ref 4–5.2)
SODIUM BLD-SCNC: 137 MMOL/L (ref 136–145)
TCO2 ARTERIAL: 27.3 MMOL/L
WBC # BLD: 8.1 K/UL (ref 4–11)

## 2020-03-01 PROCEDURE — 6360000002 HC RX W HCPCS: Performed by: PHARMACIST

## 2020-03-01 PROCEDURE — 71045 X-RAY EXAM CHEST 1 VIEW: CPT

## 2020-03-01 PROCEDURE — 6360000002 HC RX W HCPCS: Performed by: FAMILY MEDICINE

## 2020-03-01 PROCEDURE — 71260 CT THORAX DX C+: CPT

## 2020-03-01 PROCEDURE — 36415 COLL VENOUS BLD VENIPUNCTURE: CPT

## 2020-03-01 PROCEDURE — 74177 CT ABD & PELVIS W/CONTRAST: CPT

## 2020-03-01 PROCEDURE — 85025 COMPLETE CBC W/AUTO DIFF WBC: CPT

## 2020-03-01 PROCEDURE — 6360000002 HC RX W HCPCS: Performed by: INTERNAL MEDICINE

## 2020-03-01 PROCEDURE — 6370000000 HC RX 637 (ALT 250 FOR IP): Performed by: INTERNAL MEDICINE

## 2020-03-01 PROCEDURE — 6360000004 HC RX CONTRAST MEDICATION: Performed by: FAMILY MEDICINE

## 2020-03-01 PROCEDURE — 2580000003 HC RX 258: Performed by: INTERNAL MEDICINE

## 2020-03-01 PROCEDURE — 83880 ASSAY OF NATRIURETIC PEPTIDE: CPT

## 2020-03-01 PROCEDURE — 94761 N-INVAS EAR/PLS OXIMETRY MLT: CPT

## 2020-03-01 PROCEDURE — 80048 BASIC METABOLIC PNL TOTAL CA: CPT

## 2020-03-01 PROCEDURE — 2580000003 HC RX 258: Performed by: FAMILY MEDICINE

## 2020-03-01 PROCEDURE — 6360000002 HC RX W HCPCS: Performed by: NURSE PRACTITIONER

## 2020-03-01 PROCEDURE — 94660 CPAP INITIATION&MGMT: CPT

## 2020-03-01 PROCEDURE — 6370000000 HC RX 637 (ALT 250 FOR IP): Performed by: FAMILY MEDICINE

## 2020-03-01 PROCEDURE — 83735 ASSAY OF MAGNESIUM: CPT

## 2020-03-01 PROCEDURE — 6370000000 HC RX 637 (ALT 250 FOR IP)

## 2020-03-01 PROCEDURE — 82803 BLOOD GASES ANY COMBINATION: CPT

## 2020-03-01 PROCEDURE — 36600 WITHDRAWAL OF ARTERIAL BLOOD: CPT

## 2020-03-01 PROCEDURE — 1200000000 HC SEMI PRIVATE

## 2020-03-01 PROCEDURE — 2580000003 HC RX 258: Performed by: PHARMACIST

## 2020-03-01 RX ORDER — FUROSEMIDE 10 MG/ML
20 INJECTION INTRAMUSCULAR; INTRAVENOUS ONCE
Status: COMPLETED | OUTPATIENT
Start: 2020-03-01 | End: 2020-03-01

## 2020-03-01 RX ADMIN — CEFEPIME HYDROCHLORIDE 2 G: 2 INJECTION, POWDER, FOR SOLUTION INTRAVENOUS at 15:24

## 2020-03-01 RX ADMIN — METRONIDAZOLE 500 MG: 500 TABLET ORAL at 13:35

## 2020-03-01 RX ADMIN — ONDANSETRON 4 MG: 2 INJECTION INTRAMUSCULAR; INTRAVENOUS at 16:33

## 2020-03-01 RX ADMIN — FUROSEMIDE 20 MG: 10 INJECTION, SOLUTION INTRAMUSCULAR; INTRAVENOUS at 01:58

## 2020-03-01 RX ADMIN — FAMOTIDINE 20 MG: 20 TABLET, FILM COATED ORAL at 08:37

## 2020-03-01 RX ADMIN — FLUCONAZOLE 100 MG: 100 TABLET ORAL at 08:37

## 2020-03-01 RX ADMIN — SENNOSIDES 8.6 MG: 8.6 TABLET, FILM COATED ORAL at 08:37

## 2020-03-01 RX ADMIN — INSULIN LISPRO 4 UNITS: 100 INJECTION, SOLUTION INTRAVENOUS; SUBCUTANEOUS at 08:39

## 2020-03-01 RX ADMIN — INSULIN LISPRO 3 UNITS: 100 INJECTION, SOLUTION INTRAVENOUS; SUBCUTANEOUS at 08:39

## 2020-03-01 RX ADMIN — SODIUM CHLORIDE, PRESERVATIVE FREE 10 ML: 5 INJECTION INTRAVENOUS at 08:38

## 2020-03-01 RX ADMIN — INSULIN LISPRO 2 UNITS: 100 INJECTION, SOLUTION INTRAVENOUS; SUBCUTANEOUS at 16:43

## 2020-03-01 RX ADMIN — VENLAFAXINE 150 MG: 50 TABLET ORAL at 08:37

## 2020-03-01 RX ADMIN — VANCOMYCIN HYDROCHLORIDE 750 MG: 750 INJECTION, POWDER, LYOPHILIZED, FOR SOLUTION INTRAVENOUS at 16:01

## 2020-03-01 RX ADMIN — ATORVASTATIN CALCIUM 10 MG: 10 TABLET, FILM COATED ORAL at 11:52

## 2020-03-01 RX ADMIN — METOPROLOL TARTRATE 50 MG: 50 TABLET, FILM COATED ORAL at 08:37

## 2020-03-01 RX ADMIN — ATROPINE SULFATE 1 DROP: 10 SOLUTION OPHTHALMIC at 08:38

## 2020-03-01 RX ADMIN — PREDNISOLONE ACETATE 1 DROP: 10 SUSPENSION/ DROPS OPHTHALMIC at 23:26

## 2020-03-01 RX ADMIN — PANCRELIPASE 12000 UNITS: 60000; 12000; 38000 CAPSULE, DELAYED RELEASE PELLETS ORAL at 11:52

## 2020-03-01 RX ADMIN — CEFEPIME HYDROCHLORIDE 2 G: 2 INJECTION, POWDER, FOR SOLUTION INTRAVENOUS at 04:31

## 2020-03-01 RX ADMIN — INSULIN LISPRO 3 UNITS: 100 INJECTION, SOLUTION INTRAVENOUS; SUBCUTANEOUS at 16:43

## 2020-03-01 RX ADMIN — INSULIN LISPRO 3 UNITS: 100 INJECTION, SOLUTION INTRAVENOUS; SUBCUTANEOUS at 11:53

## 2020-03-01 RX ADMIN — METRONIDAZOLE 500 MG: 500 TABLET ORAL at 05:17

## 2020-03-01 RX ADMIN — PANCRELIPASE 12000 UNITS: 60000; 12000; 38000 CAPSULE, DELAYED RELEASE PELLETS ORAL at 08:37

## 2020-03-01 RX ADMIN — IOPAMIDOL 75 ML: 755 INJECTION, SOLUTION INTRAVENOUS at 10:14

## 2020-03-01 RX ADMIN — INSULIN LISPRO 2 UNITS: 100 INJECTION, SOLUTION INTRAVENOUS; SUBCUTANEOUS at 11:53

## 2020-03-01 RX ADMIN — INSULIN GLARGINE 20 UNITS: 100 INJECTION, SOLUTION SUBCUTANEOUS at 23:26

## 2020-03-01 RX ADMIN — ASPIRIN 325 MG: 325 TABLET, DELAYED RELEASE ORAL at 08:37

## 2020-03-01 RX ADMIN — METOPROLOL TARTRATE 50 MG: 50 TABLET, FILM COATED ORAL at 16:38

## 2020-03-01 RX ADMIN — ATROPINE SULFATE 1 DROP: 10 SOLUTION OPHTHALMIC at 23:26

## 2020-03-01 RX ADMIN — PREDNISOLONE ACETATE 1 DROP: 10 SUSPENSION/ DROPS OPHTHALMIC at 08:38

## 2020-03-01 RX ADMIN — DORZOLAMIDE HYDROCHLORIDE AND TIMOLOL MALEATE 1 DROP: 20; 5 SOLUTION/ DROPS OPHTHALMIC at 08:38

## 2020-03-01 RX ADMIN — DORZOLAMIDE HYDROCHLORIDE AND TIMOLOL MALEATE 1 DROP: 20; 5 SOLUTION/ DROPS OPHTHALMIC at 23:26

## 2020-03-01 RX ADMIN — FUROSEMIDE 20 MG: 10 INJECTION, SOLUTION INTRAMUSCULAR; INTRAVENOUS at 14:09

## 2020-03-01 RX ADMIN — ISOSORBIDE MONONITRATE 30 MG: 30 TABLET, EXTENDED RELEASE ORAL at 11:52

## 2020-03-01 RX ADMIN — POLYETHYLENE GLYCOL 3350 17 G: 17 POWDER, FOR SOLUTION ORAL at 17:44

## 2020-03-01 ASSESSMENT — PAIN SCALES - GENERAL
PAINLEVEL_OUTOF10: 0
PAINLEVEL_OUTOF10: 0

## 2020-03-02 ENCOUNTER — APPOINTMENT (OUTPATIENT)
Dept: CT IMAGING | Age: 75
DRG: 637 | End: 2020-03-02
Payer: MEDICARE

## 2020-03-02 LAB
AMMONIA: 28 UMOL/L (ref 11–51)
ANION GAP SERPL CALCULATED.3IONS-SCNC: 15 MMOL/L (ref 3–16)
BASOPHILS ABSOLUTE: 0 K/UL (ref 0–0.2)
BASOPHILS RELATIVE PERCENT: 0.3 %
BLOOD CULTURE, ROUTINE: NORMAL
BUN BLDV-MCNC: 14 MG/DL (ref 7–20)
CALCIUM SERPL-MCNC: 8.6 MG/DL (ref 8.3–10.6)
CHLORIDE BLD-SCNC: 95 MMOL/L (ref 99–110)
CO2: 26 MMOL/L (ref 21–32)
CREAT SERPL-MCNC: 1.1 MG/DL (ref 0.6–1.2)
CULTURE, BLOOD 2: NORMAL
EOSINOPHILS ABSOLUTE: 0.3 K/UL (ref 0–0.6)
EOSINOPHILS RELATIVE PERCENT: 3.5 %
GFR AFRICAN AMERICAN: 58
GFR NON-AFRICAN AMERICAN: 48
GLUCOSE BLD-MCNC: 152 MG/DL (ref 70–99)
GLUCOSE BLD-MCNC: 176 MG/DL (ref 70–99)
GLUCOSE BLD-MCNC: 180 MG/DL (ref 70–99)
GLUCOSE BLD-MCNC: 202 MG/DL (ref 70–99)
GLUCOSE BLD-MCNC: 255 MG/DL (ref 70–99)
HCT VFR BLD CALC: 30.3 % (ref 36–48)
HEMOGLOBIN: 10.4 G/DL (ref 12–16)
LYMPHOCYTES ABSOLUTE: 0.8 K/UL (ref 1–5.1)
LYMPHOCYTES RELATIVE PERCENT: 9 %
MAGNESIUM: 1.9 MG/DL (ref 1.8–2.4)
MCH RBC QN AUTO: 33.7 PG (ref 26–34)
MCHC RBC AUTO-ENTMCNC: 34.5 G/DL (ref 31–36)
MCV RBC AUTO: 97.8 FL (ref 80–100)
MONOCYTES ABSOLUTE: 0.4 K/UL (ref 0–1.3)
MONOCYTES RELATIVE PERCENT: 4.9 %
NEUTROPHILS ABSOLUTE: 7.1 K/UL (ref 1.7–7.7)
NEUTROPHILS RELATIVE PERCENT: 82.3 %
PDW BLD-RTO: 13.8 % (ref 12.4–15.4)
PERFORMED ON: ABNORMAL
PLATELET # BLD: 197 K/UL (ref 135–450)
PMV BLD AUTO: 8.8 FL (ref 5–10.5)
POTASSIUM REFLEX MAGNESIUM: 3.1 MMOL/L (ref 3.5–5.1)
PROCALCITONIN: 0.19 NG/ML (ref 0–0.15)
RBC # BLD: 3.1 M/UL (ref 4–5.2)
SODIUM BLD-SCNC: 136 MMOL/L (ref 136–145)
VANCOMYCIN TROUGH: 18.8 UG/ML (ref 10–20)
WBC # BLD: 8.6 K/UL (ref 4–11)

## 2020-03-02 PROCEDURE — 70450 CT HEAD/BRAIN W/O DYE: CPT

## 2020-03-02 PROCEDURE — 6370000000 HC RX 637 (ALT 250 FOR IP): Performed by: FAMILY MEDICINE

## 2020-03-02 PROCEDURE — 2500000003 HC RX 250 WO HCPCS: Performed by: INTERNAL MEDICINE

## 2020-03-02 PROCEDURE — 6360000002 HC RX W HCPCS: Performed by: PHARMACIST

## 2020-03-02 PROCEDURE — 83735 ASSAY OF MAGNESIUM: CPT

## 2020-03-02 PROCEDURE — 97530 THERAPEUTIC ACTIVITIES: CPT

## 2020-03-02 PROCEDURE — 80202 ASSAY OF VANCOMYCIN: CPT

## 2020-03-02 PROCEDURE — 82140 ASSAY OF AMMONIA: CPT

## 2020-03-02 PROCEDURE — 2500000003 HC RX 250 WO HCPCS: Performed by: NURSE PRACTITIONER

## 2020-03-02 PROCEDURE — 2580000003 HC RX 258: Performed by: INTERNAL MEDICINE

## 2020-03-02 PROCEDURE — 1200000000 HC SEMI PRIVATE

## 2020-03-02 PROCEDURE — 6370000000 HC RX 637 (ALT 250 FOR IP): Performed by: INTERNAL MEDICINE

## 2020-03-02 PROCEDURE — 2580000003 HC RX 258: Performed by: FAMILY MEDICINE

## 2020-03-02 PROCEDURE — 80048 BASIC METABOLIC PNL TOTAL CA: CPT

## 2020-03-02 PROCEDURE — 99233 SBSQ HOSP IP/OBS HIGH 50: CPT | Performed by: INTERNAL MEDICINE

## 2020-03-02 PROCEDURE — 84145 PROCALCITONIN (PCT): CPT

## 2020-03-02 PROCEDURE — 6360000002 HC RX W HCPCS: Performed by: INTERNAL MEDICINE

## 2020-03-02 PROCEDURE — 6360000002 HC RX W HCPCS: Performed by: FAMILY MEDICINE

## 2020-03-02 PROCEDURE — 2580000003 HC RX 258: Performed by: PHARMACIST

## 2020-03-02 PROCEDURE — 36415 COLL VENOUS BLD VENIPUNCTURE: CPT

## 2020-03-02 PROCEDURE — 85025 COMPLETE CBC W/AUTO DIFF WBC: CPT

## 2020-03-02 RX ORDER — SODIUM CHLORIDE 9 MG/ML
INJECTION, SOLUTION INTRAVENOUS CONTINUOUS
Status: DISCONTINUED | OUTPATIENT
Start: 2020-03-02 | End: 2020-03-04

## 2020-03-02 RX ADMIN — SODIUM CHLORIDE, PRESERVATIVE FREE 10 ML: 5 INJECTION INTRAVENOUS at 08:43

## 2020-03-02 RX ADMIN — SODIUM CHLORIDE: 9 INJECTION, SOLUTION INTRAVENOUS at 14:40

## 2020-03-02 RX ADMIN — METRONIDAZOLE 500 MG: 500 INJECTION, SOLUTION INTRAVENOUS at 07:01

## 2020-03-02 RX ADMIN — PREDNISOLONE ACETATE 1 DROP: 10 SUSPENSION/ DROPS OPHTHALMIC at 22:49

## 2020-03-02 RX ADMIN — PREDNISOLONE ACETATE 1 DROP: 10 SUSPENSION/ DROPS OPHTHALMIC at 11:31

## 2020-03-02 RX ADMIN — VANCOMYCIN HYDROCHLORIDE 750 MG: 750 INJECTION, POWDER, LYOPHILIZED, FOR SOLUTION INTRAVENOUS at 17:55

## 2020-03-02 RX ADMIN — ATROPINE SULFATE 1 DROP: 10 SOLUTION OPHTHALMIC at 22:49

## 2020-03-02 RX ADMIN — INSULIN LISPRO 4 UNITS: 100 INJECTION, SOLUTION INTRAVENOUS; SUBCUTANEOUS at 17:54

## 2020-03-02 RX ADMIN — INSULIN GLARGINE 20 UNITS: 100 INJECTION, SOLUTION SUBCUTANEOUS at 23:03

## 2020-03-02 RX ADMIN — ATROPINE SULFATE 1 DROP: 10 SOLUTION OPHTHALMIC at 11:31

## 2020-03-02 RX ADMIN — CEFEPIME HYDROCHLORIDE 2 G: 2 INJECTION, POWDER, FOR SOLUTION INTRAVENOUS at 04:17

## 2020-03-02 RX ADMIN — METRONIDAZOLE 500 MG: 500 INJECTION, SOLUTION INTRAVENOUS at 00:03

## 2020-03-02 RX ADMIN — DORZOLAMIDE HYDROCHLORIDE AND TIMOLOL MALEATE 1 DROP: 20; 5 SOLUTION/ DROPS OPHTHALMIC at 11:31

## 2020-03-02 RX ADMIN — INSULIN LISPRO 3 UNITS: 100 INJECTION, SOLUTION INTRAVENOUS; SUBCUTANEOUS at 23:03

## 2020-03-02 RX ADMIN — DORZOLAMIDE HYDROCHLORIDE AND TIMOLOL MALEATE 1 DROP: 20; 5 SOLUTION/ DROPS OPHTHALMIC at 22:49

## 2020-03-02 RX ADMIN — METRONIDAZOLE 500 MG: 500 INJECTION, SOLUTION INTRAVENOUS at 16:07

## 2020-03-02 RX ADMIN — SODIUM CHLORIDE, PRESERVATIVE FREE 10 ML: 5 INJECTION INTRAVENOUS at 00:04

## 2020-03-02 RX ADMIN — FLUCONAZOLE 100 MG: 200 INJECTION, SOLUTION INTRAVENOUS at 14:40

## 2020-03-02 ASSESSMENT — PAIN SCALES - GENERAL: PAINLEVEL_OUTOF10: 0

## 2020-03-02 NOTE — PROGRESS NOTES
Pt resting in bed at beginning of shift. She is disoriented x 4 and does not follow commands. When asked any question, her only response is [place]. \" She does not appear to be in any discomfort at rest; however, when touching feet, pt withdrew legs. Suprapubic catheter in place, draining clear yellow urine. She is SR on telemetry. Fall precautions in place, belongings within reach. Will continue to monitor and assess.

## 2020-03-02 NOTE — PROGRESS NOTES
Physical Therapy  Status Note  Schuyler Woodbine  [id number]    3/2/2020  1554    Per chart review, including  LEWIS note, patient with noted decrease in alertness and engagement with staff. Was Dependent for scooting and Min A for rolling in bed. Patient moaning, but unable to answer questions. Will defer PT this date, follow, and see as patient able to participate. RN aware of patient status this date.    Electronically signed by Vince Carroll, PT [id number] on 3/2/2020 at 3:58 PM

## 2020-03-02 NOTE — PROGRESS NOTES
Hospitalist Progress Note      PCP: Gayla Colvin MD    Date of Admission: 2/26/2020    Chief Complaint: leg discomfort    Subjective:   Patient oriented to person only, has no complaints. Increased respiratory rate yesterday, lasix given. Improved today. Pt also non-compliant with Bipap. CXR overnight showed posible right hemidiaphram vs eventration.  --> CT scan unremarkable for acute issues.     Medications:  Reviewed    Infusion Medications    dilTIAZem (CARDIZEM) 125 mg in dextrose 5% 125 mL infusion      dextrose       Scheduled Medications    metoprolol tartrate  50 mg Oral BID WC    insulin glargine  20 Units Subcutaneous Nightly    insulin lispro  3 Units Subcutaneous TID WC    senna  1 tablet Oral Daily    vancomycin  750 mg Intravenous Q24H    furosemide  40 mg Oral Every Other Day    cefepime  2 g Intravenous Q12H    metroNIDAZOLE  500 mg Oral 3 times per day    venlafaxine  150 mg Oral BID WC    prednisoLONE acetate  1 drop Right Eye BID    lipase-protease-amylase  12,000 Units Oral TID WC    isosorbide mononitrate  30 mg Oral Lunch    dorzolamide-timolol  1 drop Right Eye BID    atropine  1 drop Right Eye BID    atorvastatin  10 mg Oral Lunch    aspirin  325 mg Oral Daily    sodium chloride flush  10 mL Intravenous 2 times per day    insulin lispro  0-12 Units Subcutaneous TID WC    insulin lispro  0-6 Units Subcutaneous Nightly    famotidine  20 mg Oral Daily    fluconazole  100 mg Oral Daily    vancomycin (VANCOCIN) intermittent dosing (placeholder)   Other RX Placeholder     PRN Meds: LORazepam, diphenhydrAMINE, sodium chloride flush, polyethylene glycol, promethazine, ondansetron, glucose, dextrose, glucagon (rDNA), dextrose, acetaminophen      Intake/Output Summary (Last 24 hours) at 3/1/2020 1945  Last data filed at 3/1/2020 1736  Gross per 24 hour   Intake 120 ml   Output 4950 ml   Net -4830 ml       Exam:    BP (!) 177/81   Pulse 76   Temp 98.3 °F (36.8 °C) >=1000 02/27/2020       Radiology:  CT CHEST PULMONARY EMBOLISM W CONTRAST   Final Result   No evidence of pulmonary embolism or acute pulmonary abnormality. CT ABDOMEN PELVIS W IV CONTRAST Additional Contrast? None   Final Result   Negative CT examination. No evidence of acute process in the abdomen or   pelvis. No intra-abdominal findings to explain the right-sided diaphragm   elevation, which is unchanged back to the patient's CT abdomen/pelvis from   November 2018         XR CHEST PORTABLE   Final Result   No acute interval change. Persistent elevated right hemidiaphragm versus   eventration with associated basilar atelectasis. XR CHEST PORTABLE   Final Result   Mild vascular indistinctness, either due to low lung volumes or mild edema      Bandlike opacity at the right lung base. Bandlike morphology favors   atelectasis rather than pneumonia         CT HEAD WO CONTRAST   Final Result   No acute intracranial abnormality. XR CHEST STANDARD (2 VW)   Final Result   Mild prominence of the interstitial markings. Correlation for edema is   recommended. XR ABDOMEN (KUB) (SINGLE AP VIEW)    (Results Pending)           Assessment/Plan:    Active Hospital Problems    Diagnosis Date Noted    Hyperglycemia [R73.9] 02/27/2020     Ms. David Rodriguez is a 66 y.o. lady with DM2, MDRO, indwelling suprapubic catheter, chronic venous stasis with shallow wounds, who presents to the ED with AMS and HHS possibly due to infectious cause.     # metabolic AMS:  Waxes and wanes  - treating HHS per below  - holding deliriogenic agents  - treat infection per below    #Tachpnea:  improving    - multifactorial.  Has chronic hemidiaphram, non-compliance with nocturnal Bipap  - BNP was elevated, lasix IV given on 2/29 and 3/1     #HHS:  resolved  - on insulin pump at home, reportedly 5U with meals, basal rate 1.3U/hr = 31 U per day  - eats meals inconsistently, so pump may not be most reliable option for management  - no lantus listed on med rec.   Will do 20U for now  - restart scheduled lispro 3U with meals and low dose SSI     #Chronic venous stasis with erythema: improving  - possible infection although uncertain given otherwise reassuring clinical exam  - will consult ID given h/o MDRO   -  cont cefepime and vanc for now    - likely deescalate on 3/1 to levoquin 500mg qday for 7 days and flagyl given odor for 7 days    #Candiuria with fungal dermatitis around suprapubic catheter:  - colonization vs active infection  - foul smell from site of suprapubic catheter insertion site on exam   - cont fluconazole x 10 days  - improve skin hygeine     #Chronic CHF with pulmonary edema on exam:  - s/p fluid rescucitation for HHS in the ED  - hold home lasix for now and reassess fluid status tomorrow when improved        DVT Prophylaxis: lovenox  Diet: DIET CARB CONTROL; Carb Control: 4 carb choices (60 gms)/meal  Code Status: Full Code  PT/OT Eval Status: pending     Dispo - pending PT/OT      Koki Pastrana MD

## 2020-03-02 NOTE — PROGRESS NOTES
Infectious Disease Follow up Notes  Admit Date: 2/26/2020  Hospital Day: 6    Antibiotics : IV Vancomycin  IV Cefepime stopped  IV Flagyl   IV Fluconazole       CHIEF COMPLAINT:   lOWER leg Cellulitis  lACTIC ACIDOSIS  BG elevation  Change in mentation  Encephalopathy       Subjective interval History :  66 y.o. woman with recurrent admit for lower leg cellulitis, history of hypertension, hyperlipidemia, diabetes, MDRO infection colonization, suprapubic catheter, lower leg recurrent cellulitis, with venous stasis ulcer admitted with high blood glucose change in mental status ongoing drainage from the lower leg ulcers. Noted to have blood glucose above 600 with lactic acidosis on admission with acute creatinine elevation. Blood cultures were obtained wound cultures are in process she was started on IV antibiotics and we are consulted for antibiotic recommendations.     More confused and fidgety and not following commands irritable and restless some times - Family at bed side and d/w about them likely multifactorial encephalopathy and lower leg cellulitis better and drainage is less       Past Medical History:    Past Medical History:   Diagnosis Date    Anemia     Atrial fibrillation ([address]     CAD (coronary artery disease)     Chronic back pain     CKD (chronic kidney disease), stage III ([address]     Diabetes mellitus ([address]     Diastolic CHF ([address]     Hyperlipidemia     Hypertension     MI (myocardial infarction) ([address]     MRSA (methicillin resistant staph aureus) culture positive 04/13/2018    leg ulcer    MRSA (methicillin resistant staph aureus) culture positive 1/4/20;06/08/2019    leg    MRSA colonization 10/16/2018    Obesity (BMI 30-39. 9)     Osteoarthritis     Pancreatitis     Sepsis ([address]     Stasis ulcer ([address]     bilateral lower extremities     Suprapubic catheter [facility])        Past Surgical History:    Past Surgical History:   Procedure Laterality Date    APPENDECTOMY      BACK SURGERY      x2    CATARACT REMOVAL      bilateral     CHOLECYSTECTOMY      COLONOSCOPY      EYE SURGERY      FRACTURE SURGERY      Left ankle    HYSTERECTOMY      INSET/CHANGE LOMELI CATHETER - COMPLICATED  38/1859    suprapubic cath    JOINT REPLACEMENT      Right knee    KNEE SURGERY         Current Medications:    Outpatient Medications Marked as Taking for the 2/26/20 encounter [facility] Encounter)   Medication Sig Dispense Refill    furosemide (LASIX) 40 MG tablet Take 40 mg by mouth every other day      aspirin 325 MG EC tablet Take 325 mg by mouth daily      Cholecalciferol (VITAMIN D3) 125 MCG (5000 UT) TABS Take 5,000 Units by mouth daily      Lactobacillus (PROBIOTIC ACIDOPHILUS PO) Take 1 tablet by mouth nightly      ranitidine (ZANTAC) 150 MG tablet Take 150 mg by mouth nightly       furosemide (LASIX) 20 MG tablet Take 20 mg by mouth every other day       atorvastatin (LIPITOR) 10 MG tablet Take 1 tablet by mouth daily (Patient taking differently: Take 10 mg by mouth Daily with lunch ) 30 tablet 0    metoprolol tartrate (LOPRESSOR) 25 MG tablet Take 1 tablet by mouth 2 times daily (Patient taking differently: Take 25 mg by mouth 2 times daily (with meals) Breakfast and dinner) 60 tablet 0    Biotin (BIOTIN 5000) 5 MG CAPS Take 1 tablet by mouth nightly       lipase-protease-amylase (CREON) 86212 units delayed release capsule Take 12,000 Units by mouth 3 times daily (with meals)       gabapentin (NEURONTIN) 300 MG capsule Take 300 mg by mouth 2 times daily.  Noon and bedtime      isosorbide mononitrate (IMDUR) 30 MG extended release tablet Take 30 mg by mouth Daily with lunch       venlafaxine (EFFEXOR) 75 MG tablet Take 150 mg by mouth 2 times daily (with meals) Breakfast and lunch      cetirizine (ZYRTEC) 10 MG tablet Take 10 mg by mouth daily         Allergies:  Sulfa antibiotics    Immunizations : WBC's (Polymorphonuclear)   3+ Gram positive cocci   3+ Gram negative rods   1+ Gram positive rods   Abnormal    Narrative:     ORDER#: [id number]                          ORDERED BY: Kathie Burks  SOURCE: Catheter site                      COLLECTED:  02/27/20 14:38  ANTIBIOTICS AT REGIS. :                      RECEIVED :  02/27/20 14:38  Performed at:  [facility]  [address] [facility] [place] [id number] [place] [address]   Phone [phone]   Culture, Wound [[id number]] Collected: 02/27/20 0000   Order Status: Canceled Specimen: Catheter Site from Cath Site            IMAGING:    CT CHEST PULMONARY EMBOLISM W CONTRAST   Final Result   No evidence of pulmonary embolism or acute pulmonary abnormality. CT ABDOMEN PELVIS W IV CONTRAST Additional Contrast? None   Final Result   Negative CT examination. No evidence of acute process in the abdomen or   pelvis. No intra-abdominal findings to explain the right-sided diaphragm   elevation, which is unchanged back to the patient's CT abdomen/pelvis from   November 2018         XR CHEST PORTABLE   Final Result   No acute interval change. Persistent elevated right hemidiaphragm versus   eventration with associated basilar atelectasis. XR ABDOMEN (KUB) (SINGLE AP VIEW)   Final Result   Moderate stool burden in the right colon. No acute process on single supine   image. XR CHEST PORTABLE   Final Result   Mild vascular indistinctness, either due to low lung volumes or mild edema      Bandlike opacity at the right lung base. Bandlike morphology favors   atelectasis rather than pneumonia         CT HEAD WO CONTRAST   Final Result   No acute intracranial abnormality. XR CHEST STANDARD (2 VW)   Final Result   Mild prominence of the interstitial markings. Correlation for edema is   recommended.          CT HEAD WO CONTRAST    (Results Pending)         All the pertinent images and reports for the current Hospitalization were reviewed by me     Scheduled Meds:   metroNIDAZOLE  500 mg Intravenous Q8H    fluconazole  100 mg Intravenous Q24H    metoprolol tartrate  50 mg Oral BID     insulin glargine  20 Units Subcutaneous Nightly    insulin lispro  3 Units Subcutaneous TID     senna  1 tablet Oral Daily    vancomycin  750 mg Intravenous Q24H    cefepime  2 g Intravenous Q12H    venlafaxine  150 mg Oral BID WC    prednisoLONE acetate  1 drop Right Eye BID    lipase-protease-amylase  12,000 Units Oral TID WC    isosorbide mononitrate  30 mg Oral Lunch    dorzolamide-timolol  1 drop Right Eye BID    atropine  1 drop Right Eye BID    atorvastatin  10 mg Oral Lunch    aspirin  325 mg Oral Daily    sodium chloride flush  10 mL Intravenous 2 times per day    insulin lispro  0-12 Units Subcutaneous TID     insulin lispro  0-6 Units Subcutaneous Nightly    famotidine  20 mg Oral Daily    vancomycin (VANCOCIN) intermittent dosing (placeholder)   Other RX Placeholder       Continuous Infusions:   sodium chloride      dilTIAZem (CARDIZEM) 125 mg in dextrose 5% 125 mL infusion      dextrose         PRN Meds:  LORazepam, diphenhydrAMINE, sodium chloride flush, polyethylene glycol, promethazine, ondansetron, glucose, dextrose, glucagon (rDNA), dextrose, acetaminophen      Assessment:     Patient Active Problem List   Diagnosis    Acute on chronic renal insufficiency    FTT (failure to thrive) in adult    Type 1 diabetes mellitus with hyperglycemia (HCC)    Chronic venous hypertension (idiopathic) with ulcer and inflammation of bilateral lower extremity (HCC)    DMII (diabetes mellitus, type 2) (HCC)    Cellulitis of left lower extremity    Protein-calorie malnutrition, moderate (HCC)    MRSA colonization    Chronic diastolic heart failure (HCC)    Coronary artery disease involving native coronary artery of native heart without angina pectoris    Aortic atherosclerosis (HCC)    Essential hypertension    Cellulitis, lower extremities bilaterally    MRSA nasal colonization    Normocytic anemia    Altered mental status    Elevated lactic acid level    Uncontrolled diabetes mellitus (HCC)    Sepsis (HCC)    Stasis ulcer (HCC)    Bilateral lower leg cellulitis    Chronic renal failure, stage 3 (moderate) (HCC)    Hyperlipidemia    Hypoglycemia    Morbid obesity due to excess calories (HCC)    Gross hematuria    Hyperglycemia     Sepsis'  Change in mentation  BG elevation  Lactic acidosis  DM poor control   Recurrent lower leg cellulitis  Venous stasis ulcer+'  MDRO infection  Supra pubic catheter in place    More confused and irritable and encephalopathic d/w RN and BG has been ok , Ammonia normal and this is likely multifactorial and in the context of IV Cefepime will need to d/c this now    CT brain done but exam non focal , spoke to [address], Microbiology, Radiology and all the pertinent results from current hospitalization and  care every where were reviewed  by me as a part of the evaluation   Plan:   1. Cont IV Vancomycin  By levels due to KACIE  2. D/C IV Cefepime NOW   3. Watch BG  4. Cont local care  5. Supra pubic cultures not useful   6. Contact isolation  7. Elevate the legs   8. Cont IV Fluconazole for dermatitis on the legs   9. Cont IV Flagyl for anaerobes  10. Would expect mental status improvement if this is from Cefepime and combination of other metabolic issues   11. She needs follow up in wound care and improve her skin hygiene         Discussed with patient/Family and Nursing staff  D/w family at bed side     Risk of Complications/Morbidity: High      · Illness(es)/ Infection present that pose threat to bodily function. · There is potential for severe exacerbation of infection/side effects of treatment. · Therapy requires intensive monitoring for antimicrobial agent toxicity.   Thanks for allowing me to participate in your patient's care and please call me with any questions or concerns.     Sebas Fregoso MD  Infectious Disease  [facility] [place] [facility] Physician  Phone: [phone]   Fax : [phone]

## 2020-03-02 NOTE — PROGRESS NOTES
Pt more alert toward evening, sitting up in bed with eyes open; however, she is still unable to answer questions or follow commands. Family in room attempting to get pt to eat ice cream and drink water. She remains SR on telemetry. Will continue to monitor.

## 2020-03-02 NOTE — PROGRESS NOTES
Have been unable to administer any PO medications to pt due to her inability to interact or follow commands. Pt's home nurse in to visit, stating this is far from pt's normal. MD contacted and here to evaluate.

## 2020-03-02 NOTE — PROGRESS NOTES
([facility] has a past surgical history that includes Hysterectomy; knee surgery; back surgery; Cholecystectomy; Cataract removal; Appendectomy; Colonoscopy; eye surgery; fracture surgery; joint replacement; and Insert/Change Brasher Catheter - Complicat (78/5678). Restrictions  Restrictions/Precautions  Restrictions/Precautions: Fall Risk, Contact Precautions  Position Activity Restriction  Other position/activity restrictions: MRSA in nares  Subjective   General  Chart Reviewed: Yes  Patient assessed for rehabilitation services?: Yes  Additional Pertinent Hx: Pt  has a past medical history of Anemia, Atrial fibrillation ([address] CAD (coronary artery disease), Chronic back pain, CKD (chronic kidney disease), stage III ([address] Diabetes mellitus ([address] Diastolic CHF ([address] Hyperlipidemia, Hypertension, MI (myocardial infarction) ([address] MRSA (methicillin resistant staph aureus) culture positive (04/13/2018), MRSA (methicillin resistant staph aureus) culture positive (1/4/20;06/08/2019), MRSA colonization (10/16/2018), Obesity (BMI 30-39.9), Osteoarthritis, Pancreatitis, Sepsis ([address] Stasis ulcer ([address] and Suprapubic catheter ([address] Pt  has a past surgical history that includes Hysterectomy; knee surgery; back surgery; Cholecystectomy; Cataract removal; Appendectomy; Colonoscopy; eye surgery; fracture surgery; joint replacement; and Insert/Change Brasher Catheter - Complicat (19/7336). Family / Caregiver Present: Yes(3 daughters)  Referring Practitioner: Maximus  Diagnosis: Hyperglycemia. Pt admitted on 2-26 via ED with elevated BS, not responding to insulin. PT has had two recent admits for LE cellulitis and UTI. Subjective  Subjective: Upon arrival to room, Pt returning to room on stretcher from test, and very confused, disoriented, trying to sit up on the stretcher. RN and PCA present. TRUPTI assisted pt to bed from stretcher and bed mobility. General Comment  Comments: okay for therapy per RN. Orientation  Orientation  Overall Orientation Status: Impaired  Orientation Level: (NT-pt very confused, moaning as if in pain)  Objective        Bed mobility  Rolling to Left: Minimal assistance(x1)  Rolling to Right: Minimal assistance; Moderate assistance(x1)  Scooting: Dependent/Total(x3-4 for transfer stretcher to bed and scooting up in bed)  Comment: unsafe to assess sit><supine, d/t pt confusion      Cognition  Overall Cognitive Status: Exceptions  Arousal/Alertness: Inconsistent responses to stimuli  Following Commands: Does not follow commands  Safety Judgement: Decreased awareness of need for assistance;Decreased awareness of need for safety  Insights: Not aware of deficits  Initiation: Requires cues for all  Cognition Comment: confused and disoriented. Pt's daughters stating \"this is not like her\". Pt appearing as if in pain, moaning and attempting to sit up on stetcher.       Plan   Plan  Times per week: 3-5 xs  Times per day: Daily  Plan weeks: 1-2 weeks  Current Treatment Recommendations: Strengthening, Safety Education & Training, Patient/Caregiver Education & Training, Balance Training, Self-Care / ADL, Functional Mobility Training, Neuromuscular Re-education, Equipment Evaluation, Education, & procurement, Home Management Training, Endurance Training, Cognitive Reorientation           AM-PAC Score        AM-PAC Inpatient Daily Activity Raw Score: 6 (03/02/20 1547)  AM-PAC Inpatient ADL T-Scale Score : 17.07 (03/02/20 1547)  ADL Inpatient CMS 0-100% Score: 100 (03/02/20 1547)  ADL Inpatient CMS G-Code Modifier : CN (03/02/20 1547)    Goals  Short term goals  Time Frame for Short term goals: Status: goals ongoing  Short term goal 1: Min A bathing  Short term goal 2: Min A dressing  Short term goal 3: Min A toileting  Short term goal 4: S functional transfers  Short term goal 5: S grooming at sink  Short term goal 6: Pt will tolerate standing for 2-3 minutes for ADLs  Patient Goals   Patient goals : Pt wants to go home.        Therapy Time   Individual Concurrent Group Co-treatment   Time In 1527         Time Out 1547         Minutes 20              Pato LEWIS/[id number]  This note to serve as discharge summary if pt d/c'd prior to next session

## 2020-03-03 LAB
ANION GAP SERPL CALCULATED.3IONS-SCNC: 14 MMOL/L (ref 3–16)
BASOPHILS ABSOLUTE: 0.1 K/UL (ref 0–0.2)
BASOPHILS RELATIVE PERCENT: 0.7 %
BUN BLDV-MCNC: 14 MG/DL (ref 7–20)
CALCIUM SERPL-MCNC: 8.2 MG/DL (ref 8.3–10.6)
CHLORIDE BLD-SCNC: 99 MMOL/L (ref 99–110)
CO2: 27 MMOL/L (ref 21–32)
CREAT SERPL-MCNC: 1.1 MG/DL (ref 0.6–1.2)
EOSINOPHILS ABSOLUTE: 0.2 K/UL (ref 0–0.6)
EOSINOPHILS RELATIVE PERCENT: 3 %
GFR AFRICAN AMERICAN: 58
GFR NON-AFRICAN AMERICAN: 48
GLUCOSE BLD-MCNC: 117 MG/DL (ref 70–99)
GLUCOSE BLD-MCNC: 122 MG/DL (ref 70–99)
GLUCOSE BLD-MCNC: 133 MG/DL (ref 70–99)
GLUCOSE BLD-MCNC: 154 MG/DL (ref 70–99)
GLUCOSE BLD-MCNC: 229 MG/DL (ref 70–99)
GLUCOSE BLD-MCNC: 289 MG/DL (ref 70–99)
HCT VFR BLD CALC: 32.1 % (ref 36–48)
HEMOGLOBIN: 10.9 G/DL (ref 12–16)
LYMPHOCYTES ABSOLUTE: 0.7 K/UL (ref 1–5.1)
LYMPHOCYTES RELATIVE PERCENT: 8.8 %
MAGNESIUM: 2.1 MG/DL (ref 1.8–2.4)
MCH RBC QN AUTO: 33.4 PG (ref 26–34)
MCHC RBC AUTO-ENTMCNC: 33.9 G/DL (ref 31–36)
MCV RBC AUTO: 98.5 FL (ref 80–100)
MONOCYTES ABSOLUTE: 0.5 K/UL (ref 0–1.3)
MONOCYTES RELATIVE PERCENT: 5.9 %
NEUTROPHILS ABSOLUTE: 6.7 K/UL (ref 1.7–7.7)
NEUTROPHILS RELATIVE PERCENT: 81.6 %
PDW BLD-RTO: 13.9 % (ref 12.4–15.4)
PERFORMED ON: ABNORMAL
PLATELET # BLD: 214 K/UL (ref 135–450)
PMV BLD AUTO: 8.8 FL (ref 5–10.5)
POTASSIUM REFLEX MAGNESIUM: 3 MMOL/L (ref 3.5–5.1)
RBC # BLD: 3.25 M/UL (ref 4–5.2)
SODIUM BLD-SCNC: 140 MMOL/L (ref 136–145)
VANCOMYCIN TROUGH: 18 UG/ML (ref 10–20)
WBC # BLD: 8.2 K/UL (ref 4–11)

## 2020-03-03 PROCEDURE — 94760 N-INVAS EAR/PLS OXIMETRY 1: CPT

## 2020-03-03 PROCEDURE — 6370000000 HC RX 637 (ALT 250 FOR IP): Performed by: INTERNAL MEDICINE

## 2020-03-03 PROCEDURE — 85025 COMPLETE CBC W/AUTO DIFF WBC: CPT

## 2020-03-03 PROCEDURE — 6360000002 HC RX W HCPCS: Performed by: INTERNAL MEDICINE

## 2020-03-03 PROCEDURE — 6370000000 HC RX 637 (ALT 250 FOR IP): Performed by: FAMILY MEDICINE

## 2020-03-03 PROCEDURE — 2580000003 HC RX 258: Performed by: INTERNAL MEDICINE

## 2020-03-03 PROCEDURE — 2500000003 HC RX 250 WO HCPCS: Performed by: INTERNAL MEDICINE

## 2020-03-03 PROCEDURE — 36415 COLL VENOUS BLD VENIPUNCTURE: CPT

## 2020-03-03 PROCEDURE — 2580000003 HC RX 258: Performed by: PHARMACIST

## 2020-03-03 PROCEDURE — 6360000002 HC RX W HCPCS: Performed by: PHARMACIST

## 2020-03-03 PROCEDURE — 99232 SBSQ HOSP IP/OBS MODERATE 35: CPT | Performed by: INTERNAL MEDICINE

## 2020-03-03 PROCEDURE — 1200000000 HC SEMI PRIVATE

## 2020-03-03 PROCEDURE — 80048 BASIC METABOLIC PNL TOTAL CA: CPT

## 2020-03-03 PROCEDURE — 80202 ASSAY OF VANCOMYCIN: CPT

## 2020-03-03 PROCEDURE — 6370000000 HC RX 637 (ALT 250 FOR IP): Performed by: NURSE PRACTITIONER

## 2020-03-03 PROCEDURE — 83735 ASSAY OF MAGNESIUM: CPT

## 2020-03-03 RX ORDER — POTASSIUM CHLORIDE 7.45 MG/ML
10 INJECTION INTRAVENOUS
Status: COMPLETED | OUTPATIENT
Start: 2020-03-03 | End: 2020-03-03

## 2020-03-03 RX ORDER — METRONIDAZOLE 500 MG/1
500 TABLET ORAL EVERY 8 HOURS SCHEDULED
Status: DISCONTINUED | OUTPATIENT
Start: 2020-03-04 | End: 2020-03-05 | Stop reason: HOSPADM

## 2020-03-03 RX ORDER — FLUCONAZOLE 100 MG/1
100 TABLET ORAL DAILY
Status: DISCONTINUED | OUTPATIENT
Start: 2020-03-04 | End: 2020-03-05 | Stop reason: HOSPADM

## 2020-03-03 RX ORDER — POTASSIUM CHLORIDE 7.45 MG/ML
40 INJECTION INTRAVENOUS ONCE
Status: DISCONTINUED | OUTPATIENT
Start: 2020-03-03 | End: 2020-03-03

## 2020-03-03 RX ADMIN — INSULIN LISPRO 3 UNITS: 100 INJECTION, SOLUTION INTRAVENOUS; SUBCUTANEOUS at 21:45

## 2020-03-03 RX ADMIN — SODIUM CHLORIDE: 9 INJECTION, SOLUTION INTRAVENOUS at 11:09

## 2020-03-03 RX ADMIN — POTASSIUM CHLORIDE 10 MEQ: 7.46 INJECTION, SOLUTION INTRAVENOUS at 11:01

## 2020-03-03 RX ADMIN — ONDANSETRON 4 MG: 2 INJECTION INTRAMUSCULAR; INTRAVENOUS at 22:52

## 2020-03-03 RX ADMIN — PREDNISOLONE ACETATE 1 DROP: 10 SUSPENSION/ DROPS OPHTHALMIC at 21:45

## 2020-03-03 RX ADMIN — DIPHENHYDRAMINE HCL 25 MG: 25 TABLET ORAL at 22:52

## 2020-03-03 RX ADMIN — POTASSIUM CHLORIDE 10 MEQ: 7.46 INJECTION, SOLUTION INTRAVENOUS at 13:49

## 2020-03-03 RX ADMIN — POTASSIUM CHLORIDE 10 MEQ: 7.46 INJECTION, SOLUTION INTRAVENOUS at 11:50

## 2020-03-03 RX ADMIN — DORZOLAMIDE HYDROCHLORIDE AND TIMOLOL MALEATE 1 DROP: 20; 5 SOLUTION/ DROPS OPHTHALMIC at 11:02

## 2020-03-03 RX ADMIN — INSULIN GLARGINE 20 UNITS: 100 INJECTION, SOLUTION SUBCUTANEOUS at 21:45

## 2020-03-03 RX ADMIN — METOPROLOL TARTRATE 50 MG: 50 TABLET, FILM COATED ORAL at 16:53

## 2020-03-03 RX ADMIN — VENLAFAXINE 150 MG: 50 TABLET ORAL at 16:53

## 2020-03-03 RX ADMIN — POTASSIUM CHLORIDE 10 MEQ: 7.46 INJECTION, SOLUTION INTRAVENOUS at 09:21

## 2020-03-03 RX ADMIN — INSULIN LISPRO 3 UNITS: 100 INJECTION, SOLUTION INTRAVENOUS; SUBCUTANEOUS at 16:54

## 2020-03-03 RX ADMIN — ATROPINE SULFATE 1 DROP: 10 SOLUTION OPHTHALMIC at 11:02

## 2020-03-03 RX ADMIN — PANCRELIPASE 12000 UNITS: 60000; 12000; 38000 CAPSULE, DELAYED RELEASE PELLETS ORAL at 16:53

## 2020-03-03 RX ADMIN — METRONIDAZOLE 500 MG: 500 INJECTION, SOLUTION INTRAVENOUS at 08:06

## 2020-03-03 RX ADMIN — METRONIDAZOLE 500 MG: 500 INJECTION, SOLUTION INTRAVENOUS at 00:54

## 2020-03-03 RX ADMIN — ATROPINE SULFATE 1 DROP: 10 SOLUTION OPHTHALMIC at 21:45

## 2020-03-03 RX ADMIN — FLUCONAZOLE 100 MG: 200 INJECTION, SOLUTION INTRAVENOUS at 15:07

## 2020-03-03 RX ADMIN — INSULIN LISPRO 4 UNITS: 100 INJECTION, SOLUTION INTRAVENOUS; SUBCUTANEOUS at 16:53

## 2020-03-03 RX ADMIN — METRONIDAZOLE 500 MG: 500 INJECTION, SOLUTION INTRAVENOUS at 15:41

## 2020-03-03 RX ADMIN — PREDNISOLONE ACETATE 1 DROP: 10 SUSPENSION/ DROPS OPHTHALMIC at 11:02

## 2020-03-03 RX ADMIN — DORZOLAMIDE HYDROCHLORIDE AND TIMOLOL MALEATE 1 DROP: 20; 5 SOLUTION/ DROPS OPHTHALMIC at 21:45

## 2020-03-03 ASSESSMENT — PAIN SCALES - PAIN ASSESSMENT IN ADVANCED DEMENTIA (PAINAD)
FACIALEXPRESSION: 0
BREATHING: 0
BODYLANGUAGE: 0
FACIALEXPRESSION: 0
FACIALEXPRESSION: 0
TOTALSCORE: 0
BREATHING: 0
NEGVOCALIZATION: 0
NEGVOCALIZATION: 0
FACIALEXPRESSION: 0
TOTALSCORE: 0
NEGVOCALIZATION: 0
BREATHING: 0
NEGVOCALIZATION: 0
CONSOLABILITY: 0
NEGVOCALIZATION: 0
BODYLANGUAGE: 0
CONSOLABILITY: 0
BREATHING: 0
BODYLANGUAGE: 0
BODYLANGUAGE: 0
FACIALEXPRESSION: 0
CONSOLABILITY: 0
TOTALSCORE: 0
CONSOLABILITY: 0
BREATHING: 0
CONSOLABILITY: 0
BODYLANGUAGE: 0

## 2020-03-03 ASSESSMENT — PAIN SCALES - GENERAL
PAINLEVEL_OUTOF10: 0

## 2020-03-03 NOTE — PROGRESS NOTES
Hospitalist Progress Note      PCP: Ben Villarreal MD    Date of Admission: 2/26/2020    Chief Complaint: leg discomfort    Subjective: Pt seen and examined. Lethargic, unable to open eyes or answer questions. Medications:  Reviewed    Infusion Medications    sodium chloride 75 mL/hr at 03/02/20 1440    dilTIAZem (CARDIZEM) 125 mg in dextrose 5% 125 mL infusion      dextrose       Scheduled Medications    metroNIDAZOLE  500 mg Intravenous Q8H    fluconazole  100 mg Intravenous Q24H    metoprolol tartrate  50 mg Oral BID WC    insulin glargine  20 Units Subcutaneous Nightly    insulin lispro  3 Units Subcutaneous TID WC    senna  1 tablet Oral Daily    vancomycin  750 mg Intravenous Q24H    venlafaxine  150 mg Oral BID WC    prednisoLONE acetate  1 drop Right Eye BID    lipase-protease-amylase  12,000 Units Oral TID WC    isosorbide mononitrate  30 mg Oral Lunch    dorzolamide-timolol  1 drop Right Eye BID    atropine  1 drop Right Eye BID    atorvastatin  10 mg Oral Lunch    aspirin  325 mg Oral Daily    sodium chloride flush  10 mL Intravenous 2 times per day    insulin lispro  0-12 Units Subcutaneous TID WC    insulin lispro  0-6 Units Subcutaneous Nightly    famotidine  20 mg Oral Daily    vancomycin (VANCOCIN) intermittent dosing (placeholder)   Other RX Placeholder     PRN Meds: LORazepam, diphenhydrAMINE, sodium chloride flush, polyethylene glycol, promethazine, ondansetron, glucose, dextrose, glucagon (rDNA), dextrose, acetaminophen      Intake/Output Summary (Last 24 hours) at 3/2/2020 2128  Last data filed at 3/2/2020 1300  Gross per 24 hour   Intake 497.14 ml   Output 875 ml   Net -377.86 ml       Exam:    BP (!) 152/90   Pulse 99   Temp 98.5 °F (36.9 °C) (Axillary)   Resp 16   Ht 4' 11\" (1.499 m)   Wt 150 lb 5.7 oz (68.2 kg)   SpO2 98%   BMI 30.37 kg/m²     General appearance: Lethargic, no distress appears stated age.   HEENT: Pupils equal, round, and reactive to light. Conjunctivae/corneas clear. Neck: Supple, with full range of motion. No jugular venous distention. Trachea midline. Respiratory:  Normal respiratory effort. Clear to auscultation, bilaterally without Rales/Wheezes/Rhonchi. Cardiovascular: Regular rate and rhythm with normal S1/S2 without murmurs, rubs or gallops. Abdomen: Soft, non-tender, non-distended with normal bowel sounds. Musculoskeletal: No clubbing, cyanosis or edema bilaterally. Full range of motion without deformity. Skin: Chronic venous stasis bilaterally of lower legs with multiple shallow ulcerations, no purulence, improving erythema demarkated with permanent marker  Neurologic:  Unable to assess due to altered mental status. Psychiatric: Lethargic  Capillary Refill: Brisk,< 3 seconds   Peripheral Pulses: +2 palpable, equal bilaterally       Labs:   Recent Labs     02/29/20  0602 03/01/20  0718 03/02/20  0600   WBC 6.5 8.1 8.6   HGB 8.5* 9.4* 10.4*   HCT 24.6* 26.9* 30.3*    185 197     Recent Labs     02/29/20  0602 03/01/20  0718 03/02/20  0600    137 136   K 4.8 3.4* 3.1*    96* 95*   CO2 23 25 26   BUN 11 13 14   CREATININE 1.0 1.1 1.1   CALCIUM 8.6 8.8 8.6     No results for input(s): AST, ALT, BILIDIR, BILITOT, ALKPHOS in the last 72 hours. No results for input(s): INR in the last 72 hours. No results for input(s): Eugune Ran in the last 72 hours. Urinalysis:      Lab Results   Component Value Date    NITRU Negative 02/27/2020    WBCUA 3-5 02/27/2020    BACTERIA 1+ 06/08/2019    RBCUA 0-2 02/27/2020    BLOODU MODERATE 02/27/2020    SPECGRAV 1.008 02/27/2020    GLUCOSEU >=1000 02/27/2020       Radiology:  CT HEAD WO CONTRAST   Final Result   1. No acute intracranial abnormality. 2. Cerebral and cerebellar parenchymal volume loss with chronic microvascular   white matter ischemic disease.          CT CHEST PULMONARY EMBOLISM W CONTRAST   Final Result   No evidence of pulmonary embolism or acute pulmonary abnormality. CT ABDOMEN PELVIS W IV CONTRAST Additional Contrast? None   Final Result   Negative CT examination. No evidence of acute process in the abdomen or   pelvis. No intra-abdominal findings to explain the right-sided diaphragm   elevation, which is unchanged back to the patient's CT abdomen/pelvis from   November 2018         XR CHEST PORTABLE   Final Result   No acute interval change. Persistent elevated right hemidiaphragm versus   eventration with associated basilar atelectasis. XR ABDOMEN (KUB) (SINGLE AP VIEW)   Final Result   Moderate stool burden in the right colon. No acute process on single supine   image. XR CHEST PORTABLE   Final Result   Mild vascular indistinctness, either due to low lung volumes or mild edema      Bandlike opacity at the right lung base. Bandlike morphology favors   atelectasis rather than pneumonia         CT HEAD WO CONTRAST   Final Result   No acute intracranial abnormality. XR CHEST STANDARD (2 VW)   Final Result   Mild prominence of the interstitial markings. Correlation for edema is   recommended. Assessment/Plan:    Active Hospital Problems    Diagnosis Date Noted    Hyperglycemia [R73.9] 02/27/2020     Ms. Nelly Encinas is a 66 y.o. lady with DM2, MDRO, indwelling suprapubic catheter, chronic venous stasis with shallow wounds, who presents to the ED with AMS and HHS possibly due to infectious cause.     Acute metabolic encephalopathy:  Waxes and wanes  - treating HHS per below  - holding deliriogenic agents  - treat infection per below  - stop cefepime as this may be contributing to encephalopathy  - check ammonia, procal, CT Head without contrast     HHS:  improved  - on insulin pump at home, reportedly 5U with meals, basal rate 1.3U/hr = 31 U per day  - eats meals inconsistently, so pump may not be most reliable option for management  - no lantus listed on med rec.   Will do 20U for now  - restart scheduled lispro

## 2020-03-03 NOTE — PROGRESS NOTES
Patient responding to voice and verbalizing responses in whole sentences. She is now alert and awake though still somewhat confused. Patient now tolerating liquids.

## 2020-03-03 NOTE — PROGRESS NOTES
Hospitalist Progress Note      PCP: Ruel Ricks MD    Date of Admission: 2/26/2020    Chief Complaint: leg discomfort    Subjective: Pt seen and examined. More awake and alert today than yesterday, but still not oriented to person, place, or time.     Medications:  Reviewed    Infusion Medications    sodium chloride 75 mL/hr at 03/03/20 1109    dilTIAZem (CARDIZEM) 125 mg in dextrose 5% 125 mL infusion      dextrose       Scheduled Medications    potassium chloride  10 mEq Intravenous Q1H    metroNIDAZOLE  500 mg Intravenous Q8H    fluconazole  100 mg Intravenous Q24H    metoprolol tartrate  50 mg Oral BID WC    insulin glargine  20 Units Subcutaneous Nightly    insulin lispro  3 Units Subcutaneous TID WC    senna  1 tablet Oral Daily    vancomycin  750 mg Intravenous Q24H    venlafaxine  150 mg Oral BID WC    prednisoLONE acetate  1 drop Right Eye BID    lipase-protease-amylase  12,000 Units Oral TID WC    isosorbide mononitrate  30 mg Oral Lunch    dorzolamide-timolol  1 drop Right Eye BID    atropine  1 drop Right Eye BID    atorvastatin  10 mg Oral Lunch    aspirin  325 mg Oral Daily    sodium chloride flush  10 mL Intravenous 2 times per day    insulin lispro  0-12 Units Subcutaneous TID WC    insulin lispro  0-6 Units Subcutaneous Nightly    famotidine  20 mg Oral Daily    vancomycin (VANCOCIN) intermittent dosing (placeholder)   Other RX Placeholder     PRN Meds: LORazepam, diphenhydrAMINE, sodium chloride flush, polyethylene glycol, promethazine, ondansetron, glucose, dextrose, glucagon (rDNA), dextrose, acetaminophen      Intake/Output Summary (Last 24 hours) at 3/3/2020 1207  Last data filed at 3/2/2020 1300  Gross per 24 hour   Intake 0 ml   Output 125 ml   Net -125 ml       Exam:    BP (!) 167/77   Pulse 83   Temp 97.6 °F (36.4 °C) (Axillary)   Resp 16   Ht 4' 11\" (1.499 m)   Wt 149 lb 11.1 oz (67.9 kg)   SpO2 95%   BMI 30.23 kg/m²     General appearance: Awake, no distress appears stated age. HEENT: Pupils equal, round, and reactive to light. Conjunctivae/corneas clear. Neck: Supple, with full range of motion. No jugular venous distention. Trachea midline. Respiratory:  Normal respiratory effort. Clear to auscultation, bilaterally without Rales/Wheezes/Rhonchi. Cardiovascular: Regular rate and rhythm with normal S1/S2 without murmurs, rubs or gallops. Abdomen: Soft, non-tender, non-distended with normal bowel sounds. Musculoskeletal: No clubbing, cyanosis or edema bilaterally. Full range of motion without deformity. Skin: Chronic venous stasis bilaterally of lower legs with multiple shallow ulcerations, no purulence, improving erythema demarkated with permanent marker  Neurologic:  No focal deficits, spontaneously moves all extremities. Psychiatric: Awake, not alert to person, place, or time  Capillary Refill: Brisk,< 3 seconds   Peripheral Pulses: +2 palpable, equal bilaterally       Labs:   Recent Labs     03/01/20  0718 03/02/20  0600 03/03/20  0640   WBC 8.1 8.6 8.2   HGB 9.4* 10.4* 10.9*   HCT 26.9* 30.3* 32.1*    197 214     Recent Labs     03/01/20  0718 03/02/20  0600 03/03/20  0640    136 140   K 3.4* 3.1* 3.0*   CL 96* 95* 99   CO2 25 26 27   BUN 13 14 14   CREATININE 1.1 1.1 1.1   CALCIUM 8.8 8.6 8.2*     No results for input(s): AST, ALT, BILIDIR, BILITOT, ALKPHOS in the last 72 hours. No results for input(s): INR in the last 72 hours. No results for input(s): Evans Flatter in the last 72 hours. Urinalysis:      Lab Results   Component Value Date    NITRU Negative 02/27/2020    WBCUA 3-5 02/27/2020    BACTERIA 1+ 06/08/2019    RBCUA 0-2 02/27/2020    BLOODU MODERATE 02/27/2020    SPECGRAV 1.008 02/27/2020    GLUCOSEU >=1000 02/27/2020       Radiology:  CT HEAD WO CONTRAST   Final Result   1. No acute intracranial abnormality.    2. Cerebral and cerebellar parenchymal volume loss with chronic microvascular   white matter ischemic disease. CT CHEST PULMONARY EMBOLISM W CONTRAST   Final Result   No evidence of pulmonary embolism or acute pulmonary abnormality. CT ABDOMEN PELVIS W IV CONTRAST Additional Contrast? None   Final Result   Negative CT examination. No evidence of acute process in the abdomen or   pelvis. No intra-abdominal findings to explain the right-sided diaphragm   elevation, which is unchanged back to the patient's CT abdomen/pelvis from   November 2018         XR CHEST PORTABLE   Final Result   No acute interval change. Persistent elevated right hemidiaphragm versus   eventration with associated basilar atelectasis. XR ABDOMEN (KUB) (SINGLE AP VIEW)   Final Result   Moderate stool burden in the right colon. No acute process on single supine   image. XR CHEST PORTABLE   Final Result   Mild vascular indistinctness, either due to low lung volumes or mild edema      Bandlike opacity at the right lung base. Bandlike morphology favors   atelectasis rather than pneumonia         CT HEAD WO CONTRAST   Final Result   No acute intracranial abnormality. XR CHEST STANDARD (2 VW)   Final Result   Mild prominence of the interstitial markings. Correlation for edema is   recommended. Assessment/Plan:    Active Hospital Problems    Diagnosis Date Noted    Hyperglycemia [R73.9] 02/27/2020     Ms. Brody Hagen is a 66 y.o. lady with DM2, MDRO, indwelling suprapubic catheter, chronic venous stasis with shallow wounds, who presents to the ED with AMS and HHS possibly due to infectious cause.     Acute metabolic encephalopathy associated with type 2 DM:   Waxes and wanes, improved slightly today  - treating HHS per below  - holding deliriogenic agents  - treat infection per below  - hold cefepime as this may be contributing to encephalopathy  - ammonia WNL, procal slightly elevated, CT Head without contrast without acute abnormality     Hyperosmolar hyperglycemic state:  improved  - on insulin pump at home, reportedly 5U with meals, basal rate 1.3U/hr = 31 U per day  - eats meals inconsistently, so pump may not be most reliable option for management  - no lantus listed on med rec.   Will do 20U for now  - restart scheduled lispro 3U with meals and low dose SSI     Chronic venous stasis with erythema, sepsis ruled out: improving  - possible infection although uncertain given otherwise reassuring clinical exam  - will consult ID given h/o MDRO   -  cont vanc for now    [address] with fungal dermatitis around suprapubic catheter:  - colonization vs active infection  - foul smell from site of suprapubic catheter insertion site on exam   - cont fluconazole x 10 days  - improve skin hygeine     Chronic CHF:  - s/p fluid rescucitation for HHS in the ED  - hold home lasix for now and gentle IVF today as patient not taking any PO at the moment    Hypokalemia  -replace PRN        DVT Prophylaxis: lovenox  Diet: DIET CARB CONTROL; Carb Control: 4 carb choices (60 gms)/meal  Code Status: Full Code  PT/OT Eval Status: pending     Dispo - pending PT/OT      Tan Banks MD

## 2020-03-03 NOTE — PROGRESS NOTES
Infectious Disease Follow up Notes  Admit Date: 2/26/2020  Hospital Day: 7    Antibiotics : IV Vancomycin  IV Flagyl   IV Fluconazole       CHIEF COMPLAINT:   lOWER leg Cellulitis  lACTIC ACIDOSIS  BG elevation  Change in mentation  Encephalopathy       Subjective interval History :  66 y.o. woman with recurrent admit for lower leg cellulitis, history of hypertension, hyperlipidemia, diabetes, MDRO infection colonization, suprapubic catheter, lower leg recurrent cellulitis, with venous stasis ulcer admitted with high blood glucose change in mental status ongoing drainage from the lower leg ulcers. Noted to have blood glucose above 600 with lactic acidosis on admission with acute creatinine elevation. Blood cultures were obtained wound cultures are in process she was started on IV antibiotics and we are consulted for antibiotic recommendations.     More alert and able to eat ok, family at bed side slow to respond but better legs less weepy and no odor        Past Medical History:    Past Medical History:   Diagnosis Date    Anemia     Atrial fibrillation ([address]     CAD (coronary artery disease)     Chronic back pain     CKD [facility]ronic kidney disease[facility] III [facility]     Diabetes mellitus ([address]     Diastolic CHF ([address]     Hyperlipidemia     Hypertension     MI (myocardial infarction) ([address]     MRSA (methicillin resistant staph aureus) culture positive 04/13/2018    leg ulcer    MRSA (methicillin resistant staph aureus) culture positive 1/4/20;06/08/2019    leg    MRSA colonization 10/16/2018    Obesity (BMI 30-39. 9)     Osteoarthritis     Pancreatitis     Sepsis ([address]     Stasis ulcer ([facility]     bilateral lower extremities     Suprapubic catheter [facility])        Past Surgical History:    Past Surgical History:   Procedure Laterality Date    APPENDECTOMY      BACK SURGERY      x2    CATARACT REMOVAL      bilateral     CHOLECYSTECTOMY      COLONOSCOPY      EYE SURGERY      FRACTURE SURGERY      Left ankle    HYSTERECTOMY      INSET/CHANGE LOMELI CATHETER - COMPLICATED  68/8079    suprapubic cath    JOINT REPLACEMENT      Right knee    KNEE SURGERY         Current Medications:    Outpatient Medications Marked as Taking for the 2/26/20 encounter [facility] Encounter)   Medication Sig Dispense Refill    furosemide (LASIX) 40 MG tablet Take 40 mg by mouth every other day      aspirin 325 MG EC tablet Take 325 mg by mouth daily      Cholecalciferol (VITAMIN D3) 125 MCG (5000 UT) TABS Take 5,000 Units by mouth daily      Lactobacillus (PROBIOTIC ACIDOPHILUS PO) Take 1 tablet by mouth nightly      ranitidine (ZANTAC) 150 MG tablet Take 150 mg by mouth nightly       furosemide (LASIX) 20 MG tablet Take 20 mg by mouth every other day       atorvastatin (LIPITOR) 10 MG tablet Take 1 tablet by mouth daily (Patient taking differently: Take 10 mg by mouth Daily with lunch ) 30 tablet 0    metoprolol tartrate (LOPRESSOR) 25 MG tablet Take 1 tablet by mouth 2 times daily (Patient taking differently: Take 25 mg by mouth 2 times daily (with meals) Breakfast and dinner) 60 tablet 0    Biotin (BIOTIN 5000) 5 MG CAPS Take 1 tablet by mouth nightly       lipase-protease-amylase (CREON) 24372 units delayed release capsule Take 12,000 Units by mouth 3 times daily (with meals)       gabapentin (NEURONTIN) 300 MG capsule Take 300 mg by mouth 2 times daily.  Noon and bedtime      isosorbide mononitrate (IMDUR) 30 MG extended release tablet Take 30 mg by mouth Daily with lunch       venlafaxine (EFFEXOR) 75 MG tablet Take 150 mg by mouth 2 times daily (with meals) Breakfast and lunch      cetirizine (ZYRTEC) 10 MG tablet Take 10 mg by mouth daily         Allergies:  Sulfa antibiotics    Immunizations :   Immunization History   Administered Date(s) Administered    Influenza Vaccine, unspecified formulation 10/08/2010, 08/15/2014, 02/27/2020      Urine Microscopic:   Lab Results   Component Value Date    BACTERIA 1+ 06/08/2019    COMU see below 04/13/2018    HYALCAST 1 01/06/2020    WBCUA 3-5 02/27/2020    RBCUA 0-2 02/27/2020    EPIU 0 01/06/2020        Creat  1.4   lACTIC ACID  3.2      BG > 600  MICRO: cultures reviewed and updated by me          Culture, Blood 1 [[id number]] Collected: 02/26/20 2358   Order Status: Completed Specimen: Blood Updated: 03/02/20 0215    Blood Culture, Routine No Growth after 4 days of incubation. Narrative:     ORDER#: [id number]                          ORDERED BY: REBECA Wynn  SOURCE: Blood                              COLLECTED:  02/26/20 23:58  ANTIBIOTICS AT REGIS. :                      RECEIVED :  02/27/20 00:07  If child <=2 yrs old please draw pediatric bottle. ~Blood Culture #1  Performed at:  [facility]  [address] [place] [address]   Phone [phone]   Culture, Blood 2 [[id number]] Collected: 02/26/20 2358   Order Status: Completed Specimen: Blood Updated: 03/02/20 0215    Culture, Blood 2 No Growth after 4 days of incubation. Narrative:     ORDER#: [id number]                          ORDERED BY: REBECA Wynn  SOURCE: Blood                              COLLECTED:  02/26/20 23:58  ANTIBIOTICS AT REGIS. :                      RECEIVED :  02/27/20 00:07  If child <=2 yrs old please draw pediatric bottle. ~Blood Culture #2  Performed at:  [facility]  [address] [place] [address]   Phone [phone]   Culture, Wound [[id number]] (Abnormal) Collected: 02/27/20 1438   Order Status: Completed Specimen: Catheter Site from Cath Site Updated: 02/29/20 1423    WOUND/ABSCESS --    Mixed skin rex,  Moderate growth   No further workup     Gram Stain Result 1+ Epithelial Cells   3+ WBC's (Polymorphonuclear)   3+ Gram positive cocci   3+ Gram negative rods   1+ Gram positive rods   Abnormal    Narrative:     ORDER#: [id number]                          ORDERED BY: Hansa Garcia  SOURCE: Catheter site                      COLLECTED:  02/27/20 14:38  ANTIBIOTICS AT REGIS. :                      RECEIVED :  02/27/20 14:38  Performed at:  [facility]  [address] [facility] [place] [address]   Phone [phone]   Culture, Wound [[id number]] Collected: 02/27/20 0000   Order Status: Canceled Specimen: Catheter Site from Cath Site            IMAGING:    CT HEAD WO CONTRAST   Final Result   1. No acute intracranial abnormality. 2. Cerebral and cerebellar parenchymal volume loss with chronic microvascular   white matter ischemic disease. CT CHEST PULMONARY EMBOLISM W CONTRAST   Final Result   No evidence of pulmonary embolism or acute pulmonary abnormality. CT ABDOMEN PELVIS W IV CONTRAST Additional Contrast? None   Final Result   Negative CT examination. No evidence of acute process in the abdomen or   pelvis. No intra-abdominal findings to explain the right-sided diaphragm   elevation, which is unchanged back to the patient's CT abdomen/pelvis from   November 2018         XR CHEST PORTABLE   Final Result   No acute interval change. Persistent elevated right hemidiaphragm versus   eventration with associated basilar atelectasis. XR ABDOMEN (KUB) (SINGLE AP VIEW)   Final Result   Moderate stool burden in the right colon. No acute process on single supine   image. XR CHEST PORTABLE   Final Result   Mild vascular indistinctness, either due to low lung volumes or mild edema      Bandlike opacity at the right lung base. Bandlike morphology favors   atelectasis rather than pneumonia         CT HEAD WO CONTRAST   Final Result   No acute intracranial abnormality. XR CHEST STANDARD (2 VW)   Final Result   Mild prominence of the interstitial markings. Correlation for edema is   recommended.                All the pertinent images and reports for the current Hospitalization were reviewed by me     Scheduled Meds:   potassium chloride  10 mEq Intravenous Q1H    metroNIDAZOLE  500 mg Intravenous Q8H    fluconazole  100 mg Intravenous Q24H    metoprolol tartrate  50 mg Oral BID     insulin glargine  20 Units Subcutaneous Nightly    insulin lispro  3 Units Subcutaneous TID     senna  1 tablet Oral Daily    vancomycin  750 mg Intravenous Q24H    venlafaxine  150 mg Oral BID WC    prednisoLONE acetate  1 drop Right Eye BID    lipase-protease-amylase  12,000 Units Oral TID WC    isosorbide mononitrate  30 mg Oral Lunch    dorzolamide-timolol  1 drop Right Eye BID    atropine  1 drop Right Eye BID    atorvastatin  10 mg Oral Lunch    aspirin  325 mg Oral Daily    sodium chloride flush  10 mL Intravenous 2 times per day    insulin lispro  0-12 Units Subcutaneous TID WC    insulin lispro  0-6 Units Subcutaneous Nightly    famotidine  20 mg Oral Daily    vancomycin (VANCOCIN) intermittent dosing (placeholder)   Other RX Placeholder       Continuous Infusions:   sodium chloride 75 mL/hr at 03/03/20 1109    dilTIAZem (CARDIZEM) 125 mg in dextrose 5% 125 mL infusion      dextrose         PRN Meds:  LORazepam, diphenhydrAMINE, sodium chloride flush, polyethylene glycol, promethazine, ondansetron, glucose, dextrose, glucagon (rDNA), dextrose, acetaminophen      Assessment:     Patient Active Problem List   Diagnosis    Acute on chronic renal insufficiency    FTT (failure to thrive) in adult    Type 1 diabetes mellitus with hyperglycemia (HCC)    Chronic venous hypertension (idiopathic) with ulcer and inflammation of bilateral lower extremity (HCC)    DMII (diabetes mellitus, type 2) (HCC)    Cellulitis of left lower extremity    Protein-calorie malnutrition, moderate (HCC)    MRSA colonization    Chronic diastolic heart failure (HCC)    Coronary artery disease involving native coronary artery of native heart without angina pectoris    [facility] atherosclerosis [facility]    Essential hypertension    Cellulitis, lower extremities bilaterally    MRSA nasal colonization    Normocytic anemia    Altered mental status    Elevated lactic acid level    Uncontrolled diabetes mellitus (HCC)    Sepsis (HCC)    Stasis ulcer (HCC)    Bilateral lower leg cellulitis    Chronic renal failure, stage 3 (moderate) (HCC)    Hyperlipidemia    Hypoglycemia    Morbid obesity due to excess calories ([facility]    Gross hematuria    Hyperglycemia     Sepsis'  Change in mentation  BG elevation  Lactic acidosis  DM poor control   Recurrent lower leg cellulitis  Venous stasis ulcer+'  MDRO infection  Supra pubic catheter in place    More confused and irritable and encephalopathic d/w RN and BG has been ok , Ammonia normal and this is likely multifactorial and in the context of IV Cefepime will need to d/c this now    CT brain done but exam non focal , spoke to [address], Microbiology, Radiology and all the pertinent results from current hospitalization and  care every where were reviewed  by me as a part of the evaluation   Plan:   1. Cont IV Vancomycin  By levels due to KACIE  2. D/C IV Cefepime list as allergy for INTOLERANCE   3. Watch BG  4. Cont local care  5. Supra pubic cultures not useful   6. Contact isolation  7. Elevate the legs   8. Change to oral  Fluconazole for dermatitis on the legs   9. Change to oral Flagyl for anaerobes  10. Would expect mental status improvement if this is from Cefepime   11. She needs follow up in wound care and improve her skin hygiene         Discussed with patient/Family and Nursing staff  D/w family at bed side     Thanks for allowing me to participate in your patient's care and please call me with any questions or concerns.     Lucy Jose MD  Infectious Disease  [facility] Physician  Phone: [phone]   Fax : [phone]

## 2020-03-04 PROBLEM — E44.0 MODERATE MALNUTRITION (HCC): Status: ACTIVE | Noted: 2020-03-04

## 2020-03-04 LAB
ANION GAP SERPL CALCULATED.3IONS-SCNC: 12 MMOL/L (ref 3–16)
BASOPHILS ABSOLUTE: 0.1 K/UL (ref 0–0.2)
BASOPHILS RELATIVE PERCENT: 1 %
BUN BLDV-MCNC: 9 MG/DL (ref 7–20)
CALCIUM SERPL-MCNC: 8.1 MG/DL (ref 8.3–10.6)
CHLORIDE BLD-SCNC: 102 MMOL/L (ref 99–110)
CO2: 26 MMOL/L (ref 21–32)
CREAT SERPL-MCNC: 0.9 MG/DL (ref 0.6–1.2)
EOSINOPHILS ABSOLUTE: 0.3 K/UL (ref 0–0.6)
EOSINOPHILS RELATIVE PERCENT: 3.3 %
GFR AFRICAN AMERICAN: >60
GFR NON-AFRICAN AMERICAN: >60
GLUCOSE BLD-MCNC: 130 MG/DL (ref 70–99)
GLUCOSE BLD-MCNC: 136 MG/DL (ref 70–99)
GLUCOSE BLD-MCNC: 210 MG/DL (ref 70–99)
GLUCOSE BLD-MCNC: 237 MG/DL (ref 70–99)
GLUCOSE BLD-MCNC: 58 MG/DL (ref 70–99)
GLUCOSE BLD-MCNC: 89 MG/DL (ref 70–99)
GLUCOSE BLD-MCNC: 96 MG/DL (ref 70–99)
HCT VFR BLD CALC: 29.2 % (ref 36–48)
HEMOGLOBIN: 10.1 G/DL (ref 12–16)
LYMPHOCYTES ABSOLUTE: 1 K/UL (ref 1–5.1)
LYMPHOCYTES RELATIVE PERCENT: 13 %
MAGNESIUM: 2 MG/DL (ref 1.8–2.4)
MCH RBC QN AUTO: 34 PG (ref 26–34)
MCHC RBC AUTO-ENTMCNC: 34.5 G/DL (ref 31–36)
MCV RBC AUTO: 98.6 FL (ref 80–100)
MONOCYTES ABSOLUTE: 0.5 K/UL (ref 0–1.3)
MONOCYTES RELATIVE PERCENT: 6.8 %
NEUTROPHILS ABSOLUTE: 5.9 K/UL (ref 1.7–7.7)
NEUTROPHILS RELATIVE PERCENT: 75.9 %
PDW BLD-RTO: 13.9 % (ref 12.4–15.4)
PERFORMED ON: ABNORMAL
PERFORMED ON: NORMAL
PLATELET # BLD: 208 K/UL (ref 135–450)
PMV BLD AUTO: 8.9 FL (ref 5–10.5)
POTASSIUM REFLEX MAGNESIUM: 3.1 MMOL/L (ref 3.5–5.1)
RBC # BLD: 2.97 M/UL (ref 4–5.2)
SODIUM BLD-SCNC: 140 MMOL/L (ref 136–145)
VANCOMYCIN RANDOM: 22.9 UG/ML
WBC # BLD: 7.8 K/UL (ref 4–11)

## 2020-03-04 PROCEDURE — 6370000000 HC RX 637 (ALT 250 FOR IP): Performed by: FAMILY MEDICINE

## 2020-03-04 PROCEDURE — 2580000003 HC RX 258: Performed by: INTERNAL MEDICINE

## 2020-03-04 PROCEDURE — 6370000000 HC RX 637 (ALT 250 FOR IP): Performed by: INTERNAL MEDICINE

## 2020-03-04 PROCEDURE — 94760 N-INVAS EAR/PLS OXIMETRY 1: CPT

## 2020-03-04 PROCEDURE — 83735 ASSAY OF MAGNESIUM: CPT

## 2020-03-04 PROCEDURE — 80202 ASSAY OF VANCOMYCIN: CPT

## 2020-03-04 PROCEDURE — 6360000002 HC RX W HCPCS: Performed by: INTERNAL MEDICINE

## 2020-03-04 PROCEDURE — 80048 BASIC METABOLIC PNL TOTAL CA: CPT

## 2020-03-04 PROCEDURE — 1200000000 HC SEMI PRIVATE

## 2020-03-04 PROCEDURE — 99232 SBSQ HOSP IP/OBS MODERATE 35: CPT | Performed by: INTERNAL MEDICINE

## 2020-03-04 PROCEDURE — 36415 COLL VENOUS BLD VENIPUNCTURE: CPT

## 2020-03-04 PROCEDURE — 85025 COMPLETE CBC W/AUTO DIFF WBC: CPT

## 2020-03-04 PROCEDURE — 97535 SELF CARE MNGMENT TRAINING: CPT

## 2020-03-04 RX ORDER — DOXYCYCLINE HYCLATE 100 MG
100 TABLET ORAL EVERY 12 HOURS SCHEDULED
Status: DISCONTINUED | OUTPATIENT
Start: 2020-03-04 | End: 2020-03-05 | Stop reason: HOSPADM

## 2020-03-04 RX ORDER — POTASSIUM CHLORIDE 7.45 MG/ML
10 INJECTION INTRAVENOUS
Status: DISPENSED | OUTPATIENT
Start: 2020-03-04 | End: 2020-03-04

## 2020-03-04 RX ADMIN — ACETAMINOPHEN 650 MG: 325 TABLET ORAL at 13:24

## 2020-03-04 RX ADMIN — PREDNISOLONE ACETATE 1 DROP: 10 SUSPENSION/ DROPS OPHTHALMIC at 23:00

## 2020-03-04 RX ADMIN — VENLAFAXINE 150 MG: 50 TABLET ORAL at 09:16

## 2020-03-04 RX ADMIN — METRONIDAZOLE 500 MG: 500 TABLET, FILM COATED ORAL at 06:26

## 2020-03-04 RX ADMIN — ISOSORBIDE MONONITRATE 30 MG: 30 TABLET, EXTENDED RELEASE ORAL at 12:51

## 2020-03-04 RX ADMIN — DOXYCYCLINE HYCLATE 100 MG: 100 TABLET, FILM COATED ORAL at 22:54

## 2020-03-04 RX ADMIN — METOPROLOL TARTRATE 50 MG: 50 TABLET, FILM COATED ORAL at 09:15

## 2020-03-04 RX ADMIN — ATORVASTATIN CALCIUM 10 MG: 10 TABLET, FILM COATED ORAL at 12:51

## 2020-03-04 RX ADMIN — Medication 10 MEQ: at 10:40

## 2020-03-04 RX ADMIN — PREDNISOLONE ACETATE 1 DROP: 10 SUSPENSION/ DROPS OPHTHALMIC at 09:19

## 2020-03-04 RX ADMIN — ATROPINE SULFATE 1 DROP: 10 SOLUTION OPHTHALMIC at 23:00

## 2020-03-04 RX ADMIN — INSULIN LISPRO 4 UNITS: 100 INJECTION, SOLUTION INTRAVENOUS; SUBCUTANEOUS at 12:48

## 2020-03-04 RX ADMIN — INSULIN LISPRO 3 UNITS: 100 INJECTION, SOLUTION INTRAVENOUS; SUBCUTANEOUS at 09:23

## 2020-03-04 RX ADMIN — SENNOSIDES 8.6 MG: 8.6 TABLET, FILM COATED ORAL at 09:15

## 2020-03-04 RX ADMIN — INSULIN LISPRO 3 UNITS: 100 INJECTION, SOLUTION INTRAVENOUS; SUBCUTANEOUS at 17:26

## 2020-03-04 RX ADMIN — METRONIDAZOLE 500 MG: 500 TABLET, FILM COATED ORAL at 13:09

## 2020-03-04 RX ADMIN — Medication 10 MEQ: at 12:59

## 2020-03-04 RX ADMIN — METRONIDAZOLE 500 MG: 500 TABLET, FILM COATED ORAL at 22:54

## 2020-03-04 RX ADMIN — FAMOTIDINE 20 MG: 20 TABLET, FILM COATED ORAL at 09:15

## 2020-03-04 RX ADMIN — ATROPINE SULFATE 1 DROP: 10 SOLUTION OPHTHALMIC at 09:18

## 2020-03-04 RX ADMIN — FLUCONAZOLE 100 MG: 100 TABLET ORAL at 09:17

## 2020-03-04 RX ADMIN — PANCRELIPASE 12000 UNITS: 60000; 12000; 38000 CAPSULE, DELAYED RELEASE PELLETS ORAL at 12:52

## 2020-03-04 RX ADMIN — PANCRELIPASE 12000 UNITS: 60000; 12000; 38000 CAPSULE, DELAYED RELEASE PELLETS ORAL at 09:16

## 2020-03-04 RX ADMIN — Medication 10 MEQ: at 11:57

## 2020-03-04 RX ADMIN — Medication 10 MEQ: at 09:13

## 2020-03-04 RX ADMIN — SODIUM CHLORIDE, PRESERVATIVE FREE 10 ML: 5 INJECTION INTRAVENOUS at 22:55

## 2020-03-04 RX ADMIN — METOPROLOL TARTRATE 50 MG: 50 TABLET, FILM COATED ORAL at 17:25

## 2020-03-04 RX ADMIN — INSULIN GLARGINE 20 UNITS: 100 INJECTION, SOLUTION SUBCUTANEOUS at 22:54

## 2020-03-04 RX ADMIN — PANCRELIPASE 12000 UNITS: 60000; 12000; 38000 CAPSULE, DELAYED RELEASE PELLETS ORAL at 17:26

## 2020-03-04 RX ADMIN — INSULIN LISPRO 3 UNITS: 100 INJECTION, SOLUTION INTRAVENOUS; SUBCUTANEOUS at 12:48

## 2020-03-04 RX ADMIN — SODIUM CHLORIDE: 9 INJECTION, SOLUTION INTRAVENOUS at 04:25

## 2020-03-04 RX ADMIN — VENLAFAXINE 150 MG: 50 TABLET ORAL at 17:25

## 2020-03-04 RX ADMIN — ASPIRIN 325 MG: 325 TABLET, DELAYED RELEASE ORAL at 09:15

## 2020-03-04 RX ADMIN — DORZOLAMIDE HYDROCHLORIDE AND TIMOLOL MALEATE 1 DROP: 20; 5 SOLUTION/ DROPS OPHTHALMIC at 23:00

## 2020-03-04 RX ADMIN — DORZOLAMIDE HYDROCHLORIDE AND TIMOLOL MALEATE 1 DROP: 20; 5 SOLUTION/ DROPS OPHTHALMIC at 09:21

## 2020-03-04 RX ADMIN — INSULIN LISPRO 4 UNITS: 100 INJECTION, SOLUTION INTRAVENOUS; SUBCUTANEOUS at 09:23

## 2020-03-04 ASSESSMENT — PAIN SCALES - WONG BAKER
WONGBAKER_NUMERICALRESPONSE: 0
WONGBAKER_NUMERICALRESPONSE: 0

## 2020-03-04 ASSESSMENT — PAIN - FUNCTIONAL ASSESSMENT: PAIN_FUNCTIONAL_ASSESSMENT: ACTIVITIES ARE NOT PREVENTED

## 2020-03-04 ASSESSMENT — PAIN DESCRIPTION - LOCATION: LOCATION: GENERALIZED

## 2020-03-04 ASSESSMENT — PAIN DESCRIPTION - ONSET: ONSET: GRADUAL

## 2020-03-04 ASSESSMENT — PAIN DESCRIPTION - FREQUENCY: FREQUENCY: INTERMITTENT

## 2020-03-04 ASSESSMENT — PAIN DESCRIPTION - DESCRIPTORS: DESCRIPTORS: DISCOMFORT;ACHING

## 2020-03-04 ASSESSMENT — PAIN SCALES - GENERAL
PAINLEVEL_OUTOF10: 0
PAINLEVEL_OUTOF10: 7

## 2020-03-04 ASSESSMENT — PAIN DESCRIPTION - PAIN TYPE: TYPE: ACUTE PAIN

## 2020-03-04 NOTE — PROGRESS NOTES
(methicillin resistant staph aureus) culture positive (1/4/20;06/08/2019), MRSA colonization (10/16/2018), Obesity (BMI 30-39.9), Osteoarthritis, Pancreatitis, Sepsis ([address] Stasis ulcer ([address] and Suprapubic catheter ([address] Pt  has a past surgical history that includes Hysterectomy; knee surgery; back surgery; Cholecystectomy; Cataract removal; Appendectomy; Colonoscopy; eye surgery; fracture surgery; joint replacement; and Insert/Change Brasher Catheter - Complicat (22/0259). Family / Caregiver Present: No  Referring Practitioner: Maximus    Diagnosis: Hyperglycemia. Pt admitted on 2-26 via ED with elevated BS, not responding to insulin. Pt with acute metabolic encephalopathy. Pt has had two recent admits for LE cellulitis and UTI. Subjective  Subjective: Pt supine in bed upon OT arrival. Pt with some confusion. Pt reports fear of falling during session. General Comment  Comments: okay for therapy per RN. Recommend use of Armand Cord Steady for nursing use when assisting pt back to bed. Pre Treatment Pain Screening  Intervention List: Patient able to continue with treatment  Vital Signs  Patient Currently in Pain: No     Objective    ADL  Feeding: Setup  Grooming: Stand by assistance(wash and face)  LE Dressing: Dependent/Total        Balance  Sitting Balance: Supervision  Standing Balance: Minimal assistance  Standing Balance  Time: 10-20 seconds x3  Activity: stand step transfers x3  Comment: use of RW with Min A and moderate cues for safety. Functional Mobility  Functional - Mobility Device: Rolling Walker  Assist Level: Minimal assistance  Functional Mobility Comments: 1-2 feet stand step transfers EOB <>chair <>chair. Pt uses RW with occassional assist from OT to maneuver RW when turning.      Bed mobility  Supine to Sit: Maximum assistance(HOB elevated, bedrail)  Transfers  Stand Step Transfers: Minimal assistance  Sit to stand: Minimal assistance  Stand to sit: Contact guard assistance  Transfer

## 2020-03-04 NOTE — PROGRESS NOTES
Stroke:  Lars Angel presents for follow-up with regard to his prior embolic stroke of undetermined source  Although he does have left atrial enlargement on his echocardiogram he does not have a PFO and at this point based on his implantable loop recorder we have not found any evidence of paroxysmal atrial fibrillation  He takes his medications as prescribed and denies any bleeding or bruising issues  He continues to exercise his left upper and lower extremity, recently joined the [facility], and in January of next year will be returning to physical therapy  He has no current indication for Botox for spasticity  - from a neurologic standpoint he should continue to use his combination of aspirin, statin, and appropriate blood pressure and glycemic control for stroke prevention  -we will defer to the good judgment of his primary care team for monitoring of his cholesterol panel and blood sugar numbers  Notably his most recent hemoglobin A1c was 6 6%  This is in the high prediabetic verses early diabetic range  He should continue to follow-up with his primary care doctor in this regard  - we would suggest checking his blood pressure intermittently and targeting numbers less than 130/80 on a regular basis  -from my standpoint he is clear to travel as he sees fit with regard to his work but should ensure that he takes his medications with him and that he has the card for his implantable loop recorder available  - he does have asymptomatic bilateral carotid artery stenosis and will be having an upcoming Doppler ultrasound for further evaluation  I will plan for him to see us back in the office in 1 year's time but we would be happy to see him sooner if the need should arise    If he has any symptoms concerning for TIA or stroke such as sudden painless loss of vision or double vision, difficulty speaking or swallowing, vertigo/ room spinning that does not quickly resolve, or weakness / numbness affecting 1 side of the assess  · Anthropometric Measures:  · Ht: 4' 11\" (149.9 cm)   · Current Body Wt: 155 lb (70.3 kg)  · % Weight Change:  ,  16.2% loss over 6 months with wt on 9/5/19 at 184# 15.5 oz.    · Ideal Body Wt:  , % Ideal Body    · BMI Classification: BMI 30.0 - 34.9 Obese Class I    Nutrition Interventions:   Continue current diet, Modify current ONS  Continued Inpatient Monitoring, Education Not Indicated    Nutrition Evaluation:   · Evaluation: Goals set   · Goals: tolerate most appropriate form of nutrition    · Monitoring: Nutrition Progression, Meal Intake, Supplement Intake, Diet Tolerance, Wound Healing, Mental Status/Confusion, Weight, Pertinent Labs, Chewing/Swallowing, Diarrhea, Constipation      Electronically signed by Dorcas Nichole RD, LD on 3/4/20 at 3:44 PM    Contact Number: [phone] face or body which is new he should proceed by ambulance to the nearest emergency room immediately

## 2020-03-04 NOTE — PROGRESS NOTES
CONTRAST Additional Contrast? None   Final Result   Negative CT examination. No evidence of acute process in the abdomen or   pelvis. No intra-abdominal findings to explain the right-sided diaphragm   elevation, which is unchanged back to the patient's CT abdomen/pelvis from   November 2018         XR CHEST PORTABLE   Final Result   No acute interval change. Persistent elevated right hemidiaphragm versus   eventration with associated basilar atelectasis. XR ABDOMEN (KUB) (SINGLE AP VIEW)   Final Result   Moderate stool burden in the right colon. No acute process on single supine   image. XR CHEST PORTABLE   Final Result   Mild vascular indistinctness, either due to low lung volumes or mild edema      Bandlike opacity at the right lung base. Bandlike morphology favors   atelectasis rather than pneumonia         CT HEAD WO CONTRAST   Final Result   No acute intracranial abnormality. XR CHEST STANDARD (2 VW)   Final Result   Mild prominence of the interstitial markings. Correlation for edema is   recommended. Assessment/Plan:    Active Hospital Problems    Diagnosis Date Noted    Moderate malnutrition ([address] [E44.0] 03/04/2020    Hyperglycemia [R73.9] 02/27/2020     Ms. Lashaun Jeter is a 66 y.o. lady with DM2, MDRO, indwelling suprapubic catheter, chronic venous stasis with shallow wounds, who presents to the ED with AMS and HHS possibly due to infectious cause.     Acute metabolic encephalopathy associated with type 2 DM:  Improved significantly today  - treating HHS per below  - holding deliriogenic agents  - treat infection per below  - hold cefepime as this may be contributing to encephalopathy  - ammonia WNL, procal slightly elevated, CT Head without contrast without acute abnormality     Hyperosmolar hyperglycemic state:  improved  - on insulin pump at home, reportedly 5U with meals, basal rate 1.3U/hr = 31 U per day  - eats meals inconsistently, so pump may not be most

## 2020-03-04 NOTE — PROGRESS NOTES
Infectious Disease Follow up Notes  Admit Date: 2/26/2020  Hospital Day: 8    Antibiotics :   Oral Doxycycline   Oral  Flagyl   Oral Fluconazole       CHIEF COMPLAINT:   lOWER leg Cellulitis  lACTIC ACIDOSIS  BG elevation  Change in mentation  Encephalopathy       Subjective interval History :  66 y.o. woman with recurrent admit for lower leg cellulitis, history of hypertension, hyperlipidemia, diabetes, MDRO infection colonization, suprapubic catheter, lower leg recurrent cellulitis, with venous stasis ulcer admitted with high blood glucose change in mental status ongoing drainage from the lower leg ulcers. Noted to have blood glucose above 600 with lactic acidosis on admission with acute creatinine elevation. Blood cultures were obtained wound cultures are in process she was started on IV antibiotics and we are consulted for antibiotic recommendations.     More alert and able to follow commands and c/o leg pain and drainage lot better and skin flaking off and tolerating abx ok no fevers no chills     Past Medical History:    Past Medical History:   Diagnosis Date    Anemia     Atrial fibrillation ([address]     CAD (coronary artery disease)     Chronic back pain     CKD (chronic kidney disease), stage III (HCC)     Diabetes mellitus ([address]     Diastolic CHF ([address]     Hyperlipidemia     Hypertension     MI (myocardial infarction) ([address]     MRSA (methicillin resistant staph aureus) culture positive 04/13/2018    leg ulcer    MRSA (methicillin resistant staph aureus) culture positive 1/4/20;06/08/2019    leg    MRSA colonization 10/16/2018    Obesity (BMI 30-39. 9)     Osteoarthritis     Pancreatitis     Sepsis ([address]     Stasis ulcer ([address]     bilateral lower extremities     Suprapubic catheter [facility])        Past Surgical History:    Past Surgical History:   Procedure Laterality Date    APPENDECTOMY      BACK SURGERY x2    CATARACT REMOVAL      bilateral     CHOLECYSTECTOMY      COLONOSCOPY      EYE SURGERY      FRACTURE SURGERY      Left ankle    HYSTERECTOMY      INSET/CHANGE LOMELI CATHETER - COMPLICATED  96/4538    suprapubic cath    JOINT REPLACEMENT      Right knee    KNEE SURGERY         Current Medications:    Outpatient Medications Marked as Taking for the 2/26/20 encounter [facility] Encounter)   Medication Sig Dispense Refill    furosemide (LASIX) 40 MG tablet Take 40 mg by mouth every other day      aspirin 325 MG EC tablet Take 325 mg by mouth daily      Cholecalciferol (VITAMIN D3) 125 MCG (5000 UT) TABS Take 5,000 Units by mouth daily      Lactobacillus (PROBIOTIC ACIDOPHILUS PO) Take 1 tablet by mouth nightly      ranitidine (ZANTAC) 150 MG tablet Take 150 mg by mouth nightly       furosemide (LASIX) 20 MG tablet Take 20 mg by mouth every other day       atorvastatin (LIPITOR) 10 MG tablet Take 1 tablet by mouth daily (Patient taking differently: Take 10 mg by mouth Daily with lunch ) 30 tablet 0    metoprolol tartrate (LOPRESSOR) 25 MG tablet Take 1 tablet by mouth 2 times daily (Patient taking differently: Take 25 mg by mouth 2 times daily (with meals) Breakfast and dinner) 60 tablet 0    Biotin (BIOTIN 5000) 5 MG CAPS Take 1 tablet by mouth nightly       lipase-protease-amylase (CREON) 69335 units delayed release capsule Take 12,000 Units by mouth 3 times daily (with meals)       gabapentin (NEURONTIN) 300 MG capsule Take 300 mg by mouth 2 times daily.  Noon and bedtime      isosorbide mononitrate (IMDUR) 30 MG extended release tablet Take 30 mg by mouth Daily with lunch       venlafaxine (EFFEXOR) 75 MG tablet Take 150 mg by mouth 2 times daily (with meals) Breakfast and lunch      cetirizine (ZYRTEC) 10 MG tablet Take 10 mg by mouth daily         Allergies:  Cefepime and Sulfa antibiotics    Immunizations :   Immunization History   Administered Date(s) Administered    Influenza Vaccine, LABMICR YES 02/27/2020    URINETYPE NotGiven 02/27/2020      Urine Microscopic:   Lab Results   Component Value Date    BACTERIA 1+ 06/08/2019    COMU see below 04/13/2018    HYALCAST 1 01/06/2020    WBCUA 3-5 02/27/2020    RBCUA 0-2 02/27/2020    EPIU 0 01/06/2020        Creat  1.4   lACTIC ACID  3.2      BG > 600  MICRO: cultures reviewed and updated by me          Culture, Blood 1 [[id number]] Collected: 02/26/20 2358   Order Status: Completed Specimen: Blood Updated: 03/02/20 0215    Blood Culture, Routine No Growth after 4 days of incubation. Narrative:     ORDER#: [id number]                          ORDERED BY: REBECA Ta  SOURCE: Blood                              COLLECTED:  02/26/20 23:58  ANTIBIOTICS AT REGIS. :                      RECEIVED :  02/27/20 00:07  If child <=2 yrs old please draw pediatric bottle. ~Blood Culture #1  Performed at:  [facility]  [address]   Phone [phone]   Culture, Blood 2 [[id number]] Collected: 02/26/20 2358   Order Status: Completed Specimen: Blood Updated: 03/02/20 0215    Culture, Blood 2 No Growth after 4 days of incubation. Narrative:     ORDER#: [id number]                          ORDERED BY: REBECA Ta  SOURCE: Blood                              COLLECTED:  02/26/20 23:58  ANTIBIOTICS AT REGIS. :                      RECEIVED :  02/27/20 00:07  If child <=2 yrs old please draw pediatric bottle. ~Blood Culture #2  Performed at:  [facility]  [address]   Phone [phone]   Culture, Wound [[id number]] (Abnormal) Collected: 02/27/20 1438   Order Status: Completed Specimen: Catheter Site from Cath Site Updated: 02/29/20 1423    WOUND/ABSCESS --    Mixed skin rex,  Moderate growth   No further workup     Gram Stain Result 1+ Epithelial Cells   3+ WBC's (Polymorphonuclear)   3+ Gram positive cocci   3+ Gram negative rods   1+ Gram positive and reports for the current Hospitalization were reviewed by me     Scheduled Meds:   fluconazole  100 mg Oral Daily    metroNIDAZOLE  500 mg Oral 3 times per day    metoprolol tartrate  50 mg Oral BID WC    insulin glargine  20 Units Subcutaneous Nightly    insulin lispro  3 Units Subcutaneous TID WC    senna  1 tablet Oral Daily    vancomycin  750 mg Intravenous Q24H    venlafaxine  150 mg Oral BID WC    prednisoLONE acetate  1 drop Right Eye BID    lipase-protease-amylase  12,000 Units Oral TID WC    isosorbide mononitrate  30 mg Oral Lunch    dorzolamide-timolol  1 drop Right Eye BID    atropine  1 drop Right Eye BID    atorvastatin  10 mg Oral Lunch    aspirin  325 mg Oral Daily    sodium chloride flush  10 mL Intravenous 2 times per day    insulin lispro  0-12 Units Subcutaneous TID WC    insulin lispro  0-6 Units Subcutaneous Nightly    famotidine  20 mg Oral Daily    vancomycin (VANCOCIN) intermittent dosing (placeholder)   Other RX Placeholder       Continuous Infusions:   sodium chloride 75 mL/hr at 03/04/20 0425    dilTIAZem (CARDIZEM) 125 mg in dextrose 5% 125 mL infusion      dextrose         PRN Meds:  LORazepam, diphenhydrAMINE, sodium chloride flush, polyethylene glycol, promethazine, ondansetron, glucose, dextrose, glucagon (rDNA), dextrose, acetaminophen      Assessment:     Patient Active Problem List   Diagnosis    Acute on chronic renal insufficiency    FTT (failure to thrive) in adult    Type 1 diabetes mellitus with hyperglycemia (HCC)    Chronic venous hypertension (idiopathic) with ulcer and inflammation of bilateral lower extremity (HCC)    DMII (diabetes mellitus, type 2) (HCC)    Cellulitis of left lower extremity    Protein-calorie malnutrition, moderate (HCC)    MRSA colonization    Chronic diastolic heart failure (HCC)    Coronary artery disease involving native coronary artery of native heart without angina pectoris    Aortic atherosclerosis ([facility] [address]    Essential hypertension    Cellulitis, lower extremities bilaterally    MRSA nasal colonization    Normocytic anemia    Altered mental status    Elevated lactic acid level    Uncontrolled diabetes mellitus (HCC)    Sepsis (HCC)    Stasis ulcer (HCC)    Bilateral lower leg cellulitis    Chronic renal failure, stage 3 (moderate) (HCC)    Hyperlipidemia    Hypoglycemia    Morbid obesity due to excess calories (HCC)    Gross hematuria    Hyperglycemia     Sepsis'  Change in mentation  BG elevation  Lactic acidosis  DM poor control   Recurrent lower leg cellulitis  Venous stasis ulcer+'  MDRO infection  Supra pubic catheter in place    More confused and irritable and encephalopathic d/w RN and BG has been ok , Ammonia normal and this is likely multifactorial and in the context of IV Cefepime will need to d/c this now    CT brain negative and mental status better off IV Cefepime and will change to oral abx for d/c planning will need follow up in wound care center after d/c    Labs, Microbiology, Radiology and all the pertinent results from current hospitalization and  care every where were reviewed  by me as a part of the evaluation   Plan:   1. D/C IV Vancomycin TODAY   2. IV Cefepime list as allergy for INTOLERANCE   3. Watch BG  4. Cont local care  5. Supra pubic cultures not useful   6. Contact isolation  7. Elevate the legs   8. CONT  oral  Fluconazole for dermatitis on the legs   9. CONT  oral Flagyl for anaerobes  10. Oral Doxycycline x 100 mg x bid x 7 days   11. [id number] for d/c planning         Discussed with patient/Family and Nursing staff  D/w      Thanks for allowing me to participate in your patient's care and please call me with any questions or concerns.     Alesha Truong MD  Infectious Disease  [facility]) Physician  Phone: [phone]   Fax : [phone]

## 2020-03-05 VITALS
RESPIRATION RATE: 18 BRPM | SYSTOLIC BLOOD PRESSURE: 149 MMHG | HEIGHT: 59 IN | OXYGEN SATURATION: 97 % | HEART RATE: 75 BPM | TEMPERATURE: 98.1 F | BODY MASS INDEX: 33.47 KG/M2 | WEIGHT: 166.01 LBS | DIASTOLIC BLOOD PRESSURE: 66 MMHG

## 2020-03-05 LAB
ANION GAP SERPL CALCULATED.3IONS-SCNC: 12 MMOL/L (ref 3–16)
BASOPHILS ABSOLUTE: 0 K/UL (ref 0–0.2)
BASOPHILS RELATIVE PERCENT: 0.4 %
BUN BLDV-MCNC: 9 MG/DL (ref 7–20)
CALCIUM SERPL-MCNC: 8.1 MG/DL (ref 8.3–10.6)
CHLORIDE BLD-SCNC: 102 MMOL/L (ref 99–110)
CO2: 23 MMOL/L (ref 21–32)
CREAT SERPL-MCNC: 1.1 MG/DL (ref 0.6–1.2)
EOSINOPHILS ABSOLUTE: 0.2 K/UL (ref 0–0.6)
EOSINOPHILS RELATIVE PERCENT: 3.5 %
GFR AFRICAN AMERICAN: 58
GFR NON-AFRICAN AMERICAN: 48
GLUCOSE BLD-MCNC: 115 MG/DL (ref 70–99)
GLUCOSE BLD-MCNC: 118 MG/DL (ref 70–99)
GLUCOSE BLD-MCNC: 126 MG/DL (ref 70–99)
GLUCOSE BLD-MCNC: 95 MG/DL (ref 70–99)
GLUCOSE BLD-MCNC: 98 MG/DL (ref 70–99)
HCT VFR BLD CALC: 27.3 % (ref 36–48)
HEMOGLOBIN: 9.4 G/DL (ref 12–16)
LYMPHOCYTES ABSOLUTE: 1.2 K/UL (ref 1–5.1)
LYMPHOCYTES RELATIVE PERCENT: 16.4 %
MCH RBC QN AUTO: 34.1 PG (ref 26–34)
MCHC RBC AUTO-ENTMCNC: 34.5 G/DL (ref 31–36)
MCV RBC AUTO: 99 FL (ref 80–100)
MONOCYTES ABSOLUTE: 0.4 K/UL (ref 0–1.3)
MONOCYTES RELATIVE PERCENT: 5.8 %
NEUTROPHILS ABSOLUTE: 5.2 K/UL (ref 1.7–7.7)
NEUTROPHILS RELATIVE PERCENT: 73.9 %
PDW BLD-RTO: 14.2 % (ref 12.4–15.4)
PERFORMED ON: ABNORMAL
PERFORMED ON: NORMAL
PLATELET # BLD: 191 K/UL (ref 135–450)
PMV BLD AUTO: 9 FL (ref 5–10.5)
POTASSIUM REFLEX MAGNESIUM: 3.6 MMOL/L (ref 3.5–5.1)
RBC # BLD: 2.76 M/UL (ref 4–5.2)
SODIUM BLD-SCNC: 137 MMOL/L (ref 136–145)
WBC # BLD: 7.1 K/UL (ref 4–11)

## 2020-03-05 PROCEDURE — 6370000000 HC RX 637 (ALT 250 FOR IP): Performed by: FAMILY MEDICINE

## 2020-03-05 PROCEDURE — 36415 COLL VENOUS BLD VENIPUNCTURE: CPT

## 2020-03-05 PROCEDURE — 97530 THERAPEUTIC ACTIVITIES: CPT

## 2020-03-05 PROCEDURE — 2580000003 HC RX 258: Performed by: INTERNAL MEDICINE

## 2020-03-05 PROCEDURE — 94760 N-INVAS EAR/PLS OXIMETRY 1: CPT

## 2020-03-05 PROCEDURE — 85025 COMPLETE CBC W/AUTO DIFF WBC: CPT

## 2020-03-05 PROCEDURE — 6370000000 HC RX 637 (ALT 250 FOR IP): Performed by: INTERNAL MEDICINE

## 2020-03-05 PROCEDURE — 80048 BASIC METABOLIC PNL TOTAL CA: CPT

## 2020-03-05 RX ORDER — METRONIDAZOLE 500 MG/1
500 TABLET ORAL EVERY 8 HOURS SCHEDULED
Qty: 15 TABLET | Refills: 0 | Status: SHIPPED | OUTPATIENT
Start: 2020-03-05 | End: 2020-03-10

## 2020-03-05 RX ORDER — DOXYCYCLINE HYCLATE 100 MG
100 TABLET ORAL EVERY 12 HOURS SCHEDULED
Qty: 14 TABLET | Refills: 0 | Status: SHIPPED | OUTPATIENT
Start: 2020-03-05 | End: 2020-03-12

## 2020-03-05 RX ORDER — FLUCONAZOLE 100 MG/1
100 TABLET ORAL DAILY
Qty: 3 TABLET | Refills: 0 | Status: SHIPPED | OUTPATIENT
Start: 2020-03-06 | End: 2020-03-09

## 2020-03-05 RX ADMIN — ISOSORBIDE MONONITRATE 30 MG: 30 TABLET, EXTENDED RELEASE ORAL at 11:57

## 2020-03-05 RX ADMIN — SODIUM CHLORIDE, PRESERVATIVE FREE 10 ML: 5 INJECTION INTRAVENOUS at 09:04

## 2020-03-05 RX ADMIN — PREDNISOLONE ACETATE 1 DROP: 10 SUSPENSION/ DROPS OPHTHALMIC at 09:02

## 2020-03-05 RX ADMIN — METRONIDAZOLE 500 MG: 500 TABLET, FILM COATED ORAL at 05:28

## 2020-03-05 RX ADMIN — PANCRELIPASE 12000 UNITS: 60000; 12000; 38000 CAPSULE, DELAYED RELEASE PELLETS ORAL at 11:57

## 2020-03-05 RX ADMIN — PANCRELIPASE 12000 UNITS: 60000; 12000; 38000 CAPSULE, DELAYED RELEASE PELLETS ORAL at 09:03

## 2020-03-05 RX ADMIN — FLUCONAZOLE 100 MG: 100 TABLET ORAL at 09:03

## 2020-03-05 RX ADMIN — METOPROLOL TARTRATE 50 MG: 50 TABLET, FILM COATED ORAL at 09:03

## 2020-03-05 RX ADMIN — SENNOSIDES 8.6 MG: 8.6 TABLET, FILM COATED ORAL at 09:03

## 2020-03-05 RX ADMIN — FAMOTIDINE 20 MG: 20 TABLET, FILM COATED ORAL at 09:03

## 2020-03-05 RX ADMIN — ATORVASTATIN CALCIUM 10 MG: 10 TABLET, FILM COATED ORAL at 11:57

## 2020-03-05 RX ADMIN — INSULIN LISPRO 3 UNITS: 100 INJECTION, SOLUTION INTRAVENOUS; SUBCUTANEOUS at 09:04

## 2020-03-05 RX ADMIN — ASPIRIN 325 MG: 325 TABLET, DELAYED RELEASE ORAL at 09:03

## 2020-03-05 RX ADMIN — VENLAFAXINE 150 MG: 50 TABLET ORAL at 09:03

## 2020-03-05 RX ADMIN — DOXYCYCLINE HYCLATE 100 MG: 100 TABLET, FILM COATED ORAL at 09:03

## 2020-03-05 RX ADMIN — ATROPINE SULFATE 1 DROP: 10 SOLUTION OPHTHALMIC at 09:02

## 2020-03-05 RX ADMIN — DORZOLAMIDE HYDROCHLORIDE AND TIMOLOL MALEATE 1 DROP: 20; 5 SOLUTION/ DROPS OPHTHALMIC at 09:02

## 2020-03-05 ASSESSMENT — PAIN SCALES - GENERAL
PAINLEVEL_OUTOF10: 0
PAINLEVEL_OUTOF10: 0

## 2020-03-05 NOTE — CARE COORDINATION
3/5 Patient information sent to [facility] per family request.Electronically signed by Nat Gaspar RN on 3/5/2020 at 1:07 PM

## 2020-03-05 NOTE — CARE COORDINATION
[facility]  Received referral from case management Sabrina Flannery RN CM  Faxed orders to [facility] to resume home care  Home care to see patient by   3/7/2020  Darlin Coronado LPN    [address] Cell [phone], Fax [phone]

## 2020-03-05 NOTE — PROGRESS NOTES
Dressing changed per order to BLE  venous ulcers. Minimal drainage noted. Pt tolerated well. Will continue to monitor.

## 2020-03-05 NOTE — CARE COORDINATION
Relation Age of Onset    Heart Disease Father     Heart Failure Father     Diabetes Father     Diabetes Daughter     Diabetes Daughter        SOCIAL HISTORY    Social History     Tobacco Use    Smoking status: Never Smoker    Smokeless tobacco: Never Used   Substance Use Topics    Alcohol use: Yes     Comment: occassional    Drug use: No       ALLERGIES    Allergies   Allergen Reactions    Cefepime Other (See Comments)     Encephalopathy     Sulfa Antibiotics Rash       MEDICATIONS     doxycycline hyclate  100 mg Oral 2 times per day    fluconazole  100 mg Oral Daily    metroNIDAZOLE  500 mg Oral 3 times per day    metoprolol tartrate  50 mg Oral BID WC    insulin glargine  20 Units Subcutaneous Nightly    insulin lispro  3 Units Subcutaneous TID WC    senna  1 tablet Oral Daily    venlafaxine  150 mg Oral BID WC    prednisoLONE acetate  1 drop Right Eye BID    lipase-protease-amylase  12,000 Units Oral TID WC    isosorbide mononitrate  30 mg Oral Lunch    dorzolamide-timolol  1 drop Right Eye BID    atropine  1 drop Right Eye BID    atorvastatin  10 mg Oral Lunch    aspirin  325 mg Oral Daily    sodium chloride flush  10 mL Intravenous 2 times per day    insulin lispro  0-12 Units Subcutaneous TID WC    insulin lispro  0-6 Units Subcutaneous Nightly    famotidine  20 mg Oral Daily       Objective        Patient Active Problem List   Diagnosis Code    Acute on chronic renal insufficiency N28.9, N18.9    FTT (failure to thrive) in adult R62.7    Type 1 diabetes mellitus with hyperglycemia ([facility]) E10.65    Chronic venous hypertension (idiopathic) with ulcer and inflammation of bilateral lower extremity ([facility]) I87.333, L97.919, L97.929    DMII (diabetes mellitus, type 2) ([facility]) E11.9    Cellulitis of left lower extremity L03. 116    Protein-calorie malnutrition, moderate ([facility]) E44.0    MRSA colonization Z22.322    Chronic diastolic heart failure ([facility]) I50.32    Coronary artery Yes    Does the patient/caregiver follow a Meal Plan? No: Eats three meals most days. Eats less in the hospital than at home. Does the patient/caregiver understand S/S of Hypoglycemia? Yes  Reviewed symptoms, prevention and treatment. Level of patient/caregiver understanding able to:       [x] Verbalized Understanding   [] Demonstrated Understanding       [] Teach Back       [] Needs Reinforcement     []  Other:                    Does the patient/caregiver understand S/S of Hyperglycemia? Yes    Reviewed symptoms, prevention and treatment.     Level of patient/caregiver understanding able to:        [x] Verbalized Understanding   [] Demonstrated Understanding       [] Teach Back       [] Needs Reinforcement     []  Other:           Plan                                  Discharge Plan:  Discharge needs: no prescription needs identified  Placement for patient upon discharge: home with support        Teaching Time Diabetes Education:  20 minutes     Electronically signed by Rony Mcghee on 3/5/2020 at 2:39 PM

## 2020-03-05 NOTE — CARE COORDINATION
Discharging to Facility/ Agency   · Name:  [facility]   · Address:  Leo Odom, [address] [place] [address]  · Phone: [phone]  · Fax: [phone]    Family will provide transport. Electronically signed by Stone Cheatham RN on 3/5/2020 at 11:09 AM

## 2020-03-05 NOTE — PROGRESS NOTES
Occupational Therapy  Facility/Department: [facility] PROGRESSIVE CARE  Daily Treatment Note  Let this serve as discharge note if patient is discharged prior to next OT session    NAME: Mitch Gomez  : 1945  MRN: [id number]    Date of Service: 3/5/2020    Discharge Recommendations:  (notes reflect that patient has 24 hour supervision/assist at baseline and has 8 hours of nursing care/aides and lives with a daughter, if this is accurate and family is okay with the limited distances patient is currently completing during fxl mobility then patient is okay for home with level 3 HHOT, otherwise patient will require 3-5 sessions per week)    AmolRossy Cardoso 20 scored a  on the AM[facility] ADL Inpatient form. See above d/c recommendations. Assessment   Performance deficits / Impairments: Decreased functional mobility ; Decreased cognition;Decreased high-level IADLs;Decreased ADL status; Decreased endurance;Decreased strength;Decreased balance;Decreased safe awareness  Assessment: Patient is limited by decreased cognition and decreased activity tolerance. Patient is required Mod A x2 for transfer from recliner, yet feet did not touch the ground and patient very fearful. Patient also uses a lift chair at baseline. Patient required Mod A for short distance for recliner-bed with RW with max prompting. Cont per POC. Treatment Diagnosis: Impaired ADLs  Prognosis: Fair  OT Education: OT Role;Plan of Care;Transfer Training;ADL Adaptive Strategies;Orientation  REQUIRES OT FOLLOW UP: Yes  Activity Tolerance: Treatment limited secondary to decreased cognition;Patient limited by fatigue  Safety Devices in place: Yes  Type of devices: Bed alarm in place;Call light within reach; Left in bed;Nurse notified(KYAW Larsen aware )       Patient Diagnosis(es): The primary encounter diagnosis was Hyperglycemia.  Diagnoses of Lactic acidosis, Altered mental status, unspecified altered mental status type, and Cellulitis of lower extremity, unspecified laterality were also pertinent to this visit. has a past medical history of Anemia, Atrial fibrillation ([address] CAD (coronary artery disease), Chronic back pain, CKD (chronic kidney disease), stage III ([address] Diabetes mellitus ([address] Diastolic CHF ([address] Hyperlipidemia, Hypertension, MI (myocardial infarction) ([address] MRSA (methicillin resistant staph aureus) culture positive, MRSA (methicillin resistant staph aureus) culture positive, MRSA colonization, Obesity (BMI 30-39.9), Osteoarthritis, Pancreatitis, Sepsis ([address] Stasis ulcer ([address] and Suprapubic catheter ([address] has a past surgical history that includes Hysterectomy; knee surgery; back surgery; Cholecystectomy; Cataract removal; Appendectomy; Colonoscopy; eye surgery; fracture surgery; joint replacement; and Insert/Change Brasher Catheter - Complicat (77/1217). Restrictions  Restrictions/Precautions: Fall Risk, Contact Precautions  Other position/activity restrictions: MRSA in nares, suprapubic catheter    Subjective  Chart Reviewed: Yes  Patient assessed for rehabilitation services?: Yes  Additional Pertinent Hx: Pt  has a past medical history of Anemia, Atrial fibrillation ([address] CAD (coronary artery disease), Chronic back pain, CKD (chronic kidney disease), stage III ([address] Diabetes mellitus ([address] Diastolic CHF ([address] Hyperlipidemia, Hypertension, MI (myocardial infarction) ([address] MRSA (methicillin resistant staph aureus) culture positive (04/13/2018), MRSA (methicillin resistant staph aureus) culture positive (1/4/20;06/08/2019), MRSA colonization (10/16/2018), Obesity (BMI 30-39.9), Osteoarthritis, Pancreatitis, Sepsis ([address] Stasis ulcer ([address] and Suprapubic catheter ([address] Pt  has a past surgical history that includes Hysterectomy; knee surgery; back surgery; Cholecystectomy;  Cataract removal; Appendectomy; Colonoscopy; eye surgery; fracture surgery; joint replacement; and Insert/Change Brasher Catheter - Complicat Judgement: Decreased awareness of need for assistance;Decreased awareness of need for safety  Problem Solving: Decreased awareness of errors  Insights: Decreased awareness of deficits  Initiation: Requires cues for all  Sequencing: Requires cues for all  Cognition Comment: not oriented     Plan  Times per week: 3-5 xs  Times per day: Daily  Plan weeks: 1-2 weeks  Current Treatment Recommendations: Strengthening, Safety Education & Training, Patient/Caregiver Education & Training, Balance Training, Self-Care / ADL, Functional Mobility Training, Neuromuscular Re-education, Equipment Evaluation, Education, & procurement, Home Management Training, Endurance Training, Cognitive Reorientation    Goals  Short term goals  Time Frame for Short term goals: Status: as of 3/5/20: goals ongoing  Short term goal 1: Min A bathing  Short term goal 2: Min A dressing  Short term goal 3: Min A toileting  Short term goal 4: S functional transfers  Short term goal 5: S grooming at sink  Short term goal 6: Pt will tolerate standing for 2-3 minutes for ADLs  Patient Goals   Patient goals : Pt wants to go home.        Therapy Time   Individual Concurrent Group Co-treatment   Time In       0915   Time Out       0950   Minutes       35   Timed Code Treatment Minutes: [address] [address] [address]

## 2020-03-05 NOTE — CARE COORDINATION
3/5 Daughter at bedside. Family has decided to take the patient home. Family will transport.  Electronically signed by Corey Ruiz RN on 3/5/2020 at 2:55 PM

## 2020-03-05 NOTE — DISCHARGE INSTR - COC
Albino Gonzales) 10/15/2018    Pneumococcal Polysaccharide ([id number]) 08/15/2016       Active Problems:  Patient Active Problem List   Diagnosis Code    Acute on chronic renal insufficiency N28.9, N18.9    FTT (failure to thrive) in adult R62.7    Type 1 diabetes mellitus with hyperglycemia (HCC) E10.65    Chronic venous hypertension (idiopathic) with ulcer and inflammation of bilateral lower extremity ([address] I87.333, L97.919, L97.929    DMII (diabetes mellitus, type 2) ([facility]) E11.9    Cellulitis of left lower extremity L03. 116    Protein-calorie malnutrition, moderate ([facility]) E44.0    MRSA colonization Z22.322    Chronic diastolic heart failure ([facility]) I50.32    Coronary artery disease involving native coronary artery of native heart without angina pectoris I25.10    Aortic atherosclerosis ([facility]) I70.0    Essential hypertension I10    Cellulitis, lower extremities bilaterally L03.90    MRSA nasal colonization Z22.322    Normocytic anemia D64.9    Altered mental status R41.82    Elevated lactic acid level R79.89    Uncontrolled diabetes mellitus (HCC) E11.65    Sepsis ([facility]) A41.9    Stasis ulcer ([facility]) I83.009, L97.909    Bilateral lower leg cellulitis L03.116, L03.115    Chronic renal failure, stage 3 (moderate) ([facility]) N18.3    Hyperlipidemia E78.5    Hypoglycemia E16.2    Morbid obesity due to excess calories ([facility]) E66.01    Gross hematuria R31.0    Hyperglycemia R73.9    Moderate malnutrition (HCC) E44.0       Isolation/Infection:   Isolation          Contact        Patient Infection Status     Infection Onset Added Last Indicated Last Indicated By Review Planned Expiration Resolved Resolved By    MRSA  04/16/18 01/04/20 Wound Culture        1/4/20 leg wound            Nurse Assessment:  Last Vital Signs: /62   Pulse 77   Temp 99 °F (37.2 °C) (Oral)   Resp 18   Ht 4' 11\" (1.499 m)   Wt 166 lb 0.1 oz (75.3 kg)   SpO2 97%   BMI 33.53 kg/m²     Last documented pain score (0-10 scale): Pain Level: 0  Last Weight:   Wt Readings from Last 1 Encounters:   03/05/20 166 lb 0.1 oz (75.3 kg)     Mental Status:  alert    IV Access:  - None    Nursing Mobility/ADLs:  Walking   Assisted  Transfer  Assisted  Bathing  Assisted  Dressing  Assisted  Toileting  Assisted  Feeding  Assisted  Med Admin  Assisted  Med Delivery   whole    Wound Care Documentation and Therapy:  Wound 08/22/19 Left; Anterior; Lower; Lateral #9 - left anterior lower leg - converted from wound #2 on 8/22/2019 (Active)   Wound Image   2/10/2020  2:14 PM   Wound Venous 3/4/2020  7:31 PM   Dressing Status Clean;Dry; Intact 3/5/2020  7:50 AM   Dressing Changed Changed/New 3/2/2020 12:37 PM   Dressing/Treatment Alginate with Ag;Non adherent;Dry dressing; Ace wrap 3/4/2020  7:31 PM   Wound Cleansed Rinsed/Irrigated with saline 3/2/2020 12:37 PM   Dressing Change Due 03/04/20 3/2/2020 12:37 PM   Wound Length (cm) 3 cm 2/10/2020  2:14 PM   Wound Width (cm) 3.5 cm 2/10/2020  2:14 PM   Wound Depth (cm) 0.1 cm 1/30/2020  2:50 PM   Wound Surface Area (cm^2) 10.5 cm^2 2/10/2020  2:14 PM   Change in Wound Size % (l*w) 23.64 2/10/2020  2:14 PM   Wound Volume (cm^3) 9 cm^3 1/30/2020  2:50 PM   Wound Healing % -552 1/30/2020  2:50 PM   Post-Procedure Length (cm) 12.1 cm 1/30/2020  3:27 PM   Post-Procedure Width (cm) 7.6 cm 1/30/2020  3:27 PM   Post-Procedure Depth (cm) 0.1 cm 1/30/2020  3:27 PM   Post-Procedure Surface Area (cm^2) 91.96 cm^2 1/30/2020  3:27 PM   Post-Procedure Volume (cm^3) 9.2 cm^3 1/30/2020  3:27 PM   Drainage Amount Small 3/4/2020  7:31 PM   Drainage Description Serosanguinous 3/4/2020  7:31 PM   Odor None 3/4/2020  7:31 PM   Margins Undefined edges 2/27/2020  1:54 AM   Marilynn-wound Assessment Pink; White 2/27/2020  1:54 AM   Non-staged Wound Description Full thickness 2/27/2020  1:54 AM   Pink%Wound Bed 40% 2/10/2020  9:00 PM   Red%Wound Bed 40% 2/10/2020  9:00 PM   Yellow%Wound Bed 20% 2/10/2020  9:00 PM   Number of days: 195       Wound 09/05/19 Leg Right;Medial;Proximal;Lower Wound #10, noted 9/5/19 (Active)   Wound Image   2/10/2020  2:14 PM   Wound Venous 3/4/2020  7:31 PM   Dressing Status Clean;Dry; Intact 3/5/2020  7:50 AM   Dressing Changed Changed/New 3/2/2020 12:37 PM   Dressing/Treatment Alginate with Ag;Non adherent;Dry dressing; Ace wrap 3/4/2020  7:31 PM   Wound Cleansed Rinsed/Irrigated with saline 3/2/2020 12:37 PM   Dressing Change Due 03/04/20 3/2/2020 12:37 PM   Wound Length (cm) 5 cm 2/10/2020  2:14 PM   Wound Width (cm) 4 cm 2/10/2020  2:14 PM   Wound Depth (cm) 0.1 cm 1/30/2020  2:50 PM   Wound Surface Area (cm^2) 20 cm^2 2/10/2020  2:14 PM   Change in Wound Size % (l*w) -42.86 2/10/2020  2:14 PM   Wound Volume (cm^3) 5.77 cm^3 1/30/2020  2:50 PM   Wound Healing % -312 1/30/2020  2:50 PM   Post-Procedure Length (cm) 7.5 cm 1/30/2020  3:27 PM   Post-Procedure Width (cm) 7.9 cm 1/30/2020  3:27 PM   Post-Procedure Depth (cm) 0.1 cm 1/30/2020  3:27 PM   Post-Procedure Surface Area (cm^2) 59.25 cm^2 1/30/2020  3:27 PM   Post-Procedure Volume (cm^3) 5.92 cm^3 1/30/2020  3:27 PM   Drainage Amount Small 3/4/2020  7:31 PM   Drainage Description Serosanguinous 3/4/2020  7:31 PM   Odor None 3/4/2020  7:31 PM   Margins Attached edges 2/27/2020  1:54 AM   Marilynn-wound Assessment Dry 3/2/2020 12:37 PM   Non-staged Wound Description Full thickness 2/27/2020  1:54 AM   Pink%Wound Bed 20% 2/10/2020  9:00 PM   Red%Wound Bed 90% 2/10/2020  9:00 PM   Yellow%Wound Bed 80% 2/10/2020  9:00 PM   Number of days: 181       Wound 09/19/19 Leg Left; Lower;Medial;Proximal #2 left lower leg medial proximal  (Active)   Wound Image   2/10/2020  2:14 PM   Wound Venous 3/4/2020  7:31 PM   Dressing Status Clean;Dry; Intact 3/5/2020  7:50 AM   Dressing Changed Changed/New 3/2/2020 12:37 PM   Dressing/Treatment Alginate with Ag;Non adherent;Dry dressing; Ace wrap 3/4/2020  7:31 PM   Wound Cleansed Rinsed/Irrigated with saline 3/2/2020 12:37 PM   Dressing Change Due Dressing/Treatment Alginate with Ag;Non adherent;Dry dressing; Ace wrap 3/4/2020  7:31 PM   Wound Cleansed Rinsed/Irrigated with saline 3/2/2020 12:37 PM   Dressing Change Due 03/04/20 3/2/2020 12:37 PM   Wound Length (cm) 1 cm 2/10/2020  2:14 PM   Wound Width (cm) 0.5 cm 2/10/2020  2:14 PM   Wound Surface Area (cm^2) 0.5 cm^2 2/10/2020  2:14 PM   Drainage Amount Small 3/4/2020  7:31 PM   Drainage Description Serosanguinous 3/4/2020  7:31 PM   Odor None 3/4/2020  7:31 PM   Marilynn-wound Assessment Dry 3/2/2020 12:37 PM   Number of days: 23        Elimination:  Continence:   · Bowel: No  · Bladder: No  Urinary Catheter: suprapubic catheter   Colostomy/Ileostomy/Ileal Conduit: No       Date of Last BM:     Intake/Output Summary (Last 24 hours) at 3/5/2020 1101  Last data filed at 3/5/2020 0432  Gross per 24 hour   Intake 0 ml   Output 750 ml   Net -750 ml     I/O last 3 completed shifts: In: 0   Out: 750 [Urine:750]    Safety Concerns: At Risk for Falls    Impairments/Disabilities:      Vision and Hearing    Nutrition Therapy:  Current Nutrition Therapy:   - Oral Diet:  Dental Soft    Routes of Feeding: Oral  Liquids: No Restrictions  Daily Fluid Restriction: no  Last Modified Barium Swallow with Video (Video Swallowing Test): not done    Treatments at the Time of Hospital Discharge:   Respiratory Treatments:   Oxygen Therapy:  is not on home oxygen therapy. Ventilator:    - No ventilator support    Rehab Therapies: Physical Therapy, Occupational Therapy and Nurse  Weight Bearing Status/Restrictions:   Other Medical Equipment (for information only, NOT a DME order):  wheelchair, walker, bedside commode and hospital bed  Other Treatments: [facility]: [address] [place] [address] to establish plan of care for patient over 60 day period   Nursing  Initial home SN evaluation visit to occur within 24-48 hours for:  1)  medication management  2)  VS and clinical assessment  3)  S&S chronic disease

## 2020-03-05 NOTE — PROGRESS NOTES
began earlier in the day. The family member who called EMS is not present the information was relayed to the family members present. Patient has a history of chronic lower extremity wounds and an indwelling Lomeli catheter. Patient has recent admissions for cellulitis of the lower extremities as well as UTI. On presentation patient is denying abdominal pain chest pains shortness of breath. Family states that the redness to lower extremities appears to be at baseline. Patient uses an insulin pump apparently remained hyperglycemic despite adjusting her insulin. Restrictions/Precautions: Fall Risk, Contact Precautions Other position/activity restrictions: MRSA in nares, suprapubic catheter     PMHx:  has a past medical history of Anemia, Atrial fibrillation ([address] CAD (coronary artery disease), Chronic back pain, CKD (chronic kidney disease), stage III ([address] Diabetes mellitus ([address] Diastolic CHF ([address] Hyperlipidemia, Hypertension, MI (myocardial infarction) ([address] MRSA (methicillin resistant staph aureus) culture positive, MRSA (methicillin resistant staph aureus) culture positive, MRSA colonization, Obesity (BMI 30-39.9), Osteoarthritis, Pancreatitis, Sepsis ([address] Stasis ulcer ([address] and Suprapubic catheter ([address]   PSgHx:   Past Surgical History:   Procedure Laterality Date    APPENDECTOMY      BACK SURGERY      x2    CATARACT REMOVAL      bilateral     CHOLECYSTECTOMY      COLONOSCOPY      EYE SURGERY      FRACTURE SURGERY      Left ankle    HYSTERECTOMY      INSET/CHANGE LOMELI CATHETER - COMPLICATED  81/2690    suprapubic cath    JOINT REPLACEMENT      Right knee    KNEE SURGERY         Home Environment / Functional History  Social/Functional History  Lives With: Daughter(and friend)  Type of Home: House  Home Layout: One level  Home Access: Stairs to enter with rails  Entrance Stairs - Number of Steps: 1-2  Bathroom Shower/Tub: Walk-in shower  Bathroom Toilet: Standard  Bathroom Equipment: Shower chair, Grab bars in shower, Commode  Bathroom Accessibility: Walker accessible  Home Equipment: Rolling walker, [employer], Reacher, Lift chair  Receives Help From: Family, Home health  ADL Assistance: Needs assistance  Bath: Minimal assistance  Homemaking Responsibilities: No  Ambulation Assistance: Needs assistance(wh walker)  Transfer Assistance: Needs assistance(Supervision)  Active : No  Occupation: Retired  Leisure & Hobbies: Reading  IADL Comments: Pt has an aide from [employer] through Fri. Nurse 2 days per week for dressing changes for LE wounds. Overall has 24hr A. Pt sleeps in her recliner. Restrictions  Restrictions/Precautions: Fall Risk, Contact Precautions        Other position/activity restrictions: MRSA in nares, suprapubic catheter    SUBJECTIVE: Pt standing up in recliner chair and requires maximal encouragement to get up out of chair and back into bed. OBJECTIVE:    Bed mobility  Rolling to Left: Minimal assistance  Rolling to Right: Minimal assistance  Supine to Sit: Unable to assess  Sit to Supine: 2 Person assistance, Dependent/Total  Scooting: Dependent/Total, 2 Person assistance    Transfers  Sit to Stand: Moderate Assistance, 2 Person Assistance(recliner chair to walker)  Stand to sit: Contact guard assistance(to bed with knees blocked due to pt short of stature and on garcia bed)    Ambulation  Ambulation  Ambulation?: Yes  Ambulation 1  Surface: level tile  Device: Rolling Walker  Assistance:  Moderate assistance, Contact guard assistance, 2 Person assistance  Quality of Gait: small slow steps, marginal foot clearance, posterior weight shift, flexed at hips/knees  Distance: 3' from recliner chair to bed  Comments: .    Stairs  Stairs/Curb  Stairs?: No    Neuro/balance  Balance  Sitting - Static: Good, -(CGA sitting on edge of chair and bed due to pt's feet not reaching the floor)  Sitting - Dynamic: (.)  Standing - Static: Fair, -(CGA/Mod Assist Current Treatment Recommendations: Functional Mobility Training    If patient is discharged prior to next treatment, this note will serve as the discharge summary.     Therapy Time    Individual Concurrent Group Co-treatment   Time In        0915   Time Out      0950   Minutes        35    Timed Code Treatment Minutes: 1303 Sabine Vanessa, PT

## 2020-03-05 NOTE — DISCHARGE SUMMARY
gallops. Abdomen: Suprapubic catheter site c/d/i, soft, non-tender, non-distended with normal bowel sounds. Musculoskeletal:  No clubbing, cyanosis or edema bilaterally. Full range of motion without deformity. Skin: Skin color, texture, turgor normal.  No rashes or lesions. Neurologic:  Neurovascularly intact without any focal sensory/motor deficits. Cranial nerves: II-XII intact, grossly non-focal.  Psychiatric:  Alert and oriented, thought content appropriate, normal insight  Capillary Refill: Brisk,< 3 seconds   Peripheral Pulses: +2 palpable, equal bilaterally       Labs: For convenience and continuity at follow-up the following most recent labs are provided:      CBC:    Lab Results   Component Value Date    WBC 7.1 03/05/2020    HGB 9.4 03/05/2020    HCT 27.3 03/05/2020     03/05/2020       Renal:    Lab Results   Component Value Date     03/05/2020    K 3.6 03/05/2020     03/05/2020    CO2 23 03/05/2020    BUN 9 03/05/2020    CREATININE 1.1 03/05/2020    CALCIUM 8.1 03/05/2020    PHOS 2.5 06/13/2019         Significant Diagnostic Studies    Radiology:   CT HEAD WO CONTRAST   Final Result   1. No acute intracranial abnormality. 2. Cerebral and cerebellar parenchymal volume loss with chronic microvascular   white matter ischemic disease. CT CHEST PULMONARY EMBOLISM W CONTRAST   Final Result   No evidence of pulmonary embolism or acute pulmonary abnormality. CT ABDOMEN PELVIS W IV CONTRAST Additional Contrast? None   Final Result   Negative CT examination. No evidence of acute process in the abdomen or   pelvis. No intra-abdominal findings to explain the right-sided diaphragm   elevation, which is unchanged back to the patient's CT abdomen/pelvis from   November 2018         XR CHEST PORTABLE   Final Result   No acute interval change. Persistent elevated right hemidiaphragm versus   eventration with associated basilar atelectasis.          XR ABDOMEN (KUB) (SINGLE AP VIEW)   Final Result   Moderate stool burden in the right colon. No acute process on single supine   image. XR CHEST PORTABLE   Final Result   Mild vascular indistinctness, either due to low lung volumes or mild edema      Bandlike opacity at the right lung base. Bandlike morphology favors   atelectasis rather than pneumonia         CT HEAD WO CONTRAST   Final Result   No acute intracranial abnormality. XR CHEST STANDARD (2 VW)   Final Result   Mild prominence of the interstitial markings. Correlation for edema is   recommended.                 Consults:     IP CONSULT TO INFECTIOUS DISEASES  IP CONSULT TO HOME CARE NEEDS    Disposition:  Home with home health     Condition at Discharge: Stable    Discharge Instructions/Follow-up:  meds as prescribed    Code Status:  Full Code     Activity: activity as tolerated    Diet: dental soft diet with nutritional supplements      Discharge Medications:     Current Discharge Medication List           Details   fluconazole (DIFLUCAN) 100 MG tablet Take 1 tablet by mouth daily for 3 days  Qty: 3 tablet, Refills: 0      metroNIDAZOLE (FLAGYL) 500 MG tablet Take 1 tablet by mouth every 8 hours for 5 days  Qty: 15 tablet, Refills: 0      doxycycline hyclate (VIBRA-TABS) 100 MG tablet Take 1 tablet by mouth every 12 hours for 7 days  Qty: 14 tablet, Refills: 0              Details   !! furosemide (LASIX) 40 MG tablet Take 40 mg by mouth every other day      aspirin 325 MG EC tablet Take 325 mg by mouth daily      Cholecalciferol (VITAMIN D3) 125 MCG (5000 UT) TABS Take 5,000 Units by mouth daily      Lactobacillus (PROBIOTIC ACIDOPHILUS PO) Take 1 tablet by mouth nightly      !! furosemide (LASIX) 20 MG tablet Take 20 mg by mouth every other day       atorvastatin (LIPITOR) 10 MG tablet Take 1 tablet by mouth daily  Qty: 30 tablet, Refills: 0      metoprolol tartrate (LOPRESSOR) 25 MG tablet Take 1 tablet by mouth 2 times daily  Qty: 60 tablet, Refills: 0

## 2020-03-05 NOTE — PLAN OF CARE
Nutrition Problem: Inadequate oral intake  Intervention: Food and/or Nutrient Delivery: Continue current diet, Modify current ONS  Nutritional Goals: tolerate most appropriate form of nutrition
Problem: Falls - Risk of:  Goal: Will remain free from falls  Description  Will remain free from falls  2/27/2020 0306 by Yunier John RN  Outcome: Ongoing  Goal: Absence of physical injury  Description  Absence of physical injury  2/27/2020 0306 by Yunier John RN  Outcome: Ongoing     Problem: Risk for Impaired Skin Integrity  Goal: Tissue integrity - skin and mucous membranes  Description  Structural intactness and normal physiological function of skin and  mucous membranes.   2/27/2020 0306 by Yunier John RN  Outcome: Ongoing
Problem: Falls - Risk of:  Goal: Will remain free from falls  Description  Will remain free from falls  2/27/2020 0926 by Tonja Kent RN  Outcome: Ongoing  2/27/2020 0306 by Julia Kirby RN  Outcome: Ongoing  Goal: Absence of physical injury  Description  Absence of physical injury  2/27/2020 0926 by Tonja Kent RN  Outcome: Ongoing  2/27/2020 0306 by Julia Kirby RN  Outcome: Ongoing     Problem: Risk for Impaired Skin Integrity  Goal: Tissue integrity - skin and mucous membranes  Description  Structural intactness and normal physiological function of skin and  mucous membranes.   2/27/2020 0565 by Tonja Kent RN  Outcome: Ongoing  2/27/2020 0306 by Julia Kirby RN  Outcome: Ongoing     Problem: Pain:  Goal: Pain level will decrease  Description  Pain level will decrease  Outcome: Ongoing  Goal: Control of acute pain  Description  Control of acute pain  Outcome: Ongoing  Goal: Control of chronic pain  Description  Control of chronic pain  Outcome: Ongoing
Problem: Falls - Risk of:  Goal: Will remain free from falls  Description  Will remain free from falls  2/29/2020 1111 by Redd Delgado RN  Outcome: Ongoing  2/29/2020 0022 by Qi Schwartz RN  Outcome: Ongoing  Note:   Fall risk assessment completed every shift. All precautions in place. Pt has call light within reach at all times. Room clear of clutter. Pt aware to call for assistance when getting up. Goal: Absence of physical injury  Description  Absence of physical injury  2/29/2020 1111 by Redd Delgado RN  Outcome: Ongoing  2/29/2020 0022 by Qi Schwartz RN  Outcome: Ongoing     Problem: Risk for Impaired Skin Integrity  Goal: Tissue integrity - skin and mucous membranes  Description  Structural intactness and normal physiological function of skin and  mucous membranes. 2/29/2020 1111 by Redd Delgado RN  Outcome: Ongoing  2/29/2020 0022 by Qi Schwartz RN  Outcome: Ongoing  Note:   Skin assessment completed every shift. Pt assessed for incontinence, appropriate barrier cream applied prn. Pt encouraged to turn/rotate every 2 hours. Assistance provided if pt unable to do so themselves. Problem: Pain:  Goal: Pain level will decrease  Description  Pain level will decrease  2/29/2020 1111 by Redd Delgado RN  Outcome: Ongoing  2/29/2020 0022 by Qi Schwartz RN  Outcome: Ongoing  Goal: Control of acute pain  Description  Control of acute pain  2/29/2020 1111 by Redd Delgado RN  Outcome: Ongoing  2/29/2020 0022 by Qi Schwartz RN  Outcome: Ongoing  Note:   Pain/discomfort being managed with PRN analgesics per MD orders. Pt able to express presence and absence of pain and rate pain appropriately using numerical scale.     Goal: Control of chronic pain  Description  Control of chronic pain  2/29/2020 1111 by Redd Delgado RN  Outcome: Ongoing  2/29/2020 0022 by Qi Schwartz RN  Outcome: Ongoing
Problem: Falls - Risk of:  Goal: Will remain free from falls  Description  Will remain free from falls  3/3/2020 0111 by Ro Molina RN  Outcome: Ongoing    Goal: Absence of physical injury  Description  Absence of physical injury  3/3/2020 0111 by Ro Molina RN  Outcome: Ongoing     Problem: Risk for Impaired Skin Integrity  Goal: Tissue integrity - skin and mucous membranes  Description  Structural intactness and normal physiological function of skin and  mucous membranes. 3/3/2020 0111 by Ro Molina RN  Outcome: Ongoing     Problem: Pain:  Description  Pain management should include both nonpharmacologic and pharmacologic interventions.   Goal: Pain level will decrease  Description  Pain level will decrease  3/3/2020 0111 by Ro Molina RN  Outcome: Ongoing    Problem: OXYGENATION/RESPIRATORY FUNCTION  Goal: Patient will maintain patent airway  Outcome: Ongoing  Goal: Patient will achieve/maintain normal respiratory rate/effort  Description  Respiratory rate and effort will be within normal limits for the patient  Outcome: Ongoing     Problem: HEMODYNAMIC STATUS  Goal: Patient has stable vital signs and fluid balance  Outcome: Ongoing     Problem: FLUID AND ELECTROLYTE IMBALANCE  Goal: Fluid and electrolyte balance are achieved/maintained  Outcome: Ongoing
Problem: Falls - Risk of:  Goal: Will remain free from falls  Description  Will remain free from falls  3/5/2020 0228 by Dominick Canada RN  Outcome: Ongoing  3/4/2020 1959 by Jagruti Carpenter RN  Outcome: Ongoing  Goal: Absence of physical injury  Description  Absence of physical injury  3/5/2020 0228 by Dominick Canada RN  Outcome: Ongoing  Note:   No falls this shift. PT in bed with non-skid footwear on and alarm applied. Belongings and call light are placed within reach. PT calls appropriately for needs and is rounded on frequently. Bed placed in lowest position with siderails up and wheels locked. Will continue to monitor. 3/4/2020 1959 by Jagruti Carpenter RN  Outcome: Ongoing     Problem: Risk for Impaired Skin Integrity  Goal: Tissue integrity - skin and mucous membranes  Description  Structural intactness and normal physiological function of skin and  mucous membranes. 3/5/2020 0228 by Dominick Canada RN  Outcome: Ongoing  Note:   Skin assessment completed every shift. Pt assessed for incontinence, appropriate barrier cream applied prn. Pt encouraged to turn/rotate every 2 hours. Assistance provided if pt unable to do so themselves. 3/4/2020 1959 by Jagruti Carpenter RN  Outcome: Ongoing     Problem: Pain:  Goal: Pain level will decrease  Description  Pain level will decrease  3/5/2020 0228 by Dominick Canada RN  Outcome: Ongoing  3/4/2020 1959 by Jagruti Carpenter RN  Outcome: Ongoing  Goal: Control of acute pain  Description  Control of acute pain  3/5/2020 0228 by Dominick Canada RN  Outcome: Ongoing  3/4/2020 1959 by Jagruti Carpenter RN  Outcome: Ongoing  Goal: Control of chronic pain  Description  Control of chronic pain  3/5/2020 0228 by Dominick Canada RN  Outcome: Ongoing  Note:   Pain/discomfort being managed with PRN analgesics per MD orders. Pt able to express presence and absence of pain and rate pain appropriately using numerical scale.    3/4/2020 1959 by Jagruti Carpenter RN  Outcome: Ongoing     Problem:
Problem: Falls - Risk of:  Goal: Will remain free from falls  Description  Will remain free from falls  Outcome: Ongoing  Goal: Absence of physical injury  Description  Absence of physical injury  Outcome: Ongoing     Problem: Risk for Impaired Skin Integrity  Goal: Tissue integrity - skin and mucous membranes  Description  Structural intactness and normal physiological function of skin and  mucous membranes.   Outcome: Ongoing     Problem: Pain:  Goal: Pain level will decrease  Description  Pain level will decrease  Outcome: Ongoing  Goal: Control of acute pain  Description  Control of acute pain  Outcome: Ongoing  Goal: Control of chronic pain  Description  Control of chronic pain  Outcome: Ongoing     Problem: OXYGENATION/RESPIRATORY FUNCTION  Goal: Patient will maintain patent airway  Outcome: Ongoing  Goal: Patient will achieve/maintain normal respiratory rate/effort  Description  Respiratory rate and effort will be within normal limits for the patient  Outcome: Ongoing
Problem: Falls - Risk of:  Goal: Will remain free from falls  Description  Will remain free from falls  Outcome: Ongoing  Goal: Absence of physical injury  Description  Absence of physical injury  Outcome: Ongoing     Problem: Risk for Impaired Skin Integrity  Goal: Tissue integrity - skin and mucous membranes  Description  Structural intactness and normal physiological function of skin and  mucous membranes. Outcome: Ongoing     Problem: Pain:  Description  Pain management should include both nonpharmacologic and pharmacologic interventions.   Goal: Pain level will decrease  Description  Pain level will decrease  Outcome: Ongoing  Goal: Control of acute pain  Description  Control of acute pain  Outcome: Ongoing
Problem: Falls - Risk of:  Goal: Will remain free from falls  Description  Will remain free from falls  Outcome: Ongoing  Goal: Absence of physical injury  Description  Absence of physical injury  Outcome: Ongoing  Note:   No falls this shift. PT in bed with non-skid footwear on and alarm applied. Belongings and call light are placed within reach. PT calls appropriately for needs and is rounded on frequently. Bed placed in lowest position with siderails up and wheels locked. Will continue to monitor. Problem: Risk for Impaired Skin Integrity  Goal: Tissue integrity - skin and mucous membranes  Description  Structural intactness and normal physiological function of skin and  mucous membranes. Outcome: Ongoing  Note:   Skin assessment completed every shift. Pt assessed for incontinence, appropriate barrier cream applied prn. Pt encouraged to turn/rotate every 2 hours. Assistance provided if pt unable to do so themselves. Problem: Pain:  Goal: Pain level will decrease  Description  Pain level will decrease  Outcome: Ongoing  Goal: Control of acute pain  Description  Control of acute pain  Outcome: Ongoing  Goal: Control of chronic pain  Description  Control of chronic pain  Outcome: Ongoing  Note:   Pain/discomfort being managed with PRN analgesics per MD orders. Pt able to express presence and absence of pain and rate pain appropriately using numerical scale.       Problem: OXYGENATION/RESPIRATORY FUNCTION  Goal: Patient will maintain patent airway  Outcome: Ongoing  Goal: Patient will achieve/maintain normal respiratory rate/effort  Description  Respiratory rate and effort will be within normal limits for the patient  Outcome: Ongoing     Problem: HEMODYNAMIC STATUS  Goal: Patient has stable vital signs and fluid balance  Outcome: Ongoing     Problem: FLUID AND ELECTROLYTE IMBALANCE  Goal: Fluid and electrolyte balance are achieved/maintained  Outcome: Ongoing     Problem: ACTIVITY INTOLERANCE/IMPAIRED
asked if she is in pain; however, she does have pain behaviors when moving pt. No PRN pain medication given due to pt's inability to swallow pills. 3/2/2020 0121 by Dalia Reed RN  Outcome: Ongoing  Goal: Control of chronic pain  Description  Control of chronic pain  Outcome: Ongoing  Note:   Pt unable to respond when asked if she is in pain; however, she does have pain behaviors when moving pt. No PRN pain medication given due to pt's inability to swallow pills.

## 2020-03-05 NOTE — PROGRESS NOTES
PT up to chair, refusing to return to bed tonight, PT states that she sleeps in the chair at home as well. Educated on skin integrity and risks for pressure ulcers and importance of turning and repositioning. Will continue to monitor and offer turns.

## 2020-03-12 ENCOUNTER — HOSPITAL ENCOUNTER (OUTPATIENT)
Dept: WOUND CARE | Age: 75
Discharge: HOME | End: 2020-03-12
Payer: MEDICARE

## 2020-03-12 VITALS
SYSTOLIC BLOOD PRESSURE: 116 MMHG | HEART RATE: 79 BPM | DIASTOLIC BLOOD PRESSURE: 65 MMHG | TEMPERATURE: 97.6 F | RESPIRATION RATE: 18 BRPM

## 2020-03-12 PROCEDURE — 11045 DBRDMT SUBQ TISS EACH ADDL: CPT

## 2020-03-12 PROCEDURE — 11042 DBRDMT SUBQ TIS 1ST 20SQCM/<: CPT

## 2020-03-12 PROCEDURE — 6370000000 HC RX 637 (ALT 250 FOR IP): Performed by: SURGERY

## 2020-03-12 RX ORDER — CLOBETASOL PROPIONATE 0.5 MG/G
OINTMENT TOPICAL ONCE
Status: CANCELLED | OUTPATIENT
Start: 2020-03-12

## 2020-03-12 RX ORDER — BACITRACIN, NEOMYCIN, POLYMYXIN B 400; 3.5; 5 [USP'U]/G; MG/G; [USP'U]/G
OINTMENT TOPICAL ONCE
Status: CANCELLED | OUTPATIENT
Start: 2020-03-12

## 2020-03-12 RX ORDER — GENTAMICIN SULFATE 1 MG/G
OINTMENT TOPICAL ONCE
Status: CANCELLED | OUTPATIENT
Start: 2020-03-12

## 2020-03-12 RX ORDER — LIDOCAINE HYDROCHLORIDE 40 MG/ML
5 SOLUTION TOPICAL ONCE
Status: COMPLETED | OUTPATIENT
Start: 2020-03-12 | End: 2020-03-12

## 2020-03-12 RX ORDER — LIDOCAINE 50 MG/G
0.5 OINTMENT TOPICAL ONCE
Status: CANCELLED | OUTPATIENT
Start: 2020-03-12

## 2020-03-12 RX ORDER — LIDOCAINE 40 MG/G
CREAM TOPICAL ONCE
Status: CANCELLED | OUTPATIENT
Start: 2020-03-12

## 2020-03-12 RX ORDER — LIDOCAINE HYDROCHLORIDE 40 MG/ML
5 SOLUTION TOPICAL ONCE
Status: CANCELLED | OUTPATIENT
Start: 2020-03-12

## 2020-03-12 RX ORDER — BACITRACIN ZINC AND POLYMYXIN B SULFATE 500; 1000 [USP'U]/G; [USP'U]/G
OINTMENT TOPICAL ONCE
Status: CANCELLED | OUTPATIENT
Start: 2020-03-12

## 2020-03-12 RX ORDER — GINSENG 100 MG
CAPSULE ORAL ONCE
Status: CANCELLED | OUTPATIENT
Start: 2020-03-12

## 2020-03-12 RX ORDER — BETAMETHASONE DIPROPIONATE 0.05 %
OINTMENT (GRAM) TOPICAL ONCE
Status: CANCELLED | OUTPATIENT
Start: 2020-03-12

## 2020-03-12 RX ADMIN — LIDOCAINE HYDROCHLORIDE 10 ML: 40 SOLUTION TOPICAL at 16:29

## 2020-03-12 ASSESSMENT — PAIN SCALES - GENERAL
PAINLEVEL_OUTOF10: 7
PAINLEVEL_OUTOF10: 0

## 2020-03-12 ASSESSMENT — PAIN DESCRIPTION - ONSET: ONSET: ON-GOING

## 2020-03-12 ASSESSMENT — PAIN - FUNCTIONAL ASSESSMENT: PAIN_FUNCTIONAL_ASSESSMENT: ACTIVITIES ARE NOT PREVENTED

## 2020-03-12 ASSESSMENT — PAIN DESCRIPTION - PAIN TYPE: TYPE: ACUTE PAIN

## 2020-03-12 ASSESSMENT — PAIN DESCRIPTION - LOCATION: LOCATION: LEG

## 2020-03-12 ASSESSMENT — PAIN DESCRIPTION - FREQUENCY: FREQUENCY: INTERMITTENT

## 2020-03-12 ASSESSMENT — PAIN DESCRIPTION - PROGRESSION: CLINICAL_PROGRESSION: NOT CHANGED

## 2020-03-12 ASSESSMENT — PAIN DESCRIPTION - ORIENTATION: ORIENTATION: RIGHT;LEFT

## 2020-03-12 ASSESSMENT — PAIN DESCRIPTION - DESCRIPTORS: DESCRIPTORS: ACHING;DISCOMFORT

## 2020-03-12 NOTE — PROGRESS NOTES
[facility]   Progress Note and Procedure Note        Berkley Christianson  MEDICAL RECORD NUMBER:  [id number]  AGE: 66 y.o. GENDER: female  : 1945  EPISODE DATE: 3/12//20     Subjective:           Chief Complaint   Patient presents with    Wound Check       Follow up Wounds Left and Right Lower Legs         HISTORY of PRESENT ILLNESS HPI      Berkley Christianson is a 66 y.o. female who presents today for wound/ulcer evaluation. History of Wound Context: Years of edema years of ulceration left leg never healed right leg has healed but then would break open when it swelled again had zippered stockings from [employer] but would ulcerate anyway  Wound/Ulcer Pain Timing/Severity: constant, moderate  Quality of pain: dull, aching  Severity:  10 / 10   Modifying Factors: Pain worsens with walking  Associated Signs/Symptoms: edema, erythema, drainage, odor and pain     Ulcer Identification:  Ulcer Type: venous     Contributing Factors: edema, venous stasis, lymphedema, diabetes, decreased mobility and obesity     Wound: N/A     Patient was recently in the hospital on January 3, 2020; 2/10 /2020 and 20  PAST MEDICAL HISTORY     Past Medical History             Diagnosis Date    Anemia      Atrial fibrillation (HCC)      CAD (coronary artery disease)      Chronic back pain      CKD (chronic kidney disease), stage III ([address]      Diabetes mellitus ([address]      Diastolic CHF ([address]      Hyperlipidemia      Hypertension      MI (myocardial infarction) ([address]      MRSA (methicillin resistant staph aureus) culture positive 2018     leg ulcer    MRSA (methicillin resistant staph aureus) culture positive 2019     leg    MRSA colonization 10/16/2018    Obesity (BMI 30-39. 9)      Osteoarthritis      Pancreatitis      Sepsis (HCC)      Stasis ulcer (HCC)       bilateral lower extremities     Suprapubic catheter (HCC)              PAST SURGICAL HISTORY     Past Surgical History Past Surgical History:   Procedure Laterality Date    APPENDECTOMY        BACK SURGERY         x2    CATARACT REMOVAL         bilateral     CHOLECYSTECTOMY        COLONOSCOPY        EYE SURGERY        FRACTURE SURGERY         Left ankle    HYSTERECTOMY        INSET/CHANGE LOMELI CATHETER - COMPLICATED   20/9236     suprapubic cath    JOINT REPLACEMENT         Right knee    KNEE SURGERY                FAMILY HISTORY     Family History         Family History   Problem Relation Age of Onset    Heart Disease Father      Heart Failure Father      Diabetes Father      Diabetes Daughter      Diabetes Daughter              SOCIAL HISTORY     Social History            Tobacco Use    Smoking status: Never Smoker    Smokeless tobacco: Never Used   Substance Use Topics    Alcohol use: Yes       Comment: occassional    Drug use:  No         ALLERGIES          Allergies   Allergen Reactions    Sulfa Antibiotics Rash         MEDICATIONS            Current Outpatient Medications on File Prior to Encounter   Medication Sig Dispense Refill    ibuprofen (ADVIL;MOTRIN) 200 MG tablet Take 400 mg by mouth every 6 hours as needed for Pain        traMADol (ULTRAM) 50 MG tablet Take 50 mg by mouth every 8 hours as needed for Pain.        raNITIdine HCl (ZANTAC 150 MAXIMUM STRENGTH PO) Take 150 mg by mouth 2 times daily        ferrous sulfate 325 (65 Fe) MG tablet Take 325 mg by mouth 2 times daily         insulin lispro (HUMALOG) 100 UNIT/ML injection vial Inject 5 Units into the skin 3 times daily (with meals) (Patient taking differently: Inject into the skin Humalog insulin pump: 1.8 Unit basal rate continous and as needed bolus per insulin pump) 1 vial 0    atorvastatin (LIPITOR) 10 MG tablet Take 1 tablet by mouth daily 30 tablet 0    metoprolol tartrate (LOPRESSOR) 25 MG tablet Take 1 tablet by mouth 2 times daily 60 tablet 0    furosemide (LASIX) 40 MG tablet Take 0.5 tablets by mouth daily 60 tablet 0    clicks, or gallops, distal pulses +2 left dorsalis pedis, no others palpable Doppler signals biphasic in all 4 pedal arteries no carotid bruits neck veins slightly elevated left side  Abdomen: soft, non-tender, non-distended obese right upper quadrant gallbladder scar LRR, suprapubic tube placed by Sulema Rosales normal bowel sounds, no masses or organomegaly  Extremities: no cyanosis, clubbing massive edema left thigh and calf moderate edema right thigh and calf . Bilateral ulcerations of the calf and one of the medial left thigh see pictures below  Musculoskeletal: normal range of motion, no joint swelling, deformity or tenderness  Neurologic: reflexes normal and symmetric, no cranial nerve deficit, had trouble walking from the house to the car today, coordination and speech normal        Assessment:              Problem List Items Addressed This Visit           Chronic venous hypertension (idiopathic) with ulcer and inflammation of bilateral lower extremity (HCC)      Relevant Medications      lidocaine (XYLOCAINE) 4 % external solution 5 mL (Completed)      Cellulitis of left lower extremity - Primary      Relevant Medications      lidocaine (XYLOCAINE) 4 % external solution 5 mL (Completed)               Excisional Debridement Procedure Note  Indications:  Based on my examination of this patient's wound(s)/ulcer(s) today, debridement is required to promote healing and evaluate the wound base. Performed by: Amado Krishna MD    Consent obtained? Yes    Time out taken: Yes    Pain Control: Anesthetic: 4% Lidocaine Liquid Topical(10 ml)     Debridement:Excisional Debridement    Using curette and scissors the wound/ulcer was sharply debrided    down through and included excision of  subcutaneous tissue.         Devitalized Tissue Debrided:  fibrin, biofilm, slough and Flaps of skin anteriorly on the right      Pre Debridement Measurements:  Are located in the Parag Ghosh  Documentation Flow Sheet Wound/Ulcer #: 1 and 2     Post  Debridement Measurements:  Wound 03/12/20 Leg Left #1 Noted 3/12/2020 (Active)   Wound Image   3/12/2020  3:47 PM   Dressing Status Old drainage 3/12/2020  3:47 PM   Dressing Changed Changed/New 3/12/2020  4:30 PM   Dressing/Treatment Alginate with Ag; Other (comment) 3/12/2020  4:30 PM   Wound Length (cm) 13.8 cm 3/12/2020  3:47 PM   Wound Width (cm) 15.5 cm 3/12/2020  3:47 PM   Wound Depth (cm) 0.1 cm 3/12/2020  3:47 PM   Wound Surface Area (cm^2) 213.9 cm^2 3/12/2020  3:47 PM   Wound Volume (cm^3) 21.39 cm^3 3/12/2020  3:47 PM   Post-Procedure Length (cm) 13.9 cm 3/12/2020  4:17 PM   Post-Procedure Width (cm) 15.6 cm 3/12/2020  4:17 PM   Post-Procedure Depth (cm) 0.1 cm 3/12/2020  4:17 PM   Post-Procedure Surface Area (cm^2) 216.84 cm^2 3/12/2020  4:17 PM   Post-Procedure Volume (cm^3) 21.68 cm^3 3/12/2020  4:17 PM   Wound Assessment Granulation tissue;Slough 3/12/2020  3:47 PM   Drainage Amount Large 3/12/2020  3:47 PM   Drainage Description Green;Purulent;Serosanguinous 3/12/2020  3:47 PM   Odor Mild 3/12/2020  3:47 PM   Margins Undefined edges 3/12/2020  3:47 PM   Marilynn-wound Assessment White; Maceration 3/12/2020  3:47 PM   Non-staged Wound Description Full thickness 3/12/2020  3:47 PM   Red%Wound Bed 70 3/12/2020  3:47 PM   Yellow%Wound Bed 20 3/12/2020  3:47 PM   Other%Wound Bed 10 3/12/2020  3:47 PM   Number of days: 0       Wound 03/12/20 Leg Right; Lower #2 Noted 3/12/2020 (Active)   Wound Image   3/12/2020  3:47 PM   Dressing Status Old drainage 3/12/2020  3:47 PM   Dressing Changed Changed/New 3/12/2020  4:30 PM   Dressing/Treatment Alginate with Ag; Other (comment) 3/12/2020  4:30 PM   Wound Length (cm) 15.9 cm 3/12/2020  3:47 PM   Wound Width (cm) 8.9 cm 3/12/2020  3:47 PM   Wound Depth (cm) 0.1 cm 3/12/2020  3:47 PM   Wound Surface Area (cm^2) 141.51 cm^2 3/12/2020  3:47 PM   Wound Volume (cm^3) 14.15 cm^3 3/12/2020  3:47 PM   Post-Procedure Length (cm) 16 cm 3/12/2020 provider:   [x]?????? ECF or Home Healthcare: [address]  []?????? Wound Assessment:     []?????? Physician or NP scheduled for Wound Assessment:   [x]?????? Return Appointment: With DR Emily Magana)  []?????? Ordered tests:      Nurse Case Manger: Graciella Opitz     Electronically signed by Yesenia Veliz RN on 3/12/2020 at [address] [place] [address] Road Information: Should you experience any significant changes in your wound(s) or have questions about your wound care, please contact the [address] [place] [address] [phone] [address] 8:00 am - 4:30 pm and Friday 8:00 am - 12:30 pm.  If you need help with your wound outside these hours and cannot wait until we are again available, contact your PCP or go to the hospital emergency room.      PLEASE NOTE: IF YOU ARE UNABLE TO OBTAIN WOUND SUPPLIES, CONTINUE TO USE THE SUPPLIES YOU HAVE AVAILABLE UNTIL YOU ARE ABLE TO [facility].  IT IS MOST IMPORTANT TO KEEP THE WOUND COVERED AT ALL TIMES.     Physician Signature:_______________________     Date: ___________ Time:  ____________                            Dr Yany Angel

## 2020-03-12 NOTE — PLAN OF CARE
[employer] Application   Below Knee    NAME:  Miesha Ponce  YOB: 1945  MEDICAL RECORD NUMBER:  [id number]  DATE:  3/12/2020    Guadlupe Apo boot: Applied moisturizing agent to dry skin as needed. Appied primary and secondary dressing as ordered. Applied Unna roll from toes to knee overlapping each time. Applied ace wrap or coban from toes to below the knee. Secured with tape and/or metal clips covered with tape. Instructed patient/caregiver to keep dressing dry and intact. DO NOT REMOVE DRESSING. Instructed pt/family/caregiver to report excessive draining, loose bandage, wet dressing, severe pain or tingling in toes. Applied [employer] dressing below the knee to right lower leg. Applied [employer] dressing below the knee to left lower leg. Unna Boot(s) were applied per  Guidelines.      Electronically signed by Maggie Watson RN on 3/12/2020 at 4:34 PM

## 2020-05-06 ENCOUNTER — TELEPHONE (OUTPATIENT)
Dept: WOUND CARE | Age: 75
End: 2020-05-06

## 2020-05-13 ENCOUNTER — TELEPHONE (OUTPATIENT)
Dept: WOUND CARE | Age: 75
End: 2020-05-13

## 2020-05-13 NOTE — TELEPHONE ENCOUNTER
Spoke with the patient's daughter Ti Rios about potential virtual visit with the patient. Meliza stated that she was available tomorrow at 3:30pm for visit. Virtual visit scheduled and Meliza will receive [employer] link for the telehealth visit today sent to her email.         Electronically signed by Ubaldo Polk RN on 5/13/2020 at 12:19 PM

## 2020-05-14 ENCOUNTER — HOSPITAL ENCOUNTER (OUTPATIENT)
Dept: WOUND CARE | Age: 75
Discharge: HOME | End: 2020-05-14
Payer: MEDICARE

## 2020-05-14 PROCEDURE — 99211 OFF/OP EST MAY X REQ PHY/QHP: CPT

## 2020-05-14 PROCEDURE — 99212 OFFICE O/P EST SF 10 MIN: CPT | Performed by: SURGERY

## 2020-05-14 RX ORDER — CIPROFLOXACIN 500 MG/1
500 TABLET, FILM COATED ORAL 2 TIMES DAILY
COMMUNITY
End: 2020-09-22

## 2020-05-14 ASSESSMENT — PAIN SCALES - GENERAL: PAINLEVEL_OUTOF10: 0

## 2020-05-14 NOTE — PROGRESS NOTES
Virtual Visit [facility]   Progress Note and Procedure Note    Vern Moody  MEDICAL RECORD NUMBER:  [id number]  AGE: 66 y.o. GENDER: female  : 1945  EPISODE DATE:  2020    Subjective:     Chief Complaint   Patient presents with    Wound Check     Wound care video visit         Consent obtained to communicate via Virtual Visit:  Yes     HISTORY of PRESENT ILLNESS HPI     Vern Moody is a 66 y.o. female who presents today via Virtual Visit wound/ulcer evaluation. History of Wound Context: Had venous ulcers for a long time not been able to come see us because of the coronavirus  Wound/Ulcer Pain Timing/Severity: none  Quality of pain: N/A  Severity:  0 / 10   Modifying Factors: None  Associated Signs/Symptoms: edema and drainage    Ulcer Identification:  Ulcer Type: venous    Contributing Factors: edema    Acute Wound: N/A not an acute wound    PAST MEDICAL HISTORY        Diagnosis Date    Anemia     Atrial fibrillation (HCC)     CAD (coronary artery disease)     Chronic back pain     CKD (chronic kidney disease), stage III (HCC)     Diabetes mellitus (HCC)     Diastolic CHF ([address]     Hyperlipidemia     Hypertension     MI (myocardial infarction) ([address]     MRSA (methicillin resistant staph aureus) culture positive 2018    leg ulcer    MRSA (methicillin resistant staph aureus) culture positive 20;2019    leg    MRSA colonization 10/16/2018    Obesity (BMI 30-39. 9)     Osteoarthritis     Pancreatitis     Sepsis ([address]     Stasis ulcer ([address]     bilateral lower extremities     Suprapubic catheter ([address]        PAST SURGICAL HISTORY    Past Surgical History:   Procedure Laterality Date    APPENDECTOMY      BACK SURGERY      x2    CATARACT REMOVAL      bilateral     CHOLECYSTECTOMY      COLONOSCOPY      EYE SURGERY      FRACTURE SURGERY      Left ankle    HYSTERECTOMY      INSET/CHANGE LOMELI CATHETER - COMPLICATED 05/2018    suprapubic cath    JOINT REPLACEMENT      Right knee    KNEE SURGERY         FAMILY HISTORY    Family History   Problem Relation Age of Onset    Heart Disease Father     Heart Failure Father     Diabetes Father     Diabetes Daughter     Diabetes Daughter        SOCIAL HISTORY    Social History     Tobacco Use    Smoking status: Never Smoker    Smokeless tobacco: Never Used   Substance Use Topics    Alcohol use: Yes     Comment: occassional    Drug use: No       ALLERGIES    Allergies   Allergen Reactions    Cefepime Other (See Comments)     Encephalopathy     Sulfa Antibiotics Rash       MEDICATIONS    Current Outpatient Medications on File Prior to Encounter   Medication Sig Dispense Refill    ciprofloxacin (CIPRO) 500 MG tablet Take 500 mg by mouth 2 times daily Finishes 5/14/2020      furosemide (LASIX) 40 MG tablet Take 40 mg by mouth every other day      dorzolamide-timolol (COSOPT) 22.3-6.8 MG/ML ophthalmic solution Place 1 drop into the right eye 2 times daily      atropine 1 % ophthalmic solution Place 1 drop into the right eye 2 times daily      prednisoLONE acetate (PRED FORTE) 1 % ophthalmic suspension Place 1 drop into the right eye 2 times daily      aspirin 325 MG EC tablet Take 325 mg by mouth daily      senna-docusate (PERICOLACE) 8.6-50 MG per tablet Take 1 tablet by mouth Daily with lunch      Cholecalciferol (VITAMIN D3) 125 MCG (5000 UT) TABS Take 5,000 Units by mouth daily      Lactobacillus (PROBIOTIC ACIDOPHILUS PO) Take 1 tablet by mouth nightly      furosemide (LASIX) 20 MG tablet Take 20 mg by mouth every other day       ferrous sulfate 325 (65 Fe) MG tablet Take 325 mg by mouth 2 times daily (with meals) Breakfast and lunch      insulin lispro (HUMALOG) 100 UNIT/ML injection vial Inject 5 Units into the skin 3 times daily (with meals) (Patient taking differently: Inject into the skin Humalog insulin pump: 1.2 Unit basal rate continous and as needed bolus cm 3/12/2020  4:17 PM   Post-Procedure Width (cm) 15.6 cm 3/12/2020  4:17 PM   Post-Procedure Depth (cm) 0.1 cm 3/12/2020  4:17 PM   Post-Procedure Surface Area (cm^2) 216.84 cm^2 3/12/2020  4:17 PM   Post-Procedure Volume (cm^3) 21.68 cm^3 3/12/2020  4:17 PM   Wound Assessment Granulation tissue;Pink;Red 5/14/2020  3:48 PM   Drainage Amount Large 5/14/2020  3:48 PM   Drainage Description Serosanguinous 5/14/2020  3:48 PM   Odor None 5/14/2020  3:48 PM   Margins Undefined edges 5/14/2020  3:48 PM   Marilynn-wound Assessment Dry; Intact 5/14/2020  3:48 PM   Non-staged Wound Description Full thickness 5/14/2020  3:48 PM   [place]%Wound Bed 80 5/14/2020  3:48 PM   Red%Wound Bed 70 3/12/2020  3:47 PM   Yellow%Wound Bed 20. 5/14/2020  3:48 PM   Other%Wound Bed 10 3/12/2020  3:47 PM   Number of days: 63       Wound 03/12/20 Leg Right; Lower #2 Noted 3/12/2020 (Active)   Wound Image   3/12/2020  3:47 PM   Dressing Status Old drainage 3/12/2020  3:47 PM   Dressing Changed Changed/New 3/12/2020  4:30 PM   Dressing/Treatment Alginate with Ag; Other (comment) 3/12/2020  4:30 PM   Wound Length (cm) 7 cm 5/14/2020  3:48 PM   Wound Width (cm) 6 cm 5/14/2020  3:48 PM   Wound Depth (cm) 0.1 cm 5/14/2020  3:48 PM   Wound Surface Area (cm^2) 42 cm^2 5/14/2020  3:48 PM   Change in Wound Size % (l*w) 70.32 5/14/2020  3:48 PM   Wound Volume (cm^3) 4.2 cm^3 5/14/2020  3:48 PM   Wound Healing % 70 5/14/2020  3:48 PM   Post-Procedure Length (cm) 16 cm 3/12/2020  4:17 PM   Post-Procedure Width (cm) 9 cm 3/12/2020  4:17 PM   Post-Procedure Depth (cm) 0.1 cm 3/12/2020  4:17 PM   Post-Procedure Surface Area (cm^2) 144 cm^2 3/12/2020  4:17 PM   Post-Procedure Volume (cm^3) 14.4 cm^3 3/12/2020  4:17 PM   Wound Assessment Granulation tissue;Pink 5/14/2020  3:48 PM   Drainage Amount Large 3/12/2020  3:47 PM   Drainage Description Green;Purulent;Serosanguinous 3/12/2020  3:47 PM   Odor None 3/12/2020  3:47 PM   Margins Undefined edges 3/12/2020  3:47 PM surrounding the wound prior to dressing change.  []??????? Apply antifungal ointment to skin surrounding the wound prior to dressing change.  []??????? Apply thin film of moisture barrier ointment to skin immediately around wound.  []??????? Other:      Dressings:              Wound Location : RIGHT AND LEFT LOWER LEG WOUNDS    [x]??????? Apply Primary Dressing:                                              [x]??????? Alginate with Silver           []??????? Other:       [x]??????? Cover and Secure with:                       []??????? Gauze         []??????? Brigette           []??????? Kerlix          []??????? Ace Wrap   []??????? Cover Roll Tape     []??????? ABD                                  [x]??????? Other: EXTRASORB - NOT GAUZE.  DO NOT CUT EXTRASORB.          Avoid contact of tape with skin. [x]??????? Change dressing:          []??????? Daily           []??????? Every Other Day    []??????? Three times per week          []??????? Once a week          []??????? Do Not Change Dressing     [x]??????? Other: ONCE PER WEEK         Edema Control:  Apply:          []??????? Compression Stocking       []??????? Right Leg     []????? ?? Left Leg          []??????? Tubigrip      []??????? Right Leg Double Layer      []????? ?? Left Leg Double Layer                                              []??????? Right Leg Single Layer       []????? ?? Left Leg Single Layer          []??????? SpandaGrip            []??????? Right Leg     []????? ?? Left Leg                                  []????? ??Low compression 5-10 mm/Hg                                         []????? ? ? Medium compression 10-20 mm/Hg                                  []????? ? ? High compression  20-30 mm/Hg          every morning immediately when getting up should be applied to affected leg(s) from mid foot to knee making sure to cover the heel.  Remove every night before going to bed.          []??????? Elevate leg(s) above the level of the heart when patient's risk of exposure to COVID-19 and provide necessary medical care. The patient (and/or legal guardian) has also been advised to contact this office for worsening conditions or problems, and seek emergency medical treatment and/or call 911 if deemed necessary. Services were provided through a video synchronous discussion virtually to substitute for in-person clinic visit. Patient and provider were located at their individual homes.     Electronically signed by Melinda Chong MD on 5/14/2020 at 4:14 PM

## 2020-05-28 ENCOUNTER — HOSPITAL ENCOUNTER (OUTPATIENT)
Dept: WOUND CARE | Age: 75
Discharge: HOME | End: 2020-05-28
Payer: MEDICARE

## 2020-05-28 PROCEDURE — 99212 OFFICE O/P EST SF 10 MIN: CPT | Performed by: SURGERY

## 2020-05-28 PROCEDURE — 99212 OFFICE O/P EST SF 10 MIN: CPT

## 2020-05-28 ASSESSMENT — PAIN SCALES - GENERAL: PAINLEVEL_OUTOF10: 4

## 2020-05-28 ASSESSMENT — PAIN DESCRIPTION - LOCATION: LOCATION: LEG

## 2020-05-28 ASSESSMENT — PAIN - FUNCTIONAL ASSESSMENT: PAIN_FUNCTIONAL_ASSESSMENT: ACTIVITIES ARE NOT PREVENTED

## 2020-05-28 ASSESSMENT — PAIN DESCRIPTION - ORIENTATION: ORIENTATION: LEFT

## 2020-05-28 ASSESSMENT — PAIN DESCRIPTION - PAIN TYPE: TYPE: ACUTE PAIN

## 2020-05-28 ASSESSMENT — PAIN DESCRIPTION - ONSET: ONSET: ON-GOING

## 2020-05-28 ASSESSMENT — PAIN DESCRIPTION - DESCRIPTORS: DESCRIPTORS: ACHING

## 2020-05-28 ASSESSMENT — PAIN DESCRIPTION - PROGRESSION: CLINICAL_PROGRESSION: NOT CHANGED

## 2020-05-28 NOTE — PLAN OF CARE
telephone calls made to home health, primary care providers, pharmacy, or DME. May include filling out forms and writing letters, arranging transportation, communication with insurance , vendors, etc.  Discharge, instructions and/or After Visit Summary given to patient/caregiver and instructions completed.    []   3     Is this the Patient's First Visit to the [address] [place] [address] Road  No    Is this Patient Established @ [facility]  Yes             Virtual Visit Clinical Level of Care Wound Care      Points  1-3  Level 1 []     Points  4-5  Level 2 [x]     Points  6-7  Level 3 []     Points  8-9  Level 4 []     Points  10-11  Level 5 []       Electronically signed by Matias Turner RN on 5/28/2020 at @NO

## 2020-05-28 NOTE — PROGRESS NOTES
needed bolus per insulin pump) 1 vial 0    atorvastatin (LIPITOR) 10 MG tablet Take 1 tablet by mouth daily (Patient taking differently: Take 10 mg by mouth Daily with lunch ) 30 tablet 0    metoprolol tartrate (LOPRESSOR) 25 MG tablet Take 1 tablet by mouth 2 times daily (Patient taking differently: Take 25 mg by mouth 2 times daily (with meals) Breakfast and dinner) 60 tablet 0    Biotin (BIOTIN 5000) 5 MG CAPS Take 1 tablet by mouth nightly       lipase-protease-amylase (CREON) 43076 units delayed release capsule Take 12,000 Units by mouth 3 times daily (with meals)       gabapentin (NEURONTIN) 300 MG capsule Take 300 mg by mouth 2 times daily. Noon and bedtime      isosorbide mononitrate (IMDUR) 30 MG extended release tablet Take 30 mg by mouth Daily with lunch       venlafaxine (EFFEXOR) 75 MG tablet Take 150 mg by mouth 2 times daily (with meals) Breakfast and lunch      cetirizine (ZYRTEC) 10 MG tablet Take 10 mg by mouth daily       No current facility-administered medications on file prior to encounter. REVIEW OF SYSTEMS    A comprehensive review of systems was negative. Objective:     PHYSICAL EXAM    Virtual visit wounds examined by video camera    Assessment:      Problem List Items Addressed This Visit     Chronic venous hypertension (idiopathic) with ulcer and inflammation of bilateral lower extremity ([facility] [address] [facility] - Primary          Performed by: Sameera Cage MD    Wound/Ulcer #: 1 and 2    Wound 03/12/20 Leg Left #1 Noted 3/12/2020 (Active)   Wound Image   3/12/2020  3:47 PM   Dressing Status Old drainage 3/12/2020  3:47 PM   Dressing Changed Changed/New 3/12/2020  4:30 PM   Dressing/Treatment Alginate with Ag; Other (comment) 3/12/2020  4:30 PM   Wound Length (cm) 6.5 cm 5/28/2020  3:49 PM   Wound Width (cm) 11.5 cm 5/28/2020  3:49 PM   Wound Depth (cm) 0.1 cm 5/28/2020  3:49 PM   Wound Surface Area (cm^2) 74.75 cm^2 5/28/2020  3:49 PM   Change in Wound Size % (l*w) 65.05 5/28/2020 3:49 PM   Wound Volume (cm^3) 7.48 cm^3 5/28/2020  3:49 PM   Wound Healing % 65 5/28/2020  3:49 PM   Post-Procedure Length (cm) 13.9 cm 3/12/2020  4:17 PM   Post-Procedure Width (cm) 15.6 cm 3/12/2020  4:17 PM   Post-Procedure Depth (cm) 0.1 cm 3/12/2020  4:17 PM   Post-Procedure Surface Area (cm^2) 216.84 cm^2 3/12/2020  4:17 PM   Post-Procedure Volume (cm^3) 21.68 cm^3 3/12/2020  4:17 PM   Wound Assessment Granulation tissue;Pink;Red 5/28/2020  3:49 PM   Drainage Amount Moderate 5/28/2020  3:49 PM   Drainage Description Serosanguinous 5/28/2020  3:49 PM   Odor None 5/28/2020  3:49 PM   Margins Attached edges 5/28/2020  3:49 PM   Marilynn-wound Assessment Dry; Intact 5/28/2020  3:49 PM   Non-staged Wound Description Full thickness 5/28/2020  3:49 PM   [place]%Wound Bed 95 5/28/2020  3:49 PM   Red%Wound Bed 70 3/12/2020  3:47 PM   Yellow%Wound Bed 5 5/28/2020  3:49 PM   Other%Wound Bed 10 3/12/2020  3:47 PM   Number of days: 77       Wound 03/12/20 Leg Right; Lower #2 Noted 3/12/2020 (Active)   Wound Image   3/12/2020  3:47 PM   Dressing Status Old drainage 3/12/2020  3:47 PM   Dressing Changed Changed/New 3/12/2020  4:30 PM   Dressing/Treatment Alginate with Ag; Other (comment) 3/12/2020  4:30 PM   Wound Length (cm) 3.5 cm 5/28/2020  3:49 PM   Wound Width (cm) 1.5 cm 5/28/2020  3:49 PM   Wound Depth (cm) 0.1 cm 5/28/2020  3:49 PM   Wound Surface Area (cm^2) 5.25 cm^2 5/28/2020  3:49 PM   Change in Wound Size % (l*w) 96.29 5/28/2020  3:49 PM   Wound Volume (cm^3) 0.52 cm^3 5/28/2020  3:49 PM   Wound Healing % 96 5/28/2020  3:49 PM   Post-Procedure Length (cm) 16 cm 3/12/2020  4:17 PM   Post-Procedure Width (cm) 9 cm 3/12/2020  4:17 PM   Post-Procedure Depth (cm) 0.1 cm 3/12/2020  4:17 PM   Post-Procedure Surface Area (cm^2) 144 cm^2 3/12/2020  4:17 PM   Post-Procedure Volume (cm^3) 14.4 cm^3 3/12/2020  4:17 PM   Wound Assessment Granulation tissue;Pink 5/28/2020  3:49 PM   Drainage Amount Small 5/28/2020  3:49 PM   Drainage Description Serous; Yellow; Tan 5/28/2020  3:49 PM   Odor None 5/28/2020  3:49 PM   Margins Attached edges 5/28/2020  3:49 PM   Marilynn-wound Assessment Dry; Intact 5/28/2020  3:49 PM   Non-staged Wound Description Full thickness 5/28/2020  3:49 PM   Red%Wound Bed 60 5/28/2020  3:49 PM   Yellow%Wound Bed 40 5/28/2020  3:49 PM   Number of days: 77       Wound 05/14/20 Leg Left;Proximal;Lower; Anterior # 3 initial- 4/16/2020 (Active)   Dressing Status Old drainage; Intact 5/28/2020  3:49 PM   Wound Length (cm) 3 cm 5/28/2020  3:49 PM   Wound Width (cm) 2.5 cm 5/28/2020  3:49 PM   Wound Depth (cm) 0.1 cm 5/28/2020  3:49 PM   Wound Surface Area (cm^2) 7.5 cm^2 5/28/2020  3:49 PM   Change in Wound Size % (l*w) 16.67 5/28/2020  3:49 PM   Wound Volume (cm^3) 0.75 cm^3 5/28/2020  3:49 PM   Wound Healing % 17 5/28/2020  3:49 PM   Wound Assessment Granulation tissue;Pink;Red;Swelling;Drainage 5/28/2020  3:49 PM   Drainage Amount Moderate 5/28/2020  3:49 PM   Drainage Description Serosanguinous 5/28/2020  3:49 PM   Odor None 5/28/2020  3:49 PM   Marilynn-wound Assessment Dry; Intact 5/28/2020  3:49 PM   Non-staged Wound Description Full thickness 5/28/2020  3:49 PM   [place]%Wound Bed 100 5/28/2020  3:49 PM   Number of days: 14          Diabetic/Pressure/Non Pressure Ulcers only:  Ulcer: N/A       Plan:   Patient apparently had cellulitis recently just got over taking Cipro 500 twice a day when she had a urine infection and the urine leaked all over her Unna boots. Today I videocamera do not see any signs of cellulitis patient unfortunately sleeps sitting up in a chair since the last visit the nurse is DC'd the Unna boots because her swelling got much worse instead she is changing every other day with aqua cell AG gauze and Ace wraps and has made significant improvement in the swelling and the ulcers. We will continue with this treatment.   Since she sleeps in a recliner asked her to add pillows under her calves at night to try to get more Layer                                              []???????? Right Leg Single Layer       []?????? ?? Left Leg Single Layer          []???????? SpandaGrip            []???????? Right Leg     []?????? ?? Left Leg                                  []?????? ??Low compression 5-10 mm/Hg                                         []?????? ? ? Medium compression 10-20 mm/Hg                                  []?????? ? ? High compression  20-30 mm/Hg          every morning immediately when getting up should be applied to affected leg(s) from mid foot to knee making sure to cover the heel.  Remove every night before going to bed.          []???????? Elevate leg(s) above the level of the heart when sitting.             []???????? Avoid prolonged standing in one place.               Compression:   Apply:          []???????? Multilayer Compression Wrap Applied in Clinic    []???????? RightLeg      []?????? ?? Left Leg          []???????? Multi-layer compression.  Do not get leg(s) with wrap wet.  If wraps become too tight call the center or completely remove the wrap.                        []???????? Elevate leg(s) above the level of the heart when sitting.             []???????? Avoid prolonged standing in one place.     Off-Loading:   []???????? Off-loading when           []???????? walking       []???????? in bed         []???????? sitting  []???????? Total non-weight bearing  []???????? Right Leg  []???????? Left Leg      [x]???????? Assistive Device            []???????? Walker        []???????? Cane           [x]???????? Wheelchair  []???????? Crutches          []???????? Surgical shoe    []???????? Podus Asha(s)   []???????? Foam Boot(s)  []???????? Roll About          []???????? Cast Boot   []???????? CROW Boot  []???????? Other:           Dietary:  []???????? Diet as tolerated:           [x]???????? Calorie Diabetic Diet:         []???????? No Added Salt:  []???????? Increase

## 2020-06-11 ENCOUNTER — HOSPITAL ENCOUNTER (OUTPATIENT)
Dept: WOUND CARE | Age: 75
Discharge: HOME | End: 2020-06-11
Payer: MEDICARE

## 2020-06-18 ENCOUNTER — HOSPITAL ENCOUNTER (OUTPATIENT)
Dept: WOUND CARE | Age: 75
Discharge: HOME | End: 2020-06-18
Payer: MEDICARE

## 2020-06-18 PROCEDURE — 99212 OFFICE O/P EST SF 10 MIN: CPT | Performed by: SURGERY

## 2020-06-18 PROCEDURE — 99211 OFF/OP EST MAY X REQ PHY/QHP: CPT

## 2020-06-18 ASSESSMENT — PAIN SCALES - GENERAL: PAINLEVEL_OUTOF10: 0

## 2020-06-18 NOTE — PLAN OF CARE
Virtual Visit Wound Care  Clinic Level of Care   NAME:  Ilan Anand OF BIRTH:  1945 GENDER: female  MEDICAL RECORD NUMBER:  [id number]   DATE:  6/18/2020     Patient Type Points   No documentation completed by nursing staff. []   0   Nursing staff documented in the navigator for an ESTABLISHED patient including Episode, Patient ID, Chief Complaint, Travel Screen, Allergies, Latex Allergy, Home Medication, History, Psychosocial Screen, C-SSRS Screen, Fall Risk, Nutritional Screen, Advanced Directive, Education and Plan of Care, and Discharge Instructions. The Functional Screening tab is only required if the patient's status changes. [x]   1   Nursing staff documented in the navigator for a NEW patient including Patient ID, Chief Complaint, Travel Screen, Allergies, Latex Allergy, Home Medication, History, Psychosocial Screen, C-SSRS Screen, Fall Risk, Nutritional Screen, Advanced Directive, Functional Screen, Education and Plan of Care, and Discharge Instructions. []   2   Nursing staff documented in the navigator for a CONSULT patient including Episode, Patient ID, Chief Complaint, Travel Screen, Allergies, Latex Allergy, Home Medication, History, Psychosocial Screen, C-SSRS Screen, Fall Risk, Nutritional Screen, Advanced Directive, Functional Screen, Education and Plan of Care, and Discharge Instructions. []   2     Wound Description Points   Unable to obtain image of Wound. For example, patient/caregiver is instructed not to remove dressing, is unable to correctly position smart phone, no smart phone is available, patient is unable to maintain connectivity or the patient's wound is healed. [x]   0   1-3 wound images annotated. Images of the wound(s) is obtained and annotated along with completed description in [address] [place] [address]. []   1   4-5 wound images annotated. Images of the wound(s) is obtained and annotated along with completed description in [address] [place] [address]. []   2   Greater than 6 wound images annotated. telephone calls made to home health, primary care providers, pharmacy, or DME. May include filling out forms and writing letters, arranging transportation, communication with insurance , vendors, etc.  Discharge, instructions and/or After Visit Summary given to patient/caregiver and instructions completed.    []   3     Is this the Patient's First Visit to the [address] [place] [address] Road  No    Is this Patient Established @ [facility]  Yes             Virtual Visit Clinical Level of Care Wound Care      Points  1-3  Level 1 [x]     Points  4-5  Level 2 []     Points  6-7  Level 3 []     Points  8-9  Level 4 []     Points  10-11  Level 5 []       Electronically signed by Evangelista Mack RN on 6/18/2020 at @NO

## 2020-06-18 NOTE — PROGRESS NOTES
INSET/CHANGE LOMELI CATHETER - COMPLICATED  27/2801    suprapubic cath    JOINT REPLACEMENT      Right knee    KNEE SURGERY         FAMILY HISTORY    Family History   Problem Relation Age of Onset    Heart Disease Father     Heart Failure Father     Diabetes Father     Diabetes Daughter     Diabetes Daughter        SOCIAL HISTORY    Social History     Tobacco Use    Smoking status: Never Smoker    Smokeless tobacco: Never Used   Substance Use Topics    Alcohol use: Yes     Comment: occassional    Drug use: No       ALLERGIES    Allergies   Allergen Reactions    Cefepime Other (See Comments)     Encephalopathy     Sulfa Antibiotics Rash       MEDICATIONS    Current Outpatient Medications on File Prior to Encounter   Medication Sig Dispense Refill    mirabegron (MYRBETRIQ) 50 MG TB24 Take 50 mg by mouth daily      ciprofloxacin (CIPRO) 500 MG tablet Take 500 mg by mouth 2 times daily Finishes 5/14/2020      furosemide (LASIX) 40 MG tablet Take 40 mg by mouth every other day      dorzolamide-timolol (COSOPT) 22.3-6.8 MG/ML ophthalmic solution Place 1 drop into the right eye 2 times daily      atropine 1 % ophthalmic solution Place 1 drop into the right eye 2 times daily      prednisoLONE acetate (PRED FORTE) 1 % ophthalmic suspension Place 1 drop into the right eye 2 times daily      aspirin 325 MG EC tablet Take 325 mg by mouth daily      senna-docusate (PERICOLACE) 8.6-50 MG per tablet Take 1 tablet by mouth Daily with lunch      Cholecalciferol (VITAMIN D3) 125 MCG (5000 UT) TABS Take 5,000 Units by mouth daily      Lactobacillus (PROBIOTIC ACIDOPHILUS PO) Take 1 tablet by mouth nightly      furosemide (LASIX) 20 MG tablet Take 20 mg by mouth every other day       ferrous sulfate 325 (65 Fe) MG tablet Take 325 mg by mouth 2 times daily (with meals) Breakfast and lunch      insulin lispro (HUMALOG) 100 UNIT/ML injection vial Inject 5 Units into the skin 3 times daily (with meals) (Patient [address] [facility] [address], [facility] [address]  Telephone: [phone]      FAX [phone]     NAME: Virgie Ashraf  DATE OF BIRTH:  1945  MEDICAL RECORD NUMBER:  [id number]  DATE:  6/18/2020     Wound Cleansing:   Do not scrub or use excessive force. Cleanse wound prior to applying a clean dressing with:  [x]????????? Normal Saline    [x]????????? Keep Wound Dry in Shower    []????????? Wound Cleanser   []????????? Cleanse wound with Mild Soap & Water  []????????? May Shower at Discharge   []????????? Other:        Topical Treatments:  Do not apply lotions, creams, or ointments to wound bed unless directed.   []????????? Apply moisturizing lotion to skin surrounding the wound prior to dressing change.  []????????? Apply antifungal ointment to skin surrounding the wound prior to dressing change.  []????????? Apply thin film of moisture barrier ointment to skin immediately around wound.  []????????? Other:      Dressings:              Wound Location : RIGHT AND LEFT LOWER LEG WOUNDS    [x]????????? Apply Primary Dressing:                                              [x]????????? Alginate with Silver           []????????? Other:       [x]????????? Cover and Secure with:                       [x]????????? Gauze         []????????? Brigette           []????????? Kerlix          []????????? Ace Wrap   []????????? Cover Roll Tape     []????????? ABD                                  []????????? Other:          Avoid contact of tape with skin. [x]????????? Change dressing:          []????????? Daily           [x]????????? Every Other Day    []????????? Three times per week          []????????? Once a week          []????????? Do Not Change Dressing     []????????? Other:         Edema Control: Ace wraps from base of toes to knee for now and when available switch to prescribed stockings  Apply:          [x]????????? Compression Stocking       []????????? Right Leg     []??????? ?? Left tolerated:           [x]????????? Calorie Diabetic Diet:         []????????? No Added Salt:  []????????? Increase Protein:           []????????? Other:     Activity:  [x]????????? Activity as tolerated:  []????????? Patient has no activity restrictions     []????????? Strict Bedrest:     []????????? Remain off Work:     []????????? May return to full duty work:                                       []????????? Return to work with restrictions:         If you are still having pain after you go home:  [x]????????? Elevate the affected limb.           [x]????????? Use over-the-counter medications you would normally use for pain as permitted by your family doctor. [x]????????? For persistent pain not relieved by the above interventions, please call your family doctor.             Return Appointment:  []????????? Wound and dressing supply provider:   [x]????????? ECF or Home Healthcare: [address]  []????????? Wound Assessment:     []????????? Physician or NP scheduled for Wound Assessment:   [x]????????? Return Appointment: With DR Brijesh Zuniga Week(s) - VIRTUAL VISIT  []????????? Ordered tests:      Nurse Case Manger: Camila Graff    Electronically signed by Peterson Merlos RN on 6/18/2020 at 4:35 PM    [address]  N Information: Should you experience any significant changes in your wound(s) or have questions about your wound care, please contact the [address] [place] [address] [phone] [address] 8:00 am - 4:30 pm and Friday 8:00 am - 12:30 pm.  If you need help with your wound outside these hours and cannot wait until we are again available, contact your PCP or go to the hospital emergency room.      PLEASE NOTE: IF YOU ARE UNABLE TO OBTAIN WOUND SUPPLIES, CONTINUE TO USE THE SUPPLIES YOU HAVE AVAILABLE UNTIL YOU ARE ABLE TO [address].  IT IS MOST IMPORTANT TO KEEP THE WOUND COVERED AT ALL TIMES.     Physician Signature:_______________________     Date: ___________ Time:  ____________                            Dr Michael Rolon Jeet AGE: 66 y.o. GENDER: female  : 1945 being evaluated by a Virtual Visit (video visit) encounter to address concerns as mentioned above. A caregiver was present when appropriate. Due to this being a TeleHealth encounter (During TOM-57 public health emergency), evaluation of the following organ systems was limited: Vitals/Constitutional/EENT/Resp/CV/GI//MS/Neuro/Skin/Heme-Lymph-Imm. Pursuant to the emergency declaration under the [address] [place] [address], [address] [place] [address] authority and the [employer] and [employer] Act, this Virtual Visit was conducted with patient's (and/or legal guardian's) consent, to reduce the patient's risk of exposure to COVID-19 and provide necessary medical care. The patient (and/or legal guardian) has also been advised to contact this office for worsening conditions or problems, and seek emergency medical treatment and/or call 911 if deemed necessary. Services were provided through a video synchronous discussion virtually to substitute for in-person clinic visit. Patient and provider were located at their individual homes.     Electronically signed by Pecolia Holter, MD on 2020 at 5:27 PM

## 2020-07-20 ENCOUNTER — APPOINTMENT (OUTPATIENT)
Dept: GENERAL RADIOLOGY | Age: 75
DRG: 699 | End: 2020-07-20
Payer: MEDICARE

## 2020-07-20 ENCOUNTER — HOSPITAL ENCOUNTER (INPATIENT)
Age: 75
LOS: 3 days | Discharge: HOME HEALTH | DRG: 699 | End: 2020-07-24
Attending: INTERNAL MEDICINE | Admitting: INTERNAL MEDICINE
Payer: MEDICARE

## 2020-07-20 LAB
A/G RATIO: 0.6 (ref 1.1–2.2)
ALBUMIN SERPL-MCNC: 2.2 G/DL (ref 3.4–5)
ALP BLD-CCNC: 195 U/L (ref 40–129)
ALT SERPL-CCNC: 9 U/L (ref 10–40)
ANION GAP SERPL CALCULATED.3IONS-SCNC: 9 MMOL/L (ref 3–16)
AST SERPL-CCNC: 15 U/L (ref 15–37)
BACTERIA: ABNORMAL /HPF
BASOPHILS ABSOLUTE: 0 K/UL (ref 0–0.2)
BASOPHILS RELATIVE PERCENT: 0.4 %
BILIRUB SERPL-MCNC: <0.2 MG/DL (ref 0–1)
BILIRUBIN URINE: NEGATIVE
BLOOD, URINE: ABNORMAL
BUN BLDV-MCNC: 29 MG/DL (ref 7–20)
CALCIUM SERPL-MCNC: 7.6 MG/DL (ref 8.3–10.6)
CHLORIDE BLD-SCNC: 100 MMOL/L (ref 99–110)
CLARITY: ABNORMAL
CO2: 24 MMOL/L (ref 21–32)
COLOR: YELLOW
CREAT SERPL-MCNC: 1.3 MG/DL (ref 0.6–1.2)
EOSINOPHILS ABSOLUTE: 0.2 K/UL (ref 0–0.6)
EOSINOPHILS RELATIVE PERCENT: 1.9 %
EPITHELIAL CELLS, UA: 0 /HPF (ref 0–5)
GFR AFRICAN AMERICAN: 48
GFR NON-AFRICAN AMERICAN: 40
GLOBULIN: 4 G/DL
GLUCOSE BLD-MCNC: 266 MG/DL (ref 70–99)
GLUCOSE URINE: NEGATIVE MG/DL
HCT VFR BLD CALC: 26 % (ref 36–48)
HEMOGLOBIN: 8.7 G/DL (ref 12–16)
HYALINE CASTS: 6 /LPF (ref 0–8)
KETONES, URINE: NEGATIVE MG/DL
LACTIC ACID: 1.6 MMOL/L (ref 0.4–2)
LEUKOCYTE ESTERASE, URINE: ABNORMAL
LYMPHOCYTES ABSOLUTE: 0.7 K/UL (ref 1–5.1)
LYMPHOCYTES RELATIVE PERCENT: 7.2 %
MCH RBC QN AUTO: 32.8 PG (ref 26–34)
MCHC RBC AUTO-ENTMCNC: 33.3 G/DL (ref 31–36)
MCV RBC AUTO: 98.4 FL (ref 80–100)
MICROSCOPIC EXAMINATION: YES
MONOCYTES ABSOLUTE: 0.6 K/UL (ref 0–1.3)
MONOCYTES RELATIVE PERCENT: 6.5 %
NEUTROPHILS ABSOLUTE: 7.8 K/UL (ref 1.7–7.7)
NEUTROPHILS RELATIVE PERCENT: 84 %
NITRITE, URINE: POSITIVE
PDW BLD-RTO: 13.6 % (ref 12.4–15.4)
PH UA: 5.5 (ref 5–8)
PLATELET # BLD: 260 K/UL (ref 135–450)
PMV BLD AUTO: 8.5 FL (ref 5–10.5)
POTASSIUM SERPL-SCNC: 4.3 MMOL/L (ref 3.5–5.1)
PROTEIN UA: NEGATIVE MG/DL
RBC # BLD: 2.64 M/UL (ref 4–5.2)
RBC UA: 10 /HPF (ref 0–4)
SARS-COV-2, NAAT: NOT DETECTED
SODIUM BLD-SCNC: 133 MMOL/L (ref 136–145)
SPECIFIC GRAVITY UA: 1.01 (ref 1–1.03)
TOTAL PROTEIN: 6.2 G/DL (ref 6.4–8.2)
URINE REFLEX TO CULTURE: YES
URINE TYPE: ABNORMAL
UROBILINOGEN, URINE: 0.2 E.U./DL
WBC # BLD: 9.3 K/UL (ref 4–11)
WBC UA: 44 /HPF (ref 0–5)

## 2020-07-20 PROCEDURE — 83605 ASSAY OF LACTIC ACID: CPT

## 2020-07-20 PROCEDURE — 81001 URINALYSIS AUTO W/SCOPE: CPT

## 2020-07-20 PROCEDURE — U0002 COVID-19 LAB TEST NON-CDC: HCPCS

## 2020-07-20 PROCEDURE — 96375 TX/PRO/DX INJ NEW DRUG ADDON: CPT

## 2020-07-20 PROCEDURE — 87040 BLOOD CULTURE FOR BACTERIA: CPT

## 2020-07-20 PROCEDURE — 71045 X-RAY EXAM CHEST 1 VIEW: CPT

## 2020-07-20 PROCEDURE — 87186 SC STD MICRODIL/AGAR DIL: CPT

## 2020-07-20 PROCEDURE — 36415 COLL VENOUS BLD VENIPUNCTURE: CPT

## 2020-07-20 PROCEDURE — 96365 THER/PROPH/DIAG IV INF INIT: CPT

## 2020-07-20 PROCEDURE — 85025 COMPLETE CBC W/AUTO DIFF WBC: CPT

## 2020-07-20 PROCEDURE — 87086 URINE CULTURE/COLONY COUNT: CPT

## 2020-07-20 PROCEDURE — 2580000003 HC RX 258: Performed by: PHYSICIAN ASSISTANT

## 2020-07-20 PROCEDURE — 80053 COMPREHEN METABOLIC PANEL: CPT

## 2020-07-20 PROCEDURE — 87077 CULTURE AEROBIC IDENTIFY: CPT

## 2020-07-20 PROCEDURE — 99285 EMERGENCY DEPT VISIT HI MDM: CPT

## 2020-07-20 PROCEDURE — 6360000002 HC RX W HCPCS: Performed by: PHYSICIAN ASSISTANT

## 2020-07-20 PROCEDURE — 93005 ELECTROCARDIOGRAM TRACING: CPT | Performed by: PHYSICIAN ASSISTANT

## 2020-07-20 RX ORDER — 0.9 % SODIUM CHLORIDE 0.9 %
500 INTRAVENOUS SOLUTION INTRAVENOUS ONCE
Status: COMPLETED | OUTPATIENT
Start: 2020-07-20 | End: 2020-07-20

## 2020-07-20 RX ORDER — FUROSEMIDE 40 MG/1
40 TABLET ORAL EVERY OTHER DAY
COMMUNITY

## 2020-07-20 RX ORDER — GABAPENTIN 300 MG/1
300 CAPSULE ORAL 2 TIMES DAILY
COMMUNITY

## 2020-07-20 RX ORDER — VENLAFAXINE 75 MG/1
150 TABLET ORAL 2 TIMES DAILY
COMMUNITY

## 2020-07-20 RX ORDER — ATORVASTATIN CALCIUM 10 MG/1
10 TABLET, FILM COATED ORAL DAILY
COMMUNITY

## 2020-07-20 RX ORDER — SENNA AND DOCUSATE SODIUM 50; 8.6 MG/1; MG/1
1 TABLET, FILM COATED ORAL DAILY
COMMUNITY

## 2020-07-20 RX ORDER — ISOSORBIDE MONONITRATE 30 MG/1
30 TABLET, EXTENDED RELEASE ORAL DAILY
COMMUNITY

## 2020-07-20 RX ORDER — FERROUS SULFATE 325(65) MG
325 TABLET ORAL 2 TIMES DAILY WITH MEALS
COMMUNITY

## 2020-07-20 RX ORDER — ACETAMINOPHEN 325 MG/1
325 TABLET ORAL 2 TIMES DAILY
Status: ON HOLD | COMMUNITY
End: 2020-07-23 | Stop reason: HOSPADM

## 2020-07-20 RX ORDER — DORZOLAMIDE HYDROCHLORIDE AND TIMOLOL MALEATE 20; 5 MG/ML; MG/ML
1 SOLUTION/ DROPS OPHTHALMIC 2 TIMES DAILY
COMMUNITY

## 2020-07-20 RX ORDER — ATROPINE SULFATE 10 MG/ML
1 SOLUTION/ DROPS OPHTHALMIC 2 TIMES DAILY
COMMUNITY

## 2020-07-20 RX ORDER — PREDNISOLONE ACETATE 10 MG/ML
1 SUSPENSION/ DROPS OPHTHALMIC 2 TIMES DAILY
COMMUNITY

## 2020-07-20 RX ORDER — ASPIRIN 325 MG
325 TABLET, DELAYED RELEASE (ENTERIC COATED) ORAL DAILY
COMMUNITY

## 2020-07-20 RX ORDER — FUROSEMIDE 20 MG/1
20 TABLET ORAL EVERY OTHER DAY
COMMUNITY

## 2020-07-20 RX ORDER — CETIRIZINE HYDROCHLORIDE 10 MG/1
10 TABLET ORAL DAILY
COMMUNITY

## 2020-07-20 RX ADMIN — MEROPENEM 1 G: 1 INJECTION, POWDER, FOR SOLUTION INTRAVENOUS at 20:26

## 2020-07-20 RX ADMIN — VANCOMYCIN HYDROCHLORIDE 1250 MG: 10 INJECTION, POWDER, LYOPHILIZED, FOR SOLUTION INTRAVENOUS at 20:30

## 2020-07-20 RX ADMIN — SODIUM CHLORIDE 500 ML: 9 INJECTION, SOLUTION INTRAVENOUS at 20:26

## 2020-07-20 ASSESSMENT — ENCOUNTER SYMPTOMS
SHORTNESS OF BREATH: 0
VOMITING: 0

## 2020-07-21 PROBLEM — N39.0 COMPLICATED UTI (URINARY TRACT INFECTION): Status: ACTIVE | Noted: 2020-07-21

## 2020-07-21 LAB
GLUCOSE BLD-MCNC: 127 MG/DL (ref 70–99)
GLUCOSE BLD-MCNC: 322 MG/DL (ref 70–99)
GLUCOSE BLD-MCNC: 324 MG/DL (ref 70–99)
GLUCOSE BLD-MCNC: 387 MG/DL (ref 70–99)
GLUCOSE BLD-MCNC: 94 MG/DL (ref 70–99)
PERFORMED ON: ABNORMAL
PERFORMED ON: NORMAL
VANCOMYCIN RANDOM: 15.4 UG/ML

## 2020-07-21 PROCEDURE — 36415 COLL VENOUS BLD VENIPUNCTURE: CPT

## 2020-07-21 PROCEDURE — 80202 ASSAY OF VANCOMYCIN: CPT

## 2020-07-21 PROCEDURE — 2580000003 HC RX 258: Performed by: INTERNAL MEDICINE

## 2020-07-21 PROCEDURE — 6360000002 HC RX W HCPCS: Performed by: INTERNAL MEDICINE

## 2020-07-21 PROCEDURE — 99221 1ST HOSP IP/OBS SF/LOW 40: CPT | Performed by: INTERNAL MEDICINE

## 2020-07-21 PROCEDURE — 94760 N-INVAS EAR/PLS OXIMETRY 1: CPT

## 2020-07-21 PROCEDURE — 1200000000 HC SEMI PRIVATE

## 2020-07-21 PROCEDURE — 97166 OT EVAL MOD COMPLEX 45 MIN: CPT

## 2020-07-21 PROCEDURE — 6370000000 HC RX 637 (ALT 250 FOR IP): Performed by: INTERNAL MEDICINE

## 2020-07-21 PROCEDURE — 97530 THERAPEUTIC ACTIVITIES: CPT

## 2020-07-21 PROCEDURE — 6370000000 HC RX 637 (ALT 250 FOR IP): Performed by: NURSE PRACTITIONER

## 2020-07-21 RX ORDER — DORZOLAMIDE HYDROCHLORIDE AND TIMOLOL MALEATE 20; 5 MG/ML; MG/ML
1 SOLUTION/ DROPS OPHTHALMIC 2 TIMES DAILY
Status: DISCONTINUED | OUTPATIENT
Start: 2020-07-21 | End: 2020-07-24 | Stop reason: HOSPADM

## 2020-07-21 RX ORDER — FERROUS SULFATE TAB EC 324 MG (65 MG FE EQUIVALENT) 324 (65 FE) MG
324 TABLET DELAYED RESPONSE ORAL 2 TIMES DAILY WITH MEALS
Status: DISCONTINUED | OUTPATIENT
Start: 2020-07-21 | End: 2020-07-24 | Stop reason: HOSPADM

## 2020-07-21 RX ORDER — NICOTINE POLACRILEX 4 MG
15 LOZENGE BUCCAL PRN
Status: DISCONTINUED | OUTPATIENT
Start: 2020-07-21 | End: 2020-07-24 | Stop reason: HOSPADM

## 2020-07-21 RX ORDER — INSULIN GLARGINE 100 [IU]/ML
10 INJECTION, SOLUTION SUBCUTANEOUS DAILY
Status: DISCONTINUED | OUTPATIENT
Start: 2020-07-21 | End: 2020-07-24 | Stop reason: HOSPADM

## 2020-07-21 RX ORDER — SENNA AND DOCUSATE SODIUM 50; 8.6 MG/1; MG/1
1 TABLET, FILM COATED ORAL DAILY
Status: DISCONTINUED | OUTPATIENT
Start: 2020-07-21 | End: 2020-07-24 | Stop reason: HOSPADM

## 2020-07-21 RX ORDER — LACTOBACILLUS RHAMNOSUS GG 10B CELL
1 CAPSULE ORAL
Status: DISCONTINUED | OUTPATIENT
Start: 2020-07-21 | End: 2020-07-24 | Stop reason: HOSPADM

## 2020-07-21 RX ORDER — SODIUM CHLORIDE, SODIUM LACTATE, POTASSIUM CHLORIDE, CALCIUM CHLORIDE 600; 310; 30; 20 MG/100ML; MG/100ML; MG/100ML; MG/100ML
INJECTION, SOLUTION INTRAVENOUS CONTINUOUS
Status: DISCONTINUED | OUTPATIENT
Start: 2020-07-21 | End: 2020-07-22

## 2020-07-21 RX ORDER — DEXTROSE MONOHYDRATE 25 G/50ML
12.5 INJECTION, SOLUTION INTRAVENOUS PRN
Status: DISCONTINUED | OUTPATIENT
Start: 2020-07-21 | End: 2020-07-24 | Stop reason: HOSPADM

## 2020-07-21 RX ORDER — ISOSORBIDE MONONITRATE 30 MG/1
30 TABLET, EXTENDED RELEASE ORAL DAILY
Status: DISCONTINUED | OUTPATIENT
Start: 2020-07-21 | End: 2020-07-24 | Stop reason: HOSPADM

## 2020-07-21 RX ORDER — PREDNISOLONE ACETATE 10 MG/ML
1 SUSPENSION/ DROPS OPHTHALMIC 2 TIMES DAILY
Status: DISCONTINUED | OUTPATIENT
Start: 2020-07-21 | End: 2020-07-24 | Stop reason: HOSPADM

## 2020-07-21 RX ORDER — GABAPENTIN 300 MG/1
300 CAPSULE ORAL 2 TIMES DAILY
Status: DISCONTINUED | OUTPATIENT
Start: 2020-07-21 | End: 2020-07-24 | Stop reason: HOSPADM

## 2020-07-21 RX ORDER — PROMETHAZINE HYDROCHLORIDE 25 MG/1
12.5 TABLET ORAL EVERY 6 HOURS PRN
Status: DISCONTINUED | OUTPATIENT
Start: 2020-07-21 | End: 2020-07-24 | Stop reason: HOSPADM

## 2020-07-21 RX ORDER — ATORVASTATIN CALCIUM 10 MG/1
10 TABLET, FILM COATED ORAL DAILY
Status: DISCONTINUED | OUTPATIENT
Start: 2020-07-21 | End: 2020-07-24 | Stop reason: HOSPADM

## 2020-07-21 RX ORDER — VENLAFAXINE 37.5 MG/1
150 TABLET ORAL 2 TIMES DAILY
Status: DISCONTINUED | OUTPATIENT
Start: 2020-07-21 | End: 2020-07-24 | Stop reason: HOSPADM

## 2020-07-21 RX ORDER — ATROPINE SULFATE 10 MG/ML
1 SOLUTION/ DROPS OPHTHALMIC 2 TIMES DAILY
Status: DISCONTINUED | OUTPATIENT
Start: 2020-07-21 | End: 2020-07-24 | Stop reason: HOSPADM

## 2020-07-21 RX ORDER — ONDANSETRON 2 MG/ML
4 INJECTION INTRAMUSCULAR; INTRAVENOUS EVERY 6 HOURS PRN
Status: DISCONTINUED | OUTPATIENT
Start: 2020-07-21 | End: 2020-07-24 | Stop reason: HOSPADM

## 2020-07-21 RX ORDER — SODIUM CHLORIDE 0.9 % (FLUSH) 0.9 %
10 SYRINGE (ML) INJECTION EVERY 12 HOURS SCHEDULED
Status: DISCONTINUED | OUTPATIENT
Start: 2020-07-21 | End: 2020-07-24 | Stop reason: HOSPADM

## 2020-07-21 RX ORDER — SODIUM CHLORIDE 0.9 % (FLUSH) 0.9 %
10 SYRINGE (ML) INJECTION PRN
Status: DISCONTINUED | OUTPATIENT
Start: 2020-07-21 | End: 2020-07-24 | Stop reason: HOSPADM

## 2020-07-21 RX ORDER — DEXTROSE MONOHYDRATE 50 MG/ML
100 INJECTION, SOLUTION INTRAVENOUS PRN
Status: DISCONTINUED | OUTPATIENT
Start: 2020-07-21 | End: 2020-07-24 | Stop reason: HOSPADM

## 2020-07-21 RX ADMIN — INSULIN GLARGINE 10 UNITS: 100 INJECTION, SOLUTION SUBCUTANEOUS at 12:20

## 2020-07-21 RX ADMIN — ATORVASTATIN CALCIUM 10 MG: 10 TABLET, FILM COATED ORAL at 09:58

## 2020-07-21 RX ADMIN — ATROPINE SULFATE 1 DROP: 10 SOLUTION OPHTHALMIC at 20:41

## 2020-07-21 RX ADMIN — DORZOLAMIDE HYDROCHLORIDE AND TIMOLOL MALEATE 1 DROP: 20; 5 SOLUTION/ DROPS OPHTHALMIC at 12:22

## 2020-07-21 RX ADMIN — METOPROLOL TARTRATE 25 MG: 25 TABLET, FILM COATED ORAL at 09:57

## 2020-07-21 RX ADMIN — ISOSORBIDE MONONITRATE 30 MG: 30 TABLET, EXTENDED RELEASE ORAL at 09:58

## 2020-07-21 RX ADMIN — FERROUS SULFATE TAB EC 324 MG (65 MG FE EQUIVALENT) 324 MG: 324 (65 FE) TABLET DELAYED RESPONSE at 10:05

## 2020-07-21 RX ADMIN — SODIUM CHLORIDE, POTASSIUM CHLORIDE, SODIUM LACTATE AND CALCIUM CHLORIDE: 600; 310; 30; 20 INJECTION, SOLUTION INTRAVENOUS at 02:36

## 2020-07-21 RX ADMIN — DOCUSATE SODIUM 50 MG AND SENNOSIDES 8.6 MG 1 TABLET: 8.6; 5 TABLET, FILM COATED ORAL at 10:04

## 2020-07-21 RX ADMIN — Medication 5000 UNITS: at 10:05

## 2020-07-21 RX ADMIN — DORZOLAMIDE HYDROCHLORIDE AND TIMOLOL MALEATE 1 DROP: 20; 5 SOLUTION/ DROPS OPHTHALMIC at 20:41

## 2020-07-21 RX ADMIN — MEROPENEM 500 MG: 500 INJECTION, POWDER, FOR SOLUTION INTRAVENOUS at 16:10

## 2020-07-21 RX ADMIN — GABAPENTIN 300 MG: 300 CAPSULE ORAL at 20:40

## 2020-07-21 RX ADMIN — PANCRELIPASE 12000 UNITS: 60000; 12000; 38000 CAPSULE, DELAYED RELEASE PELLETS ORAL at 17:14

## 2020-07-21 RX ADMIN — GABAPENTIN 300 MG: 300 CAPSULE ORAL at 10:04

## 2020-07-21 RX ADMIN — ATROPINE SULFATE 1 DROP: 10 SOLUTION OPHTHALMIC at 12:22

## 2020-07-21 RX ADMIN — VENLAFAXINE 150 MG: 37.5 TABLET ORAL at 10:04

## 2020-07-21 RX ADMIN — ENOXAPARIN SODIUM 40 MG: 40 INJECTION SUBCUTANEOUS at 09:58

## 2020-07-21 RX ADMIN — PREDNISOLONE ACETATE 1 DROP: 10 SUSPENSION/ DROPS OPHTHALMIC at 20:41

## 2020-07-21 RX ADMIN — INSULIN LISPRO 3 UNITS: 100 INJECTION, SOLUTION INTRAVENOUS; SUBCUTANEOUS at 12:20

## 2020-07-21 RX ADMIN — INSULIN LISPRO 8 UNITS: 100 INJECTION, SOLUTION INTRAVENOUS; SUBCUTANEOUS at 09:59

## 2020-07-21 RX ADMIN — FERROUS SULFATE TAB EC 324 MG (65 MG FE EQUIVALENT) 324 MG: 324 (65 FE) TABLET DELAYED RESPONSE at 17:14

## 2020-07-21 RX ADMIN — ASPIRIN 325 MG: 325 TABLET, DELAYED RELEASE ORAL at 09:58

## 2020-07-21 RX ADMIN — INSULIN LISPRO 5 UNITS: 100 INJECTION, SOLUTION INTRAVENOUS; SUBCUTANEOUS at 12:20

## 2020-07-21 RX ADMIN — Medication 1 CAPSULE: at 09:58

## 2020-07-21 RX ADMIN — PREDNISOLONE ACETATE 1 DROP: 10 SUSPENSION/ DROPS OPHTHALMIC at 12:22

## 2020-07-21 RX ADMIN — PANCRELIPASE 12000 UNITS: 60000; 12000; 38000 CAPSULE, DELAYED RELEASE PELLETS ORAL at 12:22

## 2020-07-21 RX ADMIN — CEFEPIME HYDROCHLORIDE 1 G: 1 INJECTION, POWDER, FOR SOLUTION INTRAMUSCULAR; INTRAVENOUS at 02:35

## 2020-07-21 RX ADMIN — Medication 10 ML: at 02:35

## 2020-07-21 RX ADMIN — VENLAFAXINE 150 MG: 37.5 TABLET ORAL at 20:40

## 2020-07-21 RX ADMIN — SODIUM CHLORIDE, POTASSIUM CHLORIDE, SODIUM LACTATE AND CALCIUM CHLORIDE: 600; 310; 30; 20 INJECTION, SOLUTION INTRAVENOUS at 09:58

## 2020-07-21 ASSESSMENT — PAIN SCALES - GENERAL
PAINLEVEL_OUTOF10: 0

## 2020-07-21 NOTE — PLAN OF CARE
Problem: Skin Integrity:  Goal: Will show no infection signs and symptoms  Description: Will show no infection signs and symptoms  7/21/2020 0940 by Karen Koch RN  Outcome: Ongoing     Problem: Skin Integrity:  Goal: Absence of new skin breakdown  Description: Absence of new skin breakdown  7/21/2020 0940 by Karen Koch RN  Outcome: Ongoing     Problem: Falls - Risk of:  Goal: Will remain free from falls  Description: Will remain free from falls  7/21/2020 0940 by Karen Koch RN  Outcome: Ongoing     Problem: Falls - Risk of:  Goal: Absence of physical injury  Description: Absence of physical injury  7/21/2020 0940 by Karen Koch RN  Outcome: Ongoing

## 2020-07-21 NOTE — ED PROVIDER NOTES
(2-9 systems for level 4, 10 or more for level 5)     Review of Systems   Constitutional: Positive for fatigue. Negative for fever. Respiratory: Negative for shortness of breath. Cardiovascular: Positive for leg swelling. Gastrointestinal: Negative for vomiting. Musculoskeletal: Positive for gait problem. Skin: Positive for wound. Neurological: Positive for weakness. Psychiatric/Behavioral: Positive for confusion (baseline mental status). Except as noted above the remainder of the review of systems was reviewed and negative. PAST MEDICAL HISTORY   History reviewed. No pertinent past medical history. SURGICAL HISTORY     History reviewed. No pertinent surgical history. CURRENT MEDICATIONS       Previous Medications    ACETAMINOPHEN (TYLENOL) 325 MG TABLET    Take 325 mg by mouth 2 times daily     ASPIRIN 325 MG EC TABLET    Take 325 mg by mouth daily    ATORVASTATIN (LIPITOR) 10 MG TABLET    Take 10 mg by mouth daily    ATROPINE 1 % OPHTHALMIC SOLUTION    Place 1 drop into the right eye 2 times daily    BIOTIN PO    Take 1 tablet by mouth nightly     CETIRIZINE (ZYRTEC) 10 MG TABLET    Take 10 mg by mouth daily    CHOLECALCIFEROL (VITAMIN D3) 125 MCG (5000 UT) TABS    Take 5,000 Units by mouth daily    DORZOLAMIDE-TIMOLOL (COSOPT) 22.3-6.8 MG/ML OPHTHALMIC SOLUTION    Place 1 drop into the right eye 2 times daily    FERROUS SULFATE (IRON 325) 325 (65 FE) MG TABLET    Take 325 mg by mouth 2 times daily (with meals)    FUROSEMIDE (LASIX) 20 MG TABLET    Take 20 mg by mouth every other day Alternating with 40 mg tablet    FUROSEMIDE (LASIX) 40 MG TABLET    Take 40 mg by mouth every other day Alternating with 20 mg tablet    GABAPENTIN (NEURONTIN) 300 MG CAPSULE    Take 300 mg by mouth 2 times daily.  Noon and dinner    INSULIN PUMP - INSULIN LISPRO    Inject 1.2 Units/hr into the skin continuous Bolus as needed    ISOSORBIDE MONONITRATE (IMDUR) 30 MG EXTENDED RELEASE TABLET    Take 30 interpretation:    No evidence of acute ischemia or injury. RADIOLOGY:   Non-plain film images such as CT, Ultrasound and MRI are read by the radiologist. Plain radiographic images are visualized and preliminarily interpreted by SHANNAN Castaneda with the below findings:    Reviewed radiologist's interpretation. Interpretation per the Radiologist below, if available at the time of this note:    XR CHEST PORTABLE   Final Result   Mild cardial-pericardial silhouette enlargement. Otherwise unremarkable   portable chest radiograph.                LABS:  Labs Reviewed   CBC WITH AUTO DIFFERENTIAL - Abnormal; Notable for the following components:       Result Value    RBC 2.64 (*)     Hemoglobin 8.7 (*)     Hematocrit 26.0 (*)     Neutrophils Absolute 7.8 (*)     Lymphocytes Absolute 0.7 (*)     All other components within normal limits    Narrative:     Performed at:  [address] [place] [address]   Phone [phone]   COMPREHENSIVE METABOLIC PANEL - Abnormal; Notable for the following components:    Sodium 133 (*)     Glucose 266 (*)     BUN 29 (*)     CREATININE 1.3 (*)     GFR Non- 40 (*)     GFR  48 (*)     Calcium 7.6 (*)     Total Protein 6.2 (*)     Alb 2.2 (*)     Albumin/Globulin Ratio 0.6 (*)     Alkaline Phosphatase 195 (*)     ALT 9 (*)     All other components within normal limits    Narrative:     Performed at:  [address] [employer] [address]   Phone [phone]   URINE RT REFLEX TO CULTURE - Abnormal; Notable for the following components:    Clarity, UA CLOUDY (*)     Blood, Urine LARGE (*)     Nitrite, Urine POSITIVE (*)     Leukocyte Esterase, Urine LARGE (*)     All other components within normal limits    Narrative:     Performed at:  [address] [employer] [address]   Phone [phone] MICROSCOPIC URINALYSIS - Abnormal; Notable for the following components:    Bacteria, UA RARE (*)     WBC, UA 44 (*)     RBC, UA 10 (*)     All other components within normal limits    Narrative:     Performed at:  [facility]  [address] [place], [place] [address]   Phone [phone]   CULTURE, BLOOD 1   CULTURE, BLOOD 2   CULTURE, URINE   LACTIC ACID, PLASMA    Narrative:     Performed at:  [facility]  [address] [place], [place] [address]   Phone [phone]       All other labs were within normal range or not returned as of this dictation. EMERGENCY DEPARTMENT COURSE and DIFFERENTIAL DIAGNOSIS/MDM:   Vitals:    Vitals:    07/20/20 1829 07/20/20 2000 07/20/20 2026   BP: (!) 131/42  (!) 118/53   Pulse: 75 78 80   Resp: 16 19 16   Temp: 98.8 °F (37.1 °C)  98.7 °F (37.1 °C)   TempSrc: Oral     SpO2: 99% 100% 99%   Weight: 174 lb 0.6 oz (78.9 kg)       Patient is afebrile not tachycardic. She is answering questions and daughter states she is at her baseline mental status but has had increasing weakness and inability to walk over the past couple days. She states she starts to get hallucinations when she gets septic but she is trying to get her treated earlier after 7 days of doxycycline after urine culture grew MRSA the patient has had worsening wounds and increasing weakness. Urine continues to show evidence of infection with positive nitrates and leukocytes. Given vancomycin and meropenem to cover for leg wounds and a diabetic as well as infection possibly MRSA UTI. Will consult hospitalist for admission patient's daughter who is her power of  is agreeable and understands. CONSULTS:  IP CONSULT TO PHARMACY  IP CONSULT TO HOSPITALIST    PROCEDURES:  Procedures      FINAL IMPRESSION      1. Altered mental status, unspecified altered mental status type    2.  Urinary tract infection without hematuria, site

## 2020-07-21 NOTE — PROGRESS NOTES
Admission from ER to Room 4111 per [facility] bed accompanied by ER RN. Patient transferred in bed, kept comfortable, side rails secured, bed in lowest position, bed locked and bed alarm on. Patient oriented to room and unit setting. Call light instructed and put in reach. Skin assessment and wound care done. All needs attended. Safety ensured.  Electronically signed by Adriana Shepard RN on 7/21/2020 at 2:27 AM

## 2020-07-21 NOTE — H&P
[address]                              HISTORY AND PHYSICAL    PATIENT NAME: Peggy De Dios                     :        1945  MED REC NO:   [id number]                          ROOM:       4111  ACCOUNT NO:   [de-identified]                           ADMIT DATE: 2020  PROVIDER:     Anirudh Mera MD    I obtained the history and performed the physical exam on the patient in  emergency room on 2020. CHIEF COMPLAINT:  \"I do not know what happened. I do not know why I am  here:. HISTORY OF PRESENT ILLNESS:  The patient is a 64-year-old   female who presented to the hospital with what appears to be increasing  fatigue and weakness over the past 3-4 days, also associated with just  generalized decrease in functioning with progressive inability to walk  or move around without nausea or vomiting. The patient's daughter noted  the rapid decline in the past two days. She was recently being treated  for what appeared to be lower extremity wounds and had a UTI that  apparently grew MRSA. This is all per the patient's daughter who  provided most of the history. The patient was unable to provide a good  history since she did not quite understand why she was in the hospital.    PAST MEDICAL/PAST SURGICAL HISTORY:  1. Dyslipidemia. 2.  Type 2 diabetes mellitus. 3.  Hypertension. PAST SURGICAL HISTORY:  No major procedures documented. ALLERGIC HISTORY:  No known drug allergies. FAMILY HISTORY:  Reviewed by me and is currently noncontributory. SOCIAL HISTORY:  Lives at home. No illicit substance use. MEDICATIONS:  The patient's home medication list has been reviewed by me  and has been listed in the electronic medical record. REVIEW OF SYSTEMS:  Review of systems is significant for the fatigue and  weakness and per the history of present illness.   All other systems have  been reviewed and are negative except as per the history of present  illness. PHYSICAL EXAMINATION:  The patient was examined by me in the emergency  room. VITAL SIGNS:  Temperature 98.8, respiratory rate is 16, pulse 95, blood  pressure 131/42, saturating 99%. CNS:  The patient is currently alert and awake. Notes that she is in  [address] [place] [address] Street:  The patient is not hallucinating. HEENT:  Eyes:  Pupils are reacting. ENT:  No oral mucosal lesions. RESPIRATORY:  The patient has got symmetrical chest wall movements. Nonlabored respiration. ABDOMEN:  Soft. No obvious guarding, rigidity, or rebound. MUSCULOSKELETAL EXAM:  No acute deformities. SKIN:  The patient has got bilateral lower extremity wounds which are  covered with dressing. DIAGNOSTIC DATA:  COVID-19 test requested by me is negative. Urine  white count is 44, rare bacteria, large amount of leukocyte esterase  with positive nitrites. BUN 29, creatinine 1.3, sodium 133. GFR is 40. Chest x-ray shows mild cardiopericardial silhouette enlargement. CBC showed a white count of 9.3, neutrophils 7.8, lymphocytes of 0.7. CONSULTATIONS REQUESTED BY ME:  Currently none. ASSESSMENT:  1.  Gram-negative bacteria urinary tract infection, complicated with  acute kidney injury. 2.  Anemia. 3.  Hypertension. 4.  Dyslipidemia. 5.  Diabetes mellitus. PLAN OF CARE:  The patient admitted to Internal Medicine Service. IV  vancomycin and cefepime have been initiated. Pharmacy and dosing  consult has been initiated for dosing of the patient's insulin pump. The patient's home medications namely her anti-dyslipidemic therapy and  her nitrates will be continued. DVT prophylaxis will be continued with  Lovenox. CODE STATUS:  Full. EXPECTED LENGTH OF STAY:  More than two midnights based on the plan of  care above. COVID testing was requested and was negative.     RISK:  High due to the patient's presentation with the complicated  urinary tract infection and acute kidney injury. DISPOSITION:  Admitted to Internal Medicine Service.         Corin Motta MD    D: 07/21/2020 1:10:54       T: 07/21/2020 2:59:33     [email or web address]  Job#: [id number]     Doc#: [id number]    CC:

## 2020-07-21 NOTE — ED PROVIDER NOTES
Per my interpretation:    Electrocardiogram (ECG) 7/20/2020 1907  RATE: normal  RHYTHM: normal sinus  AXIS: normal  INTERVALS: normal  ST-T WAVE CHANGES: No evidence of ST segment elevation or T-wave inversion, right bundle branch block, Q waves in inferior leads  Prior for comparison - none     Julio Roman MD  07/20/20 2012

## 2020-07-21 NOTE — CARE COORDINATION
[facility]  Hyperglycemia Event      NAME: Deepa Cheema  MEDICAL RECORD NUMBER:  [id number]  AGE: 66 y.o. GENDER: female  : 1945  EPISODE DATE:  2020     Data     Recent Labs     20  0156 20  0847   POCGLU 322* 324*       Medications  Scheduled Medications:   metoprolol tartrate  25 mg Oral BID    venlafaxine  150 mg Oral BID    isosorbide mononitrate  30 mg Oral Daily    atorvastatin  10 mg Oral Daily    aspirin  325 mg Oral Daily    sodium chloride flush  10 mL Intravenous 2 times per day    enoxaparin  40 mg Subcutaneous Daily    cefepime  1 g Intravenous Q12H    vancomycin (VANCOCIN) intermittent dosing (placeholder)   Other RX Placeholder    lactobacillus  1 capsule Oral Daily with breakfast    insulin lispro  0-6 Units Subcutaneous Nightly    insulin lispro  0-12 Units Subcutaneous TID WC    atropine  1 drop Right Eye BID    dorzolamide-timolol  1 drop Right Eye BID    lipase-protease-amylase  12,000 Units Oral BID WC    [START ON 2020] mirabegron  50 mg Oral Daily    prednisoLONE acetate  1 drop Right Eye BID    sennosides-docusate sodium  1 tablet Oral Daily    gabapentin  300 mg Oral BID    ferrous sulfate  324 mg Oral BID WC    vitamin D  5,000 Units Oral Daily       Diet  Current diet/supplement order: DIET CARB CONTROL; Dental Soft     Recorded PO: PO Meals Eaten (%): 76 - 100% last meal in flowsheets      Action     Domenica Gauthier is confused. She answers to her name but does not know where she is or situation. She is not able to answer any questions regarding diabetes self care. Discussed home insulin strategy with daughter - Meliza by phone. Insulin pump with basal rate 0.85units/hr, insulin to carb ratio 1:10. Meliza reports that Domenica Gauthier has not been eating at home. She states that she will usually have hypoglycemia symptoms when less than 95. Meliza manages diabetes care at home.       Consults (yolanda all that apply):   [x]   Pharmacy []   Dietitian    []   Diabetic Educator  []   Other (please describe)     Recommend Lantus 10 units to start now, Humalog 3 units prandial and low correction. BG targets 100 -180.         Electronically signed by Bouchra Romo RN on 7/21/2020 at 11:12 AM

## 2020-07-21 NOTE — CARE COORDINATION
[facility]  Here is update from [facility]. Nurse practitioner saw her last week and just called with blood work results   Pre Albumin is low - indicating she is malnourished - this evaluates her protein - I am not surprised as she is not eating much and refuses to eat meat   She said she is having trouble swallowing - they may want to check that out and see if her esophagus needs to be stretched. I had Speech coming to work with her. Calcium is low   Globulin level is high - NP was going to run SPAP test on her to rule out multiple myeloma versus effects of her poor kidney function.   Casie Auguste LPN  CTN with  [facility],  [address], Fax [phone]

## 2020-07-21 NOTE — CONSULTS
Infectious Diseases Inpatient Consult Note      Reason for Consult:  Bi lateral lower leg cellulitis and UTI   Requesting Physician:  FRANCHESCA Carney    Primary Care Physician:  Aidan Bradley    History Obtained From:  Pt and Medical records    CHIEF COMPLAINT:     Chief Complaint   Patient presents with    Fatigue     increased. Lower leg cellulitis and confusion     HISTORY OF PRESENT ILLNESS:  66 y.o. woman with previous h/o lower leg cellulitis and recurrent admit for similar problems has SPC in place and previous MRSA infections and recurrent UTI with change in mentation admitted with decline in functional status per HPI and creat elevation on admit on weeping ulcers on both legs and we are consulted for IV abx recommendations. Unfortunately [facility] has two different charts and past history limited as the charts needs to be merged together.          Past Medical History:    MRSA infection   Cellulitis recurrent  CKD    Past Surgical History:   Supra pubic catheter in place       Current Medications:    Outpatient Medications Marked as Taking for the 7/20/20 encounter [facility] Encounter)   Medication Sig Dispense Refill    cetirizine (ZYRTEC) 10 MG tablet Take 10 mg by mouth daily      mirabegron (MYRBETRIQ) 50 MG TB24 Take 50 mg by mouth daily      furosemide (LASIX) 40 MG tablet Take 40 mg by mouth every other day Alternating with 20 mg tablet      furosemide (LASIX) 20 MG tablet Take 20 mg by mouth every other day Alternating with 40 mg tablet      dorzolamide-timolol (COSOPT) 22.3-6.8 MG/ML ophthalmic solution Place 1 drop into the right eye 2 times daily      atropine 1 % ophthalmic solution Place 1 drop into the right eye 2 times daily      prednisoLONE acetate (PRED FORTE) 1 % ophthalmic suspension Place 1 drop into the right eye 2 times daily      aspirin 325 MG EC tablet Take 325 mg by mouth daily      sennosides-docusate sodium (SENOKOT-S) 8.6-50 MG tablet Take 1 tablet by mouth daily      isosorbide mononitrate (IMDUR) 30 MG extended release tablet Take 30 mg by mouth daily      Cholecalciferol (VITAMIN D3) 125 MCG (5000 UT) TABS Take 5,000 Units by mouth daily      LACTOBACILLUS PO Take 1 tablet by mouth nightly      ferrous sulfate (IRON 325) 325 (65 Fe) MG tablet Take 325 mg by mouth 2 times daily (with meals)      Insulin Pump - insulin lispro Inject 1.2 Units/hr into the skin continuous Bolus as needed      venlafaxine (EFFEXOR) 75 MG tablet Take 150 mg by mouth 2 times daily      atorvastatin (LIPITOR) 10 MG tablet Take 10 mg by mouth daily      metoprolol tartrate (LOPRESSOR) 25 MG tablet Take 25 mg by mouth 2 times daily      BIOTIN PO Take 1 tablet by mouth nightly       lipase-protease-amylase (CREON) 40156 units delayed release capsule Take 12,000 Units by mouth 2 times daily      gabapentin (NEURONTIN) 300 MG capsule Take 300 mg by mouth 2 times daily. Noon and dinner      acetaminophen (TYLENOL) 325 MG tablet Take 325 mg by mouth 2 times daily          Allergies:  Patient has no known allergies. Immunizations : There is no immunization history on file for this patient. Social History:    Social History     Tobacco Use    Smoking status: Not on file   Substance Use Topics    Alcohol use: Not on file    Drug use: Not on file     Social History     Tobacco Use   Smoking Status Not on file      Family History : no DVT no COPD       REVIEW OF SYSTEMS:    No fever / chills / sweats. No weight loss. No visual change, eye pain, eye discharge. No oral lesion, sore throat, dysphagia. Denies cough / sputum/Sob   Denies chest pain, palpitations/ dizziness  Denies nausea/ vomiting/abdominal pain/diarrhea. Denies dysuria or change in urinary function. Denies joint swelling or pain. No myalgia, arthralgia. No rashes, skin lesions   Denies focal weakness, sensory change or other neurologic symptoms  No lymph node swelling or tenderness.     Confusion, lower leg cellulitis, UTI    PHYSICAL EXAM:      Vitals:  T max 100.1   BP (!) 102/51   Pulse 95   Temp 98.9 °F (37.2 °C) (Axillary)   Resp 17   Ht 4' 11\" (1.499 m)   Wt 172 lb 2.9 oz (78.1 kg)   SpO2 97%   BMI 34.78 kg/m²     General Appearance: awake and ,in SOME acute distress, ++ pallor, no icterus   Skin: warm and dry, no rash or erythema  Head: normocephalic and atraumatic  Eyes: pupils equal, round, and reactive to light, conjunctivae normal  ENT: tympanic membrane, external ear and ear canal normal bilaterally, nose without deformity, nasal mucosa and turbinates normal without polyps  Neck: supple and non-tender without mass, no thyromegaly  no cervical lymphadenopathy  Pulmonary/Chest: clear to auscultation bilaterally- no wheezes, rales or rhonchi, normal air movement, no respiratory distress  Cardiovascular: normal rate, regular rhythm, normal S1 and S2, no murmurs, rubs, clicks, or gallops, no carotid bruits  Abdomen: soft, non-tender, non-distended, normal bowel sounds, no masses or organomegaly  Extremities: no cyanosis, clubbing or edema  Musculoskeletal: normal range of motion, no joint swelling, deformity or tenderness  Neurologic: reflexes normal and symmetric, no cranial nerve deficit  Lines:  IV  Supra pubic catheter+  Bi lateral lower leg cellulitis and weeping ulcer and edema+         DATA:    Lab Results   Component Value Date    WBC 9.3 07/20/2020    HGB 8.7 (L) 07/20/2020    HCT 26.0 (L) 07/20/2020    MCV 98.4 07/20/2020     07/20/2020     Lab Results   Component Value Date    CREATININE 1.3 (H) 07/20/2020    BUN 29 (H) 07/20/2020     (L) 07/20/2020    K 4.3 07/20/2020     07/20/2020    CO2 24 07/20/2020       Hepatic Function Panel:   Lab Results   Component Value Date    ALKPHOS 195 07/20/2020    ALT 9 07/20/2020    AST 15 07/20/2020    PROT 6.2 07/20/2020    BILITOT <0.2 07/20/2020    LABALBU 2.2 07/20/2020     UA:  Lab Results   Component Value Date    COLORU YELLOW 07/20/2020    CLARITYU CLOUDY 07/20/2020    GLUCOSEU Negative 07/20/2020    BILIRUBINUR Negative 07/20/2020    KETUA Negative 07/20/2020    SPECGRAV 1.009 07/20/2020    BLOODU LARGE 07/20/2020    PHUR 5.5 07/20/2020    PROTEINU Negative 07/20/2020    UROBILINOGEN 0.2 07/20/2020    NITRU POSITIVE 07/20/2020    LEUKOCYTESUR LARGE 07/20/2020    LABMICR YES 07/20/2020    URINETYPE NotGiven 07/20/2020      Urine Microscopic:   Lab Results   Component Value Date    BACTERIA RARE 07/20/2020    HYALCAST 6 07/20/2020    WBCUA 44 07/20/2020    RBCUA 10 07/20/2020    EPIU 0 07/20/2020         Scheduled Meds:   metoprolol tartrate  25 mg Oral BID    venlafaxine  150 mg Oral BID    isosorbide mononitrate  30 mg Oral Daily    atorvastatin  10 mg Oral Daily    aspirin  325 mg Oral Daily    sodium chloride flush  10 mL Intravenous 2 times per day    enoxaparin  40 mg Subcutaneous Daily    cefepime  1 g Intravenous Q12H    vancomycin (VANCOCIN) intermittent dosing (placeholder)   Other RX Placeholder    lactobacillus  1 capsule Oral Daily with breakfast    atropine  1 drop Right Eye BID    dorzolamide-timolol  1 drop Right Eye BID    lipase-protease-amylase  12,000 Units Oral BID     [START ON 7/22/2020] mirabegron  50 mg Oral Daily    prednisoLONE acetate  1 drop Right Eye BID    sennosides-docusate sodium  1 tablet Oral Daily    gabapentin  300 mg Oral BID    ferrous sulfate  324 mg Oral BID     vitamin D  5,000 Units Oral Daily    insulin lispro  0-6 Units Subcutaneous TID     insulin lispro  0-3 Units Subcutaneous Nightly    insulin glargine  10 Units Subcutaneous Daily    insulin lispro  3 Units Subcutaneous TID        Continuous Infusions:   lactated ringers 125 mL/hr at 07/21/20 0958    dextrose         PRN Meds:  sodium chloride flush, promethazine **OR** ondansetron, glucose, dextrose, glucagon (rDNA), dextrose    MICRO: cultures reviewed and updated by me     Procedure  Component Value  Units  Date/Time    Culture, Urine [[id number]]   Collected: 07/20/20 1856    Order Status: No result  Updated: 07/20/20 2003    Culture, Blood 1 [[id number]]   Collected: 07/20/20 1852    Order Status: Sent  Specimen: Blood  Updated: 07/20/20 1902    Culture, Blood 2 [[id number]]   Collected: 07/20/20 1852    Order Status: Sent  Specimen: Blood  Updated: 07/20/20 1902 7/20/2020  9:17 PM - Addison Boot Incoming Lab Results From Soft (Epic Adt)           Component  Value  Ref Range & Units  Status  Collected  Lab    SARS-CoV-2, NAAT  Not Detected  Not Detected  Final  07/20/2020  8:21 PM  [address] Lab    Rapid NAAT:   Negative results should be treated as presumptive and,   if inconsistent with clinical signs and symptoms or necessary for   patient management, should be tested with an alternative molecular          Urine Culture  No results for input(s): [address] [place]ather in the last 72 hours. Imaging:   XR CHEST PORTABLE   Final Result   Mild cardial-pericardial silhouette enlargement. Otherwise unremarkable   portable chest radiograph. All pertinent images and reports for the current Hospitalization were reviewed by me. IMPRESSION:    Patient Active Problem List   Diagnosis    Complicated UTI (urinary tract infection)     Change in Mentation  Supra pubic catheter in place    Low grade temp+  Lower leg cellulitis  Recurrent lower leg cellulitis  KACIE+  H/o MRSA infection in the past     On going lower leg cellulitis and lower leg edema with weeping ulcers will need IV abx for now until legs looking better       Labs, Microbiology, Radiology and pertinent results from current hospitalization and care every where were reviewed by me as a part of the consultation. PLAN :  1. Cont IV Vancomycin by levels due to KACIE  2. D/C Cefepime had Encephalopathy in the past  3. IV Meropenem x 500 mg Q 8 hrs  4. Cont local care  5.  Elevate the extremities      Discussed with patient/Family and Nursing Thanks for allowing me to participate in your patient's care please call me with any questions or concerns.     Dr. Melvin Morel MD  [address] [place] [address]  Phone: [phone]   Fax : [phone]

## 2020-07-21 NOTE — PLAN OF CARE
Problem: Skin Integrity:  Goal: Will show no infection signs and symptoms  Description: Will show no infection signs and symptoms  7/21/2020 0940 by Abel Botello RN  Outcome: Ongoing  7/21/2020 0228 by Nichelle Lee RN  Outcome: Ongoing  Goal: Absence of new skin breakdown  Description: Absence of new skin breakdown  7/21/2020 0940 by Abel Botello RN  Outcome: Ongoing  7/21/2020 0228 by Nichelle Lee RN  Outcome: Ongoing   -zinc barrier to buttocks  -turn and reposition every 2 hours  -chair cushion, encourage to reposition self frequently while in chair  -elevate heels  -adaptic and roll guaze to BLE.  Change daily

## 2020-07-21 NOTE — PROGRESS NOTES
Occupational Therapy   Occupational Therapy Initial Assessment  Date: 2020   Patient Name: Te Ballard  MRN: [id number]     : 1945    Date of Service: 2020    Discharge Recommendations:  3-5 sessions per week, Patient would benefit from continued therapy after discharge      Te Ballard scored a 13/24 on the AM-PAC ADL Inpatient form. Current research shows that an AM-PAC score of 17 or less is typically not associated with a discharge to the patient's home setting. Based on the patient's AM-PAC score and their current ADL deficits, it is recommended that the patient have 3-5 sessions per week of Occupational Therapy at d/c to increase the patient's independence. Please see assessment section for further patient specific details. If patient discharges prior to next session this note will serve as a discharge summary. Please see below for the latest assessment towards goals. Assessment   Performance deficits / Impairments: Decreased functional mobility ; Decreased ADL status; Decreased strength;Decreased cognition;Decreased endurance;Decreased balance  Assessment: Pt is a 66 y.o. female admitted with UTI, KACIE, generalized weakness. PTA, pt resides in a home with three other elderly individuals and has  caregiver assist. Pt's daughter reports pt had assist prn for ADLs (bathing/dressing) and was mod I fxl mobility with walker up until a couple days ago. Pt currently functioning well below baseline, today requiring max A for bed mobility, use of lift equipment (trent stedy) for sit><stand with mod A, and anticipate pt would require overall max A for ADLs. Pt also confused and oriented to person only. At current status AM-PAC score indicates need for ongoing skilled OT at d/c to maximize pt's safety and independence. Will cont to follow while hospitalized.   Prognosis: Fair  Decision Making: Medium Complexity  History: see above  Exam: decreased ADL status, balance/fxl mobility, fxl activity tolerance, cognition, strength  Assistance / Modification: anticiapte overall max A for ADLs  OT Education: OT Role;Plan of Care;Transfer Training;ADL Adaptive Strategies;Orientation  Barriers to Learning: cognition, vision  REQUIRES OT FOLLOW UP: Yes  Activity Tolerance  Activity Tolerance: Patient limited by pain;Treatment limited secondary to decreased cognition  Safety Devices  Safety Devices in place: Yes  Type of devices: Call light within reach; Bed alarm in place; Left in bed;Nurse notified(RN Mark notified)           Patient Diagnosis(es): The primary encounter diagnosis was Altered mental status, unspecified altered mental status type. Diagnoses of Urinary tract infection without hematuria, site unspecified, Wound of left lower extremity, initial encounter, Wound of right lower extremity, initial encounter, and Type 2 diabetes mellitus with other specified complication, with long-term current use of insulin ([facility] [address] were also pertinent to this visit. has no past medical history on file. has no past surgical history on file. Restrictions  Restrictions/Precautions  Restrictions/Precautions: Fall Risk    Subjective   General  Chart Reviewed: Yes  Additional Pertinent Hx: Per Jeremiah Bhatt MD's H&P 7/21: \"The patient is a 64-year-old  female who presented to the hospital with what appears to be increasing fatigue and weakness over the past 3-4 days, also associated with just generalized decrease in functioning with progressive inability to walk or move around without nausea or vomiting. The patient's daughter noted the rapid decline in the past two days. She was recently being treated for what appeared to be lower extremity wounds and had a UTI that apparently grew MRSA. \"  Referring Practitioner: FRANCHESCA Douglas CNP  Diagnosis: UTI, KACIE, generalized weakness  Subjective  Subjective: Pt met b/s for OT eval/tx.  Pt in bed sleeping on arrival, awakens to call of name and agreeable to participate in therapy. Pt c/o pain \"all over\" but did not rate. Pt confused with delayed processing/responses. Social/Functional History  Social/Functional History  Lives With: (pt lives with three other elderly individuals with 24/7 assist of caregivers)  Type of Home: House  Home Layout: One level  Home Access: Stairs to enter without rails  Entrance Stairs - Number of Steps: 1 (daughter always assists pt on step)  Bathroom Shower/Tub: Walk-in shower  Bathroom Toilet: Handicap height  Bathroom Equipment: Grab bars in shower, Shower chair, Hand-held shower, Grab bars around toilet  Bathroom Accessibility: Wheelchair accessible  Home Equipment: Rolling walker, [employer], [address] chair  ADL Assistance: Needs assistance(caregiver assists with bathing, LB dressing. Pt independent toileting)  Homemaking Assistance: Needs assistance(family and caregivers manage all)  Ambulation Assistance: Independent(with RW)  Transfer Assistance: Independent(pt sleeps in LIFT chair)  Active : No  Patient's  Info: daughter  Additional Comments: Above information obtained per daughter over phone d/t pt being very poor historian. Daughter reports pt had 1 recent fall last week. 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  With max verbal encouragement pt briefly stood in [address] stedy for less than five seconds with min A, but had to sit d/t pain and returned to supine)     ADL  Feeding: Setup  Toileting: (thornton catheter)  Additional Comments: Anticipate pt is max A bathing and dressing, min A grooming, total A toileting based on ROM/strength, balance, endurance. Bed mobility  Rolling to Right: Moderate assistance  Supine to Sit: Maximum assistance  Sit to Supine: Maximum assistance;2 Person assistance  Scootin Person assistance;Dependent/Total(to scoot up in bed)     Transfers  Sit to stand: Moderate assistance(from elevated EOB>trent stedy)  Stand to sit: Moderate assistance(trent stedy>EOB)     Cognition  Overall Cognitive Status: Exceptions  Arousal/Alertness: Delayed responses to stimuli  Following Commands: Follows one step commands with repetition; Follows one step commands with increased time  Attention Span: Difficulty attending to directions; Difficulty dividing attention  Memory: Decreased short term memory;Decreased long term memory;Decreased recall of recent events;Decreased recall of biographical Information  Problem Solving: Decreased awareness of errors;Assistance required to identify errors made;Assistance required to generate solutions  Insights: Decreased awareness of deficits  Initiation: Requires cues for all  Sequencing: Requires cues for all       LUE AROM (degrees)  LUE AROM : WFL  RUE AROM (degrees)  RUE AROM : WFL                      Plan   Plan  Times per week: 3-5  Times per day: Daily  Current Treatment Recommendations: Balance Training, Functional Mobility Training, Endurance Training, Safety Education & Training, Self-Care / ADL, Strengthening, Cognitive Reorientation, Cognitive/Perceptual Training      AM-PAC Score        AM-[facility] Inpatient Daily Activity Raw Score: 13 (20 140)  AM-PAC Inpatient ADL T-Scale Score : 32.03 (20 140)  ADL Inpatient CMS 0-100% Score: 63.03 (20 1409)  ADL Inpatient CMS G-Code Modifier : CL (20)    Goals  Short term goals  Time Frame for Short term goals: Prior to d/c:  Short term goal 1: Pt will complete UB bathing/dressing with set-up/SBA.   Short term goal 2: Pt will complete toileting with mod A.  Short term goal 3: Pt will complete grooming in stance at sink with CGA. Short term goal 4: Pt will complete fxl mobility and fxl transfers to/from ADL surfaces with min A using AD. Short term goal 5: Pt will tolerate standing >5 minutes for functional task with SBA. Long term goals  Time Frame for Long term goals : STG=LTG  Patient Goals   Patient goals : pt unable to verbalize personal therapy goal d/t decreased cognition.        Therapy Time   Individual Concurrent Group Co-treatment   Time In 1820         Time Out 1400         Minutes 53         Timed Code Treatment Minutes: [address], OTR/L 2055

## 2020-07-21 NOTE — PROGRESS NOTES
Pharmacy Medication Reconciliation Note     List of medications patient is currently taking is complete. Source of information:   1. Patient's daughter and partner on the phone (Meliza [phone])  2. EMR    Notes regarding home medications:   1. Patient received morning and afternoon home medication doses before presenting to the ER. 2. Patient wears a Humalog insulin pump at home, but it was taken off by the family before the patient was sent to the ER.       [id number] Antonia, Pharmacy Intern  7/20/2020 9:08 PM

## 2020-07-21 NOTE — PROGRESS NOTES
Hospital Medicine Progress Note      Admit Date: 7/20/2020       CC: F/U for \"I don't know why I am here or what happened. \"    HPI: The patient is a 69-year-old   female who presented to the hospital with what appears to be increasing fatigue and weakness over the past 3-4 days, also associated with just generalized decrease in functioning with progressive inability to walk or move around without nausea or vomiting. The patient's daughter noted the rapid decline in the past two days. She was recently being treated for what appeared to be lower extremity wounds and had a UTI that apparently grew MRSA. This is all per the patient's daughter who provided most of the history. The patient was unable to provide a good history since she did not quite understand why she was in the hospital.    She has been seeing wound care Dr. Jeevan Whitign for leg wounds per ER report and was on doxy for 7 days for MRSA urine. Cxr: mild cardial-pericardial silhouette enlargement. O/w unremarkable     Interval History/Subjective: sleeping when I entered. Wakes easily. Confused except to self and other. Slow to answer all questions. Really does not engage. Falls back asleep easily. PT/OT evals . Lives with daughter at home. Updated daughter. Daughter states she has 24 hour care at home for help with her insulin pump going forward, although she is aware we will use SSI while in-pt. Discussed we will monitor her cognition throughout her stay and consider stopping pump if there is concern for good care with insulin on d/c. Will get SLP for swallow eval. Add ensure for protein supplements. Review of Systems:       The patient denied headaches, visual changes, LOC, SOB, CP, ABD pain, N/V/D, skin changes, new or worsening weakness or neuromuscular deficits. Comprehensive ROS negative except as mentioned above. Past Medical History:    History reviewed. No pertinent past medical history.     Past Surgical History: History reviewed. No pertinent surgical history. Allergies:  Patient has no known allergies. Past medical and surgical history reviewed. Any changes have been noted. PHYSICAL EXAM:  BP (!) 92/54   Pulse 109   Temp 99.1 °F (37.3 °C) (Oral)   Resp 16   Ht 4' 11\" (1.499 m)   Wt 172 lb 2.9 oz (78.1 kg)   SpO2 95%   BMI 34.78 kg/m²       Intake/Output Summary (Last 24 hours) at 7/21/2020 0905  Last data filed at 7/21/2020 0604  Gross per 24 hour   Intake 583.33 ml   Output 1400 ml   Net -816.67 ml        General appearance:   No apparent distress, appears stated age. Cooperative. Sleeping in bed on my exam. Oriented to self and daughter only. HEENT:  Normocephalic, atraumatic. PERRLA. EOMi. Conjunctivae/corneas clear, no icterus, non-injected. Neck: Supple, with full range of motion. No jugular venous distention. Trachea midline. Respiratory:  Normal respiratory effort. Clear to auscultation, bilaterally without Rales/Wheezes/Rhonchi. Cardiovascular:  Regular rate and rhythm without murmurs, rubs or gallops. Abdomen: Soft, non-tender, non-distended, without rebound or guarding. Normal bowel sounds. Musculoskeletal:  bilat LE with kerlix in place. Full range of motion without deformity. Skin: Skin color, texture, turgor normal.  No rashes or lesions. Neurologic:  Neurovascularly intact without any focal sensory/motor deficits. Cranial nerves: II-XII intact, grossly intact. No facial asymmetry, tongue midline.    Psychiatric:  Alert and oriented to self and other only, baseline confusion  Capillary Refill: Brisk,< 3 seconds   Peripheral Pulses: +2 palpable, equal bilaterally       LABS:    Lab Results   Component Value Date    WBC 9.3 07/20/2020    HGB 8.7 (L) 07/20/2020    HCT 26.0 (L) 07/20/2020    MCV 98.4 07/20/2020     07/20/2020    LYMPHOPCT 7.2 07/20/2020    RBC 2.64 (L) 07/20/2020    MCH 32.8 07/20/2020    MCHC 33.3 07/20/2020    RDW 13.6 07/20/2020       Lab Results Component Value Date    CREATININE 1.3 (H) 07/20/2020    BUN 29 (H) 07/20/2020     (L) 07/20/2020    K 4.3 07/20/2020     07/20/2020    CO2 24 07/20/2020       No results found for: MG    Lab Results   Component Value Date    ALT 9 (L) 07/20/2020    AST 15 07/20/2020    ALKPHOS 195 (H) 07/20/2020    BILITOT <0.2 07/20/2020        No flowsheet data found. No results found for: LABA1C    Imaging:  XR CHEST PORTABLE   Final Result   Mild cardial-pericardial silhouette enlargement. Otherwise unremarkable   portable chest radiograph. Scheduled and prn Medications:    Scheduled Meds:   metoprolol tartrate  25 mg Oral BID    venlafaxine  150 mg Oral BID    isosorbide mononitrate  30 mg Oral Daily    atorvastatin  10 mg Oral Daily    aspirin  325 mg Oral Daily    sodium chloride flush  10 mL Intravenous 2 times per day    enoxaparin  40 mg Subcutaneous Daily    cefepime  1 g Intravenous Q12H    vancomycin (VANCOCIN) intermittent dosing (placeholder)   Other RX Placeholder     Continuous Infusions:   lactated ringers 125 mL/hr at 07/21/20 0236     PRN Meds:.sodium chloride flush, promethazine **OR** ondansetron    Assessment & Plan:        1. Gram-negative bacteria urinary tract infection, complicated with  acute kidney injury. - awaiting urine culture and adjust antibx   - on Cefepime,vanc and merrem   - culturelle  - consulted pharm for vanc dosing  - Consulted ID - Dr. Larissa alcala w/pt      2. Anemia, likely 2/2 chronic disease  - H/H 8.7/26   - trend CBC    3. Hypertension.  - cont home meds    4. Dyslipidemia. - cont home statin    5. Diabetes mellitus.  - SSI low dose; 10 units daily lantus; 3units humalog ac per Vita Zhang Diabetic educator  - discuss insulin pump use on discharge   - poct ac/hs         PLAN OF CARE:  The patient admitted to Internal Medicine Service. IV  vancomycin and cefepime have been initiated.   Pharmacy and dosing  consult has been initiated for dosing of the patient's insulin pump. The patient's home medications namely her anti-dyslipidemic therapy and  her nitrates will be continued. DVT prophylaxis will be continued with    Continue current regimen/therapies. Monitor. Adjust medical regimen as appropriate. Body mass index is 34.78 kg/m². The patient and / or the family were informed of the results of any tests, a time was given to answer questions, a plan was proposed and they agreed with plan.       DVT ppx: lovenox      Diet: DIET CARB CONTROL;    Consults:  IP CONSULT TO HOSPITALIST  PHARMACY TO DOSE VANCOMYCIN  PHARMACY TO DOSE MEDICATION    DISPO/placement plan: home with family in a couple of days    Code Status: Full Code      FRANCHESCA Doan CNP  07/21/20

## 2020-07-21 NOTE — CONSULTS
Clinical Pharmacy Note  Vancomycin Consult    Pharmacy consult received for one-time dose of vancomycin in the Emergency Department per Micki Jaimes. Ht Readings from Last 1 Encounters:   No data found for Ht        Wt Readings from Last 1 Encounters:   07/20/20 174 lb 0.6 oz (78.9 kg)         Assessment/Plan:   Vancomycin 1250mg x 1 in ED.  If Vancomycin is to continue on admission and pharmacy is to manage dosing, please re-consult with admission orders.

## 2020-07-21 NOTE — CARE COORDINATION
[address] [place] [address] Continence Nurse  Consult Note       NAME:  José Marino  MEDICAL RECORD NUMBER:  [id number]  AGE: 66 y.o. GENDER: female  : 1945  TODAY'S DATE:  2020    Subjective   Reason for WOCN Evaluation and Assessment: BLE wounds      José Marino is a 66 y.o. female referred by:   [x] Physician  [x] Nursing  [] Other:      Wound Identification:  Wound Type: venous  Contributing Factors: venous stasis    Wound History: chronic wounds, has followed with Dr. Jeevan Whiting at clinic. [facility]  Current Wound Care Treatment:  Dry gauze removed from wounds at present    Patient Goal of Care:  [x] Wound Healing  [] Odor Control  [] Palliative Care  [] Pain Control   [] Other:         PAST MEDICAL HISTORY    History reviewed. No pertinent past medical history. PAST SURGICAL HISTORY    History reviewed. No pertinent surgical history. FAMILY HISTORY    History reviewed. No pertinent family history. SOCIAL HISTORY    Social History     Tobacco Use    Smoking status: Not on file   Substance Use Topics    Alcohol use: Not on file    Drug use: Not on file       ALLERGIES    No Known Allergies    MEDICATIONS    No current facility-administered medications on file prior to encounter.       Current Outpatient Medications on File Prior to Encounter   Medication Sig Dispense Refill    cetirizine (ZYRTEC) 10 MG tablet Take 10 mg by mouth daily      mirabegron (MYRBETRIQ) 50 MG TB24 Take 50 mg by mouth daily      furosemide (LASIX) 40 MG tablet Take 40 mg by mouth every other day Alternating with 20 mg tablet      furosemide (LASIX) 20 MG tablet Take 20 mg by mouth every other day Alternating with 40 mg tablet      dorzolamide-timolol (COSOPT) 22.3-6.8 MG/ML ophthalmic solution Place 1 drop into the right eye 2 times daily      atropine 1 % ophthalmic solution Place 1 drop into the right eye 2 times daily      prednisoLONE acetate (PRED FORTE) 1 % ophthalmic suspension Place 1 drop into the right eye 2 times daily      aspirin 325 MG EC tablet Take 325 mg by mouth daily      sennosides-docusate sodium (SENOKOT-S) 8.6-50 MG tablet Take 1 tablet by mouth daily      isosorbide mononitrate (IMDUR) 30 MG extended release tablet Take 30 mg by mouth daily      Cholecalciferol (VITAMIN D3) 125 MCG (5000 UT) TABS Take 5,000 Units by mouth daily      LACTOBACILLUS PO Take 1 tablet by mouth nightly      ferrous sulfate (IRON 325) 325 (65 Fe) MG tablet Take 325 mg by mouth 2 times daily (with meals)      Insulin Pump - insulin lispro Inject 1.2 Units/hr into the skin continuous Bolus as needed      venlafaxine (EFFEXOR) 75 MG tablet Take 150 mg by mouth 2 times daily      atorvastatin (LIPITOR) 10 MG tablet Take 10 mg by mouth daily      metoprolol tartrate (LOPRESSOR) 25 MG tablet Take 25 mg by mouth 2 times daily      BIOTIN PO Take 1 tablet by mouth nightly       lipase-protease-amylase (CREON) 88740 units delayed release capsule Take 12,000 Units by mouth 2 times daily      gabapentin (NEURONTIN) 300 MG capsule Take 300 mg by mouth 2 times daily.  Noon and dinner      acetaminophen (TYLENOL) 325 MG tablet Take 325 mg by mouth 2 times daily          Objective    BP (!) 102/51   Pulse 95   Temp 98.9 °F (37.2 °C) (Axillary)   Resp 17   Ht 4' 11\" (1.499 m)   Wt 172 lb 2.9 oz (78.1 kg)   SpO2 97%   BMI 34.78 kg/m²     LABS:  WBC:    Lab Results   Component Value Date    WBC 9.3 07/20/2020     H/H:    Lab Results   Component Value Date    HGB 8.7 07/20/2020    HCT 26.0 07/20/2020     PTT:  No results found for: APTT, PTT[APTT}  PT/INR:  No results found for: PROTIME, INR  HgBA1c:  No results found for: LABA1C    Assessment   Jose Risk Score: Jose Scale Score: 12    Patient Active Problem List   Diagnosis Code    Complicated UTI (urinary tract infection) N39.0       Measurements:  Wound 07/21/20 Buttocks Left (Active)   Wound Pressure Stage  2 07/21/20 9012 Dressing Status Clean;Dry; Intact 07/21/20 1008   Dressing Changed Changed/New 07/21/20 0219   Dressing/Treatment Moisture barrier 07/21/20 1422   Wound Cleansed Other (Comment) 07/21/20 1422   Wound Length (cm) 2 cm 07/21/20 1422   Wound Width (cm) 1 cm 07/21/20 1422   Wound Depth (cm) 0.1 cm 07/21/20 1422   Wound Surface Area (cm^2) 2 cm^2 07/21/20 1422   Change in Wound Size % (l*w) 0 07/21/20 1422   Wound Volume (cm^3) 0.2 cm^3 07/21/20 1422   Wound Assessment Pink 07/21/20 1422   Drainage Amount None 07/21/20 1422   Drainage Description Serosanguinous 07/21/20 0219   Odor None 07/21/20 1422   Margins Defined edges 07/21/20 1422   Marilynn-wound Assessment Pink;Purple 07/21/20 1422   [place]%Wound Bed 100 07/21/20 1422   Number of days: 0           Wound 07/21/20 Leg Left;Posterior (Active)   Wound Image   07/21/20 1422   Wound Venous 07/21/20 1422   Dressing Status Clean;Dry; Intact 07/21/20 1422   Dressing Changed Changed/New 07/21/20 0219   Dressing/Treatment Non adherent;ABD;Roll gauze 07/21/20 1422   Wound Cleansed Other (Comment) 07/21/20 1422   Dressing Change Due 07/22/20 07/21/20 1422   Wound Length (cm) 4 cm 07/21/20 0219   Wound Width (cm) 1 cm 07/21/20 0219   Wound Surface Area (cm^2) 4 cm^2 07/21/20 0219   Wound Assessment Red;Bleeding 07/21/20 1422   Drainage Amount Small 07/21/20 1422   Drainage Description Sanguinous 07/21/20 1422   Odor None 07/21/20 1422   Margins Undefined edges 07/21/20 1422   Marilynn-wound Assessment Pink;Red 07/21/20 1422   Red%Wound Bed 100 07/21/20 1422   Number of days: 0       Wound 07/21/20 Buttocks Right (Active)   Wound Pressure Stage  2 07/21/20 1422   Dressing Status Clean;Dry; Intact 07/21/20 1008   Dressing Changed Changed/New 07/21/20 1008   Dressing/Treatment Moisture barrier 07/21/20 1422   Wound Cleansed Other (Comment) 07/21/20 1422   Wound Length (cm) 1 cm 07/21/20 1422   Wound Width (cm) 1 cm 07/21/20 1422   Wound Depth (cm) 0.1 cm 07/21/20 1422   Wound Surface Area (cm^2) 1 cm^2 07/21/20 1422   Change in Wound Size % (l*w) 0 07/21/20 1422   Wound Volume (cm^3) 0.1 cm^3 07/21/20 1422   Wound Assessment Pink 07/21/20 1422   Drainage Amount None 07/21/20 1422   Drainage Description Serosanguinous 07/21/20 0219   Odor None 07/21/20 1422   Margins Defined edges 07/21/20 1422   Marilynn-wound Assessment Pink;Purple 07/21/20 1422   [place]%Wound Bed 100 07/21/20 1422   Number of days: 0           Wound 07/21/20 Leg Right;Posterior (Active)   Wound Image   07/21/20 1422   Wound Venous 07/21/20 1422   Dressing Status Clean;Dry; Intact 07/21/20 0219   Dressing Changed Changed/New 07/21/20 1422   Dressing/Treatment Non adherent; Roll gauze 07/21/20 1422   Wound Cleansed Other (Comment) 07/21/20 1422   Dressing Change Due 07/22/20 07/21/20 1422   Wound Width (cm) 1 cm 07/21/20 0219   Wound Depth (cm) 0.5 cm 07/21/20 0219   Wound Assessment Red 07/21/20 1422   Drainage Amount Scant 07/21/20 1422   Drainage Description Serosanguinous 07/21/20 1422   Odor None 07/21/20 1422   Margins Undefined edges 07/21/20 1422   Marilynn-wound Assessment Pink 07/21/20 1422   Red%Wound Bed 100 07/21/20 1422   Number of days: 0        Pt seen. Has venous ulcers to BLE. L>R. multiple open areas on RLE- largest is 6x7x0.1cm. Multiple areas open on LLE is 6x3x0.1cm. Periwound skin is dry and scaling as well as pink to red. Has suprapubic catheter in place. Abd folds dry. Posterior buttocks with stage 2 and evidence of chronic pressure due to pink to purple discoloration. Pt reports sitting in recliner for prolonged periods of time so she can watch TV. Response to treatment:  Well tolerated by patient.        Plan   Plan of Care: Wound 07/21/20 Buttocks Left-Dressing/Treatment: Moisture barrier  Wound 07/21/20 Leg Left;Posterior-Dressing/Treatment: Non adherent, ABD, Roll gauze  Wound 07/21/20 Buttocks Right-Dressing/Treatment: Moisture barrier  Wound 07/21/20 Leg Right;Posterior-Dressing/Treatment: Non adherent, Roll gauze  -zinc barrier to buttocks  -turn and reposition every 2 hours  -chair cushion, encourage to reposition self frequently while in chair  -elevate heels  -adaptic and roll guaze to BLE      Specialty Bed Required : Yes   [x] Low Air Loss   [] Pressure Redistribution  [] Fluid Immersion  [] Bariatric  [] Total Pressure Relief  [] Other:     Current Diet: DIET CARB CONTROL;  Dental Soft  Dietician consult:  Yes    Discharge Plan:  Placement for patient upon discharge: home with support    Patient appropriate for Outpatient [address]: Yes    Referrals:  [x]   [x] [facility]  [] Supplies  [] Other    Patient/Caregiver Teaching:  Level of patient/caregiver understanding able to:   [] Indicates understanding       [x] Needs reinforcement  [] Unsuccessful      [] Verbal Understanding  [] Demonstrated understanding       [] No evidence of learning  [] Refused teaching         [] N/A       Electronically signed by Rodrigue Ahumada on 7/21/2020 at 2:34 PM

## 2020-07-22 LAB
ANION GAP SERPL CALCULATED.3IONS-SCNC: 6 MMOL/L (ref 3–16)
BUN BLDV-MCNC: 21 MG/DL (ref 7–20)
CALCIUM SERPL-MCNC: 8.1 MG/DL (ref 8.3–10.6)
CHLORIDE BLD-SCNC: 112 MMOL/L (ref 99–110)
CO2: 26 MMOL/L (ref 21–32)
CREAT SERPL-MCNC: 1 MG/DL (ref 0.6–1.2)
EKG ATRIAL RATE: 75 BPM
EKG DIAGNOSIS: NORMAL
EKG P-R INTERVAL: 162 MS
EKG Q-T INTERVAL: 420 MS
EKG QRS DURATION: 92 MS
EKG QTC CALCULATION (BAZETT): 469 MS
EKG R AXIS: 0 DEGREES
EKG T AXIS: 33 DEGREES
EKG VENTRICULAR RATE: 75 BPM
GFR AFRICAN AMERICAN: >60
GFR NON-AFRICAN AMERICAN: 54
GLUCOSE BLD-MCNC: 100 MG/DL (ref 70–99)
GLUCOSE BLD-MCNC: 104 MG/DL (ref 70–99)
GLUCOSE BLD-MCNC: 147 MG/DL (ref 70–99)
GLUCOSE BLD-MCNC: 161 MG/DL (ref 70–99)
GLUCOSE BLD-MCNC: 184 MG/DL (ref 70–99)
GLUCOSE BLD-MCNC: 238 MG/DL (ref 70–99)
HCT VFR BLD CALC: 23.4 % (ref 36–48)
HEMOGLOBIN: 7.7 G/DL (ref 12–16)
MCH RBC QN AUTO: 32.9 PG (ref 26–34)
MCHC RBC AUTO-ENTMCNC: 33.1 G/DL (ref 31–36)
MCV RBC AUTO: 99.1 FL (ref 80–100)
ORGANISM: ABNORMAL
PDW BLD-RTO: 13.8 % (ref 12.4–15.4)
PERFORMED ON: ABNORMAL
PLATELET # BLD: 226 K/UL (ref 135–450)
PMV BLD AUTO: 8.5 FL (ref 5–10.5)
POTASSIUM SERPL-SCNC: 4.9 MMOL/L (ref 3.5–5.1)
RBC # BLD: 2.36 M/UL (ref 4–5.2)
SODIUM BLD-SCNC: 144 MMOL/L (ref 136–145)
URINE CULTURE, ROUTINE: ABNORMAL
URINE CULTURE, ROUTINE: ABNORMAL
VANCOMYCIN RANDOM: 11 UG/ML
WBC # BLD: 4.7 K/UL (ref 4–11)

## 2020-07-22 PROCEDURE — 6360000002 HC RX W HCPCS: Performed by: INTERNAL MEDICINE

## 2020-07-22 PROCEDURE — 99232 SBSQ HOSP IP/OBS MODERATE 35: CPT | Performed by: INTERNAL MEDICINE

## 2020-07-22 PROCEDURE — 6370000000 HC RX 637 (ALT 250 FOR IP): Performed by: INTERNAL MEDICINE

## 2020-07-22 PROCEDURE — 2580000003 HC RX 258: Performed by: INTERNAL MEDICINE

## 2020-07-22 PROCEDURE — 85027 COMPLETE CBC AUTOMATED: CPT

## 2020-07-22 PROCEDURE — 6370000000 HC RX 637 (ALT 250 FOR IP): Performed by: NURSE PRACTITIONER

## 2020-07-22 PROCEDURE — 97530 THERAPEUTIC ACTIVITIES: CPT

## 2020-07-22 PROCEDURE — 36415 COLL VENOUS BLD VENIPUNCTURE: CPT

## 2020-07-22 PROCEDURE — 1200000000 HC SEMI PRIVATE

## 2020-07-22 PROCEDURE — 93010 ELECTROCARDIOGRAM REPORT: CPT | Performed by: INTERNAL MEDICINE

## 2020-07-22 PROCEDURE — 80048 BASIC METABOLIC PNL TOTAL CA: CPT

## 2020-07-22 PROCEDURE — 97535 SELF CARE MNGMENT TRAINING: CPT

## 2020-07-22 PROCEDURE — 92610 EVALUATE SWALLOWING FUNCTION: CPT

## 2020-07-22 PROCEDURE — 97162 PT EVAL MOD COMPLEX 30 MIN: CPT

## 2020-07-22 PROCEDURE — 80202 ASSAY OF VANCOMYCIN: CPT

## 2020-07-22 RX ADMIN — INSULIN GLARGINE 10 UNITS: 100 INJECTION, SOLUTION SUBCUTANEOUS at 09:15

## 2020-07-22 RX ADMIN — VENLAFAXINE 150 MG: 37.5 TABLET ORAL at 11:32

## 2020-07-22 RX ADMIN — PANCRELIPASE 12000 UNITS: 60000; 12000; 38000 CAPSULE, DELAYED RELEASE PELLETS ORAL at 09:28

## 2020-07-22 RX ADMIN — ATROPINE SULFATE 1 DROP: 10 SOLUTION OPHTHALMIC at 09:23

## 2020-07-22 RX ADMIN — VENLAFAXINE 150 MG: 37.5 TABLET ORAL at 21:56

## 2020-07-22 RX ADMIN — Medication 5000 UNITS: at 09:15

## 2020-07-22 RX ADMIN — DOCUSATE SODIUM 50 MG AND SENNOSIDES 8.6 MG 1 TABLET: 8.6; 5 TABLET, FILM COATED ORAL at 09:15

## 2020-07-22 RX ADMIN — ISOSORBIDE MONONITRATE 30 MG: 30 TABLET, EXTENDED RELEASE ORAL at 09:15

## 2020-07-22 RX ADMIN — ASPIRIN 325 MG: 325 TABLET, DELAYED RELEASE ORAL at 09:15

## 2020-07-22 RX ADMIN — INSULIN LISPRO 2 UNITS: 100 INJECTION, SOLUTION INTRAVENOUS; SUBCUTANEOUS at 11:56

## 2020-07-22 RX ADMIN — INSULIN LISPRO 1 UNITS: 100 INJECTION, SOLUTION INTRAVENOUS; SUBCUTANEOUS at 17:20

## 2020-07-22 RX ADMIN — PANCRELIPASE 12000 UNITS: 60000; 12000; 38000 CAPSULE, DELAYED RELEASE PELLETS ORAL at 16:44

## 2020-07-22 RX ADMIN — ATROPINE SULFATE 1 DROP: 10 SOLUTION OPHTHALMIC at 21:58

## 2020-07-22 RX ADMIN — MEROPENEM 500 MG: 500 INJECTION, POWDER, FOR SOLUTION INTRAVENOUS at 00:12

## 2020-07-22 RX ADMIN — DORZOLAMIDE HYDROCHLORIDE AND TIMOLOL MALEATE 1 DROP: 20; 5 SOLUTION/ DROPS OPHTHALMIC at 21:58

## 2020-07-22 RX ADMIN — Medication 1 CAPSULE: at 09:30

## 2020-07-22 RX ADMIN — INSULIN LISPRO 3 UNITS: 100 INJECTION, SOLUTION INTRAVENOUS; SUBCUTANEOUS at 11:57

## 2020-07-22 RX ADMIN — GABAPENTIN 300 MG: 300 CAPSULE ORAL at 21:58

## 2020-07-22 RX ADMIN — SODIUM CHLORIDE, POTASSIUM CHLORIDE, SODIUM LACTATE AND CALCIUM CHLORIDE: 600; 310; 30; 20 INJECTION, SOLUTION INTRAVENOUS at 00:26

## 2020-07-22 RX ADMIN — GABAPENTIN 300 MG: 300 CAPSULE ORAL at 09:15

## 2020-07-22 RX ADMIN — ATORVASTATIN CALCIUM 10 MG: 10 TABLET, FILM COATED ORAL at 09:14

## 2020-07-22 RX ADMIN — MEROPENEM 500 MG: 500 INJECTION, POWDER, FOR SOLUTION INTRAVENOUS at 16:30

## 2020-07-22 RX ADMIN — SODIUM CHLORIDE, PRESERVATIVE FREE 10 ML: 5 INJECTION INTRAVENOUS at 22:03

## 2020-07-22 RX ADMIN — VANCOMYCIN HYDROCHLORIDE 1250 MG: 10 INJECTION, POWDER, LYOPHILIZED, FOR SOLUTION INTRAVENOUS at 11:30

## 2020-07-22 RX ADMIN — PREDNISOLONE ACETATE 1 DROP: 10 SUSPENSION/ DROPS OPHTHALMIC at 09:21

## 2020-07-22 RX ADMIN — INSULIN LISPRO 3 UNITS: 100 INJECTION, SOLUTION INTRAVENOUS; SUBCUTANEOUS at 17:21

## 2020-07-22 RX ADMIN — FERROUS SULFATE TAB EC 324 MG (65 MG FE EQUIVALENT) 324 MG: 324 (65 FE) TABLET DELAYED RESPONSE at 09:28

## 2020-07-22 RX ADMIN — ENOXAPARIN SODIUM 40 MG: 40 INJECTION SUBCUTANEOUS at 09:15

## 2020-07-22 RX ADMIN — DORZOLAMIDE HYDROCHLORIDE AND TIMOLOL MALEATE 1 DROP: 20; 5 SOLUTION/ DROPS OPHTHALMIC at 09:22

## 2020-07-22 RX ADMIN — PREDNISOLONE ACETATE 1 DROP: 10 SUSPENSION/ DROPS OPHTHALMIC at 21:58

## 2020-07-22 RX ADMIN — METOPROLOL TARTRATE 25 MG: 25 TABLET, FILM COATED ORAL at 21:58

## 2020-07-22 RX ADMIN — MEROPENEM 500 MG: 500 INJECTION, POWDER, FOR SOLUTION INTRAVENOUS at 09:28

## 2020-07-22 RX ADMIN — FERROUS SULFATE TAB EC 324 MG (65 MG FE EQUIVALENT) 324 MG: 324 (65 FE) TABLET DELAYED RESPONSE at 16:43

## 2020-07-22 RX ADMIN — METOPROLOL TARTRATE 25 MG: 25 TABLET, FILM COATED ORAL at 09:15

## 2020-07-22 ASSESSMENT — PAIN SCALES - GENERAL
PAINLEVEL_OUTOF10: 0
PAINLEVEL_OUTOF10: 0

## 2020-07-22 NOTE — PROGRESS NOTES
Physician Progress Note      PATIENT:               Sherice Jacobs  CSN #:                  [id number]  :                       1945  ADMIT DATE:       2020 6:19 PM  [address] [place] DATE:  RESPONDING  PROVIDER #:        Melissa Hobbs CNP          QUERY TEXT:    Pt admitted with UTI. Pt noted to have chronic suprapubic catheter. If   possible, please document in the progress notes and discharge summary if you   are evaluating and/or treating any of the following: The medical record reflects the following:  Risk Factors:  chronic suprapubic catheter per ID consult note   Clinical Indicators: admission urine culture from  growing pseudomonas  Treatment: ID consult, IV Cefepime, IV Vanco and IV Merrem  Options provided:  -- UTI due to chronic suprapubic catheter  -- UTI not due to chronic suprapubic catheter  -- Other - I will add my own diagnosis  -- Disagree - Clinically unable to determine / Unknown  -- Refer to Clinical Documentation Reviewer    PROVIDER RESPONSE TEXT:    This patient?s UTI is due to the chronic suprapubic catheter. Query created by: Jelly Rivas on 2020 10:51 AM      QUERY TEXT:    Patient admitted with UTI and KACIE. Per wound care note , noted to also   have venous ulcers on bilat lower extremity and Buttock wound d/t pressure,   stage 2 . If possible, please document in progress notes and discharge summary   the type of ulcer, site of the ulcer and POA status of the ulcer: The medical record reflects the following:  Risk Factors: limited mobility, venous stasis  Clinical Indicators: documentation in Wound Care note  of venous ulcers to   BLE. L>R. multiple open areas on RLE- largest is 6x7x0.1cm. Multiple areas   open on LLE is 6x3x0.1cm. Dayana Helton Posterior buttocks with stage 2 and evidence of   chronic pressure due to pink to purple discoloration. Pt reports sitting in   recliner for prolonged periods of time so she can watch TV.   Treatment: Wound care consult, moisture barrier, dressings to legs, turn and   reposition q2hrs, lissy heels  Options provided:  -- Rt and Lt buttock Pressure Wound/ Ulcer, stage 2 and multiple venous   wounds/ulcers on bilat lower legs, all POA  -- Rt and Lt buttock Pressure Wound/ Ulcer, stage 2 and multiple venous wounds   on bilat lower legs, not POA  -- Other - I will add my own diagnosis  -- Disagree - Clinically unable to determine / Unknown  -- Refer to Clinical Documentation Reviewer    PROVIDER RESPONSE TEXT:    This patient has a Rt and Lt buttock Pressure Wound/ Ulcer, stage 2 and   multiple venous wounds/ulcers on bilat lower legs that were all present on   admission.     Query created by: Olinda Lazo on 7/22/2020 11:07 AM      Electronically signed by:  Ellie Hobbs CNP 7/22/2020 3:16 PM

## 2020-07-22 NOTE — PROGRESS NOTES
Infectious Disease Follow up Notes  Admit Date: 7/20/2020  Hospital Day: 3    Antibiotics : IV Vancomycin  IV Meropenem      CHIEF COMPLAINT:     Lower leg cellulitis  Confusion  Fevers    Subjective interval History :  66 y.o.  woman with previous h/o lower leg cellulitis and recurrent admit for similar problems has SPC in place and previous MRSA infections and recurrent UTI with change in mentation admitted with decline in functional status per HPI and creat elevation on admit on weeping ulcers on both legs and we are consulted for IV abx recommendations. Unfortunately [facility] has two different charts and past history limited as the charts needs to be merged together.      Mentation a bit better lower leg dressing+ skin flaking off and urine cx Pseudomonas noted and tolerating IV abx ok     Past Medical History:    History reviewed. No pertinent past medical history. MRSA infection   Cellulitis recurrent  CKD  Past Surgical History:    History reviewed. No pertinent surgical history.   Supra pubic catheter in place  Current Medications:    Outpatient Medications Marked as Taking for the 7/20/20 encounter [facility] Encounter)   Medication Sig Dispense Refill    cetirizine (ZYRTEC) 10 MG tablet Take 10 mg by mouth daily      mirabegron (MYRBETRIQ) 50 MG TB24 Take 50 mg by mouth daily      furosemide (LASIX) 40 MG tablet Take 40 mg by mouth every other day Alternating with 20 mg tablet      furosemide (LASIX) 20 MG tablet Take 20 mg by mouth every other day Alternating with 40 mg tablet      dorzolamide-timolol (COSOPT) 22.3-6.8 MG/ML ophthalmic solution Place 1 drop into the right eye 2 times daily      atropine 1 % ophthalmic solution Place 1 drop into the right eye 2 times daily      prednisoLONE acetate (PRED FORTE) 1 % ophthalmic suspension Place 1 drop into the right eye 2 times daily      aspirin 325 MG EC tablet Take 325 mg by mouth daily      sennosides-docusate sodium (SENOKOT-S) 8.6-50 MG tablet Take 1 tablet by mouth daily      isosorbide mononitrate (IMDUR) 30 MG extended release tablet Take 30 mg by mouth daily      Cholecalciferol (VITAMIN D3) 125 MCG (5000 UT) TABS Take 5,000 Units by mouth daily      LACTOBACILLUS PO Take 1 tablet by mouth nightly      ferrous sulfate (IRON 325) 325 (65 Fe) MG tablet Take 325 mg by mouth 2 times daily (with meals)      Insulin Pump - insulin lispro Inject 1.2 Units/hr into the skin continuous Bolus as needed      venlafaxine (EFFEXOR) 75 MG tablet Take 150 mg by mouth 2 times daily      atorvastatin (LIPITOR) 10 MG tablet Take 10 mg by mouth daily      metoprolol tartrate (LOPRESSOR) 25 MG tablet Take 25 mg by mouth 2 times daily      BIOTIN PO Take 1 tablet by mouth nightly       lipase-protease-amylase (CREON) 60705 units delayed release capsule Take 12,000 Units by mouth 2 times daily      gabapentin (NEURONTIN) 300 MG capsule Take 300 mg by mouth 2 times daily. Noon and dinner      acetaminophen (TYLENOL) 325 MG tablet Take 325 mg by mouth 2 times daily          Allergies:  Patient has no known allergies. Immunizations : There is no immunization history on file for this patient. Social History:    Social History     Tobacco Use    Smoking status: Not on file   Substance Use Topics    Alcohol use: Not on file    Drug use: Not on file     Social History     Tobacco Use   Smoking Status Not on file                    Family History : no DVT no COPD        REVIEW OF SYSTEMS:    No fever / chills / sweats. No weight loss. No visual change, eye pain, eye discharge. No oral lesion, sore throat, dysphagia. Denies cough / sputum/Sob   Denies chest pain, palpitations/ dizziness  Denies nausea/ vomiting/abdominal pain/diarrhea. Denies dysuria or change in urinary function. Denies joint swelling or pain. No myalgia, arthralgia.   No rashes, skin lesions   Denies focal weakness, sensory change or other neurologic symptoms  No lymph node swelling or tenderness.         PHYSICAL EXAM:      Vitals:    /78   Pulse 78   Temp 97.7 °F (36.5 °C) (Oral)   Resp 18   Ht 4' 11\" (1.499 m)   Wt 177 lb 0.5 oz (80.3 kg)   SpO2 99%   BMI 35.76 kg/m²     General Appearance: awake and ,in SOME acute distress, ++ pallor, no icterus   Skin: warm and dry, no rash or erythema  Head: normocephalic and atraumatic  Eyes: pupils equal, round, and reactive to light, conjunctivae normal  ENT: tympanic membrane, external ear and ear canal normal bilaterally, nose without deformity, nasal mucosa and turbinates normal without polyps  Neck: supple and non-tender without mass, no thyromegaly  no cervical lymphadenopathy  Pulmonary/Chest: clear to auscultation bilaterally- no wheezes, rales or rhonchi, normal air movement, no respiratory distress  Cardiovascular: normal rate, regular rhythm, normal S1 and S2, no murmurs, rubs, clicks, or gallops, no carotid bruits  Abdomen: soft, non-tender, non-distended, normal bowel sounds, no masses or organomegaly  Extremities: no cyanosis, clubbing or edema  Musculoskeletal: normal range of motion, no joint swelling, deformity or tenderness  Neurologic: reflexes normal and symmetric, no cranial nerve deficit  Lines:  IV  Supra pubic catheter+  Bi lateral lower leg cellulitis and weeping ulcer and edema+            Data Review:    Lab Results   Component Value Date    WBC 4.7 07/22/2020    HGB 7.7 (L) 07/22/2020    HCT 23.4 (L) 07/22/2020    MCV 99.1 07/22/2020     07/22/2020     Lab Results   Component Value Date    CREATININE 1.0 07/22/2020    BUN 21 (H) 07/22/2020     07/22/2020    K 4.9 07/22/2020     (H) 07/22/2020    CO2 26 07/22/2020       Hepatic Function Panel:   Lab Results   Component Value Date    ALKPHOS 195 07/20/2020    ALT 9 07/20/2020    AST 15 07/20/2020    PROT 6.2 07/20/2020    BILITOT <0.2 07/20/2020    LABALBU 2.2 07/20/2020 UA:  Lab Results   Component Value Date    COLORU YELLOW 07/20/2020    CLARITYU CLOUDY 07/20/2020    GLUCOSEU Negative 07/20/2020    BILIRUBINUR Negative 07/20/2020    KETUA Negative 07/20/2020    SPECGRAV 1.009 07/20/2020    BLOODU LARGE 07/20/2020    PHUR 5.5 07/20/2020    PROTEINU Negative 07/20/2020    UROBILINOGEN 0.2 07/20/2020    NITRU POSITIVE 07/20/2020    LEUKOCYTESUR LARGE 07/20/2020    LABMICR YES 07/20/2020    URINETYPE NotGiven 07/20/2020      Urine Microscopic:   Lab Results   Component Value Date    BACTERIA RARE 07/20/2020    HYALCAST 6 07/20/2020    WBCUA 44 07/20/2020    RBCUA 10 07/20/2020    EPIU 0 07/20/2020       MICRO: cultures reviewed and updated by me     07/20/20 1852        Order Status: Completed  Specimen: Blood  Updated: 07/21/20 2115     Culture, Blood 2  No Growth to date.  Any change in status will be called. Narrative:      ORDER#: [id number]                          ORDERED BY: ZENON LINO   SOURCE: Blood                              COLLECTED:  07/20/20 18:52   ANTIBIOTICS AT REGIS. :                      RECEIVED :  07/20/20 19:02   If child <=2 yrs old please draw pediatric bottle. ~Blood Culture #2   Performed at:   [address] [facility] [address]   Phone [phone]    Culture, Blood 1 [[id number]]   Collected: 07/20/20 1852    Order Status: Completed  Specimen: Blood  Updated: 07/21/20 2115     Blood Culture, Routine  No Growth to date.  Any change in status will be called. Narrative:      ORDER#: [id number]                          ORDERED BY: ZENON LINO   SOURCE: Blood                              COLLECTED:  07/20/20 18:52   ANTIBIOTICS AT REGIS. :                      RECEIVED :  07/20/20 19:02   If child <=2 yrs old please draw pediatric bottle. ~Blood Culture #1   Performed at:   [address] [facility] [address]   Phone [phone]    Culture, Urine [[id number]]  (Abnormal)  Collected: 07/20/20 1856    Order Status: Completed  Specimen: Urine, clean catch  Updated: 07/21/20 1806     Urine Culture, Routine  <50,000 CFU/ml mixed skin/urogenital rex. No further workupAbnormal       Organism  Pseudomonas aeruginosaAbnormal       Urine Culture, Routine  --     >100,000 CFU/ml   ID and sensitivity to follow    Narrative:      ORDER#: [id number]                          ORDERED BY: Guero Pete   SOURCE: Urine Clean Catch                  COLLECTED:  07/20/20 18:56   ANTIBIOTICS AT REGIS. :                      RECEIVED :  07/20/20 20:03   Performed at:   [place] [address]   Phone [phone]          IMAGING:    XR CHEST PORTABLE   Final Result   Mild cardial-pericardial silhouette enlargement. Otherwise unremarkable   portable chest radiograph.                All the pertinent images and reports for the current Hospitalization were reviewed by me     Scheduled Meds:   vancomycin  1,250 mg Intravenous Once    metoprolol tartrate  25 mg Oral BID    venlafaxine  150 mg Oral BID    isosorbide mononitrate  30 mg Oral Daily    atorvastatin  10 mg Oral Daily    aspirin  325 mg Oral Daily    sodium chloride flush  10 mL Intravenous 2 times per day    enoxaparin  40 mg Subcutaneous Daily    vancomycin (VANCOCIN) intermittent dosing (placeholder)   Other RX Placeholder    lactobacillus  1 capsule Oral Daily with breakfast    atropine  1 drop Right Eye BID    dorzolamide-timolol  1 drop Right Eye BID    lipase-protease-amylase  12,000 Units Oral BID WC    mirabegron  50 mg Oral Daily    prednisoLONE acetate  1 drop Right Eye BID    sennosides-docusate sodium  1 tablet Oral Daily    gabapentin  300 mg Oral BID    ferrous sulfate  324 mg Oral BID WC    vitamin D  5,000 Units Oral Daily    insulin lispro  0-6 Units Subcutaneous TID     insulin lispro  0-3 Units Subcutaneous Nightly    insulin glargine  10 Units Subcutaneous Daily    insulin lispro  3 Units Subcutaneous TID     meropenem  500 mg Intravenous Q8H       Continuous Infusions:   dextrose         PRN Meds:  sodium chloride flush, promethazine **OR** ondansetron, glucose, dextrose, glucagon (rDNA), dextrose      Assessment:     Patient Active Problem List   Diagnosis    Complicated UTI (urinary tract infection)       Change in Mentation  Supra pubic catheter in place    Low grade temp+  Lower leg cellulitis  Recurrent lower leg cellulitis  KACIE+  H/o MRSA infection in the past      On going lower leg cellulitis and lower leg edema with weeping ulcers will need IV abx for now until legs looking better     Urine cx from Supra pubic catheter not much helpful given the clinical change ok to cont IV Meropenem will cover lower leg wounds as well         Labs, Microbiology, Radiology and all the pertinent results from current hospitalization and  care every where were reviewed  by me as a part of the evaluation   Plan:   1. Cont IV Vancomycin by levels due to KACIE  2. Do not use Cefepime had Encephalopathy in the past  3. IV Meropenem x 500 mg Q 8 hrs  4. Cont local care  5. Elevate the extremities      Discussed with patient/Family and Nursing staff     Thanks for allowing me to participate in your patient's care and please call me with any questions or concerns.     Fermin Carroll MD  Infectious Disease  [facility]) Physician  Phone: [phone]   Fax : [phone]

## 2020-07-22 NOTE — CONSULTS
Days per week: None     Minutes per session: None    Stress: None   Relationships    Social connections     Talks on phone: None     Gets together: None     Attends Nondenominational service: None     Active member of club or organization: None     Attends meetings of clubs or organizations: None     Relationship status: None    Intimate partner violence     Fear of current or ex partner: None     Emotionally abused: None     Physically abused: None     Forced sexual activity: None   Other Topics Concern    None   Social History Narrative    None           REVIEW OF SYSTEMS:   CONSTITUTIONAL: negative for fevers, chills, diaphoresis, appetite change, night sweats, unexpected weight change, +fatigue  EYES: negative for blurred vision, eye discharge, visual disturbance and icterus  HEENT: negative for hearing loss, tinnitus, ear drainage, sinus pressure, nasal congestion, epistaxis and snoring  RESPIRATORY: Negative for hemoptysis, cough, sputum production  CARDIOVASCULAR: negative for chest pain, palpitations, exertional chest pressure/discomfort, syncope, edema  GASTROINTESTINAL: negative for nausea, vomiting, diarrhea, blood in stool, abdominal pain, constipation  GENITOURINARY: negative for frequency, dysuria, urinary incontinence, decreased urine volume, and hematuria  HEMATOLOGIC/LYMPHATIC: negative for easy bruising, bleeding and lymphadenopathy  ALLERGIC/IMMUNOLOGIC: negative for recurrent infections, angioedema, anaphylaxis and drug reactions  ENDOCRINE: negative for weight changes and diabetic symptoms including polyuria, polydipsia and polyphagia  MUSCULOSKELETAL: negative for pain, joint swelling, decreased range of motion  NEUROLOGICAL: negative for headaches, slurred speech, unilateral weakness  PSYCHIATRIC/BEHAVIORAL: negative for hallucinations, behavioral problems, confusion and agitation.      Physical Examination:  Vitals:   Patient Vitals for the past 24 hrs:   BP Temp Temp src Pulse Resp SpO2 Weight   07/22/20 1456 (!) 121/50 97.6 °F (36.4 °C) Oral 66 12 96 % --   07/22/20 0914 126/78 -- -- 78 -- -- --   07/22/20 0553 124/77 97.7 °F (36.5 °C) Oral 76 18 99 % --   07/22/20 0552 -- -- -- -- -- -- 177 lb 0.5 oz (80.3 kg)   07/22/20 0051 137/61 98.9 °F (37.2 °C) Oral 82 20 95 % --   07/21/20 2108 (!) 94/54 99.2 °F (37.3 °C) Oral 79 18 100 % --     Const: Alert. WDWN. No distress  Eyes: Conjunctiva noninjected, no icterus noted; pupils equal, round, and reactive to light. HENT: Atraumatic, normocephalic; Oral mucosa moist  Neck: Trachea midline, neck supple. No thyromegaly noted. CV: Regular rate and rhythm, no murmur rub or gallop noted  Resp: Lungs diminished, no rales wheezes or rhonchi, no retractions. Respirations unlabored. GI: Soft, nontender, nondistended. Normal bowel sounds. No palpable masses. Skin: LE wounds dressed, c/d/i. No rashes noted. Ext: +LE edema. No varicosities. MSK: No joint tenderness, erythema, warmth noted. AROM intact. Neuro: Alert, oriented to person and hospital but confused about times and situation. No cranial nerve deficits appreciated. Sensation intact to light touch. Motor examination reveals strength 5/5 in BUE, 4/5 in BLE except 2-3/5 hip flexion. No abnormalities with finger/nose noted. Reflexes diminished, symmetric.   Psych: Stable mood, normal judgement, normal affect     Lab Results   Component Value Date    WBC 4.7 07/22/2020    HGB 7.7 (L) 07/22/2020    HCT 23.4 (L) 07/22/2020    MCV 99.1 07/22/2020     07/22/2020     No results found for: INR, PROTIME  Lab Results   Component Value Date    CREATININE 1.0 07/22/2020    BUN 21 (H) 07/22/2020     07/22/2020    K 4.9 07/22/2020     (H) 07/22/2020    CO2 26 07/22/2020     Lab Results   Component Value Date    ALT 9 (L) 07/20/2020    AST 15 07/20/2020    ALKPHOS 195 (H) 07/20/2020    BILITOT <0.2 07/20/2020       Most recent EKG revealed:  Normal sinus rhythmIncomplete right bundle branch blockInferior infarct , age undeterminedAnteroseptal infarct , age undeterminedAbnormal ECGNo previous ECGs availableConfirmed by SYMONE RIVERA MD, [address] [facility] ([id number]) on 7/22/2020 8:32:59 AM       On my review, CXR displays mild cardiac silhouette enlargement, no consolidations or effusions. Assessment:  1. Debility  2. UTI in setting of chronic SPT  3. KACIE on CKD  4. Chronic LE wounds   5. HTN  6. HLD  7. DM2  8. Anemia    Impairments: Generalized weakness, decreased endurance, balance, cognition    Recommendations:    Recommend SNF when medically ready for discharge. Based on decreased activity tolerance and relatively chronic/stable medical co morbidities, anticipate patient's rehabilitation needs could be met at this level of care. Thank you for this consult. Please contact me with any questions or concerns. Vern Odom.  Catrachito Espinosa MD 7/22/2020, 5:02 PM

## 2020-07-22 NOTE — CARE COORDINATION
Spoke with pts daughter Tato Forrest who is aware that pt was denied [facility]. She is hoping for pt to return to the shared living home called [facility] address is [address] [place] [address] [address] [address]. Pt has 24/7 caregivers and is active with [facility]. Meliza is aware pt requires max of 2 to transfer. Will follow to assist as needed. When SNF was mentioned she states she is on her way to hospital to see her mom and will evaluate.   Electronically signed by Wilbert Schwab, LSW on 7/22/2020 at 1:37 PM

## 2020-07-22 NOTE — PROGRESS NOTES
Physical Therapy  Facility/Department: [address] MED SURG  Initial Assessment/Cotx with OT     NAME: Nahum Smiley  : 1945  MRN: [id number]    Date of Service: 2020  Discharge Recommendations:  Continue to assess pending progress, Patient would benefit from continued therapy after discharge, 3-5 sessions per week   PT Equipment Recommendations  Other: will monitor    Assessment   Body structures, Functions, Activity limitations: Decreased functional mobility   Assessment: Pt presents after admission to the hospital with what appears to be increasing fatigue and weakness over the past 3-4 days, also associated with just generalized decrease in functioning with progressive inability to walk or move around. She was recently being treated for what appeared to be lower extremity wounds and had a UTI that apparently grew MRSA. \"  Prior to admission, pt reportedly livedn in a group home type setting and was reportedly Independent at home with a RW, but has 24 hr care of caregivers and family at this home. This date, pt was Max A x 2 persons for all bed mobility, and was Max A x 2 persons for an unsuccessful attempt at standing. Pt was unable to tolerate this due to LE pain from her wounds. Pt needed to return back into bed. Unsure of d/c plans at this time, however, pt seems unfit for return home at this time. Will cont to follow and mobilityze as able. Will follow. Nahum Smiley scored a 8/24 on the AM-PAC short mobility form. Current research shows that an AM-PAC score of 17 or less is typically not associated with a discharge to the patient's home setting. Based on the patient's AM-PAC score and their current functional mobility deficits, it is recommended that the patient have 3-5 sessions per week of Physical Therapy at d/c to increase the patient's independence. Please see assessment section for further patient specific details.     If patient discharges prior to next session this note will serve as a discharge summary. Please see below for the latest assessment towards goals. Prognosis: Guarded; Fair  Decision Making: Medium Complexity  History: as noted  Exam: as above  Clinical Presentation: evolving  Barriers to Learning: pain  REQUIRES PT FOLLOW UP: Yes  Activity Tolerance  Activity Tolerance: Patient limited by pain; Patient limited by endurance;Treatment limited secondary to medical complications (free text)       Patient Diagnosis(es): The primary encounter diagnosis was Altered mental status, unspecified altered mental status type. Diagnoses of Urinary tract infection without hematuria, site unspecified, Wound of left lower extremity, initial encounter, Wound of right lower extremity, initial encounter, and Type 2 diabetes mellitus with other specified complication, with long-term current use of insulin ([facility] [address] were also pertinent to this visit. has no past medical history on file. has no past surgical history on file. 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 The patient's daughter noted the rapid decline in the past two days. She was recently being treated for what appeared to be lower extremity wounds and had a UTI that apparently grew MRSA.  \"  Family / Caregiver Present: No  Subjective  Subjective: Pt in supine, lightly sleeping and did not want to get up. Pt reported pain\"'everywhere\", yola with mobility but pain not rated. Orientation  Orientation  Overall Orientation Status: Impaired(?baseline, as pt not willing to answer many questions asked of her.)  Orientation Level: Oriented to person;Disoriented to time;Oriented to place; Disoriented to situation  Social/Functional History  Social/Functional History  Lives With: (pt lives with three other elderly individuals with 24/7 assist of caregivers)  Type of Home: House  Home Layout: One level  Home Access: Stairs to enter without rails  Entrance Stairs - Number of Steps: 1 (daughter always assists pt on step)  Bathroom Shower/Tub: Walk-in shower  Bathroom Toilet: Handicap height  Bathroom Equipment: Grab bars in shower, Shower chair, Hand-held shower, Grab bars around toilet  Bathroom Accessibility: Wheelchair accessible  Home Equipment: Rolling walker, [employer], [address] chair  ADL Assistance: Needs assistance(caregiver assists with bathing, LB dressing. Pt independent toileting)  Homemaking Assistance: Needs assistance(family and caregivers manage all)  Ambulation Assistance: Independent(with RW)  Transfer Assistance: Independent(pt sleeps in LIFT chair)  Active : No  Patient's  Info: daughter  Additional Comments: Above information obtained per daughter over phone 20, d/t pt being very poor historian. Daughter reports pt had 1 recent fall last week. Objective  AROM RLE (degrees)  RLE General AROM: limited throughout LEs due to pain and/or LE wounds. AROM LLE (degrees)  LLE General AROM: limited throughout LEs due to pain and/or LE wounds.   Strength RLE  Comment: functionally fair to poor  Strength LLE  Comment: functionally fair to poor     Bed mobility  Rolling to Left: Maximum assistance  Rolling to Right: Maximum assistance  Supine to Sit: Maximum assistance;2 Person assistance  Sit to Supine: Dependent/Total;2 Person assistance  Scootin Person assistance;Dependent/Total(to scoot up in bed)  Transfers  Sit to Stand: Maximum Assistance;2 Person Assistance;Dependent/Total;Unable to assess  Stand to sit: 2 Person Assistance;Maximum Assistance;Dependent/Total;Unable to assess  Bed to Chair: Unable to assess  Comment: Attempted sit<>stand to [facility] lift device x 2 trials with varied surface/matress heights and these were unsuccessful due to pt's short height/stature, as well as pt was resistive to moving due to LE pain. Noticable LE increased drainage with acitivty, theredore opted for return back into supine after attmpets. Pt repositioned and linens changed. Pt assisted also with repositioning ans set up with breakfast tray. Call light and needs in reach. Nursing notified of session. Ambulation  Ambulation?: No(non-amb at present---unsure of how ambulatory at home? ??)     Balance  Sitting - Static: Fair  Sitting - Dynamic: Fair  Standing - Static: (yaniv)  Standing - Dynamic: (yaniv)  Comments: CG/Min A sitting wob, YANIV stance due to poor tolerance/pain and LE drainage. Plan   Plan  Times per week: 3-5 x wk in acute setting, needs cotx  Current Treatment Recommendations: Functional Mobility Training  Safety Devices  Type of devices: Bed alarm in place, Call light within reach, Nurse notified, Patient at risk for falls, Left in bed    AM-PAC Score  AM-PAC Inpatient Mobility Raw Score : 8 (07/22/20 0903)  AM-PAC Inpatient T-Scale Score : 28.52 (07/22/20 0903)  Mobility Inpatient CMS 0-100% Score: 86.62 (07/22/20 0903)  Mobility Inpatient CMS G-Code Modifier : CM (07/22/20 5070)      Goals  Short term goals  Time Frame for Short term goals: by acute d/c:--anticipate the need for ongoing skilled PT post acute setting  Short term goal 1: bed mobility Min A  Short term goal 2: transfers Min A x 1-2  Short term goal 3: assess ambulation with RW and Min A x 1-2  Long term goals  Time Frame for Long term goals : tbd at next level of care.   Patient Goals   Patient goals : pt did not state a goal for therapy.      Therapy Time   Individual Concurrent Group Co-treatment   Time In 0800         Time Out 0857         Minutes Stuart Echols Electronically signed by Colt Jefferson PT on 7/22/2020 at 9:05 AM

## 2020-07-22 NOTE — PROGRESS NOTES
Occupational Therapy  Facility/Department: [facility] MED SURG  Daily Treatment Note  NAME: Yessenia Amaya  : 1945  MRN: [id number]    Date of Service: 2020    Discharge Recommendations:  3-5 sessions per week, Patient would benefit from continued therapy after discharge     Yessenia Amaya scored a 13/24 on the AM-PAC ADL Inpatient form. Current research shows that an AM-PAC score of 17 or less is typically not associated with a discharge to the patient's home setting. Based on the patient's AM-PAC score and their current ADL deficits, it is recommended that the patient have 3-5 sessions per week of Occupational Therapy at d/c to increase the patient's independence. Please see assessment section for further patient specific details. If patient discharges prior to next session this note will serve as a discharge summary. Please see below for the latest assessment towards goals. Assessment   Performance deficits / Impairments: Decreased functional mobility ; Decreased ADL status; Decreased strength;Decreased cognition;Decreased endurance;Decreased balance  Assessment: Pt tolerated treatment poor. Pt is limited by pain, generalized weakness, decreased fxl mobility, fxl activity tolerance, and cognition. Pt requiring max A x2 for bed mobility, CGA/min A for sitting balance, and was unable to stand with use of lift equipment (trent stedy) despite assist x2. Anticipate pt would require overall max A for ADLs. Pt demonstrates need for ongoing skilled OT at d/c to maximize pt's safety and independence prior to return home. Will cont to follow while hospitalized.   Prognosis: Fair  OT Education: OT Role;Plan of Care;Transfer Training;Orientation  Barriers to Learning: cognition, vision  REQUIRES OT FOLLOW UP: Yes  Activity Tolerance  Activity Tolerance: Patient limited by pain;Treatment limited secondary to decreased cognition  Activity Tolerance: noticeable increased B LE drainage with activity  Safety Devices  Safety Devices in place: Yes  Type of devices: Call light within reach; Bed alarm in place; Left in bed         Patient Diagnosis(es): The primary encounter diagnosis was Altered mental status, unspecified altered mental status type. Diagnoses of Urinary tract infection without hematuria, site unspecified, Wound of left lower extremity, initial encounter, Wound of right lower extremity, initial encounter, and Type 2 diabetes mellitus with other specified complication, with long-term current use of insulin ([address] were also pertinent to this visit. has no past medical history on file. has no past surgical history on file. Restrictions  Restrictions/Precautions  Restrictions/Precautions: Fall Risk  Position Activity Restriction  Other position/activity restrictions: B LE wounds  Subjective   General  Chart Reviewed: Yes  Additional Pertinent Hx: Per Fay Gao MD's H&P 7/21: \"The patient is a 49-year-old  female who presented to the hospital with what appears to be increasing fatigue and weakness over the past 3-4 days, also associated with just generalized decrease in functioning with progressive inability to walk or move around without nausea or vomiting. The patient's daughter noted the rapid decline in the past two days. She was recently being treated for what appeared to be lower extremity wounds and had a UTI that apparently grew MRSA. \"  Family / Caregiver Present: No  Referring Practitioner: Jen Schlatter, APRN - CNP  Diagnosis: UTI, KACIE, generalized weakness  Subjective  Subjective: Pt met b/s for OT cotx with PT. Pt in bed sleeping on arrival, awakens to call of name and agreeable to participate in therapy. Pt c/o pain everywhere, did not rate. Orientation  Orientation  Overall Orientation Status: Impaired  Orientation Level: Oriented to person;Oriented to place; Disoriented to time;Disoriented to situation  Objective    ADL  Feeding: Setup(pt repositioned in bed and set-up for breakfast)  UE Dressing: Maximum assistance(to doff sweater, change gown in bed)  Toileting: (catheter)     Balance  Sitting Balance: Minimal assistance(seated EOB with CGA/min A)  Standing Balance: (attempted with max A x2 and using lift equipment (trent stedy) without success)    Bed mobility  Rolling to Left: Maximum assistance  Rolling to Right: Maximum assistance  Supine to Sit: Maximum assistance;2 Person assistance  Sit to Supine: Dependent/Total;2 Person assistance  Scootin Person assistance;Dependent/Total(to scoot up in bed)     Transfers  Sit to stand: (attempted from elevated EOB>trent stedy with assist x2 without success x2 attempts)  Transfer Comments: pt will require use of maximove     Cognition  Overall Cognitive Status: Exceptions  Arousal/Alertness: Delayed responses to stimuli  Following Commands: Follows one step commands with repetition; Follows one step commands with increased time  Attention Span: Difficulty attending to directions; Difficulty dividing attention  Memory: Decreased short term memory;Decreased long term memory;Decreased recall of recent events;Decreased recall of biographical Information  Problem Solving: Decreased awareness of errors;Assistance required to identify errors made;Assistance required to generate solutions  Insights: Decreased awareness of deficits  Initiation: Requires cues for all  Sequencing: Requires cues for all        Plan   Plan  Times per week: 3-5  Times per day: Daily  Current Treatment Recommendations: Balance Training, Functional Mobility Training, Endurance Training, Safety Education & Training, Self-Care / ADL, Strengthening, Cognitive Reorientation, Cognitive/Perceptual Training    AM-PAC Score        AM-[facility] Inpatient Daily Activity Raw Score: 13 (20 0857)  AM-PAC Inpatient ADL T-Scale Score : 32.03 (20 0857)  ADL Inpatient CMS 0-100% Score: 63.03 (20 0857)  ADL Inpatient CMS G-Code Modifier : CL (20 0)    Goals  Short term goals  Time Frame for Short term goals: Prior to d/c: STATUS GOALS 7/22: ALL GOALS ONGOING AND NOT MET. Short term goal 1: Pt will complete UB bathing/dressing with set-up/SBA. Short term goal 2: Pt will complete toileting with mod A. Short term goal 3: Pt will complete grooming in stance at sink with CGA. Short term goal 4: Pt will complete fxl mobility and fxl transfers to/from ADL surfaces with min A using AD. Short term goal 5: Pt will tolerate standing >5 minutes for functional task with SBA. Long term goals  Time Frame for Long term goals : STG=LTG  Patient Goals   Patient goals : pt unable to verbalize personal therapy goal d/t decreased cognition.        Therapy Time   Individual Concurrent Group Co-treatment   Time In 0800         Time Out 0857         Minutes [place] [phone]

## 2020-07-22 NOTE — PROGRESS NOTES
Speech Language Pathology  Facility/Department: [address]   CLINICAL BEDSIDE SWALLOW EVALUATION    NAME: Martinez Madera  : 1945  MRN: [id number]    ADMISSION DATE: 2020  ADMITTING DIAGNOSIS: Complicated UTI (urinary tract infection) [N39.0]     Martinez Madera has Complicated UTI (urinary tract infection) on their problem list.    ONSET DATE: 2020    CHART REVIEW:  2020 admitted with UTI:  The patient is a 78-year-old  female who presented to the hospital with what appears to be increasing fatigue and weakness over the past 3-4 days, also associated with just generalized decrease in functioning with progressive inability to walk or move around without nausea or vomiting. The patient's daughter noted the rapid decline in the past two days. She was recently being treated for what appeared to be lower extremity wounds and had a UTI that apparently grew MRSA. This is all per the patient's daughter who provided most of the history. The patient was unable to provide a good history since she did not quite understand why she was in the hospital.   2020 CXR:   Impression    Mild cardial-pericardial silhouette enlargement.  Otherwise unremarkable    portable chest radiograph. Date of Eval: 2020  Evaluating Therapist: Derick Edouard    Current Diet level:  Current Diet : Dysphagia Soft and Bite-Sized (Dysphagia III)  Current Liquid Diet : Thin      Primary Complaint  Patient Complaint: patient denies dysphagia; reports able to eat what everyone lese has    Pain:  Pain Level: 0    Reason for Referral  Martinez Madera was referred for a bedside swallow evaluation to assess the efficiency of her swallow function, identify signs and symptoms of aspiration and make recommendations regarding safe dietary consistencies, effective compensatory strategies, and safe eating environment.     Impression  Dysphagia Diagnosis: Moderate oral stage dysphagia;Mild pharyngeal stage dysphagia  · Accepted and tolerated evaluation at bedside. · Patient alert, cooperative, confused; oriented to self, place, situation; follows simple dx; answers questions ,but appears unreliable. · Moderate oral stage dysphagia characterized by decreased mastication and decreased lingual manipulation. Patient unable to masticate soft; trial removed. Prolonged but adequate mastication with moist solids. Prolonged oral transit with all. Suspect premature bolus loss to the pharynx. · Mild pharyngeal stage dysphagia characterized by delayed swallow and decreased laryngeal elevation WITHOUT overt sign/symptoms of penetration/aspiration. Dysphagia Outcome Severity Scale: Level 3: Moderate dysphagia- Total assisstance, supervision or strategies. Two or more diet consistencies restricted     Treatment Plan  Requires SLP Intervention: Yes  Duration/Frequency of Treatment: ST to tx 3-5 times per week for dysphagia during acute admission  D/C Recommendations: To be determined    Recommended Diet and Intervention  Diet Solids Recommendation: Dysphagia Minced and Moist (Dysphagia II)  Liquid Consistency Recommendation: Thin  Compensatory Swallowing Strategies: Upright as possible for all oral intake;Remain upright for 30-45 minutes after meals    Therapeutic Interventions: Diet tolerance monitoring; Therapeutic PO trials with SLP;Patient/Family education; Bolus control exercises    Treatment/Goals  Dysphagia Goals: The patient will tolerate recommended diet without observed clinical signs of aspiration; The patient/caregiver will demonstrate understanding of compensatory strategies for improved swallowing safety. ;The patient will improve oral preparation phase via bolus control/manipulation exercises to 5/5 each trial.    General  Chart Reviewed: Yes  Behavior/Cognition: Alert; Cooperative;Pleasant mood;Confused  Communication Observation: Functional  Follows Directions: Simple  Dentition: Dentures top(missing lower)  Patient Positioning: Upright in bed  Baseline Vocal Quality: Normal  Volitional Cough: Strong  Consistencies Administered: Dysphagia Pureed (Dysphagia I); Honey - cup;Nectar - cup; Thin - cup; Thin - straw;Dysphagia Minced and Moist (Dysphagia II); Dysphagia Soft and Bite-Sized (Dysphagia III)    Vision/Hearing  Vision: Impaired  Hearing: Within functional limits    Oral Motor Deficits  Labial Strength: Reduced  Lingual Strength: Reduced  Mandible: Impaired    Oral Phase Dysfunction  Impaired Mastication: Soft Solid;Mechanical soft(unable to masticate soft; prolonged but adequate with moist)  Decreased Anterior to Posterior Transit: All  Suspected Premature Bolus Loss: All     Indicators of Pharyngeal Phase Dysfunction  Delayed Swallow: All  Decreased Laryngeal Elevation: All    Prognosis  Prognosis for safe diet advancement: guarded  Consulted and agree with results and recommendations: Patient;RN    Education  Patient Education: completed on results/recs/plan  Patient Education Response: Needs reinforcement      Therapy Time  SLP Individual Minutes  Time In: 0915  Time Out: 1000  Minutes: [address] [place] [address] Dr Abram Gaona, [address] [place] [address], [id number]  Speech-Language Pathologist  7/22/2020 10:00 AM

## 2020-07-22 NOTE — CARE COORDINATION
Referral to [address] [place] [address] received. The patient was discussed with PM&R physician and the patient is not appropriate for rehab admission at this time and would be better served in a slower paced program at a SNF level of care.

## 2020-07-22 NOTE — PROGRESS NOTES
deformity. Skin: Skin color, texture, turgor normal.  No rashes or lesions. Neurologic:  Neurovascularly intact without any focal sensory/motor deficits. Cranial nerves: II-XII intact, grossly intact. No facial asymmetry, tongue midline. Psychiatric:  Alert and oriented to self and other only, baseline confusion  Capillary Refill: Brisk,< 3 seconds   Peripheral Pulses: +2 palpable, equal bilaterally        LABS:    Lab Results   Component Value Date    WBC 4.7 07/22/2020    HGB 7.7 (L) 07/22/2020    HCT 23.4 (L) 07/22/2020    MCV 99.1 07/22/2020     07/22/2020    LYMPHOPCT 7.2 07/20/2020    RBC 2.36 (L) 07/22/2020    MCH 32.9 07/22/2020    MCHC 33.1 07/22/2020    RDW 13.8 07/22/2020       Lab Results   Component Value Date    CREATININE 1.0 07/22/2020    BUN 21 (H) 07/22/2020     07/22/2020    K 4.9 07/22/2020     (H) 07/22/2020    CO2 26 07/22/2020       No results found for: MG    Lab Results   Component Value Date    ALT 9 (L) 07/20/2020    AST 15 07/20/2020    ALKPHOS 195 (H) 07/20/2020    BILITOT <0.2 07/20/2020        No flowsheet data found. No results found for: LABA1C    Imaging:  XR CHEST PORTABLE   Final Result   Mild cardial-pericardial silhouette enlargement. Otherwise unremarkable   portable chest radiograph.              Scheduled and prn Medications:    Scheduled Meds:   vancomycin  1,250 mg Intravenous Once    metoprolol tartrate  25 mg Oral BID    venlafaxine  150 mg Oral BID    isosorbide mononitrate  30 mg Oral Daily    atorvastatin  10 mg Oral Daily    aspirin  325 mg Oral Daily    sodium chloride flush  10 mL Intravenous 2 times per day    enoxaparin  40 mg Subcutaneous Daily    vancomycin (VANCOCIN) intermittent dosing (placeholder)   Other RX Placeholder    lactobacillus  1 capsule Oral Daily with breakfast    atropine  1 drop Right Eye BID    dorzolamide-timolol  1 drop Right Eye BID    lipase-protease-amylase  12,000 Units Oral BID WC    mirabegron 50 mg Oral Daily    prednisoLONE acetate  1 drop Right Eye BID    sennosides-docusate sodium  1 tablet Oral Daily    gabapentin  300 mg Oral BID    ferrous sulfate  324 mg Oral BID WC    vitamin D  5,000 Units Oral Daily    insulin lispro  0-6 Units Subcutaneous TID WC    insulin lispro  0-3 Units Subcutaneous Nightly    insulin glargine  10 Units Subcutaneous Daily    insulin lispro  3 Units Subcutaneous TID     meropenem  500 mg Intravenous Q8H     Continuous Infusions:   lactated ringers 125 mL/hr at 07/22/20 0026    dextrose       PRN Meds:.sodium chloride flush, promethazine **OR** ondansetron, glucose, dextrose, glucagon (rDNA), dextrose    Assessment & Plan:        1.  Gram-negative bacteria urinary tract infection, complicated with  acute kidney injury POA  - chronic suprapubic catheter  - awaiting urine culture and adjust antibx   - on Cefepime,vanc and merrem   - culturelle  - consulted pharm for vanc dosing  - Consulted ID - Dr. Stone Pollard familiar w/pt   - d/c cefepime d/t encephalopathy in past, IV merrem 500mg q8h + vanc        2.  Anemia, likely 2/2 chronic disease  - H/H 8.7/26   - trend CBC     3.  Hypertension.  - cont home meds     4.  Dyslipidemia. - cont home statin     5.  Diabetes mellitus.  - SSI low dose; 10 units daily lantus; 3units humalog ac per Jessie Thapa Diabetic educator  - discuss insulin pump use on discharge   - poct ac/hs    Continue current regimen/therapies. Monitor. Adjust medical regimen as appropriate. Body mass index is 35.76 kg/m². The patient and / or the family were informed of the results of any tests, a time was given to answer questions, a plan was proposed and they agreed with plan. DVT ppx: lovenox      Diet: DIET CARB CONTROL;  Dental Soft  Dietary Nutrition Supplements: Wound Healing Oral Supplement, Diabetic Oral Supplement, Protein Modular    Consults:  IP CONSULT TO HOSPITALIST  PHARMACY TO DOSE VANCOMYCIN  IP CONSULT TO INFECTIOUS DISEASES    DISPO/placement plan: likely SNF on d/c based on # assist pending ID plan    Code Status: Full Code      FRANCHESCA Doan CNP  07/22/20

## 2020-07-22 NOTE — PROGRESS NOTES
Clinical Pharmacy Note  Vancomycin Consult    Kim Montejo is a 66 y.o. female ordered Vancomycin for UTI; consult received from Dr. Kirsty Kruger to manage therapy. Also receiving Merrem. Patient Active Problem List   Diagnosis    Complicated UTI (urinary tract infection)       Allergies:  Patient has no known allergies. Temp max:  Temp (24hrs), Av.9 °F (37.2 °C), Min:98.1 °F (36.7 °C), Max:100.1 °F (37.8 °C)      Recent Labs     20  1852   WBC 9.3       Recent Labs     20   BUN 29*   CREATININE 1.3*         Intake/Output Summary (Last 24 hours) at 2020  Last data filed at 2020 1405  Gross per 24 hour   Intake 1003.33 ml   Output 1725 ml   Net -721.67 ml       Culture Results:       Ht Readings from Last 1 Encounters:   20 4' 11\" (1.499 m)        Wt Readings from Last 1 Encounters:   20 172 lb 2.9 oz (78.1 kg)         CrCl cannot be calculated (Unknown ideal weight. ). Assessment/Plan:  Vancomycin day 2:  Trough =  15.4 approx 18 hours after 1 x 1250 mg dose. Will obtain a random level in the am.   Further dosing to follow. Thank you for the consult.    Meng Manzanares, [facility]

## 2020-07-22 NOTE — PLAN OF CARE
Problem: Skin Integrity:  Goal: Will show no infection signs and symptoms  Description: Will show no infection signs and symptoms  7/22/2020 1106 by Farzana Hanley RN  Outcome: Ongoing  Goal: Absence of new skin breakdown  Description: Absence of new skin breakdown  7/22/2020 1106 by Farzana Hanley RN  Outcome: Ongoing  Problem: Falls - Risk of:  Goal: Will remain free from falls  Description: Will remain free from falls  7/22/2020 1106 by Farzana Hanley RN  Outcome: Ongoing  Goal: Absence of physical injury  Description: Absence of physical injury  7/22/2020 1106 by Farzana Hanley RN  Outcome: Ongoing

## 2020-07-22 NOTE — PLAN OF CARE
Problem: Skin Integrity:  Goal: Will show no infection signs and symptoms  Description: Will show no infection signs and symptoms  7/21/2020 2255 by Tristin Kent RN  Outcome: Ongoing  7/21/2020 0940 by Heriberto Malik RN  Outcome: Ongoing     Problem: Skin Integrity:  Goal: Absence of new skin breakdown  Description: Absence of new skin breakdown  7/21/2020 2255 by Tristin Kent RN  Outcome: Ongoing  7/21/2020 0940 by Heriberto Malik RN  Outcome: Ongoing    Skin assessment performed each shift per protocol. Skin care protocol in place. Pt turned and repositioned every two hours and prn with pillow support. Specialty mattress in place. Problem: Falls - Risk of:  Goal: Will remain free from falls  Description: Will remain free from falls  7/21/2020 2255 by Tristin Kent RN  Outcome: Ongoing  7/21/2020 0940 by Heriberto Malik RN  Outcome: Ongoing     Problem: Falls - Risk of:  Goal: Absence of physical injury  Description: Absence of physical injury  7/21/2020 2255 by Tristin Kent RN  Outcome: Ongoing  7/21/2020 0940 by Heriberto Malik RN  Outcome: Ongoing    Patient uses call light appropriately to express needs. Bed to lowest position with door open and call light in reach. All fall precautions implemented at this time. Bed alarm on for safety. Siderails up x2. Non skid footwear in place. Seen at intervals to ensure safety. All needs attended.

## 2020-07-22 NOTE — PROGRESS NOTES
Clinical Pharmacy Note  Vancomycin Consult    Rob Gardiner is a 66 y.o. female ordered Vancomycin for UTI; consult received from Dr. Izzy Long to manage therapy. Also receiving Merrem. Patient Active Problem List   Diagnosis    Complicated UTI (urinary tract infection)       Allergies:  Patient has no known allergies. Temp max:  Temp (24hrs), Av.7 °F (37.1 °C), Min:97.7 °F (36.5 °C), Max:99.2 °F (37.3 °C)      Recent Labs     20  1852 20  0724   WBC 9.3 4.7       Recent Labs     20  1852 20  0724   BUN 29* 21*   CREATININE 1.3* 1.0         Intake/Output Summary (Last 24 hours) at 2020 1021  Last data filed at 2020 0604  Gross per 24 hour   Intake 3030.42 ml   Output 1425 ml   Net 1605.42 ml       Culture Results:       Ht Readings from Last 1 Encounters:   20 4' 11\" (1.499 m)        Wt Readings from Last 1 Encounters:   20 177 lb 0.5 oz (80.3 kg)         CrCl cannot be calculated (Unknown ideal weight. ). Assessment/Plan:  Vancomycin day 3  Dr Yesenia Mancera seeing pt. Reviewed his note. Trough yesterday was 15.4  ~approx 18 hours after 1 x 1250 mg dose. Scr improved 1.3 --> 1.0  Will re dose 1250 mg today. Random level in the am 20    Thank you for the consult.    Parviz Bear, [facility]

## 2020-07-23 LAB
ANION GAP SERPL CALCULATED.3IONS-SCNC: 7 MMOL/L (ref 3–16)
BUN BLDV-MCNC: 16 MG/DL (ref 7–20)
CALCIUM SERPL-MCNC: 7.9 MG/DL (ref 8.3–10.6)
CHLORIDE BLD-SCNC: 107 MMOL/L (ref 99–110)
CO2: 27 MMOL/L (ref 21–32)
CREAT SERPL-MCNC: 0.8 MG/DL (ref 0.6–1.2)
GFR AFRICAN AMERICAN: >60
GFR NON-AFRICAN AMERICAN: >60
GLUCOSE BLD-MCNC: 113 MG/DL (ref 70–99)
GLUCOSE BLD-MCNC: 124 MG/DL (ref 70–99)
GLUCOSE BLD-MCNC: 127 MG/DL (ref 70–99)
GLUCOSE BLD-MCNC: 136 MG/DL (ref 70–99)
GLUCOSE BLD-MCNC: 146 MG/DL (ref 70–99)
HCT VFR BLD CALC: 23.8 % (ref 36–48)
HEMOGLOBIN: 8 G/DL (ref 12–16)
MCH RBC QN AUTO: 33 PG (ref 26–34)
MCHC RBC AUTO-ENTMCNC: 33.4 G/DL (ref 31–36)
MCV RBC AUTO: 98.6 FL (ref 80–100)
PDW BLD-RTO: 13.5 % (ref 12.4–15.4)
PERFORMED ON: ABNORMAL
PLATELET # BLD: 218 K/UL (ref 135–450)
PMV BLD AUTO: 8.4 FL (ref 5–10.5)
POTASSIUM SERPL-SCNC: 5.1 MMOL/L (ref 3.5–5.1)
RBC # BLD: 2.41 M/UL (ref 4–5.2)
SODIUM BLD-SCNC: 141 MMOL/L (ref 136–145)
VANCOMYCIN RANDOM: 19.1 UG/ML
WBC # BLD: 6.4 K/UL (ref 4–11)

## 2020-07-23 PROCEDURE — 97530 THERAPEUTIC ACTIVITIES: CPT

## 2020-07-23 PROCEDURE — 2580000003 HC RX 258: Performed by: INTERNAL MEDICINE

## 2020-07-23 PROCEDURE — 94760 N-INVAS EAR/PLS OXIMETRY 1: CPT

## 2020-07-23 PROCEDURE — 6360000002 HC RX W HCPCS: Performed by: INTERNAL MEDICINE

## 2020-07-23 PROCEDURE — 6370000000 HC RX 637 (ALT 250 FOR IP): Performed by: INTERNAL MEDICINE

## 2020-07-23 PROCEDURE — 6370000000 HC RX 637 (ALT 250 FOR IP): Performed by: NURSE PRACTITIONER

## 2020-07-23 PROCEDURE — 1200000000 HC SEMI PRIVATE

## 2020-07-23 PROCEDURE — 92526 ORAL FUNCTION THERAPY: CPT

## 2020-07-23 PROCEDURE — 97116 GAIT TRAINING THERAPY: CPT

## 2020-07-23 PROCEDURE — 80202 ASSAY OF VANCOMYCIN: CPT

## 2020-07-23 PROCEDURE — 97535 SELF CARE MNGMENT TRAINING: CPT

## 2020-07-23 PROCEDURE — 36415 COLL VENOUS BLD VENIPUNCTURE: CPT

## 2020-07-23 PROCEDURE — 99232 SBSQ HOSP IP/OBS MODERATE 35: CPT | Performed by: INTERNAL MEDICINE

## 2020-07-23 PROCEDURE — 85027 COMPLETE CBC AUTOMATED: CPT

## 2020-07-23 PROCEDURE — 80048 BASIC METABOLIC PNL TOTAL CA: CPT

## 2020-07-23 RX ORDER — SODIUM CHLORIDE 0.9 % (FLUSH) 0.9 %
10 SYRINGE (ML) INJECTION PRN
Status: DISCONTINUED | OUTPATIENT
Start: 2020-07-23 | End: 2020-07-24 | Stop reason: HOSPADM

## 2020-07-23 RX ORDER — LACTOBACILLUS RHAMNOSUS GG 10B CELL
1 CAPSULE ORAL
Qty: 30 CAPSULE | Refills: 0 | Status: SHIPPED | OUTPATIENT
Start: 2020-07-23

## 2020-07-23 RX ORDER — SODIUM CHLORIDE 0.9 % (FLUSH) 0.9 %
10 SYRINGE (ML) INJECTION EVERY 12 HOURS SCHEDULED
Status: DISCONTINUED | OUTPATIENT
Start: 2020-07-24 | End: 2020-07-24 | Stop reason: HOSPADM

## 2020-07-23 RX ORDER — LIDOCAINE HYDROCHLORIDE 10 MG/ML
5 INJECTION, SOLUTION EPIDURAL; INFILTRATION; INTRACAUDAL; PERINEURAL ONCE
Status: DISCONTINUED | OUTPATIENT
Start: 2020-07-24 | End: 2020-07-23

## 2020-07-23 RX ORDER — LIDOCAINE HYDROCHLORIDE 10 MG/ML
5 INJECTION, SOLUTION EPIDURAL; INFILTRATION; INTRACAUDAL; PERINEURAL ONCE
Status: COMPLETED | OUTPATIENT
Start: 2020-07-24 | End: 2020-07-24

## 2020-07-23 RX ORDER — ACETAMINOPHEN 325 MG/1
650 TABLET ORAL EVERY 4 HOURS PRN
Status: DISCONTINUED | OUTPATIENT
Start: 2020-07-23 | End: 2020-07-24 | Stop reason: HOSPADM

## 2020-07-23 RX ADMIN — PREDNISOLONE ACETATE 1 DROP: 10 SUSPENSION/ DROPS OPHTHALMIC at 21:01

## 2020-07-23 RX ADMIN — SODIUM CHLORIDE, PRESERVATIVE FREE 10 ML: 5 INJECTION INTRAVENOUS at 20:58

## 2020-07-23 RX ADMIN — Medication 1 CAPSULE: at 10:36

## 2020-07-23 RX ADMIN — ENOXAPARIN SODIUM 40 MG: 40 INJECTION SUBCUTANEOUS at 10:36

## 2020-07-23 RX ADMIN — METOPROLOL TARTRATE 25 MG: 25 TABLET, FILM COATED ORAL at 20:57

## 2020-07-23 RX ADMIN — FERROUS SULFATE TAB EC 324 MG (65 MG FE EQUIVALENT) 324 MG: 324 (65 FE) TABLET DELAYED RESPONSE at 10:37

## 2020-07-23 RX ADMIN — INSULIN LISPRO 3 UNITS: 100 INJECTION, SOLUTION INTRAVENOUS; SUBCUTANEOUS at 10:35

## 2020-07-23 RX ADMIN — GABAPENTIN 300 MG: 300 CAPSULE ORAL at 10:36

## 2020-07-23 RX ADMIN — SODIUM CHLORIDE, PRESERVATIVE FREE 10 ML: 5 INJECTION INTRAVENOUS at 10:47

## 2020-07-23 RX ADMIN — INSULIN LISPRO 1 UNITS: 100 INJECTION, SOLUTION INTRAVENOUS; SUBCUTANEOUS at 13:00

## 2020-07-23 RX ADMIN — VENLAFAXINE 150 MG: 37.5 TABLET ORAL at 10:36

## 2020-07-23 RX ADMIN — ATORVASTATIN CALCIUM 10 MG: 10 TABLET, FILM COATED ORAL at 10:37

## 2020-07-23 RX ADMIN — ACETAMINOPHEN 650 MG: 325 TABLET ORAL at 19:19

## 2020-07-23 RX ADMIN — Medication 5000 UNITS: at 10:37

## 2020-07-23 RX ADMIN — DORZOLAMIDE HYDROCHLORIDE AND TIMOLOL MALEATE 1 DROP: 20; 5 SOLUTION/ DROPS OPHTHALMIC at 10:39

## 2020-07-23 RX ADMIN — ATROPINE SULFATE 1 DROP: 10 SOLUTION OPHTHALMIC at 10:38

## 2020-07-23 RX ADMIN — VENLAFAXINE 150 MG: 37.5 TABLET ORAL at 20:57

## 2020-07-23 RX ADMIN — PANCRELIPASE 12000 UNITS: 60000; 12000; 38000 CAPSULE, DELAYED RELEASE PELLETS ORAL at 17:04

## 2020-07-23 RX ADMIN — DOCUSATE SODIUM 50 MG AND SENNOSIDES 8.6 MG 1 TABLET: 8.6; 5 TABLET, FILM COATED ORAL at 10:36

## 2020-07-23 RX ADMIN — FERROUS SULFATE TAB EC 324 MG (65 MG FE EQUIVALENT) 324 MG: 324 (65 FE) TABLET DELAYED RESPONSE at 17:04

## 2020-07-23 RX ADMIN — PREDNISOLONE ACETATE 1 DROP: 10 SUSPENSION/ DROPS OPHTHALMIC at 10:39

## 2020-07-23 RX ADMIN — MEROPENEM 500 MG: 500 INJECTION, POWDER, FOR SOLUTION INTRAVENOUS at 10:46

## 2020-07-23 RX ADMIN — GABAPENTIN 300 MG: 300 CAPSULE ORAL at 20:57

## 2020-07-23 RX ADMIN — MEROPENEM 500 MG: 500 INJECTION, POWDER, FOR SOLUTION INTRAVENOUS at 17:04

## 2020-07-23 RX ADMIN — INSULIN LISPRO 3 UNITS: 100 INJECTION, SOLUTION INTRAVENOUS; SUBCUTANEOUS at 17:04

## 2020-07-23 RX ADMIN — INSULIN LISPRO 3 UNITS: 100 INJECTION, SOLUTION INTRAVENOUS; SUBCUTANEOUS at 13:00

## 2020-07-23 RX ADMIN — VANCOMYCIN HYDROCHLORIDE 1250 MG: 10 INJECTION, POWDER, LYOPHILIZED, FOR SOLUTION INTRAVENOUS at 12:59

## 2020-07-23 RX ADMIN — DORZOLAMIDE HYDROCHLORIDE AND TIMOLOL MALEATE 1 DROP: 20; 5 SOLUTION/ DROPS OPHTHALMIC at 21:01

## 2020-07-23 RX ADMIN — PANCRELIPASE 12000 UNITS: 60000; 12000; 38000 CAPSULE, DELAYED RELEASE PELLETS ORAL at 10:36

## 2020-07-23 RX ADMIN — METOPROLOL TARTRATE 25 MG: 25 TABLET, FILM COATED ORAL at 10:37

## 2020-07-23 RX ADMIN — MEROPENEM 500 MG: 500 INJECTION, POWDER, FOR SOLUTION INTRAVENOUS at 00:22

## 2020-07-23 RX ADMIN — MEROPENEM 500 MG: 500 INJECTION, POWDER, FOR SOLUTION INTRAVENOUS at 23:56

## 2020-07-23 RX ADMIN — INSULIN GLARGINE 10 UNITS: 100 INJECTION, SOLUTION SUBCUTANEOUS at 10:35

## 2020-07-23 RX ADMIN — ASPIRIN 325 MG: 325 TABLET, DELAYED RELEASE ORAL at 10:37

## 2020-07-23 RX ADMIN — ATROPINE SULFATE 1 DROP: 10 SOLUTION OPHTHALMIC at 21:01

## 2020-07-23 RX ADMIN — ISOSORBIDE MONONITRATE 30 MG: 30 TABLET, EXTENDED RELEASE ORAL at 10:36

## 2020-07-23 ASSESSMENT — PAIN SCALES - GENERAL
PAINLEVEL_OUTOF10: 0
PAINLEVEL_OUTOF10: 3

## 2020-07-23 NOTE — PROGRESS NOTES
[facility]   Speech Therapy  Daily Dysphagia Treatment Note    Merlinda Daisy  AGE: 66 y.o. GENDER: female  : 1945  [id number]  EPISODE DATE:  2020     Patient Active Problem List   Diagnosis    Complicated UTI (urinary tract infection)     No Known Allergies    Treatment Diagnosis: Dysphagia     Chart review:   2020 admitted with UTI:  The patient is a 70-year-old  female who presented to the hospital with what appears to be increasing fatigue and weakness over the past 3-4 days, also associated with just generalized decrease in functioning with progressive inability to walk or move around without nausea or vomiting.  The patient's daughter noted the rapid decline in the past two days. Davey Ruano was recently being treated for what appeared to be lower extremity wounds and had a UTI that apparently grew MRSA.  This is all per the patient's daughter who provided most of the history.  The patient was unable to provide a good history since she did not quite understand why she was in the hospital.   2020 CXR:   Impression    Mild cardial-pericardial silhouette enlargement.  Otherwise unremarkable    portable chest radiograph.         Subjective:     Current diet  Minced/moist  Thin    Comments regarding tolerating Current Diet:   Adequate tolerance reported; patient reports dislike for food, but dislike appears r/t taste rather than texture? Objective:     Pain   Patient Currently in Pain: Denies    Cognitive/Behavior   Behavior/Cognition: Alert, Cooperative, Pleasant mood, Confused    Presentations   Consistencies Administered:  Thin - straw, Dysphagia Minced and Moist    Positioning   Fully upright in VSE chair    PO Trials:  · Thin Liquids: prolonged oral transit; suspect premature bolus loss to the pharynx; delayed swallow; decreased laryngeal elevation; NO overt signs/symptoms of penetration/aspiration  · Nectar thick liquids: NA  · Honey Thick liquids: reinforcement    Prognosis:   Prognosis for safe diet advancement: guarded  Consulted and agree with results and recommendations: Patient;RN    Plan:     Continue Dysphagia Therapy: YES    Interventions: Therapeutic Interventions: Diet tolerance monitoring, Therapeutic PO trials with SLP, Patient/Family education, Bolus control exercises  Duration/Frequency of therapy while on unit: Duration/Frequency of Treatment  Duration/Frequency of Treatment: ST to tx 3-5 tiems per week for dysphagia durign acute admission     Discharge Instructions:   Anticipate NEED for further skilled Speech Therapy for Dysphagia at discharge    This note serves as a D/C Summary in the event that this patient is discharged prior to the next therapy session.     Coded treatment time: 0  Total treatment time: 20    Electronically signed by MELANIE Sands on 7/23/2020 at 12:35 PM

## 2020-07-23 NOTE — PROGRESS NOTES
Clinical Pharmacy Note  Vancomycin Consult    Maral Maradiaga is a 66 y.o. female ordered Vancomycin for UTI; consult received from Dr. Kathi Rowe to manage therapy. Also receiving Merrem. Patient Active Problem List   Diagnosis    Complicated UTI (urinary tract infection)       Allergies:  Patient has no known allergies. Temp max:  Temp (24hrs), Av.6 °F (36.4 °C), Min:97.3 °F (36.3 °C), Max:97.8 °F (36.6 °C)      Recent Labs     20  1852 20  0724 20  0604   WBC 9.3 4.7 6.4       Recent Labs     20  18520  0724 20  0604   BUN 29* 21* 16   CREATININE 1.3* 1.0 0.8         Intake/Output Summary (Last 24 hours) at 2020 0727  Last data filed at 2020 0551  Gross per 24 hour   Intake 240 ml   Output 1950 ml   Net -1710 ml       Culture Results:   Urine >100,000 Pseudomonas     Ht Readings from Last 1 Encounters:   20 4' 11\" (1.499 m)        Wt Readings from Last 1 Encounters:   20 176 lb 5.9 oz (80 kg)         CrCl cannot be calculated (Unknown ideal weight. ). Assessment/Plan:  Vancomycin day 4  Dr Tim Albright seeing pt. Reviewed his note; dose by levels d/t KACIE on admit   Trough at 18 hrs 19.1 ug/mL  Scr improved 1.3 --> 0.8  Will re dose 83379 mg x which is 24 hr from previous dose  . Level 7-24-20 @ 1200    Thank you for the consult.    Siva Buckley, [facility]

## 2020-07-23 NOTE — DISCHARGE INSTR - COC
Continuity of Care Form    Patient Name: Georgette Hendrickson   :  1945  MRN:  [id number]    Admit date:  2020  Discharge date:  20    Code Status Order: Full Code    Advance Directives:   [facility] Documentation     Date/Time Healthcare Directive Type of Healthcare Directive Copy in [address] [place] [address] [address] Agent's Name Healthcare Agent's Phone Number    20 0202  No, patient does not have an advance directive for healthcare treatment -- -- -- -- --          Admitting Physician:  Jeremiah Bhatt MD  PCP: Deepthi Espitia    Discharging Nurse: Minerva Tellez Unit/Room#: [address] Lynda Odom Unit Phone Number: [phone]    Emergency Contact:   Extended Emergency Contact Information  Primary Emergency Contact: sun coles  Home Phone: [phone]  Mobile Phone: [phone]  Relation: Child   needed? No    Past Surgical History:  History reviewed. No pertinent surgical history. Immunization History: There is no immunization history on file for this patient.     Active Problems:  Patient Active Problem List   Diagnosis Code    Complicated UTI (urinary tract infection) N39.0       Isolation/Infection:   Isolation          No Isolation        Patient Infection Status     Infection Onset Added Last Indicated Last Indicated By Review Planned Expiration Resolved Resolved By    None active    Resolved    COVID-19 Rule Out 20 COVID-19 (Ordered)   20 Rule-Out Test Resulted          Nurse Assessment:  Last Vital Signs: /62   Pulse 81   Temp 97.6 °F (36.4 °C) (Oral)   Resp 18   Ht 4' 11\" (1.499 m)   Wt 176 lb 5.9 oz (80 kg)   SpO2 100%   BMI 35.62 kg/m²     Last documented pain score (0-10 scale): Pain Level: 0  Last Weight:   Wt Readings from Last 1 Encounters:   20 176 lb 5.9 oz (80 kg)     Mental Status:  alert    IV Access:  - PICC - site  Unknown, order placed , insertion date: 7/24/20    Nursing Mobility/ADLs:  Walking   Dependent  Transfer  Dependent  Bathing  Dependent  Dressing  Dependent  Toileting  Dependent  Feeding  Assisted  Med Admin  Assisted  Med Delivery   whole    Wound Care Documentation and Therapy:  Wound 07/21/20 Buttocks Left (Active)   Wound Pressure Stage  2 07/21/20 1422   Dressing Status Clean;Dry; Intact 07/22/20 2114   Dressing Changed Changed/New 07/21/20 0219   Dressing/Treatment Moisture barrier 07/22/20 2114   Wound Cleansed Other (Comment) 07/21/20 1422   Wound Length (cm) 2 cm 07/21/20 1422   Wound Width (cm) 1 cm 07/21/20 1422   Wound Depth (cm) 0.1 cm 07/21/20 1422   Wound Surface Area (cm^2) 2 cm^2 07/21/20 1422   Change in Wound Size % (l*w) 0 07/21/20 1422   Wound Volume (cm^3) 0.2 cm^3 07/21/20 1422   Wound Assessment Pink 07/21/20 1422   Drainage Amount None 07/21/20 1422   Drainage Description Serosanguinous 07/21/20 0219   Odor None 07/21/20 1422   Margins Defined edges 07/21/20 1422   Marilynn-wound Assessment Pink;Purple 07/21/20 1422   Mallow%Wound Bed 100 07/21/20 1422   Number of days: 2       Wound 07/21/20 Leg Left;Posterior (Active)   Wound Image   07/21/20 1422   Wound Venous 07/21/20 1422   Dressing Status Clean;Dry; Intact 07/22/20 2114   Dressing Changed Changed/New 07/22/20 0920   Dressing/Treatment Non adherent; Roll gauze 07/22/20 2114   Wound Cleansed Wound cleanser 07/22/20 0920   Dressing Change Due 07/23/20 07/22/20 2114   Wound Length (cm) 4 cm 07/21/20 0219   Wound Width (cm) 1 cm 07/21/20 0219   Wound Surface Area (cm^2) 4 cm^2 07/21/20 0219   Wound Assessment Red;Bleeding 07/21/20 1422   Drainage Amount None 07/22/20 2114   Drainage Description Serosanguinous 07/22/20 2114   Odor None 07/22/20 2114   Margins Undefined edges 07/22/20 2114   Marilynn-wound Assessment Mallow 07/22/20 2114   Red%Wound Bed 100 07/21/20 1422   Number of days: 2       Wound 07/21/20 Buttocks Right (Active)   Wound Pressure Stage  2 07/21/20 1421 discharge, followed by PCP visit @ 10 days, and 21 days   TeleHealth ([facility]) if patient agreeable and qualifies. Patient's personal belongings (please select all that are sent with patient):  Glasses    RN SIGNATURE:  Electronically signed by Kinjal Moreland RN on 7/24/20 at 3:08 PM EDT    CASE MANAGEMENT/SOCIAL WORK SECTION    Inpatient Status Date: 7/21/20    Readmission Risk Assessment Score:16    Discharging to Facility/ Agency   Name:  [facility] care    Address: [address] [place] [address], [address] [place] [address]., [place] [address]  Phone: [phone]  Fax: [phone]    HOME INFUSION PHARMACY:  · Name:     Mariah Salcedo  · Address: [facility]. [facility]. [address]  · Phone:    [phone]  · Fax:        [phone]    / signature: Electronically signed by [id number] JOSEPHINE Duran on 7/24/20 at 11:41 AM EDT    PHYSICIAN SECTION    Prognosis: Fair    Condition at Discharge: Stable    Rehab Potential (if transferring to Rehab): Fair    Recommended Labs or Other Treatments After Discharge: PT/OT/ST/RN       IV Vancomycin  X 750 mg  x q 24 hr x stop date  X 8/4  IV Meropenem x 1 gm x Q 12 HR x stop date x  8/4  CBC with diff, BMP weekly  Fax results to [facility]  Phone: [phone]   Fax : [phone]      Physician Certification: I certify the above information and transfer of Georgette Hendrickson  is necessary for the continuing treatment of the diagnosis listed and that she requires [facility] for less 30 days.      Update Admission H&P: No change in H&P    PHYSICIAN SIGNATURE:  Electronically signed by FRANCHESCA Douglas CNP / Cesar Snyder, [address] [place] [address] 7/23/20 at 9:25 AM EDT

## 2020-07-23 NOTE — CARE COORDINATION
Results for Glenda Mo (MRN [id number]) as of 7/23/2020 11:09   Ref. Range 7/22/2020 07:42 7/22/2020 11:39 7/22/2020 17:04 7/22/2020 20:59 7/22/2020 21:06 7/23/2020 06:04 7/23/2020 07:55   Glucose Latest Ref Range: 70 - 99 mg/dL      113 (H)    POC Glucose Latest Ref Range: 70 - 99 mg/dl 100 (H) 238 (H) 184 (H) 161 (H) 147 (H)  124 (H)     Recommend BG targets 100 -180. Recommend continuing with current insulin plan: Lantus 10 units daily. Humalog 3 units at meals and low correction. If plan is to resume insulin pump in the future, the optimal timing will be 22 - 23 hours from last Lantus dose.       Electronically signed by Marvin Dewitt RN on 7/23/2020 at 11:13 AM

## 2020-07-23 NOTE — PROGRESS NOTES
Infectious Disease Follow up Notes  Admit Date: 7/20/2020  Hospital Day: 4    Antibiotics : IV Vancomycin  IV Meropenem      CHIEF COMPLAINT:     Lower leg cellulitis  Confusion  Fevers  Non healing wounds   Subjective interval History :  66 y.o.  woman with previous h/o lower leg cellulitis and recurrent admit for similar problems has SPC in place and previous MRSA infections and recurrent UTI with change in mentation admitted with decline in functional status per HPI and creat elevation on admit on weeping ulcers on both legs and we are consulted for IV abx recommendations. Unfortunately [facility] has two different charts and past history limited as the charts needs to be merged together.      Mentation better but not back to base line and sitting up in the chair kerlix dressing+ drainage from both legs noted and tolerating IV abx ok     Past Medical History:    History reviewed. No pertinent past medical history. MRSA infection   Cellulitis recurrent  CKD  Past Surgical History:    History reviewed. No pertinent surgical history.   Supra pubic catheter in place  Current Medications:    Outpatient Medications Marked as Taking for the 7/20/20 encounter [facility] Encounter)   Medication Sig Dispense Refill    lactobacillus (CULTURELLE) capsule Take 1 capsule by mouth daily (with breakfast) 30 capsule 0    cetirizine (ZYRTEC) 10 MG tablet Take 10 mg by mouth daily      mirabegron (MYRBETRIQ) 50 MG TB24 Take 50 mg by mouth daily      furosemide (LASIX) 40 MG tablet Take 40 mg by mouth every other day Alternating with 20 mg tablet      furosemide (LASIX) 20 MG tablet Take 20 mg by mouth every other day Alternating with 40 mg tablet      dorzolamide-timolol (COSOPT) 22.3-6.8 MG/ML ophthalmic solution Place 1 drop into the right eye 2 times daily      atropine 1 % ophthalmic solution Place 1 drop into the right eye 2 times daily      pain.  No myalgia, arthralgia. No rashes, skin lesions   Denies focal weakness, sensory change or other neurologic symptoms  No lymph node swelling or tenderness.         PHYSICAL EXAM:      Vitals:    /62   Pulse 81   Temp 97.6 °F (36.4 °C) (Oral)   Resp 18   Ht 4' 11\" (1.499 m)   Wt 176 lb 5.9 oz (80 kg)   SpO2 97%   BMI 35.62 kg/m²     General Appearance: awake and ,in SOME acute distress, ++ pallor, no icterus   Skin: warm and dry, no rash or erythema  Head: normocephalic and atraumatic  Eyes: pupils equal, round, and reactive to light, conjunctivae normal  ENT: tympanic membrane, external ear and ear canal normal bilaterally, nose without deformity, nasal mucosa and turbinates normal without polyps  Neck: supple and non-tender without mass, no thyromegaly  no cervical lymphadenopathy  Pulmonary/Chest: clear to auscultation bilaterally- no wheezes, rales or rhonchi, normal air movement, no respiratory distress  Cardiovascular: normal rate, regular rhythm, normal S1 and S2, no murmurs, rubs, clicks, or gallops, no carotid bruits  Abdomen: soft, non-tender, non-distended, normal bowel sounds, no masses or organomegaly  Extremities: no cyanosis, clubbing or edema  Musculoskeletal: normal range of motion, no joint swelling, deformity or tenderness  Neurologic: reflexes normal and symmetric, no cranial nerve deficit  Lines:  IV  Supra pubic catheter+  Bi lateral lower leg cellulitis and weeping ulcer and edema+            Data Review:    Lab Results   Component Value Date    WBC 6.4 07/23/2020    HGB 8.0 (L) 07/23/2020    HCT 23.8 (L) 07/23/2020    MCV 98.6 07/23/2020     07/23/2020     Lab Results   Component Value Date    CREATININE 0.8 07/23/2020    BUN 16 07/23/2020     07/23/2020    K 5.1 07/23/2020     07/23/2020    CO2 27 07/23/2020       Hepatic Function Panel:   Lab Results   Component Value Date    ALKPHOS 195 07/20/2020    ALT 9 07/20/2020    AST 15 07/20/2020    PROT 6.2 07/20/2020    BILITOT <0.2 07/20/2020    LABALBU 2.2 07/20/2020       UA:  Lab Results   Component Value Date    COLORU YELLOW 07/20/2020    CLARITYU CLOUDY 07/20/2020    GLUCOSEU Negative 07/20/2020    BILIRUBINUR Negative 07/20/2020    KETUA Negative 07/20/2020    SPECGRAV 1.009 07/20/2020    BLOODU LARGE 07/20/2020    PHUR 5.5 07/20/2020    PROTEINU Negative 07/20/2020    UROBILINOGEN 0.2 07/20/2020    NITRU POSITIVE 07/20/2020    LEUKOCYTESUR LARGE 07/20/2020    LABMICR YES 07/20/2020    URINETYPE NotGiven 07/20/2020      Urine Microscopic:   Lab Results   Component Value Date    BACTERIA RARE 07/20/2020    HYALCAST 6 07/20/2020    WBCUA 44 07/20/2020    RBCUA 10 07/20/2020    EPIU 0 07/20/2020       MICRO: cultures reviewed and updated by me     07/20/20 1852        Order Status: Completed  Specimen: Blood  Updated: 07/21/20 2115     Culture, Blood 2  No Growth to date.  Any change in status will be called. Narrative:      ORDER#: [id number]                          ORDERED BY: ZENON LINO   SOURCE: Blood                              COLLECTED:  07/20/20 18:52   ANTIBIOTICS AT REGIS. :                      RECEIVED :  07/20/20 19:02   If child <=2 yrs old please draw pediatric bottle. ~Blood Culture #2   Performed at:   [facility]   [address] [facility] [address] [facility] [facility] [address]   Phone [phone]    Culture, Blood 1 [[id number]]   Collected: 07/20/20 1852    Order Status: Completed  Specimen: Blood  Updated: 07/21/20 2115     Blood Culture, Routine  No Growth to date.  Any change in status will be called. Narrative:      ORDER#: [id number]                          ORDERED BY: ZENON LINO   SOURCE: Blood                              COLLECTED:  07/20/20 18:52   ANTIBIOTICS AT REGIS. :                      RECEIVED :  07/20/20 19:02   If child <=2 yrs old please draw pediatric bottle. ~Blood Culture #1   Performed at:   [address] [place] [address]   [address] [facility] [place] [address]   Phone [phone]    Culture, Urine [[id number]]  (Abnormal)  Collected: 07/20/20 1856    Order Status: Completed  Specimen: Urine, clean catch  Updated: 07/21/20 1806     Urine Culture, Routine  <50,000 CFU/ml mixed skin/urogenital rex. No further workupAbnormal       Organism  Pseudomonas aeruginosaAbnormal       Urine Culture, Routine  --     >100,000 CFU/ml   ID and sensitivity to follow    Narrative:      ORDER#: [id number]                          ORDERED BY: Dena Krishnan   SOURCE: Urine Clean Catch                  COLLECTED:  07/20/20 18:56   ANTIBIOTICS AT REGIS. :                      RECEIVED :  07/20/20 20:03   Performed at:   [facility]   [address] [place] [address]   Phone [phone]      Pseudomonas aeruginosa (1)     Antibiotic  Interpretation  [place]  Status     cefepime  Sensitive  <=2  mcg/mL      ciprofloxacin  Sensitive  <=1  mcg/mL      gentamicin  Sensitive  <=4  mcg/mL      meropenem  Sensitive  <=1  mcg/mL      piperacillin-tazobactam  Sensitive  <=16  mcg/mL      tobramycin  Sensitive  <=4  mcg/mL            IMAGING:    XR CHEST PORTABLE   Final Result   Mild cardial-pericardial silhouette enlargement. Otherwise unremarkable   portable chest radiograph.                All the pertinent images and reports for the current Hospitalization were reviewed by me     Scheduled Meds:   vancomycin  1,250 mg Intravenous Once    metoprolol tartrate  25 mg Oral BID    venlafaxine  150 mg Oral BID    isosorbide mononitrate  30 mg Oral Daily    atorvastatin  10 mg Oral Daily    aspirin  325 mg Oral Daily    sodium chloride flush  10 mL Intravenous 2 times per day    enoxaparin  40 mg Subcutaneous Daily    vancomycin (VANCOCIN) intermittent dosing (placeholder)   Other RX Placeholder    lactobacillus  1 capsule Oral Daily with breakfast    atropine  1 drop Right Eye BID    dorzolamide-timolol  1 drop Right Eye BID  lipase-protease-amylase  12,000 Units Oral BID     mirabegron  50 mg Oral Daily    prednisoLONE acetate  1 drop Right Eye BID    sennosides-docusate sodium  1 tablet Oral Daily    gabapentin  300 mg Oral BID    ferrous sulfate  324 mg Oral BID     vitamin D  5,000 Units Oral Daily    insulin lispro  0-6 Units Subcutaneous TID     insulin lispro  0-3 Units Subcutaneous Nightly    insulin glargine  10 Units Subcutaneous Daily    insulin lispro  3 Units Subcutaneous TID     meropenem  500 mg Intravenous Q8H       Continuous Infusions:   dextrose         PRN Meds:  sodium chloride flush, promethazine **OR** ondansetron, glucose, dextrose, glucagon (rDNA), dextrose      Assessment:     Patient Active Problem List   Diagnosis    Complicated UTI (urinary tract infection)       Change in Mentation  Supra pubic catheter in place    Low grade temp+  Lower leg cellulitis  Recurrent lower leg cellulitis  KACIE+  H/o MRSA infection in the past      On going lower leg cellulitis and lower leg edema with weeping ulcers will need IV abx for now until legs looking better     Urine cx from Supra pubic catheter not much helpful given the clinical change ok to cont IV Meropenem will cover lower leg wounds as well      She has lower leg non healing wound that improve on IV abx and local care and she has recurrent admit for similar issues      Labs, Microbiology, Radiology and all the pertinent results from current hospitalization and  care every where were reviewed  by me as a part of the evaluation   Plan:   1. Cont IV Vancomycin  May need 750  Mg  x Q 24 HR at d/c x 10 days  2. Do not use Cefepime had Encephalopathy in the past  3. IV Meropenem x 500 mg Q 8 hrs will change to x 1 gm  q 12 HR X 10 DAYs at d/c   4. Cont local care  5. Elevate the extremities    6. PICC line and  Home IV ABX for d/c planning   7.  If clinically better can make d/c plans soon     Discussed with patient/Family and Nursing staff Thanks for allowing me to participate in your patient's care and please call me with any questions or concerns.     Prisca Jin MD  Infectious Disease  [facility]) Physician  Phone: [phone]   Fax : [phone]

## 2020-07-23 NOTE — PROGRESS NOTES
Patient called out c/o a headache. Patient has no PRN medication available at this time. Perfect Serve sent to [facility] requesting a PRN. Waiting for response at this time.

## 2020-07-23 NOTE — PROGRESS NOTES
Per Social work, patient is not clear to d/c from an ID standpoint. This RN alerted NP Maria Fernanda Falk to this information. NP states she will cancel the d/c order.

## 2020-07-23 NOTE — PROGRESS NOTES
Occupational Therapy  Facility/Department: [facility] MED SURG  Daily Treatment Note  NAME: Luis Maxwell  : 1945  MRN: [id number]    Date of Service: 2020    Discharge Recommendations:  24 hour supervision or assist, Home with Home health OT, Patient would benefit from continued therapy after discharge       Assessment   Performance deficits / Impairments: Decreased functional mobility ; Decreased ADL status; Decreased strength;Decreased cognition;Decreased endurance;Decreased balance  Assessment: Pt improved from yesterday, making progress towards goals. Pt with improved fxl transfers with min A, and improved standing tolerance/balance to complete fxl mobility short distances with RW and min A with chair follow. Pt total A for toileting at [facility], and total A LB dressing. Discussed pt's performance with daughter over telephone, and despite pt's low AM-PAC score daughter comfortable taking pt home and states staff able to provide this level of assistance. Recommend 24 hour hands-on assist and home OT at d/c. Prognosis: Fair  OT Education: OT Role;Plan of Care;Transfer Training;Orientation; ADL Adaptive Strategies  Barriers to Learning: cognition, vision  REQUIRES OT FOLLOW UP: Yes  Activity Tolerance  Activity Tolerance: Patient limited by fatigue         Patient Diagnosis(es): The primary encounter diagnosis was Altered mental status, unspecified altered mental status type. Diagnoses of Urinary tract infection without hematuria, site unspecified, Wound of left lower extremity, initial encounter, Wound of right lower extremity, initial encounter, and Type 2 diabetes mellitus with other specified complication, with long-term current use of insulin ([facility] [address] were also pertinent to this visit. has no past medical history on file. has no past surgical history on file.     Restrictions  Restrictions/Precautions  Restrictions/Precautions: Fall Risk  Position Activity Restriction  Other position/activity restrictions: B LE wounds  Subjective   General  Chart Reviewed: Yes  Additional Pertinent Hx: Per Rayna Gamboa MD's H&P 7/21: \"The patient is a 78-year-old  female who presented to the hospital with what appears to be increasing fatigue and weakness over the past 3-4 days, also associated with just generalized decrease in functioning with progressive inability to walk or move around without nausea or vomiting. The patient's daughter noted the rapid decline in the past two days. She was recently being treated for what appeared to be lower extremity wounds and had a UTI that apparently grew MRSA. \"  Family / Caregiver Present: No  Referring Practitioner: FRANCHESCA Boyer CNP  Diagnosis: UTI, KACIE, generalized weakness  Subjective  Subjective: Pt met b/s for OT cotx with PT. Pt in bed on arrival, agreeable to participate in therapy. Pt more alert and with quicker responses as compared to previous sessions. Objective    ADL  LE Dressing: Dependent/Total(to don/doff socks, don shoes)  Toileting: Dependent/Total(BM at [facility], total A for hygiene standing at walker, required seated rest break following hygiene)     Balance  Sitting Balance: Contact guard assistance  Standing Balance: Minimal assistance  Functional Mobility  Functional - Mobility Device: Rolling Walker  Assist Level: Minimal assistance  Functional Mobility Comments: Maximove transfer used for initial bed-chair transfer d/t pt height and high air matress bed. Pt then completed stand pivot transfer ~2-3 steps bed-chair, then took seated rest break in recliner. Pt then ambulated to ~8 ft x2 with RW and Min A with chair follow, seated rest break in between ambulation trials. Pt with flexed posture, pushes walker too far out in front, verbal cues and min A for safe technique with walker.     Bed mobility  Rolling to Left: Moderate assistance(needed cues to grab bedside rail)  Rolling to Right: Maximum assistance(cues to grab railing) Transfers  Stand Pivot Transfers: Minimal assistance(recliner-armchair with walker)  Sit to stand: Minimal assistance  Stand to sit: Minimal assistance  Transfer Comments: maximove used for initial bed-chair transfer d/t pt's short stature and high air mattress bed. Pt required verbal cues for hand placement and safe technique with walker. Pt also required max A x2 to scoot back into recliner. Toilet Transfers  Toilet - Technique: Ambulating  Equipment Used: Standard bedside commode  Toilet Transfer: Minimal assistance    Cognition  Overall Cognitive Status: Exceptions  Arousal/Alertness: Appropriate responses to stimuli  Following Commands: Follows one step commands with increased time  Attention Span: Appears intact  Memory: Decreased short term memory;Decreased recall of recent events  Safety Judgement: Decreased awareness of need for safety  Problem Solving: Decreased awareness of errors;Assistance required to identify errors made;Assistance required to generate solutions  Insights: Decreased awareness of deficits  Initiation: Requires cues for some  Sequencing: Requires cues for some  Cognition Comment: cognition appears to be improving. pt more alert and with quicker responses        Plan   Plan  Times per week: 3-5  Times per day: Daily  Current Treatment Recommendations: Balance Training, Functional Mobility Training, Endurance Training, Safety Education & Training, Self-Care / ADL, Strengthening, Cognitive Reorientation, Cognitive/Perceptual Training    AM-PAC Score        AM-[facility] Inpatient Daily Activity Raw Score: 13 (07/23/20 1513)  AM-PAC Inpatient ADL T-Scale Score : 32.03 (07/23/20 1513)  ADL Inpatient CMS 0-100% Score: 63.03 (07/23/20 1513)  ADL Inpatient CMS G-Code Modifier : CL (07/23/20 1513)    Goals  Short term goals  Time Frame for Short term goals: Prior to d/c: STATUS GOALS 7/23: ALL GOALS ONGOING EXCEPT WHERE INDICATED BELOW.    Short term goal 1: Pt will complete UB bathing/dressing with set-up/SBA. Short term goal 2: Pt will complete toileting with mod A. Short term goal 3: Pt will complete grooming in stance at sink with CGA. Short term goal 4: Pt will complete fxl mobility and fxl transfers to/from ADL surfaces with min A using AD.-goal met once and ongoing. Short term goal 5: Pt will tolerate standing >5 minutes for functional task with SBA. Long term goals  Time Frame for Long term goals : STG=LTG  Patient Goals   Patient goals : pt unable to verbalize personal therapy goal d/t decreased cognition. Therapy Time   Individual Concurrent Group Co-treatment   Time In 3421         Time Out 1112         Minutes 70               This note to serve as OT d/c summary if pt is d/c-ed prior to next therapy session.     Tamia Dimas, OTR/L [id number]

## 2020-07-23 NOTE — PROGRESS NOTES
This RN completed patient dressing change to LLE. Old vaseline gauze, ABD pads, and kerlix removed. Wounds cleansed with normal saline and new vaseline gauze, ABD pads, and kerlix applied. Patient tolerated dressing change well. Moderate amounts of serosanguanous drainage noted during dressing change. This RN attempted to complete dressing change to RLE, but patient refused at this time stating that she would like to eat dinner first. This RN encouraged patient to allow this RN to change dressing now, but patient continues to refuse. This RN will attempt to complete dressing change to RLE as patient and time allow. Patient currently eating dinner in chair at this time. Will continue to monitor and assess.

## 2020-07-23 NOTE — PROGRESS NOTES
Physical Therapy    Facility/Department: [address] MED SURG  Daily Treatment Note  This note serves as patient discharge summary if pt discharges prior to next PT visit    NAME: Luis Maxwell  : 1945  MRN: [id number]    Date of Service: 2020    Discharge Recommendations:  Continue to assess pending progress, Patient would benefit from continued therapy after discharge, Home with Home health PT        Assessment   Body structures, Functions, Activity limitations: Decreased functional mobility   Assessment: Pt presents after admission to the hospital with what appears to be increasing fatigue and weakness over the past 3-4 days, also associated with just generalized decrease in functioning with progressive inability to walk or move around. She was recently being treated for what appeared to be lower extremity wounds and had a UTI that apparently grew MRSA. \"  Prior to admission, pt reportedly lived in a group home type setting and was reportedly Independent at home with a RW, but has 24 hr care of caregivers and family at this home. 2020 status: Bed mobility max A rolling to right and mod A rolling to left. Pt was moved via maxi move into BS chair. Transfers CGA/min A. Pt amb 8' x2 CGA/min A. Pt took a rest break after amb the first 8' and during the second amb, pt had to have a BM. Pt amb back to the BS chair. Daughter was called and updated on pt status. Reports she is okay w/ pt DC in the condition she is in right now d/t help in her group home and the help the daughter can provide for the patient. Pt OK to DC home with  supervision/assist and home PT. Will cont to follow and mobilize as able. Prognosis: Guarded; Fair  Decision Making: Medium Complexity  History: as noted  Exam: as above  Clinical Presentation: evolving  PT Education: PT Role;Plan of Care;Gait Training  Barriers to Learning: pain  REQUIRES PT FOLLOW UP: Yes  Activity Tolerance  Activity Tolerance: Patient limited by fatigue;Patient limited by endurance; Patient limited by pain       Patient Diagnosis(es): The primary encounter diagnosis was Altered mental status, unspecified altered mental status type. Diagnoses of Urinary tract infection without hematuria, site unspecified, Wound of left lower extremity, initial encounter, Wound of right lower extremity, initial encounter, and Type 2 diabetes mellitus with other specified complication, with long-term current use of insulin ([facility] [address] were also pertinent to this visit. has no past medical history on file. has no past surgical history on file. Restrictions  Restrictions/Precautions  Restrictions/Precautions: Fall Risk  Position Activity Restriction  Other position/activity restrictions: B LE wounds  Subjective  General  Chart Reviewed: Yes  Family / Caregiver Present: No  Subjective  Subjective: Pt lying in bed watching tv. Agreeable to therapy. Reported no pain in walking. Pain in her legs and bottom d/t sores. Social/Functional History  Social/Functional History  Lives With: (pt lives with three other elderly individuals with 24/7 assist of caregivers)  Type of Home: House  Home Layout: One level  Home Access: Stairs to enter without rails  Entrance Stairs - Number of Steps: 1 (daughter always assists pt on step)  Bathroom Shower/Tub: Walk-in shower  Bathroom Toilet: Handicap height  Bathroom Equipment: Grab bars in shower, Shower chair, Hand-held shower, Grab bars around toilet  Bathroom Accessibility: Wheelchair accessible  Home Equipment: Rolling walker, [employer], [address] chair  ADL Assistance: Needs assistance(caregiver assists with bathing, LB dressing.  Pt independent toileting)  Homemaking Assistance: Needs assistance(family and caregivers manage all)  Ambulation Assistance: Independent(with RW)  Transfer Assistance: Independent(pt sleeps in LIFT chair)  Active : No  Patient's  Info: daughter  Additional Comments: Above information obtained per daughter over phone 7/21/20, d/t pt being very poor historian. Daughter reports pt had 1 recent fall last week. Cognition   Cognition  Overall Cognitive Status: Exceptions  Arousal/Alertness: Appropriate responses to stimuli  Following Commands: Follows one step commands with increased time  Attention Span: Appears intact  Memory: Decreased short term memory;Decreased recall of recent events  Safety Judgement: Decreased awareness of need for safety  Problem Solving: Decreased awareness of errors;Assistance required to identify errors made;Assistance required to generate solutions  Insights: Decreased awareness of deficits  Initiation: Requires cues for some  Sequencing: Requires cues for some  Cognition Comment: cognition appears to be improving. pt more alert and with quicker responses  Objective  AROM RLE (degrees)  RLE General AROM: Hip flexion functionally fair  AROM LLE (degrees)  LLE General AROM: Hip flexion functionally fair  Strength RLE  Comment: functionally fair  Strength LLE  Comment: functionally fair  Bed mobility  Rolling to Left: Moderate assistance(needed cues to grab bedside rail)  Rolling to Right: Maximum assistance(cues to grab railing)  Transfers  Sit to Stand: Minimal Assistance;Contact guard assistance(w/ cues for hand placement and to scoot forward so that her feet our touching the ground)  Stand to sit: Minimal Assistance;Contact guard assistance(cues for hand placement)  Stand Pivot Transfers: Minimal Assistance; Moderate Assistance(Pt needed some A to control RW in order to turn it to sit down in the chair.)  Ambulation  Ambulation?: Yes  Ambulation 1  Surface: level tile  Device: Rolling Walker  Assistance: Contact guard assistance;Minimal assistance(W/ cues to stay within the base of the RW and keep shoulder upright)  Quality of Gait: slow, step to pattern with decreased step length and height.  Reports no pain during amb  Gait Deviations: Slow Susu;Decreased step length;Decreased Shanti Olmedo, [address] on 7/23/2020 at 3:39 PM

## 2020-07-23 NOTE — PROGRESS NOTES
Hospital Medicine Progress Note      Admit Date: 7/20/2020       CC: F/U for \"I don't know why I'm here or what happened. \"     HPI: The patient is a 78-year-old   female who presented to the hospital with what appears to be increasing fatigue and weakness over the past 3-4 days, also associated with just generalized decrease in functioning with progressive inability to walk or move around without nausea or vomiting.  The patient's daughter noted the rapid decline in the past two days. Jack Chino was recently being treated for what appeared to be lower extremity wounds and had a UTI that apparently grew MRSA.  This is all per the patient's daughter who provided most of the history.  The patient was unable to provide a good history since she did not quite understand why she was in the hospital.     She has been seeing wound care Dr. Gregor Olson for leg wounds per ER report. was on doxy for 7 days for MRSA urine prior to adm      Cxr: mild cardial-pericardial silhouette enlargement. O/w unremarkable      7/21:  sleeping when I entered. Wakes easily. Confused except to self and other. Slow to answer all questions. Really does not engage. Falls back asleep easily.      PT/OT evals . Lives with daughter at home. Updated daughter.  Kira graf 8/24 on the AM-PAC short mobility form. Current research shows that an AM-PAC score of 17 or less is typically not associated with a discharge to the patient's home setting. Based on the patient's AM-PAC score and their current functional mobility deficits, it is recommended that the patient have 3-5 sessions per week of Physical Therapy at d/c to increase the patient's independence.  Please see assessment section for further patient specific details.      Daughter states she has 24 hour care at home for help with her insulin pump going forward, although she is aware we will use SSI while in-pt.  Discussed we will monitor her cognition throughout her stay and consider stopping pump if there is concern for good care with insulin on d/c.      SLP for swallow eval- dysphagia. Add ensure for protein supplements.   Moderate oral stage dysphagia; mild pharygeal stage dysphagia . SLP w/HHC on d/c 3-5 x/wk. Has [facility] at home     Consult to PMR for acute rehab. SNF recommended. Interval History/Subjective:  needs placement to SNF. Needs D/C plan for antibx for UTI. On merrem and vanc. Not ready to d/c today per Dr. Claudette Graven. Family does not want SNF. Review of Systems:     The patient denied headaches, visual changes, LOC, SOB, CP, ABD pain, N/V/D, skin changes, new or worsening weakness or neuromuscular deficits. Comprehensive ROS negative except as mentioned above. Past Medical History:    History reviewed. No pertinent past medical history. Past Surgical History:    History reviewed. No pertinent surgical history. Allergies:  Patient has no known allergies. Past medical and surgical history reviewed. Any changes have been noted. PHYSICAL EXAM:  /62   Pulse 81   Temp 97.6 °F (36.4 °C) (Oral)   Resp 18   Ht 4' 11\" (1.499 m)   Wt 176 lb 5.9 oz (80 kg)   SpO2 100%   BMI 35.62 kg/m²       Intake/Output Summary (Last 24 hours) at 7/23/2020 0920  Last data filed at 7/23/2020 0551  Gross per 24 hour   Intake 240 ml   Output 1950 ml   Net -1710 ml        General appearance:   No apparent distress, appears stated age. Cooperative. Sleeping in bed on my exam. Oriented to self and daughter only.   HEENT:  Normocephalic, atraumatic. PERRLA.  EOMi.  Conjunctivae/corneas clear, no icterus, non-injected. Neck: Supple, with full range of motion. No jugular venous distention. Trachea midline. Respiratory:  Normal respiratory effort. Clear to auscultation, bilaterally without Rales/Wheezes/Rhonchi. Cardiovascular:  Regular rate and rhythm without murmurs, rubs or gallops.   Abdomen: Soft, non-tender, non-distended, without rebound or guarding. Normal bowel sounds. : suprapubic catheter, chronic  Musculoskeletal:  bilat LE with kerlix in place.  Full range of motion without deformity. Skin: Skin color, texture, turgor normal.  No rashes or lesions. Neurologic:  Neurovascularly intact without any focal sensory/motor deficits. Cranial nerves: II-XII intact, grossly intact. No facial asymmetry, tongue midline. Psychiatric:  Alert and oriented to self and other only, baseline confusion  Capillary Refill: Brisk,< 3 seconds   Peripheral Pulses: +2 palpable, equal bilaterally     LABS:    Lab Results   Component Value Date    WBC 6.4 07/23/2020    HGB 8.0 (L) 07/23/2020    HCT 23.8 (L) 07/23/2020    MCV 98.6 07/23/2020     07/23/2020    LYMPHOPCT 7.2 07/20/2020    RBC 2.41 (L) 07/23/2020    MCH 33.0 07/23/2020    MCHC 33.4 07/23/2020    RDW 13.5 07/23/2020       Lab Results   Component Value Date    CREATININE 0.8 07/23/2020    BUN 16 07/23/2020     07/23/2020    K 5.1 07/23/2020     07/23/2020    CO2 27 07/23/2020       No results found for: MG    Lab Results   Component Value Date    ALT 9 (L) 07/20/2020    AST 15 07/20/2020    ALKPHOS 195 (H) 07/20/2020    BILITOT <0.2 07/20/2020        No flowsheet data found. No results found for: LABA1C    Imaging:  XR CHEST PORTABLE   Final Result   Mild cardial-pericardial silhouette enlargement. Otherwise unremarkable   portable chest radiograph.              Scheduled and prn Medications:    Scheduled Meds:   vancomycin  1,250 mg Intravenous Once    metoprolol tartrate  25 mg Oral BID    venlafaxine  150 mg Oral BID    isosorbide mononitrate  30 mg Oral Daily    atorvastatin  10 mg Oral Daily    aspirin  325 mg Oral Daily    sodium chloride flush  10 mL Intravenous 2 times per day    enoxaparin  40 mg Subcutaneous Daily    vancomycin (VANCOCIN) intermittent dosing (placeholder)   Other RX Placeholder    lactobacillus  1 capsule Oral Daily with breakfast    atropine  1 drop Right Eye BID    dorzolamide-timolol  1 drop Right Eye BID    lipase-protease-amylase  12,000 Units Oral BID     mirabegron  50 mg Oral Daily    prednisoLONE acetate  1 drop Right Eye BID    sennosides-docusate sodium  1 tablet Oral Daily    gabapentin  300 mg Oral BID    ferrous sulfate  324 mg Oral BID     vitamin D  5,000 Units Oral Daily    insulin lispro  0-6 Units Subcutaneous TID     insulin lispro  0-3 Units Subcutaneous Nightly    insulin glargine  10 Units Subcutaneous Daily    insulin lispro  3 Units Subcutaneous TID     meropenem  500 mg Intravenous Q8H     Continuous Infusions:   dextrose       PRN Meds:.sodium chloride flush, promethazine **OR** ondansetron, glucose, dextrose, glucagon (rDNA), dextrose    Assessment & Plan:        Gram-negative bacteria urinary tract infection, complicated with  acute kidney injury POA  - chronic suprapubic catheter  - awaiting urine culture and adjust antibx   - on Cefepime,vanc and merrem   - culturelle  - consulted pharm for vanc dosing  - Consulted ID - Dr. Claudette Graven familiar w/pt   - d/c cefepime d/t encephalopathy in past, IV merrem 500mg q8h + vanc        2.  Anemia, likely 2/2 chronic disease  - H/H 8.7/26   - trend CBC     3.  Hypertension.  - cont home meds     4.  Dyslipidemia. - cont home statin     5.  Diabetes mellitus.  - SSI low dose; 10 units daily lantus; 3units humalog ac per Vidya Monroe Diabetic educator  - discuss insulin pump use on discharge   - poct ac/hs    Continue current regimen/therapies. Monitor. Adjust medical regimen as appropriate. Body mass index is 35.62 kg/m². The patient and / or the family were informed of the results of any tests, a time was given to answer questions, a plan was proposed and they agreed with plan. DVT ppx: lovenox    Diet: Dietary Nutrition Supplements: Wound Healing Oral Supplement, Diabetic Oral Supplement, Protein Modular  DIET CARB CONTROL;  Dysphagia Minced and

## 2020-07-23 NOTE — PLAN OF CARE
Problem: Skin Integrity:  Goal: Will show no infection signs and symptoms  Description: Will show no infection signs and symptoms  7/23/2020 1141 by Oriana Nichols RN  Outcome: Ongoing  Note: Patient remains free from new signs and symptoms of infection during this shift. Infection prevention measures are in place. Will continue to monitor for alterations in patient condition throughout the shift. 7/22/2020 2207 by Brigida Flores RN  Outcome: Ongoing  Goal: Absence of new skin breakdown  Description: Absence of new skin breakdown  7/23/2020 1141 by Oriana Nichols RN  Outcome: Ongoing  Note: Patient skin condition and mucus membrane integrity remain unchanged during this shift. Skin breakdown prevention interventions are in place. Will continue to monitor and assess. 7/22/2020 2207 by Brigida Flores RN  Outcome: Ongoing     Problem: Falls - Risk of:  Goal: Will remain free from falls  Description: Will remain free from falls  7/23/2020 1141 by Oriana Nichols RN  Outcome: Ongoing  Note: Patient remains free from falls during this shift. Fall precautions remain in place. Patient educated on the need to implement call light use prior to ambulation. Will continue to monitor and assess. 7/22/2020 2207 by Brigida Flores RN  Outcome: Ongoing  Goal: Absence of physical injury  Description: Absence of physical injury  7/23/2020 1141 by Oriana Nichols RN  Outcome: Ongoing  Note: Patient remains free from physical harm during this shift. Will continue to monitor and assess patient.      7/22/2020 2207 by Brigida Flores RN  Outcome: Ongoing

## 2020-07-23 NOTE — PROGRESS NOTES
Patient resting in bed this morning. Patient denies pain, nausea, and/or vomiting. Morning medications administered without complications. Call light, telephone, and bed side table are within reach. Fall precautions in place. Will continue to monitor and assess.

## 2020-07-23 NOTE — PLAN OF CARE
Problem: Skin Integrity:  Goal: Will show no infection signs and symptoms  Description: Will show no infection signs and symptoms  7/22/2020 2207 by Octavio Narayanan RN  Outcome: Ongoing  7/22/2020 1106 by Reina Kee RN  Outcome: Ongoing  Goal: Absence of new skin breakdown  Description: Absence of new skin breakdown  7/22/2020 2207 by Octavio Narayanan RN  Outcome: Ongoing  7/22/2020 1106 by Reina Kee RN  Outcome: Ongoing  Turned, checked and changed Q2H/PRN. Moisture barrier applied to loida area. Silicone border to sacrum. Dressing to BLE clean, dry and intact. On specialty mattresse     Problem: Falls - Risk of:  Goal: Will remain free from falls  Description: Will remain free from falls  7/22/2020 2207 by Octavio Narayanan RN  Outcome: Ongoing  7/22/2020 1106 by Reina Kee RN  Outcome: Ongoing  Goal: Absence of physical injury  Description: Absence of physical injury  7/22/2020 2207 by Octavio Narayanan RN  Outcome: Ongoing  7/22/2020 1106 by Reina Kee RN  Outcome: Ongoing  Fall precautions in place at all times. Bed in lowest position with two side rails up and wheels locked. Bed alarm set. Call light within reach.

## 2020-07-24 VITALS
BODY MASS INDEX: 35.56 KG/M2 | TEMPERATURE: 97.5 F | HEART RATE: 67 BPM | DIASTOLIC BLOOD PRESSURE: 50 MMHG | SYSTOLIC BLOOD PRESSURE: 100 MMHG | HEIGHT: 59 IN | RESPIRATION RATE: 16 BRPM | OXYGEN SATURATION: 99 % | WEIGHT: 176.37 LBS

## 2020-07-24 LAB
ANION GAP SERPL CALCULATED.3IONS-SCNC: 6 MMOL/L (ref 3–16)
BLOOD CULTURE, ROUTINE: NORMAL
BUN BLDV-MCNC: 13 MG/DL (ref 7–20)
CALCIUM SERPL-MCNC: 7.7 MG/DL (ref 8.3–10.6)
CHLORIDE BLD-SCNC: 104 MMOL/L (ref 99–110)
CO2: 29 MMOL/L (ref 21–32)
CREAT SERPL-MCNC: 0.8 MG/DL (ref 0.6–1.2)
CULTURE, BLOOD 2: NORMAL
GFR AFRICAN AMERICAN: >60
GFR NON-AFRICAN AMERICAN: >60
GLUCOSE BLD-MCNC: 101 MG/DL (ref 70–99)
GLUCOSE BLD-MCNC: 111 MG/DL (ref 70–99)
GLUCOSE BLD-MCNC: 141 MG/DL (ref 70–99)
GLUCOSE BLD-MCNC: 95 MG/DL (ref 70–99)
HCT VFR BLD CALC: 24.6 % (ref 36–48)
HEMOGLOBIN: 8.2 G/DL (ref 12–16)
MCH RBC QN AUTO: 33 PG (ref 26–34)
MCHC RBC AUTO-ENTMCNC: 33.4 G/DL (ref 31–36)
MCV RBC AUTO: 98.8 FL (ref 80–100)
PDW BLD-RTO: 13.6 % (ref 12.4–15.4)
PERFORMED ON: ABNORMAL
PERFORMED ON: ABNORMAL
PERFORMED ON: NORMAL
PLATELET # BLD: 229 K/UL (ref 135–450)
PMV BLD AUTO: 8.6 FL (ref 5–10.5)
POTASSIUM SERPL-SCNC: 4.2 MMOL/L (ref 3.5–5.1)
RBC # BLD: 2.49 M/UL (ref 4–5.2)
SODIUM BLD-SCNC: 139 MMOL/L (ref 136–145)
VANCOMYCIN RANDOM: 22.7 UG/ML
WBC # BLD: 5 K/UL (ref 4–11)

## 2020-07-24 PROCEDURE — 97530 THERAPEUTIC ACTIVITIES: CPT

## 2020-07-24 PROCEDURE — 85027 COMPLETE CBC AUTOMATED: CPT

## 2020-07-24 PROCEDURE — 97116 GAIT TRAINING THERAPY: CPT

## 2020-07-24 PROCEDURE — 6360000002 HC RX W HCPCS: Performed by: INTERNAL MEDICINE

## 2020-07-24 PROCEDURE — 02HV33Z INSERTION OF INFUSION DEVICE INTO SUPERIOR VENA CAVA, PERCUTANEOUS APPROACH: ICD-10-PCS | Performed by: INTERNAL MEDICINE

## 2020-07-24 PROCEDURE — 6370000000 HC RX 637 (ALT 250 FOR IP): Performed by: INTERNAL MEDICINE

## 2020-07-24 PROCEDURE — 99232 SBSQ HOSP IP/OBS MODERATE 35: CPT | Performed by: INTERNAL MEDICINE

## 2020-07-24 PROCEDURE — 94760 N-INVAS EAR/PLS OXIMETRY 1: CPT

## 2020-07-24 PROCEDURE — 36569 INSJ PICC 5 YR+ W/O IMAGING: CPT

## 2020-07-24 PROCEDURE — 97535 SELF CARE MNGMENT TRAINING: CPT

## 2020-07-24 PROCEDURE — 97129 THER IVNTJ 1ST 15 MIN: CPT

## 2020-07-24 PROCEDURE — 2580000003 HC RX 258: Performed by: INTERNAL MEDICINE

## 2020-07-24 PROCEDURE — C1751 CATH, INF, PER/CENT/MIDLINE: HCPCS

## 2020-07-24 PROCEDURE — 76937 US GUIDE VASCULAR ACCESS: CPT

## 2020-07-24 PROCEDURE — 80048 BASIC METABOLIC PNL TOTAL CA: CPT

## 2020-07-24 PROCEDURE — 2500000003 HC RX 250 WO HCPCS: Performed by: INTERNAL MEDICINE

## 2020-07-24 PROCEDURE — 80202 ASSAY OF VANCOMYCIN: CPT

## 2020-07-24 PROCEDURE — 36415 COLL VENOUS BLD VENIPUNCTURE: CPT

## 2020-07-24 PROCEDURE — 92526 ORAL FUNCTION THERAPY: CPT

## 2020-07-24 PROCEDURE — 6370000000 HC RX 637 (ALT 250 FOR IP): Performed by: NURSE PRACTITIONER

## 2020-07-24 RX ADMIN — INSULIN GLARGINE 10 UNITS: 100 INJECTION, SOLUTION SUBCUTANEOUS at 09:10

## 2020-07-24 RX ADMIN — Medication 10 ML: at 09:23

## 2020-07-24 RX ADMIN — Medication 5000 UNITS: at 09:08

## 2020-07-24 RX ADMIN — PREDNISOLONE ACETATE 1 DROP: 10 SUSPENSION/ DROPS OPHTHALMIC at 09:20

## 2020-07-24 RX ADMIN — SODIUM CHLORIDE, PRESERVATIVE FREE 10 ML: 5 INJECTION INTRAVENOUS at 09:24

## 2020-07-24 RX ADMIN — DOCUSATE SODIUM 50 MG AND SENNOSIDES 8.6 MG 1 TABLET: 8.6; 5 TABLET, FILM COATED ORAL at 09:07

## 2020-07-24 RX ADMIN — ENOXAPARIN SODIUM 40 MG: 40 INJECTION SUBCUTANEOUS at 09:08

## 2020-07-24 RX ADMIN — PANCRELIPASE 12000 UNITS: 60000; 12000; 38000 CAPSULE, DELAYED RELEASE PELLETS ORAL at 09:07

## 2020-07-24 RX ADMIN — FERROUS SULFATE TAB EC 324 MG (65 MG FE EQUIVALENT) 324 MG: 324 (65 FE) TABLET DELAYED RESPONSE at 16:05

## 2020-07-24 RX ADMIN — LIDOCAINE HYDROCHLORIDE 5 ML: 10 INJECTION, SOLUTION EPIDURAL; INFILTRATION; INTRACAUDAL; PERINEURAL at 17:32

## 2020-07-24 RX ADMIN — METOPROLOL TARTRATE 25 MG: 25 TABLET, FILM COATED ORAL at 09:08

## 2020-07-24 RX ADMIN — ATORVASTATIN CALCIUM 10 MG: 10 TABLET, FILM COATED ORAL at 09:08

## 2020-07-24 RX ADMIN — MEROPENEM 500 MG: 500 INJECTION, POWDER, FOR SOLUTION INTRAVENOUS at 15:22

## 2020-07-24 RX ADMIN — MEROPENEM 500 MG: 500 INJECTION, POWDER, FOR SOLUTION INTRAVENOUS at 09:14

## 2020-07-24 RX ADMIN — DORZOLAMIDE HYDROCHLORIDE AND TIMOLOL MALEATE 1 DROP: 20; 5 SOLUTION/ DROPS OPHTHALMIC at 09:19

## 2020-07-24 RX ADMIN — GABAPENTIN 300 MG: 300 CAPSULE ORAL at 09:08

## 2020-07-24 RX ADMIN — ATROPINE SULFATE 1 DROP: 10 SOLUTION OPHTHALMIC at 09:07

## 2020-07-24 RX ADMIN — ISOSORBIDE MONONITRATE 30 MG: 30 TABLET, EXTENDED RELEASE ORAL at 09:07

## 2020-07-24 RX ADMIN — Medication 1 CAPSULE: at 09:14

## 2020-07-24 RX ADMIN — VENLAFAXINE 150 MG: 37.5 TABLET ORAL at 09:08

## 2020-07-24 RX ADMIN — FERROUS SULFATE TAB EC 324 MG (65 MG FE EQUIVALENT) 324 MG: 324 (65 FE) TABLET DELAYED RESPONSE at 09:14

## 2020-07-24 RX ADMIN — ASPIRIN 325 MG: 325 TABLET, DELAYED RELEASE ORAL at 09:07

## 2020-07-24 RX ADMIN — INSULIN LISPRO 3 UNITS: 100 INJECTION, SOLUTION INTRAVENOUS; SUBCUTANEOUS at 09:21

## 2020-07-24 RX ADMIN — PANCRELIPASE 12000 UNITS: 60000; 12000; 38000 CAPSULE, DELAYED RELEASE PELLETS ORAL at 16:05

## 2020-07-24 ASSESSMENT — PAIN SCALES - GENERAL: PAINLEVEL_OUTOF10: 0

## 2020-07-24 NOTE — PROGRESS NOTES
Successful insertion of a right single lumen PICC into basilic vein. PICC tip terminates in the SVC according to [facility] 3CG tip confirmation system. PICC flushes without resistance and has brisk blood return. PICC is okay to use without an xray.      Bed lowered, side rails up, call light in place

## 2020-07-24 NOTE — PROGRESS NOTES
Patient refused change of dressing on her right leg. Reinforced teaching and still refused. Will continue to monitor.  earache

## 2020-07-24 NOTE — PROGRESS NOTES
insight  REQUIRES PT FOLLOW UP: Yes  Activity Tolerance  Activity Tolerance: Patient limited by fatigue;Patient limited by endurance       Patient Diagnosis(es): The primary encounter diagnosis was Altered mental status, unspecified altered mental status type. Diagnoses of Urinary tract infection without hematuria, site unspecified, Wound of left lower extremity, initial encounter, Wound of right lower extremity, initial encounter, and Type 2 diabetes mellitus with other specified complication, with long-term current use of insulin ([address] were also pertinent to this visit. has no past medical history on file. has no past surgical history on file. Restrictions  Restrictions/Precautions  Restrictions/Precautions: Fall Risk  Position Activity Restriction  Other position/activity restrictions: B LE and buttocks wounds     Subjective  General  Chart Reviewed: Yes  Additional Pertinent Hx: Per Mustapha Knight MD's H&P 7/21: \"The patient is a 76-year-old  female who presented to the hospital with what appears to be increasing fatigue and weakness over the past 3-4 days, also associated with just generalized decrease in functioning with progressive inability to walk or move around without nausea or vomiting. The patient's daughter noted the rapid decline in the past two days. She was recently being treated for what appeared to be lower extremity wounds and had a UTI that apparently grew MRSA. \"  Response To Previous Treatment: Patient with no complaints from previous session.   Family / Caregiver Present: No  Subjective  Subjective: the pt was found to be up in the chair and had not been back to the bed since yesterday's therapy session; the pt declining alot of nursing care per her RN, Charlene Higuera; the pt thinks she is leaving today and only wants her dgt to change her dressings  Pain Screening  Patient Currently in Pain: No        Social/Functional History  Social/Functional History  Lives With: (pt lives with three other elderly individuals with 24/7 assist of caregivers)  Type of Home: House  Home Layout: One level  Home Access: Stairs to enter without rails  Entrance Stairs - Number of Steps: 1 (daughter always assists pt on step)  Bathroom Shower/Tub: Walk-in shower  Bathroom Toilet: Handicap height  Bathroom Equipment: Grab bars in shower, Shower chair, Hand-held shower, Grab bars around toilet  Bathroom Accessibility: Wheelchair accessible  Home Equipment: Rolling walker, [employer], Lift chair  ADL Assistance: Needs assistance(caregiver assists with bathing, LB dressing. Pt independent toileting)  Homemaking Assistance: Needs assistance(family and caregivers manage all)  Ambulation Assistance: Independent(with RW)  Transfer Assistance: Independent(pt sleeps in LIFT chair)  Active : No  Patient's  Info: daughter  Additional Comments: Above information obtained per daughter over phone 7/21/20, d/t pt being very poor historian. Daughter reports pt had 1 recent fall last week. Cognition   Cognition  Overall Cognitive Status: Exceptions  Arousal/Alertness: Appropriate responses to stimuli  Following Commands: Follows one step commands with increased time  Attention Span: Appears intact  Memory: Decreased short term memory;Decreased recall of recent events  Safety Judgement: Decreased awareness of need for safety  Problem Solving: Decreased awareness of errors;Assistance required to identify errors made;Assistance required to generate solutions  Insights: Decreased awareness of deficits  Initiation: Requires cues for some  Sequencing: Requires cues for some  Cognition Comment: cognition appears to be improving.  pt more alert and with quicker responses but still lacks insight into deficits and medical condition--e.g. refusing dressing changes    Objective  Bed mobility  Rolling to Left: Minimal assistance  Rolling to Right: Minimal assistance  Transfers  Sit to Stand: Minimal Assistance  Stand to sit: Minimal Assistance  Bed to Chair: Dependent/Total(with maxi-LIFT to get the pt back to the bed due to pt's height and the height of the bed)  Ambulation  Ambulation?: Yes  Ambulation 1  Surface: level tile  Device: Rolling Walker  Other Apparatus: Wheelchair follow  Assistance: Minimal assistance  Quality of Gait: slow, step to pattern with decreased step length and height B  Distance: 15 feet x 1  Comments: the pt sat at the sink to perform grooming task and then used the [facility] during session     Balance  Posture: Fair  Sitting - Static: Fair  Standing - Static: Fair  Standing - Dynamic: Fair(with walker)  Comments: seated balance a challenge due to pt's short stature and height of the chairs and BSC; stood statically at the walker for loida-care following a BM with min A        Plan   Plan  Times per week: 3-5 x wk in acute setting, needs cotx  Current Treatment Recommendations: Functional Mobility Training  Safety Devices  Type of devices: Call light within reach, Bed alarm in place, Left in bed, Patient at risk for falls, Nurse notified, Gait belt(KYAW mckeon aware)      AM-PAC Score  AM-PAC Inpatient Mobility Raw Score : 13 (07/24/20 1200)  AM-PAC Inpatient T-Scale Score : 36.74 (07/24/20 1200)  Mobility Inpatient CMS 0-100% Score: 64.91 (07/24/20 1200)  Mobility Inpatient CMS G-Code Modifier : CL (07/24/20 1200)          Goals  Short term goals  Time Frame for Short term goals: by acute d/c:--anticipate the need for ongoing skilled PT post acute setting  Short term goal 1: bed mobility Min A  Short term goal 2: transfers Min A x 1-2. goal met 7/23/2020. updated goal: transfers CGA x1  Short term goal 3: assess ambulation with RW and Min A x 1-2. goal met 7/23/2020. updated goal: amb 15' CGA w/ RW  Long term goals  Time Frame for Long term goals : tbd at next level of care. Patient Goals   Patient goals : pt did not state a goal for therapy.        Therapy Time   Individual Concurrent Group Co-treatment   Time In 1101         Time Out 1207         Minutes 66            Electronically signed by Jesus Guerra, PT [id number] on 7/24/2020 at 12:15 PM

## 2020-07-24 NOTE — DISCHARGE SUMMARY
associated with a discharge to the patient's home setting. Based on the patient's AM-PAC score and their current functional mobility deficits, it is recommended that the patient have 3-5 sessions per week of Physical Therapy at d/c to increase the patient's independence.  Please see assessment section for further patient specific details.      Daughter states she has 24 hour care at home for help with her insulin pump going forward, although she is aware we will use SSI while in-pt. Discussed we will monitor her cognition throughout her stay and consider stopping pump if there is concern for good care with insulin on d/c. But if mentation improves, ok to d/c on insulin pump. She agrees and prefers to keep pump due to ease of use with home Herve Richardson.      SLP for swallow eval- dysphagia.    Add ensure for protein supplements.   Moderate oral stage dysphagia; mild pharygeal stage dysphagia . SLP w/HHC on d/c 3-5 x/wk. Has [facility] at home    She is getting IV antibiotics per ID for nonhealing wounds. On merrem and vanc. for legs and UTI. ok for  d/c today per Dr. Gustabo Steele. Will need CBC and BMP weekly and continue merrem and vancomycin on discharge through 8/4. Family does not want SNF.       Gram-negative bacteria urinary tract infection, complicated with  acute kidney injury POA  - chronic suprapubic catheter  - awaiting urine culture and adjust antibx   - on Cefepime,vanc and merrem   - culturelle  - consulted pharm for vanc dosing  - Consulted ID - Dr. Gustabo Steele familiar w/pt   - d/c cefepime d/t encephalopathy in past, IV merrem 500mg q8h + vanc        2.  Anemia, likely 2/2 chronic disease  - H/H 8.7/26   - trend CBC     3.  Hypertension.  - cont home meds     4.  Dyslipidemia.   - cont home statin     5.  Diabetes mellitus.  - SSI low dose; 10 units daily lantus; 3units humalog ac per Ezequiel Enriquez, Diabetic educator  - discuss insulin pump use on discharge - return to regular dosing  - poct ac/hs     Lower leg cellulitis with non-healing wounds   - IV abx   - ID consulted   - cont IV merrem for leg wounds and UTI  - wound care  - IV vanc  - avoid cefepime d/t hx encephalopathy    Significant Diagnostic Studies:   Imaging:  XR CHEST PORTABLE   Final Result   Mild cardial-pericardial silhouette enlargement. Otherwise unremarkable   portable chest radiograph.             Exam:     BP (!) 125/47   Pulse 68   Temp 97.3 °F (36.3 °C) (Oral)   Resp 16   Ht 4' 11\" (1.499 m)   Wt 176 lb 5.9 oz (80 kg)   SpO2 99%   BMI 35.62 kg/m²   General appearance:   No apparent distress, appears stated age. Cooperative. HEENT:  Normocephalic, atraumatic. PERRLA.  EOMi.  Conjunctivae/corneas clear, no icterus, non-injected. Neck: Supple, with full range of motion. No jugular venous distention. Trachea midline. Respiratory:  Normal respiratory effort. Clear to auscultation, bilaterally without Rales/Wheezes/Rhonchi. Cardiovascular:  Regular rate and rhythm without murmurs, rubs or gallops. Abdomen: Soft, non-tender, non-distended, without rebound or guarding. Normal bowel sounds. : suprapubic catheter, chronic  Musculoskeletal:  bilat LE with kerlix in place. erythema and edema with weeping wounds lower extrem,  Full range of motion without deformity. Skin: Skin color, texture, turgor normal.  No rashes or lesions. Neurologic:  Neurovascularly intact without any focal sensory/motor deficits. Cranial nerves: II-XII intact, grossly intact. No facial asymmetry, tongue midline.    Psychiatric:  Alert and oriented to self, place and other  Capillary Refill: Brisk,< 3 seconds   Peripheral Pulses: +2 palpable, equal bilaterally   Consults:     IP CONSULT TO HOSPITALIST  PHARMACY TO DOSE VANCOMYCIN  IP CONSULT TO INFECTIOUS DISEASES  IP CONSULT TO PHYSICAL MEDICINE REHAB    Disposition:  Home with [facility]    Code Status:  Full Code     Activity: activity as tolerated    Diet: diabetic diet; dysphagia minced and moist, nutrition supplements    Condition on Discharge: stable    Labs:  For convenience and continuity at follow-up the following most recent labs are provided:      CBC:    Lab Results   Component Value Date    WBC 5.0 07/24/2020    HGB 8.2 07/24/2020    HCT 24.6 07/24/2020     07/24/2020       Renal:    Lab Results   Component Value Date     07/24/2020    K 4.2 07/24/2020     07/24/2020    CO2 29 07/24/2020    BUN 13 07/24/2020    CREATININE 0.8 07/24/2020    CALCIUM 7.7 07/24/2020       Discharge Medications:     Current Discharge Medication List           Details   lactobacillus (CULTURELLE) capsule Take 1 capsule by mouth daily (with breakfast)  Qty: 30 capsule, Refills: 0              Details   cetirizine (ZYRTEC) 10 MG tablet Take 10 mg by mouth daily      mirabegron (MYRBETRIQ) 50 MG TB24 Take 50 mg by mouth daily      !! furosemide (LASIX) 40 MG tablet Take 40 mg by mouth every other day Alternating with 20 mg tablet      !! furosemide (LASIX) 20 MG tablet Take 20 mg by mouth every other day Alternating with 40 mg tablet      dorzolamide-timolol (COSOPT) 22.3-6.8 MG/ML ophthalmic solution Place 1 drop into the right eye 2 times daily      atropine 1 % ophthalmic solution Place 1 drop into the right eye 2 times daily      prednisoLONE acetate (PRED FORTE) 1 % ophthalmic suspension Place 1 drop into the right eye 2 times daily      aspirin 325 MG EC tablet Take 325 mg by mouth daily      sennosides-docusate sodium (SENOKOT-S) 8.6-50 MG tablet Take 1 tablet by mouth daily      isosorbide mononitrate (IMDUR) 30 MG extended release tablet Take 30 mg by mouth daily      Cholecalciferol (VITAMIN D3) 125 MCG (5000 UT) TABS Take 5,000 Units by mouth daily      LACTOBACILLUS PO Take 1 tablet by mouth nightly      ferrous sulfate (IRON 325) 325 (65 Fe) MG tablet Take 325 mg by mouth 2 times daily (with meals)      Insulin Pump - insulin lispro Inject 1.2 Units/hr into the skin continuous Bolus as needed venlafaxine (EFFEXOR) 75 MG tablet Take 150 mg by mouth 2 times daily      atorvastatin (LIPITOR) 10 MG tablet Take 10 mg by mouth daily      metoprolol tartrate (LOPRESSOR) 25 MG tablet Take 25 mg by mouth 2 times daily      BIOTIN PO Take 1 tablet by mouth nightly       lipase-protease-amylase (CREON) 41483 units delayed release capsule Take 12,000 Units by mouth 2 times daily      gabapentin (NEURONTIN) 300 MG capsule Take 300 mg by mouth 2 times daily. Noon and dinner       !! - Potential duplicate medications found. Please discuss with provider. Time Spent on discharge is more than 30 minutes in the examination, evaluation, counseling and review of medications and discharge plan. Signed:    FRANCHESCA Sanchez - CNP   7/24/2020      Thank you Adonay Callahan for the opportunity to be involved in this patient's care. If you have any questions or concerns please feel free to contact me at [phone].

## 2020-07-24 NOTE — PROGRESS NOTES
Infectious Disease Follow up Notes  Admit Date: 7/20/2020  Hospital Day: 5    Antibiotics : IV Vancomycin  IV Meropenem      CHIEF COMPLAINT:     Lower leg cellulitis  Confusion  Fevers  Non healing wounds   Subjective interval History :  66 y.o.  woman with previous h/o lower leg cellulitis and recurrent admit for similar problems has SPC in place and previous MRSA infections and recurrent UTI with change in mentation admitted with decline in functional status per HPI and creat elevation on admit on weeping ulcers on both legs and we are consulted for IV abx recommendations. Unfortunately [facility] has two different charts and past history limited as the charts needs to be merged together.      Mentation better sitting up in the bed and dressing changes done d/w RN awaiting PICC placement OPAT dw pt daughters at home to help     Past Medical History:    History reviewed. No pertinent past medical history. MRSA infection   Cellulitis recurrent  CKD  Past Surgical History:    History reviewed. No pertinent surgical history.   Supra pubic catheter in place  Current Medications:    Outpatient Medications Marked as Taking for the 7/20/20 encounter [facility] Encounter)   Medication Sig Dispense Refill    lactobacillus (CULTURELLE) capsule Take 1 capsule by mouth daily (with breakfast) 30 capsule 0    cetirizine (ZYRTEC) 10 MG tablet Take 10 mg by mouth daily      mirabegron (MYRBETRIQ) 50 MG TB24 Take 50 mg by mouth daily      furosemide (LASIX) 40 MG tablet Take 40 mg by mouth every other day Alternating with 20 mg tablet      furosemide (LASIX) 20 MG tablet Take 20 mg by mouth every other day Alternating with 40 mg tablet      dorzolamide-timolol (COSOPT) 22.3-6.8 MG/ML ophthalmic solution Place 1 drop into the right eye 2 times daily      atropine 1 % ophthalmic solution Place 1 drop into the right eye 2 times daily      prednisoLONE acetate (PRED FORTE) 1 % ophthalmic suspension Place 1 drop into the right eye 2 times daily      aspirin 325 MG EC tablet Take 325 mg by mouth daily      sennosides-docusate sodium (SENOKOT-S) 8.6-50 MG tablet Take 1 tablet by mouth daily      isosorbide mononitrate (IMDUR) 30 MG extended release tablet Take 30 mg by mouth daily      Cholecalciferol (VITAMIN D3) 125 MCG (5000 UT) TABS Take 5,000 Units by mouth daily      LACTOBACILLUS PO Take 1 tablet by mouth nightly      ferrous sulfate (IRON 325) 325 (65 Fe) MG tablet Take 325 mg by mouth 2 times daily (with meals)      Insulin Pump - insulin lispro Inject 1.2 Units/hr into the skin continuous Bolus as needed      venlafaxine (EFFEXOR) 75 MG tablet Take 150 mg by mouth 2 times daily      atorvastatin (LIPITOR) 10 MG tablet Take 10 mg by mouth daily      metoprolol tartrate (LOPRESSOR) 25 MG tablet Take 25 mg by mouth 2 times daily      BIOTIN PO Take 1 tablet by mouth nightly       lipase-protease-amylase (CREON) 96926 units delayed release capsule Take 12,000 Units by mouth 2 times daily      gabapentin (NEURONTIN) 300 MG capsule Take 300 mg by mouth 2 times daily. Noon and dinner         Allergies:  Patient has no known allergies. Immunizations : There is no immunization history on file for this patient. Social History:    Social History     Tobacco Use    Smoking status: Not on file   Substance Use Topics    Alcohol use: Not on file    Drug use: Not on file     Social History     Tobacco Use   Smoking Status Not on file                    Family History : no DVT no COPD        REVIEW OF SYSTEMS:    No fever / chills / sweats. No weight loss. No visual change, eye pain, eye discharge. No oral lesion, sore throat, dysphagia. Denies cough / sputum/Sob   Denies chest pain, palpitations/ dizziness  Denies nausea/ vomiting/abdominal pain/diarrhea. Denies dysuria or change in urinary function. Denies joint swelling or pain.   No myalgia, arthralgia. No rashes, skin lesions   Denies focal weakness, sensory change or other neurologic symptoms  No lymph node swelling or tenderness.         PHYSICAL EXAM:      Vitals:    BP (!) 125/47   Pulse 68   Temp 97.3 °F (36.3 °C) (Oral)   Resp 16   Ht 4' 11\" (1.499 m)   Wt 176 lb 5.9 oz (80 kg)   SpO2 100%   BMI 35.62 kg/m²     General Appearance: awake and ,in SOME acute distress, ++ pallor, no icterus   Skin: warm and dry, no rash or erythema  Head: normocephalic and atraumatic  Eyes: pupils equal, round, and reactive to light, conjunctivae normal  ENT: tympanic membrane, external ear and ear canal normal bilaterally, nose without deformity, nasal mucosa and turbinates normal without polyps  Neck: supple and non-tender without mass, no thyromegaly  no cervical lymphadenopathy  Pulmonary/Chest: clear to auscultation bilaterally- no wheezes, rales or rhonchi, normal air movement, no respiratory distress  Cardiovascular: normal rate, regular rhythm, normal S1 and S2, no murmurs, rubs, clicks, or gallops, no carotid bruits  Abdomen: soft, non-tender, non-distended, normal bowel sounds, no masses or organomegaly  Extremities: no cyanosis, clubbing or edema  Musculoskeletal: normal range of motion, no joint swelling, deformity or tenderness  Neurologic: reflexes normal and symmetric, no cranial nerve deficit  Lines:  IV  Supra pubic catheter+  Bi lateral lower leg cellulitis and weeping ulcer and edema+            Data Review:    Lab Results   Component Value Date    WBC 5.0 07/24/2020    HGB 8.2 (L) 07/24/2020    HCT 24.6 (L) 07/24/2020    MCV 98.8 07/24/2020     07/24/2020     Lab Results   Component Value Date    CREATININE 0.8 07/24/2020    BUN 13 07/24/2020     07/24/2020    K 4.2 07/24/2020     07/24/2020    CO2 29 07/24/2020       Hepatic Function Panel:   Lab Results   Component Value Date    ALKPHOS 195 07/20/2020    ALT 9 07/20/2020    AST 15 07/20/2020    PROT 6.2 07/20/2020    BILITOT <0.2 07/20/2020    LABALBU 2.2 07/20/2020       UA:  Lab Results   Component Value Date    COLORU YELLOW 07/20/2020    CLARITYU CLOUDY 07/20/2020    GLUCOSEU Negative 07/20/2020    BILIRUBINUR Negative 07/20/2020    KETUA Negative 07/20/2020    SPECGRAV 1.009 07/20/2020    BLOODU LARGE 07/20/2020    PHUR 5.5 07/20/2020    PROTEINU Negative 07/20/2020    UROBILINOGEN 0.2 07/20/2020    NITRU POSITIVE 07/20/2020    LEUKOCYTESUR LARGE 07/20/2020    LABMICR YES 07/20/2020    URINETYPE NotGiven 07/20/2020      Urine Microscopic:   Lab Results   Component Value Date    BACTERIA RARE 07/20/2020    HYALCAST 6 07/20/2020    WBCUA 44 07/20/2020    RBCUA 10 07/20/2020    EPIU 0 07/20/2020       MICRO: cultures reviewed and updated by me     07/20/20 1852        Order Status: Completed  Specimen: Blood  Updated: 07/21/20 2115     Culture, Blood 2  No Growth to date.  Any change in status will be called. Narrative:      ORDER#: [id number]                          ORDERED BY: ZENON LINO   SOURCE: Blood                              COLLECTED:  07/20/20 18:52   ANTIBIOTICS AT REGIS. :                      RECEIVED :  07/20/20 19:02   If child <=2 yrs old please draw pediatric bottle. ~Blood Culture #2   Performed at:   [facility]   [address] [place] [facility] [place] [address]   Phone [phone]    Culture, Blood 1 [[id number]]   Collected: 07/20/20 1852    Order Status: Completed  Specimen: Blood  Updated: 07/21/20 2115     Blood Culture, Routine  No Growth to date.  Any change in status will be called. Narrative:      ORDER#: [id number]                          ORDERED BY: ZENON LINO   SOURCE: Blood                              COLLECTED:  07/20/20 18:52   ANTIBIOTICS AT REGIS. :                      RECEIVED :  07/20/20 19:02   If child <=2 yrs old please draw pediatric bottle. ~Blood Culture #1   Performed at:   [facility]   [address]  [place] [address]   Phone [phone]    Culture, Urine [[id number]]  (Abnormal)  Collected: 07/20/20 1856    Order Status: Completed  Specimen: Urine, clean catch  Updated: 07/21/20 1806     Urine Culture, Routine  <50,000 CFU/ml mixed skin/urogenital rex. No further workupAbnormal       Organism  Pseudomonas aeruginosaAbnormal       Urine Culture, Routine  --     >100,000 CFU/ml   ID and sensitivity to follow    Narrative:      ORDER#: [id number]                          ORDERED BY: Andree Betancourt   SOURCE: Urine Clean Catch                  COLLECTED:  07/20/20 18:56   ANTIBIOTICS AT REGIS. :                      RECEIVED :  07/20/20 20:03   Performed at:   [address] [place] [address]   Phone [phone]      Pseudomonas aeruginosa (1)     Antibiotic  Interpretation  [place]  Status     cefepime  Sensitive  <=2  mcg/mL      ciprofloxacin  Sensitive  <=1  mcg/mL      gentamicin  Sensitive  <=4  mcg/mL      meropenem  Sensitive  <=1  mcg/mL      piperacillin-tazobactam  Sensitive  <=16  mcg/mL      tobramycin  Sensitive  <=4  mcg/mL            IMAGING:    XR CHEST PORTABLE   Final Result   Mild cardial-pericardial silhouette enlargement. Otherwise unremarkable   portable chest radiograph.                All the pertinent images and reports for the current Hospitalization were reviewed by me     Scheduled Meds:   sodium chloride flush  10 mL Intravenous 2 times per day    lidocaine 1 % injection  5 mL Intradermal Once    metoprolol tartrate  25 mg Oral BID    venlafaxine  150 mg Oral BID    isosorbide mononitrate  30 mg Oral Daily    atorvastatin  10 mg Oral Daily    aspirin  325 mg Oral Daily    sodium chloride flush  10 mL Intravenous 2 times per day    enoxaparin  40 mg Subcutaneous Daily    vancomycin (VANCOCIN) intermittent dosing (placeholder)   Other RX Placeholder    lactobacillus  1 capsule Oral Daily with breakfast    atropine  1 drop Right Eye BID    dorzolamide-timolol  1 drop Right Eye BID    lipase-protease-amylase  12,000 Units Oral BID     mirabegron  50 mg Oral Daily    prednisoLONE acetate  1 drop Right Eye BID    sennosides-docusate sodium  1 tablet Oral Daily    gabapentin  300 mg Oral BID    ferrous sulfate  324 mg Oral BID     vitamin D  5,000 Units Oral Daily    insulin lispro  0-6 Units Subcutaneous TID     insulin lispro  0-3 Units Subcutaneous Nightly    insulin glargine  10 Units Subcutaneous Daily    insulin lispro  3 Units Subcutaneous TID     meropenem  500 mg Intravenous Q8H       Continuous Infusions:   dextrose         PRN Meds:  acetaminophen, sodium chloride flush, sodium chloride flush, promethazine **OR** ondansetron, glucose, dextrose, glucagon (rDNA), dextrose      Assessment:     Patient Active Problem List   Diagnosis    Complicated UTI (urinary tract infection)       Change in Mentation  Supra pubic catheter in place    Low grade temp+  Lower leg cellulitis  Recurrent lower leg cellulitis  KACIE+  H/o MRSA infection in the past      On going lower leg cellulitis and lower leg edema with weeping ulcers will need IV abx for now until legs looking better     Urine cx from Supra pubic catheter not much helpful given the clinical change ok to cont IV Meropenem will cover lower leg wounds as well      She has lower leg non healing wound that improve on IV abx and local care and she has recurrent admit for similar issues     Clinically improving slowly and will plan picc line for home going and OPAT d/w pt and RN      Labs, Microbiology, Radiology and all the pertinent results from current hospitalization and  care every where were reviewed  by me as a part of the evaluation   Plan:   1. Cont IV Vancomycin  May need 750  Mg  x Q 24 HR at d/c x 10 days  2. Do not use Cefepime had Encephalopathy in the past  3.  IV Meropenem x 500 mg Q 8 hrs will change to x 1 gm  q 12 HR X 10 DAYs at d/c   4. Cont local care  5. Elevate the extremities    6. PICC line and  Home IV ABX for d/c planning   7. OK FOR d/c plans soon     Discussed with patient/Family and Nursing staff     Thanks for allowing me to participate in your patient's care and please call me with any questions or concerns.     Billie Conklin MD  Infectious Disease  [facility] ([facility]) Physician  Phone: [phone]   Fax : [phone]

## 2020-07-24 NOTE — PROGRESS NOTES
Patient refused change of dressing on her peripheral IV site.  Electronically signed by Leti Schulte RN on 7/24/2020 at 12:27 AM

## 2020-07-24 NOTE — PROGRESS NOTES
Hospital Medicine Progress Note      Admit Date: 7/20/2020       CC: F/U for \" I don't know why I'm here or what happened. \"    HPI: HPI: The patient is a 70-year-old   female who presented to the hospital with what appears to be increasing fatigue and weakness over the past 3-4 days, also associated with just generalized decrease in functioning with progressive inability to walk or move around without nausea or vomiting.  The patient's daughter noted the rapid decline in the past two days. Lilly Taylor was recently being treated for what appeared to be lower extremity wounds and had a UTI that apparently grew MRSA.  This is all per the patient's daughter who provided most of the history.  The patient was unable to provide a good history since she did not quite understand why she was in the hospital.     She has been seeing wound care Dr. Leonard Franks for leg wounds per ER report.     was on doxy for 7 days for MRSA urine prior to adm      Cxr: mild cardial-pericardial silhouette enlargement. O/w unremarkable        PT/OT evals . Lives with daughter at home. 7/21 Updated daughter.  Maria Victoria Heredia homar 8/24 on the AM-PAC short mobility form. Current research shows that an AM-PAC score of 17 or less is typically not associated with a discharge to the patient's home setting. Based on the patient's AM-PAC score and their current functional mobility deficits, it is recommended that the patient have 3-5 sessions per week of Physical Therapy at d/c to increase the patient's independence.  Please see assessment section for further patient specific details.      Daughter states she has 24 hour care at home for help with her insulin pump going forward, although she is aware we will use SSI while in-pt. Discussed we will monitor her cognition throughout her stay and consider stopping pump if there is concern for good care with insulin on d/c.      SLP for swallow eval- dysphagia.     Add ensure for protein supplements.   Moderate oral stage dysphagia; mild pharygeal stage dysphagia . SLP w/HHC on d/c 3-5 x/wk. Has [facility] at home     Consulted PMR for acute rehab. SNF recommended but family does not want SNF and states she has a lot of help at home already. Patient also wants to d/c home.     Interval History/Subjective: patient wants to go home. Wants her shoes on. Explained she has wounds that need to heal and cannot wear shoes right now. She is getting IV antibiotics per ID for nonhealing wounds. On merrem and vanc. for legs and UTI. Not ready to d/c today per Dr. Agatha Sanderson. Family does not want SNF. Review of Systems:       The patient denied headaches, visual changes, LOC, SOB, CP, ABD pain, N/V/D, skin changes, new or worsening weakness or neuromuscular deficits. Comprehensive ROS negative except as mentioned above. Past Medical History:    History reviewed. No pertinent past medical history. Past Surgical History:    History reviewed. No pertinent surgical history. Allergies:  Patient has no known allergies. Past medical and surgical history reviewed. Any changes have been noted. PHYSICAL EXAM:  BP (!) 125/47   Pulse 68   Temp 97.3 °F (36.3 °C) (Oral)   Resp 16   Ht 4' 11\" (1.499 m)   Wt 176 lb 5.9 oz (80 kg)   SpO2 100%   BMI 35.62 kg/m²       Intake/Output Summary (Last 24 hours) at 7/24/2020 1021  Last data filed at 7/24/2020 0913  Gross per 24 hour   Intake 700 ml   Output 1100 ml   Net -400 ml        General appearance:   No apparent distress, appears stated age. Cooperative. HEENT:  Normocephalic, atraumatic. PERRLA.  EOMi.  Conjunctivae/corneas clear, no icterus, non-injected. Neck: Supple, with full range of motion. No jugular venous distention. Trachea midline. Respiratory:  Normal respiratory effort. Clear to auscultation, bilaterally without Rales/Wheezes/Rhonchi. Cardiovascular:  Regular rate and rhythm without murmurs, rubs or gallops.   Abdomen: Soft, non-tender, non-distended, without rebound or guarding. Normal bowel sounds. : suprapubic catheter, chronic  Musculoskeletal:  bilat LE with kerlix in place. erythema and edema with weeping wounds lower extrem,  Full range of motion without deformity. Skin: Skin color, texture, turgor normal.  No rashes or lesions. Neurologic:  Neurovascularly intact without any focal sensory/motor deficits. Cranial nerves: II-XII intact, grossly intact. No facial asymmetry, tongue midline. Psychiatric:  Alert and oriented to self, place and other  Capillary Refill: Brisk,< 3 seconds   Peripheral Pulses: +2 palpable, equal bilaterally     LABS:    Lab Results   Component Value Date    WBC 5.0 07/24/2020    HGB 8.2 (L) 07/24/2020    HCT 24.6 (L) 07/24/2020    MCV 98.8 07/24/2020     07/24/2020    LYMPHOPCT 7.2 07/20/2020    RBC 2.49 (L) 07/24/2020    MCH 33.0 07/24/2020    MCHC 33.4 07/24/2020    RDW 13.6 07/24/2020       Lab Results   Component Value Date    CREATININE 0.8 07/24/2020    BUN 13 07/24/2020     07/24/2020    K 4.2 07/24/2020     07/24/2020    CO2 29 07/24/2020       No results found for: MG    Lab Results   Component Value Date    ALT 9 (L) 07/20/2020    AST 15 07/20/2020    ALKPHOS 195 (H) 07/20/2020    BILITOT <0.2 07/20/2020        No flowsheet data found. No results found for: LABA1C    Imaging:  XR CHEST PORTABLE   Final Result   Mild cardial-pericardial silhouette enlargement. Otherwise unremarkable   portable chest radiograph.              Scheduled and prn Medications:    Scheduled Meds:   sodium chloride flush  10 mL Intravenous 2 times per day    lidocaine 1 % injection  5 mL Intradermal Once    metoprolol tartrate  25 mg Oral BID    venlafaxine  150 mg Oral BID    isosorbide mononitrate  30 mg Oral Daily    atorvastatin  10 mg Oral Daily    aspirin  325 mg Oral Daily    sodium chloride flush  10 mL Intravenous 2 times per day    enoxaparin  40 mg Subcutaneous Daily  vancomycin (VANCOCIN) intermittent dosing (placeholder)   Other RX Placeholder    lactobacillus  1 capsule Oral Daily with breakfast    atropine  1 drop Right Eye BID    dorzolamide-timolol  1 drop Right Eye BID    lipase-protease-amylase  12,000 Units Oral BID     mirabegron  50 mg Oral Daily    prednisoLONE acetate  1 drop Right Eye BID    sennosides-docusate sodium  1 tablet Oral Daily    gabapentin  300 mg Oral BID    ferrous sulfate  324 mg Oral BID     vitamin D  5,000 Units Oral Daily    insulin lispro  0-6 Units Subcutaneous TID     insulin lispro  0-3 Units Subcutaneous Nightly    insulin glargine  10 Units Subcutaneous Daily    insulin lispro  3 Units Subcutaneous TID     meropenem  500 mg Intravenous Q8H     Continuous Infusions:   dextrose       PRN Meds:.acetaminophen, sodium chloride flush, sodium chloride flush, promethazine **OR** ondansetron, glucose, dextrose, glucagon (rDNA), dextrose    Assessment & Plan:        Gram-negative bacteria urinary tract infection, complicated with  acute kidney injury POA  - chronic suprapubic catheter  - awaiting urine culture and adjust antibx   - on Cefepime,vanc and merrem   - culturelle  - consulted pharm for vanc dosing  - Consulted ID - Dr. Sen Atkinson familiar w/pt   - d/c cefepime d/t encephalopathy in past, IV merrem 500mg q8h + vanc        2.  Anemia, likely 2/2 chronic disease  - H/H 8.7/26   - trend CBC     3.  Hypertension.  - cont home meds     4.  Dyslipidemia. - cont home statin     5.  Diabetes mellitus.  - SSI low dose; 10 units daily lantus; 3units humalog ac per Tan Bermudez Diabetic educator  - discuss insulin pump use on discharge   - poct ac/hs    Lower leg cellulitis with non-healing wounds   - IV abx   - ID consulted   - cont IV merrem for leg wounds and UTI  - wound care  - IV vanc  - avoid cefepime d/t hx encephalopathy    Continue current regimen/therapies. Monitor. Adjust medical regimen as appropriate.     Body mass index is 35.62 kg/m². The patient and / or the family were informed of the results of any tests, a time was given to answer questions, a plan was proposed and they agreed with plan. DVT ppx: lovenox      Diet: Dietary Nutrition Supplements: Wound Healing Oral Supplement, Diabetic Oral Supplement, Protein Modular  DIET CARB CONTROL; Dysphagia Minced and Moist    Consults:  IP CONSULT TO HOSPITALIST  PHARMACY TO DOSE VANCOMYCIN  IP CONSULT TO INFECTIOUS DISEASES  IP CONSULT TO PHYSICAL MEDICINE REHAB    DISPO/placement plan: recommended SNF on d/c based on # assist, however, fernando wants home with [facility].    Pending ID plan for date    Code Status: Full Code      FRANCHESCA Richardson - LIBRA  07/24/20

## 2020-07-24 NOTE — PROGRESS NOTES
Clinical Pharmacy Note  Vancomycin Consult    Janet Mena is a 66 y.o. female ordered Vancomycin for UTI; consult received from Dr. Blas Nathan to manage therapy. Also receiving Merrem. Patient Active Problem List   Diagnosis    Complicated UTI (urinary tract infection)       Allergies:  Patient has no known allergies. Temp max:  Temp (24hrs), Av.9 °F (36.6 °C), Min:97.3 °F (36.3 °C), Max:98.3 °F (36.8 °C)      Recent Labs     20  0724 20  0604 20  0539   WBC 4.7 6.4 5.0       Recent Labs     20  0724 20  0604 20  0539   BUN 21* 16 13   CREATININE 1.0 0.8 0.8         Intake/Output Summary (Last 24 hours) at 2020 1248  Last data filed at 2020 0913  Gross per 24 hour   Intake 700 ml   Output 1100 ml   Net -400 ml       Culture Results:   Urine >100,000 Pseudomonas     Ht Readings from Last 1 Encounters:   20 4' 11\" (1.499 m)        Wt Readings from Last 1 Encounters:   20 176 lb 5.9 oz (80 kg)         CrCl cannot be calculated (Unknown ideal weight. ). Assessment/Plan:  Vancomycin day #5    24 hour level today is 22.7 mg/L after receiving vancomycin 1250 mg. No vancomycin today. Random level to be drawn 20 with AM labs. Thank you for the consult.    Lyla Sánchez, [id number] [facility]

## 2020-07-24 NOTE — PLAN OF CARE
Problem: Skin Integrity:  Goal: Will show no infection signs and symptoms  Description: Will show no infection signs and symptoms  7/24/2020 1125 by Judge Leonidas RN  Outcome: Ongoing  7/23/2020 2335 by Donald Breen RN  Outcome: Ongoing  Goal: Absence of new skin breakdown  Description: Absence of new skin breakdown  7/24/2020 1125 by Judge Leonidas RN  Outcome: Ongoing  7/23/2020 2335 by Donald Breen RN  Outcome: Ongoing     Problem: Falls - Risk of:  Goal: Will remain free from falls  Description: Will remain free from falls  7/24/2020 1125 by Judge Leonidas RN  Outcome: Ongoing  7/23/2020 2335 by Donald Breen RN  Outcome: Ongoing  Goal: Absence of physical injury  Description: Absence of physical injury  7/24/2020 1125 by Judge Leonidas RN  Outcome: Ongoing  7/23/2020 2335 by Donald Breen RN  Outcome: Ongoing

## 2020-07-24 NOTE — PROGRESS NOTES
Verbal and written discharge instructions was given to the patient and called to daughter Meliza. Patient and caregiver verbalizes understanding of d/c instructions including medications orders for home and possible side effects associated with them. Pt. instructed and verbalizes understanding to call the doctor listed if they should have any complications or worsening of symptoms and verbalizes understanding about follow-up appointments. PICC line JOSE in place and functioning properly. Pt. Discharged to home with all belongings. Out via wheelchair.

## 2020-07-24 NOTE — PROGRESS NOTES
[facility]   Speech Therapy  Daily Dysphagia Treatment Note    Chapin Slight  AGE: 66 y.o. GENDER: female  : 1945  [id number]  EPISODE DATE:  2020     Patient Active Problem List   Diagnosis    Complicated UTI (urinary tract infection)     No Known Allergies    Treatment Diagnosis: Dysphagia     Chart review:   2020 admitted with UTI:  The patient is a 64-year-old  female who presented to the hospital with what appears to be increasing fatigue and weakness over the past 3-4 days, also associated with just generalized decrease in functioning with progressive inability to walk or move around without nausea or vomiting.  The patient's daughter noted the rapid decline in the past two days. Aracelis Ball was recently being treated for what appeared to be lower extremity wounds and had a UTI that apparently grew MRSA.  This is all per the patient's daughter who provided most of the history.  The patient was unable to provide a good history since she did not quite understand why she was in the hospital.   2020 CXR:   Impression    Mild cardial-pericardial silhouette enlargement.  Otherwise unremarkable    portable chest radiograph.         Subjective:     Current diet  Minced/moist  Thin    Comments regarding tolerating Current Diet:   Adequate tolerance reported but poor intake; pt refused all her lunch; pt reports dtr tells her she is picky. Pt reports there is too much repetition here with food, she loves oatmeal at home but here it is \"yucky\"    At home:  Pt reports eating Tuna salad and egg salad with crackers, pot roast on a sandwich. Pt does not like meat much. Nsg has contacted dietary to visit with pt regarding options    Objective:     Pain   Patient Currently in Pain: No    Cognitive/Behavior   Behavior/Cognition: Alert, Cooperative, Pleasant mood, Confused    Presentations   Consistencies Administered:  Thin - straw, Regular solids:  Peanut butter on saltine, encourage adequate intake. Speech will continue to follow. Diet Solids Recommendation: Dysphagia Diet III (Soft and Bite Sized)  Liquid Consistency Recommendation: Thin  Compensatory Swallowing Strategies: Upright as possible for all oral intake;Remain upright for 30-45 minutes after meals, order soft items (soup or puree) with each meal for ease of intake. Alternate solids and liquids, monitor for oral residue    Education:  Consulted and agree with results and recommendations: Patient, RN  Patient Education: completed on results/recs/plan  Patient Education Response: Needs reinforcement    Prognosis:   Prognosis for safe diet advancement: guarded  Consulted and agree with results and recommendations: Patient;RN    Plan:     Continue Dysphagia Therapy: YES    Interventions: Therapeutic Interventions: Diet tolerance monitoring, Therapeutic PO trials with SLP, Patient/Family education, Bolus control exercises  Duration/Frequency of therapy while on unit: Duration/Frequency of Treatment  Duration/Frequency of Treatment: ST to tx 3-5 tiems per week for dysphagia durign acute admission     Discharge Instructions:   Anticipate NEED for further skilled Speech Therapy for Dysphagia at discharge    This note serves as a D/C Summary in the event that this patient is discharged prior to the next therapy session.     Coded treatment time: 15  Total treatment time: 20    Electronically signed by Jesus Kwan MS CCC/SLP [id number]  Speech Language Pathologist   on 7/24/2020 at 2:58 PM

## 2020-07-24 NOTE — CARE COORDINATION
[facility]  Received referral from case management Dimple Read  Faxed orders to [address] care to see patient by   7/26/2020  Camila Balderrama LPN    [facility] Cell [phone], Fax [phone]

## 2020-07-24 NOTE — CARE COORDINATION
Referral received to check IV Benefits. Information faxed to [employer] office awaiting call back on costs. Therapy:Vancomycin 750mg Q24 and Meropenem 1g Q12    Pt.  Copay:Vancomycin 750mg Q24 $27/wk   and Meropenem 1g Q12 $67/wk, [employer] signed by Onofre Castillo on 7/24/2020 at 3:50 PM   Cell Ph# [phone], Office # [phone]

## 2020-07-24 NOTE — CARE COORDINATION
ID rec home IV abx. Call to Adrián Williamson ([phone]) with [employer] to inform and check benefits. Patient already active with Gia Pichardo ([phone]) aware patient will need IV abx.      Electronically signed by AMI Hunter on 7/24/2020 at 2:27 PM

## 2020-07-24 NOTE — PROGRESS NOTES
Occupational Therapy  Facility/Department: [facility]  Daily Treatment Note  NAME: Emily Espinosa  : 1945  MRN: [id number]    Date of Service: 2020    Discharge Recommendations:  24 hour supervision or assist, Home with Home health OT, Patient would benefit from continued therapy after discharge       Assessment   Performance deficits / Impairments: Decreased functional mobility ; Decreased ADL status; Decreased strength;Decreased cognition;Decreased endurance;Decreased balance  Assessment: Pt with slightly increased fxl activity tolerance and balance to complete fxl mobility further distance with RW and Min A with chair follow, but continues to require significant assist for ADLs (total A toileting and LB dressing, set-up/SBA for seated grooming). Pt with limited standing tolerance. Cont per OT POC. At current status, pt's AM-PAC score indicates need for ongoing skilled OT at d/c, however, daughter prefers for pt to return home and reports staff able to provide level of assist pt is requiring. Recommend 24 hour hands-on assist and home OT. Prognosis: Fair  OT Education: OT Role;Plan of Care;Transfer Training;Orientation; ADL Adaptive Strategies  Patient Education: importance of dressing changes and positional changes for wound healing and skin integrity  Barriers to Learning: cognition, vision  REQUIRES OT FOLLOW UP: Yes  Activity Tolerance  Activity Tolerance: Patient limited by fatigue;Patient limited by pain  Safety Devices  Safety Devices in place: Yes  Type of devices: Call light within reach; Bed alarm in place;Gait belt;Left in bed;Nurse notified         Patient Diagnosis(es): The primary encounter diagnosis was Altered mental status, unspecified altered mental status type.  Diagnoses of Urinary tract infection without hematuria, site unspecified, Wound of left lower extremity, initial encounter, Wound of right lower extremity, initial encounter, and Type 2 diabetes mellitus with other specified complication, with long-term current use of insulin ([address] were also pertinent to this visit. has no past medical history on file. has no past surgical history on file. Restrictions  Restrictions/Precautions  Restrictions/Precautions: Fall Risk  Position Activity Restriction  Other position/activity restrictions: B LE and buttocks wounds  Subjective   General  Chart Reviewed: Yes  Additional Pertinent Hx: Per Dalia Cordova MD's H&P 7/21: \"The patient is a 58-year-old  female who presented to the hospital with what appears to be increasing fatigue and weakness over the past 3-4 days, also associated with just generalized decrease in functioning with progressive inability to walk or move around without nausea or vomiting. The patient's daughter noted the rapid decline in the past two days. She was recently being treated for what appeared to be lower extremity wounds and had a UTI that apparently grew MRSA. \"  Family / Caregiver Present: No  Referring Practitioner: FRANCHESCA Richardson CNP  Diagnosis: UTI, KACIE, generalized weakness  Subjective  Subjective: Pt met b/s for OT cotx with PT. Pt seated in recliner on arrival, agreeable to participate in therapy. Pt with multiple complaints and appears agitated with still being in the hospital. Per nursing pt refusing dressing changes. Pt c/o pain B LE's, did not rate.       Objective    ADL  Grooming: Setup;Stand by assistance(seated at sink for oral care, to wash face; pt unable to tolerate standing at sink for grooming)  UE Dressing: Maximum assistance(to don cardigan)  LE Dressing: Dependent/Total(to don/doff shoes)  Toileting: Dependent/Total(BM at [facility], total A for hygiene standing at walker)     Balance  Sitting Balance: Stand by assistance  Standing Balance: Minimal assistance  Standing Balance  Time: ~1 minute  Activity: standing at walker for hygiene following toileting  Comment: Min A for balance  Functional Mobility  Functional - Mobility Device: Rolling Walker  Assist Level: Minimal assistance  Functional Mobility Comments: Pt completed fxl mobility ~15 ft with RW and Min A with chair follow. Bed mobility  Rolling to Left: Minimal assistance  Rolling to Right: Minimal assistance   Comment: Rolling L/R to remove maximove sling. Transfers  Sit to stand: Minimal assistance(from recliner, arm-chair, BSC, verbal cues for hand placement.)  Stand to sit: Minimal assistance(to recliner, arm-chair, BSC, verbal cues for hand placement)  Transfer Comments: maximove used to transfer pt from chair-bed d/t pt's short stature and high air matress bed     Cognition  Overall Cognitive Status: Exceptions  Arousal/Alertness: Appropriate responses to stimuli  Following Commands: Follows one step commands with increased time  Attention Span: Appears intact  Memory: Decreased short term memory;Decreased recall of recent events  Safety Judgement: Decreased awareness of need for safety  Problem Solving: Decreased awareness of errors;Assistance required to identify errors made;Assistance required to generate solutions  Insights: Decreased awareness of deficits  Initiation: Requires cues for some  Sequencing: Requires cues for some  Cognition Comment: cognition appears to be improving.  pt more alert and with quicker responses but still lacks insight into deficits and medical condition--e.g. refusing dressing changes        Plan   Plan  Times per week: 3-5  Times per day: Daily  Current Treatment Recommendations: Balance Training, Functional Mobility Training, Endurance Training, Safety Education & Training, Self-Care / ADL, Strengthening, Cognitive Reorientation, Cognitive/Perceptual Training    AM-PAC Score        AM-[facility] Inpatient Daily Activity Raw Score: 13 (07/24/20 1207)  AM-PAC Inpatient ADL T-Scale Score : 32.03 (07/24/20 1207)  ADL Inpatient CMS 0-100% Score: 63.03 (07/24/20 1207)  ADL Inpatient CMS G-Code Modifier : CL (07/24/20 1207)    Goals  Short term goals  Time Frame for Short term goals: Prior to d/c: STATUS GOALS 7/24: ALL GOALS ONGOING EXCEPT WHERE INDICATED BELOW. Short term goal 1: Pt will complete UB bathing/dressing with set-up/SBA. Short term goal 2: Pt will complete toileting with mod A. Short term goal 3: Pt will complete grooming in stance at sink with CGA. Short term goal 4: Pt will complete fxl mobility and fxl transfers to/from ADL surfaces with min A using AD. GOAL MET. Advance to [address] Short term goal 5: Pt will tolerate standing >5 minutes for functional task with SBA. Long term goals  Time Frame for Long term goals : STG=LTG  Patient Goals   Patient goals : pt unable to verbalize personal therapy goal d/t decreased cognition. Therapy Time   Individual Concurrent Group Co-treatment   Time In 1101         Time Out 9485         Minutes 66               This note to serve as OT d/c summary if pt is d/c-ed prior to next therapy session.     Ritchie Ibarra, OTR/L [id number]

## 2020-07-27 LAB
BASOPHILS ABSOLUTE: 0.1 K/UL (ref 0–0.2)
BASOPHILS RELATIVE PERCENT: 1 %
EOSINOPHILS ABSOLUTE: 0.4 K/UL (ref 0–0.6)
EOSINOPHILS RELATIVE PERCENT: 3.8 %
HCT VFR BLD CALC: 27.3 % (ref 36–48)
HEMOGLOBIN: 9 G/DL (ref 12–16)
LYMPHOCYTES ABSOLUTE: 1 K/UL (ref 1–5.1)
LYMPHOCYTES RELATIVE PERCENT: 10.3 %
MCH RBC QN AUTO: 33.2 PG (ref 26–34)
MCHC RBC AUTO-ENTMCNC: 32.9 G/DL (ref 31–36)
MCV RBC AUTO: 100.8 FL (ref 80–100)
MONOCYTES ABSOLUTE: 0.5 K/UL (ref 0–1.3)
MONOCYTES RELATIVE PERCENT: 4.6 %
NEUTROPHILS ABSOLUTE: 8.1 K/UL (ref 1.7–7.7)
NEUTROPHILS RELATIVE PERCENT: 80.3 %
PDW BLD-RTO: 13.7 % (ref 12.4–15.4)
PLATELET # BLD: 247 K/UL (ref 135–450)
PMV BLD AUTO: 9.5 FL (ref 5–10.5)
RBC # BLD: 2.71 M/UL (ref 4–5.2)
WBC # BLD: 10.1 K/UL (ref 4–11)

## 2020-07-28 LAB
ANION GAP SERPL CALCULATED.3IONS-SCNC: 13 MMOL/L (ref 3–16)
BUN BLDV-MCNC: 11 MG/DL (ref 7–20)
CALCIUM SERPL-MCNC: 7.7 MG/DL (ref 8.3–10.6)
CHLORIDE BLD-SCNC: 98 MMOL/L (ref 99–110)
CO2: 22 MMOL/L (ref 21–32)
CREAT SERPL-MCNC: 1 MG/DL (ref 0.6–1.2)
GFR AFRICAN AMERICAN: >60
GFR NON-AFRICAN AMERICAN: 54
GLUCOSE BLD-MCNC: 86 MG/DL (ref 70–99)
POTASSIUM SERPL-SCNC: 5.5 MMOL/L (ref 3.5–5.1)
SODIUM BLD-SCNC: 133 MMOL/L (ref 136–145)

## 2020-07-30 LAB — VANCOMYCIN TROUGH: 18.9 UG/ML (ref 10–20)

## 2020-08-03 LAB
BASOPHILS ABSOLUTE: 0.1 K/UL (ref 0–0.2)
BASOPHILS RELATIVE PERCENT: 1.1 %
EOSINOPHILS ABSOLUTE: 0.5 K/UL (ref 0–0.6)
EOSINOPHILS RELATIVE PERCENT: 6.6 %
HCT VFR BLD CALC: 24.2 % (ref 36–48)
HEMOGLOBIN: 8.2 G/DL (ref 12–16)
LYMPHOCYTES ABSOLUTE: 0.9 K/UL (ref 1–5.1)
LYMPHOCYTES RELATIVE PERCENT: 13.8 %
MCH RBC QN AUTO: 33.6 PG (ref 26–34)
MCHC RBC AUTO-ENTMCNC: 33.8 G/DL (ref 31–36)
MCV RBC AUTO: 99.4 FL (ref 80–100)
MONOCYTES ABSOLUTE: 0.4 K/UL (ref 0–1.3)
MONOCYTES RELATIVE PERCENT: 5.6 %
NEUTROPHILS ABSOLUTE: 5 K/UL (ref 1.7–7.7)
NEUTROPHILS RELATIVE PERCENT: 72.9 %
PDW BLD-RTO: 13.8 % (ref 12.4–15.4)
PLATELET # BLD: 202 K/UL (ref 135–450)
PMV BLD AUTO: 9.9 FL (ref 5–10.5)
RBC # BLD: 2.44 M/UL (ref 4–5.2)
VANCOMYCIN TROUGH: 18.8 UG/ML (ref 10–20)
WBC # BLD: 6.8 K/UL (ref 4–11)

## 2020-08-04 ENCOUNTER — TELEPHONE (OUTPATIENT)
Dept: INFECTIOUS DISEASES | Age: 75
End: 2020-08-04

## 2020-08-04 NOTE — TELEPHONE ENCOUNTER
Spoke with daughter and Makenzie Tovar [facility] nurse wound is looking much better. Orders given to martha.

## 2020-08-04 NOTE — TELEPHONE ENCOUNTER
IV infusion end date is 8/4/2020. Do you wish to end an pull PICC?     Orders  IV Vancomycin 750 mg QD  IV Meropenem 1 gm Q12hrs

## 2020-08-06 ENCOUNTER — HOSPITAL ENCOUNTER (OUTPATIENT)
Dept: WOUND CARE | Age: 75
Discharge: HOME | End: 2020-08-06
Payer: MEDICARE

## 2020-09-22 ENCOUNTER — HOSPITAL ENCOUNTER (EMERGENCY)
Age: 75
Discharge: HOME | End: 2020-09-22
Payer: MEDICARE

## 2020-09-22 ENCOUNTER — APPOINTMENT (OUTPATIENT)
Dept: GENERAL RADIOLOGY | Age: 75
End: 2020-09-22
Payer: MEDICARE

## 2020-09-22 ENCOUNTER — APPOINTMENT (OUTPATIENT)
Dept: CT IMAGING | Age: 75
End: 2020-09-22
Payer: MEDICARE

## 2020-09-22 VITALS
WEIGHT: 175.09 LBS | OXYGEN SATURATION: 100 % | DIASTOLIC BLOOD PRESSURE: 60 MMHG | SYSTOLIC BLOOD PRESSURE: 122 MMHG | RESPIRATION RATE: 20 BRPM | BODY MASS INDEX: 35.36 KG/M2 | TEMPERATURE: 98 F | HEART RATE: 75 BPM

## 2020-09-22 LAB
A/G RATIO: 0.6 (ref 1.1–2.2)
ALBUMIN SERPL-MCNC: 2.4 G/DL (ref 3.4–5)
ALP BLD-CCNC: 139 U/L (ref 40–129)
ALT SERPL-CCNC: 8 U/L (ref 10–40)
ANION GAP SERPL CALCULATED.3IONS-SCNC: 10 MMOL/L (ref 3–16)
AST SERPL-CCNC: 15 U/L (ref 15–37)
BASOPHILS ABSOLUTE: 0.1 K/UL (ref 0–0.2)
BASOPHILS RELATIVE PERCENT: 1 %
BILIRUB SERPL-MCNC: <0.2 MG/DL (ref 0–1)
BILIRUBIN URINE: NEGATIVE
BLOOD, URINE: ABNORMAL
BUN BLDV-MCNC: 27 MG/DL (ref 7–20)
CALCIUM SERPL-MCNC: 7.6 MG/DL (ref 8.3–10.6)
CHLORIDE BLD-SCNC: 93 MMOL/L (ref 99–110)
CLARITY: ABNORMAL
CO2: 24 MMOL/L (ref 21–32)
COLOR: ABNORMAL
COMMENT UA: ABNORMAL
CREAT SERPL-MCNC: 1 MG/DL (ref 0.6–1.2)
EOSINOPHILS ABSOLUTE: 0.4 K/UL (ref 0–0.6)
EOSINOPHILS RELATIVE PERCENT: 5.3 %
EPITHELIAL CELLS, UA: 0 /HPF (ref 0–5)
GFR AFRICAN AMERICAN: >60
GFR NON-AFRICAN AMERICAN: 54
GLOBULIN: 4 G/DL
GLUCOSE BLD-MCNC: 98 MG/DL (ref 70–99)
GLUCOSE URINE: NEGATIVE MG/DL
HCT VFR BLD CALC: 27.7 % (ref 36–48)
HEMOGLOBIN: 9.4 G/DL (ref 12–16)
HYALINE CASTS: 6 /LPF (ref 0–8)
KETONES, URINE: NEGATIVE MG/DL
LEUKOCYTE ESTERASE, URINE: ABNORMAL
LYMPHOCYTES ABSOLUTE: 0.9 K/UL (ref 1–5.1)
LYMPHOCYTES RELATIVE PERCENT: 12.6 %
MCH RBC QN AUTO: 32.6 PG (ref 26–34)
MCHC RBC AUTO-ENTMCNC: 34 G/DL (ref 31–36)
MCV RBC AUTO: 95.9 FL (ref 80–100)
MICROSCOPIC EXAMINATION: YES
MONOCYTES ABSOLUTE: 0.3 K/UL (ref 0–1.3)
MONOCYTES RELATIVE PERCENT: 4.2 %
NEUTROPHILS ABSOLUTE: 5.8 K/UL (ref 1.7–7.7)
NEUTROPHILS RELATIVE PERCENT: 76.9 %
NITRITE, URINE: POSITIVE
OCCULT BLOOD DIAGNOSTIC: NORMAL
PDW BLD-RTO: 13.5 % (ref 12.4–15.4)
PH UA: 5.5 (ref 5–8)
PLATELET # BLD: 268 K/UL (ref 135–450)
PMV BLD AUTO: 8.5 FL (ref 5–10.5)
POTASSIUM SERPL-SCNC: 3.1 MMOL/L (ref 3.5–5.1)
PRO-BNP: 1537 PG/ML (ref 0–449)
PROTEIN UA: 30 MG/DL
RBC # BLD: 2.89 M/UL (ref 4–5.2)
RBC UA: >900 /HPF (ref 0–4)
SODIUM BLD-SCNC: 127 MMOL/L (ref 136–145)
SPECIFIC GRAVITY UA: 1.01 (ref 1–1.03)
TOTAL PROTEIN: 6.4 G/DL (ref 6.4–8.2)
TROPONIN: <0.01 NG/ML
URINE REFLEX TO CULTURE: YES
URINE TYPE: ABNORMAL
UROBILINOGEN, URINE: 0.2 E.U./DL
WBC # BLD: 7.6 K/UL (ref 4–11)
WBC UA: 183 /HPF (ref 0–5)

## 2020-09-22 PROCEDURE — 85025 COMPLETE CBC W/AUTO DIFF WBC: CPT

## 2020-09-22 PROCEDURE — 80053 COMPREHEN METABOLIC PANEL: CPT

## 2020-09-22 PROCEDURE — 87086 URINE CULTURE/COLONY COUNT: CPT

## 2020-09-22 PROCEDURE — 74174 CTA ABD&PLVS W/CONTRAST: CPT

## 2020-09-22 PROCEDURE — G0328 FECAL BLOOD SCRN IMMUNOASSAY: HCPCS

## 2020-09-22 PROCEDURE — 81001 URINALYSIS AUTO W/SCOPE: CPT

## 2020-09-22 PROCEDURE — 99284 EMERGENCY DEPT VISIT MOD MDM: CPT

## 2020-09-22 PROCEDURE — 87077 CULTURE AEROBIC IDENTIFY: CPT

## 2020-09-22 PROCEDURE — 84484 ASSAY OF TROPONIN QUANT: CPT

## 2020-09-22 PROCEDURE — 6360000004 HC RX CONTRAST MEDICATION: Performed by: PHYSICIAN ASSISTANT

## 2020-09-22 PROCEDURE — 71045 X-RAY EXAM CHEST 1 VIEW: CPT

## 2020-09-22 PROCEDURE — 87186 SC STD MICRODIL/AGAR DIL: CPT

## 2020-09-22 PROCEDURE — 93005 ELECTROCARDIOGRAM TRACING: CPT | Performed by: PHYSICIAN ASSISTANT

## 2020-09-22 PROCEDURE — 83880 ASSAY OF NATRIURETIC PEPTIDE: CPT

## 2020-09-22 RX ORDER — NITROFURANTOIN 25; 75 MG/1; MG/1
100 CAPSULE ORAL 2 TIMES DAILY
Qty: 14 CAPSULE | Refills: 0 | Status: SHIPPED | OUTPATIENT
Start: 2020-09-22 | End: 2020-09-29

## 2020-09-22 RX ORDER — ONDANSETRON 2 MG/ML
4 INJECTION INTRAMUSCULAR; INTRAVENOUS ONCE
Status: DISCONTINUED | OUTPATIENT
Start: 2020-09-22 | End: 2020-09-23 | Stop reason: HOSPADM

## 2020-09-22 RX ORDER — ONDANSETRON 4 MG/1
4 TABLET, ORALLY DISINTEGRATING ORAL EVERY 12 HOURS PRN
Qty: 12 TABLET | Refills: 0 | Status: SHIPPED | OUTPATIENT
Start: 2020-09-22

## 2020-09-22 RX ADMIN — IOPAMIDOL 75 ML: 755 INJECTION, SOLUTION INTRAVENOUS at 20:17

## 2020-09-22 ASSESSMENT — ENCOUNTER SYMPTOMS
EYE DISCHARGE: 0
NAUSEA: 1
APNEA: 0
BACK PAIN: 0
CHOKING: 0
SHORTNESS OF BREATH: 0
EYE REDNESS: 0
SORE THROAT: 0
FACIAL SWELLING: 0
VOMITING: 1
ABDOMINAL PAIN: 0

## 2020-09-22 NOTE — ED TRIAGE NOTES
Patient to ED via EMS for increased lethargy and emesis x2 days. Per EMS, patient lives at home with her caregivers and per EMS, patient's daughter was called by patient's PCP and informed she had a \"low HGB\" and a \"positive hemoccult. \" Patient is unable to give this RN a thorough report. Patient is lethargic. She is able to tell this RN her name and  and that she is in the hospital. Karolina Oro to tell me the year. Squad established 22G IV in right forearm and patient received 4mg Zofran and 250 NS prior to arrival. Patient has suprapubic catheter. VSS. No acute distress noted at this time. RN called patient's daughter, Sara Diaz at [phone]. Meliza is patient's POA. who provided a more thorough report. She reported that patient was admitted to hospital at the end of July and since she was discharged she has been declining including increased weakness. States over the past two weeks she has had an increase in confusion. States she has a PCP who comes to the patient's home and they were called today with the update that patient's HGB was 7.0 and her hemoccult was positive. Per Daughter, patient is a diabetic and wears an insulin pump. States her sugars have been \"normal.\". Rosa Maria Guerrero updated and made aware of information.

## 2020-09-22 NOTE — ED NOTES
Bed: S-48  Expected date:   Expected time:   Means of arrival: [facility]  Comments:  Manuelito Figueroa RN  09/22/20 5309

## 2020-09-23 ENCOUNTER — TELEPHONE (OUTPATIENT)
Dept: OTHER | Age: 75
End: 2020-09-23

## 2020-09-23 ENCOUNTER — CARE COORDINATION (OUTPATIENT)
Dept: CARE COORDINATION | Age: 75
End: 2020-09-23

## 2020-09-23 LAB
EKG ATRIAL RATE: 75 BPM
EKG DIAGNOSIS: NORMAL
EKG P AXIS: 49 DEGREES
EKG P-R INTERVAL: 182 MS
EKG Q-T INTERVAL: 348 MS
EKG QRS DURATION: 88 MS
EKG QTC CALCULATION (BAZETT): 388 MS
EKG R AXIS: 6 DEGREES
EKG T AXIS: 163 DEGREES
EKG VENTRICULAR RATE: 75 BPM

## 2020-09-23 PROCEDURE — 93010 ELECTROCARDIOGRAM REPORT: CPT | Performed by: INTERNAL MEDICINE

## 2020-09-23 NOTE — ED PROVIDER NOTES
culture positive 04/13/2018    leg ulcer    MRSA (methicillin resistant staph aureus) culture positive 1/4/20;06/08/2019    leg    MRSA colonization 10/16/2018    Obesity (BMI 30-39. 9)     Osteoarthritis     Pancreatitis     Sepsis ([address]     Stasis ulcer ([address]     bilateral lower extremities     Suprapubic catheter ([address]          Procedure Laterality Date    APPENDECTOMY      BACK SURGERY      x2    CATARACT REMOVAL      bilateral     CHOLECYSTECTOMY      COLONOSCOPY      EYE SURGERY      FRACTURE SURGERY      Left ankle    HYSTERECTOMY      INSET/CHANGE LOMELI CATHETER - COMPLICATED  18/4119    suprapubic cath    JOINT REPLACEMENT      Right knee    KNEE SURGERY         Medications:  Discharge Medication List as of 9/22/2020  9:43 PM      CONTINUE these medications which have NOT CHANGED    Details   lactobacillus (CULTURELLE) capsule Take 1 capsule by mouth daily (with breakfast), Disp-30 capsule,R-0Normal      cetirizine (ZYRTEC) 10 MG tablet Take 10 mg by mouth dailyHistorical Med      mirabegron (MYRBETRIQ) 50 MG TB24 Take 50 mg by mouth dailyHistorical Med      !! furosemide (LASIX) 40 MG tablet Take 40 mg by mouth every other day Alternating with 20 mg tabletHistorical Med      !! furosemide (LASIX) 20 MG tablet Take 20 mg by mouth every other day Alternating with 40 mg tabletHistorical Med      dorzolamide-timolol (COSOPT) 22.3-6.8 MG/ML ophthalmic solution Place 1 drop into the right eye 2 times dailyHistorical Med      atropine 1 % ophthalmic solution Place 1 drop into the right eye 2 times dailyHistorical Med      prednisoLONE acetate (PRED FORTE) 1 % ophthalmic suspension Place 1 drop into the right eye 2 times dailyHistorical Med      aspirin 325 MG EC tablet Take 325 mg by mouth dailyHistorical Med      sennosides-docusate sodium (SENOKOT-S) 8.6-50 MG tablet Take 1 tablet by mouth dailyHistorical Med      isosorbide mononitrate (IMDUR) 30 MG extended release tablet Take 30 mg by mouth abnormality is identified. Suprapubic Brasher catheter is within the urinary bladder, in normal position. XR CHEST PORTABLE   Final Result   No acute process. Stable compared to the prior study              Xr Chest Portable    Result Date: 9/22/2020  EXAMINATION: ONE XRAY VIEW OF THE CHEST 9/22/2020 8:10 pm COMPARISON: July 20, 2020 HISTORY: ORDERING SYSTEM PROVIDED HISTORY: Fatigue TECHNOLOGIST PROVIDED HISTORY: Reason for exam:->Fatigue Reason for Exam: fatigue FINDINGS: The lungs are without acute focal process. There is no effusion or pneumothorax. The cardiomediastinal silhouette is stable. The osseous structures are stable. No acute process. Stable compared to the prior study          [facility] / ED COURSE:      PROCEDURES:   Procedures    None    Patient was given:  Medications   iopamidol (ISOVUE-370) 76 % injection 75 mL (75 mLs Intravenous Given 9/22/20 2017)     Emergency room course: Patient on exam pupils equal round and reactive to light extraocular movement is intact. Throat is clear. Cardiovascular regular rate rhythm, lungs are clear. No wheeze, rales or rhonchi noted. No chest wall tenderness with palpation. Abdomen is soft nontender. Nondistended. Normal bowel sounds all 4 quadrant. Bilateral lower extremities show 1+ pitting edema. Patient no facial drooping no slurred speech noted.  strength is 5+ equal bilaterally. Rectal exam shows normal rectal tone. She does have multiple pressure ulcers on her buttocks both right and left side. Stool is dark brown in color. Patient is alert. Lab result from today shows[de-identified]  CBC with a white count of 7.6, RBC 2.89, hemoglobin 9.4 hematocrit 27.7. CMP shows sodium 127, potassium 3.1, chloride 93 BUN 27 creatinine 1.0. Troponin less than 0.01. BNP 1537    Occult blood negative    Urinalysis shows large leukocytes, positive for nitrites, large blood negative for ketones.   Microscopically hyaline casts 6, , RBC greater than 900 epithelial cells 0. X-ray of chest portable show no acute process. CTA of abdomen and pelvis shows no active gastrointestinal hemorrhage identified. No vascular malformation or source of hemorrhage identified. There is non-formed stool in the colon and rectum. No acute inflammatory abnormalities identified. Suprapubic Brasher catheter is within the urinary bladder. In normal position. At this time I did discuss with patient her lab results CT results with her. Patient became very adamant that she did not want to be in the hospital.  She did not want any treatment. She wanted to go home. The nurse placed a call out to her daughter and informed her that she has a urinary tract infection. She is not confused. She answer questions appropriately and she is alert to person place and time. She is know where she is at. That there is nothing to keep her here in the hospital for. Her sodium 127 is at her baseline. And she does not want to stay as he refuses any further treatment or medication. Her daughter has states she will come and get her. I will put her on Zofran and Macrobid for home. The patient tolerated their visit well. I evaluated the patient. The physician was available for consultation as needed. The patient and / or the family were informed of the results of any tests, a time was given to answer questions, a plan was proposed and they agreed with plan. CLINICAL IMPRESSION:  1. Non-intractable vomiting with nausea, unspecified vomiting type    2.  Urinary tract infection with hematuria, site unspecified        DISPOSITION Decision To Discharge 09/22/2020 09:34:38 PM      PATIENT REFERRED TO:  [address] [place] [address] Elsa Bianchi  [phone]    Call in 1 day        DISCHARGE MEDICATIONS:  Discharge Medication List as of 9/22/2020  9:43 PM      START taking these medications    Details   ondansetron (ZOFRAN ODT) 4 MG disintegrating tablet Take 1 tablet by mouth every 12 hours as needed for Nausea, Disp-12 tablet,R-0Print      nitrofurantoin, macrocrystal-monohydrate, (MACROBID) 100 MG capsule Take 1 capsule by mouth 2 times daily for 7 days, Disp-14 capsule,R-0Print             DISCONTINUED MEDICATIONS:  Discharge Medication List as of 9/22/2020  9:43 PM      STOP taking these medications       LACTOBACILLUS PO Comments:   Reason for Stopping:         ciprofloxacin (CIPRO) 500 MG tablet Comments:   Reason for Stopping:         Lactobacillus (PROBIOTIC ACIDOPHILUS PO) Comments:   Reason for Stopping:                      (Please note the MDM and HPI sections of this note were completed with a voice recognition program.  Efforts were made to edit the dictations but occasionally words are mis-transcribed.)    Electronically signed, Ezio Carty PA-C,          Ezio Carty PA-C  09/25/20 2036

## 2020-09-23 NOTE — TELEPHONE ENCOUNTER
Patient's daughter called SW back and advised that her mother is on her way to [facility], she expressed that she is very unhappy with her mother's care last night in the ED. Expressed that I am sorry that happened. Advised if I can assist in any way with her admission to [facility] please call.

## 2020-09-23 NOTE — TELEPHONE ENCOUNTER
YASMANI called daughter Kavita Joyce [facility],[phone], left message to assist with any needs.

## 2020-09-23 NOTE — PROGRESS NOTES
Medication Reconciliation     List of medications patient is currently taking is complete. Source of information:   1. Conversation with patient's daughters  2. EPIC records        Notes regarding home medications:  1. Patient received all of her morning and afternoon home medications today. 2. Creon increased to to 12,000u TID    3. Metoprolol increased to 50 mg BID    4.  Alternates 20 mg and 40 mg Lasix QD      Aman Choi, Pharmacy Intern  9/22/2020 9:18 PM

## 2020-09-24 LAB
ORGANISM: ABNORMAL
ORGANISM: ABNORMAL
URINE CULTURE, ROUTINE: ABNORMAL
URINE CULTURE, ROUTINE: ABNORMAL

## 2020-09-25 NOTE — RESULT ENCOUNTER NOTE
Patient's positive result has been appropriately evaluated by the provider pool. Patient was unable to be reached over the phone. A voicemail was left with the patient. Will await a return phone call. Will try to reach patient again tomorrow per protocol.   Await return phone call fron [facility] [phone]   states nurse is passing meds  Spoke with daughter  Aware of med change

## 2020-09-25 NOTE — RESULT ENCOUNTER NOTE
Culture reviewed, culture is positive but resistant to antibiotics prescribed at discharge. Antibiotic needs to be changed to Cipro 500 mg twice daily x10 days #20.

## 2020-09-25 NOTE — RESULT ENCOUNTER NOTE
Patient's positive result has been appropriately evaluated by the provider pool. Patient was contacted and notified of the change in treatment plan. Medication was phoned to the patient's pharmacy per protocol:    Pharmacy:   [address] [place], [place] [address] [phone] - F [phone]  [phone]  Phone: [phone] Fax: [phone]    [facility] [address] [facility] [phone] [facility] [phone]  [address] [facility] [address]  Phone: [phone] Fax: [phone]    Phone number: [phone]  Pharmacist receiving the prescription: Sent to [facility] to nurse caring for her  He will give to facility Md for review.   He is aware of RX for both bacteria in her urine

## 2023-06-13 NOTE — PROGRESS NOTES
syncope  []  lower extremity edema [] Other:  Gastrointestinal ROS:  []  Dysphagia  [] ABD pain  []  nausea  []  vomiting  []  indigestion  []  diarrhea  []  constipation [] Other:  Genitourinary:  []  frequency  []  polyuria  []  nocturia  []  hesitancy  []  urgency  []  hematuria  []  incontinence [] Other:  Musculoskeletal ROS:  []  muscle or joint pain  []  stiffness  [] arthritis  []  gout  []  weakness  [x]  redness  [x]  swelling  []  instability [] Other:  Endocrine ROS:  []  heat/cold intolerance  []  sweating  []  excessive thirst or hunger [] Other:  Neurological ROS:  []  Seizures  []  numbness  []  tingling  []  fainting  []  burning  []  tremors [] Other:  Psych ROS:  []  Anxiety  []  depression  []  abnormal thoughts [] Other:  Dermatological ROS:  []  Rash  []  sores  []  lumps  []  skin changes  []  changes to hair or nails [x] Other: wounds      Past Medical History:        Diagnosis Date    Anemia     Atrial fibrillation ([address]     CAD (coronary artery disease)     Chronic back pain     CKD (chronic kidney disease), stage III ([address]     Diabetes mellitus ([address]     Diastolic CHF ([address]     Hyperlipidemia     Hypertension     MI (myocardial infarction) ([address]     MRSA (methicillin resistant staph aureus) culture positive 04/13/2018    leg ulcer    MRSA colonization 10/16/2018    Obesity (BMI 30-39. 9)     Osteoarthritis     Pancreatitis     Sepsis ([address]     Stasis ulcer [address]     bilateral lower extremities     Suprapubic catheter ([facility]        Past Surgical History:        Procedure Laterality Date    APPENDECTOMY      BACK SURGERY      x2    CATARACT REMOVAL      bilateral     CHOLECYSTECTOMY      COLONOSCOPY      EYE SURGERY      FRACTURE SURGERY      Left ankle    HYSTERECTOMY      INSET/CHANGE LOMELI CATHETER - COMPLICATED  76/7668    suprapubic cath    JOINT REPLACEMENT      Right knee    KNEE SURGERY         Allergies:  Sulfa antibiotics    Past medical and surgical Consent 1/Introductory Paragraph: The rationale for Mohs was explained to the patient and consent was obtained. The risks, benefits and alternatives to therapy were discussed in detail. Specifically, the risks of infection, scarring, bleeding, prolonged wound healing, incomplete removal, allergy to anesthesia, nerve injury and recurrence were addressed. Prior to the procedure, the treatment site was clearly identified and confirmed by the patient. All components of Universal Protocol/PAUSE Rule completed.

## 2023-08-15 NOTE — PROGRESS NOTES
Patient's daughter, Nuzhat Ramirez, called this RN to get update on patient because her mother did not seem to be doing well during the day. This RN informed her that pt is short of breath and that NP was notified/new orders were placed. This RN agreed to update her with any changes. 5-Fu Counseling: 5-Fluorouracil Counseling:  I discussed with the patient the risks of 5-fluorouracil including but not limited to erythema, scaling, itching, weeping, crusting, and pain.

## 2023-12-06 NOTE — PROGRESS NOTES
Department of Physical Medicine & Rehabilitation  Progress Note    Patient Identification:  José Marino  [id number]  : 1945  Admit date: 2020    Chief Complaint: weakness, fatigue    Subjective:   No acute events overnight. Patient seen this afternoon sitting up in bed. She is unable to tell me if she ambulated to chair with assist or required equipment. Reports she is feeling well and plans to return home at discharge. States she has multiple caregivers who can provide physical assist.     ROS: No f/c, n/v, cp     Objective:  Patient Vitals for the past 24 hrs:   BP Temp Temp src Pulse Resp SpO2 Weight   20 1517 128/62 98.1 °F (36.7 °C) Oral 74 16 95 % --   20 1152 108/72 98.4 °F (36.9 °C) Oral 81 18 100 % --   20 0936 -- -- -- -- -- 97 % --   20 0600 107/62 97.6 °F (36.4 °C) Oral 81 18 100 % 176 lb 5.9 oz (80 kg)   20 0045 124/64 97.3 °F (36.3 °C) Oral 74 16 96 % --   20 2114 123/68 97.8 °F (36.6 °C) Oral 78 16 98 % --     Const: Alert. No distress, pleasant. HEENT: Normocephalic, atraumatic. Normal sclera/conjunctiva. MMM. CV: Regular rate and rhythm. Resp: No respiratory distress. Abd: Soft, nontender, nondistended  Ext: +Edema  Neuro: Alert, impaired recall  Psych: Cooperative, appropriate mood and affect    Laboratory data: Available via EMR.    Last 24 hour lab  Recent Results (from the past 24 hour(s))   POCT Glucose    Collection Time: 20  5:04 PM   Result Value Ref Range    POC Glucose 184 (H) 70 - 99 mg/dl    Performed on ACCU-CHEK    POCT Glucose    Collection Time: 20  8:59 PM   Result Value Ref Range    POC Glucose 161 (H) 70 - 99 mg/dl    Performed on ACCU-CHEK    POCT Glucose    Collection Time: 20  9:06 PM   Result Value Ref Range    POC Glucose 147 (H) 70 - 99 mg/dl    Performed on ACCU-CHEK    Basic Metabolic Panel    Collection Time: 20  6:04 AM   Result Value Ref Range    Sodium 141 136 - 145 mmol/L    Potassium Patient: Cindi Crystal    Procedure:        Anesthesia Type:  No value filed.    Note:  Disposition: Inpatient   Postop Pain Control: Uneventful            Sign Out: Well controlled pain   PONV: No   Neuro/Psych: Uneventful            Sign Out: Acceptable/Baseline neuro status   Airway/Respiratory: Uneventful            Sign Out: Acceptable/Baseline resp. status   CV/Hemodynamics: Uneventful            Sign Out: Acceptable CV status; No obvious hypovolemia; No obvious fluid overload   Other NRE: NONE   DID A NON-ROUTINE EVENT OCCUR?            Last vitals:  Vitals:    12/05/23 1600 12/05/23 2001 12/06/23 0118   BP: 113/65 103/63 117/70   Pulse: 87 81 70   Resp: 18 16 16   Temp: 36.3  C (97.4  F) 36.6  C (97.8  F) 36.7  C (98  F)   SpO2: 97%         Electronically Signed By: Krystal Chaudhary  December 6, 2023  6:46 AM   5.1 3.5 - 5.1 mmol/L    Chloride 107 99 - 110 mmol/L    CO2 27 21 - 32 mmol/L    Anion Gap 7 3 - 16    Glucose 113 (H) 70 - 99 mg/dL    BUN 16 7 - 20 mg/dL    CREATININE 0.8 0.6 - 1.2 mg/dL    GFR Non-African American >60 >60    GFR African American >60 >60    Calcium 7.9 (L) 8.3 - 10.6 mg/dL   CBC    Collection Time: 07/23/20  6:04 AM   Result Value Ref Range    WBC 6.4 4.0 - 11.0 K/uL    RBC 2.41 (L) 4.00 - 5.20 M/uL    Hemoglobin 8.0 (L) 12.0 - 16.0 g/dL    Hematocrit 23.8 (L) 36.0 - 48.0 %    MCV 98.6 80.0 - 100.0 fL    MCH 33.0 26.0 - 34.0 pg    MCHC 33.4 31.0 - 36.0 g/dL    RDW 13.5 12.4 - 15.4 %    Platelets 928 357 - 743 K/uL    MPV 8.4 5.0 - 10.5 fL   Vancomycin, Random    Collection Time: 07/23/20  6:04 AM   Result Value Ref Range    Vancomycin Rm 19.1 ug/mL   POCT Glucose    Collection Time: 07/23/20  7:55 AM   Result Value Ref Range    POC Glucose 124 (H) 70 - 99 mg/dl    Performed on ACCU-CHEK    POCT Glucose    Collection Time: 07/23/20 11:55 AM   Result Value Ref Range    POC Glucose 146 (H) 70 - 99 mg/dl    Performed on ACCU-CHEK        Therapy progress:  PT  Position Activity Restriction  Other position/activity restrictions: B LE wounds  Objective     Sit to Stand: Minimal Assistance, Contact guard assistance(w/ cues for hand placement and to scoot forward so that her feet our touching the ground)  Stand to sit: Minimal Assistance, Contact guard assistance(cues for hand placement)  Bed to Chair: Unable to assess  Device: Rolling Walker  Assistance: Contact guard assistance, Minimal assistance(W/ cues to stay within the base of the RW and keep shoulder upright)  Distance: 8' x2  OT  PT Equipment Recommendations  Other: will monitor  Toilet - Technique: Ambulating  Equipment Used: Standard bedside commode  Assessment        SLP  Diet Solids Recommendation: Dysphagia Minced and Moist (Dysphagia II)  Liquid Consistency Recommendation: Thin    Body mass index is 35.62 kg/m². Assessment:  1. Debility  2. UTI in setting of chronic SPT  3. KACIE on CKD  4. Chronic LE wounds   5. HTN  6. HLD  7. DM2  8. Anemia     Impairments: Generalized weakness, decreased endurance, balance, cognition     Recommendations:     Patient still requiring significant physical assist for mobility and ADLs. Typically would recommend SNF in this situation. However, per therapists, daughter has stated that caregivers at home are able to provide this level of assist.   If she does discharge home, would recommend home care services.      Thank you for this consult. Please contact me with any questions or concerns. [address]  Maliha Louie MD 7/23/2020, 4:33 PM

## 2024-03-08 NOTE — ED NOTES
Bed: A-17  Expected date:   Expected time:   Means of arrival:   Comments:  [address] [place] [address] Brandon Costello RN  02/10/20 2027 4 - 6 weeks

## 2024-08-07 NOTE — PROGRESS NOTES
Perfect serve sent to FRANCHESCA Hernandez regarding BS of 324 and no insulin orders.  Electronically signed by Elijah Kahn RN on 7/21/2020 at 9:33 AM heart disease

## 2024-09-27 NOTE — PROGRESS NOTES
Clinical Pharmacy Note  Renal Dose Adjustment    Ria Govea is receiving cefepime. This medication is renally eliminated. Based on the patient's CrCl < 60 mL/min and urine output, the dose has been adjusted to 2 gm q24h per protocol. Pharmacy will continue to monitor and adjust dose as needed for changes in renal function. [Patient Intake Form Reviewed] : Patient intake form was reviewed [Negative] : Heme/Lymph